# Patient Record
Sex: FEMALE | Race: WHITE | NOT HISPANIC OR LATINO | Employment: FULL TIME | URBAN - NONMETROPOLITAN AREA
[De-identification: names, ages, dates, MRNs, and addresses within clinical notes are randomized per-mention and may not be internally consistent; named-entity substitution may affect disease eponyms.]

---

## 2019-12-27 ENCOUNTER — HOSPITAL ENCOUNTER (OUTPATIENT)
Dept: MAMMOGRAPHY | Facility: HOSPITAL | Age: 59
Discharge: HOME | End: 2019-12-27
Admitting: OBSTETRICS & GYNECOLOGY

## 2019-12-27 DIAGNOSIS — Z12.31 VISIT FOR SCREENING MAMMOGRAM: ICD-10-CM

## 2019-12-27 PROCEDURE — 77063 BREAST TOMOSYNTHESIS BI: CPT

## 2019-12-27 PROCEDURE — 77063 BREAST TOMOSYNTHESIS BI: CPT | Performed by: RADIOLOGY

## 2019-12-27 PROCEDURE — 77067 SCR MAMMO BI INCL CAD: CPT | Performed by: RADIOLOGY

## 2019-12-27 PROCEDURE — 77067 SCR MAMMO BI INCL CAD: CPT

## 2020-01-02 ENCOUNTER — APPOINTMENT (OUTPATIENT)
Dept: MAMMOGRAPHY | Facility: HOSPITAL | Age: 60
End: 2020-01-02

## 2020-01-16 ENCOUNTER — HOSPITAL ENCOUNTER (OUTPATIENT)
Dept: MAMMOGRAPHY | Facility: HOSPITAL | Age: 60
Discharge: HOME | End: 2020-01-16
Admitting: RADIOLOGY

## 2020-01-16 ENCOUNTER — HOSPITAL ENCOUNTER (OUTPATIENT)
Dept: ULTRASOUND IMAGING | Facility: HOSPITAL | Age: 60
Discharge: HOME | End: 2020-01-16

## 2020-01-16 DIAGNOSIS — R92.8 ABNORMAL MAMMOGRAM OF RIGHT BREAST: ICD-10-CM

## 2020-01-16 PROCEDURE — 77065 DX MAMMO INCL CAD UNI: CPT

## 2020-01-16 PROCEDURE — 76642 ULTRASOUND BREAST LIMITED: CPT

## 2020-01-16 PROCEDURE — 77065 DX MAMMO INCL CAD UNI: CPT | Performed by: RADIOLOGY

## 2020-01-20 ENCOUNTER — OFFICE VISIT (OUTPATIENT)
Dept: SURGERY | Facility: CLINIC | Age: 60
End: 2020-01-20

## 2020-01-20 VITALS
TEMPERATURE: 97.8 F | HEIGHT: 64 IN | BODY MASS INDEX: 31.41 KG/M2 | OXYGEN SATURATION: 99 % | DIASTOLIC BLOOD PRESSURE: 78 MMHG | HEART RATE: 81 BPM | WEIGHT: 184 LBS | SYSTOLIC BLOOD PRESSURE: 132 MMHG

## 2020-01-20 DIAGNOSIS — N63.0 BREAST MASS: Primary | ICD-10-CM

## 2020-01-20 DIAGNOSIS — R92.8 ABNORMAL MAMMOGRAM: ICD-10-CM

## 2020-01-20 PROCEDURE — 99203 OFFICE O/P NEW LOW 30 MIN: CPT | Performed by: SURGERY

## 2020-01-20 NOTE — PROGRESS NOTES
Patient: Jackie Johnson    YOB: 1960    Date: 01/20/2020    Primary Care Provider: Denver Aguirre MD    Chief Complaint   Patient presents with   • Abnormal Breast Imaging       Subjective .     History of present illness:  I saw the patient in the office  today for evaluation and treatment of abnormal mammogram. Results show Bi-Rads 5.  Patient does states right breast pain and nipple drainage. She denies discoloration at this time. Patient does have a strong family history of breast cancer.  Patient scheduled for right breast stereotactic biopsy tomorrow in [place].  Patient has a second lesion that measures 5 mm seen on ultrasound may also need to be evaluated.  Patient has a very strong family history of breast cancer in her sister and mother.  Patient also complained of mild nipple discharge.    The following portions of the patient's history were reviewed and updated as appropriate: allergies, current medications, past family history, past medical history, past social history, past surgical history and problem list.    Review of Systems   Constitutional: Negative for chills, fever and unexpected weight change.   HENT: Negative for hearing loss, trouble swallowing and voice change.    Eyes: Negative for visual disturbance.   Respiratory: Negative for apnea, cough, chest tightness, shortness of breath and wheezing.    Cardiovascular: Negative for chest pain, palpitations and leg swelling.   Gastrointestinal: Negative for abdominal distention, abdominal pain, anal bleeding, blood in stool, constipation, diarrhea, nausea, rectal pain and vomiting.   Endocrine: Negative for cold intolerance and heat intolerance.   Genitourinary: Negative for difficulty urinating, dysuria and flank pain.   Musculoskeletal: Negative for back pain and gait problem.   Skin: Negative for color change, rash and wound.   Neurological: Negative for dizziness, syncope, speech difficulty, weakness, light-headedness,  "numbness and headaches.   Hematological: Negative for adenopathy. Does not bruise/bleed easily.   Psychiatric/Behavioral: Negative for confusion. The patient is not nervous/anxious.        History:  Past Medical History:   Diagnosis Date   • Asthma           Past Surgical History:   Procedure Laterality Date   •  SECTION     • LEG SURGERY     • TONSILLECTOMY         Family History   Problem Relation Age of Onset   • Breast cancer Mother 75   • Breast cancer Sister 55   • Cancer Father    • Heart disease Father    • Cancer Maternal Grandmother    • Cancer Maternal Grandfather    • Cancer Paternal Grandmother    • Cancer Paternal Grandfather        Social History     Tobacco Use   • Smoking status: Never Smoker   Substance Use Topics   • Alcohol use: Never     Frequency: Never   • Drug use: Never       Allergies:  Allergies   Allergen Reactions   • Penicillins Hives   • Sulfa Antibiotics Other (See Comments)     lesion       Medications:     Current Outpatient Medications:   •  Loratadine (CLARITIN PO), Take  by mouth., Disp: , Rfl:     Objective     Vital Signs:   Vitals:    20 1329   BP: 132/78   Pulse: 81   Temp: 97.8 °F (36.6 °C)   SpO2: 99%   Weight: 83.5 kg (184 lb)   Height: 162.6 cm (64\")       Physical Exam:     General Appearance:    Alert, cooperative, in no acute distress   Head:    Normocephalic, without obvious abnormality, atraumatic   Eyes:            Lids and lashes normal, conjunctivae and sclerae normal, no   icterus, no pallor, corneas clear,   Ears:    Ears appear intact with no abnormalities noted   Throat:   No oral lesions, no thrush, oral mucosa moist   Breast:    No palpable mass in either breast or axilla   Lungs:     Clear to auscultation,respirations regular, even and                  Unlabored    Heart:    Regular rhythm and normal rate, no murmur, no gallop.   Chest Wall:    No abnormalities observed   Abdomen:     Normal bowel sounds, no masses, no organomegaly, soft       "  non-tender, non-distended, no guarding.   Extremities:   Moves all extremities well, no edema, no cyanosis, no             redness   Pulses:   Pulses palpable and equal bilaterally   Skin:   No bleeding, bruising or rash   Lymph nodes:   No palpable adenopathy   Neurologic:   Cranial nerves 2 - 12 grossly intact.           Results Review:   I reviewed the patient's new clinical results.    Review of Systems was reviewed and confirmed as accurate as documented by the MA.    Assessment / Plan:    1. Breast mass    2. Abnormal mammogram        I did have a detailed and extensive discussion with the patient in the office today and reviewed her recent workup.  I have told the patient that to go ahead and have stereotactic biopsy done tomorrow.  Follow-up in 1 week for a right breast mammotome biopsy.  If stereotactic biopsy is positive and she wants to undergo a mastectomy, there is no need to do a second biopsy on the ultrasound visualized lesion.    Electronically signed by Anai Bansal MD  01/20/20  1:49 PM

## 2020-01-21 ENCOUNTER — HOSPITAL ENCOUNTER (OUTPATIENT)
Dept: MAMMOGRAPHY | Facility: HOSPITAL | Age: 60
Discharge: HOME | End: 2020-01-21

## 2020-01-21 ENCOUNTER — HOSPITAL ENCOUNTER (OUTPATIENT)
Dept: MAMMOGRAPHY | Facility: HOSPITAL | Age: 60
Discharge: HOME | End: 2020-01-21
Admitting: RADIOLOGY

## 2020-01-21 DIAGNOSIS — R92.8 ABNORMAL MAMMOGRAM: ICD-10-CM

## 2020-01-21 PROCEDURE — 19081 BX BREAST 1ST LESION STRTCTC: CPT | Performed by: RADIOLOGY

## 2020-01-21 PROCEDURE — A4648 IMPLANTABLE TISSUE MARKER: HCPCS

## 2020-01-21 PROCEDURE — 77065 DX MAMMO INCL CAD UNI: CPT | Performed by: RADIOLOGY

## 2020-01-21 PROCEDURE — 19082 BX BREAST ADD LESION STRTCTC: CPT | Performed by: RADIOLOGY

## 2020-01-22 ENCOUNTER — APPOINTMENT (OUTPATIENT)
Dept: MAMMOGRAPHY | Facility: HOSPITAL | Age: 60
End: 2020-01-22

## 2020-01-24 LAB
LAB AP CASE REPORT: NORMAL
LAB AP CLINICAL INFORMATION: NORMAL
PATH REPORT.FINAL DX SPEC: NORMAL

## 2020-02-12 LAB
LAB AP CASE REPORT: NORMAL
LAB AP CLINICAL INFORMATION: NORMAL
PATH REPORT.FINAL DX SPEC: NORMAL

## 2020-03-11 ENCOUNTER — CONSULT (OUTPATIENT)
Dept: ONCOLOGY | Facility: CLINIC | Age: 60
End: 2020-03-11

## 2020-03-11 VITALS
SYSTOLIC BLOOD PRESSURE: 131 MMHG | DIASTOLIC BLOOD PRESSURE: 85 MMHG | WEIGHT: 183 LBS | BODY MASS INDEX: 31.24 KG/M2 | TEMPERATURE: 98.2 F | HEIGHT: 64 IN | OXYGEN SATURATION: 98 % | HEART RATE: 68 BPM

## 2020-03-11 DIAGNOSIS — Z17.1 MALIGNANT NEOPLASM OF RIGHT BREAST IN FEMALE, ESTROGEN RECEPTOR NEGATIVE, UNSPECIFIED SITE OF BREAST (HCC): Primary | ICD-10-CM

## 2020-03-11 DIAGNOSIS — C50.911 MALIGNANT NEOPLASM OF RIGHT BREAST IN FEMALE, ESTROGEN RECEPTOR NEGATIVE, UNSPECIFIED SITE OF BREAST (HCC): Primary | ICD-10-CM

## 2020-03-11 LAB
ALBUMIN SERPL-MCNC: 4.54 G/DL (ref 3.5–5.2)
ALBUMIN/GLOB SERPL: 1.5 G/DL
ALP SERPL-CCNC: 73 U/L (ref 39–117)
ALT SERPL W P-5'-P-CCNC: 38 U/L (ref 1–33)
ANION GAP SERPL CALCULATED.3IONS-SCNC: 13.1 MMOL/L (ref 5–15)
AST SERPL-CCNC: 20 U/L (ref 1–32)
BASOPHILS # BLD AUTO: 0.08 10*3/MM3 (ref 0–0.2)
BASOPHILS NFR BLD AUTO: 1.2 % (ref 0–1.5)
BILIRUB SERPL-MCNC: 0.4 MG/DL (ref 0.2–1.2)
BUN BLD-MCNC: 15 MG/DL (ref 8–23)
BUN/CREAT SERPL: 19.7 (ref 7–25)
CALCIUM SPEC-SCNC: 9.9 MG/DL (ref 8.6–10.5)
CHLORIDE SERPL-SCNC: 102 MMOL/L (ref 98–107)
CO2 SERPL-SCNC: 24.9 MMOL/L (ref 22–29)
CREAT BLD-MCNC: 0.76 MG/DL (ref 0.57–1)
DEPRECATED RDW RBC AUTO: 44 FL (ref 37–54)
EOSINOPHIL # BLD AUTO: 0.09 10*3/MM3 (ref 0–0.4)
EOSINOPHIL NFR BLD AUTO: 1.3 % (ref 0.3–6.2)
ERYTHROCYTE [DISTWIDTH] IN BLOOD BY AUTOMATED COUNT: 12.9 % (ref 12.3–15.4)
GFR SERPL CREATININE-BSD FRML MDRD: 78 ML/MIN/1.73
GLOBULIN UR ELPH-MCNC: 3.1 GM/DL
GLUCOSE BLD-MCNC: 99 MG/DL (ref 65–99)
HCT VFR BLD AUTO: 44 % (ref 34–46.6)
HGB BLD-MCNC: 14.1 G/DL (ref 12–15.9)
IMM GRANULOCYTES # BLD AUTO: 0.01 10*3/MM3 (ref 0–0.05)
IMM GRANULOCYTES NFR BLD AUTO: 0.1 % (ref 0–0.5)
LYMPHOCYTES # BLD AUTO: 2.36 10*3/MM3 (ref 0.7–3.1)
LYMPHOCYTES NFR BLD AUTO: 34.5 % (ref 19.6–45.3)
MCH RBC QN AUTO: 29.7 PG (ref 26.6–33)
MCHC RBC AUTO-ENTMCNC: 32 G/DL (ref 31.5–35.7)
MCV RBC AUTO: 92.8 FL (ref 79–97)
MONOCYTES # BLD AUTO: 0.51 10*3/MM3 (ref 0.1–0.9)
MONOCYTES NFR BLD AUTO: 7.4 % (ref 5–12)
NEUTROPHILS # BLD AUTO: 3.8 10*3/MM3 (ref 1.7–7)
NEUTROPHILS NFR BLD AUTO: 55.5 % (ref 42.7–76)
NRBC BLD AUTO-RTO: 0 /100 WBC (ref 0–0.2)
PLATELET # BLD AUTO: 237 10*3/MM3 (ref 140–450)
PMV BLD AUTO: 10.1 FL (ref 6–12)
POTASSIUM BLD-SCNC: 4.3 MMOL/L (ref 3.5–5.2)
PROT SERPL-MCNC: 7.6 G/DL (ref 6–8.5)
RBC # BLD AUTO: 4.74 10*6/MM3 (ref 3.77–5.28)
SODIUM BLD-SCNC: 140 MMOL/L (ref 136–145)
WBC NRBC COR # BLD: 6.85 10*3/MM3 (ref 3.4–10.8)

## 2020-03-11 PROCEDURE — 80053 COMPREHEN METABOLIC PANEL: CPT | Performed by: INTERNAL MEDICINE

## 2020-03-11 PROCEDURE — 99205 OFFICE O/P NEW HI 60 MIN: CPT | Performed by: INTERNAL MEDICINE

## 2020-03-11 PROCEDURE — 86300 IMMUNOASSAY TUMOR CA 15-3: CPT | Performed by: INTERNAL MEDICINE

## 2020-03-11 PROCEDURE — 85025 COMPLETE CBC W/AUTO DIFF WBC: CPT | Performed by: INTERNAL MEDICINE

## 2020-03-11 PROCEDURE — 36415 COLL VENOUS BLD VENIPUNCTURE: CPT | Performed by: INTERNAL MEDICINE

## 2020-03-11 RX ORDER — ACETAMINOPHEN 325 MG/1
650 TABLET ORAL EVERY 6 HOURS PRN
COMMUNITY
End: 2020-11-10

## 2020-03-11 RX ORDER — FEXOFENADINE HYDROCHLORIDE 60 MG/1
60 TABLET, FILM COATED ORAL EVERY OTHER DAY
Status: ON HOLD | COMMUNITY
End: 2020-04-18

## 2020-03-11 NOTE — PROGRESS NOTES
NAME: Jackie Johnson    : 1960    DATE OF CONSULTATION:  3/11/2020    REASON FOR REFERRAL: Breast Cancer    REFERRING PHYSICIAN:  JUAN PABLO Richards MD    CHIEF COMPLAINT:  Breast Cancer      HISTORY OF PRESENT ILLNESS:   Jackie Johnson is a very pleasant 60 y.o. female who is being seen today in the presence of her  at the request of JUAN PABLO Richards MD for evaluation and treatment of breast cancer. She has a strong family h/o breast cancer and so is normally very good about getting her mammograms done.  She recently moved from [facility] however and so missed her mammogram in 2019 because of the move.  She got established with a new PCP (Dr. Denver Sheth) in 2019 and was referred to a a new gynecologist  (Dr. Brayan Richards) who she saw in .  Kevin Richards ordered a screening mammogram.  She had an acute febrile illness around the time of the Super Bowl and at that time thought she might have a lump in her R breast.  While waiting on mammogram scheduling, she started to have pain in the breast and in eventually she would occasionally get a little bit of discharge from the right nipple.  She presented for  Screening mammography 19 as below.  This was followed by diagnostic mammogram and U/S on 20.  She had an abnormal span of 15 cm spanning the R breast.  She underwent stereotactic biopsy on 20.    This revealed a poorly differentiated invasive ductal carcinoma of the R breast with associated high grade DCIS.  Tumor was ER/HI- and HER-2+ by IHC.  Her sister lives in [place] and was treated at [facility], so she went there for further evaluation. She saw Dr. Romero of medical oncology and Dr. Serrano of surgical oncology.  She had staging with CT CAP and bone scan as well as Brain MRI and had no evidence of distant metastatic spread.  She had reese breast MRI as well which showed an unexpected abnormality in the L breast described as linear non-mass  enhancement spanning an 8 cm area in the inferior breast at 6:00.  This was biopsied on 20 with biopsy result pending.  No abnormal axillary LNs were identified.  Neoadjuvant chemotherapy was recommended to her by her [facility] team of physicians but she wishes to seek this treatment closer to home so is here today for further evaluation and treatment.    She is in good health overall. She reports an isolated episode of fever/chills during an acute illness, abdominal fullness to her right side, fatigue and a rash circling her neck. She denies changes in weight, cp, sob, persistent abdominal pain, n/v/c/d or bony pain.      Risk Factors:  Age of Menarche: 11/11 yo  Age of Menopause: 55 yo  Prior Breast Disease: negative   Pregnancies:    Age of 1st pregnancy: 26  Family History of Breast Cancer: + see family history below  Family History of Ovarian Cancer: + see family history below  History of HRT use: None  Other history of hormone use:  Took progesterone for 1 month as part of a fertility treatment      PAST MEDICAL HISTORY:  Past Medical History:   Diagnosis Date   • Acid reflux    • Asthma    • Eczema    • Endometriosis    • Hiatal hernia    • High cholesterol    • Seasonal allergies        PAST SURGICAL HISTORY:  Past Surgical History:   Procedure Laterality Date   • BREAST BIOPSY     • CARDIAC CATHETERIZATION     •  SECTION     • D&C HYSTEROSCOPY ENDOMETRIAL ABLATION     • LEG SURGERY      Left leg following fracture   • TONSILLECTOMY         FAMILY HISTORY:  Family History   Problem Relation Age of Onset   • Breast cancer Mother 75   • Basal cell carcinoma Mother 90        2 small spots on face removed + fair complexion   • Breast cancer Sister 55        VUS detected on genetic testing CDKN2A gene (c.-33G>C).    • Heart disease Father    • Throat cancer Father 79   • Lung cancer Father 79   • Cancer Maternal Grandmother 65        unknown gynecologic cancer, cervical vs uterine   •  Lymphoma Maternal Grandfather 64   • Breast cancer Paternal Grandmother 70   • Prostate cancer Paternal Grandfather 70        metastastic to liver   • Breast cancer Paternal Aunt 77   • Breast cancer Maternal Aunt 80   • Breast cancer Other    • Ovarian cancer Sister 66        Elevated ; 1A tumor; Borderline cancer; both ovaries & lg cyst removed   • Ovarian cancer Maternal Cousin 54   • Breast cancer Other 50         SOCIAL HISTORY:  Social History     Socioeconomic History   • Marital status:      Spouse name: Not on file   • Number of children: Not on file   • Years of education: Not on file   • Highest education level: Not on file   Tobacco Use   • Smoking status: Never Smoker   • Smokeless tobacco: Never Used   Substance and Sexual Activity   • Alcohol use: Never     Frequency: Never   • Drug use: Never   • Sexual activity: Defer   Social History Narrative    lives alone with      with [facility] [place]    No unusual chemical exposures    Never smoker, never drinker       REVIEW OF SYSTEMS:   A comprehensive 14 point review of systems was performed.  Significant findings as mentioned above.  All other systems reviewed and are negative.      MEDICATIONS:  The current medication list was reviewed in the EMR    Current Outpatient Medications:   •  acetaminophen (TYLENOL) 325 MG tablet, Take 650 mg by mouth Every 6 (Six) Hours As Needed for Mild Pain ., Disp: , Rfl:   •  Famotidine (PEPCID PO), Take  by mouth., Disp: , Rfl:   •  fexofenadine (ALLEGRA) 60 MG tablet, Take 60 mg by mouth Daily., Disp: , Rfl:   •  Loratadine (CLARITIN PO), Take  by mouth., Disp: , Rfl:     ALLERGIES:    Allergies   Allergen Reactions   • Albuterol Other (See Comments)     One time severe headache, questionable for aneurysm, did spinal tap was positive, did Angiogram and MRI were Negative for aneurysm   • Cefpodoxime Rash   • Clindamycin Hcl Rash   • Crestor [Rosuvastatin Calcium] Other (See Comments)      Caused high liver enxymes   • Penicillins Hives   • Sulfa Antibiotics Other (See Comments)     lesion   • Pseudoephedrine Other (See Comments)     Soreness on the tongue       PHYSICAL EXAM:  Vitals:    03/11/20 1535   BP: 131/85   Pulse: 68   Temp: 98.2 °F (36.8 °C)   SpO2: 98%     Pain Score    03/11/20 1535   PainSc: 0-No pain     General:  Awake, alert and oriented, in no distress  HEENT:  Pupils are equal, round and reactive to light and accommodation, Extra-ocular movements full, Oropharyx clear, mucous membranes moist  Neck:  No JVD, thyromegaly or lymphadenopathy  CV:  Regular rate and rhythm, no murmurs, rubs or gallops  Resp:  Lungs are clear to auscultation bilaterally  Breast:  She is large breasted.  L breast has some fibrocystic changes but no palpable mass lesions. No enlarged L axillary LNs.  R breast is large.  Surprisingly there really isn't a clearly defined mass.  There is an area of firmer breast tissue posteriorly behind the nipple which is about 10 cm in dimension, but this isn't clearly a mass, more c/w fibrocystic changes.  No nipple d/c or inversion.  No palpable R axillary LNs.  Abd:  Soft, non-tender, sl distended, bowel sounds present, no organomegaly or masses  Ext:  No clubbing, cyanosis or edema  Lymph:  No cervical, supraclavicular, axillary, inguinal or femoral adenopathy  Neuro:  MS as above, CN II-XII intact, grossly non-focal exam      PATHOLOGY:  02-12-20            ENDOSCOPY:        IMAGING:  Bilateral Diagnostic mammogram 12-27-19  FINDINGS:  There are scattered areas of fibroglandular density.     The bilateral fibroglandular pattern is unremarkable in appearance. A  few scattered calcifications are present in the left breast. There are  calcifications in the central aspect of the right breast spanning  approximately 15 cm of tissue in the AP dimension and extending up to  the base of the nipple for which additional imaging is recommended.     IMPRESSION:  1. Benign left  mammographic findings.  2. Calcifications in the right breast. Recommend additional imaging.        BI-RADS CATEGORY:  0, INCOMPLETE: Need additional imaging evaluation.     RECOMMENDATION:  Right ML and CC magnification views.      R diagnostic mammogram 01-16-20  FINDINGS:  Magnification imaging of the right breast demonstrates  casting-type pleomorphic calcifications spanning over 15 cm of tissue in  the AP dimension in the right 6:00 to 7:00 region. Calcifications do  extend up to the base of the nipple. Findings are highly suspicious for  DCIS.     Right breast ultrasound: Focused sonographic evaluation of the right  6:00 to 7:00 region demonstrates a single 0.5 cm irregular hypoechoic  mass associated with a coarse calcification located at 7:00, 3 cm from  the nipple. Ultrasound of the right axilla demonstrates no abnormal  appearing axillary lymph nodes.        IMPRESSION:  Findings are highly suspicious for extensive DCIS in the  right 6:00 to 7:00 region extending into the base of the nipple. There  is also small irregular mass noted on ultrasound in the 7:00 region  suspicious for invasion.        BI-RADS CATEGORY:  5, HIGHLY SUGGESTIVE OF MALIGNANCY        RECOMMENDATION:  Recommend stereotactic guided core biopsy of the  anterior and posterior aspect of the calcifications in the 6:00 to 7:00  region to determine extent of disease. Ultrasound-guided core biopsy of  the right 7:00 mass may also be warranted pending pathology results of  the calcifications.    Diagnostic bilateral mammogram 02-25-20  FINDINGS:  There are scattered areas of fibroglandular density.    1:  There are fine-linear branching calcifications measuring 16 x 7 x 7  centimeters with segmental distribution and associated post biopsy clip in the  right breast lower hemisphere at 6 to 7 o'clock.  There are 2 post biopsy clips  noted.  The calcifications extend to the inferior skin and nipple.    2:  There are amorphous calcifications  measuring 0.3 centimeters in the left  breast lower hemisphere at 6 to 7 o'clock located 4 centimeters from the nipple.    IMPRESSION:  1:  Fine-linear branching calcifications in the right breast lower hemisphere at  6 to 7 o'clock are known biopsy-proven malignancy. Ultrasound is recommended for  staging. Recommend appropriate evaluation and treatment of this known malignancy.    2:  Amorphous calcifications in the left breast lower hemisphere at 6 to 7  o'clock located 4 centimeters from the nipple are suspicious. Stereotactic  biopsy is recommended.    BI-RADS Category 4:  Suspicious Abnormality      US Chest 02-25-20  Findings:       Right Breast: The extent of calcified disease is best visualized by mammography. There are dilated ducts with internal calcifications vascularity extending toward the nipple at the 7 o'clock position. This is consistent with the patient's known biopsy-proven malignancy.      Right Charles Basins: No suspicious axillary (level I, II & III) or internal mammary lymphadenopathy is noted.      IMPRESSION:    1. Known RIGHT breast malignancy is best visualized by mammography. A MRI may be obtained for evaluation of disease.    2. No suspicious RIGHT-sided lymphadenopathy      ACR BI-RADS Category: 6. Known malignancy.  Recommend appropriate evaluation and treatment of the known malignancy.         R breast US 02-25-20  Findings:       Right Breast: The extent of calcified disease is best visualized by mammography. There are dilated ducts with internal calcifications vascularity extending toward the nipple at the 7 o'clock position. This is consistent with the patient's known biopsy-proven malignancy.      Right Charles Basins: No suspicious axillary (level I, II & III) or internal mammary lymphadenopathy is noted.      IMPRESSION:    1. Known RIGHT breast malignancy is best visualized by mammography. A MRI may be obtained for evaluation of disease.    2. No suspicious RIGHT-sided  lymphadenopathy      ACR BI-RADS Category: 6. Known malignancy.  Recommend appropriate evaluation and treatment of the known malignancy.       MRI Brain w/wo contrast 02-27-20  FINDINGS:     No abnormal parenchymal or leptomeningeal enhancement is noted. The brain parenchyma is unremarkable except of small scattered T2 FLAIR hyperintensities consistent with chronic microangiopathic changes. No acute ischemia, intra or extra-axial hemorrhage, mass effect or midline shift. No hydrocephalus is noted.    The osseous structures and extra-cranial soft tissues are unremarkable.    The orbital structures unremarkable.    The paranasal sinuses and mastoid air cells are clear.    IMPRESSION:  No evidence of metastatic disease.      CTCAP 02-28-20    Findings:       CHEST:  The breasts are ptotic and there are biopsy clips projecting between the lower quadrants of the right breast.    On image 99 of series 3, there is a nonspecific 1 cm soft tissue nodule in the lower outer quadrant of the right breast.    No lung nodules or pleural effusions are present.    No axillary, internal mammary, mediastinal or hilar adenopathy is seen.    No suspicious bone lesions are present.      ABDOMEN and PELVIS:  The liver is borderline enlarged and diffusely fatty infiltrated without measurable mass or biliary ductal dilatation.    The gallbladder appears normal.    The spleen, pancreas and adrenal glands appear normal.    The kidneys demonstrate function without hydronephrosis and there is a small low dense nidus in the mid right kidney, likely a cyst.    No adenopathy or ascites are seen in the abdomen or pelvis.    A normal-appearing retrocecal appendix is seen.    Multilevel degenerative-like bone changes are present.      IMPRESSION:  No evidence for metastatic disease the chest, abdomen or pelvis.      MRI Breast bilateral 03-02-20    Findings:  The breast composition is heterogeneous fibroglandular tissue. There is no significant early  background parenchymal enhancement.    Right breast: There is linearly oriented nonmass enhancement involving the lateral and inferior aspects (anterior, middle, and posterior third) of the right breast 6 - 7 o'clock position extending 17 cm from the base of the nipple to the pectoralis muscle (series 5 image  of 256). There is no evidence for pectoralis muscle invasion. Extension to the inferior skin is better demonstrated mammographically. Susceptibility artifact from 2 postbiopsy clips is noted.    Left breast: There is linearly oriented nonmass enhancement in the inferior aspect (middle third) of the left breast 6 o'clock position extending approximately 8 cm (series 5 image  of 256). There is no evidence for nipple, skin, or pectoralis muscle involvement. Stereotactic guided biopsy of calcifications at the 6 o'clock position to be performed later today and corresponds to the anterior aspect of the nonmass enhancement. MRI guided biopsy of nonmass enhancement seen on image 94 of 256, series 5 and image 40 of 160, series 8 is recommended (The images have been annotated with a [place]).    Charles Basins: There is no lymphadenopathy in the visualized axillary or internal mammary regions.    IMPRESSION:  Linear nonmass enhancement representing known malignancy right breast as above. Linear nonmass enhancement left breast as above.   MRI guided biopsy of nonmass enhancement seen on image 94 of 256, series 5 and image 40 of 160, series 8 is recommended.    ACR BI-RADS Category: 6. Recommend MR-guided biopsy of nonmass enhancement left breast as above .Recommend appropriate evaluation and treatment of the known malignancy.       Bone scan 03-02-20  FINDINGS:     Radiotracer uptake secondary to degenerative changes are seen involving the shoulders, hips, knees, and feet. Increased uptake along the right tibial shaft likely is likely reactive. Faint focus of activity within the left tibial shaft is compatible  with prior injury/fracture. Increased uptake in the skull is compatible with a benign hyperostosis.    Physiologic uptake of the bilateral kidneys and bladder.    IMPRESSION:    No scintigraphic evidence of skeletal metastases.    RECENT LABS:  Lab Results   Component Value Date    WBC 6.0 02/26/2020    HGB 14.3 02/26/2020    HCT 45.3 02/26/2020    MCV 94 02/26/2020    RDW 13.2 02/26/2020     02/26/2020    NEUTRORELPCT 56.7 02/26/2020    EOSRELPCT 1.0 02/26/2020    NEUTROABS 3.40 02/26/2020    LYMPHSABS 2.06 02/26/2020       Lab Results   Component Value Date     02/26/2020    K 4.2 02/26/2020    CO2 25 02/26/2020     02/26/2020    BUN 16 02/26/2020    CREATININE 0.87 02/26/2020    EGFRIFNONA 73 02/26/2020    EGFRIFAFRI 84 02/26/2020           ASSESSMENT & PLAN:  Jackie Johnson is a very pleasant 60 y.o. female with what is clinically a Stage IIB ([id number]) poorly differentiated invasive ductal carcinoma of the right breast.  Tumor spans 15 cm.  There are no clinically or radiographically supspicious axillary LNs.  There is associated high grade DCIS.  Tumor is ER-,MO-, HER-2 +  There is also an abnormal finding on L breast MRI which is suspicious (BIRADS-5).  This has been biopsied.    1.  Right breast cancer:  -  I am in agreement with neoadjuvant chemotherapy to downsize tumor and potentially allow for a more successful surgery.  -  Specifically, I have recommended neoadjuvant Taxotere, Carboplatin, Herceptin and Perjeta with growth factor support.  -  Will get baseline bloodwork today.  -  Will refer for PAC          -  Echocardiogram done at [facility] - will obtain report.  -  Will need to monitor carefully on treatment.  Since she really doesn't have palpable disease, will plan for interim mammogram and U/S after 3 cycles of therapy.  -  Following neoadjuvant chemotherapy, will return her for surgery.  Following surgery, she will require adjuvant radiation given the size of her tumor  and completion of a year of anti-HER-2 therapy.             2.  Abnormality on R Breast visible on MRI only :    -  Biopsy done at [facility] on 2-28-20.  Await result.    3.  Very strong family history of malignancy including several members with breast cancer and a sister with ovarian cancer.  -  She underwent genetic testing at [facility] and testing was negative.  While she has no known genetic mutation, given her personal history combined with her family history, I would be in favor of bilateral mastectomy.  I think it is also likely that given the size of her tumor she would likely have significant asymmetry after unilateral surgery.    4.  Prophylaxis:  She has had 2019 flu vaccine.  Recommended she get Prevnar 13.  She is a lifelong non-smoker.    5.  ACO / MARNI/Other  Quality measures  -  Jackie Johnson received 2019 flu vaccine.  -  Jackie Johnson reports a pain score of 0.  Given her pain assessment as noted, treatment options were discussed and the following options were decided upon as a follow-up plan to address the patient's pain: No intervention needed..  -  Current outpatient and discharge medications have been reconciled for the patient.  Reviewed by: Fatoumata Obrien MD    6.  Follow-up:  Will get results of L breast biopsy and Echocardiogram done at [facility].  Will refer for PAC placement.  Will get baseline labwork today. Will work on approvals for TC and plan to begin as soon as possible.                                                                                                                                                                                                                 This note was scribed for Fatoumata Obrien MD by Alba Siu RN.    I, Fatoumata Obrien MD, personally performed the services described in this documentation as scribed by the above named individual in my presence, and it is both accurate and complete.  03/11/2020                                                                                                                                                                                                                                                                                                                                                                                                                               I spent 60 minutes with Jackie Johnson today.  In the office today, more than 50% of this time was spent face-to-face with her  in counseling / coordination of care, reviewing her medical history and counseling on the current treatment plan.  All questions were answered to her satisfaction      Electronically Signed by: Fatoumata Obrien MD      CC:   MD JUAN PABLO Macdonald MD Bora Lim, MD- MD [facility]  Kadeem Serrano MD- MD [facility]

## 2020-03-12 LAB — CANCER AG15-3 SERPL-ACNC: 15.1 U/ML

## 2020-03-13 LAB — CANCER AG27-29 SERPL-ACNC: 19 U/ML (ref 0–38.6)

## 2020-03-17 ENCOUNTER — PREP FOR SURGERY (OUTPATIENT)
Dept: OTHER | Facility: HOSPITAL | Age: 60
End: 2020-03-17

## 2020-03-17 DIAGNOSIS — C50.919 BREAST CANCER (HCC): Primary | ICD-10-CM

## 2020-03-17 RX ORDER — SODIUM CHLORIDE 0.9 % (FLUSH) 0.9 %
10 SYRINGE (ML) INJECTION AS NEEDED
Status: CANCELLED | OUTPATIENT
Start: 2020-03-18

## 2020-03-17 RX ORDER — SODIUM CHLORIDE 0.9 % (FLUSH) 0.9 %
3 SYRINGE (ML) INJECTION EVERY 12 HOURS SCHEDULED
Status: CANCELLED | OUTPATIENT
Start: 2020-03-18

## 2020-03-18 ENCOUNTER — ANESTHESIA EVENT (OUTPATIENT)
Dept: PERIOP | Facility: HOSPITAL | Age: 60
End: 2020-03-18

## 2020-03-18 ENCOUNTER — APPOINTMENT (OUTPATIENT)
Dept: GENERAL RADIOLOGY | Facility: HOSPITAL | Age: 60
End: 2020-03-18

## 2020-03-18 ENCOUNTER — TELEPHONE (OUTPATIENT)
Dept: ONCOLOGY | Facility: CLINIC | Age: 60
End: 2020-03-18

## 2020-03-18 ENCOUNTER — ANESTHESIA (OUTPATIENT)
Dept: PERIOP | Facility: HOSPITAL | Age: 60
End: 2020-03-18

## 2020-03-18 ENCOUNTER — HOSPITAL ENCOUNTER (OUTPATIENT)
Facility: HOSPITAL | Age: 60
Setting detail: HOSPITAL OUTPATIENT SURGERY
Discharge: HOME | End: 2020-03-18
Attending: SURGERY | Admitting: SURGERY

## 2020-03-18 VITALS
DIASTOLIC BLOOD PRESSURE: 75 MMHG | TEMPERATURE: 97.9 F | SYSTOLIC BLOOD PRESSURE: 110 MMHG | HEIGHT: 64 IN | RESPIRATION RATE: 18 BRPM | WEIGHT: 180.6 LBS | HEART RATE: 62 BPM | BODY MASS INDEX: 30.83 KG/M2 | OXYGEN SATURATION: 99 %

## 2020-03-18 DIAGNOSIS — C50.919 BREAST CANCER (HCC): ICD-10-CM

## 2020-03-18 PROCEDURE — 25010000002 PROPOFOL 10 MG/ML EMULSION: Performed by: NURSE ANESTHETIST, CERTIFIED REGISTERED

## 2020-03-18 PROCEDURE — C1788 PORT, INDWELLING, IMP: HCPCS | Performed by: SURGERY

## 2020-03-18 PROCEDURE — 71045 X-RAY EXAM CHEST 1 VIEW: CPT

## 2020-03-18 PROCEDURE — 77001 FLUOROGUIDE FOR VEIN DEVICE: CPT | Performed by: SURGERY

## 2020-03-18 PROCEDURE — 25010000002 FENTANYL CITRATE (PF) 100 MCG/2ML SOLUTION: Performed by: NURSE ANESTHETIST, CERTIFIED REGISTERED

## 2020-03-18 PROCEDURE — 76937 US GUIDE VASCULAR ACCESS: CPT | Performed by: SURGERY

## 2020-03-18 PROCEDURE — 76000 FLUOROSCOPY <1 HR PHYS/QHP: CPT

## 2020-03-18 PROCEDURE — 25010000002 MIDAZOLAM PER 1 MG: Performed by: NURSE ANESTHETIST, CERTIFIED REGISTERED

## 2020-03-18 PROCEDURE — 36561 INSERT TUNNELED CV CATH: CPT | Performed by: SURGERY

## 2020-03-18 PROCEDURE — 25010000003 HEPARIN LOCK FLUCH PER 10 UNITS: Performed by: SURGERY

## 2020-03-18 PROCEDURE — 25010000002 PHENYLEPHRINE PER 1 ML: Performed by: NURSE ANESTHETIST, CERTIFIED REGISTERED

## 2020-03-18 PROCEDURE — 25010000002 VANCOMYCIN 5 G RECONSTITUTED SOLUTION 5,000 MG VIAL: Performed by: SURGERY

## 2020-03-18 PROCEDURE — 71045 X-RAY EXAM CHEST 1 VIEW: CPT | Performed by: RADIOLOGY

## 2020-03-18 PROCEDURE — 76000 FLUOROSCOPY <1 HR PHYS/QHP: CPT | Performed by: RADIOLOGY

## 2020-03-18 DEVICE — VACCESS CT POWER-INJECTABLE IMPLANTABLE PORT (WITH SUTURE PLUGS) (8F)
Type: IMPLANTABLE DEVICE | Status: NON-FUNCTIONAL
Brand: VACCESS
Removed: 2020-04-19

## 2020-03-18 RX ORDER — MAGNESIUM HYDROXIDE 1200 MG/15ML
LIQUID ORAL AS NEEDED
Status: DISCONTINUED | OUTPATIENT
Start: 2020-03-18 | End: 2020-03-18 | Stop reason: HOSPADM

## 2020-03-18 RX ORDER — FENTANYL CITRATE 50 UG/ML
50 INJECTION, SOLUTION INTRAMUSCULAR; INTRAVENOUS
Status: DISCONTINUED | OUTPATIENT
Start: 2020-03-18 | End: 2020-03-18 | Stop reason: HOSPADM

## 2020-03-18 RX ORDER — LEVALBUTEROL TARTRATE 45 UG/1
1-2 AEROSOL, METERED ORAL EVERY 4 HOURS PRN
COMMUNITY
End: 2022-06-30 | Stop reason: SDUPTHER

## 2020-03-18 RX ORDER — FENTANYL CITRATE 50 UG/ML
INJECTION, SOLUTION INTRAMUSCULAR; INTRAVENOUS AS NEEDED
Status: DISCONTINUED | OUTPATIENT
Start: 2020-03-18 | End: 2020-03-18 | Stop reason: SURG

## 2020-03-18 RX ORDER — SODIUM CHLORIDE 0.9 % (FLUSH) 0.9 %
10 SYRINGE (ML) INJECTION AS NEEDED
Status: DISCONTINUED | OUTPATIENT
Start: 2020-03-18 | End: 2020-03-18 | Stop reason: HOSPADM

## 2020-03-18 RX ORDER — SODIUM CHLORIDE 0.9 % (FLUSH) 0.9 %
3 SYRINGE (ML) INJECTION EVERY 12 HOURS SCHEDULED
Status: DISCONTINUED | OUTPATIENT
Start: 2020-03-18 | End: 2020-03-18 | Stop reason: HOSPADM

## 2020-03-18 RX ORDER — PROPOFOL 10 MG/ML
VIAL (ML) INTRAVENOUS AS NEEDED
Status: DISCONTINUED | OUTPATIENT
Start: 2020-03-18 | End: 2020-03-18 | Stop reason: SURG

## 2020-03-18 RX ORDER — HEPARIN SODIUM (PORCINE) LOCK FLUSH IV SOLN 100 UNIT/ML 100 UNIT/ML
SOLUTION INTRAVENOUS AS NEEDED
Status: DISCONTINUED | OUTPATIENT
Start: 2020-03-18 | End: 2020-03-18 | Stop reason: HOSPADM

## 2020-03-18 RX ORDER — SODIUM CHLORIDE, SODIUM LACTATE, POTASSIUM CHLORIDE, CALCIUM CHLORIDE 600; 310; 30; 20 MG/100ML; MG/100ML; MG/100ML; MG/100ML
125 INJECTION, SOLUTION INTRAVENOUS CONTINUOUS
Status: DISCONTINUED | OUTPATIENT
Start: 2020-03-18 | End: 2020-03-18 | Stop reason: HOSPADM

## 2020-03-18 RX ORDER — BUPIVACAINE HYDROCHLORIDE AND EPINEPHRINE 5; 5 MG/ML; UG/ML
INJECTION, SOLUTION PERINEURAL AS NEEDED
Status: DISCONTINUED | OUTPATIENT
Start: 2020-03-18 | End: 2020-03-18 | Stop reason: HOSPADM

## 2020-03-18 RX ORDER — ONDANSETRON 2 MG/ML
4 INJECTION INTRAMUSCULAR; INTRAVENOUS AS NEEDED
Status: DISCONTINUED | OUTPATIENT
Start: 2020-03-18 | End: 2020-03-18 | Stop reason: HOSPADM

## 2020-03-18 RX ORDER — SODIUM CHLORIDE 0.9 % (FLUSH) 0.9 %
10 SYRINGE (ML) INJECTION EVERY 12 HOURS SCHEDULED
Status: DISCONTINUED | OUTPATIENT
Start: 2020-03-18 | End: 2020-03-18 | Stop reason: HOSPADM

## 2020-03-18 RX ORDER — LIDOCAINE HYDROCHLORIDE 20 MG/ML
INJECTION, SOLUTION INFILTRATION; PERINEURAL AS NEEDED
Status: DISCONTINUED | OUTPATIENT
Start: 2020-03-18 | End: 2020-03-18 | Stop reason: SURG

## 2020-03-18 RX ORDER — OXYCODONE HYDROCHLORIDE AND ACETAMINOPHEN 5; 325 MG/1; MG/1
1 TABLET ORAL ONCE AS NEEDED
Status: DISCONTINUED | OUTPATIENT
Start: 2020-03-18 | End: 2020-03-18 | Stop reason: HOSPADM

## 2020-03-18 RX ORDER — MIDAZOLAM HYDROCHLORIDE 1 MG/ML
INJECTION INTRAMUSCULAR; INTRAVENOUS AS NEEDED
Status: DISCONTINUED | OUTPATIENT
Start: 2020-03-18 | End: 2020-03-18 | Stop reason: SURG

## 2020-03-18 RX ORDER — MEPERIDINE HYDROCHLORIDE 25 MG/ML
12.5 INJECTION INTRAMUSCULAR; INTRAVENOUS; SUBCUTANEOUS
Status: DISCONTINUED | OUTPATIENT
Start: 2020-03-18 | End: 2020-03-18 | Stop reason: HOSPADM

## 2020-03-18 RX ADMIN — PHENYLEPHRINE HYDROCHLORIDE 100 MCG: 10 INJECTION, SOLUTION INTRAMUSCULAR; INTRAVENOUS; SUBCUTANEOUS at 08:20

## 2020-03-18 RX ADMIN — PROPOFOL 40 MG: 10 INJECTION, EMULSION INTRAVENOUS at 07:55

## 2020-03-18 RX ADMIN — PHENYLEPHRINE HYDROCHLORIDE 100 MCG: 10 INJECTION, SOLUTION INTRAMUSCULAR; INTRAVENOUS; SUBCUTANEOUS at 08:23

## 2020-03-18 RX ADMIN — SODIUM CHLORIDE, POTASSIUM CHLORIDE, SODIUM LACTATE AND CALCIUM CHLORIDE: 600; 310; 30; 20 INJECTION, SOLUTION INTRAVENOUS at 07:53

## 2020-03-18 RX ADMIN — PHENYLEPHRINE HYDROCHLORIDE 100 MCG: 10 INJECTION, SOLUTION INTRAMUSCULAR; INTRAVENOUS; SUBCUTANEOUS at 07:57

## 2020-03-18 RX ADMIN — PHENYLEPHRINE HYDROCHLORIDE 100 MCG: 10 INJECTION, SOLUTION INTRAMUSCULAR; INTRAVENOUS; SUBCUTANEOUS at 08:18

## 2020-03-18 RX ADMIN — FENTANYL CITRATE 100 MCG: 50 INJECTION INTRAMUSCULAR; INTRAVENOUS at 07:53

## 2020-03-18 RX ADMIN — VANCOMYCIN HYDROCHLORIDE 1250 MG: 5 INJECTION, POWDER, LYOPHILIZED, FOR SOLUTION INTRAVENOUS at 07:53

## 2020-03-18 RX ADMIN — PHENYLEPHRINE HYDROCHLORIDE 100 MCG: 10 INJECTION, SOLUTION INTRAMUSCULAR; INTRAVENOUS; SUBCUTANEOUS at 08:00

## 2020-03-18 RX ADMIN — MIDAZOLAM HYDROCHLORIDE 2 MG: 1 INJECTION, SOLUTION INTRAMUSCULAR; INTRAVENOUS at 07:53

## 2020-03-18 RX ADMIN — LIDOCAINE HYDROCHLORIDE 60 MG: 20 INJECTION, SOLUTION INFILTRATION; PERINEURAL at 07:55

## 2020-03-18 RX ADMIN — PHENYLEPHRINE HYDROCHLORIDE 100 MCG: 10 INJECTION, SOLUTION INTRAMUSCULAR; INTRAVENOUS; SUBCUTANEOUS at 08:10

## 2020-03-18 RX ADMIN — PHENYLEPHRINE HYDROCHLORIDE 100 MCG: 10 INJECTION, SOLUTION INTRAMUSCULAR; INTRAVENOUS; SUBCUTANEOUS at 08:05

## 2020-03-18 RX ADMIN — PHENYLEPHRINE HYDROCHLORIDE 100 MCG: 10 INJECTION, SOLUTION INTRAMUSCULAR; INTRAVENOUS; SUBCUTANEOUS at 08:16

## 2020-03-18 RX ADMIN — PROPOFOL 140 MCG/KG/MIN: 10 INJECTION, EMULSION INTRAVENOUS at 07:55

## 2020-03-18 NOTE — OP NOTE
INSERTION OF PORTACATH  Procedure Note    Jackie Johnson  3/18/2020    Pre-op Diagnosis:   Breast cancer (CMS/HCC) [C50.919]    Post-op Diagnosis:     Post-Op Diagnosis Codes:     * Breast cancer (CMS/HCC) [C50.919]    Procedure(s):  INSERTION OF PORTACATH    Surgeon(s):  Dudley Aldridge MD    Indication:  Breast cancer neoadjuvant therapy    Anesthesia: Choice    Staff:   Circulator: Nereida Ga RN  Radiology Technologist: Joel Romano  Scrub Person: Anastasia Dye LPN  Assistant: Walter Calero CSA    Operative Description: The patient was taken operating suite and placed supine on operating table.  Bilateral sequential compression devices were applied and anesthesia was administered.  The right neck and chest are prepped and draped in sterile fashion.  Timeout procedure was performed after antibiotics and been confirmed.   The right neck and chest were then infiltrated with local anesthetic.  Under ultrasound visualization an 18-gauge access needle was inserted into the right internal jugular vein under direct visualization.  Venous blood was aspirated.  Through the access needle guidewire was placed without resistance.  Ultrasound and fluoroscopy confirmed guidewire appropriate position and course.  A stab incision at the guidewire entry site was made.  2 fingerbreadths below the right clavicle a transverse incision was then made and with blunt and cautery dissection a subcutaneous pocket was created.  The catheter was then tunneled subcutaneously over the clavicle through the guidewire entry site in the right neck.  The catheter was cut to appropriate length based on patient height and the port was assembled and placed in the subcutaneous pocket.  Under fluoroscopic visualization the dilator introducer sheath was inserted via Seldinger technique over the guidewire into the right internal jugular vein.  The guidewire and sheath were removed.  The catheter was inserted into the  removable sheath and the sheath was removed.  Fluoroscopy showed the catheter tip in appropriate position with no evidence of kinking.  The port was accessed and withdrew venous blood easily and flushed with heparinized saline solution easily.  The port was secured to the pectoralis fascia with Prolene sutures.  The port site was closed with subcutaneous Vicryl and Monocryl subcuticular suture.  The right neck incision was closed with a subcuticular Monocryl suture.  The incisions are dressed with sure close and the patient was awakened from anesthesia after all counts were correct and taken to recovery where stat chest x-ray was ordered confirming placement.    Estimated Blood Loss: 10mL    Specimens:   none                Drains: none    Grafts/Implants: R IJ port    Findings: port withdrew and flushed easily    Complications: none      Dudley Aldridge MD     Date: 3/18/2020  Time: 08:33

## 2020-03-18 NOTE — ANESTHESIA PREPROCEDURE EVALUATION
Anesthesia Evaluation     Patient summary reviewed and Nursing notes reviewed   no history of anesthetic complications:  NPO Solid Status: > 8 hours  NPO Liquid Status: > 8 hours           Airway   Mallampati: II  TM distance: >3 FB  Neck ROM: full  no difficulty expected  Dental - normal exam     Pulmonary - normal exam   (+) asthma,sleep apnea,   (-) not a smoker  Cardiovascular - normal exam  Exercise tolerance: good (4-7 METS)    NYHA Classification: II    (+) dysrhythmias, hyperlipidemia,       Neuro/Psych- negative ROS  GI/Hepatic/Renal/Endo    (+) obesity,  hiatal hernia, GERD,      Musculoskeletal (-) negative ROS    Abdominal  - normal exam    Bowel sounds: normal.   Substance History - negative use     OB/GYN negative ob/gyn ROS         Other      history of cancer active                    Anesthesia Plan    ASA 3     general   (LMA)  intravenous induction     Anesthetic plan, all risks, benefits, and alternatives have been provided, discussed and informed consent has been obtained with: patient.  Use of blood products discussed with patient  Consented to blood products.

## 2020-03-18 NOTE — H&P (VIEW-ONLY)
Answers for HPI/ROS submitted by the patient on 3/17/2020   What is the primary reason for your visit?: Other  Please describe your symptoms.: I need a port put in for chemotherapy.  Have you had these symptoms before?: No  Please describe any probable cause for these symptoms. : Breast cancer  Subjective   Jackie Johnson is a 60 y.o. female is being seen for consultation today at the request of Denver Aguirre MD    Jackie Johnson is a 60 y.o. female Patient with large right breast cancer and need for neoadjuvant chemotherapy.  She is planning for surgery at [facility] after treatment.  No anticoagulation.  No previous neck surgeries.  Tumor not palpable but large and diffuse.      Past Medical History:   Diagnosis Date   • Acid reflux    • Asthma    • Cancer (CMS/HCC)    • Eczema    • Endometriosis    • Hiatal hernia    • High cholesterol    • Seasonal allergies        Family History   Problem Relation Age of Onset   • Breast cancer Mother 75   • Basal cell carcinoma Mother 90        2 small spots on face removed + fair complexion   • Breast cancer Sister 55        VUS detected on genetic testing CDKN2A gene (c.-33G>C).    • Heart disease Father    • Throat cancer Father 79   • Lung cancer Father 79   • Cancer Maternal Grandmother 65        unknown gynecologic cancer, cervical vs uterine   • Lymphoma Maternal Grandfather 64   • Breast cancer Paternal Grandmother 70   • Prostate cancer Paternal Grandfather 70        metastastic to liver   • Breast cancer Paternal Aunt 77   • Breast cancer Maternal Aunt 80   • Breast cancer Other    • Ovarian cancer Sister 66        Elevated ; 1A tumor; Borderline cancer; both ovaries & lg cyst removed   • Ovarian cancer Maternal Cousin 54   • Breast cancer Other 50       Social History     Socioeconomic History   • Marital status:      Spouse name: Not on file   • Number of children: Not on file   • Years of education: Not on file   • Highest education level:  Not on file   Tobacco Use   • Smoking status: Never Smoker   • Smokeless tobacco: Never Used   Substance and Sexual Activity   • Alcohol use: Never     Frequency: Never   • Drug use: Never   • Sexual activity: Defer     Partners: Male     Birth control/protection: None   Social History Narrative    lives alone with      with [facility]    No unusual chemical exposures    Never smoker, never drinker       Past Surgical History:   Procedure Laterality Date   • ABDOMINAL SURGERY     • BREAST BIOPSY Bilateral    • CARDIAC CATHETERIZATION     •  SECTION     • D&C HYSTEROSCOPY ENDOMETRIAL ABLATION     • DIAGNOSTIC LAPAROSCOPY     • FRACTURE SURGERY Left     tib/fib   • LEG SURGERY      Left leg following fracture   • SKIN BIOPSY     • TONSILLECTOMY         Review of Systems   Constitutional: Negative for activity change, appetite change, chills and fever.   HENT: Negative for sore throat and trouble swallowing.    Eyes: Negative for visual disturbance.   Respiratory: Negative for cough and shortness of breath.    Cardiovascular: Negative for chest pain and palpitations.   Gastrointestinal: Negative for abdominal distention, abdominal pain, blood in stool, constipation, diarrhea, nausea and vomiting.   Endocrine: Negative for cold intolerance and heat intolerance.   Genitourinary: Negative for dysuria.   Musculoskeletal: Negative for joint swelling.   Skin: Negative for color change, rash and wound.   Allergic/Immunologic: Negative for immunocompromised state.   Neurological: Negative for dizziness, seizures, weakness and headaches.   Hematological: Negative for adenopathy. Does not bruise/bleed easily.   Psychiatric/Behavioral: Negative for agitation and confusion.         There were no vitals taken for this visit.  Objective   Physical Exam   Constitutional: She is oriented to person, place, and time. She appears well-developed.   HENT:   Head: Normocephalic and atraumatic.    Mouth/Throat: Mucous membranes are normal.   Eyes: Pupils are equal, round, and reactive to light. Conjunctivae are normal.   Neck: Neck supple. No JVD present. No tracheal deviation present. No thyromegaly present.   Cardiovascular: Normal rate and regular rhythm. Exam reveals no gallop and no friction rub.   No murmur heard.  Pulmonary/Chest: Effort normal and breath sounds normal.   Abdominal: Soft. She exhibits no distension. There is no splenomegaly or hepatomegaly. There is no tenderness. No hernia.   Musculoskeletal: Normal range of motion. She exhibits no deformity.   Neurological: She is alert and oriented to person, place, and time.   Skin: Skin is warm and dry.   Psychiatric: She has a normal mood and affect.             Assessment   Diagnoses and all orders for this visit:    Malignant neoplasm of right breast in female, estrogen receptor negative, unspecified site of breast (CMS/HCC)    Other orders  -     Chlorhexidine Gluconate Cloth 2 % pads; Apply 2 application topically 1 (One) Time for 1 dose.      Jackie Johnson is a 60 y.o. female with need for neoadjuvant chemotherapy tomorrow 3/18/2020.  Patient will undergo port placement and follow up with Hem/Onc after placement.    Patient's There is no height or weight on file to calculate BMI. BMI is above normal parameters. Recommendations include: educational material.

## 2020-03-19 ENCOUNTER — INFUSION (OUTPATIENT)
Dept: ONCOLOGY | Facility: HOSPITAL | Age: 60
End: 2020-03-19

## 2020-03-19 ENCOUNTER — DOCUMENTATION (OUTPATIENT)
Dept: ONCOLOGY | Facility: HOSPITAL | Age: 60
End: 2020-03-19

## 2020-03-19 VITALS
OXYGEN SATURATION: 98 % | WEIGHT: 182.6 LBS | BODY MASS INDEX: 31.34 KG/M2 | TEMPERATURE: 98.3 F | SYSTOLIC BLOOD PRESSURE: 142 MMHG | HEART RATE: 73 BPM | DIASTOLIC BLOOD PRESSURE: 87 MMHG | RESPIRATION RATE: 18 BRPM

## 2020-03-19 DIAGNOSIS — C50.911 MALIGNANT NEOPLASM OF RIGHT BREAST IN FEMALE, ESTROGEN RECEPTOR NEGATIVE, UNSPECIFIED SITE OF BREAST (HCC): Primary | ICD-10-CM

## 2020-03-19 DIAGNOSIS — C50.911 MALIGNANT NEOPLASM OF RIGHT BREAST IN FEMALE, ESTROGEN RECEPTOR NEGATIVE, UNSPECIFIED SITE OF BREAST (HCC): ICD-10-CM

## 2020-03-19 DIAGNOSIS — Z17.1 MALIGNANT NEOPLASM OF RIGHT BREAST IN FEMALE, ESTROGEN RECEPTOR NEGATIVE, UNSPECIFIED SITE OF BREAST (HCC): ICD-10-CM

## 2020-03-19 DIAGNOSIS — Z17.1 MALIGNANT NEOPLASM OF RIGHT BREAST IN FEMALE, ESTROGEN RECEPTOR NEGATIVE, UNSPECIFIED SITE OF BREAST (HCC): Primary | ICD-10-CM

## 2020-03-19 DIAGNOSIS — Z95.828 PORT-A-CATH IN PLACE: ICD-10-CM

## 2020-03-19 PROCEDURE — 25010000002 PALONOSETRON PER 25 MCG: Performed by: INTERNAL MEDICINE

## 2020-03-19 PROCEDURE — 96417 CHEMO IV INFUS EACH ADDL SEQ: CPT

## 2020-03-19 PROCEDURE — 25010000003 HEPARIN LOCK FLUCH PER 10 UNITS: Performed by: INTERNAL MEDICINE

## 2020-03-19 PROCEDURE — 25010000002 FOSAPREPITANT PER 1 MG: Performed by: INTERNAL MEDICINE

## 2020-03-19 PROCEDURE — 25010000002 PERTUZUMAB 420 MG/14ML SOLUTION 420 MG VIAL: Performed by: INTERNAL MEDICINE

## 2020-03-19 PROCEDURE — 25010000002 DEXAMETHASONE SODIUM PHOSPHATE 20 MG/5ML SOLUTION 5 ML VIAL: Performed by: INTERNAL MEDICINE

## 2020-03-19 PROCEDURE — 96413 CHEMO IV INFUSION 1 HR: CPT

## 2020-03-19 PROCEDURE — 96367 TX/PROPH/DG ADDL SEQ IV INF: CPT

## 2020-03-19 PROCEDURE — 96375 TX/PRO/DX INJ NEW DRUG ADDON: CPT

## 2020-03-19 PROCEDURE — 25010000002 TRASTUZUMAB-ANNS 420 MG RECONSTITUTED SOLUTION 1 EACH VIAL: Performed by: INTERNAL MEDICINE

## 2020-03-19 PROCEDURE — 25010000002 DOCETAXEL 20 MG/ML SOLUTION 4 ML VIAL: Performed by: INTERNAL MEDICINE

## 2020-03-19 PROCEDURE — 25010000002 CARBOPLATIN PER 50 MG: Performed by: INTERNAL MEDICINE

## 2020-03-19 PROCEDURE — 96377 APPLICATON ON-BODY INJECTOR: CPT

## 2020-03-19 PROCEDURE — 85025 COMPLETE CBC W/AUTO DIFF WBC: CPT | Performed by: INTERNAL MEDICINE

## 2020-03-19 PROCEDURE — 25010000002 PEGFILGRASTIM 6 MG/0.6ML PREFILLED SYRINGE KIT: Performed by: INTERNAL MEDICINE

## 2020-03-19 PROCEDURE — 96415 CHEMO IV INFUSION ADDL HR: CPT

## 2020-03-19 PROCEDURE — 80053 COMPREHEN METABOLIC PANEL: CPT | Performed by: INTERNAL MEDICINE

## 2020-03-19 RX ORDER — ONDANSETRON HYDROCHLORIDE 8 MG/1
8 TABLET, FILM COATED ORAL 3 TIMES DAILY PRN
Qty: 30 TABLET | Refills: 5 | Status: SHIPPED | OUTPATIENT
Start: 2020-03-19 | End: 2020-11-10

## 2020-03-19 RX ORDER — SODIUM CHLORIDE 9 MG/ML
250 INJECTION, SOLUTION INTRAVENOUS ONCE
Status: CANCELLED | OUTPATIENT
Start: 2020-03-19

## 2020-03-19 RX ORDER — PALONOSETRON 0.05 MG/ML
0.25 INJECTION, SOLUTION INTRAVENOUS ONCE
Status: CANCELLED | OUTPATIENT
Start: 2020-03-19

## 2020-03-19 RX ORDER — SODIUM CHLORIDE 0.9 % (FLUSH) 0.9 %
10 SYRINGE (ML) INJECTION AS NEEDED
Status: DISCONTINUED | OUTPATIENT
Start: 2020-03-19 | End: 2020-03-19 | Stop reason: HOSPADM

## 2020-03-19 RX ORDER — DEXAMETHASONE 4 MG/1
TABLET ORAL
Qty: 6 TABLET | Refills: 3 | Status: ON HOLD | OUTPATIENT
Start: 2020-03-19 | End: 2020-04-18

## 2020-03-19 RX ORDER — FAMOTIDINE 10 MG/ML
20 INJECTION, SOLUTION INTRAVENOUS AS NEEDED
Status: CANCELLED | OUTPATIENT
Start: 2020-03-19

## 2020-03-19 RX ORDER — HEPARIN SODIUM (PORCINE) LOCK FLUSH IV SOLN 100 UNIT/ML 100 UNIT/ML
300 SOLUTION INTRAVENOUS ONCE
Status: CANCELLED | OUTPATIENT
Start: 2020-03-19

## 2020-03-19 RX ORDER — SODIUM CHLORIDE 0.9 % (FLUSH) 0.9 %
20 SYRINGE (ML) INJECTION AS NEEDED
Status: CANCELLED | OUTPATIENT
Start: 2020-03-19

## 2020-03-19 RX ORDER — HEPARIN SODIUM (PORCINE) LOCK FLUSH IV SOLN 100 UNIT/ML 100 UNIT/ML
500 SOLUTION INTRAVENOUS AS NEEDED
Status: CANCELLED | OUTPATIENT
Start: 2020-04-09

## 2020-03-19 RX ORDER — DIPHENHYDRAMINE HYDROCHLORIDE 50 MG/ML
50 INJECTION INTRAMUSCULAR; INTRAVENOUS AS NEEDED
Status: CANCELLED | OUTPATIENT
Start: 2020-03-19

## 2020-03-19 RX ORDER — SODIUM CHLORIDE 0.9 % (FLUSH) 0.9 %
10 SYRINGE (ML) INJECTION AS NEEDED
Status: CANCELLED | OUTPATIENT
Start: 2020-04-09

## 2020-03-19 RX ORDER — SODIUM CHLORIDE 9 MG/ML
250 INJECTION, SOLUTION INTRAVENOUS ONCE
Status: COMPLETED | OUTPATIENT
Start: 2020-03-19 | End: 2020-03-19

## 2020-03-19 RX ORDER — HEPARIN SODIUM (PORCINE) LOCK FLUSH IV SOLN 100 UNIT/ML 100 UNIT/ML
500 SOLUTION INTRAVENOUS AS NEEDED
Status: DISCONTINUED | OUTPATIENT
Start: 2020-03-19 | End: 2020-03-19 | Stop reason: HOSPADM

## 2020-03-19 RX ORDER — PALONOSETRON 0.05 MG/ML
0.25 INJECTION, SOLUTION INTRAVENOUS ONCE
Status: COMPLETED | OUTPATIENT
Start: 2020-03-19 | End: 2020-03-19

## 2020-03-19 RX ORDER — SODIUM CHLORIDE 9 MG/ML
250 INJECTION, SOLUTION INTRAVENOUS ONCE
Status: CANCELLED | OUTPATIENT
Start: 2020-04-02

## 2020-03-19 RX ORDER — SODIUM CHLORIDE 9 MG/ML
250 INJECTION, SOLUTION INTRAVENOUS ONCE
Status: CANCELLED | OUTPATIENT
Start: 2020-03-26

## 2020-03-19 RX ORDER — PROCHLORPERAZINE MALEATE 10 MG
10 TABLET ORAL EVERY 6 HOURS PRN
Qty: 60 TABLET | Refills: 3 | Status: SHIPPED | OUTPATIENT
Start: 2020-03-19 | End: 2021-03-04

## 2020-03-19 RX ADMIN — TRASTUZUMAB-ANNS 660 MG: 420 INJECTION, POWDER, LYOPHILIZED, FOR SOLUTION INTRAVENOUS at 13:30

## 2020-03-19 RX ADMIN — DEXAMETHASONE SODIUM PHOSPHATE 12 MG: 4 INJECTION, SOLUTION INTRAMUSCULAR; INTRAVENOUS at 10:26

## 2020-03-19 RX ADMIN — SODIUM CHLORIDE, PRESERVATIVE FREE 10 ML: 5 INJECTION INTRAVENOUS at 17:20

## 2020-03-19 RX ADMIN — SODIUM CHLORIDE 250 ML: 9 INJECTION, SOLUTION INTRAVENOUS at 10:12

## 2020-03-19 RX ADMIN — PEGFILGRASTIM 6 MG: KIT SUBCUTANEOUS at 15:17

## 2020-03-19 RX ADMIN — DOCETAXEL 140 MG: 20 INJECTION, SOLUTION, CONCENTRATE INTRAVENOUS at 15:22

## 2020-03-19 RX ADMIN — CARBOPLATIN 610 MG: 10 INJECTION, SOLUTION INTRAVENOUS at 16:26

## 2020-03-19 RX ADMIN — Medication 500 UNITS: at 17:20

## 2020-03-19 RX ADMIN — PALONOSETRON HYDROCHLORIDE 0.25 MG: 0.25 INJECTION INTRAVENOUS at 10:14

## 2020-03-19 RX ADMIN — SODIUM CHLORIDE 150 MG: 9 INJECTION, SOLUTION INTRAVENOUS at 10:42

## 2020-03-19 RX ADMIN — PERTUZUMAB 840 MG: 30 INJECTION, SOLUTION, CONCENTRATE INTRAVENOUS at 11:22

## 2020-03-19 NOTE — PROGRESS NOTES
"SS initiated social service assessment with patient this date.  Patient lives at home with spouse Alba and son Rikki.      Patient doesn't utilize any home health.    Patient doesn't utilize any durable medical equipment.    Patient has three children: Rosana, Lopez, and Rikki.    Patient independent with transportation.  Spouse to provide transportation as needed.    The patient and I discussed the results of her PHQ depression screening.    PHQ-9 Total Score:  1    Patient states that she doesn't have any anxiety or depression.    Patient completed NCCN Distress Thermometer and scored a 2 out of 10.    Advance Care Planning:  The patient and I discussed care planning \"Conversations that Matter.\" This service was offered, free of charge, for development of advance directives with a certified ACP facilitator. The patient does not have an up-to-date advance directive. The patient is not interested in an appointment with one of our facilitators to create or update their advanced directives.    SS completed application for [facility] and submitted to agency.      SS provided patient with catalog from [employer] to contact and request wig voucher.  Patient to order wig with wig voucher good up to $70.    SS will follow as needed.  "

## 2020-03-20 ENCOUNTER — TELEPHONE (OUTPATIENT)
Dept: ONCOLOGY | Facility: HOSPITAL | Age: 60
End: 2020-03-20

## 2020-03-20 NOTE — TELEPHONE ENCOUNTER
"Pt called to report a 6lb weight gain since yesterday. Pt reports some slight swelling in her ankles bilaterally, however states that \"its nothing really.\" Pt denies any other swelling or SOB. Pt denies N/V/D. Pt reports feeling a pulsation in the back of her head in the middle of the night. She reports getting out of bed and noticing her teeth were chattering. Pt reports after sitting up for about 5 minutes that the symptoms seemed to resolve. Pt denies any discomfort today. Reported to Dr. Julio MD order for pt to keep legs elevated. Orders given for pt to keep scheduled f/u with Dr. Obrien next week. Advised pt to return call for questions or concerns or if symptoms return. Pt verbalized understanding and agreed with plan of care.   "

## 2020-03-23 ENCOUNTER — TELEPHONE (OUTPATIENT)
Dept: ONCOLOGY | Facility: CLINIC | Age: 60
End: 2020-03-23

## 2020-03-23 NOTE — TELEPHONE ENCOUNTER
Telephoned pt back and spoke with patient and pt's , Alba. Patient c/o mild heart burn, pt aware to try something OTC for acid reflux as directed.  Pt also had questions regarding how to take claritin after neulasta on-pro.  Instructed pt to take Claritin 10 mg twice a day for 7 days, starting the night before treatment. Pt/ pt's  verified understanding.  Will call pt back regarding re-scheduling OV when Dr. Obrien is back in the office.

## 2020-03-23 NOTE — TELEPHONE ENCOUNTER
Patient has an appointment this Thursday and wants to know if this can be reschedule. Patient wants to go to [place] and see dr. sinan adrian for a biopsy that he ordered.       Patient needs to ask about indigestion and needs to know instructions on claritin      Call patient back at [phone]

## 2020-03-26 ENCOUNTER — TELEPHONE (OUTPATIENT)
Dept: ONCOLOGY | Facility: HOSPITAL | Age: 60
End: 2020-03-26

## 2020-03-26 NOTE — TELEPHONE ENCOUNTER
----- Message from Vin Guzman MA sent at 3/26/2020  9:31 AM EDT -----  Pt is wanting to know if one of you can call and talk to her?  Thank you

## 2020-03-26 NOTE — TELEPHONE ENCOUNTER
Patient reports she is in [place] for an appt at [facility], she thinks she may have a UTI and asked if we could call something in or if she should have a urinalysis at [facility] while she is there for appt on 03/27/2020.  I advised patient to have urinalysis at her appt tomorrow while she is there.  Patient expressed understanding and agrees with plan of care.

## 2020-04-01 ENCOUNTER — OFFICE VISIT (OUTPATIENT)
Dept: ONCOLOGY | Facility: CLINIC | Age: 60
End: 2020-04-01

## 2020-04-01 ENCOUNTER — LAB (OUTPATIENT)
Dept: ONCOLOGY | Facility: CLINIC | Age: 60
End: 2020-04-01

## 2020-04-01 VITALS
SYSTOLIC BLOOD PRESSURE: 123 MMHG | BODY MASS INDEX: 31.19 KG/M2 | TEMPERATURE: 97.1 F | HEART RATE: 81 BPM | OXYGEN SATURATION: 96 % | RESPIRATION RATE: 18 BRPM | WEIGHT: 181.7 LBS | DIASTOLIC BLOOD PRESSURE: 80 MMHG

## 2020-04-01 DIAGNOSIS — Z17.1 MALIGNANT NEOPLASM OF RIGHT BREAST IN FEMALE, ESTROGEN RECEPTOR NEGATIVE, UNSPECIFIED SITE OF BREAST (HCC): Primary | ICD-10-CM

## 2020-04-01 DIAGNOSIS — C50.911 MALIGNANT NEOPLASM OF RIGHT BREAST IN FEMALE, ESTROGEN RECEPTOR NEGATIVE, UNSPECIFIED SITE OF BREAST (HCC): Primary | ICD-10-CM

## 2020-04-01 LAB
ALBUMIN SERPL-MCNC: 4.19 G/DL (ref 3.5–5.2)
ALBUMIN/GLOB SERPL: 1.6 G/DL
ALP SERPL-CCNC: 105 U/L (ref 39–117)
ALT SERPL W P-5'-P-CCNC: 37 U/L (ref 1–33)
ANION GAP SERPL CALCULATED.3IONS-SCNC: 11.7 MMOL/L (ref 5–15)
AST SERPL-CCNC: 16 U/L (ref 1–32)
BASOPHILS # BLD AUTO: 0.09 10*3/MM3 (ref 0–0.2)
BASOPHILS NFR BLD AUTO: 0.9 % (ref 0–1.5)
BILIRUB SERPL-MCNC: 0.2 MG/DL (ref 0.2–1.2)
BUN BLD-MCNC: 15 MG/DL (ref 8–23)
BUN/CREAT SERPL: 19 (ref 7–25)
CALCIUM SPEC-SCNC: 9.4 MG/DL (ref 8.6–10.5)
CHLORIDE SERPL-SCNC: 103 MMOL/L (ref 98–107)
CO2 SERPL-SCNC: 26.3 MMOL/L (ref 22–29)
CREAT BLD-MCNC: 0.79 MG/DL (ref 0.57–1)
DEPRECATED RDW RBC AUTO: 44.6 FL (ref 37–54)
EOSINOPHIL # BLD AUTO: 0 10*3/MM3 (ref 0–0.4)
EOSINOPHIL NFR BLD AUTO: 0 % (ref 0.3–6.2)
ERYTHROCYTE [DISTWIDTH] IN BLOOD BY AUTOMATED COUNT: 13.2 % (ref 12.3–15.4)
GFR SERPL CREATININE-BSD FRML MDRD: 74 ML/MIN/1.73
GLOBULIN UR ELPH-MCNC: 2.6 GM/DL
GLUCOSE BLD-MCNC: 115 MG/DL (ref 65–99)
HCT VFR BLD AUTO: 41.8 % (ref 34–46.6)
HGB BLD-MCNC: 13.3 G/DL (ref 12–15.9)
IMM GRANULOCYTES # BLD AUTO: 0.21 10*3/MM3 (ref 0–0.05)
IMM GRANULOCYTES NFR BLD AUTO: 2.2 % (ref 0–0.5)
LYMPHOCYTES # BLD AUTO: 1.42 10*3/MM3 (ref 0.7–3.1)
LYMPHOCYTES NFR BLD AUTO: 14.8 % (ref 19.6–45.3)
MCH RBC QN AUTO: 29.4 PG (ref 26.6–33)
MCHC RBC AUTO-ENTMCNC: 31.8 G/DL (ref 31.5–35.7)
MCV RBC AUTO: 92.5 FL (ref 79–97)
MONOCYTES # BLD AUTO: 0.42 10*3/MM3 (ref 0.1–0.9)
MONOCYTES NFR BLD AUTO: 4.4 % (ref 5–12)
NEUTROPHILS # BLD AUTO: 7.45 10*3/MM3 (ref 1.7–7)
NEUTROPHILS NFR BLD AUTO: 77.7 % (ref 42.7–76)
NRBC BLD AUTO-RTO: 0 /100 WBC (ref 0–0.2)
PLATELET # BLD AUTO: 153 10*3/MM3 (ref 140–450)
PMV BLD AUTO: 10 FL (ref 6–12)
POTASSIUM BLD-SCNC: 3.7 MMOL/L (ref 3.5–5.2)
PROT SERPL-MCNC: 6.8 G/DL (ref 6–8.5)
RBC # BLD AUTO: 4.52 10*6/MM3 (ref 3.77–5.28)
SODIUM BLD-SCNC: 141 MMOL/L (ref 136–145)
WBC NRBC COR # BLD: 9.59 10*3/MM3 (ref 3.4–10.8)

## 2020-04-01 PROCEDURE — 85025 COMPLETE CBC W/AUTO DIFF WBC: CPT | Performed by: INTERNAL MEDICINE

## 2020-04-01 PROCEDURE — 99211 OFF/OP EST MAY X REQ PHY/QHP: CPT | Performed by: INTERNAL MEDICINE

## 2020-04-01 PROCEDURE — 80053 COMPREHEN METABOLIC PANEL: CPT | Performed by: INTERNAL MEDICINE

## 2020-04-01 NOTE — PROGRESS NOTES
"Date: 2020    Patient Name: Jackie Johnson   : 1960   ID: [id number]    Complaint:  Pt arrived ambulatory for acute nursing visit following chemotherapy treatment on 3/19. Pt afebrile, vital signs stable. Pt reports \"quivering\" inside her head x 3 episodes following treatment. Pt reports the \"quivering\" also occurred in her thighs bilaterally. Pt denies any of those symptoms at present. Pt denies any N/V/D. Pt denies pain or fevers. Pt reports decreased oral intake since treatment, stating the bowel movements are only happening once a day instead of 3-4 times. Labs obtained as ordered.       VS:   /80   Pulse 81   Temp 97.1 °F (36.2 °C) (Oral)   Resp 18   Wt 82.4 kg (181 lb 11.2 oz)   SpO2 96%   BMI 31.19 kg/m²        Labs:       Lab Results   Component Value Date    WBC 9.59 2020    HGB 13.3 2020    HCT 41.8 2020    MCV 92.5 2020    RDW 13.2 2020     2020    NEUTRORELPCT 77.7 (H) 2020    LYMPHORELPCT 14.8 (L) 2020    MONORELPCT 4.4 (L) 2020    EOSRELPCT 0.0 (L) 2020    BASORELPCT 0.9 2020    NEUTROABS 7.45 (H) 2020    LYMPHSABS 1.42 2020       Lab Results   Component Value Date     2020    K 3.7 2020    CO2 26.3 2020     2020    BUN 15 2020    CREATININE 0.79 2020    GLUCOSE 115 (H) 2020    CALCIUM 9.4 2020    ALKPHOS 105 2020    AST 16 2020    ALT 37 (H) 2020    BILITOT 0.2 2020    ALBUMIN 4.19 2020    PROTEINTOT 6.8 2020       No results found for: LDH, URICACID     Imaging Results (Last 24 Hours)     ** No results found for the last 24 hours. **          Action:  Reported labs and assessment. MD order for pt to increase oral hydration, drinking at minimum 4 bottles (16.9oz) each day if possible. MD order for pt to return call if symptoms reoccur or for any questions or concerns. Pt verbalized understanding " and agreed with plan of care.     Nurse: Alba Portillo, RN    Physician Notes:   Jackie Johnson is seen today for an acute visit for toxicity check after her first cycle of chemotherapy.  Overall doing well.  Reinforced need for active hydration.  Will see her in f/u with C2 as planned.       Electronically Signed By: Fatoumata Obrien MD    Date Signed: 04/01/20

## 2020-04-04 DIAGNOSIS — C50.911 MALIGNANT NEOPLASM OF RIGHT BREAST IN FEMALE, ESTROGEN RECEPTOR NEGATIVE, UNSPECIFIED SITE OF BREAST (HCC): ICD-10-CM

## 2020-04-04 DIAGNOSIS — Z17.1 MALIGNANT NEOPLASM OF RIGHT BREAST IN FEMALE, ESTROGEN RECEPTOR NEGATIVE, UNSPECIFIED SITE OF BREAST (HCC): ICD-10-CM

## 2020-04-04 RX ORDER — SODIUM CHLORIDE 9 MG/ML
250 INJECTION, SOLUTION INTRAVENOUS ONCE
Status: CANCELLED | OUTPATIENT
Start: 2020-04-09

## 2020-04-04 RX ORDER — FAMOTIDINE 10 MG/ML
20 INJECTION, SOLUTION INTRAVENOUS AS NEEDED
Status: CANCELLED | OUTPATIENT
Start: 2020-04-09

## 2020-04-04 RX ORDER — DIPHENHYDRAMINE HYDROCHLORIDE 50 MG/ML
50 INJECTION INTRAMUSCULAR; INTRAVENOUS AS NEEDED
Status: CANCELLED | OUTPATIENT
Start: 2020-04-09

## 2020-04-04 RX ORDER — PALONOSETRON 0.05 MG/ML
0.25 INJECTION, SOLUTION INTRAVENOUS ONCE
Status: CANCELLED | OUTPATIENT
Start: 2020-04-09

## 2020-04-08 ENCOUNTER — TELEPHONE (OUTPATIENT)
Dept: ONCOLOGY | Facility: HOSPITAL | Age: 60
End: 2020-04-08

## 2020-04-09 ENCOUNTER — OFFICE VISIT (OUTPATIENT)
Dept: ONCOLOGY | Facility: CLINIC | Age: 60
End: 2020-04-09

## 2020-04-09 ENCOUNTER — INFUSION (OUTPATIENT)
Dept: ONCOLOGY | Facility: HOSPITAL | Age: 60
End: 2020-04-09

## 2020-04-09 VITALS
TEMPERATURE: 97.9 F | OXYGEN SATURATION: 98 % | WEIGHT: 180.3 LBS | DIASTOLIC BLOOD PRESSURE: 79 MMHG | BODY MASS INDEX: 30.95 KG/M2 | RESPIRATION RATE: 18 BRPM | SYSTOLIC BLOOD PRESSURE: 125 MMHG | HEART RATE: 85 BPM

## 2020-04-09 VITALS
SYSTOLIC BLOOD PRESSURE: 125 MMHG | HEART RATE: 85 BPM | OXYGEN SATURATION: 98 % | TEMPERATURE: 97.9 F | BODY MASS INDEX: 30.95 KG/M2 | DIASTOLIC BLOOD PRESSURE: 79 MMHG | RESPIRATION RATE: 18 BRPM | WEIGHT: 180.3 LBS

## 2020-04-09 DIAGNOSIS — Z79.899 ENCOUNTER FOR MONITORING CARDIOTOXIC DRUG THERAPY: Primary | ICD-10-CM

## 2020-04-09 DIAGNOSIS — Z17.1 MALIGNANT NEOPLASM OF RIGHT BREAST IN FEMALE, ESTROGEN RECEPTOR NEGATIVE, UNSPECIFIED SITE OF BREAST (HCC): Primary | ICD-10-CM

## 2020-04-09 DIAGNOSIS — R92.8 ABNORMAL MAGNETIC RESONANCE IMAGING OF LEFT BREAST: ICD-10-CM

## 2020-04-09 DIAGNOSIS — Z51.81 ENCOUNTER FOR MONITORING CARDIOTOXIC DRUG THERAPY: Primary | ICD-10-CM

## 2020-04-09 DIAGNOSIS — Z17.1 MALIGNANT NEOPLASM OF RIGHT BREAST IN FEMALE, ESTROGEN RECEPTOR NEGATIVE, UNSPECIFIED SITE OF BREAST (HCC): ICD-10-CM

## 2020-04-09 DIAGNOSIS — Z51.81 ENCOUNTER FOR MONITORING CARDIOTOXIC DRUG THERAPY: ICD-10-CM

## 2020-04-09 DIAGNOSIS — C50.911 MALIGNANT NEOPLASM OF RIGHT BREAST IN FEMALE, ESTROGEN RECEPTOR NEGATIVE, UNSPECIFIED SITE OF BREAST (HCC): Primary | ICD-10-CM

## 2020-04-09 DIAGNOSIS — C50.911 MALIGNANT NEOPLASM OF RIGHT BREAST IN FEMALE, ESTROGEN RECEPTOR NEGATIVE, UNSPECIFIED SITE OF BREAST (HCC): ICD-10-CM

## 2020-04-09 DIAGNOSIS — Z79.899 ENCOUNTER FOR MONITORING CARDIOTOXIC DRUG THERAPY: ICD-10-CM

## 2020-04-09 DIAGNOSIS — Z95.828 PORT-A-CATH IN PLACE: ICD-10-CM

## 2020-04-09 LAB
ALBUMIN SERPL-MCNC: 3.94 G/DL (ref 3.5–5.2)
ALBUMIN/GLOB SERPL: 1.4 G/DL
ALP SERPL-CCNC: 74 U/L (ref 39–117)
ALT SERPL W P-5'-P-CCNC: 26 U/L (ref 1–33)
ANION GAP SERPL CALCULATED.3IONS-SCNC: 10.2 MMOL/L (ref 5–15)
AST SERPL-CCNC: 16 U/L (ref 1–32)
BASOPHILS # BLD AUTO: 0.1 10*3/MM3 (ref 0–0.2)
BASOPHILS NFR BLD AUTO: 2.6 % (ref 0–1.5)
BILIRUB SERPL-MCNC: 0.3 MG/DL (ref 0.2–1.2)
BUN BLD-MCNC: 17 MG/DL (ref 8–23)
BUN/CREAT SERPL: 24.3 (ref 7–25)
CALCIUM SPEC-SCNC: 9.3 MG/DL (ref 8.6–10.5)
CHLORIDE SERPL-SCNC: 105 MMOL/L (ref 98–107)
CO2 SERPL-SCNC: 25.8 MMOL/L (ref 22–29)
CREAT BLD-MCNC: 0.7 MG/DL (ref 0.57–1)
DEPRECATED RDW RBC AUTO: 46.7 FL (ref 37–54)
EOSINOPHIL # BLD AUTO: 0.02 10*3/MM3 (ref 0–0.4)
EOSINOPHIL NFR BLD AUTO: 0.5 % (ref 0.3–6.2)
ERYTHROCYTE [DISTWIDTH] IN BLOOD BY AUTOMATED COUNT: 14 % (ref 12.3–15.4)
GFR SERPL CREATININE-BSD FRML MDRD: 85 ML/MIN/1.73
GLOBULIN UR ELPH-MCNC: 2.8 GM/DL
GLUCOSE BLD-MCNC: 124 MG/DL (ref 65–99)
HCT VFR BLD AUTO: 38.3 % (ref 34–46.6)
HGB BLD-MCNC: 12.2 G/DL (ref 12–15.9)
IMM GRANULOCYTES # BLD AUTO: 0.01 10*3/MM3 (ref 0–0.05)
IMM GRANULOCYTES NFR BLD AUTO: 0.3 % (ref 0–0.5)
LYMPHOCYTES # BLD AUTO: 1.06 10*3/MM3 (ref 0.7–3.1)
LYMPHOCYTES NFR BLD AUTO: 27.3 % (ref 19.6–45.3)
MCH RBC QN AUTO: 29.8 PG (ref 26.6–33)
MCHC RBC AUTO-ENTMCNC: 31.9 G/DL (ref 31.5–35.7)
MCV RBC AUTO: 93.6 FL (ref 79–97)
MONOCYTES # BLD AUTO: 0.36 10*3/MM3 (ref 0.1–0.9)
MONOCYTES NFR BLD AUTO: 9.3 % (ref 5–12)
NEUTROPHILS # BLD AUTO: 2.33 10*3/MM3 (ref 1.7–7)
NEUTROPHILS NFR BLD AUTO: 60 % (ref 42.7–76)
NRBC BLD AUTO-RTO: 0 /100 WBC (ref 0–0.2)
PLATELET # BLD AUTO: 282 10*3/MM3 (ref 140–450)
PMV BLD AUTO: 9.7 FL (ref 6–12)
POTASSIUM BLD-SCNC: 4 MMOL/L (ref 3.5–5.2)
PROT SERPL-MCNC: 6.7 G/DL (ref 6–8.5)
RBC # BLD AUTO: 4.09 10*6/MM3 (ref 3.77–5.28)
SODIUM BLD-SCNC: 141 MMOL/L (ref 136–145)
WBC NRBC COR # BLD: 3.88 10*3/MM3 (ref 3.4–10.8)

## 2020-04-09 PROCEDURE — 25010000002 PALONOSETRON PER 25 MCG: Performed by: INTERNAL MEDICINE

## 2020-04-09 PROCEDURE — 25010000002 DOCETAXEL 20 MG/ML SOLUTION 4 ML VIAL: Performed by: INTERNAL MEDICINE

## 2020-04-09 PROCEDURE — 96413 CHEMO IV INFUSION 1 HR: CPT

## 2020-04-09 PROCEDURE — 25010000002 TRASTUZUMAB-ANNS 420 MG RECONSTITUTED SOLUTION 1 EACH VIAL: Performed by: INTERNAL MEDICINE

## 2020-04-09 PROCEDURE — 25010000002 DEXAMETHASONE SODIUM PHOSPHATE 20 MG/5ML SOLUTION: Performed by: INTERNAL MEDICINE

## 2020-04-09 PROCEDURE — 25010000002 PERTUZUMAB 420 MG/14ML SOLUTION 420 MG VIAL: Performed by: INTERNAL MEDICINE

## 2020-04-09 PROCEDURE — 25010000002 FOSAPREPITANT PER 1 MG: Performed by: INTERNAL MEDICINE

## 2020-04-09 PROCEDURE — 25010000002 CARBOPLATIN PER 50 MG: Performed by: INTERNAL MEDICINE

## 2020-04-09 PROCEDURE — 25010000003 HEPARIN LOCK FLUCH PER 10 UNITS: Performed by: INTERNAL MEDICINE

## 2020-04-09 PROCEDURE — 96367 TX/PROPH/DG ADDL SEQ IV INF: CPT

## 2020-04-09 PROCEDURE — 85025 COMPLETE CBC W/AUTO DIFF WBC: CPT

## 2020-04-09 PROCEDURE — 96417 CHEMO IV INFUS EACH ADDL SEQ: CPT

## 2020-04-09 PROCEDURE — 96375 TX/PRO/DX INJ NEW DRUG ADDON: CPT

## 2020-04-09 PROCEDURE — 80053 COMPREHEN METABOLIC PANEL: CPT

## 2020-04-09 PROCEDURE — 96372 THER/PROPH/DIAG INJ SC/IM: CPT

## 2020-04-09 PROCEDURE — 96377 APPLICATON ON-BODY INJECTOR: CPT

## 2020-04-09 PROCEDURE — 25010000002 PEGFILGRASTIM 6 MG/0.6ML PREFILLED SYRINGE KIT: Performed by: INTERNAL MEDICINE

## 2020-04-09 PROCEDURE — 99214 OFFICE O/P EST MOD 30 MIN: CPT | Performed by: INTERNAL MEDICINE

## 2020-04-09 RX ORDER — SODIUM CHLORIDE 0.9 % (FLUSH) 0.9 %
20 SYRINGE (ML) INJECTION AS NEEDED
Status: CANCELLED | OUTPATIENT
Start: 2020-04-09

## 2020-04-09 RX ORDER — AMOXICILLIN 250 MG
2 CAPSULE ORAL 2 TIMES DAILY
Qty: 120 TABLET | Refills: 5 | Status: SHIPPED | OUTPATIENT
Start: 2020-04-09 | End: 2021-03-04

## 2020-04-09 RX ORDER — SODIUM CHLORIDE 0.9 % (FLUSH) 0.9 %
10 SYRINGE (ML) INJECTION AS NEEDED
Status: CANCELLED | OUTPATIENT
Start: 2020-04-24

## 2020-04-09 RX ORDER — CALCIUM CARBONATE 200(500)MG
1 TABLET,CHEWABLE ORAL DAILY PRN
COMMUNITY
End: 2020-11-10

## 2020-04-09 RX ORDER — HEPARIN SODIUM (PORCINE) LOCK FLUSH IV SOLN 100 UNIT/ML 100 UNIT/ML
500 SOLUTION INTRAVENOUS AS NEEDED
Status: DISCONTINUED | OUTPATIENT
Start: 2020-04-09 | End: 2020-04-09 | Stop reason: HOSPADM

## 2020-04-09 RX ORDER — SODIUM CHLORIDE 9 MG/ML
250 INJECTION, SOLUTION INTRAVENOUS ONCE
Status: COMPLETED | OUTPATIENT
Start: 2020-04-09 | End: 2020-04-09

## 2020-04-09 RX ORDER — PALONOSETRON 0.05 MG/ML
0.25 INJECTION, SOLUTION INTRAVENOUS ONCE
Status: COMPLETED | OUTPATIENT
Start: 2020-04-09 | End: 2020-04-09

## 2020-04-09 RX ORDER — HEPARIN SODIUM (PORCINE) LOCK FLUSH IV SOLN 100 UNIT/ML 100 UNIT/ML
300 SOLUTION INTRAVENOUS ONCE
Status: CANCELLED | OUTPATIENT
Start: 2020-04-09

## 2020-04-09 RX ORDER — SODIUM CHLORIDE 0.9 % (FLUSH) 0.9 %
10 SYRINGE (ML) INJECTION AS NEEDED
Status: DISCONTINUED | OUTPATIENT
Start: 2020-04-09 | End: 2020-04-09 | Stop reason: HOSPADM

## 2020-04-09 RX ORDER — HEPARIN SODIUM (PORCINE) LOCK FLUSH IV SOLN 100 UNIT/ML 100 UNIT/ML
500 SOLUTION INTRAVENOUS AS NEEDED
Status: CANCELLED | OUTPATIENT
Start: 2020-04-24

## 2020-04-09 RX ADMIN — PEGFILGRASTIM 6 MG: KIT SUBCUTANEOUS at 14:45

## 2020-04-09 RX ADMIN — PALONOSETRON HYDROCHLORIDE 0.25 MG: 0.25 INJECTION INTRAVENOUS at 10:07

## 2020-04-09 RX ADMIN — SODIUM CHLORIDE, PRESERVATIVE FREE 10 ML: 5 INJECTION INTRAVENOUS at 14:43

## 2020-04-09 RX ADMIN — SODIUM CHLORIDE 150 MG: 9 INJECTION, SOLUTION INTRAVENOUS at 10:27

## 2020-04-09 RX ADMIN — TRASTUZUMAB-ANNS 490 MG: 420 INJECTION, POWDER, LYOPHILIZED, FOR SOLUTION INTRAVENOUS at 12:09

## 2020-04-09 RX ADMIN — DEXAMETHASONE SODIUM PHOSPHATE 12 MG: 4 INJECTION, SOLUTION INTRAMUSCULAR; INTRAVENOUS at 10:10

## 2020-04-09 RX ADMIN — DOCETAXEL 140 MG: 20 INJECTION, SOLUTION, CONCENTRATE INTRAVENOUS at 12:48

## 2020-04-09 RX ADMIN — PERTUZUMAB 420 MG: 30 INJECTION, SOLUTION, CONCENTRATE INTRAVENOUS at 11:05

## 2020-04-09 RX ADMIN — CARBOPLATIN 680 MG: 10 INJECTION, SOLUTION INTRAVENOUS at 13:54

## 2020-04-09 RX ADMIN — SODIUM CHLORIDE 250 ML: 9 INJECTION, SOLUTION INTRAVENOUS at 10:07

## 2020-04-09 RX ADMIN — Medication 500 UNITS: at 14:44

## 2020-04-09 NOTE — PROGRESS NOTES
NAME: Jackie Johnson    : 1960    DATE:  2020    DIAGNOSIS:   Stage IIB ([id number]) poorly differentiated invasive ductal carcinoma of the right breast.  Tumor spans 15 cm.  There are no clinically or radiographically supspicious axillary LNs.  There is associated high grade DCIS.  Tumor is ER-,ID-, HER-2 +  There is also an abnormal finding on L breast MRI which appears suspicious (BIRADS-5).  The abnormality was no longer present after C1 chemotherapy.    CHIEF COMPLAINT:  Follow up of Breast Cancer    TREATMENT HISTORY:  1.        HISTORY OF PRESENT ILLNESS:   Jackie Johnson is a very pleasant 60 y.o. female who is being seen today in the presence of her  at the request of No ref. provider found for evaluation and treatment of breast cancer. She has a strong family h/o breast cancer and so is normally very good about getting her mammograms done.  She recently moved from [facility] and so missed her mammogram in  because of the move.  She got established with a new PCP (Dr. Denver Sheth) in 2019 and was referred to a a new gynecologist  (Dr. Brayan Richards) who she saw in .  Kevin Richards ordered a screening mammogram.  She had an acute febrile illness around the time of the Super Bowl and at that time thought she might have a lump in her R breast.  While waiting on mammogram scheduling, she started to have pain in the breast and in eventually she would occasionally get a little bit of discharge from the right nipple.  She presented for  Screening mammography 19 as below.  This was followed by diagnostic mammogram and U/S on 20.  She had an abnormal span of 15 cm spanning the R breast.  She underwent stereotactic biopsy on 20.    This revealed a poorly differentiated invasive ductal carcinoma of the R breast with associated high grade DCIS.  Tumor was ER/ID- and HER-2+ by IHC.  Her sister lives in [place] and was treated at [facility]  [place], so she went there for further evaluation. She saw Dr. Romero of medical oncology and Dr. Serrano of surgical oncology.  She had staging with CT CAP and bone scan as well as Brain MRI and had no evidence of distant metastatic spread.  She had reese breast MRI as well which showed an unexpected abnormality in the L breast described as linear non-mass enhancement spanning an 8 cm area in the inferior breast at 6:00.  This was biopsied on 2-28-20 with biopsy result pending.  No abnormal axillary LNs were identified.  Neoadjuvant chemotherapy was recommended to her by her [facility] team of physicians but she wishes to seek this treatment closer to home so is here today for further evaluation and treatment.    She is in good health overall. She reports an isolated episode of fever/chills during an acute illness, abdominal fullness to her right side, fatigue and a rash circling her neck. She denies changes in weight, cp, sob, persistent abdominal pain, n/v/c/d or bony pain.      INTERVAL HISTORY:  Ms. Johnson is here today for f/u of breast cancer.  She returned to [facility] after her first cycle of chemotherapy for biopsy of L breast abnormality, but it was no longer present on MRI after first cycle of chemotherapy.  She says she had some fatigue with her treatment, had a couple of days of loose stools (up to 3x/day) and some bloating.  She also says she felt like she was vibrating.  Otherwise she had no significant side effects.  Dr. Romero has requested repeat U/S p 3 cycles treatment.  Ms. Johnson says within 4 days of her first treatment she felt better.  She felt like her breasts were lighter, she no longer had pain in the R breast and R nipple discharge stopped.        PAST MEDICAL HISTORY:  Past Medical History:   Diagnosis Date   • Acid reflux    • Asthma    • Cancer (CMS/HCC)    • Eczema    • Endometriosis    • Hiatal hernia    • High cholesterol    • Seasonal allergies        PAST SURGICAL HISTORY:  Past  Surgical History:   Procedure Laterality Date   • ABDOMINAL SURGERY     • BREAST BIOPSY Bilateral    •  SECTION     • D&C HYSTEROSCOPY ENDOMETRIAL ABLATION     • DIAGNOSTIC LAPAROSCOPY     • FRACTURE SURGERY Left     tib/fib   • LEG SURGERY      Left leg following fracture   • PORTACATH PLACEMENT N/A 3/18/2020    Procedure: INSERTION OF PORTACATH;  Surgeon: Dudley Aldridge MD;  Location: [facility];  Service: General;  Laterality: N/A;   • SKIN BIOPSY     • TONSILLECTOMY         FAMILY HISTORY:  Family History   Problem Relation Age of Onset   • Breast cancer Mother 75   • Basal cell carcinoma Mother 90        2 small spots on face removed + fair complexion   • Breast cancer Sister 55        VUS detected on genetic testing CDKN2A gene (c.-33G>C).    • Heart disease Father    • Throat cancer Father 79   • Lung cancer Father 79   • Cancer Maternal Grandmother 65        unknown gynecologic cancer, cervical vs uterine   • Lymphoma Maternal Grandfather 64   • Breast cancer Paternal Grandmother 70   • Prostate cancer Paternal Grandfather 70        metastastic to liver   • Breast cancer Paternal Aunt 77   • Breast cancer Maternal Aunt 80   • Breast cancer Other    • Ovarian cancer Sister 66        Elevated ; 1A tumor; Borderline cancer; both ovaries & lg cyst removed   • Ovarian cancer Maternal Cousin 54   • Breast cancer Other 50         SOCIAL HISTORY:  Social History     Socioeconomic History   • Marital status:      Spouse name: Not on file   • Number of children: Not on file   • Years of education: Not on file   • Highest education level: Not on file   Tobacco Use   • Smoking status: Never Smoker   • Smokeless tobacco: Never Used   Substance and Sexual Activity   • Alcohol use: Never     Frequency: Never   • Drug use: Never   • Sexual activity: Defer     Partners: Male     Birth control/protection: None   Social History Narrative    lives alone with      with [employer]  [employer]s    No unusual chemical exposures    Never smoker, never drinker       REVIEW OF SYSTEMS:   A comprehensive 14 point review of systems was performed.  Significant findings as mentioned above.  All other systems reviewed and are negative.      MEDICATIONS:  The current medication list was reviewed in the EMR    Current Outpatient Medications:   •  acetaminophen (TYLENOL) 325 MG tablet, Take 650 mg by mouth Every 6 (Six) Hours As Needed for Mild Pain ., Disp: , Rfl:   •  calcium carbonate (TUMS) 500 MG chewable tablet, Chew 1 tablet Daily As Needed for Indigestion or Heartburn., Disp: , Rfl:   •  dexamethasone (DECADRON) 4 MG tablet, Take 2 tablets in the morning daily on Days 2, 3 & 4.  Take with food., Disp: 6 tablet, Rfl: 3  •  Famotidine (PEPCID PO), Take  by mouth As Needed., Disp: , Rfl:   •  fexofenadine (ALLEGRA) 60 MG tablet, Take 60 mg by mouth Every Other Day., Disp: , Rfl:   •  levalbuterol (XOPENEX HFA) 45 MCG/ACT inhaler, Inhale 1-2 puffs Every 4 (Four) Hours As Needed for Wheezing., Disp: , Rfl:   •  Loratadine (CLARITIN PO), Take  by mouth Every Other Day., Disp: , Rfl:   •  ondansetron (ZOFRAN) 8 MG tablet, Take 1 tablet by mouth 3 (Three) Times a Day As Needed for Nausea or Vomiting., Disp: 30 tablet, Rfl: 5  •  prochlorperazine (COMPAZINE) 10 MG tablet, Take 1 tablet by mouth Every 6 (Six) Hours As Needed for Nausea or Vomiting., Disp: 60 tablet, Rfl: 3    ALLERGIES:    Allergies   Allergen Reactions   • Albuterol Other (See Comments)     One time severe headache, questionable for aneurysm, did spinal tap was positive, did Angiogram and MRI were Negative for aneurysm   • Penicillins Hives   • Cefpodoxime Rash     vantin     • Clindamycin Hcl Rash   • Crestor [Rosuvastatin Calcium] Other (See Comments)     Caused high liver enxymes   • Sulfa Antibiotics Other (See Comments)     Mucosal lesions   • Pseudoephedrine Other (See Comments)     Soreness on the tongue       PHYSICAL EXAM:  Vitals:     04/09/20 0833   BP: 125/79   Pulse: 85   Resp: 18   Temp: 97.9 °F (36.6 °C)   SpO2: 98%     Pain Score    04/09/20 0833   PainSc: 0-No pain     General:  Awake, alert and oriented, appears well.  HEENT:  Pupils are equal, round and reactive to light and accommodation, Extra-ocular movements full, Oropharyx clear, mucous membranes moist  Neck:  No JVD, thyromegaly or lymphadenopathy  CV:  Regular rate and rhythm, no murmurs, rubs or gallops  Resp:  Lungs are clear to auscultation bilaterally  Breast:  She is large breasted.  L breast has some fibrocystic changes but no palpable mass lesions. No enlarged L axillary LNs.  R breast is large.  Surprisingly there has never really been a clearly defined mass on exam.  There is an area of firmer breast tissue posteriorly behind the nipple which is about 8-10 cm in dimension, but this isn't clearly a mass, more c/w fibrocystic changes.  No nipple d/c or inversion.  No palpable R axillary LNs.  Abd:  Soft, non-tender, sl distended, bowel sounds present, no organomegaly or masses  Ext:  No clubbing, cyanosis or edema  Lymph:  No cervical, supraclavicular, axillary, inguinal or femoral adenopathy  Neuro:  MS as above, CN II-XII intact, grossly non-focal exam      PATHOLOGY:  02-12-20            ENDOSCOPY:        IMAGING:  Bilateral Diagnostic mammogram 12-27-19  FINDINGS:  There are scattered areas of fibroglandular density.     The bilateral fibroglandular pattern is unremarkable in appearance. A  few scattered calcifications are present in the left breast. There are  calcifications in the central aspect of the right breast spanning  approximately 15 cm of tissue in the AP dimension and extending up to  the base of the nipple for which additional imaging is recommended.     IMPRESSION:  1. Benign left mammographic findings.  2. Calcifications in the right breast. Recommend additional imaging.        BI-RADS CATEGORY:  0, INCOMPLETE: Need additional imaging evaluation.      RECOMMENDATION:  Right ML and CC magnification views.      R diagnostic mammogram 01-16-20  FINDINGS:  Magnification imaging of the right breast demonstrates  casting-type pleomorphic calcifications spanning over 15 cm of tissue in  the AP dimension in the right 6:00 to 7:00 region. Calcifications do  extend up to the base of the nipple. Findings are highly suspicious for  DCIS.     Right breast ultrasound: Focused sonographic evaluation of the right  6:00 to 7:00 region demonstrates a single 0.5 cm irregular hypoechoic  mass associated with a coarse calcification located at 7:00, 3 cm from  the nipple. Ultrasound of the right axilla demonstrates no abnormal  appearing axillary lymph nodes.        IMPRESSION:  Findings are highly suspicious for extensive DCIS in the  right 6:00 to 7:00 region extending into the base of the nipple. There  is also small irregular mass noted on ultrasound in the 7:00 region  suspicious for invasion.        BI-RADS CATEGORY:  5, HIGHLY SUGGESTIVE OF MALIGNANCY        RECOMMENDATION:  Recommend stereotactic guided core biopsy of the  anterior and posterior aspect of the calcifications in the 6:00 to 7:00  region to determine extent of disease. Ultrasound-guided core biopsy of  the right 7:00 mass may also be warranted pending pathology results of  the calcifications.    Diagnostic bilateral mammogram 02-25-20  FINDINGS:  There are scattered areas of fibroglandular density.    1:  There are fine-linear branching calcifications measuring 16 x 7 x 7  centimeters with segmental distribution and associated post biopsy clip in the  right breast lower hemisphere at 6 to 7 o'clock.  There are 2 post biopsy clips  noted.  The calcifications extend to the inferior skin and nipple.    2:  There are amorphous calcifications measuring 0.3 centimeters in the left  breast lower hemisphere at 6 to 7 o'clock located 4 centimeters from the nipple.    IMPRESSION:  1:  Fine-linear branching calcifications  in the right breast lower hemisphere at  6 to 7 o'clock are known biopsy-proven malignancy. Ultrasound is recommended for  staging. Recommend appropriate evaluation and treatment of this known malignancy.    2:  Amorphous calcifications in the left breast lower hemisphere at 6 to 7  o'clock located 4 centimeters from the nipple are suspicious. Stereotactic  biopsy is recommended.    BI-RADS Category 4:  Suspicious Abnormality      US Chest 02-25-20  Findings:       Right Breast: The extent of calcified disease is best visualized by mammography. There are dilated ducts with internal calcifications vascularity extending toward the nipple at the 7 o'clock position. This is consistent with the patient's known biopsy-proven malignancy.      Right Charles Basins: No suspicious axillary (level I, II & III) or internal mammary lymphadenopathy is noted.      IMPRESSION:    1. Known RIGHT breast malignancy is best visualized by mammography. A MRI may be obtained for evaluation of disease.    2. No suspicious RIGHT-sided lymphadenopathy      ACR BI-RADS Category: 6. Known malignancy.  Recommend appropriate evaluation and treatment of the known malignancy.         R breast US 02-25-20  Findings:       Right Breast: The extent of calcified disease is best visualized by mammography. There are dilated ducts with internal calcifications vascularity extending toward the nipple at the 7 o'clock position. This is consistent with the patient's known biopsy-proven malignancy.      Right Charles Basins: No suspicious axillary (level I, II & III) or internal mammary lymphadenopathy is noted.      IMPRESSION:    1. Known RIGHT breast malignancy is best visualized by mammography. A MRI may be obtained for evaluation of disease.    2. No suspicious RIGHT-sided lymphadenopathy      ACR BI-RADS Category: 6. Known malignancy.  Recommend appropriate evaluation and treatment of the known malignancy.       MRI Brain w/wo contrast 02-27-20  FINDINGS:      No abnormal parenchymal or leptomeningeal enhancement is noted. The brain parenchyma is unremarkable except of small scattered T2 FLAIR hyperintensities consistent with chronic microangiopathic changes. No acute ischemia, intra or extra-axial hemorrhage, mass effect or midline shift. No hydrocephalus is noted.    The osseous structures and extra-cranial soft tissues are unremarkable.    The orbital structures unremarkable.    The paranasal sinuses and mastoid air cells are clear.    IMPRESSION:  No evidence of metastatic disease.      CTCAP 02-28-20    Findings:       CHEST:  The breasts are ptotic and there are biopsy clips projecting between the lower quadrants of the right breast.    On image 99 of series 3, there is a nonspecific 1 cm soft tissue nodule in the lower outer quadrant of the right breast.    No lung nodules or pleural effusions are present.    No axillary, internal mammary, mediastinal or hilar adenopathy is seen.    No suspicious bone lesions are present.      ABDOMEN and PELVIS:  The liver is borderline enlarged and diffusely fatty infiltrated without measurable mass or biliary ductal dilatation.    The gallbladder appears normal.    The spleen, pancreas and adrenal glands appear normal.    The kidneys demonstrate function without hydronephrosis and there is a small low dense nidus in the mid right kidney, likely a cyst.    No adenopathy or ascites are seen in the abdomen or pelvis.    A normal-appearing retrocecal appendix is seen.    Multilevel degenerative-like bone changes are present.      IMPRESSION:  No evidence for metastatic disease the chest, abdomen or pelvis.      MRI Breast bilateral 03-02-20    Findings:  The breast composition is heterogeneous fibroglandular tissue. There is no significant early background parenchymal enhancement.    Right breast: There is linearly oriented nonmass enhancement involving the lateral and inferior aspects (anterior, middle, and posterior third) of  the right breast 6 - 7 o'clock position extending 17 cm from the base of the nipple to the pectoralis muscle (series 5 image  of 256). There is no evidence for pectoralis muscle invasion. Extension to the inferior skin is better demonstrated mammographically. Susceptibility artifact from 2 postbiopsy clips is noted.    Left breast: There is linearly oriented nonmass enhancement in the inferior aspect (middle third) of the left breast 6 o'clock position extending approximately 8 cm (series 5 image  of 256). There is no evidence for nipple, skin, or pectoralis muscle involvement. Stereotactic guided biopsy of calcifications at the 6 o'clock position to be performed later today and corresponds to the anterior aspect of the nonmass enhancement. MRI guided biopsy of nonmass enhancement seen on image 94 of 256, series 5 and image 40 of 160, series 8 is recommended (The images have been annotated with a [place]).    Charles Basins: There is no lymphadenopathy in the visualized axillary or internal mammary regions.    IMPRESSION:  Linear nonmass enhancement representing known malignancy right breast as above. Linear nonmass enhancement left breast as above.   MRI guided biopsy of nonmass enhancement seen on image 94 of 256, series 5 and image 40 of 160, series 8 is recommended.    ACR BI-RADS Category: 6. Recommend MR-guided biopsy of nonmass enhancement left breast as above .Recommend appropriate evaluation and treatment of the known malignancy.       Bone scan 03-02-20  FINDINGS:     Radiotracer uptake secondary to degenerative changes are seen involving the shoulders, hips, knees, and feet. Increased uptake along the right tibial shaft likely is likely reactive. Faint focus of activity within the left tibial shaft is compatible with prior injury/fracture. Increased uptake in the skull is compatible with a benign hyperostosis.    Physiologic uptake of the bilateral kidneys and bladder.    IMPRESSION:    No  scintigraphic evidence of skeletal metastases.      Echo 03-02-20        RECENT LABS:  Lab Results   Component Value Date    WBC 3.88 04/09/2020    HGB 12.2 04/09/2020    HCT 38.3 04/09/2020    MCV 93.6 04/09/2020    RDW 14.0 04/09/2020     04/09/2020    NEUTRORELPCT 60.0 04/09/2020    LYMPHORELPCT 27.3 04/09/2020    MONORELPCT 9.3 04/09/2020    EOSRELPCT 0.5 04/09/2020    BASORELPCT 2.6 (H) 04/09/2020    NEUTROABS 2.33 04/09/2020    LYMPHSABS 1.06 04/09/2020       Lab Results   Component Value Date     04/09/2020    K 4.0 04/09/2020    CO2 25.8 04/09/2020     04/09/2020    BUN 17 04/09/2020    CREATININE 0.70 04/09/2020    EGFRIFNONA 85 04/09/2020    EGFRIFAFRI 84 02/26/2020    GLUCOSE 124 (H) 04/09/2020    CALCIUM 9.3 04/09/2020    ALKPHOS 74 04/09/2020    AST 16 04/09/2020    ALT 26 04/09/2020    BILITOT 0.3 04/09/2020    ALBUMIN 3.94 04/09/2020    PROTEINTOT 6.7 04/09/2020     Lab Results   Component Value Date     15.1 03/11/2020     Lab Results   Component Value Date    LABCA2 19.0 03/11/2020             ASSESSMENT & PLAN:  Jackie Johnson is a very pleasant 60 y.o. female with what is clinically a Stage IIB ([id number]) poorly differentiated invasive ductal carcinoma of the right breast.  Tumor spans 15 cm.  There are no clinically or radiographically supspicious axillary LNs.  There is associated high grade DCIS.  Tumor is ER-,LA-, HER-2 +  There is also an abnormal finding on L breast MRI which is suspicious (BIRADS-5).  This has been biopsied.    1.  Right breast cancer:  -  I am in agreement with neoadjuvant chemotherapy to downsize tumor and potentially allow for a more successful surgery.  -  Specifically, I have recommended neoadjuvant Taxotere, Carboplatin, Herceptin and Perjeta with growth factor support.  -  Will get baseline bloodwork today.  -  Will refer for PAC          -  Echocardiogram done at [facility] - will obtain report.  -  Will need to monitor carefully on  treatment.  Since she really doesn't have palpable disease, will plan for interim mammogram and U/S after 3 cycles of therapy.  -  Following neoadjuvant chemotherapy, will return her for surgery.  Following surgery, she will require adjuvant radiation given the size of her tumor and completion of a year of anti-HER-2 therapy.             2.  Abnormality on L Breast visible on MRI only :    -  Biopsy done at [facility] on 2-28-20.  This isn't visible on care everywhere, but she says the biopsy was benign.  Plan was made for repeat MRI  Guided bx after 1st cycle of treatment, but biopsy could not be done b/c the lesion was no longer present.  Given improvement on treatment,  I would consider this lesion to be malignant and would be in favor of bilateral mastectomies.    3.  Very strong family history of malignancy including several members with breast cancer and a sister with ovarian cancer.  -  She underwent genetic testing at [facility] and testing was negative.  While she has no known genetic mutation, given her personal history combined with her family history, I would be in favor of bilateral mastectomy.  I think it is also likely that given the size of her tumor she would likely have significant asymmetry after unilateral surgery.    4.  Prophylaxis:  She has had 2019 flu vaccine.  Recommended she get Prevnar 13 which she also had.  She is a lifelong non-smoker.    5.  ACO / MARNI/Other  Quality measures  -  Jackie Johnson received 2019 flu vaccine.  -  Jackie Johnson reports a pain score of 0.  Given her pain assessment as noted, treatment options were discussed and the following options were decided upon as a follow-up plan to address the patient's pain: No intervention needed..  -  Current outpatient and discharge medications have been reconciled for the patient.  Reviewed by: Fatoumata Obrien MD       6.  Follow-up:  She will see APRN w/ C3.  I will see her with C4 with reese diagnostic mammogram and  U/S prior.    This note was scribed for Fatoumata Obrien MD by Alba Siu RN.    I, Fatoumata Obrien MD, personally performed the services described in this documentation as scribed by the above named individual in my presence, and it is both accurate and complete.  04/09/2020                                                                                                                                                 I spent25 minutes with Jackie Johnson today.  In the office today, more than 50% of this time was spent face-to-face with her  in counseling / coordination of care, reviewing her medical history and counseling on the current treatment plan.  All questions were answered to her satisfaction      Electronically Signed by: Fatoumata Obrien MD      CC:   [facility] JUAN PABLO Macdonald MD Bora Lim, MD- [facility] Medical Oncology  Kadeem Serrano MD- [facility]

## 2020-04-17 ENCOUNTER — OFFICE VISIT (OUTPATIENT)
Dept: SURGERY | Facility: CLINIC | Age: 60
End: 2020-04-17

## 2020-04-17 ENCOUNTER — TELEPHONE (OUTPATIENT)
Dept: SURGERY | Facility: CLINIC | Age: 60
End: 2020-04-17

## 2020-04-17 ENCOUNTER — TELEPHONE (OUTPATIENT)
Dept: ONCOLOGY | Facility: HOSPITAL | Age: 60
End: 2020-04-17

## 2020-04-17 ENCOUNTER — HOSPITAL ENCOUNTER (OUTPATIENT)
Dept: CARDIOLOGY | Facility: HOSPITAL | Age: 60
Discharge: HOME | End: 2020-04-17
Admitting: SURGERY

## 2020-04-17 VITALS
HEART RATE: 92 BPM | HEIGHT: 65 IN | DIASTOLIC BLOOD PRESSURE: 93 MMHG | BODY MASS INDEX: 29.85 KG/M2 | WEIGHT: 179.2 LBS | SYSTOLIC BLOOD PRESSURE: 133 MMHG

## 2020-04-17 DIAGNOSIS — I82.890 BLOOD CLOT OF NECK VEIN: ICD-10-CM

## 2020-04-17 DIAGNOSIS — I82.890 BLOOD CLOT OF NECK VEIN: Primary | ICD-10-CM

## 2020-04-17 DIAGNOSIS — L03.221 CELLULITIS OF NECK: Primary | ICD-10-CM

## 2020-04-17 PROCEDURE — 93971 EXTREMITY STUDY: CPT | Performed by: RADIOLOGY

## 2020-04-17 PROCEDURE — 87070 CULTURE OTHR SPECIMN AEROBIC: CPT | Performed by: SURGERY

## 2020-04-17 PROCEDURE — 99212 OFFICE O/P EST SF 10 MIN: CPT | Performed by: SURGERY

## 2020-04-17 PROCEDURE — 93971 EXTREMITY STUDY: CPT

## 2020-04-17 PROCEDURE — 87186 SC STD MICRODIL/AGAR DIL: CPT | Performed by: SURGERY

## 2020-04-17 PROCEDURE — 87205 SMEAR GRAM STAIN: CPT | Performed by: SURGERY

## 2020-04-17 PROCEDURE — 87147 CULTURE TYPE IMMUNOLOGIC: CPT | Performed by: SURGERY

## 2020-04-17 NOTE — TELEPHONE ENCOUNTER
Jackie called and stated that her port has gotten red and painful. She ask if she could send a picture I let her and then sent it on to Dr. Wang because she is on call today. She wanted her to have a STAT VNS US and did not want the patient to leave the hospital until she had the results. It will be done at 4:00 and I called the US teck (Kayleigh) and made sure she knew not to let the patient leave. I will put Dr. Wang's Ext and cell # on order.

## 2020-04-17 NOTE — PROGRESS NOTES
Subjective   Jackie Johnson is a 60 y.o. female is here today for follow-up for possible port infection.    History of Present Illness  Patient called the office today with complaints of redness and pain in the right neck where her port is.  She is receiving neoadjuvant chemo for breast cancer. Had a stat venous us of the right neck just immediately prior to visit which did not show DVT. Port placed over 4 weeks ago  Allergies   Allergen Reactions   • Albuterol Other (See Comments)     One time severe headache, questionable for aneurysm, did spinal tap was positive, did Angiogram and MRI were Negative for aneurysm   • Penicillins Hives   • Cefpodoxime Rash     vantin     • Clindamycin Hcl Rash   • Crestor [Rosuvastatin Calcium] Other (See Comments)     Caused high liver enxymes   • Sulfa Antibiotics Other (See Comments)     Mucosal lesions   • Pseudoephedrine Other (See Comments)     Soreness on the tongue       Current Outpatient Medications   Medication Sig Dispense Refill   • acetaminophen (TYLENOL) 325 MG tablet Take 650 mg by mouth Every 6 (Six) Hours As Needed for Mild Pain .     • calcium carbonate (TUMS) 500 MG chewable tablet Chew 1 tablet Daily As Needed for Indigestion or Heartburn.     • dexamethasone (DECADRON) 4 MG tablet Take 2 tablets in the morning daily on Days 2, 3 & 4.  Take with food. 6 tablet 3   • Famotidine (PEPCID PO) Take  by mouth As Needed.     • fexofenadine (ALLEGRA) 60 MG tablet Take 60 mg by mouth Every Other Day.     • levalbuterol (XOPENEX HFA) 45 MCG/ACT inhaler Inhale 1-2 puffs Every 4 (Four) Hours As Needed for Wheezing.     • Loratadine (CLARITIN PO) Take  by mouth Every Other Day.     • ondansetron (ZOFRAN) 8 MG tablet Take 1 tablet by mouth 3 (Three) Times a Day As Needed for Nausea or Vomiting. 30 tablet 5   • prochlorperazine (COMPAZINE) 10 MG tablet Take 1 tablet by mouth Every 6 (Six) Hours As Needed for Nausea or Vomiting. 60 tablet 3   • sennosides-docusate (Senna-S)  "8.6-50 MG per tablet Take 2 tablets by mouth 2 (Two) Times a Day. 120 tablet 5     No current facility-administered medications for this visit.      Past Medical History:   Diagnosis Date   • Acid reflux    • Asthma    • Cancer (CMS/HCC)    • Eczema    • Endometriosis    • Hiatal hernia    • High cholesterol    • Seasonal allergies      Past Surgical History:   Procedure Laterality Date   • ABDOMINAL SURGERY     • BREAST BIOPSY Bilateral    •  SECTION     • D&C HYSTEROSCOPY ENDOMETRIAL ABLATION     • DIAGNOSTIC LAPAROSCOPY     • FRACTURE SURGERY Left     tib/fib   • LEG SURGERY      Left leg following fracture   • PORTACATH PLACEMENT N/A 3/18/2020    Procedure: INSERTION OF PORTACATH;  Surgeon: Dudley Aldridge MD;  Location: [facility];  Service: General;  Laterality: N/A;   • SKIN BIOPSY     • TONSILLECTOMY       The following portions of the patient's history were reviewed and updated as appropriate: allergies, current medications, past family history, past medical history, past social history, past surgical history and problem list.    Review of systems:  Unchanged from prior except HPI    Objective   /93 (BP Location: Left arm, Patient Position: Sitting)   Pulse 92   Ht 165.1 cm (65\")   Wt 81.3 kg (179 lb 3.2 oz)   BMI 29.82 kg/m²    Physical Exam  On examination this is a well-developed well-nourished female in no acute distress  HEENT examination: Within normal limits.  Conjunctiva pink.  Nose and ears appear normal.  Neck: Supple, full range of motion.  No JVD.  Musculoskeletal: Full range of motion all extremities without focal weakness. Normal gait. No digital cyanosis.  Psych: Patient is alert, oriented x3. Mood and affect are appropriate.  Skin: Right neck -there is a 2 to 3 cm area of erythema around the stab incision of port entry into the neck.  The port itself is not grossly infected.  Area in the neck cleansed with alcohol and culture done of very minimal area of drainage.  No " definite pus seen.    Results/Data      Procedures     Assessment/Plan   Right neck cellulitis and skin a port site without evidence of DVT    Plan:  Start doxycycline  Track culture         Discussion/Summary    Patient's Body mass index is 29.82 kg/m². BMI is above normal parameters. Recommendations include: educational material.         Future Appointments   Date Time Provider Department Center   4/30/2020  8:30 AM  [facility] OP INFUS CHAIR 2  [facility] INF [facility]   4/30/2020  8:30 AM [facility] NURSE [facility]   4/30/2020  9:00 AM Izzy Arce APRN [facility]   5/6/2020 12:30 PM COR BR CARE MAMM 1  COR MA BC COR   5/6/2020  1:00 PM COR BR CARE US 1  COR US BC COR   5/21/2020  8:30 AM [facility] NURSE LAB E ONC [facility] COR   5/21/2020  9:00 AM Fatoumata Obrien MD [facility]         Please note that portions of this note were completed with a voice recognition program.

## 2020-04-17 NOTE — TELEPHONE ENCOUNTER
----- Message from Rosana Braun MA sent at 4/17/2020 11:48 AM EDT -----  Contact: [phone]  Jackie is asking for a nurse to call her back, She thinks her port is infected.    Thanks

## 2020-04-17 NOTE — TELEPHONE ENCOUNTER
"Patient reports area on neck above port is tender, red, and \"puffy\".  Patient concerned port may be infected.  Patient instructed to call Dr. Aldridge who inserted port and given telephone number [phone] to call Dr. Aldridge's office for further instruction and follow up.  Patient expresses understanding and agrees with plan of care.   "

## 2020-04-18 ENCOUNTER — HOSPITAL ENCOUNTER (INPATIENT)
Facility: HOSPITAL | Age: 60
LOS: 5 days | Discharge: HOME | End: 2020-04-23
Attending: EMERGENCY MEDICINE | Admitting: SURGERY

## 2020-04-18 ENCOUNTER — APPOINTMENT (OUTPATIENT)
Dept: GENERAL RADIOLOGY | Facility: HOSPITAL | Age: 60
End: 2020-04-18

## 2020-04-18 DIAGNOSIS — L03.221 CELLULITIS OF NECK: Primary | ICD-10-CM

## 2020-04-18 DIAGNOSIS — Z95.828 PORT-A-CATH IN PLACE: ICD-10-CM

## 2020-04-18 LAB
ALBUMIN SERPL-MCNC: 4.14 G/DL (ref 3.5–5.2)
ALBUMIN/GLOB SERPL: 1.7 G/DL
ALP SERPL-CCNC: 166 U/L (ref 39–117)
ALT SERPL W P-5'-P-CCNC: 33 U/L (ref 1–33)
ANION GAP SERPL CALCULATED.3IONS-SCNC: 13 MMOL/L (ref 5–15)
AST SERPL-CCNC: 17 U/L (ref 1–32)
BACTERIA BLD CULT: ABNORMAL
BACTERIA UR QL AUTO: ABNORMAL /HPF
BASOPHILS # BLD MANUAL: 0.32 10*3/MM3 (ref 0–0.2)
BASOPHILS # BLD MANUAL: 0.54 10*3/MM3 (ref 0–0.2)
BASOPHILS NFR BLD AUTO: 1 % (ref 0–1.5)
BASOPHILS NFR BLD AUTO: 2 % (ref 0–1.5)
BILIRUB SERPL-MCNC: 0.3 MG/DL (ref 0.2–1.2)
BILIRUB UR QL STRIP: NEGATIVE
BOTTLE TYPE: ABNORMAL
BUN BLD-MCNC: 15 MG/DL (ref 8–23)
BUN/CREAT SERPL: 16.9 (ref 7–25)
CALCIUM SPEC-SCNC: 9.1 MG/DL (ref 8.6–10.5)
CHLORIDE SERPL-SCNC: 96 MMOL/L (ref 98–107)
CLARITY UR: CLEAR
CO2 SERPL-SCNC: 23 MMOL/L (ref 22–29)
COLOR UR: YELLOW
CREAT BLD-MCNC: 0.89 MG/DL (ref 0.57–1)
CRP SERPL-MCNC: 1.93 MG/DL (ref 0–0.5)
D-LACTATE SERPL-SCNC: 1.3 MMOL/L (ref 0.5–2)
DEPRECATED RDW RBC AUTO: 49.1 FL (ref 37–54)
DEPRECATED RDW RBC AUTO: 50.4 FL (ref 37–54)
ERYTHROCYTE [DISTWIDTH] IN BLOOD BY AUTOMATED COUNT: 14.5 % (ref 12.3–15.4)
ERYTHROCYTE [DISTWIDTH] IN BLOOD BY AUTOMATED COUNT: 14.6 % (ref 12.3–15.4)
GFR SERPL CREATININE-BSD FRML MDRD: 65 ML/MIN/1.73
GLOBULIN UR ELPH-MCNC: 2.5 GM/DL
GLUCOSE BLD-MCNC: 136 MG/DL (ref 65–99)
GLUCOSE UR STRIP-MCNC: NEGATIVE MG/DL
HCT VFR BLD AUTO: 33.9 % (ref 34–46.6)
HCT VFR BLD AUTO: 38.4 % (ref 34–46.6)
HGB BLD-MCNC: 10.7 G/DL (ref 12–15.9)
HGB BLD-MCNC: 12.2 G/DL (ref 12–15.9)
HGB UR QL STRIP.AUTO: NEGATIVE
HYALINE CASTS UR QL AUTO: ABNORMAL /LPF
HYPOCHROMIA BLD QL: ABNORMAL
HYPOCHROMIA BLD QL: ABNORMAL
KETONES UR QL STRIP: NEGATIVE
LEUKOCYTE ESTERASE UR QL STRIP.AUTO: ABNORMAL
LIPASE SERPL-CCNC: 21 U/L (ref 13–60)
LYMPHOCYTES # BLD MANUAL: 1.08 10*3/MM3 (ref 0.7–3.1)
LYMPHOCYTES # BLD MANUAL: 2.58 10*3/MM3 (ref 0.7–3.1)
LYMPHOCYTES NFR BLD MANUAL: 3 % (ref 5–12)
LYMPHOCYTES NFR BLD MANUAL: 4 % (ref 19.6–45.3)
LYMPHOCYTES NFR BLD MANUAL: 4 % (ref 5–12)
LYMPHOCYTES NFR BLD MANUAL: 8 % (ref 19.6–45.3)
MCH RBC QN AUTO: 30 PG (ref 26.6–33)
MCH RBC QN AUTO: 30.1 PG (ref 26.6–33)
MCHC RBC AUTO-ENTMCNC: 31.6 G/DL (ref 31.5–35.7)
MCHC RBC AUTO-ENTMCNC: 31.8 G/DL (ref 31.5–35.7)
MCV RBC AUTO: 94.3 FL (ref 79–97)
MCV RBC AUTO: 95.5 FL (ref 79–97)
METAMYELOCYTES NFR BLD MANUAL: 2 % (ref 0–0)
METAMYELOCYTES NFR BLD MANUAL: 3 % (ref 0–0)
MONOCYTES # BLD AUTO: 0.81 10*3/MM3 (ref 0.1–0.9)
MONOCYTES # BLD AUTO: 1.29 10*3/MM3 (ref 0.1–0.9)
NEUTROPHILS # BLD AUTO: 24.01 10*3/MM3 (ref 1.7–7)
NEUTROPHILS # BLD AUTO: 27.07 10*3/MM3 (ref 1.7–7)
NEUTROPHILS NFR BLD MANUAL: 77 % (ref 42.7–76)
NEUTROPHILS NFR BLD MANUAL: 84 % (ref 42.7–76)
NEUTS BAND NFR BLD MANUAL: 12 % (ref 0–5)
NITRITE UR QL STRIP: NEGATIVE
PH UR STRIP.AUTO: <=5 [PH] (ref 5–8)
PLAT MORPH BLD: NORMAL
PLATELET # BLD AUTO: 137 10*3/MM3 (ref 140–450)
PLATELET # BLD AUTO: 155 10*3/MM3 (ref 140–450)
PMV BLD AUTO: 10 FL (ref 6–12)
PMV BLD AUTO: 9.5 FL (ref 6–12)
POTASSIUM BLD-SCNC: 4 MMOL/L (ref 3.5–5.2)
PROT SERPL-MCNC: 6.6 G/DL (ref 6–8.5)
PROT UR QL STRIP: NEGATIVE
RBC # BLD AUTO: 3.55 10*6/MM3 (ref 3.77–5.28)
RBC # BLD AUTO: 4.07 10*6/MM3 (ref 3.77–5.28)
RBC # UR: ABNORMAL /HPF
REF LAB TEST METHOD: ABNORMAL
SCAN SLIDE: NORMAL
SCAN SLIDE: NORMAL
SMALL PLATELETS BLD QL SMEAR: ABNORMAL
SODIUM BLD-SCNC: 132 MMOL/L (ref 136–145)
SP GR UR STRIP: 1.01 (ref 1–1.03)
SQUAMOUS #/AREA URNS HPF: ABNORMAL /HPF
UROBILINOGEN UR QL STRIP: ABNORMAL
WBC NRBC COR # BLD: 26.98 10*3/MM3 (ref 3.4–10.8)
WBC NRBC COR # BLD: 32.23 10*3/MM3 (ref 3.4–10.8)
WBC UR QL AUTO: ABNORMAL /HPF

## 2020-04-18 PROCEDURE — 99284 EMERGENCY DEPT VISIT MOD MDM: CPT

## 2020-04-18 PROCEDURE — 86140 C-REACTIVE PROTEIN: CPT | Performed by: EMERGENCY MEDICINE

## 2020-04-18 PROCEDURE — 87150 DNA/RNA AMPLIFIED PROBE: CPT | Performed by: EMERGENCY MEDICINE

## 2020-04-18 PROCEDURE — 25010000002 LEVOFLOXACIN PER 250 MG: Performed by: SURGERY

## 2020-04-18 PROCEDURE — 87186 SC STD MICRODIL/AGAR DIL: CPT | Performed by: EMERGENCY MEDICINE

## 2020-04-18 PROCEDURE — 83605 ASSAY OF LACTIC ACID: CPT | Performed by: EMERGENCY MEDICINE

## 2020-04-18 PROCEDURE — 94799 UNLISTED PULMONARY SVC/PX: CPT

## 2020-04-18 PROCEDURE — 81001 URINALYSIS AUTO W/SCOPE: CPT | Performed by: EMERGENCY MEDICINE

## 2020-04-18 PROCEDURE — 25010000002 VANCOMYCIN: Performed by: EMERGENCY MEDICINE

## 2020-04-18 PROCEDURE — 36415 COLL VENOUS BLD VENIPUNCTURE: CPT

## 2020-04-18 PROCEDURE — 99222 1ST HOSP IP/OBS MODERATE 55: CPT | Performed by: SURGERY

## 2020-04-18 PROCEDURE — 85007 BL SMEAR W/DIFF WBC COUNT: CPT | Performed by: EMERGENCY MEDICINE

## 2020-04-18 PROCEDURE — 85007 BL SMEAR W/DIFF WBC COUNT: CPT | Performed by: SURGERY

## 2020-04-18 PROCEDURE — 83690 ASSAY OF LIPASE: CPT | Performed by: EMERGENCY MEDICINE

## 2020-04-18 PROCEDURE — 71045 X-RAY EXAM CHEST 1 VIEW: CPT

## 2020-04-18 PROCEDURE — 85025 COMPLETE CBC W/AUTO DIFF WBC: CPT | Performed by: SURGERY

## 2020-04-18 PROCEDURE — 87147 CULTURE TYPE IMMUNOLOGIC: CPT | Performed by: EMERGENCY MEDICINE

## 2020-04-18 PROCEDURE — 80053 COMPREHEN METABOLIC PANEL: CPT | Performed by: EMERGENCY MEDICINE

## 2020-04-18 PROCEDURE — 25010000002 VANCOMYCIN 1 G RECONSTITUTED SOLUTION: Performed by: EMERGENCY MEDICINE

## 2020-04-18 PROCEDURE — 85025 COMPLETE CBC W/AUTO DIFF WBC: CPT | Performed by: EMERGENCY MEDICINE

## 2020-04-18 PROCEDURE — 87040 BLOOD CULTURE FOR BACTERIA: CPT | Performed by: EMERGENCY MEDICINE

## 2020-04-18 RX ORDER — FAMOTIDINE 20 MG/1
10 TABLET, FILM COATED ORAL AS NEEDED
Status: DISCONTINUED | OUTPATIENT
Start: 2020-04-18 | End: 2020-04-23 | Stop reason: HOSPADM

## 2020-04-18 RX ORDER — ONDANSETRON 4 MG/1
8 TABLET, FILM COATED ORAL 3 TIMES DAILY PRN
Status: DISCONTINUED | OUTPATIENT
Start: 2020-04-18 | End: 2020-04-23 | Stop reason: HOSPADM

## 2020-04-18 RX ORDER — PROCHLORPERAZINE MALEATE 10 MG
10 TABLET ORAL EVERY 6 HOURS PRN
Status: DISCONTINUED | OUTPATIENT
Start: 2020-04-18 | End: 2020-04-23 | Stop reason: HOSPADM

## 2020-04-18 RX ORDER — ACETAMINOPHEN 325 MG/1
650 TABLET ORAL EVERY 6 HOURS PRN
Status: DISCONTINUED | OUTPATIENT
Start: 2020-04-18 | End: 2020-04-23 | Stop reason: HOSPADM

## 2020-04-18 RX ORDER — HEPARIN SODIUM 5000 [USP'U]/ML
5000 INJECTION, SOLUTION INTRAVENOUS; SUBCUTANEOUS EVERY 12 HOURS SCHEDULED
Status: DISCONTINUED | OUTPATIENT
Start: 2020-04-18 | End: 2020-04-23 | Stop reason: HOSPADM

## 2020-04-18 RX ORDER — FAMOTIDINE 20 MG/1
10 TABLET, FILM COATED ORAL AS NEEDED
Status: CANCELLED | OUTPATIENT
Start: 2020-04-18

## 2020-04-18 RX ORDER — CETIRIZINE HYDROCHLORIDE 10 MG/1
10 TABLET ORAL DAILY
Status: DISCONTINUED | OUTPATIENT
Start: 2020-04-18 | End: 2020-04-23 | Stop reason: HOSPADM

## 2020-04-18 RX ORDER — LEVOFLOXACIN 5 MG/ML
750 INJECTION, SOLUTION INTRAVENOUS EVERY 24 HOURS
Status: DISCONTINUED | OUTPATIENT
Start: 2020-04-18 | End: 2020-04-19

## 2020-04-18 RX ORDER — ALBUTEROL SULFATE 2.5 MG/3ML
2.5 SOLUTION RESPIRATORY (INHALATION) EVERY 6 HOURS PRN
Status: CANCELLED | OUTPATIENT
Start: 2020-04-18

## 2020-04-18 RX ORDER — CALCIUM CARBONATE 200(500)MG
1 TABLET,CHEWABLE ORAL DAILY PRN
Status: DISCONTINUED | OUTPATIENT
Start: 2020-04-18 | End: 2020-04-23 | Stop reason: HOSPADM

## 2020-04-18 RX ORDER — CETIRIZINE HYDROCHLORIDE 10 MG/1
10 TABLET ORAL DAILY
Status: CANCELLED | OUTPATIENT
Start: 2020-04-18

## 2020-04-18 RX ORDER — SODIUM CHLORIDE 9 MG/ML
125 INJECTION, SOLUTION INTRAVENOUS CONTINUOUS
Status: DISCONTINUED | OUTPATIENT
Start: 2020-04-18 | End: 2020-04-23 | Stop reason: HOSPADM

## 2020-04-18 RX ORDER — SODIUM CHLORIDE 0.9 % (FLUSH) 0.9 %
10 SYRINGE (ML) INJECTION AS NEEDED
Status: DISCONTINUED | OUTPATIENT
Start: 2020-04-18 | End: 2020-04-23 | Stop reason: HOSPADM

## 2020-04-18 RX ORDER — LEVALBUTEROL TARTRATE 45 UG/1
1 AEROSOL, METERED ORAL EVERY 4 HOURS PRN
Status: DISCONTINUED | OUTPATIENT
Start: 2020-04-18 | End: 2020-04-23 | Stop reason: HOSPADM

## 2020-04-18 RX ORDER — SODIUM CHLORIDE 0.9 % (FLUSH) 0.9 %
10 SYRINGE (ML) INJECTION EVERY 12 HOURS SCHEDULED
Status: DISCONTINUED | OUTPATIENT
Start: 2020-04-18 | End: 2020-04-23 | Stop reason: HOSPADM

## 2020-04-18 RX ORDER — FAMOTIDINE 10 MG
10 TABLET ORAL AS NEEDED
COMMUNITY
End: 2021-07-28

## 2020-04-18 RX ORDER — LORATADINE 10 MG/1
10 TABLET ORAL 2 TIMES DAILY
COMMUNITY
End: 2021-07-26

## 2020-04-18 RX ORDER — AMOXICILLIN 250 MG
2 CAPSULE ORAL 2 TIMES DAILY
Status: DISCONTINUED | OUTPATIENT
Start: 2020-04-18 | End: 2020-04-23 | Stop reason: HOSPADM

## 2020-04-18 RX ADMIN — SODIUM CHLORIDE, PRESERVATIVE FREE 10 ML: 5 INJECTION INTRAVENOUS at 08:09

## 2020-04-18 RX ADMIN — VANCOMYCIN HYDROCHLORIDE 1000 MG: 1 INJECTION, POWDER, LYOPHILIZED, FOR SOLUTION INTRAVENOUS at 14:14

## 2020-04-18 RX ADMIN — SODIUM CHLORIDE 125 ML/HR: 9 INJECTION, SOLUTION INTRAVENOUS at 03:16

## 2020-04-18 RX ADMIN — ACETAMINOPHEN 650 MG: 325 TABLET ORAL at 18:42

## 2020-04-18 RX ADMIN — SODIUM CHLORIDE 500 ML: 9 INJECTION, SOLUTION INTRAVENOUS at 01:48

## 2020-04-18 RX ADMIN — AZTREONAM 2 G: 2 INJECTION, POWDER, FOR SOLUTION INTRAMUSCULAR; INTRAVENOUS at 01:48

## 2020-04-18 RX ADMIN — DOCUSATE SODIUM 50 MG AND SENNOSIDES 8.6 MG 2 TABLET: 8.6; 5 TABLET, FILM COATED ORAL at 14:14

## 2020-04-18 RX ADMIN — SODIUM CHLORIDE 2 G: 900 INJECTION INTRAVENOUS at 10:24

## 2020-04-18 RX ADMIN — SODIUM CHLORIDE 500 ML: 9 INJECTION, SOLUTION INTRAVENOUS at 03:17

## 2020-04-18 RX ADMIN — CETIRIZINE HYDROCHLORIDE 10 MG: 10 TABLET, FILM COATED ORAL at 14:14

## 2020-04-18 RX ADMIN — VANCOMYCIN HYDROCHLORIDE 1500 MG: 10 INJECTION, POWDER, LYOPHILIZED, FOR SOLUTION INTRAVENOUS at 02:47

## 2020-04-18 RX ADMIN — LEVOFLOXACIN 750 MG: 750 INJECTION, SOLUTION INTRAVENOUS at 08:08

## 2020-04-18 RX ADMIN — SODIUM CHLORIDE 125 ML/HR: 9 INJECTION, SOLUTION INTRAVENOUS at 16:32

## 2020-04-18 RX ADMIN — DOCUSATE SODIUM 50 MG AND SENNOSIDES 8.6 MG 2 TABLET: 8.6; 5 TABLET, FILM COATED ORAL at 19:28

## 2020-04-18 NOTE — ED PROVIDER NOTES
Subjective   60-year-old female presents with suspected postop infection.  She was referred in by Dr. Wang.  She recently had a port placed by Dr. Aldridge and was seen in the office today for redness at the incision site.  There was some drainage that was swabbed.  She developed a fever this evening and was referred into the ER for evaluation and admission.  Fever has resolved without treatment.  Patient denies any other complaints.  She is a breast cancer patient on active chemotherapy, and recently had Neulasta as well.  She follows with Dr. Obrien.      History provided by:  Patient   used: No        Review of Systems   Constitutional: Positive for fever.   HENT: Negative.    Eyes: Negative.    Respiratory: Negative.  Negative for cough and shortness of breath.    Cardiovascular: Negative.  Negative for chest pain.   Gastrointestinal: Negative.  Negative for abdominal pain.   Genitourinary: Negative.  Negative for dysuria.   Musculoskeletal: Negative.    Skin: Positive for rash and wound.   Neurological: Negative.    Psychiatric/Behavioral: Negative.    All other systems reviewed and are negative.      Past Medical History:   Diagnosis Date   • Acid reflux    • Asthma    • Cancer (CMS/HCC)    • Eczema    • Endometriosis    • Hiatal hernia    • High cholesterol    • Seasonal allergies        Allergies   Allergen Reactions   • Albuterol Other (See Comments)     One time severe headache, questionable for aneurysm, did spinal tap was positive, did Angiogram and MRI were Negative for aneurysm   • Penicillins Hives   • Cefpodoxime Rash     vantin     • Clindamycin Hcl Rash   • Crestor [Rosuvastatin Calcium] Other (See Comments)     Caused high liver enxymes   • Sulfa Antibiotics Other (See Comments)     Mucosal lesions   • Pseudoephedrine Other (See Comments)     Soreness on the tongue       Past Surgical History:   Procedure Laterality Date   • ABDOMINAL SURGERY     • BREAST BIOPSY Bilateral    •   SECTION     • D&C HYSTEROSCOPY ENDOMETRIAL ABLATION     • DIAGNOSTIC LAPAROSCOPY     • FRACTURE SURGERY Left     tib/fib   • LEG SURGERY      Left leg following fracture   • PORTACATH PLACEMENT N/A 3/18/2020    Procedure: INSERTION OF PORTACATH;  Surgeon: Dudley Aldridge MD;  Location: [facility];  Service: General;  Laterality: N/A;   • SKIN BIOPSY     • TONSILLECTOMY         Family History   Problem Relation Age of Onset   • Breast cancer Mother 75   • Basal cell carcinoma Mother 90        2 small spots on face removed + fair complexion   • Breast cancer Sister 55        VUS detected on genetic testing CDKN2A gene (c.-33G>C).    • Heart disease Father    • Throat cancer Father 79   • Lung cancer Father 79   • Cancer Maternal Grandmother 65        unknown gynecologic cancer, cervical vs uterine   • Lymphoma Maternal Grandfather 64   • Breast cancer Paternal Grandmother 70   • Prostate cancer Paternal Grandfather 70        metastastic to liver   • Breast cancer Paternal Aunt 77   • Breast cancer Maternal Aunt 80   • Breast cancer Other    • Ovarian cancer Sister 66        Elevated ; 1A tumor; Borderline cancer; both ovaries & lg cyst removed   • Ovarian cancer Maternal Cousin 54   • Breast cancer Other 50       Social History     Socioeconomic History   • Marital status:      Spouse name: Not on file   • Number of children: Not on file   • Years of education: Not on file   • Highest education level: Not on file   Tobacco Use   • Smoking status: Never Smoker   • Smokeless tobacco: Never Used   Substance and Sexual Activity   • Alcohol use: Never     Frequency: Never   • Drug use: Never   • Sexual activity: Defer     Partners: Male     Birth control/protection: None   Social History Narrative    lives alone with      with [facility]    No unusual chemical exposures    Never smoker, never drinker           Objective   Physical Exam   Constitutional: She is  oriented to person, place, and time. She appears well-developed and well-nourished. No distress.   HENT:   Head: Normocephalic and atraumatic.   Right Ear: External ear normal.   Left Ear: External ear normal.   Nose: Nose normal.   Eyes: Pupils are equal, round, and reactive to light. Conjunctivae and EOM are normal.   Neck: Normal range of motion. Neck supple. No JVD present. No tracheal deviation present.   Cardiovascular: Normal rate, regular rhythm and normal heart sounds.   No murmur heard.  Pulmonary/Chest: Effort normal and breath sounds normal. No respiratory distress. She has no wheezes.   Abdominal: Soft. Bowel sounds are normal. There is no tenderness.   Musculoskeletal: Normal range of motion. She exhibits no edema or deformity.   Neurological: She is alert and oriented to person, place, and time. No cranial nerve deficit.   Skin: Skin is warm and dry. Rash noted. She is not diaphoretic. No erythema. No pallor.   Erythematous cellulitis supraclavicular on R, no active drainage, no fluctuance, does not directly overly access site   Psychiatric: She has a normal mood and affect. Her behavior is normal. Thought content normal.   Nursing note and vitals reviewed.      Procedures           ED Course                                           MDM  Number of Diagnoses or Management Options  Cellulitis of neck:   Diagnosis management comments: Patient presents with cellulitis, postop skin infection.  Fever has resolved, heart rate is in the 90s, she otherwise is nontoxic in appearance.  Basic bloodwork was notable only for marked leukocytosis due to her Neulasta.  We will start her on broad-spectrum antibiotic coverage.  Discussed with Dr. Wang who will admit patient to her service.       Amount and/or Complexity of Data Reviewed  Clinical lab tests: reviewed and ordered  Tests in the radiology section of CPT®: ordered and reviewed  Discuss the patient with other providers: yes  Independent visualization of  images, tracings, or specimens: yes        Final diagnoses:   Cellulitis of neck            Artie Cruz MD  04/18/20 0721

## 2020-04-18 NOTE — H&P
Chief complaint cellulitis    Subjective     Patient is a 60 y.o. female who presented with right neck cellulitis.  The patient was actually seen in the office yesterday for this problem.  She underwent a port placement for neoadjuvant chemotherapy for right breast cancer approximately 4-1/2 weeks ago.  The patient called the office yesterday complaining of pain and redness in the neck.  She sent a picture into the office and based upon that she had a stat ultrasound which did not show any evidence of DVT.  The patient was seen in the office about 4:00 yesterday afternoon where a culture was done and she was started on doxycycline.  The patient called the answering service late yesterday evening with complaints of a low-grade temperature.  She was advised to come to the emergency room to be admitted.  Blood cultures were done.  The patient did have a leukocytosis but she has received Neulasta following her last chemo treatment.      Review of Systems  Review of Systems - General ROS: negative for - weight loss  Psychological ROS: negative for - behavioral disorder  Ophthalmic ROS: negative for - dry eyes  ENT ROS: negative for - vertigo or vocal changes  Hematological and Lymphatic ROS: negative for - jaundice or swollen lymph nodes  Respiratory ROS: negative for - sputum changes or stridor  Cardiovascular ROS: negative for - irregular heartbeat or murmur  Gastrointestinal ROS: negative for - blood in stools or change in stools  Genito-Urinary ROS: negative for - hematuria or incontinence  Musculoskeletal ROS: negative for - gait disturbance    History  Past Medical History:   Diagnosis Date   • Acid reflux    • Asthma    • Cancer (CMS/HCC)    • Eczema    • Endometriosis    • Hiatal hernia    • High cholesterol    • Seasonal allergies      Past Surgical History:   Procedure Laterality Date   • ABDOMINAL SURGERY     • BREAST BIOPSY Bilateral    •  SECTION     • D&C HYSTEROSCOPY ENDOMETRIAL ABLATION      • DIAGNOSTIC LAPAROSCOPY     • FRACTURE SURGERY Left     tib/fib   • LEG SURGERY      Left leg following fracture   • PORTACATH PLACEMENT N/A 3/18/2020    Procedure: INSERTION OF PORTACATH;  Surgeon: Dudley Aldridge MD;  Location: [facility];  Service: General;  Laterality: N/A;   • SKIN BIOPSY     • TONSILLECTOMY       Family History   Problem Relation Age of Onset   • Breast cancer Mother 75   • Basal cell carcinoma Mother 90        2 small spots on face removed + fair complexion   • Breast cancer Sister 55        VUS detected on genetic testing CDKN2A gene (c.-33G>C).    • Heart disease Father    • Throat cancer Father 79   • Lung cancer Father 79   • Cancer Maternal Grandmother 65        unknown gynecologic cancer, cervical vs uterine   • Lymphoma Maternal Grandfather 64   • Breast cancer Paternal Grandmother 70   • Prostate cancer Paternal Grandfather 70        metastastic to liver   • Breast cancer Paternal Aunt 77   • Breast cancer Maternal Aunt 80   • Breast cancer Other    • Ovarian cancer Sister 66        Elevated ; 1A tumor; Borderline cancer; both ovaries & lg cyst removed   • Ovarian cancer Maternal Cousin 54   • Breast cancer Other 50     Social History     Tobacco Use   • Smoking status: Never Smoker   • Smokeless tobacco: Never Used   Substance Use Topics   • Alcohol use: Never     Frequency: Never   • Drug use: Never     Medications Prior to Admission   Medication Sig Dispense Refill Last Dose   • acetaminophen (TYLENOL) 325 MG tablet Take 650 mg by mouth Every 6 (Six) Hours As Needed for Mild Pain .   Past Week at Unknown time   • calcium carbonate (TUMS) 500 MG chewable tablet Chew 1 tablet Daily As Needed for Indigestion or Heartburn.   Past Week at Unknown time   • famotidine (PEPCID) 10 MG tablet Take 10 mg by mouth As Needed for Indigestion or Heartburn.   Past Week at Unknown time   • levalbuterol (XOPENEX HFA) 45 MCG/ACT inhaler Inhale 1-2 puffs Every 4 (Four) Hours As Needed for  Wheezing.   Past Month at Unknown time   • loratadine (CLARITIN) 10 MG tablet Take 10 mg by mouth 2 (Two) Times a Day.   4/17/2020 at pm   • ondansetron (ZOFRAN) 8 MG tablet Take 1 tablet by mouth 3 (Three) Times a Day As Needed for Nausea or Vomiting. 30 tablet 5 Past Week at Unknown time   • prochlorperazine (COMPAZINE) 10 MG tablet Take 1 tablet by mouth Every 6 (Six) Hours As Needed for Nausea or Vomiting. 60 tablet 3 4/17/2020 at pm   • sennosides-docusate (Senna-S) 8.6-50 MG per tablet Take 2 tablets by mouth 2 (Two) Times a Day. 120 tablet 5 Past Week at Unknown time     Allergies:  Albuterol; Penicillins; Cefpodoxime; Clindamycin hcl; Crestor [rosuvastatin calcium]; Sulfa antibiotics; and Pseudoephedrine    Objective     Vital Signs  Temp:  [98.6 °F (37 °C)-100.4 °F (38 °C)] 100.4 °F (38 °C)  Heart Rate:  [] 88  Resp:  [16-18] 18  BP: (112-133)/(70-93) 122/70    Physical Exam  General:  This is a WD WN female in no acute distress  Vital signs stable, afebrile  HEENT exam:  WNL. Sclerae are anicteric.  EOMI  Neck:  supple, FROM.  No JVD.  Trachea midline  Lungs:  Respiratory effort normal. Auscultation: Clear, without wheezes, rhonchi, rales  Heart:  Regular rate and rhythm, without murmur, gallop, rub.  No pedal edema  Abdomen: Nontender, nondistended  Musculoskeletal:  muscle strength/tone is normal.    Psyc:  alert, oriented x 3.  Mood and affect are appropriate  skin:  Warm with good turgor.  There is cellulitis and erythema associated with the incision in the neck from where the port was placed on the right side.  The cellulitis has actually progressed significantly since it was seen in the office yesterday at 4 PM  extremities:  Examination of the extremities revealed no cyanosis, clubbing or edema.    Results Review:       Results from last 7 days   Lab Units 04/18/20  0733 04/18/20  0115   CRP mg/dL  --  1.93*   LACTATE mmol/L  --  1.3   WBC 10*3/mm3 26.98* 32.23*   HEMOGLOBIN g/dL 10.7* 12.2    HEMATOCRIT % 33.9* 38.4   PLATELETS 10*3/mm3 137* 155         Results from last 7 days   Lab Units 04/18/20  0115   SODIUM mmol/L 132*   POTASSIUM mmol/L 4.0   CHLORIDE mmol/L 96*   CO2 mmol/L 23.0   BUN mg/dL 15   CREATININE mg/dL 0.89   EGFR IF NONAFRICN AM mL/min/1.73 65   CALCIUM mg/dL 9.1   GLUCOSE mg/dL 136*   ALBUMIN g/dL 4.14   BILIRUBIN mg/dL 0.3   ALK PHOS U/L 166*   AST (SGOT) U/L 17   ALT (SGPT) U/L 33   Estimated Creatinine Clearance: 69.9 mL/min (by C-G formula based on SCr of 0.89 mg/dL).  No results found for: AMMONIA      No results found for: BLOODCX  No results found for: URINECX  Wound Culture   Date Value Ref Range Status   04/17/2020 Moderate growth (3+) Staphylococcus aureus (A)  Preliminary     No results found for: STOOLCX    Imaging:  Imaging Results (Last 24 Hours)     Procedure Component Value Units Date/Time    XR Chest 1 View [[id number]] Collected:  04/18/20 0308     Updated:  04/18/20 0310    Narrative:       CR Chest 1 Vw    INDICATION:   Postop port. Cellulitis of the neck.     COMPARISON:    3/18/2020    FINDINGS:  Single portable AP view(s) of the chest.    Right-sided Port-A-Cath tip is at the lower SVC level.    The lungs are clear. Cardiac and mediastinal contours are normal. There is no pneumothorax.       Impression:       Port tip in the lower SVC. No acute findings.    Signer Name: Charly Stahl MD   Signed: 4/18/2020 3:08 AM   Workstation Name: [facility]    [facility]              Impression:  Patient Active Problem List   Diagnosis Code   • Malignant neoplasm of right breast in female, estrogen receptor negative (CMS/HCC) C50.911, Z17.1   • Breast cancer (CMS/HCC) C50.919   • Port-A-Cath in place Z95.828   • Cellulitis of neck L03.221       Plan:  Continue broad-spectrum antibiotics and monitor blood culture results.  If blood culture positive patient will require removal of [id number]      Discussion:    Nerissa Wang,  MD  04/18/20  13:38      Please note that portions of this note were completed with a voice recognition program.

## 2020-04-18 NOTE — NURSING NOTE
Contacted Dr. Wang at [phone] and left voicemail on home phone. Called back at 0506 to inform that patient was admitted to the floor and to ask for admitting orders, said she would put them in.

## 2020-04-18 NOTE — PROGRESS NOTES
Pharmacy was consulted to dose vancomycin for a skin and soft tissue infection. Based on an estimated CrCl of 69mL/min and other patient factors, a vancomycin dose of 1g q12hr has been ordered following the initial 1500mg loading dose. A vancomycin trough will be obtained at steady state to determine if we are within the target therapeutic trough range of 15-20mg/L. Thank you for the consult. Pharmacy will continue to follow.     Thank you,   Imani Mcknight [facility]  03:58

## 2020-04-18 NOTE — PLAN OF CARE
Problem: Patient Care Overview  Goal: Plan of Care Review  Outcome: Ongoing (interventions implemented as appropriate)  Flowsheets (Taken 4/18/2020 1516)  Outcome Summary: Patient has ambulated in room, denies pain, abx per MARS, no distress noted, will continue to monitor.

## 2020-04-18 NOTE — PLAN OF CARE
Pt admitted to floor around 0445 this AM. Vitals WNL, patient took shower for preparation. No acute changes, will continue to monitor. New IV 20 LAC, patient accidentally removed IV upon arrival to floor.

## 2020-04-19 ENCOUNTER — ANESTHESIA EVENT (OUTPATIENT)
Dept: PERIOP | Facility: HOSPITAL | Age: 60
End: 2020-04-19

## 2020-04-19 ENCOUNTER — ANESTHESIA (OUTPATIENT)
Dept: PERIOP | Facility: HOSPITAL | Age: 60
End: 2020-04-19

## 2020-04-19 LAB
ANION GAP SERPL CALCULATED.3IONS-SCNC: 10.7 MMOL/L (ref 5–15)
BACTERIA SPEC AEROBE CULT: ABNORMAL
BUN BLD-MCNC: 7 MG/DL (ref 8–23)
BUN/CREAT SERPL: 9.7 (ref 7–25)
CALCIUM SPEC-SCNC: 7.9 MG/DL (ref 8.6–10.5)
CHLORIDE SERPL-SCNC: 104 MMOL/L (ref 98–107)
CO2 SERPL-SCNC: 17.3 MMOL/L (ref 22–29)
CREAT BLD-MCNC: 0.72 MG/DL (ref 0.57–1)
DEPRECATED RDW RBC AUTO: 53.3 FL (ref 37–54)
ERYTHROCYTE [DISTWIDTH] IN BLOOD BY AUTOMATED COUNT: 14.6 % (ref 12.3–15.4)
GFR SERPL CREATININE-BSD FRML MDRD: 83 ML/MIN/1.73
GLUCOSE BLD-MCNC: 130 MG/DL (ref 65–99)
GRAM STN SPEC: ABNORMAL
GRAM STN SPEC: ABNORMAL
HCT VFR BLD AUTO: 34.3 % (ref 34–46.6)
HGB BLD-MCNC: 10.3 G/DL (ref 12–15.9)
MCH RBC QN AUTO: 29.9 PG (ref 26.6–33)
MCHC RBC AUTO-ENTMCNC: 30 G/DL (ref 31.5–35.7)
MCV RBC AUTO: 99.7 FL (ref 79–97)
PLATELET # BLD AUTO: 110 10*3/MM3 (ref 140–450)
PMV BLD AUTO: 9.7 FL (ref 6–12)
POTASSIUM BLD-SCNC: 3.6 MMOL/L (ref 3.5–5.2)
RBC # BLD AUTO: 3.44 10*6/MM3 (ref 3.77–5.28)
SODIUM BLD-SCNC: 132 MMOL/L (ref 136–145)
WBC NRBC COR # BLD: 16.38 10*3/MM3 (ref 3.4–10.8)

## 2020-04-19 PROCEDURE — 0JPT3WZ REMOVAL OF TOTALLY IMPLANTABLE VASCULAR ACCESS DEVICE FROM TRUNK SUBCUTANEOUS TISSUE AND FASCIA, PERCUTANEOUS APPROACH: ICD-10-PCS | Performed by: SURGERY

## 2020-04-19 PROCEDURE — 99251 PR INITL INPATIENT CONSULT NEW/ESTAB PT 20 MIN: CPT | Performed by: SPECIALIST

## 2020-04-19 PROCEDURE — 05PY33Z REMOVAL OF INFUSION DEVICE FROM UPPER VEIN, PERCUTANEOUS APPROACH: ICD-10-PCS | Performed by: SURGERY

## 2020-04-19 PROCEDURE — 25010000002 HEPARIN (PORCINE) PER 1000 UNITS: Performed by: SURGERY

## 2020-04-19 PROCEDURE — 99232 SBSQ HOSP IP/OBS MODERATE 35: CPT | Performed by: SURGERY

## 2020-04-19 PROCEDURE — 25010000002 GENTAMICIN PER 80 MG: Performed by: INTERNAL MEDICINE

## 2020-04-19 PROCEDURE — 25010000002 ONDANSETRON PER 1 MG: Performed by: NURSE ANESTHETIST, CERTIFIED REGISTERED

## 2020-04-19 PROCEDURE — 94799 UNLISTED PULMONARY SVC/PX: CPT

## 2020-04-19 PROCEDURE — 36590 REMOVAL TUNNELED CV CATH: CPT | Performed by: SURGERY

## 2020-04-19 PROCEDURE — 80048 BASIC METABOLIC PNL TOTAL CA: CPT | Performed by: SURGERY

## 2020-04-19 PROCEDURE — 63710000001 ONDANSETRON PER 8 MG: Performed by: SURGERY

## 2020-04-19 PROCEDURE — 85027 COMPLETE CBC AUTOMATED: CPT | Performed by: SURGERY

## 2020-04-19 PROCEDURE — 25010000002 DAPTOMYCIN PER 1 MG: Performed by: INTERNAL MEDICINE

## 2020-04-19 PROCEDURE — 25010000003 LIDOCAINE 1 % SOLUTION: Performed by: SURGERY

## 2020-04-19 PROCEDURE — 25010000002 LEVOFLOXACIN PER 250 MG: Performed by: SURGERY

## 2020-04-19 PROCEDURE — 25010000002 MIDAZOLAM PER 1 MG: Performed by: NURSE ANESTHETIST, CERTIFIED REGISTERED

## 2020-04-19 PROCEDURE — 25010000002 PROPOFOL 10 MG/ML EMULSION: Performed by: NURSE ANESTHETIST, CERTIFIED REGISTERED

## 2020-04-19 RX ORDER — PROPOFOL 10 MG/ML
VIAL (ML) INTRAVENOUS AS NEEDED
Status: DISCONTINUED | OUTPATIENT
Start: 2020-04-19 | End: 2020-04-19 | Stop reason: SURG

## 2020-04-19 RX ORDER — MAGNESIUM HYDROXIDE 1200 MG/15ML
LIQUID ORAL AS NEEDED
Status: DISCONTINUED | OUTPATIENT
Start: 2020-04-19 | End: 2020-04-19 | Stop reason: HOSPADM

## 2020-04-19 RX ORDER — SODIUM CHLORIDE, SODIUM LACTATE, POTASSIUM CHLORIDE, CALCIUM CHLORIDE 600; 310; 30; 20 MG/100ML; MG/100ML; MG/100ML; MG/100ML
INJECTION, SOLUTION INTRAVENOUS CONTINUOUS PRN
Status: DISCONTINUED | OUTPATIENT
Start: 2020-04-19 | End: 2020-04-19

## 2020-04-19 RX ORDER — LIDOCAINE HYDROCHLORIDE 10 MG/ML
INJECTION, SOLUTION INFILTRATION; PERINEURAL AS NEEDED
Status: DISCONTINUED | OUTPATIENT
Start: 2020-04-19 | End: 2020-04-19 | Stop reason: HOSPADM

## 2020-04-19 RX ORDER — MEPERIDINE HYDROCHLORIDE 25 MG/ML
12.5 INJECTION INTRAMUSCULAR; INTRAVENOUS; SUBCUTANEOUS
Status: DISCONTINUED | OUTPATIENT
Start: 2020-04-19 | End: 2020-04-19 | Stop reason: HOSPADM

## 2020-04-19 RX ORDER — LIDOCAINE HYDROCHLORIDE 20 MG/ML
INJECTION, SOLUTION EPIDURAL; INFILTRATION; INTRACAUDAL; PERINEURAL AS NEEDED
Status: DISCONTINUED | OUTPATIENT
Start: 2020-04-19 | End: 2020-04-19 | Stop reason: SURG

## 2020-04-19 RX ORDER — MIDAZOLAM HYDROCHLORIDE 1 MG/ML
INJECTION INTRAMUSCULAR; INTRAVENOUS AS NEEDED
Status: DISCONTINUED | OUTPATIENT
Start: 2020-04-19 | End: 2020-04-19 | Stop reason: SURG

## 2020-04-19 RX ORDER — ONDANSETRON 2 MG/ML
INJECTION INTRAMUSCULAR; INTRAVENOUS AS NEEDED
Status: DISCONTINUED | OUTPATIENT
Start: 2020-04-19 | End: 2020-04-19 | Stop reason: SURG

## 2020-04-19 RX ORDER — ONDANSETRON 2 MG/ML
4 INJECTION INTRAMUSCULAR; INTRAVENOUS AS NEEDED
Status: DISCONTINUED | OUTPATIENT
Start: 2020-04-19 | End: 2020-04-19 | Stop reason: HOSPADM

## 2020-04-19 RX ORDER — FAMOTIDINE 10 MG/ML
INJECTION, SOLUTION INTRAVENOUS AS NEEDED
Status: DISCONTINUED | OUTPATIENT
Start: 2020-04-19 | End: 2020-04-19 | Stop reason: SURG

## 2020-04-19 RX ORDER — OXYCODONE HYDROCHLORIDE AND ACETAMINOPHEN 5; 325 MG/1; MG/1
1 TABLET ORAL ONCE AS NEEDED
Status: DISCONTINUED | OUTPATIENT
Start: 2020-04-19 | End: 2020-04-19 | Stop reason: HOSPADM

## 2020-04-19 RX ORDER — FENTANYL CITRATE 50 UG/ML
50 INJECTION, SOLUTION INTRAMUSCULAR; INTRAVENOUS
Status: DISCONTINUED | OUTPATIENT
Start: 2020-04-19 | End: 2020-04-19 | Stop reason: HOSPADM

## 2020-04-19 RX ADMIN — DAPTOMYCIN 500 MG: 500 INJECTION, POWDER, LYOPHILIZED, FOR SOLUTION INTRAVENOUS at 09:25

## 2020-04-19 RX ADMIN — ONDANSETRON HYDROCHLORIDE 8 MG: 4 TABLET, FILM COATED ORAL at 02:30

## 2020-04-19 RX ADMIN — LIDOCAINE HYDROCHLORIDE 50 MG: 20 INJECTION, SOLUTION EPIDURAL; INFILTRATION; INTRACAUDAL; PERINEURAL at 12:20

## 2020-04-19 RX ADMIN — PROPOFOL 60 MG: 10 INJECTION, EMULSION INTRAVENOUS at 12:20

## 2020-04-19 RX ADMIN — ONDANSETRON 4 MG: 2 INJECTION INTRAMUSCULAR; INTRAVENOUS at 12:33

## 2020-04-19 RX ADMIN — PROPOFOL 50 MCG/KG/MIN: 10 INJECTION, EMULSION INTRAVENOUS at 12:33

## 2020-04-19 RX ADMIN — ACETAMINOPHEN 650 MG: 325 TABLET ORAL at 02:30

## 2020-04-19 RX ADMIN — HEPARIN SODIUM 5000 UNITS: 5000 INJECTION INTRAVENOUS; SUBCUTANEOUS at 20:22

## 2020-04-19 RX ADMIN — SODIUM CHLORIDE 125 ML/HR: 9 INJECTION, SOLUTION INTRAVENOUS at 02:35

## 2020-04-19 RX ADMIN — GENTAMICIN SULFATE 200 MG: 40 INJECTION, SOLUTION INTRAMUSCULAR; INTRAVENOUS at 09:25

## 2020-04-19 RX ADMIN — ACETAMINOPHEN 650 MG: 325 TABLET ORAL at 09:27

## 2020-04-19 RX ADMIN — SODIUM CHLORIDE, PRESERVATIVE FREE 10 ML: 5 INJECTION INTRAVENOUS at 09:26

## 2020-04-19 RX ADMIN — MIDAZOLAM HYDROCHLORIDE 2 MG: 1 INJECTION, SOLUTION INTRAMUSCULAR; INTRAVENOUS at 12:13

## 2020-04-19 RX ADMIN — ONDANSETRON HYDROCHLORIDE 8 MG: 4 TABLET, FILM COATED ORAL at 16:59

## 2020-04-19 RX ADMIN — VANCOMYCIN HYDROCHLORIDE 1000 MG: 1 INJECTION, POWDER, LYOPHILIZED, FOR SOLUTION INTRAVENOUS at 02:26

## 2020-04-19 RX ADMIN — LEVOFLOXACIN 750 MG: 750 INJECTION, SOLUTION INTRAVENOUS at 09:25

## 2020-04-19 RX ADMIN — FAMOTIDINE 20 MG: 10 INJECTION INTRAVENOUS at 12:33

## 2020-04-19 RX ADMIN — ACETAMINOPHEN 650 MG: 325 TABLET ORAL at 16:59

## 2020-04-19 NOTE — ANESTHESIA POSTPROCEDURE EVALUATION
Patient: Jackie Johnson    Procedure Summary     Date:  04/19/20 Room / Location:  [facility] OR 01 /  [facility] OR    Anesthesia Start:  1213 Anesthesia Stop:  1245    Procedure:  REMOVAL VENOUS ACCESS DEVICE (Right ) Diagnosis:       Cellulitis of neck      Port-A-Cath in place      (Cellulitis of neck [L03.221])      (Port-A-Cath in place [Z95.828])    Surgeon:  Nerissa Wang MD Provider:  Brayan Sharp DO    Anesthesia Type:  general ASA Status:  3          Anesthesia Type: general    Vitals  Vitals Value Taken Time   /71 4/19/2020  1:05 PM   Temp 98.3 °F (36.8 °C) 4/19/2020  1:05 PM   Pulse 88 4/19/2020  1:05 PM   Resp 16 4/19/2020  1:05 PM   SpO2 97 % 4/19/2020  1:05 PM           Post Anesthesia Care and Evaluation    Patient location during evaluation: PHASE II  Patient participation: complete - patient participated  Level of consciousness: awake and alert  Pain score: 1  Pain management: adequate  Airway patency: patent  Anesthetic complications: No anesthetic complications  PONV Status: controlled  Cardiovascular status: acceptable  Respiratory status: acceptable  Hydration status: acceptable

## 2020-04-19 NOTE — PLAN OF CARE
Problem: Patient Care Overview  Goal: Plan of Care Review  Outcome: Ongoing (interventions implemented as appropriate)  Flowsheets (Taken 4/19/2020 6035)  Outcome Summary: Patient has rested, had port removed denies pain, no distress noted, will continue to monitor.

## 2020-04-19 NOTE — CONSULTS
INFECTIOUS DISEASE CONSULTATION REPORT        Patient Identification:  Name:  Jackie Johnson  Age:  60 y.o.  Sex:  female  :  1960  MRN:  [id number]   Visit Number:  [id number]  Primary Care Physician:  Denver Aguirre MD       LOS: 1 day        Subjective       Subjective     History of present illness:      Thank you Dr. Wang for allowing us to participate in the care of your patient.  As you well know, Ms. Jackie Johnson is a 60 y.o. female with past medical history significant for breast cancer currently undergoing chemotherapy, right Port-A-Cath placement 1 to 2 months ago, asthma, eczema, who presented to [facility] Emergency Department on 2020 for right neck pain and fever.  Patient had Port-A-Cath placed 1 to 2 months ago status post 2 chemo treatments 3 weeks apart and began to have right neck pain and erythema.  Now presents with high-grade fever of 103.  On room air with no distress.  WBC 16.38.  Right upper extremity venous duplex ruled out DVT.  X-ray of the chest reports no acute findings.      Infectious Disease consultation was requested for antimicrobial management.      ---------------------------------------------------------------------------------------------------------------------     Review Of Systems:    Constitutional: Positive fever, no chills and night sweats. No appetite change or unexpected weight change. No fatigue.  Hair loss.  Eyes: no eye drainage, itching or redness.  HEENT: no mouth sores, dysphagia or nose bleed.  Respiratory: no for shortness of breath, cough or production of sputum.  Cardiovascular: no chest pain, no palpitations, no orthopnea.  Gastrointestinal: no nausea, vomiting or diarrhea. No abdominal pain, hematemesis or rectal bleeding.  Genitourinary: no dysuria or polyuria.  Hematologic/lymphatic: no lymph node abnormalities, no easy bruising or easy bleeding.  Musculoskeletal: Right neck tenderness.  Skin: No rash and no  itching.  Neurological: no loss of consciousness, no seizure, no headache.  Behavioral/Psych: no depression or suicidal ideation.  Endocrine: no hot flashes.  Immunologic: negative.    ---------------------------------------------------------------------------------------------------------------------     Past Medical History    Past Medical History:   Diagnosis Date   • Acid reflux    • Asthma    • Cancer (CMS/HCC)    • Eczema    • Endometriosis    • Hiatal hernia    • High cholesterol    • Seasonal allergies        Past Surgical History    Past Surgical History:   Procedure Laterality Date   • ABDOMINAL SURGERY     • BREAST BIOPSY Bilateral    •  SECTION     • D&C HYSTEROSCOPY ENDOMETRIAL ABLATION     • DIAGNOSTIC LAPAROSCOPY     • FRACTURE SURGERY Left     tib/fib   • LEG SURGERY      Left leg following fracture   • PORTACATH PLACEMENT N/A 3/18/2020    Procedure: INSERTION OF PORTACATH;  Surgeon: Dudley Aldridge MD;  Location: [facility];  Service: General;  Laterality: N/A;   • SKIN BIOPSY     • TONSILLECTOMY         Family History    Family History   Problem Relation Age of Onset   • Breast cancer Mother 75   • Basal cell carcinoma Mother 90        2 small spots on face removed + fair complexion   • Breast cancer Sister 55        VUS detected on genetic testing CDKN2A gene (c.-33G>C).    • Heart disease Father    • Throat cancer Father 79   • Lung cancer Father 79   • Cancer Maternal Grandmother 65        unknown gynecologic cancer, cervical vs uterine   • Lymphoma Maternal Grandfather 64   • Breast cancer Paternal Grandmother 70   • Prostate cancer Paternal Grandfather 70        metastastic to liver   • Breast cancer Paternal Aunt 77   • Breast cancer Maternal Aunt 80   • Breast cancer Other    • Ovarian cancer Sister 66        Elevated ; 1A tumor; Borderline cancer; both ovaries & lg cyst removed   • Ovarian cancer Maternal Cousin 54   • Breast cancer Other 50       Social History    Social  History     Tobacco Use   • Smoking status: Never Smoker   • Smokeless tobacco: Never Used   Substance Use Topics   • Alcohol use: Never     Frequency: Never   • Drug use: Never       Allergies    Albuterol; Penicillins; Cefpodoxime; Clindamycin hcl; Crestor [rosuvastatin calcium]; Sulfa antibiotics; and Pseudoephedrine  ---------------------------------------------------------------------------------------------------------------------     Home Medications:    Prior to Admission Medications     Prescriptions Last Dose Informant Patient Reported? Taking?    acetaminophen (TYLENOL) 325 MG tablet Past Week Self Yes Yes    Take 650 mg by mouth Every 6 (Six) Hours As Needed for Mild Pain .    calcium carbonate (TUMS) 500 MG chewable tablet Past Week Self Yes Yes    Chew 1 tablet Daily As Needed for Indigestion or Heartburn.    famotidine (PEPCID) 10 MG tablet Past Week Self Yes Yes    Take 10 mg by mouth As Needed for Indigestion or Heartburn.    levalbuterol (XOPENEX HFA) 45 MCG/ACT inhaler Past Month Self Yes Yes    Inhale 1-2 puffs Every 4 (Four) Hours As Needed for Wheezing.    loratadine (CLARITIN) 10 MG tablet 4/17/2020 Self Yes Yes    Take 10 mg by mouth 2 (Two) Times a Day.    ondansetron (ZOFRAN) 8 MG tablet Past Week Self No Yes    Take 1 tablet by mouth 3 (Three) Times a Day As Needed for Nausea or Vomiting.    prochlorperazine (COMPAZINE) 10 MG tablet 4/17/2020 Self No Yes    Take 1 tablet by mouth Every 6 (Six) Hours As Needed for Nausea or Vomiting.    sennosides-docusate (Senna-S) 8.6-50 MG per tablet Past Week Self No Yes    Take 2 tablets by mouth 2 (Two) Times a Day.        ---------------------------------------------------------------------------------------------------------------------    Objective       Objective     Hospital Scheduled Meds:    cetirizine 10 mg Oral Daily   DAPTOmycin 8 mg/kg (Adjusted) Intravenous Q24H   heparin (porcine) 5,000 Units Subcutaneous Q12H   sennosides-docusate 2  tablet Oral BID   sodium chloride 10 mL Intravenous Q12H       sodium chloride 125 mL/hr Last Rate: 125 mL/hr (04/19/20 0235)     ---------------------------------------------------------------------------------------------------------------------   Vital Signs:  Temp:  [99.6 °F (37.6 °C)-103 °F (39.4 °C)] 99.6 °F (37.6 °C)  Heart Rate:  [] 92  Resp:  [18-20] 20  BP: (113-142)/(67-77) 113/67  No data found.  SpO2 Percentage    04/18/20 2300 04/19/20 0238 04/19/20 1041   SpO2: 97% 93% 94%     SpO2:  [93 %-98 %] 94 %  on   ;   Device (Oxygen Therapy): room air    Body mass index is 31.24 kg/m².  Wt Readings from Last 3 Encounters:   04/18/20 82.6 kg (182 lb)   04/17/20 81.3 kg (179 lb 3.2 oz)   04/09/20 81.8 kg (180 lb 4.8 oz)     ---------------------------------------------------------------------------------------------------------------------     Physical Exam:    Constitutional:  Well-developed and well-nourished.  No respiratory distress.  Hair loss.  HENT:  Head: Normocephalic and atraumatic.  Mouth:  Moist mucous membranes.    Eyes:  Conjunctivae and EOM are normal.  No scleral icterus.  Neck:  Neck supple.  No JVD present.    Cardiovascular:  Normal rate, regular rhythm and normal heart sounds with no murmur. No edema.  Pulmonary/Chest:  No respiratory distress, no wheezes, no crackles, with normal breath sounds and good air movement.  Abdominal:  Soft.  Bowel sounds are normal.  No distension and no tenderness.   Musculoskeletal:  No edema, no tenderness, and no deformity.  No swelling or redness of joints.  Neurological:  Alert and oriented to person, place, and time.  No facial droop.  No slurred speech.   Skin:  Skin is warm and dry.  No rash noted.  No pallor.  Right neck erythema.  Psychiatric:  Normal mood and affect.  Behavior is normal.    ---------------------------------------------------------------------------------------------------------------------              Results from last 7 days    Lab Units 04/19/20  0444 04/18/20  0733 04/18/20  0115   CRP mg/dL  --   --  1.93*   LACTATE mmol/L  --   --  1.3   WBC 10*3/mm3 16.38* 26.98* 32.23*   HEMOGLOBIN g/dL 10.3* 10.7* 12.2   HEMATOCRIT % 34.3 33.9* 38.4   MCV fL 99.7* 95.5 94.3   MCHC g/dL 30.0* 31.6 31.8   PLATELETS 10*3/mm3 110* 137* 155     Results from last 7 days   Lab Units 04/19/20 0444 04/18/20  0115   SODIUM mmol/L 132* 132*   POTASSIUM mmol/L 3.6 4.0   CHLORIDE mmol/L 104 96*   CO2 mmol/L 17.3* 23.0   BUN mg/dL 7* 15   CREATININE mg/dL 0.72 0.89   EGFR IF NONAFRICN AM mL/min/1.73 83 65   CALCIUM mg/dL 7.9* 9.1   GLUCOSE mg/dL 130* 136*   ALBUMIN g/dL  --  4.14   BILIRUBIN mg/dL  --  0.3   ALK PHOS U/L  --  166*   AST (SGOT) U/L  --  17   ALT (SGPT) U/L  --  33   Estimated Creatinine Clearance: 86.4 mL/min (by C-G formula based on SCr of 0.72 mg/dL).  No results found for: AMMONIA    No results found for: HGBA1C, POCGLU  Lab Results   Component Value Date    HGBA1C 6.1 (H) 02/26/2020     No results found for: TSH, FREET4    No results found for: BLOODCX  No results found for: URINECX  Wound Culture   Date Value Ref Range Status   04/17/2020 Moderate growth (3+) Staphylococcus aureus (A)  Final     No results found for: STOOLCX  No results found for: RESPCX  Pain Management Panel     There is no flowsheet data to display.        I have personally reviewed the above laboratory results.   ---------------------------------------------------------------------------------------------------------------------  Imaging Results (Last 7 Days)     Procedure Component Value Units Date/Time    XR Chest 1 View [[id number]] Collected:  04/18/20 0308     Updated:  04/18/20 0310    Narrative:       CR Chest 1 Vw    INDICATION:   Postop port. Cellulitis of the neck.     COMPARISON:    3/18/2020    FINDINGS:  Single portable AP view(s) of the chest.    Right-sided Port-A-Cath tip is at the lower SVC level.    The lungs are clear. Cardiac and mediastinal contours  are normal. There is no pneumothorax.       Impression:       Port tip in the lower SVC. No acute findings.    Signer Name: Charly Stahl MD   Signed: 4/18/2020 3:08 AM   Workstation Name: [email or web address]    [facility]        I have personally reviewed the above radiology results.   ---------------------------------------------------------------------------------------------------------------------      Assessment & Plan        Assessment/Plan       ASSESSMENT:    1.  Sepsis  2.  MSSA bacteremia  3.  Right chest wall port infection with cellulitis    PLAN:    Patient presents with right neck pain and fever.  Patient had Port-A-Cath placed 1 to 2 months ago status post 2 chemo treatments 3 weeks apart and began to have right neck pain and erythema.  Now presents with high-grade fever of 103.  On room air with no distress.  WBC 16.38.  Right upper extremity venous duplex ruled out DVT.  X-ray of the chest reports no acute findings.  Wound culture from neck finalized as MSSA.  Blood culture from 4/18/2020 1 out of 2 sets positive for MSSA.    MSSA bacteremia does require port removal.    In the setting of MSSA bacteremia with penicillin and Vantin allergy unable to initiate nafcillin or high-dose cefazolin.  Patient was initiated on daptomycin 8 mg/kg IV every 24 hours to continue through at least 5-20 20.  One-time dose of gentamicin 3 mg/kg grams was given.  Repeat blood cultures ordered after port removal.  Recommend CINTIA to rule out port lead vegetation or valvular vegetation.  We will continue to follow closely and adjust antibiotic therapy as needed.  Levaquin was discontinued for now.    Again, thank you Dr. Wang for allowing us to participate in the care of your patient and please feel free to call for any questions you may have.        Code Status:   Code Status and Medical Interventions:   Ordered at: 04/18/20 05     Level Of Support Discussed With:    Patient     Code Status:    CPR      Medical Interventions (Level of Support Prior to Arrest):    Full       Scribed for Dr. Matt Burks, APRN  04/19/20  10:46     Physician Attestation:    The documentation recorded by the scribe accurately reflects the service I personally performed and the decisions made by me.    Matt Gunn MD  Infectious Diseases  04/19/20  13:11

## 2020-04-19 NOTE — CONSULTS
Consult Note        Date of Admit: 2020  Date of Consult: 20  Artie Cruz*          Reason for consultation:   Request for CINTIA    Subjective       Subjective     Jackie Johnson is a 60 y.o. female  presented with bacteremia secondary to port a cath infection    History of Presenting Illness:   Patient with a history of breast cancer she admitted with fever and blood culture was positive for MSSA she had the port a cath for chemotherapy for breast cancer apparently she presented with neck pain and fever and found to have bacteremia as well port catheter was removed by Dr. Wang and since she is feeling better she is not hurting much from the surgical site and she is comfortable she denies any cardiac history in the past she has been active with no issues    Cardiac risk factors:hypercholesterolemia and Sedentary life style    Last Echo:     Last Stress:     Last Cath:        Past Medical History:   Diagnosis Date   • Acid reflux    • Asthma    • Cancer (CMS/HCC)    • Eczema    • Endometriosis    • Hiatal hernia    • High cholesterol    • Seasonal allergies      Past Surgical History:   Procedure Laterality Date   • ABDOMINAL SURGERY     • BREAST BIOPSY Bilateral    •  SECTION     • D&C HYSTEROSCOPY ENDOMETRIAL ABLATION     • DIAGNOSTIC LAPAROSCOPY     • FRACTURE SURGERY Left     tib/fib   • LEG SURGERY      Left leg following fracture   • PORTACATH PLACEMENT N/A 3/18/2020    Procedure: INSERTION OF PORTACATH;  Surgeon: Dudley Aldridge MD;  Location: [facility];  Service: General;  Laterality: N/A;   • SKIN BIOPSY     • TONSILLECTOMY       Family History   Problem Relation Age of Onset   • Breast cancer Mother 75   • Basal cell carcinoma Mother 90        2 small spots on face removed + fair complexion   • Breast cancer Sister 55        VUS detected on genetic testing CDKN2A gene (c.-33G>C).    • Heart disease Father    • Throat cancer Father 79   • Lung cancer Father 79   •  Cancer Maternal Grandmother 65        unknown gynecologic cancer, cervical vs uterine   • Lymphoma Maternal Grandfather 64   • Breast cancer Paternal Grandmother 70   • Prostate cancer Paternal Grandfather 70        metastastic to liver   • Breast cancer Paternal Aunt 77   • Breast cancer Maternal Aunt 80   • Breast cancer Other    • Ovarian cancer Sister 66        Elevated ; 1A tumor; Borderline cancer; both ovaries & lg cyst removed   • Ovarian cancer Maternal Cousin 54   • Breast cancer Other 50     Social History     Tobacco Use   • Smoking status: Never Smoker   • Smokeless tobacco: Never Used   Substance Use Topics   • Alcohol use: Never     Frequency: Never   • Drug use: Never     Medications Prior to Admission   Medication Sig Dispense Refill Last Dose   • acetaminophen (TYLENOL) 325 MG tablet Take 650 mg by mouth Every 6 (Six) Hours As Needed for Mild Pain .   Past Week at Unknown time   • calcium carbonate (TUMS) 500 MG chewable tablet Chew 1 tablet Daily As Needed for Indigestion or Heartburn.   Past Week at Unknown time   • famotidine (PEPCID) 10 MG tablet Take 10 mg by mouth As Needed for Indigestion or Heartburn.   Past Week at Unknown time   • levalbuterol (XOPENEX HFA) 45 MCG/ACT inhaler Inhale 1-2 puffs Every 4 (Four) Hours As Needed for Wheezing.   Past Month at Unknown time   • loratadine (CLARITIN) 10 MG tablet Take 10 mg by mouth 2 (Two) Times a Day.   4/17/2020 at pm   • ondansetron (ZOFRAN) 8 MG tablet Take 1 tablet by mouth 3 (Three) Times a Day As Needed for Nausea or Vomiting. 30 tablet 5 Past Week at Unknown time   • prochlorperazine (COMPAZINE) 10 MG tablet Take 1 tablet by mouth Every 6 (Six) Hours As Needed for Nausea or Vomiting. 60 tablet 3 4/17/2020 at pm   • sennosides-docusate (Senna-S) 8.6-50 MG per tablet Take 2 tablets by mouth 2 (Two) Times a Day. 120 tablet 5 Past Week at Unknown time     Allergies:  Albuterol; Penicillins; Cefpodoxime; Clindamycin hcl; Crestor  [rosuvastatin calcium]; Sulfa antibiotics; and Pseudoephedrine    Review of Systems    Objective       Objective      Vital Signs  Temp:  [97.6 °F (36.4 °C)-103 °F (39.4 °C)] 98.3 °F (36.8 °C)  Heart Rate:  [] 88  Resp:  [16-21] 16  BP: (102-142)/(67-77) 111/71  Vital Signs (last 72 hrs)       04/16 0700  -  04/17 0659 04/17 0700  -  04/18 0659 04/18 0700  -  04/19 0659 04/19 0700  -  04/19 1356   Most Recent    Temp (°F)   98.6 -  98.7    100 -  103    97.6 -  99.6     98.3 (36.8)    Heart Rate   86 -  101    88 -  113    82 -  92     88    Resp   16 -  18    18 -  20    16 -  21     16    BP   112/78 -  128/80    122/70 -  142/73    102/70 -  114/75     111/71    SpO2 (%)   94 -  96    93 -  98    94 -  97     97        Vital Signs (last 72 hrs)       04/16 0700  -  04/17 0659 04/17 0700  -  04/18 0659 04/18 0700 - 04/19 0659 04/19 0700  -  04/19 1356   Most Recent    Temp (°F)   98.6 -  98.7    100 -  103    97.6 -  99.6     98.3 (36.8)    Heart Rate   86 -  101    88 -  113    82 -  92     88    Resp   16 -  18    18 -  20    16 -  21     16    BP   112/78 -  128/80    122/70 -  142/73    102/70 -  114/75     111/71    SpO2 (%)   94 -  96    93 -  98    94 -  97     97        Body mass index is 31.24 kg/m².    Intake/Output Summary (Last 24 hours) at 4/19/2020 1356  Last data filed at 4/19/2020 1245  Gross per 24 hour   Intake 4110 ml   Output --   Net 4110 ml       Physical Exam:  Physical exam:  Constitutional:    HENT:  Head:  Normocephalic and atraumatic.    Eyes:  Conjunctivae and EOM are normal.  Pupils are equal, round, and reactive to light.  No scleral icterus.    Neck:  Neck supple.  No JVD present.    Cardiovascular: Normal rate, regular rhythm, S1 S2+, NO S3 / S4  Pulmonary/Chest:  Vesicular breath sounds B/L  Abdominal:  Soft.  Bowel sounds are normal.  No distension and no tenderness.    Neurological:  Alert and oriented to person, place, and time. No focal deficits, normal coordination and  gate.  Skin:  Skin is warm and dry. No rash noted. No pallor.   Musculoskeletal:  No tenderness, and no deformity.  No red or swollen joints anywhere.   Lower extremities: No edema, peripheral vascular: equal and palpable,  2+ Pulses B/L       Results review     Results Review:    I reviewed the patient's new clinical results.      Results from last 7 days   Lab Units 04/19/20  0444 04/18/20  0733 04/18/20  0115   WBC 10*3/mm3 16.38* 26.98* 32.23*   HEMOGLOBIN g/dL 10.3* 10.7* 12.2   PLATELETS 10*3/mm3 110* 137* 155     Results from last 7 days   Lab Units 04/19/20  0444 04/18/20  0115   SODIUM mmol/L 132* 132*   POTASSIUM mmol/L 3.6 4.0   CHLORIDE mmol/L 104 96*   CO2 mmol/L 17.3* 23.0   BUN mg/dL 7* 15   CREATININE mg/dL 0.72 0.89   CALCIUM mg/dL 7.9* 9.1   GLUCOSE mg/dL 130* 136*   ALT (SGPT) U/L  --  33   AST (SGOT) U/L  --  17     Lab Results   Component Value Date    INR 0.94 02/26/2020     No results found for: MG  No results found for: TSH, PSA, CHLPL, TRIG, HDL, LDL   No results found for: PROBNP    ECG  ECG/EMG Results (last 24 hours)     ** No results found for the last 24 hours. **          Imaging Results (Last 72 Hours)     Procedure Component Value Units Date/Time    XR Chest 1 View [[id number]] Collected:  04/18/20 0308     Updated:  04/18/20 0310    Narrative:       CR Chest 1 Vw    INDICATION:   Postop port. Cellulitis of the neck.     COMPARISON:    3/18/2020    FINDINGS:  Single portable AP view(s) of the chest.    Right-sided Port-A-Cath tip is at the lower SVC level.    The lungs are clear. Cardiac and mediastinal contours are normal. There is no pneumothorax.       Impression:       Port tip in the lower SVC. No acute findings.    Signer Name: Charly Stahl MD   Signed: 4/18/2020 3:08 AM   Workstation Name: [facility]    [facility]          Assessment      1. Patient with positive blood culture with MRSA from infected port a cath  2. History of breast  cancer  3. Hyperlipidemia      Recommendations     1. Agree with a CINTIA I discussed with the patient in detail she agreed to go for the procedure risks and benefits discussed we will plan to go for CINTIA in the  2. Continue antibiotic as per ID and medicine service      I have discussed my impression and recommendations with the patient and family.    Thank you very much for asking us to be involved in this patient's care.  We will follow along with you.      Yumi Syed MD,FACC  04/19/20  13:56

## 2020-04-19 NOTE — PROGRESS NOTES
Surgery follow-up for right neck cellulitis    Blood culture came back yesterday evening positive for staph, not MRSA.  Overnight patient spiked a temp to 102.2.  She reported to Dr. Gunn that she had some palpitations when she had the fever.  As a result he has been reportedly recommended the patient have a CINTIA although he has not documented his recommendations in the chart as of yet.  Patient states her palpitations stopped when the fever came down.    On examination this is a well-developed well-nourished female in no acute distress  HEENT examination: Within normal limits.  Conjunctiva pink.  Nose and ears appear normal.  Neck: Supple, full range of motion.  No JVD.  Heart: Regular rate and rhythm, no murmur  Musculoskeletal: Full range of motion all extremities without focal weakness. Normal gait. No digital cyanosis.  Psych: Patient is alert, oriented x3. Mood and affect are appropriate.  Skin: Cellulitis is improved    Impression: Staph aureus bacteremia    Plan: Port removal today  Levaquin discontinued  We will consult cardiology regarding their recommendations about CINTIA

## 2020-04-19 NOTE — OP NOTE
Yusipm-j-Vful removal    Indications: This patient presents for removal of an Nybthu-t-Zegt    Surgeon:  Nerissa Wang M.D., DARRICKADanielCDanielSDaniel    Assistant;  None    Pre-operative Diagnosis: staph bacteremia    Post-operative Diagnosis:  Same    Anesthesia:  MAC    Procedure Details   After obtaining informed consent and receiving preoperative antibiotic patient was taken to the operating room and placed in the supine position.  After administration of anesthesia the chest and neck were prepped and draped in sterile fashion.   An incision was made over the previous incision and carried down into the subcutaneous tissue. The catheter was freed, and as it was removed from the vein a 3-0 Vicryl purse string was placed to avoid back bleeding.  The port was then freed from the surrounding tissue.  After ensuring hemostasis the incision was closed in a 2 layer closure of interrupted 3-0 Vicryls sutures to Neisha's fascia, followed by a 4-0 Vicryls subcuticular stitch.  Patient tolerated the procedure well, was taken to the recovery room in stable condition.     Instrument, sponge, and needle counts were correct at closure and at the conclusion of the case.     Findings:  Port was not well incorporated into the surrounding tissue    Estimated Blood Loss: 10 cc or less    Blood administered:  none           Drains: None           Total IV Fluids: per anesthesia           Specimens: None             Complications: none           Condition: Stable

## 2020-04-20 ENCOUNTER — APPOINTMENT (OUTPATIENT)
Dept: CARDIOLOGY | Facility: HOSPITAL | Age: 60
End: 2020-04-20

## 2020-04-20 ENCOUNTER — ANESTHESIA EVENT (OUTPATIENT)
Dept: CARDIOLOGY | Facility: HOSPITAL | Age: 60
End: 2020-04-20

## 2020-04-20 ENCOUNTER — ANESTHESIA (OUTPATIENT)
Dept: CARDIOLOGY | Facility: HOSPITAL | Age: 60
End: 2020-04-20

## 2020-04-20 LAB
BACTERIA SPEC AEROBE CULT: ABNORMAL
BACTERIA SPEC AEROBE CULT: ABNORMAL
BH CV ECHO MEAS - BSA(HAYCOCK): 2 M^2
BH CV ECHO MEAS - BSA: 1.9 M^2
BH CV ECHO MEAS - BZI_BMI: 31.2 KILOGRAMS/M^2
BH CV ECHO MEAS - BZI_METRIC_HEIGHT: 162.6 CM
BH CV ECHO MEAS - BZI_METRIC_WEIGHT: 82.6 KG
CRP SERPL-MCNC: 7.19 MG/DL (ref 0–0.5)
GRAM STN SPEC: ABNORMAL
ISOLATED FROM: ABNORMAL
ISOLATED FROM: ABNORMAL

## 2020-04-20 PROCEDURE — 93321 DOPPLER ECHO F-UP/LMTD STD: CPT | Performed by: SPECIALIST

## 2020-04-20 PROCEDURE — 86140 C-REACTIVE PROTEIN: CPT | Performed by: SURGERY

## 2020-04-20 PROCEDURE — 25010000002 GENTAMICIN PER 80 MG: Performed by: INTERNAL MEDICINE

## 2020-04-20 PROCEDURE — 25010000002 PROPOFOL 10 MG/ML EMULSION: Performed by: NURSE ANESTHETIST, CERTIFIED REGISTERED

## 2020-04-20 PROCEDURE — 25010000002 HEPARIN (PORCINE) PER 1000 UNITS: Performed by: SURGERY

## 2020-04-20 PROCEDURE — 93325 DOPPLER ECHO COLOR FLOW MAPG: CPT | Performed by: SPECIALIST

## 2020-04-20 PROCEDURE — 63710000001 PROCHLORPERAZINE MALEATE PER 10 MG: Performed by: SURGERY

## 2020-04-20 PROCEDURE — 93312 ECHO TRANSESOPHAGEAL: CPT | Performed by: SPECIALIST

## 2020-04-20 PROCEDURE — 25010000002 DAPTOMYCIN PER 1 MG: Performed by: SURGERY

## 2020-04-20 PROCEDURE — 93325 DOPPLER ECHO COLOR FLOW MAPG: CPT

## 2020-04-20 PROCEDURE — 87040 BLOOD CULTURE FOR BACTERIA: CPT | Performed by: INTERNAL MEDICINE

## 2020-04-20 PROCEDURE — 99024 POSTOP FOLLOW-UP VISIT: CPT | Performed by: SURGERY

## 2020-04-20 PROCEDURE — 93312 ECHO TRANSESOPHAGEAL: CPT

## 2020-04-20 PROCEDURE — 93321 DOPPLER ECHO F-UP/LMTD STD: CPT

## 2020-04-20 RX ORDER — PROPOFOL 10 MG/ML
VIAL (ML) INTRAVENOUS AS NEEDED
Status: DISCONTINUED | OUTPATIENT
Start: 2020-04-20 | End: 2020-04-20 | Stop reason: SURG

## 2020-04-20 RX ADMIN — PROPOFOL 200 MCG/KG/MIN: 10 INJECTION, EMULSION INTRAVENOUS at 10:44

## 2020-04-20 RX ADMIN — GENTAMICIN SULFATE 200 MG: 40 INJECTION, SOLUTION INTRAMUSCULAR; INTRAVENOUS at 11:55

## 2020-04-20 RX ADMIN — ACETAMINOPHEN 650 MG: 325 TABLET ORAL at 00:44

## 2020-04-20 RX ADMIN — SODIUM CHLORIDE 125 ML/HR: 9 INJECTION, SOLUTION INTRAVENOUS at 00:44

## 2020-04-20 RX ADMIN — HEPARIN SODIUM 5000 UNITS: 5000 INJECTION INTRAVENOUS; SUBCUTANEOUS at 20:09

## 2020-04-20 RX ADMIN — PROPOFOL 50 MG: 10 INJECTION, EMULSION INTRAVENOUS at 10:44

## 2020-04-20 RX ADMIN — SODIUM CHLORIDE 125 ML/HR: 9 INJECTION, SOLUTION INTRAVENOUS at 18:53

## 2020-04-20 RX ADMIN — DAPTOMYCIN 500 MG: 500 INJECTION, POWDER, LYOPHILIZED, FOR SOLUTION INTRAVENOUS at 09:31

## 2020-04-20 RX ADMIN — PROCHLORPERAZINE MALEATE 10 MG: 10 TABLET ORAL at 14:02

## 2020-04-20 RX ADMIN — PROPOFOL 30 MG: 10 INJECTION, EMULSION INTRAVENOUS at 10:45

## 2020-04-20 RX ADMIN — PROPOFOL 30 MG: 10 INJECTION, EMULSION INTRAVENOUS at 10:48

## 2020-04-20 RX ADMIN — SODIUM CHLORIDE 125 ML/HR: 9 INJECTION, SOLUTION INTRAVENOUS at 08:10

## 2020-04-20 RX ADMIN — ACETAMINOPHEN 650 MG: 325 TABLET ORAL at 22:00

## 2020-04-20 NOTE — ANESTHESIA POSTPROCEDURE EVALUATION
Patient: Jackie Johnson    Procedure Summary     Date:  04/20/20 Room / Location:  [facility] NONINVASIVE LAB    Anesthesia Start:  1041 Anesthesia Stop:  1057    Procedure:  ADULT TRANSESOPHAGEAL ECHO (CINTIA) W/ CONT IF NECESSARY PER PROTOCOL Diagnosis:  (Endocarditis)    Scheduled Providers:  Yumi Syed MD Provider:  Kadeem Chilel MD    Anesthesia Type:  general ASA Status:  3          Anesthesia Type: general    Vitals  Vitals Value Taken Time   /72 4/20/2020 11:20 AM   Temp     Pulse 82 4/20/2020 11:20 AM   Resp 18 4/20/2020 11:20 AM   SpO2 99 % 4/20/2020 11:20 AM           Post Anesthesia Care and Evaluation    Patient location during evaluation: bedside  Patient participation: complete - patient participated  Level of consciousness: awake and alert  Pain score: 1  Pain management: adequate  Airway patency: patent  Anesthetic complications: No anesthetic complications  PONV Status: controlled  Cardiovascular status: acceptable  Respiratory status: acceptable  Hydration status: acceptable

## 2020-04-20 NOTE — PLAN OF CARE
Problem: Patient Care Overview  Goal: Plan of Care Review  Outcome: Ongoing (interventions implemented as appropriate)  Flowsheets (Taken 4/20/2020 4533)  Progress: no change  Plan of Care Reviewed With: patient  Note:   Patient complains of neck pain this shift, temp elevated, prn tylenol given. Pt NPO at midnight for CINTIA. Will continue to monitor

## 2020-04-20 NOTE — PROGRESS NOTES
Discharge Planning Assessment  [facility]     Patient Name: Jackie Johnson  MRN: [id number]  Today's Date: 4/20/2020    Admit Date: 4/18/2020    Discharge Needs Assessment     Row Name 04/20/20 1222       Living Environment    Lives With  spouse    Current Living Arrangements  home/apartment/condo       Discharge Needs Assessment    Equipment Currently Used at Home  none        Discharge Plan     Row Name 04/20/20 1223       Plan    Plan  Pt was admitted on 04/18/20. Pt lives at home with her spouse, Alba. Pt does not utilize home health or DME currently. Pt's PCP is Denver Aguirre. Pt's pharmacy is [facility] at [place]. Pt's transporation will be her spouse, Alba at d/c. He can be reached at [phone]. SS recieved consult re: possible IV anbx when afebrile for 48 hours, per Dr. Wang. Pt is agreeable to do IV anbx, stated her  and herself prefers an infusion company that will do teaching rather than have home health services in her home. Pt stated she is doing chemotherapy and she was concerned about recent COv-ID 19 risks. SS will follow for d/c planning.         Rosana Dickens

## 2020-04-20 NOTE — PLAN OF CARE
Problem: Patient Care Overview  Goal: Plan of Care Review  Outcome: Ongoing (interventions implemented as appropriate)  Flowsheets (Taken 4/20/2020 1514)  Progress: no change  Plan of Care Reviewed With: patient  Outcome Summary: Pt has rested comfortably in bed today, had CINTIA this AM, back on regular diet at this time. Will continue to monitor.

## 2020-04-20 NOTE — ANESTHESIA PREPROCEDURE EVALUATION
Anesthesia Evaluation     Patient summary reviewed and Nursing notes reviewed   no history of anesthetic complications:  NPO Solid Status: > 8 hours  NPO Liquid Status: > 8 hours           Airway   Mallampati: II  TM distance: >3 FB  Neck ROM: full  no difficulty expected  Dental - normal exam     Pulmonary - normal exam   (+) asthma,sleep apnea,   (-) not a smoker  Cardiovascular - normal exam  Exercise tolerance: good (4-7 METS)    NYHA Classification: II    (+) dysrhythmias, hyperlipidemia,       Neuro/Psych- negative ROS  GI/Hepatic/Renal/Endo    (+) obesity,  hiatal hernia, GERD,      Musculoskeletal (-) negative ROS    Abdominal  - normal exam    Bowel sounds: normal.   Substance History - negative use     OB/GYN negative ob/gyn ROS         Other      history of cancer active                      Anesthesia Plan    ASA 3     general     intravenous induction     Anesthetic plan, all risks, benefits, and alternatives have been provided, discussed and informed consent has been obtained with: patient.  Use of blood products discussed with patient  Consented to blood products.   Plan discussed with CRNA.

## 2020-04-20 NOTE — PROGRESS NOTES
PROGRESS NOTE         Patient Identification:  Name:  Jackie Johnson  Age:  60 y.o.  Sex:  female  :  1960  MRN:  [id number]  Visit Number:  [id number]  Primary Care Provider:  Denver Aguirre MD         LOS: 2 days       ----------------------------------------------------------------------------------------------------------------------  Subjective       Chief Complaints:    Post-op Problem        Interval History:    T-max overnight of 102.8. WBC improved at 16.38. CRP worsening at 7.19. Port-a-cath removed yesterday per General Surgery.       Review of Systems:    Constitutional: Fever present. No appetite change or unexpected weight change. No fatigue.  Eyes: no eye drainage, itching or redness.  HEENT: no mouth sores, dysphagia or nose bleed.  Respiratory: no for shortness of breath, cough or production of sputum.  Cardiovascular: no chest pain, no palpitations, no orthopnea.  Gastrointestinal: no nausea, vomiting or diarrhea. No abdominal pain, hematemesis or rectal bleeding.  Genitourinary: no dysuria or polyuria.  Hematologic/lymphatic: no lymph node abnormalities, no easy bruising or easy bleeding.  Musculoskeletal: no muscle or joint pain.  Skin: No rash and no itching.  Neurological: no loss of consciousness, no seizure, no headache.  Behavioral/Psych: no depression or suicidal ideation.  Endocrine: no hot flashes.  Immunologic: negative.  ----------------------------------------------------------------------------------------------------------------------      Objective       Current Hospital Meds:    cetirizine 10 mg Oral Daily   DAPTOmycin 8 mg/kg (Adjusted) Intravenous Q24H   heparin (porcine) 5,000 Units Subcutaneous Q12H   sennosides-docusate 2 tablet Oral BID   sodium chloride 10 mL Intravenous Q12H       sodium chloride 125 mL/hr Last Rate: 125 mL/hr (20 1044)      ----------------------------------------------------------------------------------------------------------------------    Vital Signs:  Temp:  [98 °F (36.7 °C)-102.8 °F (39.3 °C)] 99.7 °F (37.6 °C)  Heart Rate:  [] 87  Resp:  [16-20] 20  BP: ()/(60-87) 114/73  No data found.  SpO2 Percentage    04/20/20 1200 04/20/20 1215 04/20/20 1315   SpO2: 96% 96% 96%     SpO2:  [96 %-100 %] 96 %  on   ;   Device (Oxygen Therapy): room air    Body mass index is 31.24 kg/m².  Wt Readings from Last 3 Encounters:   04/18/20 82.6 kg (182 lb)   04/17/20 81.3 kg (179 lb 3.2 oz)   04/09/20 81.8 kg (180 lb 4.8 oz)        Intake/Output Summary (Last 24 hours) at 4/20/2020 1500  Last data filed at 4/20/2020 0852  Gross per 24 hour   Intake 3045 ml   Output --   Net 3045 ml     Diet Regular  ----------------------------------------------------------------------------------------------------------------------      Physical Exam:  Constitutional:  Well-developed and well-nourished.  No respiratory distress.      HENT:  Head: Normocephalic and atraumatic.  Mouth: Dried fever blisters  Eyes:  Conjunctivae and EOM are normal.  No scleral icterus.  Neck:  Neck supple.  No JVD present.    Cardiovascular:  Normal rate, regular rhythm and normal heart sounds with no murmur. No edema.  Pulmonary/Chest:  No respiratory distress, no wheezes, no crackles, with normal breath sounds and good air movement.  Abdominal:  Soft.  Bowel sounds are normal.  No distension and no tenderness.   Musculoskeletal:  No edema, no tenderness, and no deformity.  No swelling or redness of joints.  Neurological:  Alert and oriented to person, place, and time.  No facial droop.  No slurred speech.   Skin:  Skin is warm and dry.  No rash noted.  No pallor.   Psychiatric:  Normal mood and affect.  Behavior is normal.            ----------------------------------------------------------------------------------------------------------------------             Results from last 7 days   Lab Units 04/20/20  0546 04/19/20  0444 04/18/20  0733 04/18/20  0115   CRP mg/dL 7.19*  --   --  1.93*   LACTATE mmol/L  --   --   --  1.3   WBC 10*3/mm3  --  16.38* 26.98* 32.23*   HEMOGLOBIN g/dL  --  10.3* 10.7* 12.2   HEMATOCRIT %  --  34.3 33.9* 38.4   MCV fL  --  99.7* 95.5 94.3   MCHC g/dL  --  30.0* 31.6 31.8   PLATELETS 10*3/mm3  --  110* 137* 155     Results from last 7 days   Lab Units 04/19/20  0444 04/18/20  0115   SODIUM mmol/L 132* 132*   POTASSIUM mmol/L 3.6 4.0   CHLORIDE mmol/L 104 96*   CO2 mmol/L 17.3* 23.0   BUN mg/dL 7* 15   CREATININE mg/dL 0.72 0.89   EGFR IF NONAFRICN AM mL/min/1.73 83 65   CALCIUM mg/dL 7.9* 9.1   GLUCOSE mg/dL 130* 136*   ALBUMIN g/dL  --  4.14   BILIRUBIN mg/dL  --  0.3   ALK PHOS U/L  --  166*   AST (SGOT) U/L  --  17   ALT (SGPT) U/L  --  33   Estimated Creatinine Clearance: 86.4 mL/min (by C-G formula based on SCr of 0.72 mg/dL).  No results found for: AMMONIA    No results found for: HGBA1C, POCGLU  Lab Results   Component Value Date    HGBA1C 6.1 (H) 02/26/2020     No results found for: TSH, FREET4    Blood Culture   Date Value Ref Range Status   04/18/2020 Staphylococcus aureus (C)  Final     Comment:       Infectious disease consultation is highly recommended to rule out distant foci of infection.   04/18/2020 Staphylococcus aureus (C)  Final     Comment:       Infectious disease consultation is highly recommended to rule out distant foci of infection.     No results found for: URINECX  Wound Culture   Date Value Ref Range Status   04/17/2020 Moderate growth (3+) Staphylococcus aureus (A)  Final     No results found for: STOOLCX  No results found for: RESPCX  Pain Management Panel     There is no flowsheet data to display.            ----------------------------------------------------------------------------------------------------------------------  Imaging Results (Last 24 Hours)     ** No results found for the last 24 hours. **           ----------------------------------------------------------------------------------------------------------------------    Assessment/Plan       Assessment/Plan     ASSESSMENT:    1.  Sepsis  2.  MSSA bacteremia  3.  Right chest wall port infection with cellulitis    PLAN:    T-max overnight of 102.8. WBC improved at 16.38. CRP worsening at 7.19. Port-a-cath removed yesterday per General Surgery.     Right upper extremity venous duplex ruled out DVT.  X-ray of the chest reports no acute findings.  Wound culture from neck finalized as MSSA.  Blood culture from 4/18/2020 1 out of 2 sets positive for MSSA.     MSSA bacteremia does require port removal.     In the setting of MSSA bacteremia with penicillin and Vantin allergy unable to initiate nafcillin or high-dose cefazolin.      Recommend to continue daptomycin 8 mg/kg IV every 24 hours to continue through at least 5-20-20.      Patient showing high load bacteremia in both aerobic and anaerobic. Creatinine normal- another one-time dose of gentamicin 3 mg/kg grams was ordered again this morning in the setting of fever and worsening CRP level.      Repeat blood cultures ordered and pending. We do not recommend new port placement at this time.    Recommend CINTIA to rule out port lead vegetation or valvular vegetation.  We will continue to follow closely and adjust antibiotic therapy as needed.        Code Status:   Code Status and Medical Interventions:   Ordered at: 04/18/20 0532     Level Of Support Discussed With:    Patient     Code Status:    CPR     Medical Interventions (Level of Support Prior to Arrest):    Full     Scribed for Matt Dye, SHER  04/20/20  15:00     Physician Attestation:    The documentation recorded by the scribe accurately reflects the service I personally performed and the decisions made by me.    Matt Gunn MD  Infectious Diseases  04/20/20  16:52

## 2020-04-20 NOTE — PROGRESS NOTES
Status post port removal for staph sepsis.    Port was removed yesterday.  T-max last night of 102.8, now at 99 2.  Patient is scheduled for CINTIA today.  She states she feels better.    On examination this is a well-developed well-nourished female in no acute distress  HEENT examination: Within normal limits.  Conjunctiva pink.  Nose and ears appear normal.  Neck: Supple, full range of motion.  No JVD.  Musculoskeletal: Full range of motion all extremities without focal weakness. Normal gait. No digital cyanosis.  Psych: Patient is alert, oriented x3. Mood and affect are appropriate.  Skin: Port site clean.  Marked improvement in cellulitis.    Plan: Continue IV antibiotics  For CINTIA today  Consult  to arrange for outpatient antibiotic therapy  Plan will be to insert PICC line once patient has been afebrile for 48 hours with the hopes that she can then be discharged home on IV antibiotics per ID recommendations.

## 2020-04-20 NOTE — PAYOR COMM NOTE
"Contact: Velma Waite RN @ [facility]  Phone: [phone]  Fax: [phone]    Ref# [id number]  Inpatient status  Clinical       Jackie Johnson (60 y.o. Female)     Date of Birth Social Security Number Address Home Phone MRN    [id number]  [id number] [facility] [phone] [id number]    Mosque Marital Status          [employer]vent        Admission Date Admission Type Admitting Provider Attending Provider Department, Room/Bed    4/18/20 Emergency Nerissa Wang MD Michna, Barbara A, MD [address], [address]/    Discharge Date Discharge Disposition Discharge Destination                       Attending Provider:  Nerissa Wang MD    Allergies:  Albuterol, Penicillins, Cefpodoxime, Clindamycin Hcl, Crestor [Rosuvastatin Calcium], Sulfa Antibiotics, Pseudoephedrine    Isolation:  None   Infection:  None   Code Status:  CPR    Ht:  162.6 cm (64\")   Wt:  82.6 kg (182 lb)    Admission Cmt:  None   Principal Problem:  None                Active Insurance as of 4/18/2020     Primary Coverage     Payor Plan Insurance Group Employer/Plan Group    [facility], later [facility] [employer] [id number]     Payor Plan Address Payor Plan Phone Number Payor Plan Fax Number Effective Dates    [address] [phone]  8/1/2017 - None Entered    [place] [address]       Subscriber Name Subscriber Birth Date Member ID       JACKIE JOHNSON 1960 [id number]                 Emergency Contacts      (Rel.) Home Phone Work Phone Mobile Phone    IVET JOHNSON (Spouse) [phone] -- [phone]               History & Physical      Nerissa Wang MD at 04/18/20 1338                Chief complaint cellulitis    Subjective     Patient is a 60 y.o. female who presented with right neck cellulitis.  The patient was actually seen in the office yesterday for this problem.  She underwent a port placement for neoadjuvant chemotherapy for right " breast cancer approximately 4-1/2 weeks ago.  The patient called the office yesterday complaining of pain and redness in the neck.  She sent a picture into the office and based upon that she had a stat ultrasound which did not show any evidence of DVT.  The patient was seen in the office about 4:00 yesterday afternoon where a culture was done and she was started on doxycycline.  The patient called the answering service late yesterday evening with complaints of a low-grade temperature.  She was advised to come to the emergency room to be admitted.  Blood cultures were done.  The patient did have a leukocytosis but she has received Neulasta following her last chemo treatment.      Review of Systems  Review of Systems - General ROS: negative for - weight loss  Psychological ROS: negative for - behavioral disorder  Ophthalmic ROS: negative for - dry eyes  ENT ROS: negative for - vertigo or vocal changes  Hematological and Lymphatic ROS: negative for - jaundice or swollen lymph nodes  Respiratory ROS: negative for - sputum changes or stridor  Cardiovascular ROS: negative for - irregular heartbeat or murmur  Gastrointestinal ROS: negative for - blood in stools or change in stools  Genito-Urinary ROS: negative for - hematuria or incontinence  Musculoskeletal ROS: negative for - gait disturbance    History  Past Medical History:   Diagnosis Date   • Acid reflux    • Asthma    • Cancer (CMS/HCC)    • Eczema    • Endometriosis    • Hiatal hernia    • High cholesterol    • Seasonal allergies      Past Surgical History:   Procedure Laterality Date   • ABDOMINAL SURGERY     • BREAST BIOPSY Bilateral    •  SECTION     • D&C HYSTEROSCOPY ENDOMETRIAL ABLATION     • DIAGNOSTIC LAPAROSCOPY     • FRACTURE SURGERY Left     tib/fib   • LEG SURGERY      Left leg following fracture   • PORTACATH PLACEMENT N/A 3/18/2020    Procedure: INSERTION OF PORTACATH;  Surgeon: Dudley Aldridge MD;  Location: [facility];  Service:  General;  Laterality: N/A;   • SKIN BIOPSY     • TONSILLECTOMY       Family History   Problem Relation Age of Onset   • Breast cancer Mother 75   • Basal cell carcinoma Mother 90        2 small spots on face removed + fair complexion   • Breast cancer Sister 55        VUS detected on genetic testing CDKN2A gene (c.-33G>C).    • Heart disease Father    • Throat cancer Father 79   • Lung cancer Father 79   • Cancer Maternal Grandmother 65        unknown gynecologic cancer, cervical vs uterine   • Lymphoma Maternal Grandfather 64   • Breast cancer Paternal Grandmother 70   • Prostate cancer Paternal Grandfather 70        metastastic to liver   • Breast cancer Paternal Aunt 77   • Breast cancer Maternal Aunt 80   • Breast cancer Other    • Ovarian cancer Sister 66        Elevated ; 1A tumor; Borderline cancer; both ovaries & lg cyst removed   • Ovarian cancer Maternal Cousin 54   • Breast cancer Other 50     Social History     Tobacco Use   • Smoking status: Never Smoker   • Smokeless tobacco: Never Used   Substance Use Topics   • Alcohol use: Never     Frequency: Never   • Drug use: Never     Medications Prior to Admission   Medication Sig Dispense Refill Last Dose   • acetaminophen (TYLENOL) 325 MG tablet Take 650 mg by mouth Every 6 (Six) Hours As Needed for Mild Pain .   Past Week at Unknown time   • calcium carbonate (TUMS) 500 MG chewable tablet Chew 1 tablet Daily As Needed for Indigestion or Heartburn.   Past Week at Unknown time   • famotidine (PEPCID) 10 MG tablet Take 10 mg by mouth As Needed for Indigestion or Heartburn.   Past Week at Unknown time   • levalbuterol (XOPENEX HFA) 45 MCG/ACT inhaler Inhale 1-2 puffs Every 4 (Four) Hours As Needed for Wheezing.   Past Month at Unknown time   • loratadine (CLARITIN) 10 MG tablet Take 10 mg by mouth 2 (Two) Times a Day.   4/17/2020 at pm   • ondansetron (ZOFRAN) 8 MG tablet Take 1 tablet by mouth 3 (Three) Times a Day As Needed for Nausea or Vomiting. 30  tablet 5 Past Week at Unknown time   • prochlorperazine (COMPAZINE) 10 MG tablet Take 1 tablet by mouth Every 6 (Six) Hours As Needed for Nausea or Vomiting. 60 tablet 3 4/17/2020 at pm   • sennosides-docusate (Senna-S) 8.6-50 MG per tablet Take 2 tablets by mouth 2 (Two) Times a Day. 120 tablet 5 Past Week at Unknown time     Allergies:  Albuterol; Penicillins; Cefpodoxime; Clindamycin hcl; Crestor [rosuvastatin calcium]; Sulfa antibiotics; and Pseudoephedrine    Objective     Vital Signs  Temp:  [98.6 °F (37 °C)-100.4 °F (38 °C)] 100.4 °F (38 °C)  Heart Rate:  [] 88  Resp:  [16-18] 18  BP: (112-133)/(70-93) 122/70    Physical Exam  General:  This is a WD WN female in no acute distress  Vital signs stable, afebrile  HEENT exam:  WNL. Sclerae are anicteric.  EOMI  Neck:  supple, FROM.  No JVD.  Trachea midline  Lungs:  Respiratory effort normal. Auscultation: Clear, without wheezes, rhonchi, rales  Heart:  Regular rate and rhythm, without murmur, gallop, rub.  No pedal edema  Abdomen: Nontender, nondistended  Musculoskeletal:  muscle strength/tone is normal.    Psyc:  alert, oriented x 3.  Mood and affect are appropriate  skin:  Warm with good turgor.  There is cellulitis and erythema associated with the incision in the neck from where the port was placed on the right side.  The cellulitis has actually progressed significantly since it was seen in the office yesterday at 4 PM  extremities:  Examination of the extremities revealed no cyanosis, clubbing or edema.    Results Review:       Results from last 7 days   Lab Units 04/18/20  0733 04/18/20  0115   CRP mg/dL  --  1.93*   LACTATE mmol/L  --  1.3   WBC 10*3/mm3 26.98* 32.23*   HEMOGLOBIN g/dL 10.7* 12.2   HEMATOCRIT % 33.9* 38.4   PLATELETS 10*3/mm3 137* 155         Results from last 7 days   Lab Units 04/18/20  0115   SODIUM mmol/L 132*   POTASSIUM mmol/L 4.0   CHLORIDE mmol/L 96*   CO2 mmol/L 23.0   BUN mg/dL 15   CREATININE mg/dL 0.89   EGFR IF NONAFRICN  AM mL/min/1.73 65   CALCIUM mg/dL 9.1   GLUCOSE mg/dL 136*   ALBUMIN g/dL 4.14   BILIRUBIN mg/dL 0.3   ALK PHOS U/L 166*   AST (SGOT) U/L 17   ALT (SGPT) U/L 33   Estimated Creatinine Clearance: 69.9 mL/min (by C-G formula based on SCr of 0.89 mg/dL).  No results found for: AMMONIA      No results found for: BLOODCX  No results found for: URINECX  Wound Culture   Date Value Ref Range Status   04/17/2020 Moderate growth (3+) Staphylococcus aureus (A)  Preliminary     No results found for: STOOLCX    Imaging:  Imaging Results (Last 24 Hours)     Procedure Component Value Units Date/Time    XR Chest 1 View [[id number]] Collected:  04/18/20 0308     Updated:  04/18/20 0310    Narrative:       CR Chest 1 Vw    INDICATION:   Postop port. Cellulitis of the neck.     COMPARISON:    3/18/2020    FINDINGS:  Single portable AP view(s) of the chest.    Right-sided Port-A-Cath tip is at the lower SVC level.    The lungs are clear. Cardiac and mediastinal contours are normal. There is no pneumothorax.       Impression:       Port tip in the lower SVC. No acute findings.    Signer Name: Charly Stahl MD   Signed: 4/18/2020 3:08 AM   Workstation Name: [facility]    [facility]              Impression:  Patient Active Problem List   Diagnosis Code   • Malignant neoplasm of right breast in female, estrogen receptor negative (CMS/HCC) C50.911, Z17.1   • Breast cancer (CMS/HCC) C50.919   • Port-A-Cath in place Z95.828   • Cellulitis of neck L03.221       Plan:  Continue broad-spectrum antibiotics and monitor blood culture results.  If blood culture positive patient will require removal of [id number]      Discussion:    Nerissa Wang MD  04/18/20  13:38      Please note that portions of this note were completed with a voice recognition program.        Electronically signed by Nerissa Wang MD at 04/18/20 1357          Emergency Department Notes      Artie Cruz MD at 04/18/20 0108           Subjective   60-year-old female presents with suspected postop infection.  She was referred in by Dr. Wang.  She recently had a port placed by Dr. Aldridge and was seen in the office today for redness at the incision site.  There was some drainage that was swabbed.  She developed a fever this evening and was referred into the ER for evaluation and admission.  Fever has resolved without treatment.  Patient denies any other complaints.  She is a breast cancer patient on active chemotherapy, and recently had Neulasta as well.  She follows with Dr. Obrien.      History provided by:  Patient   used: No        Review of Systems   Constitutional: Positive for fever.   HENT: Negative.    Eyes: Negative.    Respiratory: Negative.  Negative for cough and shortness of breath.    Cardiovascular: Negative.  Negative for chest pain.   Gastrointestinal: Negative.  Negative for abdominal pain.   Genitourinary: Negative.  Negative for dysuria.   Musculoskeletal: Negative.    Skin: Positive for rash and wound.   Neurological: Negative.    Psychiatric/Behavioral: Negative.    All other systems reviewed and are negative.      Past Medical History:   Diagnosis Date   • Acid reflux    • Asthma    • Cancer (CMS/HCC)    • Eczema    • Endometriosis    • Hiatal hernia    • High cholesterol    • Seasonal allergies        Allergies   Allergen Reactions   • Albuterol Other (See Comments)     One time severe headache, questionable for aneurysm, did spinal tap was positive, did Angiogram and MRI were Negative for aneurysm   • Penicillins Hives   • Cefpodoxime Rash     vantin     • Clindamycin Hcl Rash   • Crestor [Rosuvastatin Calcium] Other (See Comments)     Caused high liver enxymes   • Sulfa Antibiotics Other (See Comments)     Mucosal lesions   • Pseudoephedrine Other (See Comments)     Soreness on the tongue       Past Surgical History:   Procedure Laterality Date   • ABDOMINAL SURGERY     • BREAST BIOPSY Bilateral      •  SECTION     • D&C HYSTEROSCOPY ENDOMETRIAL ABLATION     • DIAGNOSTIC LAPAROSCOPY     • FRACTURE SURGERY Left     tib/fib   • LEG SURGERY      Left leg following fracture   • PORTACATH PLACEMENT N/A 3/18/2020    Procedure: INSERTION OF PORTACATH;  Surgeon: Dudley Aldridge MD;  [facility];  Service: General;  Laterality: N/A;   • SKIN BIOPSY     • TONSILLECTOMY         Family History   Problem Relation Age of Onset   • Breast cancer Mother 75   • Basal cell carcinoma Mother 90        2 small spots on face removed + fair complexion   • Breast cancer Sister 55        VUS detected on genetic testing CDKN2A gene (c.-33G>C).    • Heart disease Father    • Throat cancer Father 79   • Lung cancer Father 79   • Cancer Maternal Grandmother 65        unknown gynecologic cancer, cervical vs uterine   • Lymphoma Maternal Grandfather 64   • Breast cancer Paternal Grandmother 70   • Prostate cancer Paternal Grandfather 70        metastastic to liver   • Breast cancer Paternal Aunt 77   • Breast cancer Maternal Aunt 80   • Breast cancer Other    • Ovarian cancer Sister 66        Elevated ; 1A tumor; Borderline cancer; both ovaries & lg cyst removed   • Ovarian cancer Maternal Cousin 54   • Breast cancer Other 50       Social History     Socioeconomic History   • Marital status:      Spouse name: Not on file   • Number of children: Not on file   • Years of education: Not on file   • Highest education level: Not on file   Tobacco Use   • Smoking status: Never Smoker   • Smokeless tobacco: Never Used   Substance and Sexual Activity   • Alcohol use: Never     Frequency: Never   • Drug use: Never   • Sexual activity: Defer     Partners: Male     Birth control/protection: None   Social History Narrative    lives alone with      with [facility]    No unusual chemical exposures    Never smoker, never drinker           Objective   Physical Exam   Constitutional: She is  oriented to person, place, and time. She appears well-developed and well-nourished. No distress.   HENT:   Head: Normocephalic and atraumatic.   Right Ear: External ear normal.   Left Ear: External ear normal.   Nose: Nose normal.   Eyes: Pupils are equal, round, and reactive to light. Conjunctivae and EOM are normal.   Neck: Normal range of motion. Neck supple. No JVD present. No tracheal deviation present.   Cardiovascular: Normal rate, regular rhythm and normal heart sounds.   No murmur heard.  Pulmonary/Chest: Effort normal and breath sounds normal. No respiratory distress. She has no wheezes.   Abdominal: Soft. Bowel sounds are normal. There is no tenderness.   Musculoskeletal: Normal range of motion. She exhibits no edema or deformity.   Neurological: She is alert and oriented to person, place, and time. No cranial nerve deficit.   Skin: Skin is warm and dry. Rash noted. She is not diaphoretic. No erythema. No pallor.   Erythematous cellulitis supraclavicular on R, no active drainage, no fluctuance, does not directly overly access site   Psychiatric: She has a normal mood and affect. Her behavior is normal. Thought content normal.   Nursing note and vitals reviewed.      Procedures          ED Course                                           MDM  Number of Diagnoses or Management Options  Cellulitis of neck:   Diagnosis management comments: Patient presents with cellulitis, postop skin infection.  Fever has resolved, heart rate is in the 90s, she otherwise is nontoxic in appearance.  Basic bloodwork was notable only for marked leukocytosis due to her Neulasta.  We will start her on broad-spectrum antibiotic coverage.  Discussed with Dr. Wang who will admit patient to her service.       Amount and/or Complexity of Data Reviewed  Clinical lab tests: reviewed and ordered  Tests in the radiology section of CPT®:  ordered and reviewed  Discuss the patient with other providers: yes  Independent visualization of  images, tracings, or specimens: yes        Final diagnoses:   Cellulitis of neck            Artie Cruz MD  04/18/20 0712      Electronically signed by Artie Cruz MD at 04/18/20 0712          Physician Progress Notes (last 7 days) (Notes from 04/13/20 0943 through 04/20/20 0943)      Nerissa Wang MD at 04/20/20 0913        Status post port removal for staph sepsis.    Port was removed yesterday.  T-max last night of 102.8, now at 99 2.  Patient is scheduled for CINTIA today.  She states she feels better.    On examination this is a well-developed well-nourished female in no acute distress  HEENT examination: Within normal limits.  Conjunctiva pink.  Nose and ears appear normal.  Neck: Supple, full range of motion.  No JVD.  Musculoskeletal: Full range of motion all extremities without focal weakness. Normal gait. No digital cyanosis.  Psych: Patient is alert, oriented x3. Mood and affect are appropriate.  Skin: Port site clean.  Marked improvement in cellulitis.    Plan: Continue IV antibiotics  For CINTIA today  Consult  to arrange for outpatient antibiotic therapy  Plan will be to insert PICC line once patient has been afebrile for 48 hours with the hopes that she can then be discharged home on IV antibiotics per ID recommendations.    Electronically signed by Nerissa Wang MD at 04/20/20 0916     Nerissa Wang MD at 04/19/20 1001        Surgery follow-up for right neck cellulitis    Blood culture came back yesterday evening positive for staph, not MRSA.  Overnight patient spiked a temp to 102.2.  She reported to Dr. Gunn that she had some palpitations when she had the fever.  As a result he has been reportedly recommended the patient have a CINTIA although he has not documented his recommendations in the chart as of yet.  Patient states her palpitations stopped when the fever came down.    On examination this is a well-developed well-nourished female in no  acute distress  HEENT examination: Within normal limits.  Conjunctiva pink.  Nose and ears appear normal.  Neck: Supple, full range of motion.  No JVD.  Heart: Regular rate and rhythm, no murmur  Musculoskeletal: Full range of motion all extremities without focal weakness. Normal gait. No digital cyanosis.  Psych: Patient is alert, oriented x3. Mood and affect are appropriate.  Skin: Cellulitis is improved    Impression: Staph aureus bacteremia    Plan: Port removal today  Levaquin discontinued  We will consult cardiology regarding their recommendations about CINTIA    Electronically signed by Nerissa Wang MD at 04/19/20 1003       Medical Student Notes (last 7 days) (Notes from 04/13/20 0943 through 04/20/20 0943)    No notes of this type exist for this encounter.       All medication doses during the admission are shown, including meds that are no longer on order.   Scheduled Meds Sorted by Name   for Jackie Johnson as of 04/20/20 0943     1 Day 3 Days 7 Days 10 Days < Today >    Legend:                           Discontinued    Completed    Future    MAR Hold     Other Encounter    Linked           Medications 04/11 04/12 04/13 04/14 04/15 04/16 04/17 04/18 04/19 04/20   aztreonam (AZACTAM) 2 g/100 mL 0.9% NS (mbp)   Dose: 2 g  Freq: Every 8 Hours Route: IV  Indications of Use: SKIN AND SOFT TISSUE INFECTION  Start: 04/18/20 1000 End: 04/18/20 1230    Admin Instructions:   Break seal and mix to activate vial before use           1024     1230-D/C'd        aztreonam (AZACTAM) 2 g/100 mL 0.9% NS (mbp)   Dose: 2 g  Freq: Once Route: IV  Last Dose: Stopped (04/18/20 0247)  Start: 04/18/20 0130 End: 04/18/20 0247    Admin Instructions:   Break seal and mix to activate vial before use           0148     0247          cetirizine (zyrTEC) tablet 10 mg   Dose: 10 mg  Freq: Daily Route: PO  Start: 04/18/20 1400           1414      (0927)     1117     1353      (0809)        DAPTOmycin (CUBICIN) 500 mg in sodium  chloride 0.9 % 50 mL IVPB   Dose: 8 mg/kg  Weight Dosing Info: 65.9 kg (Adjusted)  Freq: Every 24 Hours Route: IV  Indications of Use: BACTEREMIA  Last Dose: 500 mg (04/20/20 0931)  Start: 04/19/20 0900 End: 05/03/20 0859    Admin Instructions:   Caution: Look alike/sound alike drug alert. Refrigerate. Do not shake. Do not infuse with other fluids or drugs.    Order specific questions:   Reason for Therapy MRSA & documented vancomycin failure            0925 1117     1353      0931        gentamicin (GARAMYCIN) 200 mg in sodium chloride 0.9 % 100 mL IVPB   Dose: 3 mg/kg  Weight Dosing Info: 65.9 kg (Adjusted)  Freq: Once Route: IV  Indications of Use: BACTEREMIA  Start: 04/20/20 1100             1100        gentamicin (GARAMYCIN) 200 mg in sodium chloride 0.9 % 100 mL IVPB   Dose: 3 mg/kg  Weight Dosing Info: 65.9 kg (Adjusted)  Freq: Once Route: IV  Indications of Use: BACTEREMIA  Start: 04/19/20 1000 End: 04/19/20 0955 0925         heparin (porcine) 5000 UNIT/ML injection 5,000 Units   Dose: 5,000 Units  Freq: Every 12 Hours Scheduled Route: SC  Indications Comment: Prophylaxis of Venous Thromboembolism  Start: 04/18/20 0900           0809     2100 [C]      (0926)     1117     1353     2022      (0809) [C]     2100        levoFLOXacin (LEVAQUIN) 750 mg/150 mL D5W (premix) (LEVAQUIN) 750 mg   Dose: 750 mg  Freq: Every 24 Hours Route: IV  Indications of Use: INTRA-ABDOMINAL INFECTION  Start: 04/18/20 0900 End: 04/19/20 0959    Admin Instructions:   Caution: Look alike/sound alike drug alert. Protect from light. Do NOT refrigerate.           0808 0925     0959-D/C'd       sennosides-docusate (PERICOLACE) 8.6-50 MG per tablet 2 tablet   Dose: 2 tablet  Freq: 2 Times Daily Route: PO  Start: 04/18/20 1400           1414     1928     2100      (0926)     1117     1353     (2023)      (0809)     2100        sodium chloride 0.9 % bolus 500 mL   Dose: 500 mL  Freq: Once Route: IV  Last Dose: 500 mL  (04/18/20 0317)  Start: 04/18/20 0256 End: 04/18/20 0347           0317          sodium chloride 0.9 % bolus 500 mL   Dose: 500 mL  Freq: Once Route: IV  Last Dose: Stopped (04/18/20 0247)  Start: 04/18/20 0109 End: 04/18/20 0247           0148     0247          sodium chloride 0.9 % flush 10 mL   Dose: 10 mL  Freq: Every 12 Hours Scheduled Route: IV  Start: 04/18/20 0900           0809     2100      0926     1117     1353     (2023)      (0810)     2100        vancomycin 1 g/250 mL 0.9% NS (vial-mate)   Dose: 1,000 mg  Freq: Every 12 Hours Route: IV  Indications of Use: SKIN AND SOFT TISSUE INFECTION  Start: 04/18/20 1500 End: 04/19/20 0738           1414      0226     0738-D/C'd       vancomycin 1500 mg/500 mL 0.9% NS IVPB ([facility])   Dose: 20 mg/kg  Weight Dosing Info: 81.2 kg  Freq: Once Route: IV  Indications of Use: SKIN AND SOFT TISSUE INFECTION  Start: 04/18/20 0115 End: 04/18/20 0417           0247          Medications 04/11 04/12 04/13 04/14 04/15 04/16 04/17 04/18 04/19 04/20       Continuous Meds Sorted by Name   for Jackie Johnson as of 04/20/20 0943    Legend:                           Discontinued    Completed    Future    MAR Hold     Other Encounter    Linked           Medications 04/11 04/12 04/13 04/14 04/15 04/16 04/17 04/18 04/19 04/20   lactated ringers infusion   Freq: Continuous PRN Route: IV  Start: 04/19/20 1213 End: 04/19/20 1235            1213     1235-D/C'd       Pharmacy to dose vancomycin   Freq: Continuous PRN Route: XX  PRN Reason: Consult  Indications of Use: SKIN AND SOFT TISSUE INFECTION  Start: 04/18/20 0531 End: 04/18/20 0536           0536-D/C'd        Pharmacy to dose vancomycin   Freq: Continuous Route: XX  Indications of Use: SKIN AND SOFT TISSUE INFECTION  Start: 04/18/20 0115 End: 04/18/20 0356           0356-D/C'd  (0810)          propofol (DIPRIVAN) infusion 10 mg/mL 100 mL   Freq: Continuous PRN Route: IV  Last Dose: 50 mcg/kg/min (04/19/20 1233)  Start: 04/19/20 1233  End: 04/19/20 1245            1233     1245-D/C'd       sodium chloride 0.9 % infusion   Rate: 125 mL/hr Dose: 125 mL/hr  Freq: Continuous Route: IV  Last Dose: 125 mL/hr (04/20/20 0810)  Start: 04/18/20 0221           0316     0655     1632      0235     1213     1245      0044     0810        Medications 04/11 04/12 04/13 04/14 04/15 04/16 04/17 04/18 04/19 04/20       PRN Meds Sorted by Name   for Jackie Johnson as of 04/20/20 0943    Legend:                           Discontinued    Completed    Future    MAR Hold     Other Encounter    Linked           Medications 04/11 04/12 04/13 04/14 04/15 04/16 04/17 04/18 04/19 04/20   acetaminophen (TYLENOL) tablet 650 mg   Dose: 650 mg  Freq: Every 6 Hours PRN Route: PO  PRN Reason: Mild Pain   Start: 04/18/20 1234    Admin Instructions:   Do not exceed 4 grams of acetaminophen in a 24 hr period.    If given for pain, use the following pain scale:   Mild Pain = Pain Score of 1-3, CPOT 1-2  Moderate Pain = Pain Score of 4-6, CPOT 3-4  Severe Pain = Pain Score of 7-10, CPOT 5-8           1842      0230     0927     1117     1353     1659      0044        calcium carbonate (TUMS) chewable tablet 500 mg (200 mg elemental)   Dose: 1 tablet  Freq: Daily PRN Route: PO  PRN Reasons: Indigestion,Heartburn  Start: 04/18/20 1234    Admin Instructions:   One tablet contains 200 mg elemental calcium.  Take with food.            1117     1353         famotidine (PEPCID) injection   Freq: As Needed  Start: 04/19/20 1233 End: 04/19/20 1245            1233     1245-D/C'd       famotidine (PEPCID) tablet 10 mg   Dose: 10 mg  Freq: As Needed Route: PO  PRN Reasons: Indigestion,Heartburn  Start: 04/18/20 1235                fentaNYL citrate (PF) (SUBLIMAZE) injection 50 mcg   Dose: 50 mcg  Freq: Every 5 Minutes PRN Route: IV  PRN Reasons: Moderate Pain ,Severe Pain   Start: 04/19/20 1123 End: 04/19/20 1308    Admin Instructions:   Maximum total dose of fentanyl is 100 mcg.  If given  for pain, use the following pain scale:  Mild Pain = Pain Score of 1-3, CPOT 1-2  Moderate Pain = Pain Score of 4-6, CPOT 3-4  Severe Pain = Pain Score of 7-10, CPOT 5-8            1308-D/C'd       lactated ringers infusion   Freq: Continuous PRN Route: IV  Start: 04/19/20 1213 End: 04/19/20 1235            1213     1235-D/C'd       levalbuterol (XOPENEX HFA) inhaler 1 puff   Dose: 1 puff  Freq: Every 4 Hours PRN Route: IN  PRN Reason: Wheezing  Start: 04/18/20 1238    Admin Instructions:               1117     1353         lidocaine (XYLOCAINE) 1 % injection   Freq: As Needed  Start: 04/19/20 1229 End: 04/19/20 1248            1229 [C]     1248-D/C'd       lidocaine PF 2% (XYLOCAINE) injection   Freq: As Needed  Start: 04/19/20 1220 End: 04/19/20 1245            1220     1245-D/C'd       meperidine (DEMEROL) injection 12.5 mg   Dose: 12.5 mg  Freq: Every 5 Minutes PRN Route: IV  PRN Reason: Shivering  PRN Comment: May repeat x 1  Start: 04/19/20 1123 End: 04/19/20 1308    Admin Instructions:               1308-D/C'd       midazolam (VERSED) injection   Freq: As Needed  Start: 04/19/20 1213 End: 04/19/20 1245            1213     1245-D/C'd       ondansetron (ZOFRAN) injection   Freq: As Needed  Start: 04/19/20 1233 End: 04/19/20 1245            1233     1245-D/C'd       ondansetron (ZOFRAN) injection 4 mg   Dose: 4 mg  Freq: As Needed Route: IV  PRN Reasons: Nausea,Vomiting  Start: 04/19/20 1123 End: 04/19/20 1308    Admin Instructions:   If BOTH ondansetron (ZOFRAN) and promethazine (PHENERGAN) are ordered use ondansetron first and THEN promethazine IF ondansetron is ineffective.            1308-D/C'd       ondansetron (ZOFRAN) tablet 8 mg   Dose: 8 mg  Freq: 3 Times Daily PRN Route: PO  PRN Reasons: Nausea,Vomiting  Start: 04/18/20 1234            0230     1117     1353     1659         oxyCODONE-acetaminophen (PERCOCET) 5-325 MG per tablet 1 tablet   Dose: 1 tablet  Freq: Once As Needed Route: PO  PRN Reason:  Moderate Pain   Start: 04/19/20 1123 End: 04/19/20 1308            1308-D/C'd       Pharmacy to dose vancomycin   Freq: Continuous PRN Route: XX  PRN Reason: Consult  Indications of Use: SKIN AND SOFT TISSUE INFECTION  Start: 04/18/20 0531 End: 04/18/20 0536           0536-D/C'd        prochlorperazine (COMPAZINE) tablet 10 mg   Dose: 10 mg  Freq: Every 6 Hours PRN Route: PO  PRN Reasons: Nausea,Vomiting  Start: 04/18/20 1234            1117     1353         propofol (DIPRIVAN) infusion 10 mg/mL 100 mL   Freq: Continuous PRN Route: IV  Start: 04/19/20 1233 End: 04/19/20 1245            1233     1245-D/C'd       Propofol (DIPRIVAN) injection   Freq: As Needed Route: IV  Start: 04/19/20 1220 End: 04/19/20 1245            1220     1245-D/C'd       sodium chloride (NS) irrigation solution   Freq: As Needed  Start: 04/19/20 1229 End: 04/19/20 1248            1229     1248-D/C'd       sodium chloride 0.9 % flush 10 mL   Dose: 10 mL  Freq: As Needed Route: IV  PRN Reason: Line Care  Start: 04/18/20 0530            1117     1353         sodium chloride 0.9 % flush 10 mL   Dose: 10 mL  Freq: As Needed Route: IV  PRN Reason: Line Care  Start: 04/18/20 0105            1117     1353         Medications 04/11 04/12 04/13 04/14 04/15 04/16 04/17 04/18 04/19 04/20

## 2020-04-21 LAB
BASOPHILS # BLD AUTO: 0.04 10*3/MM3 (ref 0–0.2)
BASOPHILS NFR BLD AUTO: 0.8 % (ref 0–1.5)
CRP SERPL-MCNC: 5.33 MG/DL (ref 0–0.5)
DEPRECATED RDW RBC AUTO: 51.2 FL (ref 37–54)
EOSINOPHIL # BLD AUTO: 0 10*3/MM3 (ref 0–0.4)
EOSINOPHIL NFR BLD AUTO: 0 % (ref 0.3–6.2)
ERYTHROCYTE [DISTWIDTH] IN BLOOD BY AUTOMATED COUNT: 14.8 % (ref 12.3–15.4)
HCT VFR BLD AUTO: 32.7 % (ref 34–46.6)
HGB BLD-MCNC: 10.4 G/DL (ref 12–15.9)
IMM GRANULOCYTES # BLD AUTO: 0.07 10*3/MM3 (ref 0–0.05)
IMM GRANULOCYTES NFR BLD AUTO: 1.5 % (ref 0–0.5)
LYMPHOCYTES # BLD AUTO: 0.76 10*3/MM3 (ref 0.7–3.1)
LYMPHOCYTES NFR BLD AUTO: 15.8 % (ref 19.6–45.3)
MCH RBC QN AUTO: 30.1 PG (ref 26.6–33)
MCHC RBC AUTO-ENTMCNC: 31.8 G/DL (ref 31.5–35.7)
MCV RBC AUTO: 94.5 FL (ref 79–97)
MONOCYTES # BLD AUTO: 0.33 10*3/MM3 (ref 0.1–0.9)
MONOCYTES NFR BLD AUTO: 6.9 % (ref 5–12)
NEUTROPHILS # BLD AUTO: 3.61 10*3/MM3 (ref 1.7–7)
NEUTROPHILS NFR BLD AUTO: 75 % (ref 42.7–76)
NRBC BLD AUTO-RTO: 0 /100 WBC (ref 0–0.2)
PLATELET # BLD AUTO: 91 10*3/MM3 (ref 140–450)
PMV BLD AUTO: 10.1 FL (ref 6–12)
RBC # BLD AUTO: 3.46 10*6/MM3 (ref 3.77–5.28)
WBC NRBC COR # BLD: 4.81 10*3/MM3 (ref 3.4–10.8)

## 2020-04-21 PROCEDURE — 85025 COMPLETE CBC W/AUTO DIFF WBC: CPT | Performed by: NURSE PRACTITIONER

## 2020-04-21 PROCEDURE — 25010000002 HEPARIN (PORCINE) PER 1000 UNITS: Performed by: SURGERY

## 2020-04-21 PROCEDURE — 25010000002 DAPTOMYCIN PER 1 MG: Performed by: SURGERY

## 2020-04-21 PROCEDURE — 99024 POSTOP FOLLOW-UP VISIT: CPT | Performed by: SURGERY

## 2020-04-21 PROCEDURE — 86140 C-REACTIVE PROTEIN: CPT | Performed by: SURGERY

## 2020-04-21 PROCEDURE — 99255 IP/OBS CONSLTJ NEW/EST HI 80: CPT | Performed by: INTERNAL MEDICINE

## 2020-04-21 RX ADMIN — HEPARIN SODIUM 5000 UNITS: 5000 INJECTION INTRAVENOUS; SUBCUTANEOUS at 20:41

## 2020-04-21 RX ADMIN — HEPARIN SODIUM 5000 UNITS: 5000 INJECTION INTRAVENOUS; SUBCUTANEOUS at 08:12

## 2020-04-21 RX ADMIN — DAPTOMYCIN 500 MG: 500 INJECTION, POWDER, LYOPHILIZED, FOR SOLUTION INTRAVENOUS at 10:11

## 2020-04-21 RX ADMIN — SODIUM CHLORIDE 125 ML/HR: 9 INJECTION, SOLUTION INTRAVENOUS at 20:41

## 2020-04-21 RX ADMIN — DOCUSATE SODIUM 50 MG AND SENNOSIDES 8.6 MG 2 TABLET: 8.6; 5 TABLET, FILM COATED ORAL at 20:41

## 2020-04-21 RX ADMIN — SODIUM CHLORIDE 125 ML/HR: 9 INJECTION, SOLUTION INTRAVENOUS at 03:06

## 2020-04-21 RX ADMIN — CETIRIZINE HYDROCHLORIDE 10 MG: 10 TABLET, FILM COATED ORAL at 08:11

## 2020-04-21 RX ADMIN — SODIUM CHLORIDE 125 ML/HR: 9 INJECTION, SOLUTION INTRAVENOUS at 11:15

## 2020-04-21 NOTE — CONSULTS
Date:  04/21/20  Referring Provider: Dr. Wang  Reason for Consultation: Port infection, breast cancer    Patient Care Team:  Denver Aguirre MD as PCP - General (Family Medicine)  JUAN PABLO Richards MD as Consulting Physician (Obstetrics and Gynecology)  Fatoumata Obrien MD as Consulting Physician (Oncology)  Simón Romero MD (Oncology)    Chief complaint: Port infection    History of present illness:  Jackie Johnson is a 60 y.o. year-old female seen today at the request of Dr. Wang for evaluation and treatment of PAC infection and breast cancer.  Ms. Johnson is well known to me from the office where I see her for treatment of breast cancer.  Please see my last note dated 4-09-20 for full details.  Briefly, she was referred to me by Dr. Richards in March after diagnosis of R breast cancer.  She had a very large mass spanning a 15 cm area in the R breast.  She was seen at [facility] [place] and underwent biopsy showing a hormone negative, HER/-2/José Miguel positive cancer in a background of DCIS. There was also an abnormality noted in the left breast.  She was referred to me for neoadjuvant treatment.  The area in the left breast was biopsied once and was negative.  Repeat biopsy was attempted but the area had disappeared on neoadjuvant treatment.  She has a very strong family h/o malignancy including several family members with breast cancer and a sister with ovarian cancer, but genetic testing done at [facility] was negative.  She started on neoadjuvant treatment as planned and has had 2 cycles of TCHP (last on 4-09-20)with improvement in the soreness in her R breast and cessation of R nipple discharge.  Unfortunately, she developed  R neck pain and redness c/w cellulitis over PAC insertion site.  She then developed high grade fevers and rigors and was found to have MSSA bacteremia.  PAC was removed.  Fevers initially persisted but finally broke.  She is on IV Daptomycin per Dr. Gunn's recommendations.  CINTIA was done  which did not show evidence of endocarditis.        History  Past Medical History:   Diagnosis Date   • Acid reflux    • Asthma    • Cancer (CMS/HCC)    • Eczema    • Endometriosis    • Hiatal hernia    • High cholesterol    • Seasonal allergies        Past Surgical History:   Procedure Laterality Date   • ABDOMINAL SURGERY     • BREAST BIOPSY Bilateral    •  SECTION     • D&C HYSTEROSCOPY ENDOMETRIAL ABLATION     • DIAGNOSTIC LAPAROSCOPY     • FRACTURE SURGERY Left     tib/fib   • LEG SURGERY      Left leg following fracture   • PORTACATH PLACEMENT N/A 3/18/2020    Procedure: INSERTION OF PORTACATH;  Surgeon: Dudley Aldridge MD;  Location: [facility];  Service: General;  Laterality: N/A;   • SKIN BIOPSY     • TONSILLECTOMY     • VENOUS ACCESS DEVICE (PORT) REMOVAL Right 2020    Procedure: REMOVAL VENOUS ACCESS DEVICE;  Surgeon: Nerissa Wang MD;  Location: [facility];  Service: General;  Laterality: Right;       Family History   Problem Relation Age of Onset   • Breast cancer Mother 75   • Basal cell carcinoma Mother 90        2 small spots on face removed + fair complexion   • Breast cancer Sister 55        VUS detected on genetic testing CDKN2A gene (c.-33G>C).    • Heart disease Father    • Throat cancer Father 79   • Lung cancer Father 79   • Cancer Maternal Grandmother 65        unknown gynecologic cancer, cervical vs uterine   • Lymphoma Maternal Grandfather 64   • Breast cancer Paternal Grandmother 70   • Prostate cancer Paternal Grandfather 70        metastastic to liver   • Breast cancer Paternal Aunt 77   • Breast cancer Maternal Aunt 80   • Breast cancer Other    • Ovarian cancer Sister 66        Elevated ; 1A tumor; Borderline cancer; both ovaries & lg cyst removed   • Ovarian cancer Maternal Cousin 54   • Breast cancer Other 50       Social History     Tobacco Use   • Smoking status: Never Smoker   • Smokeless tobacco: Never Used   Substance Use Topics   • Alcohol use: Never      Frequency: Never   • Drug use: Never       Allergies:  Albuterol; Penicillins; Cefpodoxime; Clindamycin hcl; Crestor [rosuvastatin calcium]; Sulfa antibiotics; and Pseudoephedrine    Outpatient Medications:  Medications Prior to Admission   Medication Sig Dispense Refill Last Dose   • acetaminophen (TYLENOL) 325 MG tablet Take 650 mg by mouth Every 6 (Six) Hours As Needed for Mild Pain .   Past Week at Unknown time   • calcium carbonate (TUMS) 500 MG chewable tablet Chew 1 tablet Daily As Needed for Indigestion or Heartburn.   Past Week at Unknown time   • famotidine (PEPCID) 10 MG tablet Take 10 mg by mouth As Needed for Indigestion or Heartburn.   Past Week at Unknown time   • levalbuterol (XOPENEX HFA) 45 MCG/ACT inhaler Inhale 1-2 puffs Every 4 (Four) Hours As Needed for Wheezing.   Past Month at Unknown time   • loratadine (CLARITIN) 10 MG tablet Take 10 mg by mouth 2 (Two) Times a Day.   4/17/2020 at pm   • ondansetron (ZOFRAN) 8 MG tablet Take 1 tablet by mouth 3 (Three) Times a Day As Needed for Nausea or Vomiting. 30 tablet 5 Past Week at Unknown time   • prochlorperazine (COMPAZINE) 10 MG tablet Take 1 tablet by mouth Every 6 (Six) Hours As Needed for Nausea or Vomiting. 60 tablet 3 4/17/2020 at pm   • sennosides-docusate (Senna-S) 8.6-50 MG per tablet Take 2 tablets by mouth 2 (Two) Times a Day. 120 tablet 5 Past Week at Unknown time       Inpatient Medications:  Scheduled Meds:    cetirizine 10 mg Oral Daily   DAPTOmycin 8 mg/kg (Adjusted) Intravenous Q24H   heparin (porcine) 5,000 Units Subcutaneous Q12H   sennosides-docusate 2 tablet Oral BID   sodium chloride 10 mL Intravenous Q12H       Continuous Infusions:    sodium chloride 125 mL/hr Last Rate: 125 mL/hr (04/21/20 1115)       PRN Meds:  •  acetaminophen  •  calcium carbonate  •  famotidine  •  levalbuterol  •  ondansetron  •  prochlorperazine  •  [COMPLETED] Insert peripheral IV **AND** sodium chloride  •  sodium chloride    Review of  Systems  A comprehensive 14 point review of systems was performed.  Significant findings as mentioned above.  All other systems reviewed and are negative.       Physical Exam:  Vital Signs   Patient Vitals for the past 24 hrs:   BP Temp Temp src Pulse Resp SpO2   04/21/20 1047 107/73 96.7 °F (35.9 °C) Axillary 79 20 --   04/21/20 0600 108/74 97.4 °F (36.3 °C) Axillary 83 20 --   04/21/20 0300 104/66 98.5 °F (36.9 °C) Oral 83 20 --   04/20/20 2300 105/67 (!) 101.1 °F (38.4 °C) Oral 90 20 --   04/20/20 2202 -- 99.7 °F (37.6 °C) Oral -- -- --   04/20/20 1900 106/64 99.7 °F (37.6 °C) Oral 88 20 --   04/20/20 1615 105/61 99.6 °F (37.6 °C) Oral 93 20 --   04/20/20 1515 110/63 99.2 °F (37.3 °C) Oral 92 18 --   04/20/20 1415 111/73 99.5 °F (37.5 °C) Oral 88 18 --   04/20/20 1315 114/73 99.7 °F (37.6 °C) Oral 87 20 96 %   04/20/20 1245 113/70 99.1 °F (37.3 °C) Oral 87 20 --   04/20/20 1215 115/71 99.1 °F (37.3 °C) Axillary 88 18 96 %       General:  Awake, alert and oriented, appears well  HEENT:  Pupils are equal, round and reactive to light and accommodation  Neck:  No JVD, thyromegaly or lymphadenopathy.  R PAC insertion site with minimal erythema.  R PAC has been removed.  Dressing in place.  No fluctuance or warmth.  CV:  Regular rate and rhythm, no murmurs, rubs or gallops  Resp:  Lungs are clear to auscultation bilaterally  Abd:  Soft, non-tender, non-distended, bowel sounds present, no organomegaly  Ext:  No clubbing, cyanosis or edema  Lymph:  No cervical, supraclavicular, axillary, inguinal or femoral adenopathy  Neuro:  MS as above, CN II-XII intact, grossly non-focal exam      Labs / Studies:  Lab Results   Component Value Date    WBC 4.81 04/21/2020    HGB 10.4 (L) 04/21/2020    HCT 32.7 (L) 04/21/2020    MCV 94.5 04/21/2020    RDW 14.8 04/21/2020    PLT 91 (L) 04/21/2020    NEUTRORELPCT 75.0 04/21/2020    LYMPHORELPCT 15.8 (L) 04/21/2020    MONORELPCT 6.9 04/21/2020    EOSRELPCT 0.0 (L) 04/21/2020    BASORELPCT  0.8 04/21/2020    NEUTROABS 3.61 04/21/2020    LYMPHSABS 0.76 04/21/2020       Lab Results   Component Value Date     (L) 04/19/2020    K 3.6 04/19/2020    CO2 17.3 (L) 04/19/2020     04/19/2020    BUN 7 (L) 04/19/2020    CREATININE 0.72 04/19/2020    EGFRIFNONA 83 04/19/2020    EGFRIFAFRI 84 02/26/2020    GLUCOSE 130 (H) 04/19/2020    CALCIUM 7.9 (L) 04/19/2020    ALKPHOS 166 (H) 04/18/2020    AST 17 04/18/2020    ALT 33 04/18/2020    BILITOT 0.3 04/18/2020    ALBUMIN 4.14 04/18/2020    PROTEINTOT 6.6 04/18/2020     Lab Results   Component Value Date    BLOODCX No growth at 24 hours 04/20/2020    BLOODCX No growth at 24 hours 04/20/2020    BLOODCX Staphylococcus aureus (C) 04/18/2020    BLOODCX Staphylococcus aureus (C) 04/18/2020    WOUNDCX Moderate growth (3+) Staphylococcus aureus (A) 04/17/2020           Assessment & Plan:  Jackie Johnson is a 60 y.o. year-old female presenting with MSSA PAC infection with bacteremia in the setting of neoadjuvant chemotherapy for a hormone negative, HER-2 positive breast cancer of the R breast with possible abnormality in the L breast.    1.  PAC infection:  -  PAC was removed on 4-19-20  -  Initial cultures from 4-17 and again on 4-18 showed MSSA.  Repeat Cx from 4-20-20 are NGTD.  -  She is currently on IV Daptomycin 500 mg IV daily along with Gentamicin  -  CINTIA without evidence of endocarditis.  -  Dr. Gunn has recommended to continue Daptomycin 8 mg/kg q 24 hrs through at least 5-20-20, but Ms. Johnson thinks he may have altered his recommendations and shortened the course of rx.  Will discuss with Dr. Gunn.  -  Plan is for PICC line placement to allow for delivery of IV Abx.    2.  Breast Cancer:  -   I have spoken with Dr. Wang and also with Ms. Johnson.  -  There is some dilemma about how to best proceed with her treatment given bloodstream infection.  -  Plan ultimately is for bilateral mastectomies given her family history, concern about  contralateral disease, etc.  -  Given this, it may be prudent to go ahead and proceed with surgery and delay immunosuppressive therapy.  I will get into contact with Dr. Gunn to clarify length of therapy for antibiotics as above.  I have spoken with Ms. Johnson and with Dr. Wang regarding plan for her breast cancer.  She wants to think things over a little.  I will return to speak with her further tomorrow.           Fatoumata Obrien MD  04/21/20  11:22

## 2020-04-21 NOTE — PROGRESS NOTES
PROGRESS NOTE         Patient Identification:  Name:  Jackie Johnson  Age:  60 y.o.  Sex:  female  :  1960  MRN:  [id number]  Visit Number:  [id number]  Primary Care Provider:  Denver Aguirre MD         LOS: 3 days       ----------------------------------------------------------------------------------------------------------------------  Subjective       Chief Complaints:    Post-op Problem        Interval History:    Patient comfortable this am. T-max of 101.1 overnight. CRP improved at 5.3. WBC normal. CINTIA negative on 20. No diarrhea reported.      Review of Systems:    Constitutional: Fever present. No appetite change or unexpected weight change. No fatigue.  Eyes: no eye drainage, itching or redness.  HEENT: no mouth sores, dysphagia or nose bleed.  Respiratory: no for shortness of breath, cough or production of sputum.  Cardiovascular: no chest pain, no palpitations, no orthopnea.  Gastrointestinal: no nausea, vomiting or diarrhea. No abdominal pain, hematemesis or rectal bleeding.  Genitourinary: no dysuria or polyuria.  Hematologic/lymphatic: no lymph node abnormalities, no easy bruising or easy bleeding.  Musculoskeletal: no muscle or joint pain.  Skin: No rash and no itching.  Neurological: no loss of consciousness, no seizure, no headache.  Behavioral/Psych: no depression or suicidal ideation.  Endocrine: no hot flashes.  Immunologic: negative.  ----------------------------------------------------------------------------------------------------------------------      Objective       Current [facility] Meds:    cetirizine 10 mg Oral Daily   DAPTOmycin 8 mg/kg (Adjusted) Intravenous Q24H   heparin (porcine) 5,000 Units Subcutaneous Q12H   sennosides-docusate 2 tablet Oral BID   sodium chloride 10 mL Intravenous Q12H       sodium chloride 125 mL/hr Last Rate: 125 mL/hr (20 1115)      ----------------------------------------------------------------------------------------------------------------------    Vital Signs:  Temp:  [96.7 °F (35.9 °C)-101.1 °F (38.4 °C)] 98.1 °F (36.7 °C)  Heart Rate:  [79-93] 83  Resp:  [20] 20  BP: (104-109)/(61-74) 109/71  No data found.  SpO2 Percentage    04/20/20 1215 04/20/20 1315 04/21/20 1436   SpO2: 96% 96% 94%     SpO2:  [94 %] 94 %  on   ;   Device (Oxygen Therapy): room air    Body mass index is 31.24 kg/m².  Wt Readings from Last 3 Encounters:   04/18/20 82.6 kg (182 lb)   04/17/20 81.3 kg (179 lb 3.2 oz)   04/09/20 81.8 kg (180 lb 4.8 oz)        Intake/Output Summary (Last 24 hours) at 4/21/2020 1525  Last data filed at 4/21/2020 0800  Gross per 24 hour   Intake 3771.6 ml   Output --   Net 3771.6 ml     Diet Regular  ----------------------------------------------------------------------------------------------------------------------      Physical Exam:  Constitutional:  Well-developed and well-nourished.  No respiratory distress.      HENT:  Head: Normocephalic and atraumatic.  Mouth: Dried fever blisters  Eyes:  Conjunctivae and EOM are normal.  No scleral icterus.  Neck:  Neck supple.  No JVD present.    Cardiovascular:  Normal rate, regular rhythm and normal heart sounds with no murmur. No edema.  Pulmonary/Chest:  No respiratory distress, no wheezes, no crackles, with normal breath sounds and good air movement.  Abdominal:  Soft.  Bowel sounds are normal.  No distension and no tenderness.   Musculoskeletal:  No edema, no tenderness, and no deformity.  No swelling or redness of joints.  Neurological:  Alert and oriented to person, place, and time.  No facial droop.  No slurred speech.   Skin:  Skin is warm and dry.  No rash noted.  No pallor.   Psychiatric:  Normal mood and affect.  Behavior is normal.            ----------------------------------------------------------------------------------------------------------------------            Results  from last 7 days   Lab Units 04/21/20  0800 04/21/20  0449 04/20/20  0546 04/19/20  0444 04/18/20  0733 04/18/20  0115   CRP mg/dL  --  5.33* 7.19*  --   --  1.93*   LACTATE mmol/L  --   --   --   --   --  1.3   WBC 10*3/mm3 4.81  --   --  16.38* 26.98* 32.23*   HEMOGLOBIN g/dL 10.4*  --   --  10.3* 10.7* 12.2   HEMATOCRIT % 32.7*  --   --  34.3 33.9* 38.4   MCV fL 94.5  --   --  99.7* 95.5 94.3   MCHC g/dL 31.8  --   --  30.0* 31.6 31.8   PLATELETS 10*3/mm3 91*  --   --  110* 137* 155     Results from last 7 days   Lab Units 04/19/20 0444 04/18/20  0115   SODIUM mmol/L 132* 132*   POTASSIUM mmol/L 3.6 4.0   CHLORIDE mmol/L 104 96*   CO2 mmol/L 17.3* 23.0   BUN mg/dL 7* 15   CREATININE mg/dL 0.72 0.89   EGFR IF NONAFRICN AM mL/min/1.73 83 65   CALCIUM mg/dL 7.9* 9.1   GLUCOSE mg/dL 130* 136*   ALBUMIN g/dL  --  4.14   BILIRUBIN mg/dL  --  0.3   ALK PHOS U/L  --  166*   AST (SGOT) U/L  --  17   ALT (SGPT) U/L  --  33   Estimated Creatinine Clearance: 86.4 mL/min (by C-G formula based on SCr of 0.72 mg/dL).  No results found for: AMMONIA    No results found for: HGBA1C, POCGLU  Lab Results   Component Value Date    HGBA1C 6.1 (H) 02/26/2020     No results found for: TSH, FREET4    Blood Culture   Date Value Ref Range Status   04/18/2020 Staphylococcus aureus (C)  Final     Comment:       Infectious disease consultation is highly recommended to rule out distant foci of infection.   04/18/2020 Staphylococcus aureus (C)  Final     Comment:       Infectious disease consultation is highly recommended to rule out distant foci of infection.     No results found for: URINECX  Wound Culture   Date Value Ref Range Status   04/17/2020 Moderate growth (3+) Staphylococcus aureus (A)  Final     No results found for: STOOLCX  No results found for: RESPCX  Pain Management Panel     There is no flowsheet data to display.             ----------------------------------------------------------------------------------------------------------------------  Imaging Results (Last 24 Hours)     ** No results found for the last 24 hours. **          ----------------------------------------------------------------------------------------------------------------------    Assessment/Plan       Assessment/Plan     ASSESSMENT:    1.  Sepsis  2.  MSSA bacteremia  3.  Right chest wall port infection with cellulitis    PLAN:    Patient comfortable this am. T-max of 101.1 overnight. CRP improved at 5.3. WBC normal. CINTIA negative on 4/20/20. No diarrhea reported.    Right upper extremity venous duplex ruled out DVT.  X-ray of the chest reports no acute findings.  Wound culture from neck finalized as MSSA.  Blood culture from 4/18/2020 1 out of 2 sets positive for MSSA.     MSSA bacteremia does require port removal.     In the setting of MSSA bacteremia with penicillin and Vantin allergy unable to initiate nafcillin or high-dose cefazolin.      Recommend to continue daptomycin 8 mg/kg IV every 24 hours to continue through at least 5-3-20.      Patient showing high load bacteremia in both aerobic and anaerobic. One-time dose of gentamicin 3 mg/kg grams was given on 4/20/20.    Blood cultures on 4/20/20 reveal no growth to date. We do not recommend new port placement at this time. If blood cultures from 4/20/20 remain no growth for the next 48-72 hours would recommend 14 day course of Daptomycin with a completion date of 5/3/20.    CINTIA on 4/20/20 negative.     We will continue to follow CBC, CRP trend, culture results and adjust accordingly.    Code Status:   Code Status and Medical Interventions:   Ordered at: 04/18/20 0532     Level Of Support Discussed With:    Patient     Code Status:    CPR     Medical Interventions (Level of Support Prior to Arrest):    Full     Scribed for Matt Dye, APRN  04/21/20  15:25     Physician  Attestation:    The documentation recorded by the scribe accurately reflects the service I personally performed and the decisions made by me.    Matt Gunn MD  Infectious Diseases  04/21/20  15:25

## 2020-04-21 NOTE — PLAN OF CARE
Problem: Patient Care Overview  Goal: Plan of Care Review  Outcome: Ongoing (interventions implemented as appropriate)  Flowsheets (Taken 4/21/2020 3733)  Progress: no change  Plan of Care Reviewed With: patient  Note:   Temp 101.1, prn tylenol given and effective. No other complaints this shift. Will monitor

## 2020-04-21 NOTE — PLAN OF CARE
Problem: Patient Care Overview  Goal: Plan of Care Review  Outcome: Ongoing (interventions implemented as appropriate)  Flowsheets (Taken 4/21/2020 1502)  Progress: no change  Plan of Care Reviewed With: patient  Outcome Summary: Pt has been resting comfortably in bed today, no complaints noted. Had one bout of fever early this AM (night shift), no reports today. Will continue to monitor.

## 2020-04-21 NOTE — PROGRESS NOTES
Status post port removal for staph sepsis.    Port was removed Sunday.  T-max last night of 101.1 at 11 PM last night, now at 96.7.  Patient was negative.  She states she feels better.    On examination this is a well-developed well-nourished female in no acute distress  HEENT examination: Within normal limits.  Conjunctiva pink.  Nose and ears appear normal.  Neck: Supple, full range of motion.  No JVD.  Musculoskeletal: Full range of motion all extremities without focal weakness. Normal gait. No digital cyanosis.  Psych: Patient is alert, oriented x3. Mood and affect are appropriate.  Skin: Port site clean.  Marked improvement in cellulitis.    Plan: Continue IV antibiotics   to arrange for outpatient antibiotic therapy  Plan will be to insert PICC line once patient has been afebrile for 48 hours with the hopes that she can then be discharged home on IV antibiotics per ID recommendations.    There is a discrepancy in the duration of antibiotics recommended.  The notes from infectious disease in the chart state IV antibiotics through 5/20/2020 while on the white board in the patient's room it is written for 14 days of IV therapy from 4/20/2020.  This would put the completion date at May 4.  This needs to be clarified as it may impact management.

## 2020-04-21 NOTE — PROGRESS NOTES
Discharge Planning Assessment   Adriel     Patient Name: Jackie Johnson  MRN: [id number]  Today's Date: 4/21/2020    Admit Date: 4/18/2020    Discharge Plan     Row Name 04/21/20 1610       Plan    Plan SS discussed pt with CM nurseAngelica and IV antibiotic duration is being discussed. SS to assist with arranging home IV antibiotics after duration is determined. SS to follow.         LINSEY Parra

## 2020-04-22 LAB
BASOPHILS # BLD AUTO: 0.02 10*3/MM3 (ref 0–0.2)
BASOPHILS NFR BLD AUTO: 0.4 % (ref 0–1.5)
CRP SERPL-MCNC: 3.53 MG/DL (ref 0–0.5)
DEPRECATED RDW RBC AUTO: 51.9 FL (ref 37–54)
EOSINOPHIL # BLD AUTO: 0.01 10*3/MM3 (ref 0–0.4)
EOSINOPHIL NFR BLD AUTO: 0.2 % (ref 0.3–6.2)
ERYTHROCYTE [DISTWIDTH] IN BLOOD BY AUTOMATED COUNT: 14.6 % (ref 12.3–15.4)
HCT VFR BLD AUTO: 29.9 % (ref 34–46.6)
HGB BLD-MCNC: 9.2 G/DL (ref 12–15.9)
IMM GRANULOCYTES # BLD AUTO: 0.08 10*3/MM3 (ref 0–0.05)
IMM GRANULOCYTES NFR BLD AUTO: 1.6 % (ref 0–0.5)
LYMPHOCYTES # BLD AUTO: 1.06 10*3/MM3 (ref 0.7–3.1)
LYMPHOCYTES NFR BLD AUTO: 21.4 % (ref 19.6–45.3)
MCH RBC QN AUTO: 29.8 PG (ref 26.6–33)
MCHC RBC AUTO-ENTMCNC: 30.8 G/DL (ref 31.5–35.7)
MCV RBC AUTO: 96.8 FL (ref 79–97)
MONOCYTES # BLD AUTO: 0.3 10*3/MM3 (ref 0.1–0.9)
MONOCYTES NFR BLD AUTO: 6.1 % (ref 5–12)
NEUTROPHILS # BLD AUTO: 3.48 10*3/MM3 (ref 1.7–7)
NEUTROPHILS NFR BLD AUTO: 70.3 % (ref 42.7–76)
NRBC BLD AUTO-RTO: 0 /100 WBC (ref 0–0.2)
PLATELET # BLD AUTO: 101 10*3/MM3 (ref 140–450)
PMV BLD AUTO: 9.6 FL (ref 6–12)
RBC # BLD AUTO: 3.09 10*6/MM3 (ref 3.77–5.28)
WBC NRBC COR # BLD: 4.95 10*3/MM3 (ref 3.4–10.8)

## 2020-04-22 PROCEDURE — 99232 SBSQ HOSP IP/OBS MODERATE 35: CPT | Performed by: INTERNAL MEDICINE

## 2020-04-22 PROCEDURE — 25010000002 DAPTOMYCIN PER 1 MG: Performed by: SURGERY

## 2020-04-22 PROCEDURE — 99024 POSTOP FOLLOW-UP VISIT: CPT | Performed by: SURGERY

## 2020-04-22 PROCEDURE — 86140 C-REACTIVE PROTEIN: CPT | Performed by: SURGERY

## 2020-04-22 PROCEDURE — 25010000002 HEPARIN (PORCINE) PER 1000 UNITS: Performed by: SURGERY

## 2020-04-22 PROCEDURE — 85025 COMPLETE CBC W/AUTO DIFF WBC: CPT | Performed by: NURSE PRACTITIONER

## 2020-04-22 RX ADMIN — HEPARIN SODIUM 5000 UNITS: 5000 INJECTION INTRAVENOUS; SUBCUTANEOUS at 20:56

## 2020-04-22 RX ADMIN — DOCUSATE SODIUM 50 MG AND SENNOSIDES 8.6 MG 2 TABLET: 8.6; 5 TABLET, FILM COATED ORAL at 20:56

## 2020-04-22 RX ADMIN — CETIRIZINE HYDROCHLORIDE 10 MG: 10 TABLET, FILM COATED ORAL at 08:41

## 2020-04-22 RX ADMIN — DOCUSATE SODIUM 50 MG AND SENNOSIDES 8.6 MG 2 TABLET: 8.6; 5 TABLET, FILM COATED ORAL at 08:41

## 2020-04-22 RX ADMIN — SODIUM CHLORIDE 125 ML/HR: 9 INJECTION, SOLUTION INTRAVENOUS at 18:20

## 2020-04-22 RX ADMIN — DAPTOMYCIN 500 MG: 500 INJECTION, POWDER, LYOPHILIZED, FOR SOLUTION INTRAVENOUS at 08:41

## 2020-04-22 RX ADMIN — HEPARIN SODIUM 5000 UNITS: 5000 INJECTION INTRAVENOUS; SUBCUTANEOUS at 08:41

## 2020-04-22 NOTE — PLAN OF CARE
Problem: Patient Care Overview  Goal: Plan of Care Review  Outcome: Ongoing (interventions implemented as appropriate)  Flowsheets (Taken 4/22/2020 1405)  Progress: no change  Plan of Care Reviewed With: patient  Outcome Summary: No changes today, been resting comfortably in bed. Dressing changed on removed port site (R chest), still on IV fluids. Will continue to monitor.

## 2020-04-22 NOTE — PROGRESS NOTES
Date:  04/22/20    CC: Breast Cancer         PAC infection    Subjective:  Jackie Johnson is feeling well.  She is happy to no longer be having fevers.  She has considered and wants to go ahead and complete neoadjuvant therapy as planned.  As such, she will get PICC tomorrow and will go home with IV Daptomycin through 5/3 per Dr. Gunn's recommendations.  I spoke with Dr. Wang and we decided to go ahead and finish IV antibiotics and then give her next cycle of chemotherapy through the PICC line and then place new PAC just prior to her subsequent cycle of chemotherapy.      Objective:  Medications:  Scheduled Meds:  cetirizine 10 mg Oral Daily   DAPTOmycin 8 mg/kg (Adjusted) Intravenous Q24H   heparin (porcine) 5,000 Units Subcutaneous Q12H   sennosides-docusate 2 tablet Oral BID   sodium chloride 10 mL Intravenous Q12H     Continuous Infusions:  sodium chloride 125 mL/hr Last Rate: 125 mL/hr (04/21/20 2041)     PRN Meds:.•  acetaminophen  •  calcium carbonate  •  famotidine  •  levalbuterol  •  ondansetron  •  prochlorperazine  •  [COMPLETED] Insert peripheral IV **AND** sodium chloride  •  sodium chloride    Physical Exam:  Vital Signs     Patient Vitals for the past 24 hrs:   BP Temp Temp src Pulse Resp SpO2   04/22/20 1500 122/74 98.2 °F (36.8 °C) Oral 75 18 --   04/22/20 1100 108/71 98.2 °F (36.8 °C) Oral 77 18 --   04/22/20 0622 118/87 98.1 °F (36.7 °C) Oral 80 18 --   04/22/20 0300 118/79 98.3 °F (36.8 °C) Axillary 79 18 95 %   04/21/20 2300 120/84 98.5 °F (36.9 °C) Axillary 84 20 97 %   04/21/20 1900 119/71 98.2 °F (36.8 °C) Axillary 84 18 96 %         General:  Awake, alert and oriented, in no distress  HEENT:  Pupils are equal, round and reactive to light and accommodation, Extra-ocular movements full, oropharynx clear, mucous membranes moist  Neck:  No JVD, thyromegaly or lymphadenopathy.  R PAC insertion site erythema improved.  R PAC site with dressing in place.  No warmth or fluctuence.  CV:   Regular rate and rhythm, no murmurs, rubs or gallops  Resp:  Lungs are clear to auscultation bilaterally  Abd:  Soft, non-tender, non-distended, bowel sounds present, no organomegaly  Ext:  No clubbing, cyanosis or edema  Lymph:  No cervical, supraclavicular, axillary, inguinal or femoral adenopathy  Neuro:  MS as above, CN II-XII intact, grossly non-focal exam    Labs / Studies:    Lab Results   Component Value Date    WBC 4.95 04/22/2020    HGB 9.2 (L) 04/22/2020    HCT 29.9 (L) 04/22/2020    MCV 96.8 04/22/2020    RDW 14.6 04/22/2020     (L) 04/22/2020    NEUTRORELPCT 70.3 04/22/2020    LYMPHORELPCT 21.4 04/22/2020    MONORELPCT 6.1 04/22/2020    EOSRELPCT 0.2 (L) 04/22/2020    BASORELPCT 0.4 04/22/2020    NEUTROABS 3.48 04/22/2020    LYMPHSABS 1.06 04/22/2020       Lab Results   Component Value Date     (L) 04/19/2020    K 3.6 04/19/2020    CO2 17.3 (L) 04/19/2020     04/19/2020    BUN 7 (L) 04/19/2020    CREATININE 0.72 04/19/2020    EGFRIFNONA 83 04/19/2020    EGFRIFAFRI 84 02/26/2020    GLUCOSE 130 (H) 04/19/2020    CALCIUM 7.9 (L) 04/19/2020    ALKPHOS 166 (H) 04/18/2020    AST 17 04/18/2020    ALT 33 04/18/2020    BILITOT 0.3 04/18/2020    ALBUMIN 4.14 04/18/2020    PROTEINTOT 6.6 04/18/2020     Lab Results   Component Value Date    BLOODCX No growth at 2 days 04/20/2020    BLOODCX No growth at 2 days 04/20/2020    BLOODCX Staphylococcus aureus (C) 04/18/2020    BLOODCX Staphylococcus aureus (C) 04/18/2020    WOUNDCX Moderate growth (3+) Staphylococcus aureus (A) 04/17/2020       Assessment & Plan:  Jackie Johnson is a 60 y.o. year-old female with MSSA PAC infection with bacteremia in the setting of neoadjuvant chemotherapy for a hormone negative, HER-2 positive breast cancer of the R breast with possible abnormality in the L breast.     1.  PAC infection:  -  PAC was removed on 4-19-20  -  Initial cultures from 4-17 and again on 4-18 showed MSSA.  Repeat Cx from 4-20-20 continue to  be NGTD.  -  She is currently on IV Daptomycin 500 mg IV daily and also temporarily received Gentamicin which has now been discontinued.  -  CINTIA without evidence of endocarditis.  -  Dr. Gunn has recommended to continue Daptomycin 8 mg/kg q 24 hrs through 5-03-20.  -  Plan is for PICC line placement tomorrow to allow for delivery of IV Abx.     2.  Breast Cancer:  -   I have spoken with Dr. Wang and also with Ms. Johnson.  -  There is some dilemma about how to best proceed with her treatment given bloodstream infection.  -  Plan ultimately is for bilateral mastectomies given her family history, concern about contralateral disease, etc.  -  Given this, I did discuss option with her to proceed with surgery and delay immunosuppressive therapy.  She has thought things over and would prefer to stick with the original plan.  As such, will plan to finish antibiotics on 5-3-20 as planned.    -  Will delay her next cycle of chemotherapy by 1 week and plan to give this on 5-07-20      Fatoumata Obrien MD  04/22/20  17:37

## 2020-04-22 NOTE — DISCHARGE PLACEMENT REQUEST
"Ana María Johnson (60 y.o. Female)     Date of Birth Social Security Number Address Home Phone MRN    1960  [id number] [facility] [phone] [id number]    Orthodoxy Marital Status          Gnosticist        Admission Date Admission Type Admitting Provider Attending Provider Department, Room/Bed    4/18/20 Emergency Nerissa Wang MD Michna, Barbara A, MD [address], 3335/1P    Discharge Date Discharge Disposition Discharge Destination                       Attending Provider:  Nerissa Wang MD    Allergies:  Albuterol, Penicillins, Cefpodoxime, Clindamycin Hcl, Crestor [Rosuvastatin Calcium], Sulfa Antibiotics, Pseudoephedrine    Isolation:  None   Infection:  None   Code Status:  CPR    Ht:  162.6 cm (64\")   Wt:  82.6 kg (182 lb)    Admission Cmt:  None   Principal Problem:  None                Active Insurance as of 4/18/2020     Primary Coverage     Payor Plan Insurance Group Employer/Plan Group    [facility] BLUE CROSS [facility] [employer] EMPL[employer]EE [id number]     Payor Plan Address Payor Plan Phone Number Payor Plan Fax Number Effective Dates    [address] [phone]  8/1/2017 - None Entered    [place] [address]       Subscriber Name Subscriber Birth Date Member ID       ANA MARÍA JOHNSON 1960 [id number]                 Emergency Contacts      (Rel.) Home Phone Work Phone Mobile Phone    IVET JOHNSON (Spouse) [phone] -- [phone]               History & Physical      Nerissa Wang MD at 04/18/20 1338                Chief complaint cellulitis    Subjective     Patient is a 60 y.o. female who presented with right neck cellulitis.  The patient was actually seen in the office yesterday for this problem.  She underwent a port placement for neoadjuvant chemotherapy for right breast cancer approximately 4-1/2 weeks ago.  The patient called the office yesterday complaining of pain and redness in the neck.  She sent a " picture into the office and based upon that she had a stat ultrasound which did not show any evidence of DVT.  The patient was seen in the office about 4:00 yesterday afternoon where a culture was done and she was started on doxycycline.  The patient called the answering service late yesterday evening with complaints of a low-grade temperature.  She was advised to come to the emergency room to be admitted.  Blood cultures were done.  The patient did have a leukocytosis but she has received Neulasta following her last chemo treatment.      Review of Systems  Review of Systems - General ROS: negative for - weight loss  Psychological ROS: negative for - behavioral disorder  Ophthalmic ROS: negative for - dry eyes  ENT ROS: negative for - vertigo or vocal changes  Hematological and Lymphatic ROS: negative for - jaundice or swollen lymph nodes  Respiratory ROS: negative for - sputum changes or stridor  Cardiovascular ROS: negative for - irregular heartbeat or murmur  Gastrointestinal ROS: negative for - blood in stools or change in stools  Genito-Urinary ROS: negative for - hematuria or incontinence  Musculoskeletal ROS: negative for - gait disturbance    History  Past Medical History:   Diagnosis Date   • Acid reflux    • Asthma    • Cancer (CMS/HCC)    • Eczema    • Endometriosis    • Hiatal hernia    • High cholesterol    • Seasonal allergies      Past Surgical History:   Procedure Laterality Date   • ABDOMINAL SURGERY     • BREAST BIOPSY Bilateral    •  SECTION     • D&C HYSTEROSCOPY ENDOMETRIAL ABLATION     • DIAGNOSTIC LAPAROSCOPY     • FRACTURE SURGERY Left     tib/fib   • LEG SURGERY      Left leg following fracture   • PORTACATH PLACEMENT N/A 3/18/2020    Procedure: INSERTION OF PORTACATH;  Surgeon: Dudley Aldridge MD;  Location: [facility];  Service: General;  Laterality: N/A;   • SKIN BIOPSY     • TONSILLECTOMY       Family History   Problem Relation Age of Onset   • Breast cancer Mother 75   •  Basal cell carcinoma Mother 90        2 small spots on face removed + fair complexion   • Breast cancer Sister 55        VUS detected on genetic testing CDKN2A gene (c.-33G>C).    • Heart disease Father    • Throat cancer Father 79   • Lung cancer Father 79   • Cancer Maternal Grandmother 65        unknown gynecologic cancer, cervical vs uterine   • Lymphoma Maternal Grandfather 64   • Breast cancer Paternal Grandmother 70   • Prostate cancer Paternal Grandfather 70        metastastic to liver   • Breast cancer Paternal Aunt 77   • Breast cancer Maternal Aunt 80   • Breast cancer Other    • Ovarian cancer Sister 66        Elevated ; 1A tumor; Borderline cancer; both ovaries & lg cyst removed   • Ovarian cancer Maternal Cousin 54   • Breast cancer Other 50     Social History     Tobacco Use   • Smoking status: Never Smoker   • Smokeless tobacco: Never Used   Substance Use Topics   • Alcohol use: Never     Frequency: Never   • Drug use: Never     Medications Prior to Admission   Medication Sig Dispense Refill Last Dose   • acetaminophen (TYLENOL) 325 MG tablet Take 650 mg by mouth Every 6 (Six) Hours As Needed for Mild Pain .   Past Week at Unknown time   • calcium carbonate (TUMS) 500 MG chewable tablet Chew 1 tablet Daily As Needed for Indigestion or Heartburn.   Past Week at Unknown time   • famotidine (PEPCID) 10 MG tablet Take 10 mg by mouth As Needed for Indigestion or Heartburn.   Past Week at Unknown time   • levalbuterol (XOPENEX HFA) 45 MCG/ACT inhaler Inhale 1-2 puffs Every 4 (Four) Hours As Needed for Wheezing.   Past Month at Unknown time   • loratadine (CLARITIN) 10 MG tablet Take 10 mg by mouth 2 (Two) Times a Day.   4/17/2020 at pm   • ondansetron (ZOFRAN) 8 MG tablet Take 1 tablet by mouth 3 (Three) Times a Day As Needed for Nausea or Vomiting. 30 tablet 5 Past Week at Unknown time   • prochlorperazine (COMPAZINE) 10 MG tablet Take 1 tablet by mouth Every 6 (Six) Hours As Needed for Nausea or  Vomiting. 60 tablet 3 4/17/2020 at pm   • sennosides-docusate (Senna-S) 8.6-50 MG per tablet Take 2 tablets by mouth 2 (Two) Times a Day. 120 tablet 5 Past Week at Unknown time     Allergies:  Albuterol; Penicillins; Cefpodoxime; Clindamycin hcl; Crestor [rosuvastatin calcium]; Sulfa antibiotics; and Pseudoephedrine    Objective     Vital Signs  Temp:  [98.6 °F (37 °C)-100.4 °F (38 °C)] 100.4 °F (38 °C)  Heart Rate:  [] 88  Resp:  [16-18] 18  BP: (112-133)/(70-93) 122/70    Physical Exam  General:  This is a WD WN female in no acute distress  Vital signs stable, afebrile  HEENT exam:  WNL. Sclerae are anicteric.  EOMI  Neck:  supple, FROM.  No JVD.  Trachea midline  Lungs:  Respiratory effort normal. Auscultation: Clear, without wheezes, rhonchi, rales  Heart:  Regular rate and rhythm, without murmur, gallop, rub.  No pedal edema  Abdomen: Nontender, nondistended  Musculoskeletal:  muscle strength/tone is normal.    Psyc:  alert, oriented x 3.  Mood and affect are appropriate  skin:  Warm with good turgor.  There is cellulitis and erythema associated with the incision in the neck from where the port was placed on the right side.  The cellulitis has actually progressed significantly since it was seen in the office yesterday at 4 PM  extremities:  Examination of the extremities revealed no cyanosis, clubbing or edema.    Results Review:       Results from last 7 days   Lab Units 04/18/20  0733 04/18/20  0115   CRP mg/dL  --  1.93*   LACTATE mmol/L  --  1.3   WBC 10*3/mm3 26.98* 32.23*   HEMOGLOBIN g/dL 10.7* 12.2   HEMATOCRIT % 33.9* 38.4   PLATELETS 10*3/mm3 137* 155         Results from last 7 days   Lab Units 04/18/20  0115   SODIUM mmol/L 132*   POTASSIUM mmol/L 4.0   CHLORIDE mmol/L 96*   CO2 mmol/L 23.0   BUN mg/dL 15   CREATININE mg/dL 0.89   EGFR IF NONAFRICN AM mL/min/1.73 65   CALCIUM mg/dL 9.1   GLUCOSE mg/dL 136*   ALBUMIN g/dL 4.14   BILIRUBIN mg/dL 0.3   ALK PHOS U/L 166*   AST (SGOT) U/L 17   ALT  (SGPT) U/L 33   Estimated Creatinine Clearance: 69.9 mL/min (by C-G formula based on SCr of 0.89 mg/dL).  No results found for: AMMONIA      No results found for: BLOODCX  No results found for: URINECX  Wound Culture   Date Value Ref Range Status   04/17/2020 Moderate growth (3+) Staphylococcus aureus (A)  Preliminary     No results found for: STOOLCX    Imaging:  Imaging Results (Last 24 Hours)     Procedure Component Value Units Date/Time    XR Chest 1 View [[id number]] Collected:  04/18/20 0308     Updated:  04/18/20 0310    Narrative:       CR Chest 1 Vw    INDICATION:   Postop port. Cellulitis of the neck.     COMPARISON:    3/18/2020    FINDINGS:  Single portable AP view(s) of the chest.    Right-sided Port-A-Cath tip is at the lower SVC level.    The lungs are clear. Cardiac and mediastinal contours are normal. There is no pneumothorax.       Impression:       Port tip in the lower SVC. No acute findings.    Signer Name: Charly Stahl MD   Signed: 4/18/2020 3:08 AM   Workstation Name: [email or web address]    [facility]              Impression:  Patient Active Problem List   Diagnosis Code   • Malignant neoplasm of right breast in female, estrogen receptor negative (CMS/HCC) C50.911, Z17.1   • Breast cancer (CMS/HCC) C50.919   • Port-A-Cath in place Z95.828   • Cellulitis of neck L03.221       Plan:  Continue broad-spectrum antibiotics and monitor blood culture results.  If blood culture positive patient will require removal of [id number]      Discussion:    Nerissa Wang MD  04/18/20  13:38      Please note that portions of this note were completed with a voice recognition program.        Electronically signed by Nerissa Wang MD at 04/18/20 1351         Current Facility-Administered Medications   Medication Dose Route Frequency Provider Last Rate Last Dose   • acetaminophen (TYLENOL) tablet 650 mg  650 mg Oral Q6H PRN Nerissa Wang MD   650 mg at 04/20/20 2200   • calcium  carbonate (TUMS) chewable tablet 500 mg (200 mg elemental)  1 tablet Oral Daily PRN Nerissa Wang MD       • cetirizine (zyrTEC) tablet 10 mg  10 mg Oral Daily Nerissa Wang MD   10 mg at 20 0841   • DAPTOmycin (CUBICIN) 500 mg in sodium chloride 0.9 % 50 mL IVPB  8 mg/kg (Adjusted) Intravenous Q24H Nerissa Wang  mL/hr at 20 0841 500 mg at 20 0841   • famotidine (PEPCID) tablet 10 mg  10 mg Oral PRN Nerissa Wang MD       • heparin (porcine) 5000 UNIT/ML injection 5,000 Units  5,000 Units Subcutaneous Q12H Nerissa Wang MD   5,000 Units at 20 0841   • levalbuterol (XOPENEX HFA) inhaler 1 puff  1 puff Inhalation Q4H PRN Nerissa Wang MD       • ondansetron (ZOFRAN) tablet 8 mg  8 mg Oral TID PRN Nerissa Wang MD   8 mg at 20 1659   • prochlorperazine (COMPAZINE) tablet 10 mg  10 mg Oral Q6H PRN Nerissa Wang MD   10 mg at 20 1402   • sennosides-docusate (PERICOLACE) 8.6-50 MG per tablet 2 tablet  2 tablet Oral BID Nerissa Wang MD   2 tablet at 20 0841   • sodium chloride 0.9 % flush 10 mL  10 mL Intravenous PRN Nerissa Wang MD       • sodium chloride 0.9 % flush 10 mL  10 mL Intravenous Q12H Nerissa Wang MD   10 mL at 20 0926   • sodium chloride 0.9 % flush 10 mL  10 mL Intravenous PRN Nerissa Wang MD       • sodium chloride 0.9 % infusion  125 mL/hr Intravenous Continuous Nerissa Wang  mL/hr at 20 125 mL/hr at 20 204        Physician Progress Notes (last 48 hours) (Notes from 20 1247 through 20 1247)      Matt Gunn MD at 20 1216                     PROGRESS NOTE         Patient Identification:  Name:  Jackie Johnson  Age:  60 y.o.  Sex:  female  :  1960  MRN:  [id number]  Visit Number:  [id number]  Primary Care Provider:  Denver Aguirre MD         LOS: 4 days              ----------------------------------------------------------------------------------------------------------------------  Subjective       Chief Complaints:    Post-op Problem        Interval History:    Patient comfortable this morning, states she slept well. WBC normal. CRP improved at 3.53. Patient has been afebrile for 24 hours. No diarrhea. Blood cultures from 4/20/20 reveal no growth to date thus far.     Review of Systems:    Constitutional: Afebrile this AM. No appetite change or unexpected weight change. No fatigue.  Eyes: no eye drainage, itching or redness.  HEENT: no mouth sores, dysphagia or nose bleed.  Respiratory: no for shortness of breath, cough or production of sputum.  Cardiovascular: no chest pain, no palpitations, no orthopnea.  Gastrointestinal: no nausea, vomiting or diarrhea. No abdominal pain, hematemesis or rectal bleeding.  Genitourinary: no dysuria or polyuria.  Hematologic/lymphatic: no lymph node abnormalities, no easy bruising or easy bleeding.  Musculoskeletal: no muscle or joint pain.  Skin: No rash and no itching.  Neurological: no loss of consciousness, no seizure, no headache.  Behavioral/Psych: no depression or suicidal ideation.  Endocrine: no hot flashes.  Immunologic: negative.  ----------------------------------------------------------------------------------------------------------------------      Objective       [facility] Meds:    cetirizine 10 mg Oral Daily   DAPTOmycin 8 mg/kg (Adjusted) Intravenous Q24H   heparin (porcine) 5,000 Units Subcutaneous Q12H   sennosides-docusate 2 tablet Oral BID   sodium chloride 10 mL Intravenous Q12H       sodium chloride 125 mL/hr Last Rate: 125 mL/hr (04/21/20 2041)     ----------------------------------------------------------------------------------------------------------------------    Vital Signs:  Temp:  [98.1 °F (36.7 °C)-98.5 °F (36.9 °C)] 98.2 °F (36.8 °C)  Heart Rate:  [77-84] 77  Resp:  [18-20] 18  BP:  (108-120)/(71-87) 108/71  Mean Arterial Pressure (Non-Invasive) for the past 24 hrs (Last 3 readings):   Noninvasive MAP (mmHg)   04/22/20 0300 90   04/21/20 2300 94   04/21/20 1900 86     SpO2 Percentage    04/21/20 1900 04/21/20 2300 04/22/20 0300   SpO2: 96% 97% 95%     SpO2:  [94 %-97 %] 95 %  on   ;   Device (Oxygen Therapy): room air    Body mass index is 31.24 kg/m².  Wt Readings from Last 3 Encounters:   04/18/20 82.6 kg (182 lb)   04/17/20 81.3 kg (179 lb 3.2 oz)   04/09/20 81.8 kg (180 lb 4.8 oz)        Intake/Output Summary (Last 24 hours) at 4/22/2020 1216  Last data filed at 4/22/2020 0300  Gross per 24 hour   Intake 2120.85 ml   Output --   Net 2120.85 ml     Diet Regular  ----------------------------------------------------------------------------------------------------------------------      Physical Exam:  Constitutional:  Well-developed and well-nourished.  No respiratory distress.      HENT:  Head: Normocephalic and atraumatic.  Mouth: Dried fever blisters  Eyes:  Conjunctivae and EOM are normal.  No scleral icterus.  Neck:  Neck supple.  No JVD present.    Cardiovascular:  Normal rate, regular rhythm and normal heart sounds with no murmur. No edema.  Pulmonary/Chest:  No respiratory distress, no wheezes, no crackles, with normal breath sounds and good air movement.  Abdominal:  Soft.  Bowel sounds are normal.  No distension and no tenderness.   Musculoskeletal:  No edema, no tenderness, and no deformity.  No swelling or redness of joints.  Neurological:  Alert and oriented to person, place, and time.  No facial droop.  No slurred speech.   Skin:  Skin is warm and dry.  No rash noted.  No pallor.   Psychiatric:  Normal mood and affect.  Behavior is normal.            ----------------------------------------------------------------------------------------------------------------------            Results from last 7 days   Lab Units 04/22/20  0057 04/21/20  0800 04/21/20  0449 04/20/20  0546  04/19/20 0444 04/18/20  0115   CRP mg/dL 3.53*  --  5.33* 7.19*  --   --  1.93*   LACTATE mmol/L  --   --   --   --   --   --  1.3   WBC 10*3/mm3 4.95 4.81  --   --  16.38*   < > 32.23*   HEMOGLOBIN g/dL 9.2* 10.4*  --   --  10.3*   < > 12.2   HEMATOCRIT % 29.9* 32.7*  --   --  34.3   < > 38.4   MCV fL 96.8 94.5  --   --  99.7*   < > 94.3   MCHC g/dL 30.8* 31.8  --   --  30.0*   < > 31.8   PLATELETS 10*3/mm3 101* 91*  --   --  110*   < > 155    < > = values in this interval not displayed.     Results from last 7 days   Lab Units 04/19/20 0444 04/18/20 0115   SODIUM mmol/L 132* 132*   POTASSIUM mmol/L 3.6 4.0   CHLORIDE mmol/L 104 96*   CO2 mmol/L 17.3* 23.0   BUN mg/dL 7* 15   CREATININE mg/dL 0.72 0.89   EGFR IF NONAFRICN AM mL/min/1.73 83 65   CALCIUM mg/dL 7.9* 9.1   GLUCOSE mg/dL 130* 136*   ALBUMIN g/dL  --  4.14   BILIRUBIN mg/dL  --  0.3   ALK PHOS U/L  --  166*   AST (SGOT) U/L  --  17   ALT (SGPT) U/L  --  33   Estimated Creatinine Clearance: 86.4 mL/min (by C-G formula based on SCr of 0.72 mg/dL).  No results found for: AMMONIA    No results found for: HGBA1C, POCGLU  Lab Results   Component Value Date    HGBA1C 6.1 (H) 02/26/2020     No results found for: TSH, FREET4    Blood Culture   Date Value Ref Range Status   04/18/2020 Staphylococcus aureus (C)  Final     Comment:       Infectious disease consultation is highly recommended to rule out distant foci of infection.   04/18/2020 Staphylococcus aureus (C)  Final     Comment:       Infectious disease consultation is highly recommended to rule out distant foci of infection.     No results found for: URINECX  Wound Culture   Date Value Ref Range Status   04/17/2020 Moderate growth (3+) Staphylococcus aureus (A)  Final     No results found for: STOOLCX  No results found for: RESPCX  Pain Management Panel     There is no flowsheet data to display.             ----------------------------------------------------------------------------------------------------------------------  Imaging Results (Last 24 Hours)     ** No results found for the last 24 hours. **          ----------------------------------------------------------------------------------------------------------------------    Assessment/Plan       Assessment/Plan     ASSESSMENT:    1.  Sepsis  2.  MSSA bacteremia  3.  Right chest wall port infection with cellulitis    PLAN:    Patient comfortable this morning, states she slept well. WBC normal. CRP improved at 3.53. Patient has been afebrile for 24 hours. No diarrhea. Blood cultures from 4/20/20 reveal no growth to date thus far.     Right upper extremity venous duplex ruled out DVT.  X-ray of the chest reports no acute findings.  Wound culture from neck finalized as MSSA.  Blood culture from 4/18/2020 1 out of 2 sets positive for MSSA.     CINTIA on 4/20/20 negative.     MSSA bacteremia does require port removal.     In the setting of MSSA bacteremia with penicillin and Vantin allergy unable to initiate nafcillin or high-dose cefazolin.      Patient showing high load bacteremia in both aerobic and anaerobic. One-time dose of gentamicin 3 mg/kg was given on 4/20/20.    Blood cultures on 4/20/20 reveal no growth to date thus far. If blood cultures remain negative on 4/23/20 patient can be cleared to have a new Port-a-cath placed to the left side.    Recommend to continue daptomycin 8 mg/kg IV every 24 hours to continue through  5/3/20.      Code Status:   Code Status and Medical Interventions:   Ordered at: 04/18/20 0532     Level Of Support Discussed With:    Patient     Code Status:    CPR     Medical Interventions (Level of Support Prior to Arrest):    Full     Scribed for Matt Dye, APRN  04/22/20  12:16     Physician Attestation:    The documentation recorded by the scribe accurately reflects the service I personally performed and  the decisions made by me.    Matt Gunn MD  Infectious Diseases  20  12:16      Electronically signed by Matt Gunn MD at 20 1246     Jennifer Dye APRN at 20 1525                     PROGRESS NOTE         Patient Identification:  Name:  Jackie Johnson  Age:  60 y.o.  Sex:  female  :  1960  MRN:  [id number]  Visit Number:  [id number]  Primary Care Provider:  Denver Aguirre MD         LOS: 3 days       ----------------------------------------------------------------------------------------------------------------------  Subjective       Chief Complaints:    Post-op Problem        Interval History:    Patient comfortable this am. T-max of 101.1 overnight. CRP improved at 5.3. WBC normal. CINTIA negative on 20. No diarrhea reported.      Review of Systems:    Constitutional: Fever present. No appetite change or unexpected weight change. No fatigue.  Eyes: no eye drainage, itching or redness.  HEENT: no mouth sores, dysphagia or nose bleed.  Respiratory: no for shortness of breath, cough or production of sputum.  Cardiovascular: no chest pain, no palpitations, no orthopnea.  Gastrointestinal: no nausea, vomiting or diarrhea. No abdominal pain, hematemesis or rectal bleeding.  Genitourinary: no dysuria or polyuria.  Hematologic/lymphatic: no lymph node abnormalities, no easy bruising or easy bleeding.  Musculoskeletal: no muscle or joint pain.  Skin: No rash and no itching.  Neurological: no loss of consciousness, no seizure, no headache.  Behavioral/Psych: no depression or suicidal ideation.  Endocrine: no hot flashes.  Immunologic: negative.  ----------------------------------------------------------------------------------------------------------------------      Objective       Current [facility] Meds:    cetirizine 10 mg Oral Daily   DAPTOmycin 8 mg/kg (Adjusted) Intravenous Q24H   heparin (porcine) 5,000 Units Subcutaneous Q12H   sennosides-docusate 2 tablet Oral BID    sodium chloride 10 mL Intravenous Q12H       sodium chloride 125 mL/hr Last Rate: 125 mL/hr (04/21/20 1115)     ----------------------------------------------------------------------------------------------------------------------    Vital Signs:  Temp:  [96.7 °F (35.9 °C)-101.1 °F (38.4 °C)] 98.1 °F (36.7 °C)  Heart Rate:  [79-93] 83  Resp:  [20] 20  BP: (104-109)/(61-74) 109/71  No data found.  SpO2 Percentage    04/20/20 1215 04/20/20 1315 04/21/20 1436   SpO2: 96% 96% 94%     SpO2:  [94 %] 94 %  on   ;   Device (Oxygen Therapy): room air    Body mass index is 31.24 kg/m².  Wt Readings from Last 3 Encounters:   04/18/20 82.6 kg (182 lb)   04/17/20 81.3 kg (179 lb 3.2 oz)   04/09/20 81.8 kg (180 lb 4.8 oz)        Intake/Output Summary (Last 24 hours) at 4/21/2020 1525  Last data filed at 4/21/2020 0800  Gross per 24 hour   Intake 3771.6 ml   Output --   Net 3771.6 ml     Diet Regular  ----------------------------------------------------------------------------------------------------------------------      Physical Exam:  Constitutional:  Well-developed and well-nourished.  No respiratory distress.      HENT:  Head: Normocephalic and atraumatic.  Mouth: Dried fever blisters  Eyes:  Conjunctivae and EOM are normal.  No scleral icterus.  Neck:  Neck supple.  No JVD present.    Cardiovascular:  Normal rate, regular rhythm and normal heart sounds with no murmur. No edema.  Pulmonary/Chest:  No respiratory distress, no wheezes, no crackles, with normal breath sounds and good air movement.  Abdominal:  Soft.  Bowel sounds are normal.  No distension and no tenderness.   Musculoskeletal:  No edema, no tenderness, and no deformity.  No swelling or redness of joints.  Neurological:  Alert and oriented to person, place, and time.  No facial droop.  No slurred speech.   Skin:  Skin is warm and dry.  No rash noted.  No pallor.   Psychiatric:  Normal mood and affect.  Behavior is  normal.            ----------------------------------------------------------------------------------------------------------------------            Results from last 7 days   Lab Units 04/21/20  0800 04/21/20  0449 04/20/20  0546 04/19/20 0444 04/18/20  0733 04/18/20  0115   CRP mg/dL  --  5.33* 7.19*  --   --  1.93*   LACTATE mmol/L  --   --   --   --   --  1.3   WBC 10*3/mm3 4.81  --   --  16.38* 26.98* 32.23*   HEMOGLOBIN g/dL 10.4*  --   --  10.3* 10.7* 12.2   HEMATOCRIT % 32.7*  --   --  34.3 33.9* 38.4   MCV fL 94.5  --   --  99.7* 95.5 94.3   MCHC g/dL 31.8  --   --  30.0* 31.6 31.8   PLATELETS 10*3/mm3 91*  --   --  110* 137* 155     Results from last 7 days   Lab Units 04/19/20 0444 04/18/20  0115   SODIUM mmol/L 132* 132*   POTASSIUM mmol/L 3.6 4.0   CHLORIDE mmol/L 104 96*   CO2 mmol/L 17.3* 23.0   BUN mg/dL 7* 15   CREATININE mg/dL 0.72 0.89   EGFR IF NONAFRICN AM mL/min/1.73 83 65   CALCIUM mg/dL 7.9* 9.1   GLUCOSE mg/dL 130* 136*   ALBUMIN g/dL  --  4.14   BILIRUBIN mg/dL  --  0.3   ALK PHOS U/L  --  166*   AST (SGOT) U/L  --  17   ALT (SGPT) U/L  --  33   Estimated Creatinine Clearance: 86.4 mL/min (by C-G formula based on SCr of 0.72 mg/dL).  No results found for: AMMONIA    No results found for: HGBA1C, POCGLU  Lab Results   Component Value Date    HGBA1C 6.1 (H) 02/26/2020     No results found for: TSH, FREET4    Blood Culture   Date Value Ref Range Status   04/18/2020 Staphylococcus aureus (C)  Final     Comment:       Infectious disease consultation is highly recommended to rule out distant foci of infection.   04/18/2020 Staphylococcus aureus (C)  Final     Comment:       Infectious disease consultation is highly recommended to rule out distant foci of infection.     No results found for: URINECX  Wound Culture   Date Value Ref Range Status   04/17/2020 Moderate growth (3+) Staphylococcus aureus (A)  Final     No results found for: STOOLCX  No results found for: RESPCX  Pain Management Panel      There is no flowsheet data to display.            ----------------------------------------------------------------------------------------------------------------------  Imaging Results (Last 24 Hours)     ** No results found for the last 24 hours. **          ----------------------------------------------------------------------------------------------------------------------    Assessment/Plan       Assessment/Plan     ASSESSMENT:    1.  Sepsis  2.  MSSA bacteremia  3.  Right chest wall port infection with cellulitis    PLAN:    Patient comfortable this am. T-max of 101.1 overnight. CRP improved at 5.3. WBC normal. CINTIA negative on 4/20/20. No diarrhea reported.    Right upper extremity venous duplex ruled out DVT.  X-ray of the chest reports no acute findings.  Wound culture from neck finalized as MSSA.  Blood culture from 4/18/2020 1 out of 2 sets positive for MSSA.     MSSA bacteremia does require port removal.     In the setting of MSSA bacteremia with penicillin and Vantin allergy unable to initiate nafcillin or high-dose cefazolin.      Recommend to continue daptomycin 8 mg/kg IV every 24 hours to continue through at least 5-3-20.      Patient showing high load bacteremia in both aerobic and anaerobic. One-time dose of gentamicin 3 mg/kg grams was given on 4/20/20.    Blood cultures on 4/20/20 reveal no growth to date. We do not recommend new port placement at this time. If blood cultures from 4/20/20 remain no growth for the next 48-72 hours would recommend 14 day course of Daptomycin with a completion date of 5/3/20.    CINTIA on 4/20/20 negative.     We will continue to follow CBC, CRP trend, culture results and adjust accordingly.    Code Status:   Code Status and Medical Interventions:   Ordered at: 04/18/20 0532     Level Of Support Discussed With:    Patient     Code Status:    CPR     Medical Interventions (Level of Support Prior to Arrest):    Full     Scribed for Matt Dye,  SHER  20  15:25     Physician Attestation:    The documentation recorded by the scribe accurately reflects the service I personally performed and the decisions made by me.    Matt Gunn MD  Infectious Diseases  20  15:25      Electronically signed by Jennifer Dye APRN at 20 0920     Nerissa Wang MD at 20 1228        Status post port removal for staph sepsis.    Port was removed .  T-max last night of 101.1 at 11 PM last night, now at 96.7.  Patient was negative.  She states she feels better.    On examination this is a well-developed well-nourished female in no acute distress  HEENT examination: Within normal limits.  Conjunctiva pink.  Nose and ears appear normal.  Neck: Supple, full range of motion.  No JVD.  Musculoskeletal: Full range of motion all extremities without focal weakness. Normal gait. No digital cyanosis.  Psych: Patient is alert, oriented x3. Mood and affect are appropriate.  Skin: Port site clean.  Marked improvement in cellulitis.    Plan: Continue IV antibiotics   to arrange for outpatient antibiotic therapy  Plan will be to insert PICC line once patient has been afebrile for 48 hours with the hopes that she can then be discharged home on IV antibiotics per ID recommendations.    There is a discrepancy in the duration of antibiotics recommended.  The notes from infectious disease in the chart state IV antibiotics through 2020 while on the white board in the patient's room it is written for 14 days of IV therapy from 2020.  This would put the completion date at May 4.  This needs to be clarified as it may impact management.    Electronically signed by Nerissa Wang MD at 20 1232     Matt Gunn MD at 20 2999                     PROGRESS NOTE         Patient Identification:  Name:  Jackie Johnson  Age:  60 y.o.  Sex:  female  :  1960  MRN:  [id number]  Visit Number:  [id number]  Primary Care  Provider:  Denver Aguirre MD         LOS: 2 days       ----------------------------------------------------------------------------------------------------------------------  Subjective       Chief Complaints:    Post-op Problem        Interval History:    T-max overnight of 102.8. WBC improved at 16.38. CRP worsening at 7.19. Port-a-cath removed yesterday per General Surgery.       Review of Systems:    Constitutional: Fever present. No appetite change or unexpected weight change. No fatigue.  Eyes: no eye drainage, itching or redness.  HEENT: no mouth sores, dysphagia or nose bleed.  Respiratory: no for shortness of breath, cough or production of sputum.  Cardiovascular: no chest pain, no palpitations, no orthopnea.  Gastrointestinal: no nausea, vomiting or diarrhea. No abdominal pain, hematemesis or rectal bleeding.  Genitourinary: no dysuria or polyuria.  Hematologic/lymphatic: no lymph node abnormalities, no easy bruising or easy bleeding.  Musculoskeletal: no muscle or joint pain.  Skin: No rash and no itching.  Neurological: no loss of consciousness, no seizure, no headache.  Behavioral/Psych: no depression or suicidal ideation.  Endocrine: no hot flashes.  Immunologic: negative.  ----------------------------------------------------------------------------------------------------------------------      Objective       Current Hospital Meds:    cetirizine 10 mg Oral Daily   DAPTOmycin 8 mg/kg (Adjusted) Intravenous Q24H   heparin (porcine) 5,000 Units Subcutaneous Q12H   sennosides-docusate 2 tablet Oral BID   sodium chloride 10 mL Intravenous Q12H       sodium chloride 125 mL/hr Last Rate: 125 mL/hr (04/20/20 1044)     ----------------------------------------------------------------------------------------------------------------------    Vital Signs:  Temp:  [98 °F (36.7 °C)-102.8 °F (39.3 °C)] 99.7 °F (37.6 °C)  Heart Rate:  [] 87  Resp:  [16-20] 20  BP: ()/(60-87) 114/73  No data found.  SpO2  Percentage    04/20/20 1200 04/20/20 1215 04/20/20 1315   SpO2: 96% 96% 96%     SpO2:  [96 %-100 %] 96 %  on   ;   Device (Oxygen Therapy): room air    Body mass index is 31.24 kg/m².  Wt Readings from Last 3 Encounters:   04/18/20 82.6 kg (182 lb)   04/17/20 81.3 kg (179 lb 3.2 oz)   04/09/20 81.8 kg (180 lb 4.8 oz)        Intake/Output Summary (Last 24 hours) at 4/20/2020 1500  Last data filed at 4/20/2020 0852  Gross per 24 hour   Intake 3045 ml   Output --   Net 3045 ml     Diet Regular  ----------------------------------------------------------------------------------------------------------------------      Physical Exam:  Constitutional:  Well-developed and well-nourished.  No respiratory distress.      HENT:  Head: Normocephalic and atraumatic.  Mouth: Dried fever blisters  Eyes:  Conjunctivae and EOM are normal.  No scleral icterus.  Neck:  Neck supple.  No JVD present.    Cardiovascular:  Normal rate, regular rhythm and normal heart sounds with no murmur. No edema.  Pulmonary/Chest:  No respiratory distress, no wheezes, no crackles, with normal breath sounds and good air movement.  Abdominal:  Soft.  Bowel sounds are normal.  No distension and no tenderness.   Musculoskeletal:  No edema, no tenderness, and no deformity.  No swelling or redness of joints.  Neurological:  Alert and oriented to person, place, and time.  No facial droop.  No slurred speech.   Skin:  Skin is warm and dry.  No rash noted.  No pallor.   Psychiatric:  Normal mood and affect.  Behavior is normal.            ----------------------------------------------------------------------------------------------------------------------            Results from last 7 days   Lab Units 04/20/20  0546 04/19/20  0444 04/18/20  0733 04/18/20  0115   CRP mg/dL 7.19*  --   --  1.93*   LACTATE mmol/L  --   --   --  1.3   WBC 10*3/mm3  --  16.38* 26.98* 32.23*   HEMOGLOBIN g/dL  --  10.3* 10.7* 12.2   HEMATOCRIT %  --  34.3 33.9* 38.4   MCV fL  --   99.7* 95.5 94.3   MCHC g/dL  --  30.0* 31.6 31.8   PLATELETS 10*3/mm3  --  110* 137* 155     Results from last 7 days   Lab Units 04/19/20  0444 04/18/20  0115   SODIUM mmol/L 132* 132*   POTASSIUM mmol/L 3.6 4.0   CHLORIDE mmol/L 104 96*   CO2 mmol/L 17.3* 23.0   BUN mg/dL 7* 15   CREATININE mg/dL 0.72 0.89   EGFR IF NONAFRICN AM mL/min/1.73 83 65   CALCIUM mg/dL 7.9* 9.1   GLUCOSE mg/dL 130* 136*   ALBUMIN g/dL  --  4.14   BILIRUBIN mg/dL  --  0.3   ALK PHOS U/L  --  166*   AST (SGOT) U/L  --  17   ALT (SGPT) U/L  --  33   Estimated Creatinine Clearance: 86.4 mL/min (by C-G formula based on SCr of 0.72 mg/dL).  No results found for: AMMONIA    No results found for: HGBA1C, POCGLU  Lab Results   Component Value Date    HGBA1C 6.1 (H) 02/26/2020     No results found for: TSH, FREET4    Blood Culture   Date Value Ref Range Status   04/18/2020 Staphylococcus aureus (C)  Final     Comment:       Infectious disease consultation is highly recommended to rule out distant foci of infection.   04/18/2020 Staphylococcus aureus (C)  Final     Comment:       Infectious disease consultation is highly recommended to rule out distant foci of infection.     No results found for: URINECX  Wound Culture   Date Value Ref Range Status   04/17/2020 Moderate growth (3+) Staphylococcus aureus (A)  Final     No results found for: STOOLCX  No results found for: RESPCX  Pain Management Panel     There is no flowsheet data to display.            ----------------------------------------------------------------------------------------------------------------------  Imaging Results (Last 24 Hours)     ** No results found for the last 24 hours. **          ----------------------------------------------------------------------------------------------------------------------    Assessment/Plan       Assessment/Plan     ASSESSMENT:    1.  Sepsis  2.  MSSA bacteremia  3.  Right chest wall port infection with cellulitis    PLAN:    T-max overnight of  102.8. WBC improved at 16.38. CRP worsening at 7.19. Port-a-cath removed yesterday per General Surgery.     Right upper extremity venous duplex ruled out DVT.  X-ray of the chest reports no acute findings.  Wound culture from neck finalized as MSSA.  Blood culture from 4/18/2020 1 out of 2 sets positive for MSSA.     MSSA bacteremia does require port removal.     In the setting of MSSA bacteremia with penicillin and Vantin allergy unable to initiate nafcillin or high-dose cefazolin.      Recommend to continue daptomycin 8 mg/kg IV every 24 hours to continue through at least 5-20-20.      Patient showing high load bacteremia in both aerobic and anaerobic. Creatinine normal- another one-time dose of gentamicin 3 mg/kg grams was ordered again this morning in the setting of fever and worsening CRP level.      Repeat blood cultures ordered and pending. We do not recommend new port placement at this time.    Recommend CINTIA to rule out port lead vegetation or valvular vegetation.  We will continue to follow closely and adjust antibiotic therapy as needed.        Code Status:   Code Status and Medical Interventions:   Ordered at: 04/18/20 0532     Level Of Support Discussed With:    Patient     Code Status:    CPR     Medical Interventions (Level of Support Prior to Arrest):    Full     Scribed for SHER Buck MD  04/20/20  15:00     Physician Attestation:    The documentation recorded by the scribe accurately reflects the service I personally performed and the decisions made by me.    Matt Gunn MD  Infectious Diseases  04/20/20  16:52      Electronically signed by Matt Gunn MD at 04/20/20 8427

## 2020-04-22 NOTE — PROGRESS NOTES
Status post port removal for staph sepsis.    Port was removed Sunday.  Afebrile for 38 hours. Lost IV access    On examination this is a well-developed well-nourished female in no acute distress  HEENT examination: Within normal limits.  Conjunctiva pink.  Nose and ears appear normal.  Neck: Supple, full range of motion.  No JVD.  Musculoskeletal: Full range of motion all extremities without focal weakness. Normal gait. No digital cyanosis.  Psych: Patient is alert, oriented x3. Mood and affect are appropriate.  Skin: Port site clean.  Marked improvement in cellulitis.    Plan: Continue IV antibiotics   to arrange for outpatient antibiotic therapy  Plan will be to insert PICC line once patient has been afebrile for 48 hours with the hopes that she can then be discharged home on IV antibiotics per ID recommendations.    Plan is to send patient home on IV ABX and replace port closer to time of restarting chemo.

## 2020-04-22 NOTE — PROGRESS NOTES
Discharge Planning Assessment   [place]     Patient Name: Jackie Johnson  MRN: [id number]  Today's Date: 4/22/2020    Admit Date: 4/18/2020      Discharge Plan     Row Name 04/22/20 1248       Plan    Plan Pt needs IV Daptomycin through 5/3/20. Pt is requesting to have infusion pharmacy complete IV antibiotic teaching instead of utilizing home health services. SS contacted [facility] [phone] per Walter WEBB with referral. SS faxed referral to [facility] [phone]. SS to follow.     SS received call from Walter WEBB at [facility] and pt does not have a copay for IV Daptomycin. Walter WEBB at [facility] to contact pt to discuss teaching. SS notified pt. SS to follow.      LINSEY Parra

## 2020-04-22 NOTE — PLAN OF CARE
Problem: Patient Care Overview  Goal: Plan of Care Review  Outcome: Ongoing (interventions implemented as appropriate)  Flowsheets  Taken 4/22/2020 0543  Progress: no change  Outcome Summary: No acute changes overnight. Pt rested will. No complaints. Will continue to monitor.  Taken 4/21/2020 2046  Plan of Care Reviewed With: patient

## 2020-04-22 NOTE — PROGRESS NOTES
PROGRESS NOTE         Patient Identification:  Name:  Jackie Johnson  Age:  60 y.o.  Sex:  female  :  1960  MRN:  [id number]  Visit Number:  [id number]  Primary Care Provider:  Denver Aguirre MD         LOS: 4 days       ----------------------------------------------------------------------------------------------------------------------  Subjective       Chief Complaints:    Post-op Problem        Interval History:    Patient comfortable this morning, states she slept well. WBC normal. CRP improved at 3.53. Patient has been afebrile for 24 hours. No diarrhea. Blood cultures from 20 reveal no growth to date thus far.     Review of Systems:    Constitutional: Afebrile this AM. No appetite change or unexpected weight change. No fatigue.  Eyes: no eye drainage, itching or redness.  HEENT: no mouth sores, dysphagia or nose bleed.  Respiratory: no for shortness of breath, cough or production of sputum.  Cardiovascular: no chest pain, no palpitations, no orthopnea.  Gastrointestinal: no nausea, vomiting or diarrhea. No abdominal pain, hematemesis or rectal bleeding.  Genitourinary: no dysuria or polyuria.  Hematologic/lymphatic: no lymph node abnormalities, no easy bruising or easy bleeding.  Musculoskeletal: no muscle or joint pain.  Skin: No rash and no itching.  Neurological: no loss of consciousness, no seizure, no headache.  Behavioral/Psych: no depression or suicidal ideation.  Endocrine: no hot flashes.  Immunologic: negative.  ----------------------------------------------------------------------------------------------------------------------      Objective       Current [facility] Meds:    cetirizine 10 mg Oral Daily   DAPTOmycin 8 mg/kg (Adjusted) Intravenous Q24H   heparin (porcine) 5,000 Units Subcutaneous Q12H   sennosides-docusate 2 tablet Oral BID   sodium chloride 10 mL Intravenous Q12H       sodium chloride 125 mL/hr Last Rate: 125 mL/hr (20)      ----------------------------------------------------------------------------------------------------------------------    Vital Signs:  Temp:  [98.1 °F (36.7 °C)-98.5 °F (36.9 °C)] 98.2 °F (36.8 °C)  Heart Rate:  [77-84] 77  Resp:  [18-20] 18  BP: (108-120)/(71-87) 108/71  Mean Arterial Pressure (Non-Invasive) for the past 24 hrs (Last 3 readings):   Noninvasive MAP (mmHg)   04/22/20 0300 90   04/21/20 2300 94   04/21/20 1900 86     SpO2 Percentage    04/21/20 1900 04/21/20 2300 04/22/20 0300   SpO2: 96% 97% 95%     SpO2:  [94 %-97 %] 95 %  on   ;   Device (Oxygen Therapy): room air    Body mass index is 31.24 kg/m².  Wt Readings from Last 3 Encounters:   04/18/20 82.6 kg (182 lb)   04/17/20 81.3 kg (179 lb 3.2 oz)   04/09/20 81.8 kg (180 lb 4.8 oz)        Intake/Output Summary (Last 24 hours) at 4/22/2020 1216  Last data filed at 4/22/2020 0300  Gross per 24 hour   Intake 2120.85 ml   Output --   Net 2120.85 ml     Diet Regular  ----------------------------------------------------------------------------------------------------------------------      Physical Exam:  Constitutional:  Well-developed and well-nourished.  No respiratory distress.      HENT:  Head: Normocephalic and atraumatic.  Mouth: Dried fever blisters  Eyes:  Conjunctivae and EOM are normal.  No scleral icterus.  Neck:  Neck supple.  No JVD present.    Cardiovascular:  Normal rate, regular rhythm and normal heart sounds with no murmur. No edema.  Pulmonary/Chest:  No respiratory distress, no wheezes, no crackles, with normal breath sounds and good air movement.  Abdominal:  Soft.  Bowel sounds are normal.  No distension and no tenderness.   Musculoskeletal:  No edema, no tenderness, and no deformity.  No swelling or redness of joints.  Neurological:  Alert and oriented to person, place, and time.  No facial droop.  No slurred speech.   Skin:  Skin is warm and dry.  No rash noted.  No pallor.   Psychiatric:  Normal mood and affect.  Behavior is  normal.            ----------------------------------------------------------------------------------------------------------------------            Results from last 7 days   Lab Units 04/22/20  0057 04/21/20  0800 04/21/20  0449 04/20/20  0546 04/19/20 0444 04/18/20 0115   CRP mg/dL 3.53*  --  5.33* 7.19*  --   --  1.93*   LACTATE mmol/L  --   --   --   --   --   --  1.3   WBC 10*3/mm3 4.95 4.81  --   --  16.38*   < > 32.23*   HEMOGLOBIN g/dL 9.2* 10.4*  --   --  10.3*   < > 12.2   HEMATOCRIT % 29.9* 32.7*  --   --  34.3   < > 38.4   MCV fL 96.8 94.5  --   --  99.7*   < > 94.3   MCHC g/dL 30.8* 31.8  --   --  30.0*   < > 31.8   PLATELETS 10*3/mm3 101* 91*  --   --  110*   < > 155    < > = values in this interval not displayed.     Results from last 7 days   Lab Units 04/19/20 0444 04/18/20 0115   SODIUM mmol/L 132* 132*   POTASSIUM mmol/L 3.6 4.0   CHLORIDE mmol/L 104 96*   CO2 mmol/L 17.3* 23.0   BUN mg/dL 7* 15   CREATININE mg/dL 0.72 0.89   EGFR IF NONAFRICN AM mL/min/1.73 83 65   CALCIUM mg/dL 7.9* 9.1   GLUCOSE mg/dL 130* 136*   ALBUMIN g/dL  --  4.14   BILIRUBIN mg/dL  --  0.3   ALK PHOS U/L  --  166*   AST (SGOT) U/L  --  17   ALT (SGPT) U/L  --  33   Estimated Creatinine Clearance: 86.4 mL/min (by C-G formula based on SCr of 0.72 mg/dL).  No results found for: AMMONIA    No results found for: HGBA1C, POCGLU  Lab Results   Component Value Date    HGBA1C 6.1 (H) 02/26/2020     No results found for: TSH, FREET4    Blood Culture   Date Value Ref Range Status   04/18/2020 Staphylococcus aureus (C)  Final     Comment:       Infectious disease consultation is highly recommended to rule out distant foci of infection.   04/18/2020 Staphylococcus aureus (C)  Final     Comment:       Infectious disease consultation is highly recommended to rule out distant foci of infection.     No results found for: URINECX  Wound Culture   Date Value Ref Range Status   04/17/2020 Moderate growth (3+) Staphylococcus aureus (A)   Final     No results found for: STOOLCX  No results found for: RESPCX  Pain Management Panel     There is no flowsheet data to display.            ----------------------------------------------------------------------------------------------------------------------  Imaging Results (Last 24 Hours)     ** No results found for the last 24 hours. **          ----------------------------------------------------------------------------------------------------------------------    Assessment/Plan       Assessment/Plan     ASSESSMENT:    1.  Sepsis  2.  MSSA bacteremia  3.  Right chest wall port infection with cellulitis    PLAN:    Patient comfortable this morning, states she slept well. WBC normal. CRP improved at 3.53. Patient has been afebrile for 24 hours. No diarrhea. Blood cultures from 4/20/20 reveal no growth to date thus far.     Right upper extremity venous duplex ruled out DVT.  X-ray of the chest reports no acute findings.  Wound culture from neck finalized as MSSA.  Blood culture from 4/18/2020 1 out of 2 sets positive for MSSA.     CINTIA on 4/20/20 negative.     MSSA bacteremia does require port removal.     In the setting of MSSA bacteremia with penicillin and Vantin allergy unable to initiate nafcillin or high-dose cefazolin.      Patient showing high load bacteremia in both aerobic and anaerobic. One-time dose of gentamicin 3 mg/kg was given on 4/20/20.    Blood cultures on 4/20/20 reveal no growth to date thus far. If blood cultures remain negative on 4/23/20 patient can be cleared to have a new Port-a-cath placed to the left side.    Recommend to continue daptomycin 8 mg/kg IV every 24 hours to continue through  5/3/20.      Code Status:   Code Status and Medical Interventions:   Ordered at: 04/18/20 0532     Level Of Support Discussed With:    Patient     Code Status:    CPR     Medical Interventions (Level of Support Prior to Arrest):    Full     Scribed for Matt Dye,  APRN  04/22/20  12:16     Physician Attestation:    The documentation recorded by the scribe accurately reflects the service I personally performed and the decisions made by me.    Matt Gunn MD  Infectious Diseases  04/22/20  12:16

## 2020-04-23 ENCOUNTER — TRANSCRIBE ORDERS (OUTPATIENT)
Dept: INFUSION THERAPY | Facility: HOSPITAL | Age: 60
End: 2020-04-23

## 2020-04-23 VITALS
OXYGEN SATURATION: 97 % | BODY MASS INDEX: 31.07 KG/M2 | HEIGHT: 64 IN | HEART RATE: 81 BPM | DIASTOLIC BLOOD PRESSURE: 78 MMHG | WEIGHT: 182 LBS | SYSTOLIC BLOOD PRESSURE: 123 MMHG | TEMPERATURE: 98.4 F | RESPIRATION RATE: 22 BRPM

## 2020-04-23 DIAGNOSIS — L03.90 CELLULITIS, UNSPECIFIED CELLULITIS SITE: Primary | ICD-10-CM

## 2020-04-23 LAB
BASOPHILS # BLD AUTO: 0.06 10*3/MM3 (ref 0–0.2)
BASOPHILS NFR BLD AUTO: 1.2 % (ref 0–1.5)
CRP SERPL-MCNC: 2.42 MG/DL (ref 0–0.5)
DEPRECATED RDW RBC AUTO: 50.4 FL (ref 37–54)
EOSINOPHIL # BLD AUTO: 0 10*3/MM3 (ref 0–0.4)
EOSINOPHIL NFR BLD AUTO: 0 % (ref 0.3–6.2)
ERYTHROCYTE [DISTWIDTH] IN BLOOD BY AUTOMATED COUNT: 14.7 % (ref 12.3–15.4)
HCT VFR BLD AUTO: 29.4 % (ref 34–46.6)
HGB BLD-MCNC: 9.3 G/DL (ref 12–15.9)
IMM GRANULOCYTES # BLD AUTO: 0.03 10*3/MM3 (ref 0–0.05)
IMM GRANULOCYTES NFR BLD AUTO: 0.6 % (ref 0–0.5)
LYMPHOCYTES # BLD AUTO: 1.19 10*3/MM3 (ref 0.7–3.1)
LYMPHOCYTES NFR BLD AUTO: 23.6 % (ref 19.6–45.3)
MCH RBC QN AUTO: 29.9 PG (ref 26.6–33)
MCHC RBC AUTO-ENTMCNC: 31.6 G/DL (ref 31.5–35.7)
MCV RBC AUTO: 94.5 FL (ref 79–97)
MONOCYTES # BLD AUTO: 0.26 10*3/MM3 (ref 0.1–0.9)
MONOCYTES NFR BLD AUTO: 5.1 % (ref 5–12)
NEUTROPHILS # BLD AUTO: 3.51 10*3/MM3 (ref 1.7–7)
NEUTROPHILS NFR BLD AUTO: 69.5 % (ref 42.7–76)
NRBC BLD AUTO-RTO: 0 /100 WBC (ref 0–0.2)
PLATELET # BLD AUTO: 118 10*3/MM3 (ref 140–450)
PMV BLD AUTO: 9.8 FL (ref 6–12)
RBC # BLD AUTO: 3.11 10*6/MM3 (ref 3.77–5.28)
WBC NRBC COR # BLD: 5.05 10*3/MM3 (ref 3.4–10.8)

## 2020-04-23 PROCEDURE — 86140 C-REACTIVE PROTEIN: CPT | Performed by: SURGERY

## 2020-04-23 PROCEDURE — 25010000002 DAPTOMYCIN PER 1 MG: Performed by: SURGERY

## 2020-04-23 PROCEDURE — 25010000002 HEPARIN (PORCINE) PER 1000 UNITS: Performed by: SURGERY

## 2020-04-23 PROCEDURE — 85025 COMPLETE CBC W/AUTO DIFF WBC: CPT | Performed by: NURSE PRACTITIONER

## 2020-04-23 PROCEDURE — C1751 CATH, INF, PER/CENT/MIDLINE: HCPCS

## 2020-04-23 PROCEDURE — 05HY33Z INSERTION OF INFUSION DEVICE INTO UPPER VEIN, PERCUTANEOUS APPROACH: ICD-10-PCS | Performed by: SURGERY

## 2020-04-23 RX ADMIN — SODIUM CHLORIDE, PRESERVATIVE FREE 10 ML: 5 INJECTION INTRAVENOUS at 08:49

## 2020-04-23 RX ADMIN — ACETAMINOPHEN 650 MG: 325 TABLET ORAL at 16:16

## 2020-04-23 RX ADMIN — CETIRIZINE HYDROCHLORIDE 10 MG: 10 TABLET, FILM COATED ORAL at 08:48

## 2020-04-23 RX ADMIN — HEPARIN SODIUM 5000 UNITS: 5000 INJECTION INTRAVENOUS; SUBCUTANEOUS at 08:48

## 2020-04-23 RX ADMIN — SODIUM CHLORIDE 125 ML/HR: 9 INJECTION, SOLUTION INTRAVENOUS at 02:25

## 2020-04-23 RX ADMIN — DAPTOMYCIN 500 MG: 500 INJECTION, POWDER, LYOPHILIZED, FOR SOLUTION INTRAVENOUS at 08:48

## 2020-04-23 NOTE — PLAN OF CARE
Problem: Patient Care Overview  Goal: Plan of Care Review  Outcome: Ongoing (interventions implemented as appropriate)  Flowsheets  Taken 4/23/2020 1425  Progress: no change  Outcome Summary: Patient getting PICC line placed today. Patient being discharged home today no acute changes or complaints  Taken 4/23/2020 0845  Plan of Care Reviewed With: patient

## 2020-04-23 NOTE — NURSING NOTE
Called Sandra PICC Line RN about PICC placement. She states it will be at least another 30 minutes before she could get up here.

## 2020-04-23 NOTE — PLAN OF CARE
Patient has rested for most of the night, no acute changes. Patient states to be in no pain. Vital signs WNL, will continue to monitor.

## 2020-04-23 NOTE — NURSING NOTE
Spoke with outpatient services at infusion clinic about patient needing a PICC line placed this morning so that she could be discharged home today. They state that Sandra is on call and is on her way in that they will let her know once she gets here.

## 2020-04-23 NOTE — PROGRESS NOTES
Discharge Planning Assessment  Psychiatric     Patient Name: Jackie Johnson  MRN: [id number]  Today's Date: 4/23/2020    Admit Date: 4/18/2020      Discharge Plan     Row Name 04/23/20 1340       Plan    Plan SS received prescription for IV Daptomycin. SS faxed prescription to [facility] [phone]. Outpatient order for PICC line dressing change was received. Lead RN arranged outpatient PICC line dressing change with [facility] infusion clinic. SS to follow.     Pt is being discharged home today. SS spoke to [facility] per Walter and he will contact pt about IV antibiotic teaching. [facility] to deliver IV antibiotics to pt's home. No other needs identified.         LINSEY Parra

## 2020-04-23 NOTE — NURSING NOTE
Spoke with Sandra, PICC RN who states that she just arrived to the hospital and that she had a line to place first then she would come and place the PICC on this patient.

## 2020-04-23 NOTE — DISCHARGE SUMMARY
[facility] GENERAL SURGERY DISCHARGE SUMMARY    Patient Identification:  Name:  Jackie Johnson  Age:  60 y.o.  Sex:  female  :  1960  MRN:  [id number]    Date of Admission: 2020  Date of Discharge:  2020     ADMISSION DIAGNOSIS:  Port infection    DISCHARGE DIAGNOSIS: Port infection with MSSA bacteremia    CONSULTS:   Infectious disease, cardiology    PROCEDURES PERFORMED:  Procedure(s):  REMOVAL VENOUS ACCESS DEVICE  CINTIA       HOSPITAL COURSE  Patient is a 60 y.o. female presented to [facility] complaining of fever and right neck cellulitis.  She was seen in the office on  and started on oral antibiotics but later that night spiked a temperature and went to the emergency room where she was admitted and started on IV antibiotics..  Please see the admitting history and physical for further details.  Blood cultures turn positive within 24 hours.  Patient had her port removed on 2020.  She continued to spike temperatures post port removal.  She did have a CINTIA which did not demonstrate any vegetation.  Patient was maintained on vancomycin and then switched to daptomycin.  She is currently been afebrile for over 48 hours      PHYSICAL EXAM:  On examination this is a well-developed well-nourished female in no acute distress  HEENT examination: Within normal limits.  Conjunctiva pink.  Nose and ears appear normal.  Neck: Supple, full range of motion.  No JVD.  Lungs: Clear  Heart without murmur  Musculoskeletal: Full range of motion all extremities without focal weakness. Normal gait. No digital cyanosis.  Psych: Patient is alert, oriented x3. Mood and affect are appropriate.  Skin: Port removal site is clean without cellulitis or induration.  Right neck port site demonstrates mild induration with no cellulitis  Lab Results (last 48 hours)     Procedure Component Value Units Date/Time    Blood Culture - Blood, Arm, Right [[id number]] Collected:  20 1008    Specimen:   Blood from Arm, Right Updated:  04/23/20 1030     Blood Culture No growth at 3 days    Blood Culture - Blood, Arm, Right [[id number]] Collected:  04/20/20 1008    Specimen:  Blood from Arm, Right Updated:  04/23/20 1030     Blood Culture No growth at 3 days    C-reactive Protein [[id number]]  (Abnormal) Collected:  04/23/20 0139    Specimen:  Blood Updated:  04/23/20 0235     C-Reactive Protein 2.42 mg/dL     CBC & Differential [[id number]] Collected:  04/23/20 0139    Specimen:  Blood Updated:  04/23/20 0217    Narrative:       The following orders were created for panel order CBC & Differential.  Procedure                               Abnormality         Status                     ---------                               -----------         ------                     CBC Auto Differential[[id number]]        Abnormal            Final result                 Please view results for these tests on the individual orders.    CBC Auto Differential [[id number]]  (Abnormal) Collected:  04/23/20 0139    Specimen:  Blood Updated:  04/23/20 0217     WBC 5.05 10*3/mm3      RBC 3.11 10*6/mm3      Hemoglobin 9.3 g/dL      Hematocrit 29.4 %      MCV 94.5 fL      MCH 29.9 pg      MCHC 31.6 g/dL      RDW 14.7 %      RDW-SD 50.4 fl      MPV 9.8 fL      Platelets 118 10*3/mm3      Neutrophil % 69.5 %      Lymphocyte % 23.6 %      Monocyte % 5.1 %      Eosinophil % 0.0 %      Basophil % 1.2 %      Immature Grans % 0.6 %      Neutrophils, Absolute 3.51 10*3/mm3      Lymphocytes, Absolute 1.19 10*3/mm3      Monocytes, Absolute 0.26 10*3/mm3      Eosinophils, Absolute 0.00 10*3/mm3      Basophils, Absolute 0.06 10*3/mm3      Immature Grans, Absolute 0.03 10*3/mm3      nRBC 0.0 /100 WBC     C-reactive Protein [[id number]]  (Abnormal) Collected:  04/22/20 0057    Specimen:  Blood Updated:  04/22/20 0127     C-Reactive Protein 3.53 mg/dL     CBC & Differential [[id number]] Collected:  04/22/20 0057    Specimen:  Blood Updated:  04/22/20 0105     Narrative:       The following orders were created for panel order CBC & Differential.  Procedure                               Abnormality         Status                     ---------                               -----------         ------                     CBC Auto Differential[[id number]]        Abnormal            Final result                 Please view results for these tests on the individual orders.    CBC Auto Differential [[id number]]  (Abnormal) Collected:  04/22/20 0057    Specimen:  Blood Updated:  04/22/20 0105     WBC 4.95 10*3/mm3      RBC 3.09 10*6/mm3      Hemoglobin 9.2 g/dL      Hematocrit 29.9 %      MCV 96.8 fL      MCH 29.8 pg      MCHC 30.8 g/dL      RDW 14.6 %      RDW-SD 51.9 fl      MPV 9.6 fL      Platelets 101 10*3/mm3      Neutrophil % 70.3 %      Lymphocyte % 21.4 %      Monocyte % 6.1 %      Eosinophil % 0.2 %      Basophil % 0.4 %      Immature Grans % 1.6 %      Neutrophils, Absolute 3.48 10*3/mm3      Lymphocytes, Absolute 1.06 10*3/mm3      Monocytes, Absolute 0.30 10*3/mm3      Eosinophils, Absolute 0.01 10*3/mm3      Basophils, Absolute 0.02 10*3/mm3      Immature Grans, Absolute 0.08 10*3/mm3      nRBC 0.0 /100 WBC         Xr Chest 1 View    Result Date: 4/18/2020  Port tip in the lower SVC. No acute findings. Signer Name: Charly Stahl MD  Signed: 4/18/2020 3:08 AM  Workstation Name: [facility]  [facility] Venous Doppler Upper Extremity Right (duplex)    Result Date: 4/17/2020  No DVT.  This report was finalized on 4/17/2020 4:30 PM by Dr. Maury Phipps MD.      Blood Culture   Date Value Ref Range Status   04/20/2020 No growth at 3 days  Preliminary   04/20/2020 No growth at 3 days  Preliminary     BCID, PCR   Date Value Ref Range Status   04/18/2020 (C) No organism detected by BCID PCR. Final    Staphylococcus aureus, not MRSA. Identification by BCID PCR.     No results found for: CULTURES, HSVCX, URCX  No results found for: EYECULTURE,  GCCX, LABHSV  No results found for: LEGIONELLA, MRSACX, MUMPSCX, MYCOPLASCX  No results found for: NOCARDIACX, STOOLCX  No results found for: THROATCX, UNSTIMCULT, URINECX, CULTURE, VZVCULTUR  Wound Culture   Date Value Ref Range Status   04/17/2020 Moderate growth (3+) Staphylococcus aureus (A)  Final     CONDITION AT DISCHARGE:  Improved    DISCHARGE DISPOSITION   Home    DISCHARGE MEDICATIONS:     Discharge Medications      ASK your doctor about these medications      Instructions Start Date   acetaminophen 325 MG tablet  Commonly known as:  TYLENOL   650 mg, Oral, Every 6 Hours PRN      calcium carbonate 500 MG chewable tablet  Commonly known as:  TUMS   1 tablet, Oral, Daily PRN      famotidine 10 MG tablet  Commonly known as:  PEPCID  Ask about: Which instructions should I use?   10 mg, Oral, As Needed      levalbuterol 45 MCG/ACT inhaler  Commonly known as:  XOPENEX HFA   1-2 puffs, Inhalation, Every 4 Hours PRN      loratadine 10 MG tablet  Commonly known as:  CLARITIN  Ask about: Which instructions should I use?   10 mg, Oral, 2 Times Daily      ondansetron 8 MG tablet  Commonly known as:  ZOFRAN   8 mg, Oral, 3 Times Daily PRN      prochlorperazine 10 MG tablet  Commonly known as:  COMPAZINE   10 mg, Oral, Every 6 Hours PRN      sennosides-docusate 8.6-50 MG per tablet  Commonly known as:  Senna-S   2 tablets, Oral, 2 Times Daily             FOLLOW-UP:  Dr. Wang to be determined  Patient has scheduled follow-up with medical oncology to resume her chemotherapy once her antibiotic course has been completed.  Patient will then contact me when she is ready to have her new port placed and this will be coordinated based upon the timing of her treatment.    Activity and diet instructions given to the patient    Nerissa Wang MD  04/23/20  14:25    Please note that this discharge summary required more than 30 minutes to complete.

## 2020-04-23 NOTE — PROGRESS NOTES
PROGRESS NOTE         Patient Identification:  Name:  Jackie Johnson  Age:  60 y.o.  Sex:  female  :  1960  MRN:  [id number]  Visit Number:  [id number]  Primary Care Provider:  Denver Aguirre MD         LOS: 5 days       ----------------------------------------------------------------------------------------------------------------------  Subjective       Chief Complaints:    Post-op Problem        Interval History:    Patient comfortable this morning, no apparent distress. Afebrile, no diarrhea. WBC normal. CRP improving at 2.42. Blood cultures from 20 reveal NGTD. Patient to have PICC line placed today as she has been afebrile for 48 hours.    Review of Systems:    Constitutional: Afebrile this AM. No appetite change or unexpected weight change. No fatigue.  Eyes: no eye drainage, itching or redness.  HEENT: no mouth sores, dysphagia or nose bleed.  Respiratory: no for shortness of breath, cough or production of sputum.  Cardiovascular: no chest pain, no palpitations, no orthopnea.  Gastrointestinal: no nausea, vomiting or diarrhea. No abdominal pain, hematemesis or rectal bleeding.  Genitourinary: no dysuria or polyuria.  Hematologic/lymphatic: no lymph node abnormalities, no easy bruising or easy bleeding.  Musculoskeletal: no muscle or joint pain.  Skin: No rash and no itching.  Neurological: no loss of consciousness, no seizure, no headache.  Behavioral/Psych: no depression or suicidal ideation.  Endocrine: no hot flashes.  Immunologic: negative.  ----------------------------------------------------------------------------------------------------------------------      Objective       [facility] Meds:    cetirizine 10 mg Oral Daily   DAPTOmycin 8 mg/kg (Adjusted) Intravenous Q24H   heparin (porcine) 5,000 Units Subcutaneous Q12H   sennosides-docusate 2 tablet Oral BID   sodium chloride 10 mL Intravenous Q12H       sodium chloride 125 mL/hr Last Rate: 125 mL/hr (20  0225)     ----------------------------------------------------------------------------------------------------------------------    Vital Signs:  Temp:  [98.1 °F (36.7 °C)-98.4 °F (36.9 °C)] 98.4 °F (36.9 °C)  Heart Rate:  [75-88] 81  Resp:  [18-22] 22  BP: (117-124)/(74-85) 123/78  Mean Arterial Pressure (Non-Invasive) for the past 24 hrs (Last 3 readings):   Noninvasive MAP (mmHg)   04/23/20 0300 91   04/22/20 2300 93   04/22/20 1900 95     SpO2 Percentage    04/22/20 2300 04/23/20 0300 04/23/20 1020   SpO2: 94% 94% 97%     SpO2:  [94 %-97 %] 97 %  on   ;   Device (Oxygen Therapy): room air    Body mass index is 31.24 kg/m².  Wt Readings from Last 3 Encounters:   04/18/20 82.6 kg (182 lb)   04/17/20 81.3 kg (179 lb 3.2 oz)   04/09/20 81.8 kg (180 lb 4.8 oz)        Intake/Output Summary (Last 24 hours) at 4/23/2020 1322  Last data filed at 4/23/2020 0800  Gross per 24 hour   Intake 3429.51 ml   Output --   Net 3429.51 ml     Diet Regular  ----------------------------------------------------------------------------------------------------------------------      Physical Exam:  Constitutional:  Well-developed and well-nourished.  No respiratory distress.      HENT:  Head: Normocephalic and atraumatic.  Mouth: Dried fever blisters  Eyes:  Conjunctivae and EOM are normal.  No scleral icterus.  Neck:  Neck supple.  No JVD present.    Cardiovascular:  Normal rate, regular rhythm and normal heart sounds with no murmur. No edema.  Pulmonary/Chest:  No respiratory distress, no wheezes, no crackles, with normal breath sounds and good air movement.  Abdominal:  Soft.  Bowel sounds are normal.  No distension and no tenderness.   Musculoskeletal:  No edema, no tenderness, and no deformity.  No swelling or redness of joints.  Neurological:  Alert and oriented to person, place, and time.  No facial droop.  No slurred speech.   Skin:  Skin is warm and dry.  No rash noted.  No pallor.   Psychiatric:  Normal mood and affect.   Behavior is normal.            ----------------------------------------------------------------------------------------------------------------------            Results from last 7 days   Lab Units 04/23/20  0139 04/22/20  0057 04/21/20  0800 04/21/20 0449 04/18/20  0115   CRP mg/dL 2.42* 3.53*  --  5.33*   < > 1.93*   LACTATE mmol/L  --   --   --   --   --  1.3   WBC 10*3/mm3 5.05 4.95 4.81  --    < > 32.23*   HEMOGLOBIN g/dL 9.3* 9.2* 10.4*  --    < > 12.2   HEMATOCRIT % 29.4* 29.9* 32.7*  --    < > 38.4   MCV fL 94.5 96.8 94.5  --    < > 94.3   MCHC g/dL 31.6 30.8* 31.8  --    < > 31.8   PLATELETS 10*3/mm3 118* 101* 91*  --    < > 155    < > = values in this interval not displayed.     Results from last 7 days   Lab Units 04/19/20 0444 04/18/20  0115   SODIUM mmol/L 132* 132*   POTASSIUM mmol/L 3.6 4.0   CHLORIDE mmol/L 104 96*   CO2 mmol/L 17.3* 23.0   BUN mg/dL 7* 15   CREATININE mg/dL 0.72 0.89   EGFR IF NONAFRICN AM mL/min/1.73 83 65   CALCIUM mg/dL 7.9* 9.1   GLUCOSE mg/dL 130* 136*   ALBUMIN g/dL  --  4.14   BILIRUBIN mg/dL  --  0.3   ALK PHOS U/L  --  166*   AST (SGOT) U/L  --  17   ALT (SGPT) U/L  --  33   Estimated Creatinine Clearance: 86.4 mL/min (by C-G formula based on SCr of 0.72 mg/dL).  No results found for: AMMONIA    No results found for: HGBA1C, POCGLU  Lab Results   Component Value Date    HGBA1C 6.1 (H) 02/26/2020     No results found for: TSH, FREET4    Blood Culture   Date Value Ref Range Status   04/18/2020 Staphylococcus aureus (C)  Final     Comment:       Infectious disease consultation is highly recommended to rule out distant foci of infection.   04/18/2020 Staphylococcus aureus (C)  Final     Comment:       Infectious disease consultation is highly recommended to rule out distant foci of infection.     No results found for: URINECX  Wound Culture   Date Value Ref Range Status   04/17/2020 Moderate growth (3+) Staphylococcus aureus (A)  Final     No results found for: STOOLCX  No  results found for: RESPX  Pain Management Panel     There is no flowsheet data to display.            ----------------------------------------------------------------------------------------------------------------------  Imaging Results (Last 24 Hours)     ** No results found for the last 24 hours. **          ----------------------------------------------------------------------------------------------------------------------    Assessment/Plan       Assessment/Plan     ASSESSMENT:    1.  Sepsis  2.  MSSA bacteremia  3.  Right chest wall port infection with cellulitis    PLAN:    Patient comfortable this morning, no apparent distress. Afebrile, no diarrhea. WBC normal. CRP improving at 2.42. Blood cultures from 4/20/20 reveal NGTD. Patient to have PICC line placed today as she has been afebrile for 48 hours.    Right upper extremity venous duplex ruled out DVT.  X-ray of the chest reports no acute findings.  Wound culture from neck finalized as MSSA.  Blood culture from 4/18/2020 1 out of 2 sets positive for MSSA.     CINTIA on 4/20/20 negative.     Right Port-a-cath removed per Dr. Wang related to MSSA bacteremia.     In the setting of MSSA bacteremia with penicillin and Vantin allergy unable to initiate nafcillin or high-dose cefazolin.      Patient showing high load bacteremia in both aerobic and anaerobic. One-time dose of gentamicin 3 mg/kg was given on 4/20/20.    Blood cultures on 4/20/20 reveal no growth to date thus far. If blood cultures remain negative on 4/23/20 patient can be cleared to have a new Port-a-cath placed to the left side.    Patient has been afebrile for 48 hours, will have PICC line placed today with plans to discharge home on antibiotics.    Recommend to continue daptomycin 8 mg/kg IV every 24 hours to continue through  5/3/20.      Stable from ID standpoint. Plan as outlined. Will sign off.    Code Status:   Code Status and Medical Interventions:   Ordered at: 04/18/20 0532     Level Of  Support Discussed With:    Patient     Code Status:    CPR     Medical Interventions (Level of Support Prior to Arrest):    Full     Scribed for Matt Dye, SHER  04/23/20  13:22     Physician Attestation:    The documentation recorded by the scribe accurately reflects the service I personally performed and the decisions made by me.    Matt Gunn MD  Infectious Diseases  04/23/20  13:22

## 2020-04-23 NOTE — NURSING NOTE
Awaiting PICC line placement at this time. I have tried to call Sandra RN with PICC line and there is no answer at this time. Will attempt to reach her again, as we are only awaiting this PICC line to be placed so that the patient can be discharged home. Dr. Wang requests we call her once the PICC has been placed.

## 2020-04-24 ENCOUNTER — READMISSION MANAGEMENT (OUTPATIENT)
Dept: CALL CENTER | Facility: HOSPITAL | Age: 60
End: 2020-04-24

## 2020-04-24 ENCOUNTER — HOSPITAL ENCOUNTER (OUTPATIENT)
Dept: INFUSION THERAPY | Facility: HOSPITAL | Age: 60
Setting detail: INFUSION SERIES
Discharge: HOME | End: 2020-04-24

## 2020-04-24 VITALS
TEMPERATURE: 98.3 F | HEART RATE: 79 BPM | SYSTOLIC BLOOD PRESSURE: 143 MMHG | RESPIRATION RATE: 18 BRPM | DIASTOLIC BLOOD PRESSURE: 80 MMHG

## 2020-04-24 DIAGNOSIS — L03.90 CELLULITIS, UNSPECIFIED CELLULITIS SITE: ICD-10-CM

## 2020-04-24 PROCEDURE — G0463 HOSPITAL OUTPT CLINIC VISIT: HCPCS

## 2020-04-24 NOTE — OUTREACH NOTE
Prep Survey      Responses   [facility] facility patient discharged from?  [facility]   Is LACE score < 7 ?  No   Eligibility  Readm Mgmt   Discharge diagnosis  Port infection with MSSA bacteremia   COVID-19 Test Status  Not tested   Does the patient have one of the following disease processes/diagnoses(primary or secondary)?  Sepsis   Does the patient have Home health ordered?  No [[facility] to contact patient about IV antibiotic teaching.  PICC line dressing change to be done outpatient with [facility] infusion clinic]   Is there a DME ordered?  No   Medication alerts for this patient  New Medication: Daptomycin IV every 24 hours   Prep survey completed?  Yes          Barb Ta RN

## 2020-04-24 NOTE — PAYOR COMM NOTE
"  Contact: Velma Waite RN @ [facility]  Phone: [phone]  Fax: [phone]    Auth# [id number]  Patient discharged home on 4/23/20       Jackie Johnson (60 y.o. Female)     Date of Birth Social Security Number Address Home Phone MRN    1960  [address] [address] [phone] [id number]    [place] Marital Status          Latter-day        Admission Date Admission Type Admitting Provider Attending Provider Department, Room/Bed    4/18/20 Emergency Nerissa Wang MD  [address], 3335/1P    Discharge Date Discharge Disposition Discharge Destination        4/23/2020 Home or Self Care              Attending Provider:  (none)   Allergies:  Albuterol, Penicillins, Cefpodoxime, Clindamycin Hcl, Crestor [Rosuvastatin Calcium], Sulfa Antibiotics, Pseudoephedrine    Isolation:  None   Infection:  None   Code Status:  Prior    Ht:  162.6 cm (64\")   Wt:  82.6 kg (182 lb)    Admission Cmt:  None   Principal Problem:  None                Active Insurance as of 4/18/2020     Primary Coverage     Payor Plan Insurance Group Employer/Plan Group    [employer] [employer] BL[employer] [id number]     Payor Plan Address Payor Plan Phone Number Payor Plan Fax Number Effective Dates    [address] [phone]  8/1/2017 - None Entered    [place] [address]       Subscriber Name Subscriber Birth Date Member ID       JACKIE JOHNSON 1960 [id number]                 Emergency Contacts      (Rel.) Home Phone Work Phone Mobile Phone    IVET JOHNSON (Spouse) [phone] -- [phone]                "

## 2020-04-24 NOTE — PATIENT INSTRUCTIONS
PICC Home Care Guide    A peripherally inserted central catheter (PICC) is a form of IV access that allows medicines and IV fluids to be quickly distributed throughout the body. The PICC is a long, thin, flexible tube (catheter) that is inserted into a vein in the upper arm. The catheter ends in a large vein in the chest (superior vena cava, or SVC). After the PICC is inserted, a chest X-ray may be done to make sure that it is in the correct place.  A PICC may be placed for different reasons, such as:  · To give medicines and liquid nutrition.  · To give IV fluids and blood products.  · If there is trouble placing a peripheral intravenous (PIV) catheter.  If taken care of properly, a PICC can remain in place for several months. Having a PICC can also allow a person to go home from the hospital sooner. Medicine and PICC care can be managed at home by a family member, caregiver, or home health care team.  What are the risks?  Generally, having a PICC is safe. However, problems may occur, including:  · A blood clot (thrombus) forming in or at the tip of the PICC.  · A blood clot forming in a vein (deep vein thrombosis) or traveling to the lung (pulmonary embolism).  · Inflammation of the vein (phlebitis) in which the PICC is placed.  · Infection. Central line associated blood stream infection (CLABSI) is a serious infection that often requires hospitalization.  · PICC movement (malposition). The PICC tip may move from its original position due to excessive physical activity, forceful coughing, sneezing, or vomiting.  · A break or cut in the PICC. It is important not to use scissors near the PICC.  · Nerve or tendon irritation or injury during PICC insertion.  How to take care of your PICC  Preventing problems  · You and any caregivers should wash your hands often with soap. Wash hands:  ? Before touching the PICC line or the infusion device.  ? Before changing a bandage (dressing).  · Flush the PICC as told by your  health care provider. Let your health care provider know right away if the PICC is hard to flush or does not flush. Do not use force to flush the PICC.  · Do not use a syringe that is less than 10 mL to flush the PICC.  · Avoid blood pressure checks on the arm in which the PICC is placed.  · Never pull or tug on the PICC.  · Do not take the PICC out yourself. Only a trained clinical professional should remove the PICC.  · Use clean and sterile supplies only. Keep the supplies in a dry place. Do not reuse needles, syringes, or any other supplies. Doing that can lead to infection.  · Keep pets and children away from your PICC line.  · Check the PICC insertion site every day for signs of infection. Check for:  ? Leakage.  ? Redness, swelling, or pain.  ? Fluid or blood.  ? Warmth.  ? Pus or a bad smell.  PICC dressing care  · Keep your PICC bandage (dressing) clean and dry to prevent infection.  · Do not take baths, swim, or use a hot tub until your health care provider approves. Ask your health care provider if you can take showers. You may only be allowed to take sponge baths for bathing. When you are allowed to shower:  ? Ask your health care provider to teach you how to wrap the PICC line.  ? Cover the PICC line with clear plastic wrap and tape to keep it dry while showering.  · Follow instructions from your health care provider about how to take care of your insertion site and dressing. Make sure you:  ? Wash your hands with soap and water before you change your bandage (dressing). If soap and water are not available, use hand .  ? Change your dressing as told by your health care provider.  ? Leave stitches (sutures), skin glue, or adhesive strips in place. These skin closures may need to stay in place for 2 weeks or longer. If adhesive strip edges start to loosen and curl up, you may trim the loose edges. Do not remove adhesive strips completely unless your health care provider tells you to do  "that.  · Change your PICC dressing if it becomes loose or wet.  General instructions    · Carry your PICC identification card or wear a medical alert bracelet at all times.  · Keep the tube clamped at all times, unless it is being used.  · Carry a smooth-edge clamp with you at all times to place on the tube if it breaks.  · Do not use scissors or sharp objects near the tube.  · You may bend your arm and move it freely. If your PICC is near or at the bend of your elbow, avoid activity with repeated motion at the elbow.  · Avoid lifting heavy objects as told by your health care provider.  · Keep all follow-up visits as told by your health care provider. This is important.  Disposal of supplies  · Throw away any syringes in a disposal container that is meant for sharp items (sharps container). You can buy a sharps container from a pharmacy, or you can make one by using an empty hard plastic bottle with a cover.  · Place any used dressings or infusion bags into a plastic bag. Throw that bag in the trash.  Contact a health care provider if:  · You have pain in your arm, ear, face, or teeth.  · You have a fever or chills.  · You have redness, swelling, or pain around the insertion site.  · You have fluid or blood coming from the insertion site.  · Your insertion site feels warm to the touch.  · You have pus or a bad smell coming from the insertion site.  · Your skin feels hard and raised around the insertion site.  Get help right away if:  · Your PICC is accidentally pulled all the way out. If this happens, cover the insertion site with a bandage or gauze dressing. Do not throw the PICC away. Your health care provider will need to check it.  · Your PICC was tugged or pulled and has partially come out. Do not  push the PICC back in.  · You cannot flush the PICC, it is hard to flush, or the PICC leaks around the insertion site when it is flushed.  · You hear a \"flushing\" sound when the PICC is flushed.  · You feel your " heart racing or skipping beats.  · There is a hole or tear in the PICC.  · You have swelling in the arm in which the PICC was inserted.  · You have a red streak going up your arm from where the PICC was inserted.  Summary  · A peripherally inserted central catheter (PICC) is a long, thin, flexible tube (catheter) that is inserted into a vein in the upper arm.  · The PICC is inserted using a sterile technique by a specially trained nurse or physician. Only a trained clinical professional should remove it.  · Keep your PICC identification card with you at all times.  · Avoid blood pressure checks on the arm in which the PICC is placed.  · If cared for properly, a PICC can remain in place for several months. Having a PICC can also allow a person to go home from the hospital sooner.  This information is not intended to replace advice given to you by your health care provider. Make sure you discuss any questions you have with your health care provider.  Document Released: 06/23/2004 Document Revised: 01/20/2018 Document Reviewed: 01/20/2018  [employer]vier Interactive Patient Education © 2020 [employer]

## 2020-04-25 LAB
BACTERIA SPEC AEROBE CULT: NORMAL
BACTERIA SPEC AEROBE CULT: NORMAL

## 2020-04-27 ENCOUNTER — READMISSION MANAGEMENT (OUTPATIENT)
Dept: CALL CENTER | Facility: HOSPITAL | Age: 60
End: 2020-04-27

## 2020-04-27 ENCOUNTER — TELEPHONE (OUTPATIENT)
Dept: ONCOLOGY | Facility: CLINIC | Age: 60
End: 2020-04-27

## 2020-04-27 NOTE — OUTREACH NOTE
Sepsis Week 1 Survey      Responses   [facility] patient discharged from?  [facility]   COVID-19 Test Status  Not tested   Does the patient have one of the following disease processes/diagnoses(primary or secondary)?  Sepsis   Is there a successful TCM telephone encounter documented?  No   Week 1 attempt successful?  Yes   Call start time  1225   Call end time  1228   Discharge diagnosis  Port infection with MSSA bacteremia   Meds reviewed with patient/caregiver?  Yes   Is the patient having any side effects they believe may be caused by any medication additions or changes?  No   Does the patient have all medications related to this admission filled (includes all antibiotics, inhalers, nebulizers,steroids,etc.)  Yes   Is the patient taking all medications as directed (includes completed medication regime)?  Yes   Does the patient have a primary care provider?   Yes   Does the patient have an appointment with their PCP within 7 days of discharge?  Yes   Has the patient kept scheduled appointments due by today?  Yes   Has home health visited the patient within 72 hours of discharge?  N/A   Psychosocial issues?  No   Did the patient receive a copy of their discharge instructions?  Yes   Nursing interventions  Reviewed instructions with patient   What is the patient's perception of their health status since discharge?  Improving   Nursing interventions  Nurse provided patient education   Is the patient/caregiver able to teach back Sepsis?  S - Shivering,fever or very cold, E - Extreme pain or generalized discomfort (worst ever,especially abdomen), P - Pale or discolored skin, S - Sleepy, difficult to arouse,confused   Nursing interventions  Nurse provided reassurance to patient, Nurse provided patient education   Is patient/caregiver able to teach back steps to recovery at home?  Set small, achievable goals for return to baseline health, Rest and regain strength   Is the patient/caregiver able to teach back signs and  symptoms of worsening condition:  Fever   Is the patient/caregiver able to teach back the hierarchy of who to call/visit for symptoms/problems? PCP, Specialist, Home health nurse, Urgent Care, ED, 911  Yes   Week 1 call completed?  Yes          Artie Dietrich RN

## 2020-04-30 ENCOUNTER — APPOINTMENT (OUTPATIENT)
Dept: ONCOLOGY | Facility: HOSPITAL | Age: 60
End: 2020-04-30

## 2020-04-30 DIAGNOSIS — C50.911 MALIGNANT NEOPLASM OF RIGHT BREAST IN FEMALE, ESTROGEN RECEPTOR NEGATIVE, UNSPECIFIED SITE OF BREAST (HCC): ICD-10-CM

## 2020-04-30 DIAGNOSIS — Z17.1 MALIGNANT NEOPLASM OF RIGHT BREAST IN FEMALE, ESTROGEN RECEPTOR NEGATIVE, UNSPECIFIED SITE OF BREAST (HCC): ICD-10-CM

## 2020-04-30 RX ORDER — FAMOTIDINE 10 MG/ML
20 INJECTION, SOLUTION INTRAVENOUS AS NEEDED
Status: CANCELLED | OUTPATIENT
Start: 2020-05-07

## 2020-04-30 RX ORDER — DIPHENHYDRAMINE HYDROCHLORIDE 50 MG/ML
50 INJECTION INTRAMUSCULAR; INTRAVENOUS AS NEEDED
Status: CANCELLED | OUTPATIENT
Start: 2020-05-07

## 2020-04-30 RX ORDER — SODIUM CHLORIDE 9 MG/ML
250 INJECTION, SOLUTION INTRAVENOUS ONCE
Status: CANCELLED | OUTPATIENT
Start: 2020-05-07

## 2020-04-30 RX ORDER — PALONOSETRON 0.05 MG/ML
0.25 INJECTION, SOLUTION INTRAVENOUS ONCE
Status: CANCELLED | OUTPATIENT
Start: 2020-05-07

## 2020-05-01 ENCOUNTER — HOSPITAL ENCOUNTER (OUTPATIENT)
Dept: INFUSION THERAPY | Facility: HOSPITAL | Age: 60
Setting detail: INFUSION SERIES
Discharge: HOME | End: 2020-05-01

## 2020-05-01 DIAGNOSIS — C50.911 MALIGNANT NEOPLASM OF RIGHT BREAST IN FEMALE, ESTROGEN RECEPTOR NEGATIVE, UNSPECIFIED SITE OF BREAST (HCC): ICD-10-CM

## 2020-05-01 DIAGNOSIS — Z17.1 MALIGNANT NEOPLASM OF RIGHT BREAST IN FEMALE, ESTROGEN RECEPTOR NEGATIVE, UNSPECIFIED SITE OF BREAST (HCC): ICD-10-CM

## 2020-05-01 PROCEDURE — G0463 HOSPITAL OUTPT CLINIC VISIT: HCPCS

## 2020-05-04 ENCOUNTER — READMISSION MANAGEMENT (OUTPATIENT)
Dept: CALL CENTER | Facility: HOSPITAL | Age: 60
End: 2020-05-04

## 2020-05-04 NOTE — OUTREACH NOTE
Sepsis Week 2 Survey      Responses   [facility] patient discharged from?  [facility]   COVID-19 Test Status  Not tested   Does the patient have one of the following disease processes/diagnoses(primary or secondary)?  Sepsis   Week 2 attempt successful?  Yes   Call start time  1007   Call end time  1018   Discharge diagnosis  Port infection with MSSA bacteremia   Meds reviewed with patient/caregiver?  Yes   Is the patient taking all medications as directed (includes completed medication regime)?  Yes   Medication comments  Last dose of Dapto yesterday but I have urged her to call Oncology to confirm and have them discuss with [facility]--she has not seen anyone from ID since d/c   Has the patient kept scheduled appointments due by today?  Yes   Comments  Infusion center for PICC line dressing changes   Pulse Ox monitoring  None   What is the patient's perception of their health status since discharge?  Improving   Nursing interventions  Nurse provided patient education, Advised patient to call provider   Is patient/caregiver able to teach back steps to recovery at home?  Set small, achievable goals for return to baseline health   Is the patient/caregiver able to teach back signs and symptoms of worsening condition:  Fever, Rapid heart rate (>90), Altered mental status(confusion/coma)   Is the patient/caregiver able to teach back the hierarchy of who to call/visit for symptoms/problems? PCP, Specialist, Home health nurse, Urgent Care, ED, 911  Yes   Additional teach back comments  Enc pt to f/u with oncology. Neck looks much better ans she will start chemo via her PICC planned for Thursday   Week 2 call completed?  Yes          Jen Lane RN

## 2020-05-06 ENCOUNTER — APPOINTMENT (OUTPATIENT)
Dept: MAMMOGRAPHY | Facility: HOSPITAL | Age: 60
End: 2020-05-06

## 2020-05-06 ENCOUNTER — TELEPHONE (OUTPATIENT)
Dept: ONCOLOGY | Facility: HOSPITAL | Age: 60
End: 2020-05-06

## 2020-05-06 ENCOUNTER — APPOINTMENT (OUTPATIENT)
Dept: ULTRASOUND IMAGING | Facility: HOSPITAL | Age: 60
End: 2020-05-06

## 2020-05-07 ENCOUNTER — TELEPHONE (OUTPATIENT)
Dept: SURGERY | Facility: CLINIC | Age: 60
End: 2020-05-07

## 2020-05-07 ENCOUNTER — INFUSION (OUTPATIENT)
Dept: ONCOLOGY | Facility: HOSPITAL | Age: 60
End: 2020-05-07

## 2020-05-07 ENCOUNTER — OFFICE VISIT (OUTPATIENT)
Dept: ONCOLOGY | Facility: CLINIC | Age: 60
End: 2020-05-07

## 2020-05-07 VITALS
SYSTOLIC BLOOD PRESSURE: 120 MMHG | WEIGHT: 176.9 LBS | TEMPERATURE: 96.6 F | TEMPERATURE: 96.6 F | RESPIRATION RATE: 18 BRPM | DIASTOLIC BLOOD PRESSURE: 80 MMHG | WEIGHT: 176.9 LBS | OXYGEN SATURATION: 98 % | OXYGEN SATURATION: 98 % | HEART RATE: 86 BPM | HEART RATE: 86 BPM | BODY MASS INDEX: 30.36 KG/M2 | RESPIRATION RATE: 18 BRPM | BODY MASS INDEX: 30.36 KG/M2 | SYSTOLIC BLOOD PRESSURE: 120 MMHG | DIASTOLIC BLOOD PRESSURE: 80 MMHG

## 2020-05-07 DIAGNOSIS — C50.911 MALIGNANT NEOPLASM OF RIGHT BREAST IN FEMALE, ESTROGEN RECEPTOR NEGATIVE, UNSPECIFIED SITE OF BREAST (HCC): Primary | ICD-10-CM

## 2020-05-07 DIAGNOSIS — Z17.1 MALIGNANT NEOPLASM OF RIGHT BREAST IN FEMALE, ESTROGEN RECEPTOR NEGATIVE, UNSPECIFIED SITE OF BREAST (HCC): Primary | ICD-10-CM

## 2020-05-07 DIAGNOSIS — R92.8 ABNORMAL MAGNETIC RESONANCE IMAGING OF LEFT BREAST: ICD-10-CM

## 2020-05-07 DIAGNOSIS — T80.211D BLOODSTREAM INFECTION DUE TO PORT-A-CATH, SUBSEQUENT ENCOUNTER: ICD-10-CM

## 2020-05-07 LAB
ALBUMIN SERPL-MCNC: 4.21 G/DL (ref 3.5–5.2)
ALBUMIN/GLOB SERPL: 1.6 G/DL
ALP SERPL-CCNC: 78 U/L (ref 39–117)
ALT SERPL W P-5'-P-CCNC: 26 U/L (ref 1–33)
ANION GAP SERPL CALCULATED.3IONS-SCNC: 10.5 MMOL/L (ref 5–15)
AST SERPL-CCNC: 16 U/L (ref 1–32)
BASOPHILS # BLD AUTO: 0.13 10*3/MM3 (ref 0–0.2)
BASOPHILS NFR BLD AUTO: 2.7 % (ref 0–1.5)
BILIRUB SERPL-MCNC: 0.3 MG/DL (ref 0.2–1.2)
BUN BLD-MCNC: 16 MG/DL (ref 8–23)
BUN/CREAT SERPL: 21.3 (ref 7–25)
CALCIUM SPEC-SCNC: 9.7 MG/DL (ref 8.6–10.5)
CHLORIDE SERPL-SCNC: 102 MMOL/L (ref 98–107)
CO2 SERPL-SCNC: 27.5 MMOL/L (ref 22–29)
CREAT BLD-MCNC: 0.75 MG/DL (ref 0.57–1)
DEPRECATED RDW RBC AUTO: 50.4 FL (ref 37–54)
EOSINOPHIL # BLD AUTO: 0.18 10*3/MM3 (ref 0–0.4)
EOSINOPHIL NFR BLD AUTO: 3.7 % (ref 0.3–6.2)
ERYTHROCYTE [DISTWIDTH] IN BLOOD BY AUTOMATED COUNT: 14.6 % (ref 12.3–15.4)
GFR SERPL CREATININE-BSD FRML MDRD: 79 ML/MIN/1.73
GLOBULIN UR ELPH-MCNC: 2.6 GM/DL
GLUCOSE BLD-MCNC: 101 MG/DL (ref 65–99)
HCT VFR BLD AUTO: 36.8 % (ref 34–46.6)
HGB BLD-MCNC: 11.7 G/DL (ref 12–15.9)
IMM GRANULOCYTES # BLD AUTO: 0.01 10*3/MM3 (ref 0–0.05)
IMM GRANULOCYTES NFR BLD AUTO: 0.2 % (ref 0–0.5)
LYMPHOCYTES # BLD AUTO: 1.43 10*3/MM3 (ref 0.7–3.1)
LYMPHOCYTES NFR BLD AUTO: 29.2 % (ref 19.6–45.3)
MCH RBC QN AUTO: 30.2 PG (ref 26.6–33)
MCHC RBC AUTO-ENTMCNC: 31.8 G/DL (ref 31.5–35.7)
MCV RBC AUTO: 94.8 FL (ref 79–97)
MONOCYTES # BLD AUTO: 0.29 10*3/MM3 (ref 0.1–0.9)
MONOCYTES NFR BLD AUTO: 5.9 % (ref 5–12)
NEUTROPHILS # BLD AUTO: 2.85 10*3/MM3 (ref 1.7–7)
NEUTROPHILS NFR BLD AUTO: 58.3 % (ref 42.7–76)
NRBC BLD AUTO-RTO: 0 /100 WBC (ref 0–0.2)
PLATELET # BLD AUTO: 278 10*3/MM3 (ref 140–450)
PMV BLD AUTO: 9.1 FL (ref 6–12)
POTASSIUM BLD-SCNC: 4.3 MMOL/L (ref 3.5–5.2)
PROT SERPL-MCNC: 6.8 G/DL (ref 6–8.5)
RBC # BLD AUTO: 3.88 10*6/MM3 (ref 3.77–5.28)
SODIUM BLD-SCNC: 140 MMOL/L (ref 136–145)
WBC NRBC COR # BLD: 4.89 10*3/MM3 (ref 3.4–10.8)

## 2020-05-07 PROCEDURE — 25010000002 PERTUZUMAB 420 MG/14ML SOLUTION 420 MG VIAL: Performed by: INTERNAL MEDICINE

## 2020-05-07 PROCEDURE — 96417 CHEMO IV INFUS EACH ADDL SEQ: CPT

## 2020-05-07 PROCEDURE — 96367 TX/PROPH/DG ADDL SEQ IV INF: CPT

## 2020-05-07 PROCEDURE — 96377 APPLICATON ON-BODY INJECTOR: CPT

## 2020-05-07 PROCEDURE — 25010000002 PEGFILGRASTIM 6 MG/0.6ML PREFILLED SYRINGE KIT: Performed by: INTERNAL MEDICINE

## 2020-05-07 PROCEDURE — 25010000002 FOSAPREPITANT PER 1 MG: Performed by: INTERNAL MEDICINE

## 2020-05-07 PROCEDURE — 80053 COMPREHEN METABOLIC PANEL: CPT

## 2020-05-07 PROCEDURE — 25010000002 CARBOPLATIN PER 50 MG: Performed by: INTERNAL MEDICINE

## 2020-05-07 PROCEDURE — 25010000002 PALONOSETRON PER 25 MCG: Performed by: INTERNAL MEDICINE

## 2020-05-07 PROCEDURE — 25010000002 DOCETAXEL 20 MG/ML SOLUTION 4 ML VIAL: Performed by: INTERNAL MEDICINE

## 2020-05-07 PROCEDURE — 96413 CHEMO IV INFUSION 1 HR: CPT

## 2020-05-07 PROCEDURE — 96375 TX/PRO/DX INJ NEW DRUG ADDON: CPT

## 2020-05-07 PROCEDURE — 25010000002 DEXAMETHASONE SODIUM PHOSPHATE 20 MG/5ML SOLUTION: Performed by: INTERNAL MEDICINE

## 2020-05-07 PROCEDURE — 99214 OFFICE O/P EST MOD 30 MIN: CPT | Performed by: INTERNAL MEDICINE

## 2020-05-07 PROCEDURE — 85025 COMPLETE CBC W/AUTO DIFF WBC: CPT

## 2020-05-07 PROCEDURE — 25010000002 TRASTUZUMAB-ANNS 420 MG RECONSTITUTED SOLUTION 1 EACH VIAL: Performed by: INTERNAL MEDICINE

## 2020-05-07 RX ORDER — SODIUM CHLORIDE 9 MG/ML
250 INJECTION, SOLUTION INTRAVENOUS ONCE
Status: COMPLETED | OUTPATIENT
Start: 2020-05-07 | End: 2020-05-07

## 2020-05-07 RX ORDER — PALONOSETRON 0.05 MG/ML
0.25 INJECTION, SOLUTION INTRAVENOUS ONCE
Status: COMPLETED | OUTPATIENT
Start: 2020-05-07 | End: 2020-05-07

## 2020-05-07 RX ADMIN — PERTUZUMAB 420 MG: 30 INJECTION, SOLUTION, CONCENTRATE INTRAVENOUS at 11:38

## 2020-05-07 RX ADMIN — SODIUM CHLORIDE 250 ML: 9 INJECTION, SOLUTION INTRAVENOUS at 10:30

## 2020-05-07 RX ADMIN — DOCETAXEL 140 MG: 20 INJECTION, SOLUTION, CONCENTRATE INTRAVENOUS at 13:30

## 2020-05-07 RX ADMIN — PEGFILGRASTIM 6 MG: KIT SUBCUTANEOUS at 15:42

## 2020-05-07 RX ADMIN — TRASTUZUMAB-ANNS 480 MG: 420 INJECTION, POWDER, LYOPHILIZED, FOR SOLUTION INTRAVENOUS at 12:50

## 2020-05-07 RX ADMIN — PALONOSETRON HYDROCHLORIDE 0.25 MG: 0.25 INJECTION INTRAVENOUS at 10:40

## 2020-05-07 RX ADMIN — CARBOPLATIN 640 MG: 10 INJECTION, SOLUTION INTRAVENOUS at 14:37

## 2020-05-07 RX ADMIN — SODIUM CHLORIDE 150 MG: 9 INJECTION, SOLUTION INTRAVENOUS at 10:52

## 2020-05-07 RX ADMIN — DEXAMETHASONE SODIUM PHOSPHATE 12 MG: 4 INJECTION, SOLUTION INTRAMUSCULAR; INTRAVENOUS at 10:37

## 2020-05-07 NOTE — TELEPHONE ENCOUNTER
Called Miss Mejia to choose a surgery date for port placement. Dr. Wang had seen her in the hospital and she will not need a office visit. She choose May 27th and I told Dr. Wang.

## 2020-05-07 NOTE — PROGRESS NOTES
NAME: Jackie Johnson    : 1960    DATE:  2020    DIAGNOSIS:   Clinical Stage IIB ([id number]) poorly differentiated invasive ductal carcinoma of the right breast.  Tumor spans 15 cm.  There are no clinically or radiographically supspicious axillary LNs.  There is associated high grade DCIS.  Tumor is ER-,NY-, HER-2 +  There is also an abnormal finding on L breast MRI which appears suspicious (BIRADS-5).  The abnormality was no longer present after C1 chemotherapy.    CHIEF COMPLAINT:  Follow up of Breast Cancer    TREATMENT HISTORY:  1.          HISTORY OF PRESENT ILLNESS:   Jakcie Johnson is a very pleasant 60 y.o. female who was referred by Dr. STACIE Richards for evaluation and treatment of breast cancer. She has a strong family h/o breast cancer and so is normally very good about getting her mammograms done.  She recently moved from [facility] however and so missed her mammogram in  because of the move.  She got established with a new PCP (Dr. Denver Sheth) in 2019 and was referred to a a new gynecologist  (Dr. Brayan Richards) who she saw in .  Kevin Richards ordered a screening mammogram.  She had an acute febrile illness around the time of the Super Bowl and at that time thought she might have a lump in her R breast.  While waiting on mammogram scheduling, she started to have pain in the breast and in eventually she would occasionally get a little bit of discharge from the right nipple.  She presented for  Screening mammography 19 as below.  This was followed by diagnostic mammogram and U/S on 20.  She had an abnormal span of 15 cm spanning the R breast.  She underwent stereotactic biopsy on 20.    This revealed a poorly differentiated invasive ductal carcinoma of the R breast with associated high grade DCIS.  Tumor was ER/NY- and HER-2+ by IHC.  Her sister lives in [place] and was treated at [facility], so she went there for further  evaluation. She saw Dr. Romero of medical oncology and Dr. Serrano of surgical oncology.  She had staging with CT CAP and bone scan as well as Brain MRI and had no evidence of distant metastatic spread.  She had reese breast MRI as well which showed an unexpected abnormality in the L breast described as linear non-mass enhancement spanning an 8 cm area in the inferior breast at 6:00.  This was biopsied on 20 with biopsy result pending.  No abnormal axillary LNs were identified.  Neoadjuvant chemotherapy was recommended to her by her [facility] team of physicians but she wishes to seek this treatment closer to home so is here today for further evaluation and treatment.    She is in good health overall. She reports an isolated episode of fever/chills during an acute illness, abdominal fullness to her right side, fatigue and a rash circling her neck. She denies changes in weight, cp, sob, persistent abdominal pain, n/v/c/d or bony pain.      INTERVAL HISTORY:  Ms. Johnson is here today for follow up of breast cancer and C3 of [facility] after being held for port infection. Previous port site is healed well. PICC is in place in [facility]. She reports having sores in her nose the week following treatment. Otherwise she has healed well from the infection and is ready to resume treatment.   Given COVID pandemic, she has decided to do interim restaging here rather than at [facility].    PAST MEDICAL HISTORY:  Past Medical History:   Diagnosis Date   • Acid reflux    • Asthma    • Cancer (CMS/HCC)    • Eczema    • Endometriosis    • Hiatal hernia    • High cholesterol    • Seasonal allergies        PAST SURGICAL HISTORY:  Past Surgical History:   Procedure Laterality Date   • ABDOMINAL SURGERY     • BREAST BIOPSY Bilateral    •  SECTION     • D&C HYSTEROSCOPY ENDOMETRIAL ABLATION     • DIAGNOSTIC LAPAROSCOPY     • FRACTURE SURGERY Left     tib/fib   • LEG SURGERY      Left leg following fracture   • PORTACATH PLACEMENT N/A  3/18/2020    Procedure: INSERTION OF PORTACATH;  Surgeon: Dudley Aldridge MD;  Location:  [facility] OR;  Service: General;  Laterality: N/A;   • SKIN BIOPSY     • TONSILLECTOMY     • VENOUS ACCESS DEVICE (PORT) REMOVAL Right 4/19/2020    Procedure: REMOVAL VENOUS ACCESS DEVICE;  Surgeon: Nerissa Wang MD;  Location:  [facility] OR;  Service: General;  Laterality: Right;       FAMILY HISTORY:  Family History   Problem Relation Age of Onset   • Breast cancer Mother 75   • Basal cell carcinoma Mother 90        2 small spots on face removed + fair complexion   • Breast cancer Sister 55        VUS detected on genetic testing CDKN2A gene (c.-33G>C).    • Heart disease Father    • Throat cancer Father 79   • Lung cancer Father 79   • Cancer Maternal Grandmother 65        unknown gynecologic cancer, cervical vs uterine   • Lymphoma Maternal Grandfather 64   • Breast cancer Paternal Grandmother 70   • Prostate cancer Paternal Grandfather 70        metastastic to liver   • Breast cancer Paternal Aunt 77   • Breast cancer Maternal Aunt 80   • Breast cancer Other    • Ovarian cancer Sister 66        Elevated ; 1A tumor; Borderline cancer; both ovaries & lg cyst removed   • Ovarian cancer Maternal Cousin 54   • Breast cancer Other 50         SOCIAL HISTORY:  Social History     Socioeconomic History   • Marital status:      Spouse name: Not on file   • Number of children: Not on file   • Years of education: Not on file   • Highest education level: Not on file   Tobacco Use   • Smoking status: Never Smoker   • Smokeless tobacco: Never Used   Substance and Sexual Activity   • Alcohol use: Never     Frequency: Never   • Drug use: Never   • Sexual activity: Defer     Partners: Male     Birth control/protection: None   Social History Narrative    lives alone with      with [facility]    No unusual chemical exposures    Never smoker, never drinker       REVIEW OF SYSTEMS:   A comprehensive 14  point review of systems was performed.  Significant findings as mentioned above.  All other systems reviewed and are negative.      MEDICATIONS:  The current medication list was reviewed in the EMR    Current Outpatient Medications:   •  acetaminophen (TYLENOL) 325 MG tablet, Take 650 mg by mouth Every 6 (Six) Hours As Needed for Mild Pain ., Disp: , Rfl:   •  calcium carbonate (TUMS) 500 MG chewable tablet, Chew 1 tablet Daily As Needed for Indigestion or Heartburn., Disp: , Rfl:   •  famotidine (PEPCID) 10 MG tablet, Take 10 mg by mouth As Needed for Indigestion or Heartburn., Disp: , Rfl:   •  levalbuterol (XOPENEX HFA) 45 MCG/ACT inhaler, Inhale 1-2 puffs Every 4 (Four) Hours As Needed for Wheezing., Disp: , Rfl:   •  loratadine (CLARITIN) 10 MG tablet, Take 10 mg by mouth 2 (Two) Times a Day., Disp: , Rfl:   •  ondansetron (ZOFRAN) 8 MG tablet, Take 1 tablet by mouth 3 (Three) Times a Day As Needed for Nausea or Vomiting., Disp: 30 tablet, Rfl: 5  •  prochlorperazine (COMPAZINE) 10 MG tablet, Take 1 tablet by mouth Every 6 (Six) Hours As Needed for Nausea or Vomiting., Disp: 60 tablet, Rfl: 3  •  sennosides-docusate (Senna-S) 8.6-50 MG per tablet, Take 2 tablets by mouth 2 (Two) Times a Day., Disp: 120 tablet, Rfl: 5  No current facility-administered medications for this visit.     Facility-Administered Medications Ordered in Other Visits:   •  CARBOplatin (PARAPLATIN) 640 mg in sodium chloride 0.9 % 314 mL chemo IVPB, 640 mg, Intravenous, Once, Fatoumata Obrien MD  •  dexamethasone (DECADRON) IVPB 12 mg, 12 mg, Intravenous, Once, Fatoumata Obrien MD  •  DOCEtaxel 140 mg in sodium chloride 0.9 % 257 mL chemo IVPB, 75 mg/m2 (Treatment Plan Recorded), Intravenous, Once, Fatoumata Obrien MD  •  fosaprepitant (EMEND) 150 mg/100mL NS, 150 mg, Intravenous, Once, Fatoumata Obrien MD  •  palonosetron (ALOXI) injection 0.25 mg, 0.25 mg, Intravenous, Once, Fatoumata Obrien MD  •  pegfilgrastim (NEULASTA ONPRO)  on-body injector 6 mg, 6 mg, Subcutaneous, Once, Fatoumata Obrien MD  •  pertuzumab (PERJETA) 420 mg in sodium chloride 0.9 % 264 mL chemo IVPB, 420 mg, Intravenous, Once, Fatoumata Obrien MD  •  sodium chloride 0.9 % infusion 250 mL, 250 mL, Intravenous, Once, Fatoumata Obrien MD  •  trastuzumab-anns (KANJINTI) 500 mg in sodium chloride 0.9 % 250 mL chemo IVPB, 6 mg/kg (Treatment Plan Recorded), Intravenous, Once, Fatoumata Obrien MD    ALLERGIES:    Allergies   Allergen Reactions   • Albuterol Other (See Comments)     One time severe headache, questionable for aneurysm, did spinal tap was positive, did Angiogram and MRI were Negative for aneurysm   • Penicillins Hives   • Cefpodoxime Rash     vantin     • Clindamycin Hcl Rash   • Crestor [Rosuvastatin Calcium] Other (See Comments)     Caused high liver enxymes   • Sulfa Antibiotics Other (See Comments)     Mucosal lesions   • Pseudoephedrine Other (See Comments)     Soreness on the tongue       PHYSICAL EXAM:  Vitals:    05/07/20 0844   BP: 120/80   Pulse: 86   Resp: 18   Temp: 96.6 °F (35.9 °C)   SpO2: 98%     Pain Score    05/07/20 0844   PainSc: 0-No pain     General:  Awake, alert and oriented, appears well.  HEENT:  Pupils are equal, round and reactive to light and accommodation, Extra-ocular movements full, Oropharyx clear, mucous membranes moist  Neck:  No JVD, thyromegaly or lymphadenopathy, prior PAC associated cellulitis completely resolved.  CV:  Regular rate and rhythm, no murmurs, rubs or gallops  Resp:  Lungs are clear to auscultation bilaterally.  PAC site c/d/i.  Breast:  She is large breasted.  L breast has some fibrocystic changes but no palpable mass lesions. No enlarged L axillary LNs.  R breast is large.  Surprisingly there has never really been a clearly defined mass on exam and this continues to be there case.  No nipple d/c or inversion.  No palpable R axillary LNs.  Abd:  Soft, non-tender, sl distended, bowel sounds present, no  organomegaly or masses  Ext:  No clubbing, cyanosis or edema.  PICC in place in LUE, c/d/i  Lymph:  No cervical, supraclavicular, axillary, inguinal or femoral adenopathy  Neuro:  MS as above, CN II-XII intact, grossly non-focal exam      PATHOLOGY:  02-12-20            ENDOSCOPY:        IMAGING:  Bilateral Diagnostic mammogram 12-27-19  FINDINGS:  There are scattered areas of fibroglandular density.     The bilateral fibroglandular pattern is unremarkable in appearance. A  few scattered calcifications are present in the left breast. There are  calcifications in the central aspect of the right breast spanning  approximately 15 cm of tissue in the AP dimension and extending up to  the base of the nipple for which additional imaging is recommended.     IMPRESSION:  1. Benign left mammographic findings.  2. Calcifications in the right breast. Recommend additional imaging.        BI-RADS CATEGORY:  0, INCOMPLETE: Need additional imaging evaluation.     RECOMMENDATION:  Right ML and CC magnification views.      R diagnostic mammogram 01-16-20  FINDINGS:  Magnification imaging of the right breast demonstrates  casting-type pleomorphic calcifications spanning over 15 cm of tissue in  the AP dimension in the right 6:00 to 7:00 region. Calcifications do  extend up to the base of the nipple. Findings are highly suspicious for  DCIS.     Right breast ultrasound: Focused sonographic evaluation of the right  6:00 to 7:00 region demonstrates a single 0.5 cm irregular hypoechoic  mass associated with a coarse calcification located at 7:00, 3 cm from  the nipple. Ultrasound of the right axilla demonstrates no abnormal  appearing axillary lymph nodes.        IMPRESSION:  Findings are highly suspicious for extensive DCIS in the  right 6:00 to 7:00 region extending into the base of the nipple. There  is also small irregular mass noted on ultrasound in the 7:00 region  suspicious for invasion.        BI-RADS CATEGORY:  5, HIGHLY  SUGGESTIVE OF MALIGNANCY        RECOMMENDATION:  Recommend stereotactic guided core biopsy of the  anterior and posterior aspect of the calcifications in the 6:00 to 7:00  region to determine extent of disease. Ultrasound-guided core biopsy of  the right 7:00 mass may also be warranted pending pathology results of  the calcifications.    Diagnostic bilateral mammogram 02-25-20  FINDINGS:  There are scattered areas of fibroglandular density.    1:  There are fine-linear branching calcifications measuring 16 x 7 x 7  centimeters with segmental distribution and associated post biopsy clip in the  right breast lower hemisphere at 6 to 7 o'clock.  There are 2 post biopsy clips  noted.  The calcifications extend to the inferior skin and nipple.    2:  There are amorphous calcifications measuring 0.3 centimeters in the left  breast lower hemisphere at 6 to 7 o'clock located 4 centimeters from the nipple.    IMPRESSION:  1:  Fine-linear branching calcifications in the right breast lower hemisphere at  6 to 7 o'clock are known biopsy-proven malignancy. Ultrasound is recommended for  staging. Recommend appropriate evaluation and treatment of this known malignancy.    2:  Amorphous calcifications in the left breast lower hemisphere at 6 to 7  o'clock located 4 centimeters from the nipple are suspicious. Stereotactic  biopsy is recommended.    BI-RADS Category 4:  Suspicious Abnormality      US Chest 02-25-20  Findings:       Right Breast: The extent of calcified disease is best visualized by mammography. There are dilated ducts with internal calcifications vascularity extending toward the nipple at the 7 o'clock position. This is consistent with the patient's known biopsy-proven malignancy.      Right Charles Basins: No suspicious axillary (level I, II & III) or internal mammary lymphadenopathy is noted.      IMPRESSION:    1. Known RIGHT breast malignancy is best visualized by mammography. A MRI may be obtained for evaluation  of disease.    2. No suspicious RIGHT-sided lymphadenopathy      ACR BI-RADS Category: 6. Known malignancy.  Recommend appropriate evaluation and treatment of the known malignancy.         R breast US 02-25-20  Findings:       Right Breast: The extent of calcified disease is best visualized by mammography. There are dilated ducts with internal calcifications vascularity extending toward the nipple at the 7 o'clock position. This is consistent with the patient's known biopsy-proven malignancy.      Right Charles Basins: No suspicious axillary (level I, II & III) or internal mammary lymphadenopathy is noted.      IMPRESSION:    1. Known RIGHT breast malignancy is best visualized by mammography. A MRI may be obtained for evaluation of disease.    2. No suspicious RIGHT-sided lymphadenopathy      ACR BI-RADS Category: 6. Known malignancy.  Recommend appropriate evaluation and treatment of the known malignancy.       MRI Brain w/wo contrast 02-27-20  FINDINGS:     No abnormal parenchymal or leptomeningeal enhancement is noted. The brain parenchyma is unremarkable except of small scattered T2 FLAIR hyperintensities consistent with chronic microangiopathic changes. No acute ischemia, intra or extra-axial hemorrhage, mass effect or midline shift. No hydrocephalus is noted.    The osseous structures and extra-cranial soft tissues are unremarkable.    The orbital structures unremarkable.    The paranasal sinuses and mastoid air cells are clear.    IMPRESSION:  No evidence of metastatic disease.      CTCAP 02-28-20    Findings:       CHEST:  The breasts are ptotic and there are biopsy clips projecting between the lower quadrants of the right breast.    On image 99 of series 3, there is a nonspecific 1 cm soft tissue nodule in the lower outer quadrant of the right breast.    No lung nodules or pleural effusions are present.    No axillary, internal mammary, mediastinal or hilar adenopathy is seen.    No suspicious bone lesions  are present.      ABDOMEN and PELVIS:  The liver is borderline enlarged and diffusely fatty infiltrated without measurable mass or biliary ductal dilatation.    The gallbladder appears normal.    The spleen, pancreas and adrenal glands appear normal.    The kidneys demonstrate function without hydronephrosis and there is a small low dense nidus in the mid right kidney, likely a cyst.    No adenopathy or ascites are seen in the abdomen or pelvis.    A normal-appearing retrocecal appendix is seen.    Multilevel degenerative-like bone changes are present.      IMPRESSION:  No evidence for metastatic disease the chest, abdomen or pelvis.      MRI Breast bilateral 03-02-20    Findings:  The breast composition is heterogeneous fibroglandular tissue. There is no significant early background parenchymal enhancement.    Right breast: There is linearly oriented nonmass enhancement involving the lateral and inferior aspects (anterior, middle, and posterior third) of the right breast 6 - 7 o'clock position extending 17 cm from the base of the nipple to the pectoralis muscle (series 5 image  of 256). There is no evidence for pectoralis muscle invasion. Extension to the inferior skin is better demonstrated mammographically. Susceptibility artifact from 2 postbiopsy clips is noted.    Left breast: There is linearly oriented nonmass enhancement in the inferior aspect (middle third) of the left breast 6 o'clock position extending approximately 8 cm (series 5 image  of 256). There is no evidence for nipple, skin, or pectoralis muscle involvement. Stereotactic guided biopsy of calcifications at the 6 o'clock position to be performed later today and corresponds to the anterior aspect of the nonmass enhancement. MRI guided biopsy of nonmass enhancement seen on image 94 of 256, series 5 and image 40 of 160, series 8 is recommended (The images have been annotated with a [place]).    Charles Basins: There is no lymphadenopathy in  the visualized axillary or internal mammary regions.    IMPRESSION:  Linear nonmass enhancement representing known malignancy right breast as above. Linear nonmass enhancement left breast as above.   MRI guided biopsy of nonmass enhancement seen on image 94 of 256, series 5 and image 40 of 160, series 8 is recommended.    ACR BI-RADS Category: 6. Recommend MR-guided biopsy of nonmass enhancement left breast as above .Recommend appropriate evaluation and treatment of the known malignancy.       Bone scan 03-02-20  FINDINGS:     Radiotracer uptake secondary to degenerative changes are seen involving the shoulders, hips, knees, and feet. Increased uptake along the right tibial shaft likely is likely reactive. Faint focus of activity within the left tibial shaft is compatible with prior injury/fracture. Increased uptake in the skull is compatible with a benign hyperostosis.    Physiologic uptake of the bilateral kidneys and bladder.    IMPRESSION:    No scintigraphic evidence of skeletal metastases.      Echo 03-02-20        RECENT LABS:  Lab Results   Component Value Date    WBC 4.89 05/07/2020    HGB 11.7 (L) 05/07/2020    HCT 36.8 05/07/2020    MCV 94.8 05/07/2020    RDW 14.6 05/07/2020     05/07/2020    NEUTRORELPCT 58.3 05/07/2020    LYMPHORELPCT 29.2 05/07/2020    MONORELPCT 5.9 05/07/2020    EOSRELPCT 3.7 05/07/2020    BASORELPCT 2.7 (H) 05/07/2020    NEUTROABS 2.85 05/07/2020    LYMPHSABS 1.43 05/07/2020       Lab Results   Component Value Date     05/07/2020    K 4.3 05/07/2020    CO2 27.5 05/07/2020     05/07/2020    BUN 16 05/07/2020    CREATININE 0.75 05/07/2020    EGFRIFNONA 79 05/07/2020    EGFRIFAFRI 84 02/26/2020    GLUCOSE 101 (H) 05/07/2020    CALCIUM 9.7 05/07/2020    ALKPHOS 78 05/07/2020    AST 16 05/07/2020    ALT 26 05/07/2020    BILITOT 0.3 05/07/2020    ALBUMIN 4.21 05/07/2020    PROTEINTOT 6.8 05/07/2020     Lab Results   Component Value Date     15.1 03/11/2020     Lab  Results   Component Value Date    LABCA2 19.0 03/11/2020             ASSESSMENT & PLAN:  Jackie Johnson is a very pleasant 60 y.o. female with what is clinically a Stage IIB ([id number]) poorly differentiated invasive ductal carcinoma of the right breast.  Tumor spans 15 cm.  There are no clinically or radiographically supspicious axillary LNs.  There is associated high grade DCIS.  Tumor is ER-,OR-, HER-2 +  There is also an abnormal finding on L breast MRI which is suspicious (BIRADS-5).  This has been biopsied.    1.  Right breast cancer:  -  Currently taking  neoadjuvant chemotherapy to downsize tumor and potentially allow for a more successful surgery.  -  Specifically, I have recommended neoadjuvant Taxotere, Carboplatin, Herceptin and Perjeta with growth factor support.  -  Given that she really doesn't have palpable disease, will plan for interim mammogram and U/S prior to C4 therapy.  -  Following neoadjuvant chemotherapy, will return her for surgery.  Following surgery, she will require adjuvant radiation given the size of her tumor and completion of a year of anti-HER-2 therapy.             2.  Abnormality on L Breast visible on MRI only :    -  Biopsy done at [facility] on 2-28-20.  This isn't visible on care everywhere, but she says the biopsy was benign.  Plan was made for repeat MRI  Guided bx after 1st cycle of treatment, but biopsy could not be done b/c the lesion was no longer present.  Given improvement on treatment,  I would consider this lesion to be malignant and would be in favor of bilateral mastectomies.    3.  PAC infection:  -  PAC was removed.  Echocardiogram including CINTIA was negative.  She has completed IV antibiotics and is symptom free.  Will proceed with rx today as planned and will remove PICC line today after chemotherapy given.    4.  Very strong family history of malignancy including several members with breast cancer and a sister with ovarian cancer.  -  She underwent genetic  testing at [facility] and testing was negative.  While she has no known genetic mutation, given her personal history combined with her family history, I would be in favor of bilateral mastectomy.  I think it is also likely that given the size of her tumor she would likely have significant asymmetry after unilateral surgery.    5.  Prophylaxis:  She has had 2019 flu vaccine.  Recommended she get Prevnar 13 which she also had.  She is a lifelong non-smoker.    6.  ACO / MARNI/Other  Quality measures  -  Jackie Johnson received 2019 flu vaccine.  -  Jackie Johnson reports a pain score of 0.  Given her pain assessment as noted, treatment options were discussed and the following options were decided upon as a follow-up plan to address the patient's pain: No intervention needed..  -  Current outpatient and discharge medications have been reconciled for the patient.  Reviewed by: Fatoumata Obrien MD       7.  Follow-up:  I will see her with C4 with resee diagnostic mammogram and U/S prior.        This note was scribed for Fatoumata Obrien MD by Alba Siu RN.    I, Fatoumata Obrien MD, personally performed the services described in this documentation as scribed by the above named individual in my presence, and it is both accurate and complete.  05/07/2020                                                                                                                                             I spent 25 minutes with Jackie Johnson today.  In the office today, more than 50% of this time was spent face-to-face with her  in counseling / coordination of care, reviewing her medical history and counseling on the current treatment plan.  All questions were answered to her satisfaction      Electronically Signed by: Fatoumata Obrien MD      CC:   MD JUAN PABLO Macdonald MD Bora Lim, MD- [facility] Medical Oncology  Kadeem Serrano MD- [facility] Surgical Oncology

## 2020-05-08 ENCOUNTER — TELEPHONE (OUTPATIENT)
Dept: ONCOLOGY | Facility: HOSPITAL | Age: 60
End: 2020-05-08

## 2020-05-08 ENCOUNTER — DOCUMENTATION (OUTPATIENT)
Dept: ONCOLOGY | Facility: HOSPITAL | Age: 60
End: 2020-05-08

## 2020-05-08 ENCOUNTER — INFUSION (OUTPATIENT)
Dept: ONCOLOGY | Facility: HOSPITAL | Age: 60
End: 2020-05-08

## 2020-05-08 VITALS
RESPIRATION RATE: 18 BRPM | TEMPERATURE: 96.5 F | DIASTOLIC BLOOD PRESSURE: 78 MMHG | SYSTOLIC BLOOD PRESSURE: 121 MMHG | OXYGEN SATURATION: 97 % | BODY MASS INDEX: 30.97 KG/M2 | HEART RATE: 90 BPM | WEIGHT: 180.4 LBS

## 2020-05-08 DIAGNOSIS — Z17.1 MALIGNANT NEOPLASM OF RIGHT BREAST IN FEMALE, ESTROGEN RECEPTOR NEGATIVE, UNSPECIFIED SITE OF BREAST (HCC): ICD-10-CM

## 2020-05-08 DIAGNOSIS — C50.911 MALIGNANT NEOPLASM OF RIGHT BREAST IN FEMALE, ESTROGEN RECEPTOR NEGATIVE, UNSPECIFIED SITE OF BREAST (HCC): ICD-10-CM

## 2020-05-08 PROCEDURE — 96372 THER/PROPH/DIAG INJ SC/IM: CPT

## 2020-05-08 PROCEDURE — 25010000002 PEGFILGRASTIM 6 MG/0.6ML SOLUTION PREFILLED SYRINGE: Performed by: INTERNAL MEDICINE

## 2020-05-08 RX ORDER — DEXAMETHASONE 4 MG/1
TABLET ORAL
Qty: 6 TABLET | Refills: 3 | Status: SHIPPED | OUTPATIENT
Start: 2020-05-08 | End: 2020-11-10

## 2020-05-08 RX ADMIN — PEGFILGRASTIM 6 MG: 6 INJECTION SUBCUTANEOUS at 15:35

## 2020-05-08 NOTE — TELEPHONE ENCOUNTER
----- Message from Arabella Quinteros sent at 5/8/2020 11:47 AM EDT -----  Regarding: NEULASTA ON PRO  Contact: [phone]  She hit a door facing and her on pro came loose. It told her more then likely she will need to come in but I did forget that she is coming in to get her boost and prescriptions at some point today.Told patient we would call her back.

## 2020-05-08 NOTE — TELEPHONE ENCOUNTER
Pt called to report that the bottom of Neulasta OnPro had became unattached after hitting the door facing. Spoke with pharmacy. Advised pt that to bring On Pro device to clinic today @ 3:30p and we would give her Neulasta SQ today. Pt verbalized understanding and agreed with plan of care.

## 2020-05-08 NOTE — TELEPHONE ENCOUNTER
Refill for Dexamethasone sent to  Apothecary, patient has refills on zofran available.  Apothecary will deliver to Oncology clinic.

## 2020-05-08 NOTE — PROGRESS NOTES
SS received call from patient requesting a case of boost.  SS left patient case of strawberry boost at front window.  Patient to  today.  SS will follow.

## 2020-05-12 ENCOUNTER — READMISSION MANAGEMENT (OUTPATIENT)
Dept: CALL CENTER | Facility: HOSPITAL | Age: 60
End: 2020-05-12

## 2020-05-12 ENCOUNTER — PREP FOR SURGERY (OUTPATIENT)
Dept: OTHER | Facility: HOSPITAL | Age: 60
End: 2020-05-12

## 2020-05-12 DIAGNOSIS — Z17.1 MALIGNANT NEOPLASM OF RIGHT BREAST IN FEMALE, ESTROGEN RECEPTOR NEGATIVE, UNSPECIFIED SITE OF BREAST (HCC): Primary | ICD-10-CM

## 2020-05-12 DIAGNOSIS — C50.911 MALIGNANT NEOPLASM OF RIGHT BREAST IN FEMALE, ESTROGEN RECEPTOR NEGATIVE, UNSPECIFIED SITE OF BREAST (HCC): Primary | ICD-10-CM

## 2020-05-12 NOTE — OUTREACH NOTE
Sepsis Week 3 Survey      Responses   [facility] patient discharged from?  [facility]   COVID-19 Test Status  Not tested   Does the patient have one of the following disease processes/diagnoses(primary or secondary)?  Sepsis   Week 3 attempt successful?  Yes   Call start time  1512   Call end time  1516   Discharge diagnosis  Port infection with MSSA bacteremia   Is patient permission given to speak with other caregiver?  No   Meds reviewed with patient/caregiver?  Yes   Is the patient taking all medications as directed (includes completed medication regime)?  Yes   Has the patient kept scheduled appointments due by today?  Yes   Comments  PICC line removed    Pulse Ox monitoring  None   What is the patient's perception of their health status since discharge?  Improving   Week 3 call completed?  Yes   Wrap up additional comments  PICC line is out. Has had 3rd dose of chemo and having mild fatigue and rarely, N/V. Has antiemetic as needed.            Alba Jane, RN

## 2020-05-14 ENCOUNTER — TELEPHONE (OUTPATIENT)
Dept: SURGERY | Facility: CLINIC | Age: 60
End: 2020-05-14

## 2020-05-15 ENCOUNTER — APPOINTMENT (OUTPATIENT)
Dept: CARDIOLOGY | Facility: HOSPITAL | Age: 60
End: 2020-05-15

## 2020-05-18 ENCOUNTER — TELEPHONE (OUTPATIENT)
Dept: ONCOLOGY | Facility: HOSPITAL | Age: 60
End: 2020-05-18

## 2020-05-18 NOTE — TELEPHONE ENCOUNTER
Pt called and stated she had a cyst under her breast.  Reports having a history of getting these cysts but was worried about it healing d/t receiving chemotherapy.  Denies fever.  States area has gotten larger over weekend.  States she has doxycyline at home from past cellulitis. She only had taken 1 pill and was admitted to hospital several weeks ago.  Pt was wanting to know if she should take them now.  Dr Gautam made aware.  OK to restart doxycycline.  Continue warm compresses to area.  Pt to call office if no improvement in a few days.  Pt voiced understanding.

## 2020-05-18 NOTE — TELEPHONE ENCOUNTER
----- Message from Rosana Braun MA sent at 5/15/2020  2:43 PM EDT -----  Contact: [phone]  Jackie has a cyst under her breast she thinks is getting infected, Can someone call her back please.    Thanks

## 2020-05-19 ENCOUNTER — READMISSION MANAGEMENT (OUTPATIENT)
Dept: CALL CENTER | Facility: HOSPITAL | Age: 60
End: 2020-05-19

## 2020-05-19 NOTE — OUTREACH NOTE
Sepsis Week 4 Survey      Responses   [facility] patient discharged from?  [facility]   COVID-19 Test Status  Not tested   Does the patient have one of the following disease processes/diagnoses(primary or secondary)?  Sepsis   Week 4 attempt successful?  Yes   Call start time  1732   Call end time  1734   Discharge diagnosis  Port infection with MSSA bacteremia   Medication alerts for this patient  Started on Doxycycline for infected cyst under breast   Meds reviewed with patient/caregiver?  Yes   Is the patient having any side effects they believe may be caused by any medication additions or changes?  No   Is the patient taking all medications as directed (includes completed medication regime)?  Yes   Has the patient kept scheduled appointments due by today?  Yes   Pulse Ox monitoring  None   Psychosocial issues?  No   What is the patient's perception of their health status since discharge?  Improving   Nursing interventions  Nurse provided patient education   Is the patient/caregiver able to teach back Sepsis?  S - Shivering,fever or very cold, E - Extreme pain or generalized discomfort (worst ever,especially abdomen), P - Pale or discolored skin, S - Sleepy, difficult to arouse,confused   Nursing interventions  Nurse provided patient education, Advised patient to call provider   Is patient/caregiver able to teach back steps to recovery at home?  Set small, achievable goals for return to baseline health   Is the patient/caregiver able to teach back signs and symptoms of worsening condition:  Fever, Rapid heart rate (>90), Altered mental status(confusion/coma)   Is the patient/caregiver able to teach back the hierarchy of who to call/visit for symptoms/problems? PCP, Specialist, Home health nurse, Urgent Care, ED, 911  Yes   Week 4 call completed?  Yes   Would the patient like one additional call?  No   Graduated  Yes   Did the patient feel the follow up calls were helpful during their recovery period?  Yes   Was  the number of calls appropriate?  Yes          Jung Jha RN

## 2020-05-21 ENCOUNTER — OFFICE VISIT (OUTPATIENT)
Dept: SURGERY | Facility: CLINIC | Age: 60
End: 2020-05-21

## 2020-05-21 ENCOUNTER — APPOINTMENT (OUTPATIENT)
Dept: PREADMISSION TESTING | Facility: HOSPITAL | Age: 60
End: 2020-05-21

## 2020-05-21 VITALS
SYSTOLIC BLOOD PRESSURE: 126 MMHG | DIASTOLIC BLOOD PRESSURE: 88 MMHG | HEIGHT: 65 IN | WEIGHT: 177 LBS | BODY MASS INDEX: 29.49 KG/M2 | HEART RATE: 75 BPM

## 2020-05-21 DIAGNOSIS — L02.213 CUTANEOUS ABSCESS OF CHEST WALL: Primary | ICD-10-CM

## 2020-05-21 DIAGNOSIS — Z17.1 MALIGNANT NEOPLASM OF RIGHT BREAST IN FEMALE, ESTROGEN RECEPTOR NEGATIVE, UNSPECIFIED SITE OF BREAST (HCC): ICD-10-CM

## 2020-05-21 DIAGNOSIS — L02.211 CUTANEOUS ABSCESS OF ABDOMINAL WALL: ICD-10-CM

## 2020-05-21 DIAGNOSIS — L03.221 CELLULITIS OF NECK: Primary | ICD-10-CM

## 2020-05-21 DIAGNOSIS — C50.911 MALIGNANT NEOPLASM OF RIGHT BREAST IN FEMALE, ESTROGEN RECEPTOR NEGATIVE, UNSPECIFIED SITE OF BREAST (HCC): ICD-10-CM

## 2020-05-21 DIAGNOSIS — L02.213 CUTANEOUS ABSCESS OF CHEST WALL: ICD-10-CM

## 2020-05-21 PROCEDURE — 87205 SMEAR GRAM STAIN: CPT | Performed by: SURGERY

## 2020-05-21 PROCEDURE — 87077 CULTURE AEROBIC IDENTIFY: CPT | Performed by: SURGERY

## 2020-05-21 PROCEDURE — 10060 I&D ABSCESS SIMPLE/SINGLE: CPT | Performed by: SURGERY

## 2020-05-21 PROCEDURE — 99212 OFFICE O/P EST SF 10 MIN: CPT | Performed by: SURGERY

## 2020-05-21 PROCEDURE — 87070 CULTURE OTHR SPECIMN AEROBIC: CPT | Performed by: SURGERY

## 2020-05-21 RX ORDER — DOXYCYCLINE HYCLATE 100 MG/1
100 CAPSULE ORAL 2 TIMES DAILY
COMMUNITY
End: 2020-07-27

## 2020-05-21 NOTE — H&P
Subjective   Jackie Johnson is a 60 y.o. female is here today for follow-up for possible port placement and abscess on trunk.    History of Present Illness  Ms. Johnson was seen in the office today to discuss replacement of infusaport.  She was admitted last month with sepsis secondary to infected port and have it removed.  She went home on IV antibiotics and the PICC line and now has resumed her chemotherapy.  She presents today to have her port replaced.  In addition the patient states that she has developed an abscess of her trunk at the xiphoid.  She has been on antibiotics for several days.    Allergies   Allergen Reactions   • Albuterol Other (See Comments)     One time severe headache, questionable for aneurysm, did spinal tap was positive, did Angiogram and MRI were Negative for aneurysm   • Penicillins Hives   • Cefpodoxime Rash     vantin     • Clindamycin Hcl Rash   • Crestor [Rosuvastatin Calcium] Other (See Comments)     Caused high liver enxymes   • Sulfa Antibiotics Other (See Comments)     Mucosal lesions   • Pseudoephedrine Other (See Comments)     Soreness on the tongue         Current Outpatient Medications   Medication Sig Dispense Refill   • acetaminophen (TYLENOL) 325 MG tablet Take 650 mg by mouth Every 6 (Six) Hours As Needed for Mild Pain .     • calcium carbonate (TUMS) 500 MG chewable tablet Chew 1 tablet Daily As Needed for Indigestion or Heartburn.     • dexamethasone (DECADRON) 4 MG tablet Take 2 tablets by mouth daily for 3 days 6 tablet 3   • famotidine (PEPCID) 10 MG tablet Take 10 mg by mouth As Needed for Indigestion or Heartburn.     • levalbuterol (XOPENEX HFA) 45 MCG/ACT inhaler Inhale 1-2 puffs Every 4 (Four) Hours As Needed for Wheezing.     • loratadine (CLARITIN) 10 MG tablet Take 10 mg by mouth 2 (Two) Times a Day.     • ondansetron (ZOFRAN) 8 MG tablet Take 1 tablet by mouth 3 (Three) Times a Day As Needed for Nausea or Vomiting. 30 tablet 5   • prochlorperazine  "(COMPAZINE) 10 MG tablet Take 1 tablet by mouth Every 6 (Six) Hours As Needed for Nausea or Vomiting. 60 tablet 3   • sennosides-docusate (Senna-S) 8.6-50 MG per tablet Take 2 tablets by mouth 2 (Two) Times a Day. 120 tablet 5     No current facility-administered medications for this visit.      Past Medical History:   Diagnosis Date   • Acid reflux    • Asthma    • Cancer (CMS/HCC)    • Eczema    • Endometriosis    • Hiatal hernia    • High cholesterol    • Seasonal allergies      Past Surgical History:   Procedure Laterality Date   • ABDOMINAL SURGERY     • BREAST BIOPSY Bilateral    •  SECTION     • D&C HYSTEROSCOPY ENDOMETRIAL ABLATION     • DIAGNOSTIC LAPAROSCOPY     • FRACTURE SURGERY Left     tib/fib   • LEG SURGERY      Left leg following fracture   • PORTACATH PLACEMENT N/A 3/18/2020    Procedure: INSERTION OF PORTACATH;  Surgeon: Dudley Aldridge MD;  Location: [facility];  Service: General;  Laterality: N/A;   • SKIN BIOPSY     • TONSILLECTOMY     • VENOUS ACCESS DEVICE (PORT) REMOVAL Right 2020    Procedure: REMOVAL VENOUS ACCESS DEVICE;  Surgeon: Nerissa Wang MD;  Location: [facility];  Service: General;  Laterality: Right;       The following portions of the patient's history were reviewed and updated as appropriate: allergies, current medications, past family history, past medical history, past social history, past surgical history and problem list.    Review of Systems  General: fever  Integumentary: abscess   Eyes: glasses  ENT: negative  Respiratory: negative  Gastrointestinal: nausea/vomiting  Cardiovascular: negative  Neurological: negative  Psychiatric: negative  Hematologic/Lymphatic: negative  Genitourinary: negative  Musculoskeletal: negative  Endocrine: negative  Breasts: negative    Objective   /88 (BP Location: Left arm)   Pulse 75   Ht 165.1 cm (65\")   Wt 80.3 kg (177 lb)   BMI 29.45 kg/m²     Physical Exam  General:  This is a WD WN female in no acute " distress  HEENT exam:  WNL. Sclera are anicteric.  EOMI  Neck:  supple, FROM, without thyromegaly, cervical or supraclavicular adenopathy  Lungs:  Respiratory effort normal. Auscultation: Clear, without wheezes, rhonchi, rales  Heart:  Regular rate and rhythm, without murmur, gallop, rub.  No pedal edema  Abdomen: Nontender, nondistended  Musculoskeletal:  muscle strength/tone is normal.  Gait and station: normal. No digital cyanosis  Psyc:  alert, oriented x 3.  Mood and affect are appropriate  skin:  Warm with good turgor.  Healing port incision in the right neck and right upper chest.  There is a cutaneous abscess in the anterior trunk just at the level of the xiphoid measuring at least 3 cm.  extremities:  Examination of the extremities revealed no cyanosis, clubbing or edema.  Results/Data      Procedures   Procedure Note    Procedure: Incision and drainage abscess    Location: Anterior trunk    Anesthesia: 1% lidocaine with epinephrine    Description:  The risks and benefits of the procedure were discussed.  After prep with Betadine and infiltration with lidocaine a 1.5 cm incision was made over the abscess.  Several cc of pus were evacuated.  Culture was done.  The wound was irrigated with peroxide and packed with quarter inch Nu Gauze.  Dressing was placed    Complications:  none    Follow-up: As needed    Assessment/Plan   Right breast carcinoma  Status post removal of infected [id number]  Cutaneous abscess of trunk    Plan:  Proceed with port placement next week  Wound care to cutaneous abscess  Track culture result       Discussion/Summary    Patient's Body mass index is 29.45 kg/m². BMI is above normal parameters. Recommendations include: educational material.         Future Appointments   Date Time Provider Department Center   5/21/2020 10:15 AM PAT 3 COR BH COR PAT COR   5/26/2020 10:50 AM COR BR CARE MAMM 2 BH COR MA BC COR   5/28/2020  8:30 AM BH COR OP INFUS CHAIR 2  COR INF COR   5/28/2020   8:30 AM [facility] NURSE LAB [facility] ON[facility] COR COR   5/28/2020  9:00 AM Fatoumata Obrien MD [facility] ON[facility] COR COR         Please note that portions of this note were completed with a voice recognition program.  This document has been electronically signed by Nerissa JESSICA MD on May 21, 2020 14:49

## 2020-05-21 NOTE — PROGRESS NOTES
Subjective   Jackie Johnson is a 60 y.o. female is here today for follow-up for possible port placement and abscess on trunk.    History of Present Illness  Ms. Johnson was seen in the office today to discuss replacement of infusaport.  She was admitted last month with sepsis secondary to infected port and have it removed.  She went home on IV antibiotics and the PICC line and now has resumed her chemotherapy.  She presents today to have her port replaced.  In addition the patient states that she has developed an abscess of her trunk at the xiphoid.  She has been on antibiotics for several days.    Allergies   Allergen Reactions   • Albuterol Other (See Comments)     One time severe headache, questionable for aneurysm, did spinal tap was positive, did Angiogram and MRI were Negative for aneurysm   • Penicillins Hives   • Cefpodoxime Rash     vantin     • Clindamycin Hcl Rash   • Crestor [Rosuvastatin Calcium] Other (See Comments)     Caused high liver enxymes   • Sulfa Antibiotics Other (See Comments)     Mucosal lesions   • Pseudoephedrine Other (See Comments)     Soreness on the tongue         Current Outpatient Medications   Medication Sig Dispense Refill   • acetaminophen (TYLENOL) 325 MG tablet Take 650 mg by mouth Every 6 (Six) Hours As Needed for Mild Pain .     • calcium carbonate (TUMS) 500 MG chewable tablet Chew 1 tablet Daily As Needed for Indigestion or Heartburn.     • dexamethasone (DECADRON) 4 MG tablet Take 2 tablets by mouth daily for 3 days 6 tablet 3   • famotidine (PEPCID) 10 MG tablet Take 10 mg by mouth As Needed for Indigestion or Heartburn.     • levalbuterol (XOPENEX HFA) 45 MCG/ACT inhaler Inhale 1-2 puffs Every 4 (Four) Hours As Needed for Wheezing.     • loratadine (CLARITIN) 10 MG tablet Take 10 mg by mouth 2 (Two) Times a Day.     • ondansetron (ZOFRAN) 8 MG tablet Take 1 tablet by mouth 3 (Three) Times a Day As Needed for Nausea or Vomiting. 30 tablet 5   • prochlorperazine  "(COMPAZINE) 10 MG tablet Take 1 tablet by mouth Every 6 (Six) Hours As Needed for Nausea or Vomiting. 60 tablet 3   • sennosides-docusate (Senna-S) 8.6-50 MG per tablet Take 2 tablets by mouth 2 (Two) Times a Day. 120 tablet 5     No current facility-administered medications for this visit.      Past Medical History:   Diagnosis Date   • Acid reflux    • Asthma    • Cancer (CMS/HCC)    • Eczema    • Endometriosis    • Hiatal hernia    • High cholesterol    • Seasonal allergies      Past Surgical History:   Procedure Laterality Date   • ABDOMINAL SURGERY     • BREAST BIOPSY Bilateral    •  SECTION     • D&C HYSTEROSCOPY ENDOMETRIAL ABLATION     • DIAGNOSTIC LAPAROSCOPY     • FRACTURE SURGERY Left     tib/fib   • LEG SURGERY      Left leg following fracture   • PORTACATH PLACEMENT N/A 3/18/2020    Procedure: INSERTION OF PORTACATH;  Surgeon: Dudley Aldridge MD;  Location: [facility];  Service: General;  Laterality: N/A;   • SKIN BIOPSY     • TONSILLECTOMY     • VENOUS ACCESS DEVICE (PORT) REMOVAL Right 2020    Procedure: REMOVAL VENOUS ACCESS DEVICE;  Surgeon: Nerissa Wang MD;  Location: [facility];  Service: General;  Laterality: Right;       The following portions of the patient's history were reviewed and updated as appropriate: allergies, current medications, past family history, past medical history, past social history, past surgical history and problem list.    Review of Systems  General: fever  Integumentary: abscess   Eyes: glasses  ENT: negative  Respiratory: negative  Gastrointestinal: nausea/vomiting  Cardiovascular: negative  Neurological: negative  Psychiatric: negative  Hematologic/Lymphatic: negative  Genitourinary: negative  Musculoskeletal: negative  Endocrine: negative  Breasts: negative    Objective   /88 (BP Location: Left arm)   Pulse 75   Ht 165.1 cm (65\")   Wt 80.3 kg (177 lb)   BMI 29.45 kg/m²    Physical Exam  General:  This is a WD WN female in no acute " distress  HEENT exam:  WNL. Sclera are anicteric.  EOMI  Neck:  supple, FROM, without thyromegaly, cervical or supraclavicular adenopathy  Lungs:  Respiratory effort normal. Auscultation: Clear, without wheezes, rhonchi, rales  Heart:  Regular rate and rhythm, without murmur, gallop, rub.  No pedal edema  Abdomen: Nontender, nondistended  Musculoskeletal:  muscle strength/tone is normal.  Gait and station: normal. No digital cyanosis  Psyc:  alert, oriented x 3.  Mood and affect are appropriate  skin:  Warm with good turgor.  Healing port incision in the right neck and right upper chest.  There is a cutaneous abscess in the anterior trunk just at the level of the xiphoid measuring at least 3 cm.  extremities:  Examination of the extremities revealed no cyanosis, clubbing or edema.  Results/Data      Procedures   Procedure Note    Procedure: Incision and drainage abscess    Location: Anterior trunk    Anesthesia: 1% lidocaine with epinephrine    Description:  The risks and benefits of the procedure were discussed.  After prep with Betadine and infiltration with lidocaine a 1.5 cm incision was made over the abscess.  Several cc of pus were evacuated.  Culture was done.  The wound was irrigated with peroxide and packed with quarter inch Nu Gauze.  Dressing was placed    Complications:  none    Follow-up: As needed    Assessment/Plan   Right breast carcinoma  Status post removal of infected [id number]  Cutaneous abscess of trunk    Plan:  Proceed with port placement next week  Wound care to cutaneous abscess  Track culture result       Discussion/Summary    Patient's Body mass index is 29.45 kg/m². BMI is above normal parameters. Recommendations include: educational material.         Future Appointments   Date Time Provider Department Center   5/21/2020 10:15 AM PAT 3 COR [facility] COR PAT COR   5/26/2020 10:50 AM COR BR CARE MAMM 2 BH COR MA BC COR   5/28/2020  8:30 AM BH COR OP INFUS CHAIR 2  COR INF COR   5/28/2020   8:30 AM [facility] NURSE LAB [facility] COR   5/28/2020  9:00 AM Fatoumata Obrien MD [facility] ON[facility] COR COR         Please note that portions of this note were completed with a voice recognition program.

## 2020-05-21 NOTE — DISCHARGE INSTRUCTIONS
5/27/2020   DR OFFICE TO CALL WITH TIME    TAKE the following medications the morning of surgery:  All heart or blood pressure medications    HOLD all diabetic medications the morning of surgery as ordered by physician.    Please discontinue all blood thinners and anticoagulants (except aspirin) prior to surgery as per your surgeon and cardiologist instructions.  Aspirin may be continued up to the day prior to surgery.     CHLORHEXIDINE CLOTHS GIVEN WITH INSTRUCTIONS AND FORM TO RETURN TO HOSPITAL, IF APPLICABLE.    General Instructions:  · Do not eat or drink after midnight: includes water, mints, or gum. You may brush your teeth.  Dental appliances that are removable must be taken out day of surgery.  · Do not smoke, chew tobacco, or drink alcohol.  · Bring medications in original bottles, any inhalers and if applicable your C-PAP/BI-PAP machine.  · Bring any papers given to you in the doctor's office.  · Wear clean comfortable clothes and socks.  · Do not wear contact lenses or make-up. Bring a case for your glasses if applicable.  · Bring crutches or walker if applicable.  · Leave all other valuables and jewelry at home.    If you were given a blood bank ID arm band remember to bring it with you the day of surgery.    Preventing a Surgical Site Infection:  Shower the night before surgery (unless instructed other wise) using a fresh bar of anti-bacterial soap (such as Dial) and clean washcloth. Dry with a clean towel and dress in clean clothing.  For 2 to 3 days before surgery, avoid shaving with a razor near where you will have surgery because the razor can irritate skin and make it easier to develop an infection. Ask your surgeon if you will be receiving antibiotics prior to surgery.  Make sure you, your family, and all healthcare providers clear their hands with soap and water or an alcohol-based hand  before caring for you or your wound.  If at all possible, quit smoking as many days before surgery  as you can.    Day of surgery:  Upon arrival, a Pre-op nurse and Anesthesiologist will review your health history, obtain vital signs, and answer questions you may have. The only belongings needed at this time will be your home medications and if applicable your C-PAP/BI-PAP machine. If you are staying overnight your family can leave the rest of your belongings in the car and bring them to your room later. A Pre-op nurse will start an IV and you may receive medication in preparation for surgery, including something to help you relax. Your family will be able to see you in the Pre-op area. While you are in surgery your family should notify the waiting room  if they leave the waiting room area and provide a contact phone number.    Please be aware that surgery does come with discomfort. We want to make every effort to control your discomfort so please discuss any uncontrolled symptoms with your nurse. Your doctor will most likely have prescribed pain medications.    If you are going home after surgery you will receive individualized written care instructions before being discharged. A responsible adult must drive you to and from the hospital on the day of surgery and stay with you for 24 hours.    If you are staying overnight following surgery, you will be transported to your hospital room following the recovery period.  [facility] has all private rooms.    If you have any questions please call Pre-Admission Testing at [phone].  Deductibles and co-payments are collected on the day of service. Please be prepared to pay the required co-pay, deductible or deposit on the day of service as defined by your plan.    A RESPONSIBLE PERSON MUST REMAIN IN THE WAITING ROOM DURING YOUR PROCEDURE AND A RESPONSIBLE  MUST BE AVAILABLE UPON YOUR DISCHARGE.

## 2020-05-22 ENCOUNTER — TRANSCRIBE ORDERS (OUTPATIENT)
Dept: ADMINISTRATIVE | Facility: HOSPITAL | Age: 60
End: 2020-05-22

## 2020-05-22 DIAGNOSIS — Z01.818 PRE-OPERATIVE CLEARANCE: Primary | ICD-10-CM

## 2020-05-25 ENCOUNTER — LAB (OUTPATIENT)
Dept: LAB | Facility: HOSPITAL | Age: 60
End: 2020-05-25

## 2020-05-25 DIAGNOSIS — Z01.818 PRE-OPERATIVE CLEARANCE: ICD-10-CM

## 2020-05-25 LAB
BACTERIA SPEC AEROBE CULT: NORMAL
GRAM STN SPEC: NORMAL

## 2020-05-26 ENCOUNTER — TELEPHONE (OUTPATIENT)
Dept: SURGERY | Facility: CLINIC | Age: 60
End: 2020-05-26

## 2020-05-26 ENCOUNTER — HOSPITAL ENCOUNTER (OUTPATIENT)
Dept: ULTRASOUND IMAGING | Facility: HOSPITAL | Age: 60
Discharge: HOME | End: 2020-05-26

## 2020-05-26 ENCOUNTER — HOSPITAL ENCOUNTER (OUTPATIENT)
Dept: MAMMOGRAPHY | Facility: HOSPITAL | Age: 60
Discharge: HOME | End: 2020-05-26
Admitting: INTERNAL MEDICINE

## 2020-05-26 DIAGNOSIS — Z17.1 MALIGNANT NEOPLASM OF RIGHT BREAST IN FEMALE, ESTROGEN RECEPTOR NEGATIVE, UNSPECIFIED SITE OF BREAST (HCC): ICD-10-CM

## 2020-05-26 DIAGNOSIS — C50.911 MALIGNANT NEOPLASM OF RIGHT BREAST IN FEMALE, ESTROGEN RECEPTOR NEGATIVE, UNSPECIFIED SITE OF BREAST (HCC): ICD-10-CM

## 2020-05-26 LAB
REF LAB TEST METHOD: NORMAL
SARS-COV-2 RNA RESP QL NAA+PROBE: NOT DETECTED

## 2020-05-26 PROCEDURE — 77062 BREAST TOMOSYNTHESIS BI: CPT | Performed by: RADIOLOGY

## 2020-05-26 PROCEDURE — 77066 DX MAMMO INCL CAD BI: CPT | Performed by: RADIOLOGY

## 2020-05-26 PROCEDURE — G0279 TOMOSYNTHESIS, MAMMO: HCPCS

## 2020-05-26 PROCEDURE — 77066 DX MAMMO INCL CAD BI: CPT

## 2020-05-26 RX ORDER — SODIUM CHLORIDE 9 MG/ML
250 INJECTION, SOLUTION INTRAVENOUS ONCE
Status: CANCELLED | OUTPATIENT
Start: 2020-05-28

## 2020-05-26 RX ORDER — PALONOSETRON 0.05 MG/ML
0.25 INJECTION, SOLUTION INTRAVENOUS ONCE
Status: CANCELLED | OUTPATIENT
Start: 2020-05-28

## 2020-05-26 RX ORDER — DIPHENHYDRAMINE HYDROCHLORIDE 50 MG/ML
50 INJECTION INTRAMUSCULAR; INTRAVENOUS AS NEEDED
Status: CANCELLED | OUTPATIENT
Start: 2020-05-28

## 2020-05-26 RX ORDER — FAMOTIDINE 10 MG/ML
20 INJECTION, SOLUTION INTRAVENOUS AS NEEDED
Status: CANCELLED | OUTPATIENT
Start: 2020-05-28

## 2020-05-27 ENCOUNTER — HOSPITAL ENCOUNTER (OUTPATIENT)
Facility: HOSPITAL | Age: 60
Setting detail: HOSPITAL OUTPATIENT SURGERY
Discharge: HOME | End: 2020-05-27
Attending: SURGERY | Admitting: SURGERY

## 2020-05-27 ENCOUNTER — ANESTHESIA (OUTPATIENT)
Dept: PERIOP | Facility: HOSPITAL | Age: 60
End: 2020-05-27

## 2020-05-27 ENCOUNTER — APPOINTMENT (OUTPATIENT)
Dept: GENERAL RADIOLOGY | Facility: HOSPITAL | Age: 60
End: 2020-05-27

## 2020-05-27 ENCOUNTER — ANESTHESIA EVENT (OUTPATIENT)
Dept: PERIOP | Facility: HOSPITAL | Age: 60
End: 2020-05-27

## 2020-05-27 VITALS
OXYGEN SATURATION: 95 % | SYSTOLIC BLOOD PRESSURE: 115 MMHG | HEIGHT: 64 IN | TEMPERATURE: 97.2 F | BODY MASS INDEX: 30.56 KG/M2 | RESPIRATION RATE: 16 BRPM | WEIGHT: 179 LBS | DIASTOLIC BLOOD PRESSURE: 82 MMHG | HEART RATE: 70 BPM

## 2020-05-27 DIAGNOSIS — Z17.1 MALIGNANT NEOPLASM OF RIGHT BREAST IN FEMALE, ESTROGEN RECEPTOR NEGATIVE, UNSPECIFIED SITE OF BREAST (HCC): ICD-10-CM

## 2020-05-27 DIAGNOSIS — C50.911 MALIGNANT NEOPLASM OF RIGHT BREAST IN FEMALE, ESTROGEN RECEPTOR NEGATIVE, UNSPECIFIED SITE OF BREAST (HCC): ICD-10-CM

## 2020-05-27 PROCEDURE — 25010000002 MIDAZOLAM PER 1 MG: Performed by: NURSE ANESTHETIST, CERTIFIED REGISTERED

## 2020-05-27 PROCEDURE — C1788 PORT, INDWELLING, IMP: HCPCS | Performed by: SURGERY

## 2020-05-27 PROCEDURE — 25010000002 FENTANYL CITRATE (PF) 100 MCG/2ML SOLUTION: Performed by: NURSE ANESTHETIST, CERTIFIED REGISTERED

## 2020-05-27 PROCEDURE — 77001 FLUOROGUIDE FOR VEIN DEVICE: CPT | Performed by: SURGERY

## 2020-05-27 PROCEDURE — 36561 INSERT TUNNELED CV CATH: CPT | Performed by: SURGERY

## 2020-05-27 PROCEDURE — 25010000002 HEPARIN (PORCINE) PER 1000 UNITS: Performed by: SURGERY

## 2020-05-27 PROCEDURE — 76000 FLUOROSCOPY <1 HR PHYS/QHP: CPT | Performed by: RADIOLOGY

## 2020-05-27 PROCEDURE — 25010000002 DEXAMETHASONE PER 1 MG: Performed by: NURSE ANESTHETIST, CERTIFIED REGISTERED

## 2020-05-27 PROCEDURE — 25010000002 PROPOFOL 10 MG/ML EMULSION: Performed by: NURSE ANESTHETIST, CERTIFIED REGISTERED

## 2020-05-27 PROCEDURE — 71045 X-RAY EXAM CHEST 1 VIEW: CPT

## 2020-05-27 PROCEDURE — 25010000002 VANCOMYCIN: Performed by: SURGERY

## 2020-05-27 PROCEDURE — 25010000002 ONDANSETRON PER 1 MG: Performed by: NURSE ANESTHETIST, CERTIFIED REGISTERED

## 2020-05-27 PROCEDURE — 76000 FLUOROSCOPY <1 HR PHYS/QHP: CPT

## 2020-05-27 PROCEDURE — 71045 X-RAY EXAM CHEST 1 VIEW: CPT | Performed by: RADIOLOGY

## 2020-05-27 DEVICE — VACCESS CT POWER-INJECTABLE IMPLANTABLE PORT (WITH SUTURE PLUGS) (8F)
Type: IMPLANTABLE DEVICE | Status: FUNCTIONAL
Brand: VACCESS

## 2020-05-27 RX ORDER — OXYCODONE HYDROCHLORIDE AND ACETAMINOPHEN 5; 325 MG/1; MG/1
1 TABLET ORAL ONCE AS NEEDED
Status: DISCONTINUED | OUTPATIENT
Start: 2020-05-27 | End: 2020-05-27 | Stop reason: HOSPADM

## 2020-05-27 RX ORDER — SODIUM CHLORIDE, SODIUM LACTATE, POTASSIUM CHLORIDE, CALCIUM CHLORIDE 600; 310; 30; 20 MG/100ML; MG/100ML; MG/100ML; MG/100ML
125 INJECTION, SOLUTION INTRAVENOUS CONTINUOUS
Status: DISCONTINUED | OUTPATIENT
Start: 2020-05-27 | End: 2020-05-27 | Stop reason: HOSPADM

## 2020-05-27 RX ORDER — FENTANYL CITRATE 50 UG/ML
INJECTION, SOLUTION INTRAMUSCULAR; INTRAVENOUS AS NEEDED
Status: DISCONTINUED | OUTPATIENT
Start: 2020-05-27 | End: 2020-05-27 | Stop reason: SURG

## 2020-05-27 RX ORDER — MIDAZOLAM HYDROCHLORIDE 1 MG/ML
2 INJECTION INTRAMUSCULAR; INTRAVENOUS
Status: DISCONTINUED | OUTPATIENT
Start: 2020-05-27 | End: 2020-05-27 | Stop reason: HOSPADM

## 2020-05-27 RX ORDER — MIDAZOLAM HYDROCHLORIDE 1 MG/ML
1 INJECTION INTRAMUSCULAR; INTRAVENOUS
Status: DISCONTINUED | OUTPATIENT
Start: 2020-05-27 | End: 2020-05-27 | Stop reason: HOSPADM

## 2020-05-27 RX ORDER — HEPARIN SODIUM 5000 [USP'U]/ML
INJECTION, SOLUTION INTRAVENOUS; SUBCUTANEOUS AS NEEDED
Status: DISCONTINUED | OUTPATIENT
Start: 2020-05-27 | End: 2020-05-27 | Stop reason: HOSPADM

## 2020-05-27 RX ORDER — FAMOTIDINE 10 MG/ML
INJECTION, SOLUTION INTRAVENOUS AS NEEDED
Status: DISCONTINUED | OUTPATIENT
Start: 2020-05-27 | End: 2020-05-27 | Stop reason: SURG

## 2020-05-27 RX ORDER — HYDROCODONE BITARTRATE AND ACETAMINOPHEN 7.5; 325 MG/1; MG/1
1 TABLET ORAL 4 TIMES DAILY PRN
Qty: 10 TABLET | Refills: 0 | Status: SHIPPED | OUTPATIENT
Start: 2020-05-27 | End: 2020-08-17

## 2020-05-27 RX ORDER — MEPERIDINE HYDROCHLORIDE 25 MG/ML
12.5 INJECTION INTRAMUSCULAR; INTRAVENOUS; SUBCUTANEOUS
Status: DISCONTINUED | OUTPATIENT
Start: 2020-05-27 | End: 2020-05-27 | Stop reason: HOSPADM

## 2020-05-27 RX ORDER — DEXAMETHASONE SODIUM PHOSPHATE 4 MG/ML
INJECTION, SOLUTION INTRA-ARTICULAR; INTRALESIONAL; INTRAMUSCULAR; INTRAVENOUS; SOFT TISSUE AS NEEDED
Status: DISCONTINUED | OUTPATIENT
Start: 2020-05-27 | End: 2020-05-27 | Stop reason: SURG

## 2020-05-27 RX ORDER — MIDAZOLAM HYDROCHLORIDE 1 MG/ML
INJECTION INTRAMUSCULAR; INTRAVENOUS AS NEEDED
Status: DISCONTINUED | OUTPATIENT
Start: 2020-05-27 | End: 2020-05-27 | Stop reason: SURG

## 2020-05-27 RX ORDER — PROPOFOL 10 MG/ML
VIAL (ML) INTRAVENOUS AS NEEDED
Status: DISCONTINUED | OUTPATIENT
Start: 2020-05-27 | End: 2020-05-27 | Stop reason: SURG

## 2020-05-27 RX ORDER — SODIUM CHLORIDE 0.9 % (FLUSH) 0.9 %
10 SYRINGE (ML) INJECTION AS NEEDED
Status: DISCONTINUED | OUTPATIENT
Start: 2020-05-27 | End: 2020-05-27 | Stop reason: HOSPADM

## 2020-05-27 RX ORDER — MAGNESIUM HYDROXIDE 1200 MG/15ML
LIQUID ORAL AS NEEDED
Status: DISCONTINUED | OUTPATIENT
Start: 2020-05-27 | End: 2020-05-27 | Stop reason: HOSPADM

## 2020-05-27 RX ORDER — ONDANSETRON 2 MG/ML
INJECTION INTRAMUSCULAR; INTRAVENOUS AS NEEDED
Status: DISCONTINUED | OUTPATIENT
Start: 2020-05-27 | End: 2020-05-27 | Stop reason: SURG

## 2020-05-27 RX ORDER — ONDANSETRON 2 MG/ML
4 INJECTION INTRAMUSCULAR; INTRAVENOUS AS NEEDED
Status: DISCONTINUED | OUTPATIENT
Start: 2020-05-27 | End: 2020-05-27 | Stop reason: HOSPADM

## 2020-05-27 RX ORDER — SODIUM CHLORIDE 9 MG/ML
INJECTION, SOLUTION INTRAVENOUS AS NEEDED
Status: DISCONTINUED | OUTPATIENT
Start: 2020-05-27 | End: 2020-05-27 | Stop reason: HOSPADM

## 2020-05-27 RX ORDER — SODIUM CHLORIDE 0.9 % (FLUSH) 0.9 %
10 SYRINGE (ML) INJECTION EVERY 12 HOURS SCHEDULED
Status: DISCONTINUED | OUTPATIENT
Start: 2020-05-27 | End: 2020-05-27 | Stop reason: HOSPADM

## 2020-05-27 RX ORDER — LIDOCAINE HYDROCHLORIDE 20 MG/ML
INJECTION, SOLUTION INFILTRATION; PERINEURAL AS NEEDED
Status: DISCONTINUED | OUTPATIENT
Start: 2020-05-27 | End: 2020-05-27 | Stop reason: SURG

## 2020-05-27 RX ORDER — FENTANYL CITRATE 50 UG/ML
50 INJECTION, SOLUTION INTRAMUSCULAR; INTRAVENOUS
Status: DISCONTINUED | OUTPATIENT
Start: 2020-05-27 | End: 2020-05-27 | Stop reason: HOSPADM

## 2020-05-27 RX ORDER — SODIUM CHLORIDE, SODIUM LACTATE, POTASSIUM CHLORIDE, CALCIUM CHLORIDE 600; 310; 30; 20 MG/100ML; MG/100ML; MG/100ML; MG/100ML
INJECTION, SOLUTION INTRAVENOUS CONTINUOUS PRN
Status: DISCONTINUED | OUTPATIENT
Start: 2020-05-27 | End: 2020-05-27 | Stop reason: SURG

## 2020-05-27 RX ADMIN — VANCOMYCIN HYDROCHLORIDE 1.25 G: 10 INJECTION, POWDER, LYOPHILIZED, FOR SOLUTION INTRAVENOUS at 07:30

## 2020-05-27 RX ADMIN — SODIUM CHLORIDE, POTASSIUM CHLORIDE, SODIUM LACTATE AND CALCIUM CHLORIDE: 600; 310; 30; 20 INJECTION, SOLUTION INTRAVENOUS at 07:30

## 2020-05-27 RX ADMIN — FENTANYL CITRATE 25 MCG: 50 INJECTION INTRAMUSCULAR; INTRAVENOUS at 07:51

## 2020-05-27 RX ADMIN — PROPOFOL 150 MG: 10 INJECTION, EMULSION INTRAVENOUS at 07:35

## 2020-05-27 RX ADMIN — FENTANYL CITRATE 25 MCG: 50 INJECTION INTRAMUSCULAR; INTRAVENOUS at 07:46

## 2020-05-27 RX ADMIN — SODIUM CHLORIDE, POTASSIUM CHLORIDE, SODIUM LACTATE AND CALCIUM CHLORIDE 125 ML/HR: 600; 310; 30; 20 INJECTION, SOLUTION INTRAVENOUS at 07:14

## 2020-05-27 RX ADMIN — MIDAZOLAM HYDROCHLORIDE 2 MG: 1 INJECTION, SOLUTION INTRAMUSCULAR; INTRAVENOUS at 07:32

## 2020-05-27 RX ADMIN — LIDOCAINE HYDROCHLORIDE 60 MG: 20 INJECTION, SOLUTION INFILTRATION; PERINEURAL at 07:35

## 2020-05-27 RX ADMIN — DEXAMETHASONE SODIUM PHOSPHATE 4 MG: 4 INJECTION, SOLUTION INTRAMUSCULAR; INTRAVENOUS at 07:56

## 2020-05-27 RX ADMIN — ONDANSETRON 4 MG: 2 INJECTION INTRAMUSCULAR; INTRAVENOUS at 07:56

## 2020-05-27 RX ADMIN — FENTANYL CITRATE 50 MCG: 50 INJECTION INTRAMUSCULAR; INTRAVENOUS at 08:06

## 2020-05-27 RX ADMIN — FAMOTIDINE 20 MG: 10 INJECTION INTRAVENOUS at 07:56

## 2020-05-27 NOTE — ANESTHESIA POSTPROCEDURE EVALUATION
Patient: Jackie Johnson    Procedure Summary     Date:  05/27/20 Room / Location:  [facility] OR  /  COR OR    Anesthesia Start:  0730 Anesthesia Stop:  0812    Procedure:  INSERTION VENOUS ACCESS DEVICE (N/A ) Diagnosis:       Malignant neoplasm of right breast in female, estrogen receptor negative, unspecified site of breast (CMS/HCC)      (Malignant neoplasm of right breast in female, estrogen receptor negative, unspecified site of breast (CMS/HCC) [C50.911, Z17.1])    Surgeon:  Nerissa Wang MD Provider:  Case Duval MD    Anesthesia Type:  general ASA Status:  3          Anesthesia Type: general    Vitals  Vitals Value Taken Time   /76 5/27/2020  8:41 AM   Temp 97 °F (36.1 °C) 5/27/2020  8:16 AM   Pulse 69 5/27/2020  8:46 AM   Resp 15 5/27/2020  8:46 AM   SpO2 96 % 5/27/2020  8:46 AM           Post Anesthesia Care and Evaluation    Patient location during evaluation: bedside  Patient participation: complete - patient participated  Level of consciousness: awake and alert  Pain score: 1  Pain management: adequate  Airway patency: patent  Anesthetic complications: No anesthetic complications  PONV Status: none  Cardiovascular status: acceptable  Respiratory status: acceptable  Hydration status: acceptable

## 2020-05-27 NOTE — ANESTHESIA PROCEDURE NOTES
Airway  Urgency: elective    Date/Time: 5/27/2020 7:35 AM  Airway not difficult    General Information and Staff    Patient location during procedure: OR  Anesthesiologist: Case Duval MD  CRNA: Raven Braun CRNA    Indications and Patient Condition  Indications for airway management: airway protection    Preoxygenated: yes  MILS maintained throughout  Mask difficulty assessment: 1 - vent by mask    Final Airway Details  Final airway type: supraglottic airway      Successful airway: classic  Size 4    Number of attempts at approach: 1  Assessment: lips, teeth, and gum same as pre-op and atraumatic intubation    Additional Comments  AOI X 1 PASS +BBS, +ETCO2, +R//F/C MOUTH TEETH GUMS SAME AS PREOP

## 2020-05-28 ENCOUNTER — INFUSION (OUTPATIENT)
Dept: ONCOLOGY | Facility: HOSPITAL | Age: 60
End: 2020-05-28

## 2020-05-28 ENCOUNTER — LAB (OUTPATIENT)
Dept: ONCOLOGY | Facility: CLINIC | Age: 60
End: 2020-05-28

## 2020-05-28 VITALS
RESPIRATION RATE: 20 BRPM | TEMPERATURE: 97.1 F | BODY MASS INDEX: 30.93 KG/M2 | SYSTOLIC BLOOD PRESSURE: 120 MMHG | DIASTOLIC BLOOD PRESSURE: 71 MMHG | HEART RATE: 83 BPM | OXYGEN SATURATION: 97 % | WEIGHT: 180.2 LBS

## 2020-05-28 DIAGNOSIS — C50.911 MALIGNANT NEOPLASM OF RIGHT BREAST IN FEMALE, ESTROGEN RECEPTOR NEGATIVE, UNSPECIFIED SITE OF BREAST (HCC): ICD-10-CM

## 2020-05-28 DIAGNOSIS — Z17.1 MALIGNANT NEOPLASM OF RIGHT BREAST IN FEMALE, ESTROGEN RECEPTOR NEGATIVE, UNSPECIFIED SITE OF BREAST (HCC): Primary | ICD-10-CM

## 2020-05-28 DIAGNOSIS — Z17.1 MALIGNANT NEOPLASM OF RIGHT BREAST IN FEMALE, ESTROGEN RECEPTOR NEGATIVE, UNSPECIFIED SITE OF BREAST (HCC): ICD-10-CM

## 2020-05-28 DIAGNOSIS — Z95.828 PORT-A-CATH IN PLACE: ICD-10-CM

## 2020-05-28 DIAGNOSIS — C50.911 MALIGNANT NEOPLASM OF RIGHT BREAST IN FEMALE, ESTROGEN RECEPTOR NEGATIVE, UNSPECIFIED SITE OF BREAST (HCC): Primary | ICD-10-CM

## 2020-05-28 LAB
ALBUMIN SERPL-MCNC: 3.87 G/DL (ref 3.5–5.2)
ALBUMIN/GLOB SERPL: 1.7 G/DL
ALP SERPL-CCNC: 67 U/L (ref 39–117)
ALT SERPL W P-5'-P-CCNC: 16 U/L (ref 1–33)
ANION GAP SERPL CALCULATED.3IONS-SCNC: 9.2 MMOL/L (ref 5–15)
AST SERPL-CCNC: 11 U/L (ref 1–32)
BASOPHILS # BLD AUTO: 0.06 10*3/MM3 (ref 0–0.2)
BASOPHILS NFR BLD AUTO: 0.8 % (ref 0–1.5)
BILIRUB SERPL-MCNC: 0.2 MG/DL (ref 0.2–1.2)
BUN BLD-MCNC: 16 MG/DL (ref 8–23)
BUN/CREAT SERPL: 20.3 (ref 7–25)
CALCIUM SPEC-SCNC: 9.4 MG/DL (ref 8.6–10.5)
CHLORIDE SERPL-SCNC: 105 MMOL/L (ref 98–107)
CO2 SERPL-SCNC: 25.8 MMOL/L (ref 22–29)
CREAT BLD-MCNC: 0.79 MG/DL (ref 0.57–1)
DEPRECATED RDW RBC AUTO: 58.4 FL (ref 37–54)
EOSINOPHIL # BLD AUTO: 0.01 10*3/MM3 (ref 0–0.4)
EOSINOPHIL NFR BLD AUTO: 0.1 % (ref 0.3–6.2)
ERYTHROCYTE [DISTWIDTH] IN BLOOD BY AUTOMATED COUNT: 16 % (ref 12.3–15.4)
GFR SERPL CREATININE-BSD FRML MDRD: 74 ML/MIN/1.73
GLOBULIN UR ELPH-MCNC: 2.3 GM/DL
GLUCOSE BLD-MCNC: 108 MG/DL (ref 65–99)
HCT VFR BLD AUTO: 33 % (ref 34–46.6)
HGB BLD-MCNC: 10.3 G/DL (ref 12–15.9)
IMM GRANULOCYTES # BLD AUTO: 0.02 10*3/MM3 (ref 0–0.05)
IMM GRANULOCYTES NFR BLD AUTO: 0.3 % (ref 0–0.5)
LYMPHOCYTES # BLD AUTO: 2.01 10*3/MM3 (ref 0.7–3.1)
LYMPHOCYTES NFR BLD AUTO: 27.7 % (ref 19.6–45.3)
MCH RBC QN AUTO: 31 PG (ref 26.6–33)
MCHC RBC AUTO-ENTMCNC: 31.2 G/DL (ref 31.5–35.7)
MCV RBC AUTO: 99.4 FL (ref 79–97)
MONOCYTES # BLD AUTO: 0.69 10*3/MM3 (ref 0.1–0.9)
MONOCYTES NFR BLD AUTO: 9.5 % (ref 5–12)
NEUTROPHILS # BLD AUTO: 4.47 10*3/MM3 (ref 1.7–7)
NEUTROPHILS NFR BLD AUTO: 61.6 % (ref 42.7–76)
NRBC BLD AUTO-RTO: 0 /100 WBC (ref 0–0.2)
PLATELET # BLD AUTO: 228 10*3/MM3 (ref 140–450)
PMV BLD AUTO: 9.8 FL (ref 6–12)
POTASSIUM BLD-SCNC: 3.9 MMOL/L (ref 3.5–5.2)
PROT SERPL-MCNC: 6.2 G/DL (ref 6–8.5)
RBC # BLD AUTO: 3.32 10*6/MM3 (ref 3.77–5.28)
SODIUM BLD-SCNC: 140 MMOL/L (ref 136–145)
WBC NRBC COR # BLD: 7.26 10*3/MM3 (ref 3.4–10.8)

## 2020-05-28 PROCEDURE — 96413 CHEMO IV INFUSION 1 HR: CPT

## 2020-05-28 PROCEDURE — 25010000002 PEGFILGRASTIM 6 MG/0.6ML PREFILLED SYRINGE KIT: Performed by: INTERNAL MEDICINE

## 2020-05-28 PROCEDURE — 80053 COMPREHEN METABOLIC PANEL: CPT | Performed by: INTERNAL MEDICINE

## 2020-05-28 PROCEDURE — 25010000002 PERTUZUMAB 420 MG/14ML SOLUTION 420 MG VIAL: Performed by: INTERNAL MEDICINE

## 2020-05-28 PROCEDURE — 25010000002 DOCETAXEL 20 MG/ML SOLUTION 4 ML VIAL: Performed by: INTERNAL MEDICINE

## 2020-05-28 PROCEDURE — 25010000002 FOSAPREPITANT PER 1 MG: Performed by: INTERNAL MEDICINE

## 2020-05-28 PROCEDURE — 85025 COMPLETE CBC W/AUTO DIFF WBC: CPT | Performed by: INTERNAL MEDICINE

## 2020-05-28 PROCEDURE — 25010000003 HEPARIN LOCK FLUSH PER 10 UNITS: Performed by: INTERNAL MEDICINE

## 2020-05-28 PROCEDURE — 25010000002 PALONOSETRON PER 25 MCG: Performed by: INTERNAL MEDICINE

## 2020-05-28 PROCEDURE — 96375 TX/PRO/DX INJ NEW DRUG ADDON: CPT

## 2020-05-28 PROCEDURE — 96367 TX/PROPH/DG ADDL SEQ IV INF: CPT

## 2020-05-28 PROCEDURE — 25010000002 DEXAMETHASONE SODIUM PHOSPHATE 20 MG/5ML SOLUTION: Performed by: INTERNAL MEDICINE

## 2020-05-28 PROCEDURE — 96377 APPLICATON ON-BODY INJECTOR: CPT

## 2020-05-28 PROCEDURE — 25010000002 CARBOPLATIN PER 50 MG: Performed by: INTERNAL MEDICINE

## 2020-05-28 PROCEDURE — 96417 CHEMO IV INFUS EACH ADDL SEQ: CPT

## 2020-05-28 PROCEDURE — 25010000002 TRASTUZUMAB-ANNS 420 MG RECONSTITUTED SOLUTION 1 EACH VIAL: Performed by: INTERNAL MEDICINE

## 2020-05-28 RX ORDER — HEPARIN SODIUM (PORCINE) LOCK FLUSH IV SOLN 100 UNIT/ML 100 UNIT/ML
500 SOLUTION INTRAVENOUS AS NEEDED
Status: CANCELLED | OUTPATIENT
Start: 2020-06-18

## 2020-05-28 RX ORDER — PALONOSETRON 0.05 MG/ML
0.25 INJECTION, SOLUTION INTRAVENOUS ONCE
Status: COMPLETED | OUTPATIENT
Start: 2020-05-28 | End: 2020-05-28

## 2020-05-28 RX ORDER — SODIUM CHLORIDE 9 MG/ML
250 INJECTION, SOLUTION INTRAVENOUS ONCE
Status: COMPLETED | OUTPATIENT
Start: 2020-05-28 | End: 2020-05-28

## 2020-05-28 RX ORDER — SODIUM CHLORIDE 0.9 % (FLUSH) 0.9 %
20 SYRINGE (ML) INJECTION AS NEEDED
Status: CANCELLED | OUTPATIENT
Start: 2020-05-28

## 2020-05-28 RX ORDER — HEPARIN SODIUM (PORCINE) LOCK FLUSH IV SOLN 100 UNIT/ML 100 UNIT/ML
300 SOLUTION INTRAVENOUS ONCE
Status: CANCELLED | OUTPATIENT
Start: 2020-05-28

## 2020-05-28 RX ORDER — SODIUM CHLORIDE 0.9 % (FLUSH) 0.9 %
10 SYRINGE (ML) INJECTION AS NEEDED
Status: CANCELLED | OUTPATIENT
Start: 2020-06-18

## 2020-05-28 RX ORDER — HEPARIN SODIUM (PORCINE) LOCK FLUSH IV SOLN 100 UNIT/ML 100 UNIT/ML
500 SOLUTION INTRAVENOUS AS NEEDED
Status: DISCONTINUED | OUTPATIENT
Start: 2020-05-28 | End: 2020-05-28 | Stop reason: HOSPADM

## 2020-05-28 RX ORDER — SODIUM CHLORIDE 0.9 % (FLUSH) 0.9 %
10 SYRINGE (ML) INJECTION AS NEEDED
Status: DISCONTINUED | OUTPATIENT
Start: 2020-05-28 | End: 2020-05-28 | Stop reason: HOSPADM

## 2020-05-28 RX ADMIN — DEXAMETHASONE SODIUM PHOSPHATE 12 MG: 4 INJECTION, SOLUTION INTRAMUSCULAR; INTRAVENOUS at 09:47

## 2020-05-28 RX ADMIN — PERTUZUMAB 420 MG: 30 INJECTION, SOLUTION, CONCENTRATE INTRAVENOUS at 10:40

## 2020-05-28 RX ADMIN — DOCETAXEL 140 MG: 20 INJECTION, SOLUTION, CONCENTRATE INTRAVENOUS at 12:15

## 2020-05-28 RX ADMIN — TRASTUZUMAB-ANNS 490 MG: 420 INJECTION, POWDER, LYOPHILIZED, FOR SOLUTION INTRAVENOUS at 11:44

## 2020-05-28 RX ADMIN — PEGFILGRASTIM 6 MG: KIT SUBCUTANEOUS at 14:00

## 2020-05-28 RX ADMIN — SODIUM CHLORIDE 250 ML: 9 INJECTION, SOLUTION INTRAVENOUS at 09:47

## 2020-05-28 RX ADMIN — SODIUM CHLORIDE, PRESERVATIVE FREE 500 UNITS: 5 INJECTION INTRAVENOUS at 13:55

## 2020-05-28 RX ADMIN — SODIUM CHLORIDE 150 MG: 9 INJECTION, SOLUTION INTRAVENOUS at 10:05

## 2020-05-28 RX ADMIN — CARBOPLATIN 620 MG: 10 INJECTION, SOLUTION INTRAVENOUS at 13:15

## 2020-05-28 RX ADMIN — PALONOSETRON HYDROCHLORIDE 0.25 MG: 0.25 INJECTION INTRAVENOUS at 09:47

## 2020-06-10 ENCOUNTER — OFFICE VISIT (OUTPATIENT)
Dept: ONCOLOGY | Facility: CLINIC | Age: 60
End: 2020-06-10

## 2020-06-10 ENCOUNTER — LAB (OUTPATIENT)
Dept: ONCOLOGY | Facility: CLINIC | Age: 60
End: 2020-06-10

## 2020-06-10 VITALS
SYSTOLIC BLOOD PRESSURE: 112 MMHG | RESPIRATION RATE: 18 BRPM | HEART RATE: 82 BPM | DIASTOLIC BLOOD PRESSURE: 77 MMHG | BODY MASS INDEX: 30.59 KG/M2 | WEIGHT: 178.2 LBS | TEMPERATURE: 97.1 F | OXYGEN SATURATION: 98 %

## 2020-06-10 DIAGNOSIS — C50.911 MALIGNANT NEOPLASM OF RIGHT BREAST IN FEMALE, ESTROGEN RECEPTOR NEGATIVE, UNSPECIFIED SITE OF BREAST (HCC): ICD-10-CM

## 2020-06-10 DIAGNOSIS — Z79.899 ENCOUNTER FOR MONITORING CARDIOTOXIC DRUG THERAPY: Primary | ICD-10-CM

## 2020-06-10 DIAGNOSIS — Z51.81 ENCOUNTER FOR MONITORING CARDIOTOXIC DRUG THERAPY: Primary | ICD-10-CM

## 2020-06-10 DIAGNOSIS — Z17.1 MALIGNANT NEOPLASM OF RIGHT BREAST IN FEMALE, ESTROGEN RECEPTOR NEGATIVE, UNSPECIFIED SITE OF BREAST (HCC): ICD-10-CM

## 2020-06-10 LAB
ALBUMIN SERPL-MCNC: 4.08 G/DL (ref 3.5–5.2)
ALBUMIN/GLOB SERPL: 1.7 G/DL
ALP SERPL-CCNC: 91 U/L (ref 39–117)
ALT SERPL W P-5'-P-CCNC: 23 U/L (ref 1–33)
ANION GAP SERPL CALCULATED.3IONS-SCNC: 9.4 MMOL/L (ref 5–15)
AST SERPL-CCNC: 13 U/L (ref 1–32)
BASOPHILS # BLD AUTO: 0.04 10*3/MM3 (ref 0–0.2)
BASOPHILS NFR BLD AUTO: 0.6 % (ref 0–1.5)
BILIRUB SERPL-MCNC: 0.2 MG/DL (ref 0.2–1.2)
BUN BLD-MCNC: 15 MG/DL (ref 8–23)
BUN/CREAT SERPL: 18.3 (ref 7–25)
CALCIUM SPEC-SCNC: 9.4 MG/DL (ref 8.6–10.5)
CHLORIDE SERPL-SCNC: 108 MMOL/L (ref 98–107)
CO2 SERPL-SCNC: 24.6 MMOL/L (ref 22–29)
CREAT BLD-MCNC: 0.82 MG/DL (ref 0.57–1)
DEPRECATED RDW RBC AUTO: 55.2 FL (ref 37–54)
EOSINOPHIL # BLD AUTO: 0.01 10*3/MM3 (ref 0–0.4)
EOSINOPHIL NFR BLD AUTO: 0.1 % (ref 0.3–6.2)
ERYTHROCYTE [DISTWIDTH] IN BLOOD BY AUTOMATED COUNT: 15.5 % (ref 12.3–15.4)
GFR SERPL CREATININE-BSD FRML MDRD: 71 ML/MIN/1.73
GLOBULIN UR ELPH-MCNC: 2.4 GM/DL
GLUCOSE BLD-MCNC: 100 MG/DL (ref 65–99)
HCT VFR BLD AUTO: 35.6 % (ref 34–46.6)
HGB BLD-MCNC: 11.3 G/DL (ref 12–15.9)
IMM GRANULOCYTES # BLD AUTO: 0.04 10*3/MM3 (ref 0–0.05)
IMM GRANULOCYTES NFR BLD AUTO: 0.6 % (ref 0–0.5)
LYMPHOCYTES # BLD AUTO: 1.31 10*3/MM3 (ref 0.7–3.1)
LYMPHOCYTES NFR BLD AUTO: 18.3 % (ref 19.6–45.3)
MCH RBC QN AUTO: 31.2 PG (ref 26.6–33)
MCHC RBC AUTO-ENTMCNC: 31.7 G/DL (ref 31.5–35.7)
MCV RBC AUTO: 98.3 FL (ref 79–97)
MONOCYTES # BLD AUTO: 0.29 10*3/MM3 (ref 0.1–0.9)
MONOCYTES NFR BLD AUTO: 4 % (ref 5–12)
NEUTROPHILS # BLD AUTO: 5.48 10*3/MM3 (ref 1.7–7)
NEUTROPHILS NFR BLD AUTO: 76.4 % (ref 42.7–76)
NRBC BLD AUTO-RTO: 0 /100 WBC (ref 0–0.2)
PLATELET # BLD AUTO: 189 10*3/MM3 (ref 140–450)
PMV BLD AUTO: 9.3 FL (ref 6–12)
POTASSIUM BLD-SCNC: 4.2 MMOL/L (ref 3.5–5.2)
PROT SERPL-MCNC: 6.5 G/DL (ref 6–8.5)
RBC # BLD AUTO: 3.62 10*6/MM3 (ref 3.77–5.28)
SODIUM BLD-SCNC: 142 MMOL/L (ref 136–145)
WBC NRBC COR # BLD: 7.17 10*3/MM3 (ref 3.4–10.8)

## 2020-06-10 PROCEDURE — 36415 COLL VENOUS BLD VENIPUNCTURE: CPT | Performed by: INTERNAL MEDICINE

## 2020-06-10 PROCEDURE — 80053 COMPREHEN METABOLIC PANEL: CPT | Performed by: INTERNAL MEDICINE

## 2020-06-10 PROCEDURE — 85025 COMPLETE CBC W/AUTO DIFF WBC: CPT | Performed by: INTERNAL MEDICINE

## 2020-06-10 PROCEDURE — 99214 OFFICE O/P EST MOD 30 MIN: CPT | Performed by: INTERNAL MEDICINE

## 2020-06-10 NOTE — PROGRESS NOTES
NAME: Jackie Johnson    : 1960    DATE:  6/10/2020    DIAGNOSIS:   Clinical Stage IIB ([id number]) poorly differentiated invasive ductal carcinoma of the right breast.  Tumor spans 15 cm.  There are no clinically or radiographically supspicious axillary LNs.  There is associated high grade DCIS.  Tumor is ER-,KY-, HER-2 +  There is also an abnormal finding on L breast MRI which appears suspicious (BIRADS-5).  The abnormality was no longer present after C1 chemotherapy.    CHIEF COMPLAINT:  Follow up of Breast Cancer    TREATMENT HISTORY:  1.        HISTORY OF PRESENT ILLNESS:   Jackie Johnson is a very pleasant 60 y.o. female who was referred by Dr. STACIE Richards for evaluation and treatment of breast cancer. She has a strong family h/o breast cancer and so is normally very good about getting her mammograms done.  She recently moved from [facility] however and so missed her mammogram in  because of the move.  She got established with a new PCP (Dr. Denver Sheth) in 2019 and was referred to a a new gynecologist  (Dr. Brayan Richards) who she saw in .  Kevin Richards ordered a screening mammogram.  She had an acute febrile illness around the time of the Super Bowl and at that time thought she might have a lump in her R breast.  While waiting on mammogram scheduling, she started to have pain in the breast and in eventually she would occasionally get a little bit of discharge from the right nipple.  She presented for  Screening mammography 19 as below.  This was followed by diagnostic mammogram and U/S on 20.  She had an abnormal span of 15 cm spanning the R breast.  She underwent stereotactic biopsy on 20.    This revealed a poorly differentiated invasive ductal carcinoma of the R breast with associated high grade DCIS.  Tumor was ER/KY- and HER-2+ by IHC.  Her sister lives in [place] and was treated at [facility], so she went there for further  evaluation. She saw Dr. Romero of medical oncology and Dr. Serrano of surgical oncology.  She had staging with CT CAP and bone scan as well as Brain MRI and had no evidence of distant metastatic spread.  She had reese breast MRI as well which showed an unexpected abnormality in the L breast described as linear non-mass enhancement spanning an 8 cm area in the inferior breast at 6:00.  This was biopsied on 20 with biopsy result pending.  No abnormal axillary LNs were identified.  Neoadjuvant chemotherapy was recommended to her by her [facility] team of physicians but she wishes to seek this treatment closer to home so is here today for further evaluation and treatment.    She is in good health overall. She reports an isolated episode of fever/chills during an acute illness, abdominal fullness to her right side, fatigue and a rash circling her neck. She denies changes in weight, cp, sob, persistent abdominal pain, n/v/c/d or bony pain.      INTERVAL HISTORY:  Ms. Johnson is here today for follow up of breast cancer. She has received 4 cycles of TCHP and has tolerated her treatment well to date. She underwent repeat mammography as below.  She says that she hasn't had any discharge from the R nipple since a few days after her first treatment.  She says she also has less pain in the breast tissue bilaterally.  She is scheduled to return to [facility] the week of 7/15/20 for surgical planning.  She complains of fatigue but is otherwise doing well.     PAST MEDICAL HISTORY:  Past Medical History:   Diagnosis Date   • Acid reflux    • Asthma    • Breast cancer (CMS/HCC) 2020    rt br ca   • Cancer (CMS/HCC)    • Eczema    • Endometriosis    • Hiatal hernia    • High cholesterol    • Seasonal allergies    • Staph infection 2020    port       PAST SURGICAL HISTORY:  Past Surgical History:   Procedure Laterality Date   • ABDOMINAL SURGERY     • BREAST BIOPSY Bilateral    •  SECTION     • D&C HYSTEROSCOPY  ENDOMETRIAL ABLATION     • DIAGNOSTIC LAPAROSCOPY     • FRACTURE SURGERY Left     tib/fib   • LEG SURGERY      Left leg following fracture   • PORTACATH PLACEMENT N/A 3/18/2020    Procedure: INSERTION OF PORTACATH;  Surgeon: Dudley Aldridge MD;  Location: [facility] OR;  Service: General;  Laterality: N/A;   • SKIN BIOPSY     • TONSILLECTOMY     • VENOUS ACCESS DEVICE (PORT) INSERTION N/A 5/27/2020    Procedure: INSERTION VENOUS ACCESS DEVICE;  Surgeon: Nerissa Wang MD;  Location: [facility] OR;  Service: General;  Laterality: N/A;   • VENOUS ACCESS DEVICE (PORT) REMOVAL Right 4/19/2020    Procedure: REMOVAL VENOUS ACCESS DEVICE;  Surgeon: Nerissa Wang MD;  Location:  [facility] OR;  Service: General;  Laterality: Right;       FAMILY HISTORY:  Family History   Problem Relation Age of Onset   • Breast cancer Mother 75   • Basal cell carcinoma Mother 90        2 small spots on face removed + fair complexion   • Breast cancer Sister 55        VUS detected on genetic testing CDKN2A gene (c.-33G>C).    • Heart disease Father    • Throat cancer Father 79   • Lung cancer Father 79   • Cancer Maternal Grandmother 65        unknown gynecologic cancer, cervical vs uterine   • Lymphoma Maternal Grandfather 64   • Breast cancer Paternal Grandmother 70   • Prostate cancer Paternal Grandfather 70        metastastic to liver   • Breast cancer Paternal Aunt 77   • Breast cancer Maternal Aunt 80   • Breast cancer Other    • Ovarian cancer Sister 66        Elevated ; 1A tumor; Borderline cancer; both ovaries & lg cyst removed   • Ovarian cancer Maternal Cousin 54   • Breast cancer Other 50         SOCIAL HISTORY:  Social History     Socioeconomic History   • Marital status:      Spouse name: Not on file   • Number of children: Not on file   • Years of education: Not on file   • Highest education level: Not on file   Tobacco Use   • Smoking status: Never Smoker   • Smokeless tobacco: Never Used   Substance and Sexual  Activity   • Alcohol use: Never     Frequency: Never   • Drug use: Never   • Sexual activity: Defer     Partners: Male     Birth control/protection: None   Social History Narrative    lives alone with      with [facility]    No unusual chemical exposures    Never smoker, never drinker       REVIEW OF SYSTEMS:   A comprehensive 14 point review of systems was performed.  Significant findings as mentioned above.  All other systems reviewed and are negative.      MEDICATIONS:  The current medication list was reviewed in the EMR    Current Outpatient Medications:   •  acetaminophen (TYLENOL) 325 MG tablet, Take 650 mg by mouth Every 6 (Six) Hours As Needed for Mild Pain ., Disp: , Rfl:   •  calcium carbonate (TUMS) 500 MG chewable tablet, Chew 1 tablet Daily As Needed for Indigestion or Heartburn., Disp: , Rfl:   •  dexamethasone (DECADRON) 4 MG tablet, Take 2 tablets by mouth daily for 3 days, Disp: 6 tablet, Rfl: 3  •  doxycycline (VIBRAMYCIN) 100 MG capsule, Take 100 mg by mouth 2 (Two) Times a Day., Disp: , Rfl:   •  famotidine (PEPCID) 10 MG tablet, Take 10 mg by mouth As Needed for Indigestion or Heartburn., Disp: , Rfl:   •  HYDROcodone-acetaminophen (Norco) 7.5-325 MG per tablet, Take 1 tablet by mouth 4 (Four) Times a Day As Needed for Moderate Pain ., Disp: 10 tablet, Rfl: 0  •  levalbuterol (XOPENEX HFA) 45 MCG/ACT inhaler, Inhale 1-2 puffs Every 4 (Four) Hours As Needed for Wheezing., Disp: , Rfl:   •  loratadine (CLARITIN) 10 MG tablet, Take 10 mg by mouth 2 (Two) Times a Day., Disp: , Rfl:   •  ondansetron (ZOFRAN) 8 MG tablet, Take 1 tablet by mouth 3 (Three) Times a Day As Needed for Nausea or Vomiting., Disp: 30 tablet, Rfl: 5  •  prochlorperazine (COMPAZINE) 10 MG tablet, Take 1 tablet by mouth Every 6 (Six) Hours As Needed for Nausea or Vomiting., Disp: 60 tablet, Rfl: 3  •  sennosides-docusate (Senna-S) 8.6-50 MG per tablet, Take 2 tablets by mouth 2 (Two) Times a Day.,  Disp: 120 tablet, Rfl: 5    ALLERGIES:    Allergies   Allergen Reactions   • Albuterol Other (See Comments)     One time severe headache, questionable for aneurysm, did spinal tap was positive, did Angiogram and MRI were Negative for aneurysm   • Penicillins Hives   • Cefpodoxime Rash     vantin     • Clindamycin Hcl Rash   • Crestor [Rosuvastatin Calcium] Other (See Comments)     Caused high liver enxymes   • Sulfa Antibiotics Other (See Comments)     Mucosal lesions   • Pseudoephedrine Other (See Comments)     Soreness on the tongue       PHYSICAL EXAM:  Vitals:    06/10/20 1336   BP: 112/77   Pulse: 82   Resp: 18   Temp: 97.1 °F (36.2 °C)   SpO2: 98%     Pain Score    06/10/20 1336   PainSc: 0-No pain     General:  Awake, alert and oriented, appears well. In good spirits  HEENT:  Pupils are equal, round and reactive to light and accommodation, Extra-ocular movements full, Oropharyx clear, mucous membranes moist  Neck:  No JVD, thyromegaly or lymphadenopathy, prior PAC associated cellulitis completely resolved.  CV:  Regular rate and rhythm, no murmurs, rubs or gallops  Resp:  Lungs are clear to auscultation bilaterally.  PAC site c/d/i.  Breast:  She is large breasted.  L breast has some fibrocystic changes but no palpable mass lesions. No enlarged L axillary LNs.  R breast is large.  There has never really been a clearly defined mass on exam and this continues to be the case.  There are some fibrocystic changes.   No nipple d/c or inversion.  No palpable R axillary LNs.  Abd:  Soft, non-tender, sl distended, bowel sounds present, no organomegaly or masses  Ext:  No clubbing, cyanosis or edema.  PAC in place L chest wall c/d/i and well-healed.  Lymph:  No cervical, supraclavicular, axillary, inguinal or femoral adenopathy  Neuro:  MS as above, CN II-XII intact, grossly non-focal exam      PATHOLOGY:  02-12-20            ENDOSCOPY:        IMAGING:  Bilateral Diagnostic mammogram 12-27-19  FINDINGS:  There are  scattered areas of fibroglandular density.     The bilateral fibroglandular pattern is unremarkable in appearance. A  few scattered calcifications are present in the left breast. There are  calcifications in the central aspect of the right breast spanning  approximately 15 cm of tissue in the AP dimension and extending up to  the base of the nipple for which additional imaging is recommended.     IMPRESSION:  1. Benign left mammographic findings.  2. Calcifications in the right breast. Recommend additional imaging.        BI-RADS CATEGORY:  0, INCOMPLETE: Need additional imaging evaluation.     RECOMMENDATION:  Right ML and CC magnification views.      R diagnostic mammogram 01-16-20  FINDINGS:  Magnification imaging of the right breast demonstrates  casting-type pleomorphic calcifications spanning over 15 cm of tissue in  the AP dimension in the right 6:00 to 7:00 region. Calcifications do  extend up to the base of the nipple. Findings are highly suspicious for  DCIS.     Right breast ultrasound: Focused sonographic evaluation of the right  6:00 to 7:00 region demonstrates a single 0.5 cm irregular hypoechoic  mass associated with a coarse calcification located at 7:00, 3 cm from  the nipple. Ultrasound of the right axilla demonstrates no abnormal  appearing axillary lymph nodes.        IMPRESSION:  Findings are highly suspicious for extensive DCIS in the  right 6:00 to 7:00 region extending into the base of the nipple. There  is also small irregular mass noted on ultrasound in the 7:00 region  suspicious for invasion.        BI-RADS CATEGORY:  5, HIGHLY SUGGESTIVE OF MALIGNANCY        RECOMMENDATION:  Recommend stereotactic guided core biopsy of the  anterior and posterior aspect of the calcifications in the 6:00 to 7:00  region to determine extent of disease. Ultrasound-guided core biopsy of  the right 7:00 mass may also be warranted pending pathology results of  the calcifications.    Diagnostic bilateral  mammogram 02-25-20  FINDINGS:  There are scattered areas of fibroglandular density.    1:  There are fine-linear branching calcifications measuring 16 x 7 x 7  centimeters with segmental distribution and associated post biopsy clip in the  right breast lower hemisphere at 6 to 7 o'clock.  There are 2 post biopsy clips  noted.  The calcifications extend to the inferior skin and nipple.    2:  There are amorphous calcifications measuring 0.3 centimeters in the left  breast lower hemisphere at 6 to 7 o'clock located 4 centimeters from the nipple.    IMPRESSION:  1:  Fine-linear branching calcifications in the right breast lower hemisphere at  6 to 7 o'clock are known biopsy-proven malignancy. Ultrasound is recommended for  staging. Recommend appropriate evaluation and treatment of this known malignancy.    2:  Amorphous calcifications in the left breast lower hemisphere at 6 to 7  o'clock located 4 centimeters from the nipple are suspicious. Stereotactic  biopsy is recommended.    BI-RADS Category 4:  Suspicious Abnormality      US Chest 02-25-20  Findings:       Right Breast: The extent of calcified disease is best visualized by mammography. There are dilated ducts with internal calcifications vascularity extending toward the nipple at the 7 o'clock position. This is consistent with the patient's known biopsy-proven malignancy.      Right Charles Basins: No suspicious axillary (level I, II & III) or internal mammary lymphadenopathy is noted.      IMPRESSION:    1. Known RIGHT breast malignancy is best visualized by mammography. A MRI may be obtained for evaluation of disease.    2. No suspicious RIGHT-sided lymphadenopathy      ACR BI-RADS Category: 6. Known malignancy.  Recommend appropriate evaluation and treatment of the known malignancy.         R breast US 02-25-20  Findings:       Right Breast: The extent of calcified disease is best visualized by mammography. There are dilated ducts with internal calcifications  vascularity extending toward the nipple at the 7 o'clock position. This is consistent with the patient's known biopsy-proven malignancy.      Right Charles Basins: No suspicious axillary (level I, II & III) or internal mammary lymphadenopathy is noted.      IMPRESSION:    1. Known RIGHT breast malignancy is best visualized by mammography. A MRI may be obtained for evaluation of disease.    2. No suspicious RIGHT-sided lymphadenopathy      ACR BI-RADS Category: 6. Known malignancy.  Recommend appropriate evaluation and treatment of the known malignancy.       MRI Brain w/wo contrast 02-27-20  FINDINGS:     No abnormal parenchymal or leptomeningeal enhancement is noted. The brain parenchyma is unremarkable except of small scattered T2 FLAIR hyperintensities consistent with chronic microangiopathic changes. No acute ischemia, intra or extra-axial hemorrhage, mass effect or midline shift. No hydrocephalus is noted.    The osseous structures and extra-cranial soft tissues are unremarkable.    The orbital structures unremarkable.    The paranasal sinuses and mastoid air cells are clear.    IMPRESSION:  No evidence of metastatic disease.      CTCAP 02-28-20    Findings:       CHEST:  The breasts are ptotic and there are biopsy clips projecting between the lower quadrants of the right breast.    On image 99 of series 3, there is a nonspecific 1 cm soft tissue nodule in the lower outer quadrant of the right breast.    No lung nodules or pleural effusions are present.    No axillary, internal mammary, mediastinal or hilar adenopathy is seen.    No suspicious bone lesions are present.      ABDOMEN and PELVIS:  The liver is borderline enlarged and diffusely fatty infiltrated without measurable mass or biliary ductal dilatation.    The gallbladder appears normal.    The spleen, pancreas and adrenal glands appear normal.    The kidneys demonstrate function without hydronephrosis and there is a small low dense nidus in the mid right  kidney, likely a cyst.    No adenopathy or ascites are seen in the abdomen or pelvis.    A normal-appearing retrocecal appendix is seen.    Multilevel degenerative-like bone changes are present.      IMPRESSION:  No evidence for metastatic disease the chest, abdomen or pelvis.      MRI Breast bilateral 03-02-20    Findings:  The breast composition is heterogeneous fibroglandular tissue. There is no significant early background parenchymal enhancement.    Right breast: There is linearly oriented nonmass enhancement involving the lateral and inferior aspects (anterior, middle, and posterior third) of the right breast 6 - 7 o'clock position extending 17 cm from the base of the nipple to the pectoralis muscle (series 5 image  of 256). There is no evidence for pectoralis muscle invasion. Extension to the inferior skin is better demonstrated mammographically. Susceptibility artifact from 2 postbiopsy clips is noted.    Left breast: There is linearly oriented nonmass enhancement in the inferior aspect (middle third) of the left breast 6 o'clock position extending approximately 8 cm (series 5 image  of 256). There is no evidence for nipple, skin, or pectoralis muscle involvement. Stereotactic guided biopsy of calcifications at the 6 o'clock position to be performed later today and corresponds to the anterior aspect of the nonmass enhancement. MRI guided biopsy of nonmass enhancement seen on image 94 of 256, series 5 and image 40 of 160, series 8 is recommended (The images have been annotated with a [employer]).    Charles Basins: There is no lymphadenopathy in the visualized axillary or internal mammary regions.    IMPRESSION:  Linear nonmass enhancement representing known malignancy right breast as above. Linear nonmass enhancement left breast as above.   MRI guided biopsy of nonmass enhancement seen on image 94 of 256, series 5 and image 40 of 160, series 8 is recommended.    ACR BI-RADS Category: 6. Recommend  MR-guided biopsy of nonmass enhancement left breast as above .Recommend appropriate evaluation and treatment of the known malignancy.       Bone scan 03-02-20  FINDINGS:     Radiotracer uptake secondary to degenerative changes are seen involving the shoulders, hips, knees, and feet. Increased uptake along the right tibial shaft likely is likely reactive. Faint focus of activity within the left tibial shaft is compatible with prior injury/fracture. Increased uptake in the skull is compatible with a benign hyperostosis.    Physiologic uptake of the bilateral kidneys and bladder.    IMPRESSION:    No scintigraphic evidence of skeletal metastases.      Echo 03-02-20    Bilateral diagnostic mammogram 05-26-20  FINDINGS: There are scattered areas of fibroglandular density.     A postbiopsy marking clip is now noted in the left breast with expected  surrounding postbiopsy changes best visualized on MLO imaging. The  remaining bilateral fibroglandular pattern is stable in appearance.  Redemonstrated are fine linear branching calcifications in the right  6:00 to 7:00 region spanning approximately 17 cm of tissue in the AP  dimension. There are 2 associated postbiopsy marking clips. No new areas  of calcification are seen in either breast.     IMPRESSION:  1. Benign left mammographic findings.        2. No significant change in the appearance of calcifications in the  right breast.        BI-RADS CATEGORY:  6, KNOWN BIOPSY PROVEN MALIGNANCY        RECOMMENDATION:  Recommend clinical follow-up.       RECENT LABS:  Lab Results   Component Value Date    WBC 7.17 06/10/2020    HGB 11.3 (L) 06/10/2020    HCT 35.6 06/10/2020    MCV 98.3 (H) 06/10/2020    RDW 15.5 (H) 06/10/2020     06/10/2020    NEUTRORELPCT 76.4 (H) 06/10/2020    LYMPHORELPCT 18.3 (L) 06/10/2020    MONORELPCT 4.0 (L) 06/10/2020    EOSRELPCT 0.1 (L) 06/10/2020    BASORELPCT 0.6 06/10/2020    NEUTROABS 5.48 06/10/2020    LYMPHSABS 1.31 06/10/2020       Lab  Results   Component Value Date     06/10/2020    K 4.2 06/10/2020    CO2 24.6 06/10/2020     (H) 06/10/2020    BUN 15 06/10/2020    CREATININE 0.82 06/10/2020    EGFRIFNONA 71 06/10/2020    EGFRIFAFRI 84 02/26/2020    GLUCOSE 100 (H) 06/10/2020    CALCIUM 9.4 06/10/2020    ALKPHOS 91 06/10/2020    AST 13 06/10/2020    ALT 23 06/10/2020    BILITOT 0.2 06/10/2020    ALBUMIN 4.08 06/10/2020    PROTEINTOT 6.5 06/10/2020     Lab Results   Component Value Date     15.1 03/11/2020     Lab Results   Component Value Date    LABCA2 19.0 03/11/2020             ASSESSMENT & PLAN:  Jackie Johnson is a very pleasant 60 y.o. female with what is clinically a Stage IIB ([id number]) poorly differentiated invasive ductal carcinoma of the right breast.  Tumor spans 15 cm.  There are no clinically or radiographically supspicious axillary LNs.  There is associated high grade DCIS.  Tumor is ER-,NV-, HER-2 +  There is also an abnormal finding on L breast MRI which is suspicious (BIRADS-5).  This has been biopsied.    1.  Right breast cancer:  -  Currently taking  neoadjuvant chemotherapy to downsize tumor and potentially allow for a more successful surgery.  -  Specifically, I recommended neoadjuvant Taxotere, Carboplatin, Herceptin and Perjeta with growth factor support.  -  Given that she really doesn't have palpable disease, will ordered interim mammogram and U/S prior to C4 therapy.  This showed a stable appearance of abnormal calcifications.  No defined mass.  No evidence of progression.    -  Will complete last 2 cycles of chemotherapy and she will return to [facility] the week of 7/15 for surgical planning.  As discussed below, would favor bilateral mastectomies.  -   Following surgery, she will require adjuvant radiation given the size of her tumor and completion of a year of anti-HER-2 therapy.           -  Repeat echocardiogram due mid July.  Will order this to be done after she returns from [facility].    2.   Abnormality on L Breast visible on MRI only :    -  Biopsy done at [facility] on 2-28-20.  This isn't visible on care everywhere, but she says the biopsy was benign.  Plan was made for repeat MRI  Guided bx after 1st cycle of treatment, but biopsy could not be done b/c the lesion was no longer present.  Given improvement on treatment,  I would consider this lesion to be malignant and would be in favor of bilateral mastectomies.  Ms. Johnson says she also has a strong preference for bilateral mastectomies because she says she had pain in the left breast similar to the cancer-related pain she was having in the R breast prior to treatment and pain on both sides has subsided on treatment.    3.  Very strong family history of malignancy including several members with breast cancer and a sister with ovarian cancer.  -  She underwent genetic testing at [facility] and testing was negative.  While she has no known genetic mutation, given her personal history combined with her family history, I would be in favor of bilateral mastectomy.  I think it is also likely that given the size of her tumor she would likely have significant asymmetry after unilateral surgery.    4.  Prophylaxis:  She has had 2019 flu vaccine.  Recommended she get Prevnar 13 which she also had.  She is a lifelong non-smoker.    5.  ACO / MARNI/Other  Quality measures  -  Jackie Johnson received 2019 flu vaccine.  -  Jackie Johnson reports a pain score of 0.  Given her pain assessment as noted, treatment options were discussed and the following options were decided upon as a follow-up plan to address the patient's pain: No intervention needed..  -  Current outpatient and discharge medications have been reconciled for the patient.  Reviewed by: Fatoumata Obrien MD       6.  Follow-up:  I will see her back on 7-9 when she is here for rx.    This note was scribed for Fatoumata Obrien MD by Alba Siu RN.    I, Fatoumata Obrien MD,  personally performed the services described in this documentation as scribed by the above named individual in my presence, and it is both accurate and complete.  06/10/2020                                                                                                                                                 I spent 25 minutes with Jackie Johnson today.  In the office today, more than 50% of this time was spent face-to-face with her  in counseling / coordination of care, reviewing her medical history and counseling on the current treatment plan.  All questions were answered to her satisfaction      Electronically Signed by: Fatoumata Obrien MD      CC:   MD JUAN PABLO Macdonald MD Bora Lim, MD- [facility]  Kadeem Serrano MD- MD [place] [facility]

## 2020-06-14 DIAGNOSIS — C50.911 MALIGNANT NEOPLASM OF RIGHT BREAST IN FEMALE, ESTROGEN RECEPTOR NEGATIVE, UNSPECIFIED SITE OF BREAST (HCC): ICD-10-CM

## 2020-06-14 DIAGNOSIS — Z17.1 MALIGNANT NEOPLASM OF RIGHT BREAST IN FEMALE, ESTROGEN RECEPTOR NEGATIVE, UNSPECIFIED SITE OF BREAST (HCC): ICD-10-CM

## 2020-06-14 RX ORDER — PALONOSETRON 0.05 MG/ML
0.25 INJECTION, SOLUTION INTRAVENOUS ONCE
Status: CANCELLED | OUTPATIENT
Start: 2020-06-18

## 2020-06-14 RX ORDER — SODIUM CHLORIDE 9 MG/ML
250 INJECTION, SOLUTION INTRAVENOUS ONCE
Status: CANCELLED | OUTPATIENT
Start: 2020-06-18

## 2020-06-14 RX ORDER — FAMOTIDINE 10 MG/ML
20 INJECTION, SOLUTION INTRAVENOUS AS NEEDED
Status: CANCELLED | OUTPATIENT
Start: 2020-06-18

## 2020-06-14 RX ORDER — DIPHENHYDRAMINE HYDROCHLORIDE 50 MG/ML
50 INJECTION INTRAMUSCULAR; INTRAVENOUS AS NEEDED
Status: CANCELLED | OUTPATIENT
Start: 2020-06-18

## 2020-06-17 ENCOUNTER — TELEPHONE (OUTPATIENT)
Dept: ONCOLOGY | Facility: HOSPITAL | Age: 60
End: 2020-06-17

## 2020-06-18 ENCOUNTER — INFUSION (OUTPATIENT)
Dept: ONCOLOGY | Facility: HOSPITAL | Age: 60
End: 2020-06-18

## 2020-06-18 VITALS
SYSTOLIC BLOOD PRESSURE: 115 MMHG | WEIGHT: 178.4 LBS | BODY MASS INDEX: 30.62 KG/M2 | HEART RATE: 82 BPM | TEMPERATURE: 96.2 F | OXYGEN SATURATION: 98 % | DIASTOLIC BLOOD PRESSURE: 81 MMHG | RESPIRATION RATE: 18 BRPM

## 2020-06-18 DIAGNOSIS — C50.911 MALIGNANT NEOPLASM OF RIGHT BREAST IN FEMALE, ESTROGEN RECEPTOR NEGATIVE, UNSPECIFIED SITE OF BREAST (HCC): Primary | ICD-10-CM

## 2020-06-18 DIAGNOSIS — Z95.828 PORT-A-CATH IN PLACE: ICD-10-CM

## 2020-06-18 DIAGNOSIS — Z17.1 MALIGNANT NEOPLASM OF RIGHT BREAST IN FEMALE, ESTROGEN RECEPTOR NEGATIVE, UNSPECIFIED SITE OF BREAST (HCC): Primary | ICD-10-CM

## 2020-06-18 LAB
ALBUMIN SERPL-MCNC: 4.13 G/DL (ref 3.5–5.2)
ALBUMIN/GLOB SERPL: 1.8 G/DL
ALP SERPL-CCNC: 69 U/L (ref 39–117)
ALT SERPL W P-5'-P-CCNC: 20 U/L (ref 1–33)
ANION GAP SERPL CALCULATED.3IONS-SCNC: 9.3 MMOL/L (ref 5–15)
AST SERPL-CCNC: 14 U/L (ref 1–32)
BASOPHILS # BLD AUTO: 0.06 10*3/MM3 (ref 0–0.2)
BASOPHILS NFR BLD AUTO: 1.3 % (ref 0–1.5)
BILIRUB SERPL-MCNC: 0.3 MG/DL (ref 0.2–1.2)
BUN BLD-MCNC: 20 MG/DL (ref 8–23)
BUN/CREAT SERPL: 25.3 (ref 7–25)
CALCIUM SPEC-SCNC: 9.1 MG/DL (ref 8.6–10.5)
CHLORIDE SERPL-SCNC: 105 MMOL/L (ref 98–107)
CO2 SERPL-SCNC: 23.7 MMOL/L (ref 22–29)
CREAT BLD-MCNC: 0.79 MG/DL (ref 0.57–1)
DEPRECATED RDW RBC AUTO: 57.2 FL (ref 37–54)
EOSINOPHIL # BLD AUTO: 0.01 10*3/MM3 (ref 0–0.4)
EOSINOPHIL NFR BLD AUTO: 0.2 % (ref 0.3–6.2)
ERYTHROCYTE [DISTWIDTH] IN BLOOD BY AUTOMATED COUNT: 15.6 % (ref 12.3–15.4)
GFR SERPL CREATININE-BSD FRML MDRD: 74 ML/MIN/1.73
GLOBULIN UR ELPH-MCNC: 2.3 GM/DL
GLUCOSE BLD-MCNC: 155 MG/DL (ref 65–99)
HCT VFR BLD AUTO: 35.9 % (ref 34–46.6)
HGB BLD-MCNC: 11.1 G/DL (ref 12–15.9)
IMM GRANULOCYTES # BLD AUTO: 0.01 10*3/MM3 (ref 0–0.05)
IMM GRANULOCYTES NFR BLD AUTO: 0.2 % (ref 0–0.5)
LYMPHOCYTES # BLD AUTO: 1.27 10*3/MM3 (ref 0.7–3.1)
LYMPHOCYTES NFR BLD AUTO: 26.6 % (ref 19.6–45.3)
MCH RBC QN AUTO: 30.8 PG (ref 26.6–33)
MCHC RBC AUTO-ENTMCNC: 30.9 G/DL (ref 31.5–35.7)
MCV RBC AUTO: 99.7 FL (ref 79–97)
MONOCYTES # BLD AUTO: 0.34 10*3/MM3 (ref 0.1–0.9)
MONOCYTES NFR BLD AUTO: 7.1 % (ref 5–12)
NEUTROPHILS # BLD AUTO: 3.08 10*3/MM3 (ref 1.7–7)
NEUTROPHILS NFR BLD AUTO: 64.6 % (ref 42.7–76)
NRBC BLD AUTO-RTO: 0 /100 WBC (ref 0–0.2)
PLATELET # BLD AUTO: 241 10*3/MM3 (ref 140–450)
PMV BLD AUTO: 9.4 FL (ref 6–12)
POTASSIUM BLD-SCNC: 3.9 MMOL/L (ref 3.5–5.2)
PROT SERPL-MCNC: 6.4 G/DL (ref 6–8.5)
RBC # BLD AUTO: 3.6 10*6/MM3 (ref 3.77–5.28)
SODIUM BLD-SCNC: 138 MMOL/L (ref 136–145)
WBC NRBC COR # BLD: 4.77 10*3/MM3 (ref 3.4–10.8)

## 2020-06-18 PROCEDURE — 96377 APPLICATON ON-BODY INJECTOR: CPT

## 2020-06-18 PROCEDURE — 25010000003 HEPARIN LOCK FLUSH PER 10 UNITS: Performed by: INTERNAL MEDICINE

## 2020-06-18 PROCEDURE — 25010000002 TRASTUZUMAB-ANNS 420 MG RECONSTITUTED SOLUTION 1 EACH VIAL: Performed by: INTERNAL MEDICINE

## 2020-06-18 PROCEDURE — 96367 TX/PROPH/DG ADDL SEQ IV INF: CPT

## 2020-06-18 PROCEDURE — 96417 CHEMO IV INFUS EACH ADDL SEQ: CPT

## 2020-06-18 PROCEDURE — 25010000002 CARBOPLATIN PER 50 MG: Performed by: INTERNAL MEDICINE

## 2020-06-18 PROCEDURE — 25010000002 PALONOSETRON PER 25 MCG: Performed by: INTERNAL MEDICINE

## 2020-06-18 PROCEDURE — 25010000002 FOSAPREPITANT PER 1 MG: Performed by: INTERNAL MEDICINE

## 2020-06-18 PROCEDURE — 85025 COMPLETE CBC W/AUTO DIFF WBC: CPT

## 2020-06-18 PROCEDURE — 25010000002 PERTUZUMAB 420 MG/14ML SOLUTION 420 MG VIAL: Performed by: INTERNAL MEDICINE

## 2020-06-18 PROCEDURE — 25010000002 DEXAMETHASONE SODIUM PHOSPHATE 20 MG/5ML SOLUTION: Performed by: INTERNAL MEDICINE

## 2020-06-18 PROCEDURE — 96375 TX/PRO/DX INJ NEW DRUG ADDON: CPT

## 2020-06-18 PROCEDURE — 80053 COMPREHEN METABOLIC PANEL: CPT

## 2020-06-18 PROCEDURE — 25010000002 PEGFILGRASTIM 6 MG/0.6ML PREFILLED SYRINGE KIT: Performed by: INTERNAL MEDICINE

## 2020-06-18 PROCEDURE — 96413 CHEMO IV INFUSION 1 HR: CPT

## 2020-06-18 PROCEDURE — 25010000002 DOCETAXEL 20 MG/ML SOLUTION 4 ML VIAL: Performed by: INTERNAL MEDICINE

## 2020-06-18 RX ORDER — SODIUM CHLORIDE 0.9 % (FLUSH) 0.9 %
20 SYRINGE (ML) INJECTION AS NEEDED
Status: CANCELLED | OUTPATIENT
Start: 2020-06-18

## 2020-06-18 RX ORDER — SODIUM CHLORIDE 9 MG/ML
250 INJECTION, SOLUTION INTRAVENOUS ONCE
Status: COMPLETED | OUTPATIENT
Start: 2020-06-18 | End: 2020-06-18

## 2020-06-18 RX ORDER — HEPARIN SODIUM (PORCINE) LOCK FLUSH IV SOLN 100 UNIT/ML 100 UNIT/ML
300 SOLUTION INTRAVENOUS ONCE
Status: CANCELLED | OUTPATIENT
Start: 2020-06-18

## 2020-06-18 RX ORDER — HEPARIN SODIUM (PORCINE) LOCK FLUSH IV SOLN 100 UNIT/ML 100 UNIT/ML
500 SOLUTION INTRAVENOUS AS NEEDED
Status: DISCONTINUED | OUTPATIENT
Start: 2020-06-18 | End: 2020-06-18 | Stop reason: HOSPADM

## 2020-06-18 RX ORDER — HEPARIN SODIUM (PORCINE) LOCK FLUSH IV SOLN 100 UNIT/ML 100 UNIT/ML
500 SOLUTION INTRAVENOUS AS NEEDED
Status: CANCELLED | OUTPATIENT
Start: 2020-07-08

## 2020-06-18 RX ORDER — SODIUM CHLORIDE 0.9 % (FLUSH) 0.9 %
10 SYRINGE (ML) INJECTION AS NEEDED
Status: CANCELLED | OUTPATIENT
Start: 2020-07-08

## 2020-06-18 RX ORDER — PALONOSETRON 0.05 MG/ML
0.25 INJECTION, SOLUTION INTRAVENOUS ONCE
Status: COMPLETED | OUTPATIENT
Start: 2020-06-18 | End: 2020-06-18

## 2020-06-18 RX ORDER — SODIUM CHLORIDE 0.9 % (FLUSH) 0.9 %
10 SYRINGE (ML) INJECTION AS NEEDED
Status: DISCONTINUED | OUTPATIENT
Start: 2020-06-18 | End: 2020-06-18 | Stop reason: HOSPADM

## 2020-06-18 RX ADMIN — SODIUM CHLORIDE 150 MG: 9 INJECTION, SOLUTION INTRAVENOUS at 12:15

## 2020-06-18 RX ADMIN — DEXAMETHASONE SODIUM PHOSPHATE 12 MG: 4 INJECTION, SOLUTION INTRA-ARTICULAR; INTRALESIONAL; INTRAMUSCULAR; INTRAVENOUS; SOFT TISSUE at 11:58

## 2020-06-18 RX ADMIN — Medication 500 UNITS: at 15:00

## 2020-06-18 RX ADMIN — PEGFILGRASTIM 6 MG: KIT SUBCUTANEOUS at 14:50

## 2020-06-18 RX ADMIN — PALONOSETRON HYDROCHLORIDE 0.25 MG: 0.25 INJECTION INTRAVENOUS at 11:56

## 2020-06-18 RX ADMIN — DOCETAXEL 140 MG: 20 INJECTION, SOLUTION, CONCENTRATE INTRAVENOUS at 12:58

## 2020-06-18 RX ADMIN — SODIUM CHLORIDE, PRESERVATIVE FREE 10 ML: 5 INJECTION INTRAVENOUS at 15:00

## 2020-06-18 RX ADMIN — PERTUZUMAB 420 MG: 30 INJECTION, SOLUTION, CONCENTRATE INTRAVENOUS at 10:02

## 2020-06-18 RX ADMIN — TRASTUZUMAB-ANNS 490 MG: 420 INJECTION, POWDER, LYOPHILIZED, FOR SOLUTION INTRAVENOUS at 11:06

## 2020-06-18 RX ADMIN — SODIUM CHLORIDE 250 ML: 9 INJECTION, SOLUTION INTRAVENOUS at 10:02

## 2020-06-18 RX ADMIN — CARBOPLATIN 620 MG: 10 INJECTION, SOLUTION INTRAVENOUS at 14:11

## 2020-07-07 ENCOUNTER — TELEPHONE (OUTPATIENT)
Dept: ONCOLOGY | Facility: HOSPITAL | Age: 60
End: 2020-07-07

## 2020-07-07 RX ORDER — FAMOTIDINE 10 MG/ML
20 INJECTION, SOLUTION INTRAVENOUS AS NEEDED
Status: CANCELLED | OUTPATIENT
Start: 2020-07-08

## 2020-07-07 RX ORDER — PALONOSETRON 0.05 MG/ML
0.25 INJECTION, SOLUTION INTRAVENOUS ONCE
Status: CANCELLED | OUTPATIENT
Start: 2020-07-08

## 2020-07-07 RX ORDER — DIPHENHYDRAMINE HYDROCHLORIDE 50 MG/ML
50 INJECTION INTRAMUSCULAR; INTRAVENOUS AS NEEDED
Status: CANCELLED | OUTPATIENT
Start: 2020-07-08

## 2020-07-07 RX ORDER — SODIUM CHLORIDE 9 MG/ML
250 INJECTION, SOLUTION INTRAVENOUS ONCE
Status: CANCELLED | OUTPATIENT
Start: 2020-07-08

## 2020-07-08 ENCOUNTER — INFUSION (OUTPATIENT)
Dept: ONCOLOGY | Facility: HOSPITAL | Age: 60
End: 2020-07-08

## 2020-07-08 ENCOUNTER — OFFICE VISIT (OUTPATIENT)
Dept: ONCOLOGY | Facility: CLINIC | Age: 60
End: 2020-07-08

## 2020-07-08 VITALS
HEART RATE: 73 BPM | DIASTOLIC BLOOD PRESSURE: 80 MMHG | SYSTOLIC BLOOD PRESSURE: 120 MMHG | RESPIRATION RATE: 18 BRPM | OXYGEN SATURATION: 98 % | DIASTOLIC BLOOD PRESSURE: 80 MMHG | WEIGHT: 181.5 LBS | TEMPERATURE: 97.1 F | TEMPERATURE: 97.1 F | RESPIRATION RATE: 18 BRPM | WEIGHT: 181.5 LBS | BODY MASS INDEX: 31.15 KG/M2 | BODY MASS INDEX: 31.15 KG/M2 | OXYGEN SATURATION: 98 % | HEART RATE: 73 BPM | SYSTOLIC BLOOD PRESSURE: 120 MMHG

## 2020-07-08 DIAGNOSIS — Z17.1 MALIGNANT NEOPLASM OF RIGHT BREAST IN FEMALE, ESTROGEN RECEPTOR NEGATIVE, UNSPECIFIED SITE OF BREAST (HCC): Primary | ICD-10-CM

## 2020-07-08 DIAGNOSIS — C50.911 MALIGNANT NEOPLASM OF RIGHT BREAST IN FEMALE, ESTROGEN RECEPTOR NEGATIVE, UNSPECIFIED SITE OF BREAST (HCC): Primary | ICD-10-CM

## 2020-07-08 DIAGNOSIS — Z95.828 PORT-A-CATH IN PLACE: ICD-10-CM

## 2020-07-08 DIAGNOSIS — R92.8 ABNORMAL MAGNETIC RESONANCE IMAGING OF LEFT BREAST: ICD-10-CM

## 2020-07-08 PROCEDURE — 25010000002 PEGFILGRASTIM 6 MG/0.6ML PREFILLED SYRINGE KIT: Performed by: INTERNAL MEDICINE

## 2020-07-08 PROCEDURE — 96413 CHEMO IV INFUSION 1 HR: CPT

## 2020-07-08 PROCEDURE — 85025 COMPLETE CBC W/AUTO DIFF WBC: CPT | Performed by: INTERNAL MEDICINE

## 2020-07-08 PROCEDURE — 25010000003 HEPARIN LOCK FLUSH PER 10 UNITS: Performed by: INTERNAL MEDICINE

## 2020-07-08 PROCEDURE — 96375 TX/PRO/DX INJ NEW DRUG ADDON: CPT

## 2020-07-08 PROCEDURE — 96367 TX/PROPH/DG ADDL SEQ IV INF: CPT

## 2020-07-08 PROCEDURE — 96417 CHEMO IV INFUS EACH ADDL SEQ: CPT

## 2020-07-08 PROCEDURE — 80053 COMPREHEN METABOLIC PANEL: CPT | Performed by: INTERNAL MEDICINE

## 2020-07-08 PROCEDURE — 25010000002 PALONOSETRON PER 25 MCG: Performed by: INTERNAL MEDICINE

## 2020-07-08 PROCEDURE — 99214 OFFICE O/P EST MOD 30 MIN: CPT | Performed by: INTERNAL MEDICINE

## 2020-07-08 PROCEDURE — 25010000002 TRASTUZUMAB-ANNS 420 MG RECONSTITUTED SOLUTION 1 EACH VIAL: Performed by: INTERNAL MEDICINE

## 2020-07-08 PROCEDURE — 25010000002 DEXAMETHASONE SODIUM PHOSPHATE 20 MG/5ML SOLUTION: Performed by: INTERNAL MEDICINE

## 2020-07-08 PROCEDURE — 96377 APPLICATON ON-BODY INJECTOR: CPT

## 2020-07-08 PROCEDURE — 25010000002 DOCETAXEL 20 MG/ML SOLUTION 4 ML VIAL: Performed by: INTERNAL MEDICINE

## 2020-07-08 PROCEDURE — 25010000002 PERTUZUMAB 420 MG/14ML SOLUTION 420 MG VIAL: Performed by: INTERNAL MEDICINE

## 2020-07-08 PROCEDURE — 25010000002 FOSAPREPITANT PER 1 MG: Performed by: INTERNAL MEDICINE

## 2020-07-08 PROCEDURE — 25010000002 CARBOPLATIN PER 50 MG: Performed by: INTERNAL MEDICINE

## 2020-07-08 RX ORDER — SODIUM CHLORIDE 0.9 % (FLUSH) 0.9 %
10 SYRINGE (ML) INJECTION AS NEEDED
Status: CANCELLED | OUTPATIENT
Start: 2020-08-10

## 2020-07-08 RX ORDER — HEPARIN SODIUM (PORCINE) LOCK FLUSH IV SOLN 100 UNIT/ML 100 UNIT/ML
500 SOLUTION INTRAVENOUS AS NEEDED
Status: CANCELLED | OUTPATIENT
Start: 2020-08-10

## 2020-07-08 RX ORDER — HEPARIN SODIUM (PORCINE) LOCK FLUSH IV SOLN 100 UNIT/ML 100 UNIT/ML
300 SOLUTION INTRAVENOUS ONCE
Status: CANCELLED | OUTPATIENT
Start: 2020-07-08

## 2020-07-08 RX ORDER — SODIUM CHLORIDE 0.9 % (FLUSH) 0.9 %
10 SYRINGE (ML) INJECTION AS NEEDED
Status: DISCONTINUED | OUTPATIENT
Start: 2020-07-08 | End: 2020-07-08 | Stop reason: HOSPADM

## 2020-07-08 RX ORDER — SODIUM CHLORIDE 9 MG/ML
250 INJECTION, SOLUTION INTRAVENOUS ONCE
Status: COMPLETED | OUTPATIENT
Start: 2020-07-08 | End: 2020-07-08

## 2020-07-08 RX ORDER — HEPARIN SODIUM (PORCINE) LOCK FLUSH IV SOLN 100 UNIT/ML 100 UNIT/ML
500 SOLUTION INTRAVENOUS AS NEEDED
Status: DISCONTINUED | OUTPATIENT
Start: 2020-07-08 | End: 2020-07-08 | Stop reason: HOSPADM

## 2020-07-08 RX ORDER — PALONOSETRON 0.05 MG/ML
0.25 INJECTION, SOLUTION INTRAVENOUS ONCE
Status: COMPLETED | OUTPATIENT
Start: 2020-07-08 | End: 2020-07-08

## 2020-07-08 RX ORDER — SODIUM CHLORIDE 0.9 % (FLUSH) 0.9 %
20 SYRINGE (ML) INJECTION AS NEEDED
Status: CANCELLED | OUTPATIENT
Start: 2020-07-08

## 2020-07-08 RX ADMIN — TRASTUZUMAB-ANNS 490 MG: 420 INJECTION, POWDER, LYOPHILIZED, FOR SOLUTION INTRAVENOUS at 11:43

## 2020-07-08 RX ADMIN — SODIUM CHLORIDE 150 MG: 9 INJECTION, SOLUTION INTRAVENOUS at 12:45

## 2020-07-08 RX ADMIN — SODIUM CHLORIDE 250 ML: 9 INJECTION, SOLUTION INTRAVENOUS at 10:27

## 2020-07-08 RX ADMIN — DEXAMETHASONE SODIUM PHOSPHATE 12 MG: 4 INJECTION INTRA-ARTICULAR; INTRALESIONAL; INTRAMUSCULAR; INTRAVENOUS; SOFT TISSUE at 12:25

## 2020-07-08 RX ADMIN — PALONOSETRON HYDROCHLORIDE 0.25 MG: 0.25 INJECTION INTRAVENOUS at 12:21

## 2020-07-08 RX ADMIN — Medication 500 UNITS: at 15:18

## 2020-07-08 RX ADMIN — DOCETAXEL 140 MG: 20 INJECTION, SOLUTION, CONCENTRATE INTRAVENOUS at 13:26

## 2020-07-08 RX ADMIN — CARBOPLATIN 660 MG: 10 INJECTION, SOLUTION INTRAVENOUS at 14:30

## 2020-07-08 RX ADMIN — SODIUM CHLORIDE, PRESERVATIVE FREE 10 ML: 5 INJECTION INTRAVENOUS at 15:18

## 2020-07-08 RX ADMIN — PEGFILGRASTIM 6 MG: KIT SUBCUTANEOUS at 15:24

## 2020-07-08 RX ADMIN — PERTUZUMAB 420 MG: 30 INJECTION, SOLUTION, CONCENTRATE INTRAVENOUS at 10:32

## 2020-07-08 NOTE — PROGRESS NOTES
NAME: Jackie Johnson    : 1960    DATE:  2020    DIAGNOSIS:   Clinical Stage IIB ([id number]) poorly differentiated invasive ductal carcinoma of the right breast.  Tumor spans 15 cm.  There are no clinically or radiographically supspicious axillary LNs.  There is associated high grade DCIS.  Tumor is ER-,TX-, HER-2 +  There is also an abnormal finding on L breast MRI which appears suspicious (BIRADS-5).  The abnormality was no longer present after C1 chemotherapy.    CHIEF COMPLAINT:  Follow up of Breast Cancer      TREATMENT HISTORY:  1.          HISTORY OF PRESENT ILLNESS:   Jackie Johnson is a very pleasant 60 y.o. female who was referred by Dr. STACIE Richards for evaluation and treatment of breast cancer. She has a strong family h/o breast cancer and so is normally very good about getting her mammograms done.  She recently moved from [facility] however and so missed her mammogram in  because of the move.  She got established with a new PCP (Dr. Denver Sheth) in 2019 and was referred to a a new gynecologist  (Dr. Brayan Richards) who she saw in .  Kevin Richards ordered a screening mammogram.  She had an acute febrile illness around the time of the Super Bowl and at that time thought she might have a lump in her R breast.  While waiting on mammogram scheduling, she started to have pain in the breast and in eventually she would occasionally get a little bit of discharge from the right nipple.  She presented for  Screening mammography 19 as below.  This was followed by diagnostic mammogram and U/S on 20.  She had an abnormal span of 15 cm spanning the R breast.  She underwent stereotactic biopsy on 20.    This revealed a poorly differentiated invasive ductal carcinoma of the R breast with associated high grade DCIS.  Tumor was ER/TX- and HER-2+ by IHC.  Her sister lives in [place] and was treated at [facility], so she went there for further  "evaluation. She saw Dr. Romero of medical oncology and Dr. Serrano of surgical oncology.  She had staging with CT CAP and bone scan as well as Brain MRI and had no evidence of distant metastatic spread.  She had reese breast MRI as well which showed an unexpected abnormality in the L breast described as linear non-mass enhancement spanning an 8 cm area in the inferior breast at 6:00.  This was biopsied on 2-28-20 with biopsy result pending.  No abnormal axillary LNs were identified.  Neoadjuvant chemotherapy was recommended to her by her [facility] team of physicians but she wishes to seek this treatment closer to home so is here today for further evaluation and treatment.    She is in good health overall. She reports an isolated episode of fever/chills during an acute illness, abdominal fullness to her right side, fatigue and a rash circling her neck. She denies changes in weight, cp, sob, persistent abdominal pain, n/v/c/d or bony pain.      INTERVAL HISTORY:  Ms. Johnson is here today for follow up of breast cancer and C6 of [facility]. She reports she has been feeling fatigue and has \"typical\" symptoms following her treatment. She also reports occasional numbness in her feet.  She says her R foot has always done this because of an old ankle injury but the L foot sensation is new.  She occasionally gets twinges of discomfort in both breasts.  She leaves Monday to go to [facility].  They will travel in an RV to minimize risk of exposure to the coronavirus.  She isn't sure if unilateral or bilateral surgery is planned.  She asks what she should take with her.      PAST MEDICAL HISTORY:  Past Medical History:   Diagnosis Date   • Acid reflux    • Asthma    • Breast cancer (CMS/HCC) 01/2020    rt br ca   • Cancer (CMS/HCC)    • Eczema    • Endometriosis    • Hiatal hernia    • High cholesterol    • Seasonal allergies    • Staph infection 04/2020    port       PAST SURGICAL HISTORY:  Past Surgical History:   Procedure " Laterality Date   • ABDOMINAL SURGERY     • BREAST BIOPSY Bilateral    •  SECTION     • D&C HYSTEROSCOPY ENDOMETRIAL ABLATION     • DIAGNOSTIC LAPAROSCOPY     • FRACTURE SURGERY Left     tib/fib   • LEG SURGERY      Left leg following fracture   • PORTACATH PLACEMENT N/A 3/18/2020    Procedure: INSERTION OF PORTACATH;  Surgeon: Dudley Aldridge MD;  Location: [facility];  Service: General;  Laterality: N/A;   • SKIN BIOPSY     • TONSILLECTOMY     • VENOUS ACCESS DEVICE (PORT) INSERTION N/A 2020    Procedure: INSERTION VENOUS ACCESS DEVICE;  Surgeon: Nerissa Wang MD;  Location: [facility] OR;  Service: General;  Laterality: N/A;   • VENOUS ACCESS DEVICE (PORT) REMOVAL Right 2020    Procedure: REMOVAL VENOUS ACCESS DEVICE;  Surgeon: Nerissa Wang MD;  Location: [facility] OR;  Service: General;  Laterality: Right;       FAMILY HISTORY:  Family History   Problem Relation Age of Onset   • Breast cancer Mother 75   • Basal cell carcinoma Mother 90        2 small spots on face removed + fair complexion   • Breast cancer Sister 55        VUS detected on genetic testing CDKN2A gene (c.-33G>C).    • Heart disease Father    • Throat cancer Father 79   • Lung cancer Father 79   • Cancer Maternal Grandmother 65        unknown gynecologic cancer, cervical vs uterine   • Lymphoma Maternal Grandfather 64   • Breast cancer Paternal Grandmother 70   • Prostate cancer Paternal Grandfather 70        metastastic to liver   • Breast cancer Paternal Aunt 77   • Breast cancer Maternal Aunt 80   • Breast cancer Other    • Ovarian cancer Sister 66        Elevated ; 1A tumor; Borderline cancer; both ovaries & lg cyst removed   • Ovarian cancer Maternal Cousin 54   • Breast cancer Other 50         SOCIAL HISTORY:  Social History     Socioeconomic History   • Marital status:      Spouse name: Not on file   • Number of children: Not on file   • Years of education: Not on file   • Highest education level: Not  on file   Tobacco Use   • Smoking status: Never Smoker   • Smokeless tobacco: Never Used   Substance and Sexual Activity   • Alcohol use: Never     Frequency: Never   • Drug use: Never   • Sexual activity: Defer     Partners: Male     Birth control/protection: None   Social History Narrative    lives alone with      with [employer]    No unusual chemical exposures    Never smoker, never drinker       REVIEW OF SYSTEMS:   A comprehensive 14 point review of systems was performed.  Significant findings as mentioned above.  All other systems reviewed and are negative.      MEDICATIONS:  The current medication list was reviewed in the EMR    Current Outpatient Medications:   •  acetaminophen (TYLENOL) 325 MG tablet, Take 650 mg by mouth Every 6 (Six) Hours As Needed for Mild Pain ., Disp: , Rfl:   •  calcium carbonate (TUMS) 500 MG chewable tablet, Chew 1 tablet Daily As Needed for Indigestion or Heartburn., Disp: , Rfl:   •  dexamethasone (DECADRON) 4 MG tablet, Take 2 tablets by mouth daily for 3 days, Disp: 6 tablet, Rfl: 3  •  doxycycline (VIBRAMYCIN) 100 MG capsule, Take 100 mg by mouth 2 (Two) Times a Day., Disp: , Rfl:   •  famotidine (PEPCID) 10 MG tablet, Take 10 mg by mouth As Needed for Indigestion or Heartburn., Disp: , Rfl:   •  HYDROcodone-acetaminophen (Norco) 7.5-325 MG per tablet, Take 1 tablet by mouth 4 (Four) Times a Day As Needed for Moderate Pain ., Disp: 10 tablet, Rfl: 0  •  levalbuterol (XOPENEX HFA) 45 MCG/ACT inhaler, Inhale 1-2 puffs Every 4 (Four) Hours As Needed for Wheezing., Disp: , Rfl:   •  loratadine (CLARITIN) 10 MG tablet, Take 10 mg by mouth 2 (Two) Times a Day., Disp: , Rfl:   •  ondansetron (ZOFRAN) 8 MG tablet, Take 1 tablet by mouth 3 (Three) Times a Day As Needed for Nausea or Vomiting., Disp: 30 tablet, Rfl: 5  •  prochlorperazine (COMPAZINE) 10 MG tablet, Take 1 tablet by mouth Every 6 (Six) Hours As Needed for Nausea or Vomiting., Disp: 60  tablet, Rfl: 3  •  sennosides-docusate (Senna-S) 8.6-50 MG per tablet, Take 2 tablets by mouth 2 (Two) Times a Day., Disp: 120 tablet, Rfl: 5    ALLERGIES:    Allergies   Allergen Reactions   • Albuterol Other (See Comments)     One time severe headache, questionable for aneurysm, did spinal tap was positive, did Angiogram and MRI were Negative for aneurysm   • Penicillins Hives   • Cefpodoxime Rash     vantin     • Clindamycin Hcl Rash   • Crestor [Rosuvastatin Calcium] Other (See Comments)     Caused high liver enxymes   • Sulfa Antibiotics Other (See Comments)     Mucosal lesions   • Pseudoephedrine Other (See Comments)     Soreness on the tongue       PHYSICAL EXAM:  Vitals:    07/08/20 0850   BP: 120/80   Pulse: 73   Resp: 18   Temp: 97.1 °F (36.2 °C)   SpO2: 98%     Pain Score    07/08/20 0850   PainSc: 0-No pain     General:  Awake, alert and oriented, appears well.   HEENT:  Pupils are equal, round and reactive to light and accommodation, Extra-ocular movements full, Oropharyx clear, mucous membranes moist  Neck:  No JVD, thyromegaly or lymphadenopathy   CV:  Regular rate and rhythm, no murmurs, rubs or gallops  Resp:  Lungs are clear to auscultation bilaterally.  No crackles.  PAC site c/d/i.  Breast:  She is large breasted.  L breast has some fibrocystic changes but no palpable mass lesions. No enlarged L axillary LNs.  R breast is large.  There has never really been a clearly defined mass on exam and this continues to be the case.  There are some fibrocystic changes.   No nipple d/c or inversion.  No palpable R axillary LNs.  Abd:  Soft, non-tender, sl distended, bowel sounds present, no organomegaly or masses  Ext:  No clubbing, cyanosis or edema.   Lymph:  No cervical, supraclavicular, axillary, inguinal or femoral adenopathy  Neuro:  MS as above, CN II-XII intact, grossly non-focal exam      PATHOLOGY:  02-12-20            ENDOSCOPY:        IMAGING:  Bilateral Diagnostic mammogram 12-27-19  FINDINGS:   There are scattered areas of fibroglandular density.     The bilateral fibroglandular pattern is unremarkable in appearance. A  few scattered calcifications are present in the left breast. There are  calcifications in the central aspect of the right breast spanning  approximately 15 cm of tissue in the AP dimension and extending up to  the base of the nipple for which additional imaging is recommended.     IMPRESSION:  1. Benign left mammographic findings.  2. Calcifications in the right breast. Recommend additional imaging.        BI-RADS CATEGORY:  0, INCOMPLETE: Need additional imaging evaluation.     RECOMMENDATION:  Right ML and CC magnification views.      R diagnostic mammogram 01-16-20  FINDINGS:  Magnification imaging of the right breast demonstrates  casting-type pleomorphic calcifications spanning over 15 cm of tissue in  the AP dimension in the right 6:00 to 7:00 region. Calcifications do  extend up to the base of the nipple. Findings are highly suspicious for  DCIS.     Right breast ultrasound: Focused sonographic evaluation of the right  6:00 to 7:00 region demonstrates a single 0.5 cm irregular hypoechoic  mass associated with a coarse calcification located at 7:00, 3 cm from  the nipple. Ultrasound of the right axilla demonstrates no abnormal  appearing axillary lymph nodes.        IMPRESSION:  Findings are highly suspicious for extensive DCIS in the  right 6:00 to 7:00 region extending into the base of the nipple. There  is also small irregular mass noted on ultrasound in the 7:00 region  suspicious for invasion.        BI-RADS CATEGORY:  5, HIGHLY SUGGESTIVE OF MALIGNANCY        RECOMMENDATION:  Recommend stereotactic guided core biopsy of the  anterior and posterior aspect of the calcifications in the 6:00 to 7:00  region to determine extent of disease. Ultrasound-guided core biopsy of  the right 7:00 mass may also be warranted pending pathology results of  the calcifications.    Diagnostic  bilateral mammogram 02-25-20  FINDINGS:  There are scattered areas of fibroglandular density.    1:  There are fine-linear branching calcifications measuring 16 x 7 x 7  centimeters with segmental distribution and associated post biopsy clip in the  right breast lower hemisphere at 6 to 7 o'clock.  There are 2 post biopsy clips  noted.  The calcifications extend to the inferior skin and nipple.    2:  There are amorphous calcifications measuring 0.3 centimeters in the left  breast lower hemisphere at 6 to 7 o'clock located 4 centimeters from the nipple.    IMPRESSION:  1:  Fine-linear branching calcifications in the right breast lower hemisphere at  6 to 7 o'clock are known biopsy-proven malignancy. Ultrasound is recommended for  staging. Recommend appropriate evaluation and treatment of this known malignancy.    2:  Amorphous calcifications in the left breast lower hemisphere at 6 to 7  o'clock located 4 centimeters from the nipple are suspicious. Stereotactic  biopsy is recommended.    BI-RADS Category 4:  Suspicious Abnormality      US Chest 02-25-20  Findings:       Right Breast: The extent of calcified disease is best visualized by mammography. There are dilated ducts with internal calcifications vascularity extending toward the nipple at the 7 o'clock position. This is consistent with the patient's known biopsy-proven malignancy.      Right Charles Basins: No suspicious axillary (level I, II & III) or internal mammary lymphadenopathy is noted.      IMPRESSION:    1. Known RIGHT breast malignancy is best visualized by mammography. A MRI may be obtained for evaluation of disease.    2. No suspicious RIGHT-sided lymphadenopathy      ACR BI-RADS Category: 6. Known malignancy.  Recommend appropriate evaluation and treatment of the known malignancy.         R breast US 02-25-20  Findings:       Right Breast: The extent of calcified disease is best visualized by mammography. There are dilated ducts with internal  calcifications vascularity extending toward the nipple at the 7 o'clock position. This is consistent with the patient's known biopsy-proven malignancy.      Right Charles Basins: No suspicious axillary (level I, II & III) or internal mammary lymphadenopathy is noted.      IMPRESSION:    1. Known RIGHT breast malignancy is best visualized by mammography. A MRI may be obtained for evaluation of disease.    2. No suspicious RIGHT-sided lymphadenopathy      ACR BI-RADS Category: 6. Known malignancy.  Recommend appropriate evaluation and treatment of the known malignancy.       MRI Brain w/wo contrast 02-27-20  FINDINGS:     No abnormal parenchymal or leptomeningeal enhancement is noted. The brain parenchyma is unremarkable except of small scattered T2 FLAIR hyperintensities consistent with chronic microangiopathic changes. No acute ischemia, intra or extra-axial hemorrhage, mass effect or midline shift. No hydrocephalus is noted.    The osseous structures and extra-cranial soft tissues are unremarkable.    The orbital structures unremarkable.    The paranasal sinuses and mastoid air cells are clear.    IMPRESSION:  No evidence of metastatic disease.      CTCAP 02-28-20    Findings:       CHEST:  The breasts are ptotic and there are biopsy clips projecting between the lower quadrants of the right breast.    On image 99 of series 3, there is a nonspecific 1 cm soft tissue nodule in the lower outer quadrant of the right breast.    No lung nodules or pleural effusions are present.    No axillary, internal mammary, mediastinal or hilar adenopathy is seen.    No suspicious bone lesions are present.      ABDOMEN and PELVIS:  The liver is borderline enlarged and diffusely fatty infiltrated without measurable mass or biliary ductal dilatation.    The gallbladder appears normal.    The spleen, pancreas and adrenal glands appear normal.    The kidneys demonstrate function without hydronephrosis and there is a small low dense nidus  in the mid right kidney, likely a cyst.    No adenopathy or ascites are seen in the abdomen or pelvis.    A normal-appearing retrocecal appendix is seen.    Multilevel degenerative-like bone changes are present.      IMPRESSION:  No evidence for metastatic disease the chest, abdomen or pelvis.      MRI Breast bilateral 03-02-20    Findings:  The breast composition is heterogeneous fibroglandular tissue. There is no significant early background parenchymal enhancement.    Right breast: There is linearly oriented nonmass enhancement involving the lateral and inferior aspects (anterior, middle, and posterior third) of the right breast 6 - 7 o'clock position extending 17 cm from the base of the nipple to the pectoralis muscle (series 5 image  of 256). There is no evidence for pectoralis muscle invasion. Extension to the inferior skin is better demonstrated mammographically. Susceptibility artifact from 2 postbiopsy clips is noted.    Left breast: There is linearly oriented nonmass enhancement in the inferior aspect (middle third) of the left breast 6 o'clock position extending approximately 8 cm (series 5 image  of 256). There is no evidence for nipple, skin, or pectoralis muscle involvement. Stereotactic guided biopsy of calcifications at the 6 o'clock position to be performed later today and corresponds to the anterior aspect of the nonmass enhancement. MRI guided biopsy of nonmass enhancement seen on image 94 of 256, series 5 and image 40 of 160, series 8 is recommended (The images have been annotated with a Rosebud).    Charles Basins: There is no lymphadenopathy in the visualized axillary or internal mammary regions.    IMPRESSION:  Linear nonmass enhancement representing known malignancy right breast as above. Linear nonmass enhancement left breast as above.   MRI guided biopsy of nonmass enhancement seen on image 94 of 256, series 5 and image 40 of 160, series 8 is recommended.    ACR BI-RADS Category:  6. Recommend MR-guided biopsy of nonmass enhancement left breast as above .Recommend appropriate evaluation and treatment of the known malignancy.       Bone scan 03-02-20  FINDINGS:     Radiotracer uptake secondary to degenerative changes are seen involving the shoulders, hips, knees, and feet. Increased uptake along the right tibial shaft likely is likely reactive. Faint focus of activity within the left tibial shaft is compatible with prior injury/fracture. Increased uptake in the skull is compatible with a benign hyperostosis.    Physiologic uptake of the bilateral kidneys and bladder.    IMPRESSION:    No scintigraphic evidence of skeletal metastases.      Echo 03-02-20    Bilateral diagnostic mammogram 05-26-20  FINDINGS: There are scattered areas of fibroglandular density.     A postbiopsy marking clip is now noted in the left breast with expected  surrounding postbiopsy changes best visualized on MLO imaging. The  remaining bilateral fibroglandular pattern is stable in appearance.  Redemonstrated are fine linear branching calcifications in the right  6:00 to 7:00 region spanning approximately 17 cm of tissue in the AP  dimension. There are 2 associated postbiopsy marking clips. No new areas  of calcification are seen in either breast.     IMPRESSION:  1. Benign left mammographic findings.        2. No significant change in the appearance of calcifications in the  right breast.        BI-RADS CATEGORY:  6, KNOWN BIOPSY PROVEN MALIGNANCY        RECOMMENDATION:  Recommend clinical follow-up.       RECENT LABS:  Lab Results   Component Value Date    WBC 4.25 07/08/2020    HGB 10.5 (L) 07/08/2020    HCT 33.6 (L) 07/08/2020    .2 (H) 07/08/2020    RDW 15.5 (H) 07/08/2020     07/08/2020    NEUTRORELPCT 59.8 07/08/2020    LYMPHORELPCT 28.0 07/08/2020    MONORELPCT 9.9 07/08/2020    EOSRELPCT 0.0 (L) 07/08/2020    BASORELPCT 2.1 (H) 07/08/2020    NEUTROABS 2.54 07/08/2020    LYMPHSABS 1.19 07/08/2020        Lab Results   Component Value Date     07/08/2020    K 4.4 07/08/2020    CO2 23.5 07/08/2020     07/08/2020    BUN 17 07/08/2020    CREATININE 0.73 07/08/2020    EGFRIFNONA 81 07/08/2020    EGFRIFAFRI 84 02/26/2020    GLUCOSE 100 (H) 07/08/2020    CALCIUM 9.1 07/08/2020    ALKPHOS 64 07/08/2020    AST 15 07/08/2020    ALT 21 07/08/2020    BILITOT 0.2 07/08/2020    ALBUMIN 3.91 07/08/2020    PROTEINTOT 6.1 07/08/2020     Lab Results   Component Value Date     15.1 03/11/2020     Lab Results   Component Value Date    LABCA2 19.0 03/11/2020             ASSESSMENT & PLAN:  Jackie Johnson is a very pleasant 60 y.o. female with what is clinically a Stage IIB ([id number]) poorly differentiated invasive ductal carcinoma of the right breast.  Tumor spans 15 cm.  There are no clinically or radiographically supspicious axillary LNs.  There is associated high grade DCIS.  Tumor is ER-,NE-, HER-2 +  There is also an abnormal finding on L breast MRI which is suspicious (BIRADS-5).  This has been biopsied.    1.  Right breast cancer:  -  Currently taking  neoadjuvant chemotherapy to downsize tumor and potentially allow for a more successful surgery.  -  Specifically, I recommended neoadjuvant Taxotere, Carboplatin, Herceptin and Perjeta with growth factor support.  -  Given that she really doesn't have palpable disease, I ordered interim mammogram and U/S prior to C4 therapy.  This showed a stable appearance of abnormal calcifications.  No defined mass.  No evidence of progression.    -  Will complete last cycles of chemotherapy today and she will return to [facility] next week for surgical planning.  As discussed below, would favor bilateral mastectomies.  -   Following surgery, she will require adjuvant radiation given the size of her tumor and completion of a year of anti-HER-2 therapy.          -  Will order repeat echocardiogram/    2.  Abnormality on L Breast visible on MRI only :    -  Biopsy done  at [facility] on 2-28-20.  This isn't visible on care everywhere, but she says the biopsy was benign.  Plan was made for repeat MRI  Guided bx after 1st cycle of treatment, but biopsy could not be done b/c the lesion was no longer present.  Given improvement on treatment,  I would consider this lesion to be malignant and would be in favor of bilateral mastectomies.  Ms. Johnson says she also has a strong preference for bilateral mastectomies because she says she had pain in the left breast similar to the cancer-related pain she was having in the R breast prior to treatment and pain on both sides has subsided on treatment.    3.  Very strong family history of malignancy including several members with breast cancer and a sister with ovarian cancer.  -  She underwent genetic testing at [facility] and testing was negative.  While she has no known genetic mutation, given her personal history combined with her family history, I would be in favor of bilateral mastectomy.  I think it is also likely that given the size of her tumor she would likely have significant asymmetry after unilateral surgery.    4.  Prophylaxis:  She has had 2019 flu vaccine.  Recommended she get Prevnar 13 which she also had.  She is a lifelong non-smoker.    5.  ACO / MARNI/Other  Quality measures  -  Jackie Johnson received 2019 flu vaccine.  -  Jackie Johnson reports a pain score of 0.  Given her pain assessment as noted, treatment options were discussed and the following options were decided upon as a follow-up plan to address the patient's pain: No intervention needed..  -  Current outpatient and discharge medications have been reconciled for the patient.  Reviewed by: Fatoumata Obrien MD       6.  Follow-up:   - MD follow up after surgery     This note was scribed for Fatoumata Obrien MD by Alba Siu RN.    I, Fatoumata Obrien MD, personally performed the services described in this documentation as scribed by the above  named individual in my presence, and it is both accurate and complete.  07/08/2020          I spent 25 minutes with Jackie Johnson today.  In the office today, more than 50% of this time was spent face-to-face with her  in counseling / coordination of care, reviewing her medical history and counseling on the current treatment plan.  All questions were answered to her satisfaction      Electronically Signed by: Fatoumata Obrien MD      CC:   MD JUAN PABLO Macdonald MD Bora Lim, MD- [facility]  Kadeem Serrano MD- [facility]

## 2020-07-22 ENCOUNTER — TELEPHONE (OUTPATIENT)
Dept: ONCOLOGY | Facility: HOSPITAL | Age: 60
End: 2020-07-22

## 2020-07-22 ENCOUNTER — HOSPITAL ENCOUNTER (OUTPATIENT)
Dept: CARDIOLOGY | Facility: HOSPITAL | Age: 60
Discharge: HOME | End: 2020-07-22
Admitting: INTERNAL MEDICINE

## 2020-07-22 DIAGNOSIS — Z79.899 ENCOUNTER FOR MONITORING CARDIOTOXIC DRUG THERAPY: ICD-10-CM

## 2020-07-22 DIAGNOSIS — Z51.81 ENCOUNTER FOR MONITORING CARDIOTOXIC DRUG THERAPY: ICD-10-CM

## 2020-07-22 LAB
BH CV ECHO MEAS - % IVS THICK: 35.2 %
BH CV ECHO MEAS - % LVPW THICK: 72.9 %
BH CV ECHO MEAS - BSA(HAYCOCK): 2 M^2
BH CV ECHO MEAS - BSA: 1.9 M^2
BH CV ECHO MEAS - BZI_BMI: 31.1 KILOGRAMS/M^2
BH CV ECHO MEAS - BZI_METRIC_HEIGHT: 162.6 CM
BH CV ECHO MEAS - BZI_METRIC_WEIGHT: 82.1 KG
BH CV ECHO MEAS - EDV(CUBED): 76.8 ML
BH CV ECHO MEAS - EDV(MOD-SP4): 40.6 ML
BH CV ECHO MEAS - EDV(TEICH): 80.8 ML
BH CV ECHO MEAS - EF(CUBED): 38.7 %
BH CV ECHO MEAS - EF(MOD-SP4): 63.5 %
BH CV ECHO MEAS - EF(TEICH): 32.2 %
BH CV ECHO MEAS - ESV(CUBED): 47 ML
BH CV ECHO MEAS - ESV(MOD-SP4): 14.8 ML
BH CV ECHO MEAS - ESV(TEICH): 54.8 ML
BH CV ECHO MEAS - FS: 15.1 %
BH CV ECHO MEAS - IVS/LVPW: 1.2
BH CV ECHO MEAS - IVSD: 1.1 CM
BH CV ECHO MEAS - IVSS: 1.5 CM
BH CV ECHO MEAS - LV DIASTOLIC VOL/BSA (35-75): 21.7 ML/M^2
BH CV ECHO MEAS - LV MASS(C)D: 142.4 GRAMS
BH CV ECHO MEAS - LV MASS(C)DI: 76 GRAMS/M^2
BH CV ECHO MEAS - LV MASS(C)S: 211.7 GRAMS
BH CV ECHO MEAS - LV MASS(C)SI: 112.9 GRAMS/M^2
BH CV ECHO MEAS - LV SYSTOLIC VOL/BSA (12-30): 7.9 ML/M^2
BH CV ECHO MEAS - LVIDD: 4.3 CM
BH CV ECHO MEAS - LVIDS: 3.6 CM
BH CV ECHO MEAS - LVLD AP4: 7.4 CM
BH CV ECHO MEAS - LVLS AP4: 6 CM
BH CV ECHO MEAS - LVPWD: 0.93 CM
BH CV ECHO MEAS - LVPWS: 1.6 CM
BH CV ECHO MEAS - MV A MAX VEL: 69.8 CM/SEC
BH CV ECHO MEAS - MV E MAX VEL: 88.7 CM/SEC
BH CV ECHO MEAS - MV E/A: 1.3
BH CV ECHO MEAS - SI(CUBED): 15.9 ML/M^2
BH CV ECHO MEAS - SI(MOD-SP4): 13.8 ML/M^2
BH CV ECHO MEAS - SI(TEICH): 13.9 ML/M^2
BH CV ECHO MEAS - SV(CUBED): 29.7 ML
BH CV ECHO MEAS - SV(MOD-SP4): 25.8 ML
BH CV ECHO MEAS - SV(TEICH): 26 ML
MAXIMAL PREDICTED HEART RATE: 160 BPM
STRESS TARGET HR: 136 BPM

## 2020-07-22 PROCEDURE — 93308 TTE F-UP OR LMTD: CPT | Performed by: INTERNAL MEDICINE

## 2020-07-22 PROCEDURE — 93308 TTE F-UP OR LMTD: CPT

## 2020-07-22 NOTE — TELEPHONE ENCOUNTER
----- Message from Arabella Quinteros sent at 7/21/2020  4:04 PM EDT -----  Regarding: PHONE CALL  Contact: [phone]  Jackie called wanting to speak with a nurse to relay a message to Dr. Obrien.

## 2020-07-22 NOTE — TELEPHONE ENCOUNTER
Spoke with patient who reports upcoming surgery on 08/18/2020.  Patient reports she would like a port flush prior to leaving to [place].  Patient will not return until around September 11th.  Dr. Obrien made aware, patient placed on schedule with Dr. Obrien for 09/22/20.

## 2020-07-27 ENCOUNTER — OFFICE VISIT (OUTPATIENT)
Dept: FAMILY MEDICINE CLINIC | Facility: CLINIC | Age: 60
End: 2020-07-27

## 2020-07-27 VITALS
SYSTOLIC BLOOD PRESSURE: 110 MMHG | TEMPERATURE: 97.1 F | WEIGHT: 181 LBS | HEART RATE: 92 BPM | OXYGEN SATURATION: 98 % | HEIGHT: 64 IN | DIASTOLIC BLOOD PRESSURE: 66 MMHG | BODY MASS INDEX: 30.9 KG/M2

## 2020-07-27 DIAGNOSIS — G62.9 NEUROPATHY: ICD-10-CM

## 2020-07-27 DIAGNOSIS — R73.03 PREDIABETES: ICD-10-CM

## 2020-07-27 DIAGNOSIS — D22.9 NUMEROUS SKIN MOLES: Primary | ICD-10-CM

## 2020-07-27 DIAGNOSIS — J30.89 SEASONAL ALLERGIC RHINITIS DUE TO OTHER ALLERGIC TRIGGER: ICD-10-CM

## 2020-07-27 DIAGNOSIS — E78.2 MIXED HYPERLIPIDEMIA: ICD-10-CM

## 2020-07-27 PROCEDURE — 99204 OFFICE O/P NEW MOD 45 MIN: CPT | Performed by: FAMILY MEDICINE

## 2020-07-27 RX ORDER — FEXOFENADINE HCL 180 MG/1
180 TABLET ORAL
COMMUNITY
End: 2021-03-04

## 2020-07-27 RX ORDER — LOPERAMIDE HYDROCHLORIDE 2 MG/1
TABLET ORAL
COMMUNITY
Start: 2020-03-23 | End: 2021-03-04

## 2020-07-28 ENCOUNTER — LAB (OUTPATIENT)
Dept: FAMILY MEDICINE CLINIC | Facility: CLINIC | Age: 60
End: 2020-07-28

## 2020-07-28 DIAGNOSIS — G62.9 NEUROPATHY: ICD-10-CM

## 2020-07-28 DIAGNOSIS — E78.2 MIXED HYPERLIPIDEMIA: ICD-10-CM

## 2020-07-28 DIAGNOSIS — R73.03 PREDIABETES: ICD-10-CM

## 2020-07-28 PROCEDURE — 84443 ASSAY THYROID STIM HORMONE: CPT | Performed by: FAMILY MEDICINE

## 2020-07-28 PROCEDURE — 82306 VITAMIN D 25 HYDROXY: CPT | Performed by: FAMILY MEDICINE

## 2020-07-28 PROCEDURE — 83036 HEMOGLOBIN GLYCOSYLATED A1C: CPT | Performed by: FAMILY MEDICINE

## 2020-07-28 PROCEDURE — 83735 ASSAY OF MAGNESIUM: CPT | Performed by: FAMILY MEDICINE

## 2020-07-28 PROCEDURE — 80061 LIPID PANEL: CPT | Performed by: FAMILY MEDICINE

## 2020-07-28 PROCEDURE — 82607 VITAMIN B-12: CPT | Performed by: FAMILY MEDICINE

## 2020-07-29 LAB
25(OH)D3 SERPL-MCNC: 23.2 NG/ML (ref 30–100)
CHOLEST SERPL-MCNC: 195 MG/DL (ref 0–200)
HBA1C MFR BLD: 5.65 % (ref 4.8–5.6)
HDLC SERPL-MCNC: 30 MG/DL (ref 40–60)
LDLC SERPL CALC-MCNC: 117 MG/DL (ref 0–100)
LDLC/HDLC SERPL: 3.89 {RATIO}
MAGNESIUM SERPL-MCNC: 2.1 MG/DL (ref 1.6–2.4)
TRIGL SERPL-MCNC: 242 MG/DL (ref 0–150)
TSH SERPL DL<=0.05 MIU/L-ACNC: 1.69 UIU/ML (ref 0.27–4.2)
VIT B12 BLD-MCNC: >2000 PG/ML (ref 211–946)
VLDLC SERPL-MCNC: 48.4 MG/DL (ref 5–40)

## 2020-08-07 ENCOUNTER — TELEPHONE (OUTPATIENT)
Dept: ONCOLOGY | Facility: HOSPITAL | Age: 60
End: 2020-08-07

## 2020-08-10 ENCOUNTER — INFUSION (OUTPATIENT)
Dept: ONCOLOGY | Facility: HOSPITAL | Age: 60
End: 2020-08-10

## 2020-08-10 ENCOUNTER — TELEPHONE (OUTPATIENT)
Dept: FAMILY MEDICINE CLINIC | Facility: CLINIC | Age: 60
End: 2020-08-10

## 2020-08-10 VITALS
TEMPERATURE: 97.3 F | RESPIRATION RATE: 20 BRPM | WEIGHT: 180.4 LBS | BODY MASS INDEX: 30.97 KG/M2 | SYSTOLIC BLOOD PRESSURE: 115 MMHG | HEART RATE: 80 BPM | OXYGEN SATURATION: 97 % | DIASTOLIC BLOOD PRESSURE: 77 MMHG

## 2020-08-10 DIAGNOSIS — Z95.828 PORT-A-CATH IN PLACE: Primary | ICD-10-CM

## 2020-08-10 DIAGNOSIS — Z17.1 MALIGNANT NEOPLASM OF RIGHT BREAST IN FEMALE, ESTROGEN RECEPTOR NEGATIVE, UNSPECIFIED SITE OF BREAST (HCC): ICD-10-CM

## 2020-08-10 DIAGNOSIS — C50.911 MALIGNANT NEOPLASM OF RIGHT BREAST IN FEMALE, ESTROGEN RECEPTOR NEGATIVE, UNSPECIFIED SITE OF BREAST (HCC): ICD-10-CM

## 2020-08-10 PROCEDURE — 25010000003 HEPARIN LOCK FLUSH PER 10 UNITS: Performed by: INTERNAL MEDICINE

## 2020-08-10 PROCEDURE — 96523 IRRIG DRUG DELIVERY DEVICE: CPT

## 2020-08-10 RX ORDER — HEPARIN SODIUM (PORCINE) LOCK FLUSH IV SOLN 100 UNIT/ML 100 UNIT/ML
500 SOLUTION INTRAVENOUS AS NEEDED
Status: DISCONTINUED | OUTPATIENT
Start: 2020-08-10 | End: 2020-08-10 | Stop reason: HOSPADM

## 2020-08-10 RX ORDER — HEPARIN SODIUM (PORCINE) LOCK FLUSH IV SOLN 100 UNIT/ML 100 UNIT/ML
300 SOLUTION INTRAVENOUS ONCE
Status: CANCELLED | OUTPATIENT
Start: 2020-08-10

## 2020-08-10 RX ORDER — SODIUM CHLORIDE 0.9 % (FLUSH) 0.9 %
10 SYRINGE (ML) INJECTION AS NEEDED
Status: DISCONTINUED | OUTPATIENT
Start: 2020-08-10 | End: 2020-08-10 | Stop reason: HOSPADM

## 2020-08-10 RX ORDER — SODIUM CHLORIDE 0.9 % (FLUSH) 0.9 %
20 SYRINGE (ML) INJECTION AS NEEDED
Status: CANCELLED | OUTPATIENT
Start: 2020-08-10

## 2020-08-10 RX ORDER — SODIUM CHLORIDE 0.9 % (FLUSH) 0.9 %
10 SYRINGE (ML) INJECTION AS NEEDED
Status: CANCELLED | OUTPATIENT
Start: 2020-09-22

## 2020-08-10 RX ORDER — HEPARIN SODIUM (PORCINE) LOCK FLUSH IV SOLN 100 UNIT/ML 100 UNIT/ML
500 SOLUTION INTRAVENOUS AS NEEDED
Status: CANCELLED | OUTPATIENT
Start: 2020-09-22

## 2020-08-10 RX ADMIN — Medication 500 UNITS: at 10:08

## 2020-08-10 RX ADMIN — SODIUM CHLORIDE, PRESERVATIVE FREE 10 ML: 5 INJECTION INTRAVENOUS at 10:07

## 2020-08-10 NOTE — TELEPHONE ENCOUNTER
----- Message from Scarlet Loera MD sent at 8/9/2020 10:17 PM EDT -----  Please call Jackie. Labs are okay except she is borderline elevated cholesterol - work on diet and exercise and we will monitor. Also, her Vitamin D is low - repleting may help her neuropathy. If she is okay with it, please send Vitamin D 18520 IU weekly, #8, no refills to the pharmacy. Thanks.      CALLED PATIENT NO ANSWER, LEFT MESSAGE TO RETURN CALL.     Attempted to reach patient again no answer, left message to return call.       Patient returned call and I made her aware, she voiced understanding and is agreeable to weekly Vitamin D. Sent to [facility] in [place] per patients request as she is there with her sister.

## 2020-08-10 NOTE — TELEPHONE ENCOUNTER
----- Message from Scarlet Loera MD sent at 8/9/2020 10:12 PM EDT -----  Please get me a STONE.    On your desk

## 2020-08-12 RX ORDER — ERGOCALCIFEROL 1.25 MG/1
CAPSULE ORAL
Qty: 13 CAPSULE | OUTPATIENT
Start: 2020-08-12

## 2020-08-12 RX ORDER — ERGOCALCIFEROL 1.25 MG/1
50000 CAPSULE ORAL WEEKLY
Qty: 8 CAPSULE | Refills: 0 | Status: SHIPPED | OUTPATIENT
Start: 2020-08-12 | End: 2021-07-26

## 2020-08-17 NOTE — PROGRESS NOTES
"Jackie Johnson     VITALS: Blood pressure 110/66, pulse 92, temperature 97.1 °F (36.2 °C), height 162.6 cm (64\"), weight 82.1 kg (181 lb), SpO2 98 %, not currently breastfeeding.    Subjective  Chief Complaint:   Chief Complaint   Patient presents with   • Establish Care   • Breast Cancer        History of Present Illness:  Patient is a 60 y.o.  female with a complicated medical history significant for breast cancer with a future left lumpectomy planned along with a right mastectomy planned August 18 who presents to clinic secondary to establishment of care.  Patient has no new or acute concerns.  She is doing okay.  She does complain of numbness and tingling in her bilateral upper and lower extremities without any other symptoms.    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  Tumor is ER negative, AL negative, HER-2 positive.  There is also a finding on left breast MRI which appears suspicious.  She is pending a future left lumpectomy along with a right mastectomy.  There is no evidence of distant metastatic spread.  She has been seen at [facility].  Currently on neoadjuvant chemotherapy and is planning to return to [facility] for the mastectomies.  Genetic testing at [facility] was negative.    Patient has history of elevated liver enzymes and enlarged liver.  She also has a history of hyperlipidemia.  However, she was discontinued from the Crestor secondary to the elevated liver enzymes.  They had tried her on the WelChol afterwards, but she experienced muscle spasms and weakened muscles from that medication and thus discontinued that also.    Patient has allergic rhinitis and is currently on Claritin or Allegra as needed.  She denies any exacerbations.  She denies any congestion, rhinorrhea, or coughing.    Patient has GERD and is currently on Pepcid 10 mg orally daily without any side effects.  She denies any nighttime " exacerbations or she denies any nighttime coughing.  She denies any abdominal pain, or worsening symptoms.    No complaints about any of the medications.    The following portions of the patient's history were reviewed and updated as appropriate: allergies, current medications, past family history, past medical history, past social history, past surgical history and problem list.    Past Medical History  Past Medical History:   Diagnosis Date   • Acid reflux    • Asthma    • Breast cancer (CMS/HCC) 2020    rt br ca   • Cancer (CMS/HCC)    • Eczema    • Endometriosis    • Hiatal hernia    • High cholesterol    • Seasonal allergies    • Sinusitis    • Staph infection 2020    port       Review of Systems   Constitutional: Negative for chills and fever.   HENT: Negative for congestion and rhinorrhea.    Eyes: Negative for discharge and itching.   Respiratory: Negative for shortness of breath and wheezing.    Cardiovascular: Negative for chest pain and palpitations.   Gastrointestinal: Negative for abdominal pain and constipation.   Endocrine: Negative for cold intolerance and heat intolerance.   Genitourinary: Negative for dysuria and hematuria.   Musculoskeletal: Positive for arthralgias and myalgias.   Skin: Negative for rash and wound.   Neurological: Positive for weakness. Negative for headaches.   Psychiatric/Behavioral: Negative for suicidal ideas. The patient is not nervous/anxious.        Surgical History  Past Surgical History:   Procedure Laterality Date   • ABDOMINAL SURGERY     • BREAST BIOPSY Bilateral    •  SECTION     • D&C HYSTEROSCOPY ENDOMETRIAL ABLATION     • DIAGNOSTIC LAPAROSCOPY     • FRACTURE SURGERY Left     tib/fib   • FULGURATION ENDOMETRIOSIS     • LEG SURGERY      Left leg following fracture   • PORTACATH PLACEMENT N/A 3/18/2020    Procedure: INSERTION OF PORTACATH;  Surgeon: Dudley Aldridge MD;  Location: [facility];  Service: General;  Laterality: N/A;   • SKIN BIOPSY      • TONSILLECTOMY     • VENOUS ACCESS DEVICE (PORT) INSERTION N/A 5/27/2020    Procedure: INSERTION VENOUS ACCESS DEVICE;  Surgeon: Nerissa Wang MD;  Location:  [facility];  Service: General;  Laterality: N/A;   • VENOUS ACCESS DEVICE (PORT) REMOVAL Right 4/19/2020    Procedure: REMOVAL VENOUS ACCESS DEVICE;  Surgeon: Nerissa Wang MD;  Location:  [facility];  Service: General;  Laterality: Right;       Family History  Family History   Problem Relation Age of Onset   • Breast cancer Mother 75   • Basal cell carcinoma Mother 90        2 small spots on face removed + fair complexion   • Breast cancer Sister 55        VUS detected on genetic testing CDKN2A gene (c.-33G>C).    • Heart disease Father    • Throat cancer Father 79   • Lung cancer Father 79   • Dementia Father    • Cancer Maternal Grandmother 65        unknown gynecologic cancer, cervical vs uterine   • Lymphoma Maternal Grandfather 64   • Breast cancer Paternal Grandmother 70   • Prostate cancer Paternal Grandfather 70        metastastic to liver   • Breast cancer Paternal Aunt 77   • Breast cancer Maternal Aunt 80   • Breast cancer Other    • Ovarian cancer Sister 66        Elevated ; 1A tumor; Borderline cancer; both ovaries & lg cyst removed   • Ovarian cancer Maternal Cousin 54   • Breast cancer Other 50       Social History  Social History     Socioeconomic History   • Marital status:      Spouse name: Not on file   • Number of children: Not on file   • Years of education: Not on file   • Highest education level: Not on file   Tobacco Use   • Smoking status: Never Smoker   • Smokeless tobacco: Never Used   Substance and Sexual Activity   • Alcohol use: Never     Frequency: Never   • Drug use: Never   • Sexual activity: Defer     Partners: Male     Birth control/protection: None   Social History Narrative    lives alone with      with [facility] [employer]    No unusual chemical exposures    Never smoker, never  drinker       Objective  Physical Exam    Gen: Patient in NAD. Pleasant and answers appropriately. A&Ox3.    Skin: Warm and dry with normal turgor. No purpura, rashes, or unusual pigmentation noted. Hair is normal in appearance and distribution.  Numerous skin moles.    HEENT: NC/AT. No lesions noted. Conjunctiva clear, sclera nonicteric. PERRL. EOMI without nystagmus or strabismus. Fundi appear benign. No hemorrhages or exudates of eyes. Auditory canals are patent bilaterally without lesions. TMs intact,  nonerythematous, nonbulging without lesions. Nasal mucosa pink, nonerythematous, and nonedematous. Frontal and maxillary sinuses are nontender. O/P nonerythematous and moist without exudate.    Neck: Supple without lymph nodes palpated. FROM.     Lungs: CTA B/L without rales, rhonchi, crackles, or wheezes.    Heart: RRR. S1 and S2 normal. No S3 or S4. No MRGT.    Abd: Soft, nontender,nondistended. (+)BSx4 quadrants.     Extrem: No CCE. Radial pulses 2+/4 and equal B/L. FROMx4.   Neuro: No focal motor/sensory deficits.    Procedures    Assessment/Plan  Jackie Johnson is a 60 y.o. here for establishment of care.  Diagnoses and all orders for this visit:    Numerous skin moles  -     Ambulatory Referral to Dermatology  We will send to dermatology for a skin check.    Neuropathy  -     Vitamin B12; Future  -     Vitamin D 25 Hydroxy; Future  -     TSH; Future  -     Magnesium; Future  May be secondary to cancer treatment, but will check labs.    Prediabetes  -     Hemoglobin A1c; Future  Has history of elevated glucose, will check labs.    Mixed hyperlipidemia  -     Lipid Panel; Future  Has history of being on Crestor.  Will check labs.    Seasonal allergic rhinitis due to other allergic trigger  Continue on antihistamine.    Patient's Body mass index is 31.07 kg/m². BMI is above normal parameters. Recommendations include: exercise counseling and nutrition counseling.     Findings and plans discussed with patient  who verbalizes understanding and agreement. Will followup with patient once results are in. Patient to followup at clinic PRN or in two months for further medical followup.    MD JUDY Mccormick Dragon/Transcription Disclaimer:  Much of this encounter note is an electronic transcription/translation of spoken language to printed text.  The electronic translation of spoken language may permit erroneous, or at times, nonsensical words or phrases to be inadvertently transcribed.  Although I have reviewed the note for such errors, some may still exist.

## 2020-08-19 ENCOUNTER — APPOINTMENT (OUTPATIENT)
Dept: ONCOLOGY | Facility: HOSPITAL | Age: 60
End: 2020-08-19

## 2020-09-22 ENCOUNTER — OFFICE VISIT (OUTPATIENT)
Dept: ONCOLOGY | Facility: CLINIC | Age: 60
End: 2020-09-22

## 2020-09-22 ENCOUNTER — INFUSION (OUTPATIENT)
Dept: ONCOLOGY | Facility: HOSPITAL | Age: 60
End: 2020-09-22

## 2020-09-22 VITALS
BODY MASS INDEX: 28.24 KG/M2 | DIASTOLIC BLOOD PRESSURE: 72 MMHG | OXYGEN SATURATION: 98 % | RESPIRATION RATE: 20 BRPM | SYSTOLIC BLOOD PRESSURE: 112 MMHG | WEIGHT: 164.5 LBS | TEMPERATURE: 97.3 F | HEART RATE: 76 BPM

## 2020-09-22 VITALS
TEMPERATURE: 97.3 F | RESPIRATION RATE: 20 BRPM | OXYGEN SATURATION: 98 % | SYSTOLIC BLOOD PRESSURE: 112 MMHG | BODY MASS INDEX: 28.24 KG/M2 | HEART RATE: 76 BPM | WEIGHT: 164.5 LBS | DIASTOLIC BLOOD PRESSURE: 72 MMHG

## 2020-09-22 DIAGNOSIS — C50.911 MALIGNANT NEOPLASM OF RIGHT BREAST IN FEMALE, ESTROGEN RECEPTOR NEGATIVE, UNSPECIFIED SITE OF BREAST (HCC): ICD-10-CM

## 2020-09-22 DIAGNOSIS — Z51.81 ENCOUNTER FOR MONITORING CARDIOTOXIC DRUG THERAPY: ICD-10-CM

## 2020-09-22 DIAGNOSIS — Z79.899 ENCOUNTER FOR MONITORING CARDIOTOXIC DRUG THERAPY: ICD-10-CM

## 2020-09-22 DIAGNOSIS — Z17.1 MALIGNANT NEOPLASM OF RIGHT BREAST IN FEMALE, ESTROGEN RECEPTOR NEGATIVE, UNSPECIFIED SITE OF BREAST (HCC): ICD-10-CM

## 2020-09-22 DIAGNOSIS — R53.83 FATIGUE, UNSPECIFIED TYPE: Primary | ICD-10-CM

## 2020-09-22 DIAGNOSIS — Z95.828 PORT-A-CATH IN PLACE: Primary | ICD-10-CM

## 2020-09-22 DIAGNOSIS — M81.0 POSTMENOPAUSAL OSTEOPOROSIS: ICD-10-CM

## 2020-09-22 LAB — TSH SERPL DL<=0.05 MIU/L-ACNC: 1.82 UIU/ML (ref 0.27–4.2)

## 2020-09-22 PROCEDURE — 80053 COMPREHEN METABOLIC PANEL: CPT | Performed by: INTERNAL MEDICINE

## 2020-09-22 PROCEDURE — 84443 ASSAY THYROID STIM HORMONE: CPT | Performed by: INTERNAL MEDICINE

## 2020-09-22 PROCEDURE — 36591 DRAW BLOOD OFF VENOUS DEVICE: CPT

## 2020-09-22 PROCEDURE — 85025 COMPLETE CBC W/AUTO DIFF WBC: CPT | Performed by: INTERNAL MEDICINE

## 2020-09-22 PROCEDURE — 25010000003 HEPARIN LOCK FLUSH PER 10 UNITS: Performed by: INTERNAL MEDICINE

## 2020-09-22 PROCEDURE — 99215 OFFICE O/P EST HI 40 MIN: CPT | Performed by: INTERNAL MEDICINE

## 2020-09-22 RX ORDER — HEPARIN SODIUM (PORCINE) LOCK FLUSH IV SOLN 100 UNIT/ML 100 UNIT/ML
500 SOLUTION INTRAVENOUS AS NEEDED
Status: CANCELLED | OUTPATIENT
Start: 2020-10-06

## 2020-09-22 RX ORDER — SODIUM CHLORIDE 0.9 % (FLUSH) 0.9 %
20 SYRINGE (ML) INJECTION AS NEEDED
Status: CANCELLED | OUTPATIENT
Start: 2020-09-22

## 2020-09-22 RX ORDER — SODIUM CHLORIDE 0.9 % (FLUSH) 0.9 %
10 SYRINGE (ML) INJECTION AS NEEDED
Status: CANCELLED | OUTPATIENT
Start: 2020-10-06

## 2020-09-22 RX ORDER — SODIUM CHLORIDE 0.9 % (FLUSH) 0.9 %
10 SYRINGE (ML) INJECTION AS NEEDED
Status: DISCONTINUED | OUTPATIENT
Start: 2020-09-22 | End: 2020-09-22 | Stop reason: HOSPADM

## 2020-09-22 RX ORDER — SODIUM CHLORIDE 9 MG/ML
250 INJECTION, SOLUTION INTRAVENOUS ONCE
Status: CANCELLED | OUTPATIENT
Start: 2020-10-06

## 2020-09-22 RX ORDER — HEPARIN SODIUM (PORCINE) LOCK FLUSH IV SOLN 100 UNIT/ML 100 UNIT/ML
300 SOLUTION INTRAVENOUS ONCE
Status: CANCELLED | OUTPATIENT
Start: 2020-09-22

## 2020-09-22 RX ORDER — HEPARIN SODIUM (PORCINE) LOCK FLUSH IV SOLN 100 UNIT/ML 100 UNIT/ML
500 SOLUTION INTRAVENOUS AS NEEDED
Status: DISCONTINUED | OUTPATIENT
Start: 2020-09-22 | End: 2020-09-22 | Stop reason: HOSPADM

## 2020-09-22 RX ADMIN — SODIUM CHLORIDE, PRESERVATIVE FREE 10 ML: 5 INJECTION INTRAVENOUS at 14:47

## 2020-09-22 RX ADMIN — Medication 500 UNITS: at 14:47

## 2020-09-22 NOTE — PROGRESS NOTES
NAME: Jackie Johnson    : 1960    DATE:  2020    DIAGNOSIS:   1.  Clinical Stage IIB (cQ2T0S9) poorly differentiated invasive ductal carcinoma of the right breast.  Tumor spanned 15-16 cm, though it was unclear how much of this was invasive malignancy v. DCIS.  There were no clinically or radiographically supspicious axillary LNs.  There was associated high grade DCIS.  Tumor was ER-,RI-, HER-2 +  .  Following neoadjuvant TCHP, she had complete pathologic response - ypT0N0(sn).  No invasive malignancy detected.  0/6 LN involved.     2.  Atypical Lobular Hyperplasia and LCIS involving the L breast    CHIEF COMPLAINT:  Follow up of Breast Cancer      TREATMENT HISTORY:  1.      2.  R Mastectectomy and SLNBx with L Lumpectomy and Mastopexy  - Dr. Kadeem Serrano and Dr. Roc Huston ([facility]) 20    HISTORY OF PRESENT ILLNESS:   Jackie Johnson is a very pleasant 60 y.o. female who was referred by Dr. STACIE Richards for evaluation and treatment of breast cancer. She has a strong family h/o breast cancer and so is normally very good about getting her mammograms done.  She recently moved from [place] to [facility] and so missed her mammogram in 2019 because of the move.  She got established with a new PCP (Dr. Denver Sheth) in 2019 and was referred to a a new gynecologist  (Dr. Brayan Richards) who she saw in .  Kevin Richards ordered a screening mammogram.  She had an acute febrile illness around the time of the Super Bowl and at that time thought she might have a lump in her R breast.  While waiting on mammogram scheduling, she started to have pain in the breast and in eventually she would occasionally get a little bit of discharge from the right nipple.  She presented for  Screening mammography 19 as below.  This was followed by diagnostic mammogram and U/S on 20.  She had an abnormal span of 15 cm spanning the R breast.  She underwent stereotactic  biopsy on 1-21-20.    This revealed a poorly differentiated invasive ductal carcinoma of the R breast with associated high grade DCIS.  Tumor was ER/WI- and HER-2+ by IHC.  Her sister lives in [place] and was treated at [facility], so she went there for further evaluation. She saw Dr. Romero of medical oncology and Dr. Serrano of surgical oncology.  She had staging with CT CAP and bone scan as well as Brain MRI and had no evidence of distant metastatic spread.  She had reese breast MRI as well which showed an unexpected abnormality in the L breast described as linear non-mass enhancement spanning an 8 cm area in the inferior breast at 6:00.  This was biopsied on 2-28-20 with biopsy result pending.  No abnormal axillary LNs were identified.  Neoadjuvant chemotherapy was recommended to her by her [facility] team of physicians but she wishes to seek this treatment closer to home so is here today for further evaluation and treatment.    She is in good health overall. She reports an isolated episode of fever/chills during an acute illness, abdominal fullness to her right side, fatigue and a rash circling her neck. She denies changes in weight, cp, sob, persistent abdominal pain, n/v/c/d or bony pain.      INTERVAL HISTORY:  Ms. Johnson is here today for follow up of breast cancer. Since she was here last, she underwent R mastectomy, SLNBx with L breast lumpectomy and mastopexy at [facility]. She is recovering well. She did have an unusual sense of vibrating throughout her body and brain following her surgery, which she apparently had after her last dose of anesthesia also.  She had neurologic evaluation which showed no pathology and she says this is improving.  She has questions about restarting HP and consideration of hormonal therapy after discussing this with Dr. Simón Romero at [facility].       PAST MEDICAL HISTORY:  Past Medical History:   Diagnosis Date   • Acid reflux    • Asthma    • Breast cancer (CMS/[facility]) 01/2020     rt br ca   • Cancer (CMS/HCC)    • Eczema    • Endometriosis    • GERD (gastroesophageal reflux disease)    • Hiatal hernia    • High cholesterol    • Seasonal allergies    • Sinusitis    • Staph infection 2020    port       PAST SURGICAL HISTORY:  Past Surgical History:   Procedure Laterality Date   • ABDOMINAL SURGERY     • BREAST BIOPSY Bilateral    •  SECTION     • D&C HYSTEROSCOPY ENDOMETRIAL ABLATION     • DIAGNOSTIC LAPAROSCOPY     • FRACTURE SURGERY Left     tib/fib   • FULGURATION ENDOMETRIOSIS     • LEG SURGERY      Left leg following fracture   • PORTACATH PLACEMENT N/A 3/18/2020    Procedure: INSERTION OF PORTACATH;  Surgeon: Dudley Aldridge MD;  Location: [facility];  Service: General;  Laterality: N/A;   • SKIN BIOPSY     • TONSILLECTOMY     • VENOUS ACCESS DEVICE (PORT) INSERTION N/A 2020    Procedure: INSERTION VENOUS ACCESS DEVICE;  Surgeon: Nerissa Wang MD;  Location: [facility];  Service: General;  Laterality: N/A;   • VENOUS ACCESS DEVICE (PORT) REMOVAL Right 2020    Procedure: REMOVAL VENOUS ACCESS DEVICE;  Surgeon: Nerissa Wang MD;  Location: [facility];  Service: General;  Laterality: Right;       FAMILY HISTORY:  Family History   Problem Relation Age of Onset   • Breast cancer Mother 75   • Basal cell carcinoma Mother 90        2 small spots on face removed + fair complexion   • Heart disease Mother    • Hypertension Mother    • Diabetes type II Mother    • Breast cancer Sister 55        VUS detected on genetic testing CDKN2A gene (c.-33G>C).    • Lung disease Sister    • Heart disease Father    • Throat cancer Father 79   • Lung cancer Father 79   • Dementia Father    • Parkinsonism Father    • Cancer Maternal Grandmother 65        unknown gynecologic cancer, cervical vs uterine   • Heart disease Maternal Grandmother    • Diabetes type II Maternal Grandmother    • Lymphoma Maternal Grandfather 64   • Breast cancer Paternal Grandmother 70   • Prostate  cancer Paternal Grandfather 70        metastastic to liver   • Breast cancer Paternal Aunt 77   • Breast cancer Maternal Aunt 80   • Breast cancer Other    • Ovarian cancer Sister 66        Elevated ; 1A tumor; Borderline cancer; both ovaries & lg cyst removed   • Ovarian cancer Maternal Cousin 54   • Breast cancer Other 50   • Diabetes type I Nephew          SOCIAL HISTORY:  Social History     Socioeconomic History   • Marital status:      Spouse name: Not on file   • Number of children: Not on file   • Years of education: Not on file   • Highest education level: Not on file   Tobacco Use   • Smoking status: Never Smoker   • Smokeless tobacco: Never Used   Substance and Sexual Activity   • Alcohol use: Never     Frequency: Never   • Drug use: Never   • Sexual activity: Defer     Partners: Male     Birth control/protection: None   Social History Narrative    lives alone with      with [facility]    No unusual chemical exposures    Never smoker, never drinker       REVIEW OF SYSTEMS:   A comprehensive 14 point review of systems was performed.  Significant findings as mentioned above.  All other systems reviewed and are negative.      MEDICATIONS:  The current medication list was reviewed in the EMR    Current Outpatient Medications:   •  acetaminophen (TYLENOL) 325 MG tablet, Take 650 mg by mouth Every 6 (Six) Hours As Needed for Mild Pain ., Disp: , Rfl:   •  calcium carbonate (TUMS) 500 MG chewable tablet, Chew 1 tablet Daily As Needed for Indigestion or Heartburn., Disp: , Rfl:   •  dexamethasone (DECADRON) 4 MG tablet, Take 2 tablets by mouth daily for 3 days, Disp: 6 tablet, Rfl: 3  •  famotidine (PEPCID) 10 MG tablet, Take 10 mg by mouth As Needed for Indigestion or Heartburn., Disp: , Rfl:   •  fexofenadine (ALLEGRA) 180 MG tablet, Take 180 mg by mouth., Disp: , Rfl:   •  levalbuterol (XOPENEX HFA) 45 MCG/ACT inhaler, Inhale 1-2 puffs Every 4 (Four) Hours As Needed for  Wheezing., Disp: , Rfl:   •  loperamide (IMODIUM A-D) 2 MG tablet, , Disp: , Rfl:   •  loratadine (CLARITIN) 10 MG tablet, Take 10 mg by mouth 2 (Two) Times a Day., Disp: , Rfl:   •  ondansetron (ZOFRAN) 8 MG tablet, Take 1 tablet by mouth 3 (Three) Times a Day As Needed for Nausea or Vomiting., Disp: 30 tablet, Rfl: 5  •  prochlorperazine (COMPAZINE) 10 MG tablet, Take 1 tablet by mouth Every 6 (Six) Hours As Needed for Nausea or Vomiting., Disp: 60 tablet, Rfl: 3  •  sennosides-docusate (Senna-S) 8.6-50 MG per tablet, Take 2 tablets by mouth 2 (Two) Times a Day., Disp: 120 tablet, Rfl: 5  •  vitamin D (ERGOCALCIFEROL) 1.25 MG (45823 UT) capsule capsule, Take 1 capsule by mouth 1 (One) Time Per Week., Disp: 8 capsule, Rfl: 0    ALLERGIES:    Allergies   Allergen Reactions   • Albuterol Other (See Comments)     One time severe headache, questionable for aneurysm, did spinal tap was positive, did Angiogram and MRI were Negative for aneurysm   • Penicillins Hives   • Cefpodoxime Rash     vantin     • Clindamycin Hcl Rash   • Crestor [Rosuvastatin Calcium] Other (See Comments)     Caused high liver enxymes   • Sulfa Antibiotics Other (See Comments)     Mucosal lesions   • Pseudoephedrine Other (See Comments)     Soreness on the tongue       PHYSICAL EXAM:  Vitals:    09/22/20 1414   BP: 112/72   Pulse: 76   Resp: 20   Temp: 97.3 °F (36.3 °C)   SpO2: 98%     Pain Score    09/22/20 1414   PainSc: 0-No pain     General:  Awake, alert and oriented, appears well.   HEENT:  Pupils are equal, round and reactive to light and accommodation, Extra-ocular movements full, Oropharyx clear, mucous membranes moist  Neck:  No JVD, thyromegaly or lymphadenopathy   CV:  Regular rate and rhythm, no murmurs, rubs or gallops  Resp:  Lungs are clear to auscultation bilaterally.  No wheezing.  PAC site c/d/i.  Breast:  S/p R mastectomy.  Surgical incisions c/d/i and healing nicely.  S/p L lumpectomy/mastopexy.  Surgical incisions are healing  well.  There is an area of granulation tissue inferiorly which does not appear to be infected.  No palpable masses or axillary adenopathy on either side.   Abd:  Soft, non-tender, sl distended, bowel sounds present, no organomegaly or masses  Ext:  No clubbing, cyanosis or edema.   Lymph:  No cervical, supraclavicular, axillary, inguinal or femoral adenopathy  Neuro:  MS as above, CN II-XII intact, grossly non-focal exam      PATHOLOGY:  02-12-20 08-18-20  A. SENTINEL LYMPH NODE #1 RIGHT AXILLA, BIOPSY:  - 1 lymph node, no tumor present (0/1)  - Cytokeratin stain shows no evidence of metastatic carcinoma    B. SENTINEL LYMPH NODE #2, RIGHT AXILLA, BIOPSY:  - one lymph node, no tumor present (0/1)  - Cytokeratin stain shows no evidence of metastatic carcinoma    C. RIGHT BREAST, TOTAL MASTECTOMY:  - No residual carcinoma identified  - area of fibrosis with associated biopsy clip, consistent with treated tumor bed.   - Proliferative fibrocystic change.   - Skin and nipple, no tumor present.   - four low axillary lymph nodes, no tumor present (0/4).     D. LEFT BREAST, SEGMENTAL MASTECTOMY:  - FOCI OF LOBULAR NEOPLASIA (ATYPICAL LOBULAR HYPERPLASIA AND LOBULAR CARCINOMA IN SITU). LOW GRADE, CLASSIC TYPE, WITH FOCAL PAGETOID SPREAD INTO DUCTS.   - Two prior biopsy site changes present.   - Proliferative fibrocystic change.     E. RIGHT BREAST SKIN, EXCESS, EXCISION:  - Skin and breast tissue, no tumor present.     F. LEFT BREAST, MASTOPEXY:  - Skin and breast tissue, no tumor present    Tumor Template  Specimen Identification   Procedure: Total mastectomy   Specimen Laterality: Right  Invasive Carcinoma   Tumor Focality: NA   Tumor Size: 0   Histologic Type of Invasive Carcinoma: NA   Histologic Grade Based on Tommy Histologic Score    Glandular (Acinar)/Tubular Differentiation: Not applicable    Nuclear Pleomorphism: not applicable    Mitotic Rate: not applicable   Overall Grade: not  applicable   Lymphovascular invasion: not present  In Situ Carcinoma   Ductal Carcinoma in Situ (DCIS): Not present   Lobular Carcinoma in situ (LCIS): Not present  Tumor Extension   Skin: uninvolved    Nipple: uninvolved   Skeletal muscle: not applicable  Margins   Invasive Carcinoma Margins: NA    DCIS Margins: NA  Regional Lymph nodes   Uninvolved by tumor cells    Total number of lymph nodes examined: 6    Number of sentinel lymph nodes examined: 2  Treatment effect   Treatment effect in the breast: present   Treatment effect in the lymph nodes: not present  RCB input variables   Dimensions of Residual Cancer that is in-situ: 0%   Total Number of Lymph nodes with Residual Metastatic Carcinoma: 0   Size of Largest Metastasis: NA   RCB Index Computed Value (optional): 0   RCB Class (optional): 0  Pathologic Stage Classification 9ypTNM, AJCC 8th Edition)    Primary Tumor (invasive carcinoma)(ypT): ypT0   Regional lymph nodes (ypN): yPN0(sn)   Distant Metastasis (ypM): NA  Ancillary Studies: Please see previous case [id number]      ENDOSCOPY:        IMAGING:  Bilateral Diagnostic mammogram 12-27-19  FINDINGS:  There are scattered areas of fibroglandular density.     The bilateral fibroglandular pattern is unremarkable in appearance. A  few scattered calcifications are present in the left breast. There are  calcifications in the central aspect of the right breast spanning  approximately 15 cm of tissue in the AP dimension and extending up to  the base of the nipple for which additional imaging is recommended.     IMPRESSION:  1. Benign left mammographic findings.  2. Calcifications in the right breast. Recommend additional imaging.        BI-RADS CATEGORY:  0, INCOMPLETE: Need additional imaging evaluation.     RECOMMENDATION:  Right ML and CC magnification views.      R diagnostic mammogram 01-16-20  FINDINGS:  Magnification imaging of the right breast demonstrates  casting-type pleomorphic calcifications spanning  over 15 cm of tissue in  the AP dimension in the right 6:00 to 7:00 region. Calcifications do  extend up to the base of the nipple. Findings are highly suspicious for  DCIS.     Right breast ultrasound: Focused sonographic evaluation of the right  6:00 to 7:00 region demonstrates a single 0.5 cm irregular hypoechoic  mass associated with a coarse calcification located at 7:00, 3 cm from  the nipple. Ultrasound of the right axilla demonstrates no abnormal  appearing axillary lymph nodes.        IMPRESSION:  Findings are highly suspicious for extensive DCIS in the  right 6:00 to 7:00 region extending into the base of the nipple. There  is also small irregular mass noted on ultrasound in the 7:00 region  suspicious for invasion.        BI-RADS CATEGORY:  5, HIGHLY SUGGESTIVE OF MALIGNANCY        RECOMMENDATION:  Recommend stereotactic guided core biopsy of the  anterior and posterior aspect of the calcifications in the 6:00 to 7:00  region to determine extent of disease. Ultrasound-guided core biopsy of  the right 7:00 mass may also be warranted pending pathology results of  the calcifications.    Diagnostic bilateral mammogram 02-25-20  FINDINGS:  There are scattered areas of fibroglandular density.    1:  There are fine-linear branching calcifications measuring 16 x 7 x 7  centimeters with segmental distribution and associated post biopsy clip in the  right breast lower hemisphere at 6 to 7 o'clock.  There are 2 post biopsy clips  noted.  The calcifications extend to the inferior skin and nipple.    2:  There are amorphous calcifications measuring 0.3 centimeters in the left  breast lower hemisphere at 6 to 7 o'clock located 4 centimeters from the nipple.    IMPRESSION:  1:  Fine-linear branching calcifications in the right breast lower hemisphere at  6 to 7 o'clock are known biopsy-proven malignancy. Ultrasound is recommended for  staging. Recommend appropriate evaluation and treatment of this known  malignancy.    2:  Amorphous calcifications in the left breast lower hemisphere at 6 to 7  o'clock located 4 centimeters from the nipple are suspicious. Stereotactic  biopsy is recommended.    BI-RADS Category 4:  Suspicious Abnormality      US Chest 02-25-20  Findings:       Right Breast: The extent of calcified disease is best visualized by mammography. There are dilated ducts with internal calcifications vascularity extending toward the nipple at the 7 o'clock position. This is consistent with the patient's known biopsy-proven malignancy.      Right Charles Basins: No suspicious axillary (level I, II & III) or internal mammary lymphadenopathy is noted.      IMPRESSION:    1. Known RIGHT breast malignancy is best visualized by mammography. A MRI may be obtained for evaluation of disease.    2. No suspicious RIGHT-sided lymphadenopathy      ACR BI-RADS Category: 6. Known malignancy.  Recommend appropriate evaluation and treatment of the known malignancy.         R breast US 02-25-20  Findings:       Right Breast: The extent of calcified disease is best visualized by mammography. There are dilated ducts with internal calcifications vascularity extending toward the nipple at the 7 o'clock position. This is consistent with the patient's known biopsy-proven malignancy.      Right Charles Basins: No suspicious axillary (level I, II & III) or internal mammary lymphadenopathy is noted.      IMPRESSION:    1. Known RIGHT breast malignancy is best visualized by mammography. A MRI may be obtained for evaluation of disease.    2. No suspicious RIGHT-sided lymphadenopathy      ACR BI-RADS Category: 6. Known malignancy.  Recommend appropriate evaluation and treatment of the known malignancy.       MRI Brain w/wo contrast 02-27-20  FINDINGS:     No abnormal parenchymal or leptomeningeal enhancement is noted. The brain parenchyma is unremarkable except of small scattered T2 FLAIR hyperintensities consistent with chronic  microangiopathic changes. No acute ischemia, intra or extra-axial hemorrhage, mass effect or midline shift. No hydrocephalus is noted.    The osseous structures and extra-cranial soft tissues are unremarkable.    The orbital structures unremarkable.    The paranasal sinuses and mastoid air cells are clear.    IMPRESSION:  No evidence of metastatic disease.      CTCAP 02-28-20    Findings:       CHEST:  The breasts are ptotic and there are biopsy clips projecting between the lower quadrants of the right breast.    On image 99 of series 3, there is a nonspecific 1 cm soft tissue nodule in the lower outer quadrant of the right breast.    No lung nodules or pleural effusions are present.    No axillary, internal mammary, mediastinal or hilar adenopathy is seen.    No suspicious bone lesions are present.      ABDOMEN and PELVIS:  The liver is borderline enlarged and diffusely fatty infiltrated without measurable mass or biliary ductal dilatation.    The gallbladder appears normal.    The spleen, pancreas and adrenal glands appear normal.    The kidneys demonstrate function without hydronephrosis and there is a small low dense nidus in the mid right kidney, likely a cyst.    No adenopathy or ascites are seen in the abdomen or pelvis.    A normal-appearing retrocecal appendix is seen.    Multilevel degenerative-like bone changes are present.      IMPRESSION:  No evidence for metastatic disease the chest, abdomen or pelvis.      MRI Breast bilateral 03-02-20    Findings:  The breast composition is heterogeneous fibroglandular tissue. There is no significant early background parenchymal enhancement.    Right breast: There is linearly oriented nonmass enhancement involving the lateral and inferior aspects (anterior, middle, and posterior third) of the right breast 6 - 7 o'clock position extending 17 cm from the base of the nipple to the pectoralis muscle (series 5 image  of 256). There is no evidence for pectoralis  muscle invasion. Extension to the inferior skin is better demonstrated mammographically. Susceptibility artifact from 2 postbiopsy clips is noted.    Left breast: There is linearly oriented nonmass enhancement in the inferior aspect (middle third) of the left breast 6 o'clock position extending approximately 8 cm (series 5 image  of 256). There is no evidence for nipple, skin, or pectoralis muscle involvement. Stereotactic guided biopsy of calcifications at the 6 o'clock position to be performed later today and corresponds to the anterior aspect of the nonmass enhancement. MRI guided biopsy of nonmass enhancement seen on image 94 of 256, series 5 and image 40 of 160, series 8 is recommended (The images have been annotated with a [place]).    Charles Basins: There is no lymphadenopathy in the visualized axillary or internal mammary regions.    IMPRESSION:  Linear nonmass enhancement representing known malignancy right breast as above. Linear nonmass enhancement left breast as above.   MRI guided biopsy of nonmass enhancement seen on image 94 of 256, series 5 and image 40 of 160, series 8 is recommended.    ACR BI-RADS Category: 6. Recommend MR-guided biopsy of nonmass enhancement left breast as above .Recommend appropriate evaluation and treatment of the known malignancy.       Bone scan 03-02-20  FINDINGS:     Radiotracer uptake secondary to degenerative changes are seen involving the shoulders, hips, knees, and feet. Increased uptake along the right tibial shaft likely is likely reactive. Faint focus of activity within the left tibial shaft is compatible with prior injury/fracture. Increased uptake in the skull is compatible with a benign hyperostosis.    Physiologic uptake of the bilateral kidneys and bladder.    IMPRESSION:    No scintigraphic evidence of skeletal metastases.      Echo 03-02-20      Bilateral diagnostic mammogram 05-26-20  FINDINGS: There are scattered areas of fibroglandular density.     A  postbiopsy marking clip is now noted in the left breast with expected  surrounding postbiopsy changes best visualized on MLO imaging. The  remaining bilateral fibroglandular pattern is stable in appearance.  Redemonstrated are fine linear branching calcifications in the right  6:00 to 7:00 region spanning approximately 17 cm of tissue in the AP  dimension. There are 2 associated postbiopsy marking clips. No new areas  of calcification are seen in either breast.     IMPRESSION:  1. Benign left mammographic findings.        2. No significant change in the appearance of calcifications in the  right breast.        BI-RADS CATEGORY:  6, KNOWN BIOPSY PROVEN MALIGNANCY        RECOMMENDATION:  Recommend clinical follow-up.       Echo 07-22-20  · Normal left ventricular cavity size and wall thickness noted. All left ventricular wall segments contract normally.  · Estimated EF appears to be in the range of 61 - 65%  · The pericardium is normal. There is no evidence of pericardial effusion.    RECENT LABS:  Lab Results   Component Value Date    WBC 4.59 09/22/2020    HGB 12.0 09/22/2020    HCT 38.1 09/22/2020    MCV 95.7 09/22/2020    RDW 12.0 (L) 09/22/2020     09/22/2020    NEUTRORELPCT 53.1 09/22/2020    LYMPHORELPCT 37.7 09/22/2020    MONORELPCT 6.8 09/22/2020    EOSRELPCT 1.3 09/22/2020    BASORELPCT 1.1 09/22/2020    NEUTROABS 2.44 09/22/2020    LYMPHSABS 1.73 09/22/2020       Lab Results   Component Value Date     09/22/2020    K 4.3 09/22/2020    CO2 25.1 09/22/2020     09/22/2020    BUN 20 09/22/2020    CREATININE 1.03 (H) 09/22/2020    EGFRIFNONA 55 (L) 09/22/2020    EGFRIFAFRI 85 08/17/2020    GLUCOSE 90 09/22/2020    CALCIUM 9.8 09/22/2020    ALKPHOS 68 09/22/2020    AST 16 09/22/2020    ALT 20 09/22/2020    BILITOT 0.2 09/22/2020    ALBUMIN 4.42 09/22/2020    PROTEINTOT 6.6 09/22/2020    MG 2.1 07/28/2020     Lab Results   Component Value Date    TSH 1.820 09/22/2020       Lab Results    Component Value Date     15.1 03/11/2020     Lab Results   Component Value Date    LABCA2 19.0 03/11/2020             ASSESSMENT & PLAN:  Jackie Johnson is a very pleasant 60 y.o. female with what is clinically a Stage IIB ([id number]) poorly differentiated invasive ductal carcinoma of the right breast.  Tumor spanned 15-16 cm although it was difficult to tell how much of this was invasive malignancy v. DCIS.  There were no clinically or radiographically supspicious axillary LNs.  There was associated high grade DCIS.  Tumor was ER-,WI-, HER-2 +.  She had pathologic complete response with neoadjuvant therapy.  She also has LCIS/ALH involving the left breast.    1.  Right breast cancer:  -  I administered TCHP neoadjuvantly downsize tumor and potentially allow for a more successful surgery. After 6 cycles of TCHP, she had a complete pathologic response.  -  We discussed consideration of radiation given the initial size of her tumor.  It really isn't known how much of her initial disease was invasive malignancy v. DCIS.  She says she was evaluated by a Radiation Oncologist at [facility] who didn't recommend radiation.  -  Given HER-2 positive disease, I have recommended we complete a year of HP.  -  Will repeat echocardiogram and work to get this started.    2.  L Breast LCIS / ALH:  -  She is s/p lumpectomy.  -  Discussed role for 5 years of hormonal therapy in prevention of future breast cancer development in this breast.  She is post-menopausal, so could use Tamoxifen or an aromatase inhibitor for this purpose.  She has an entact uterus so Tamoxifen would come with increased risk (albeit low risk) of development of endometrial hypertrophy/ endometrial cancer.  Aromatase inhibitors would not come with this risk, but would come with risk for loss of bone mineral density and potentially increased risk of side effects.  Will order DEXA scan and she will think things over and we can discuss this further after  DEXA is done.    3.  Very strong family history of malignancy including several members with breast cancer and a sister with ovarian cancer.  -  She underwent genetic testing at [facility] and testing was negative.      4.  Unusual sense of having vibration all over her body:  -  Uncertain origin.  Evaluated extensively by Neurology at [facility] who felt the symptoms were not neurologic in origin.  Ms. Johnson wonders if they might be related to anesthesia.        5.  Prophylaxis:  She has had 2019 flu vaccine.  Recommended she get Prevnar 13 which she also had.  She is a lifelong non-smoker.    6.  ACO / MARNI/Other  Quality measures  -  Jackie Johnson did not receive 2020 flu vaccine.  Recommended [facility] get this done.  -  Jackie Jhonson reports a pain score of 0.  Given her pain assessment as noted, treatment options were discussed and the following options were decided upon as a follow-up plan to address the patient's pain: No intervention needed..  -  Current outpatient and discharge medications have been reconciled for the patient.  Reviewed by: Fatoumata Obrien MD       7.  Follow-up:   - MD follow up after DEXA    This note was scribed for Fatoumata Obrien MD by Alba Siu RN.    I, Fatoumata Obrien MD, personally performed the services described in this documentation as scribed by the above named individual in my presence, and it is both accurate and complete.  09/22/2020             I spent 45 minutes with Jackie Johnson today.  In the office today, more than 50% of this time was spent face-to-face with her  in counseling / coordination of care, reviewing her medical history and counseling on the current treatment plan.  All questions were answered to her satisfaction      Electronically Signed by: Fatoumata Obrien MD      CC:   MD JUAN PABLO Macdonald MD Bora Lim, MD- [facility] Medical Oncology  Kadeem Serrano MD- [facility] Surgical Oncology  Dr. Roc Joaquin MD  [facility]

## 2020-09-25 ENCOUNTER — HOSPITAL ENCOUNTER (OUTPATIENT)
Dept: BONE DENSITY | Facility: HOSPITAL | Age: 60
Discharge: HOME | End: 2020-09-25
Admitting: INTERNAL MEDICINE

## 2020-09-25 DIAGNOSIS — M81.0 POSTMENOPAUSAL OSTEOPOROSIS: ICD-10-CM

## 2020-09-25 PROCEDURE — 77080 DXA BONE DENSITY AXIAL: CPT

## 2020-09-25 PROCEDURE — 77080 DXA BONE DENSITY AXIAL: CPT | Performed by: RADIOLOGY

## 2020-09-28 ENCOUNTER — TELEPHONE (OUTPATIENT)
Dept: ONCOLOGY | Facility: HOSPITAL | Age: 60
End: 2020-09-28

## 2020-09-28 ENCOUNTER — HOSPITAL ENCOUNTER (EMERGENCY)
Facility: HOSPITAL | Age: 60
Discharge: HOME | End: 2020-09-28
Attending: EMERGENCY MEDICINE | Admitting: EMERGENCY MEDICINE

## 2020-09-28 ENCOUNTER — APPOINTMENT (OUTPATIENT)
Dept: MRI IMAGING | Facility: HOSPITAL | Age: 60
End: 2020-09-28

## 2020-09-28 VITALS
TEMPERATURE: 98.2 F | HEIGHT: 64 IN | HEART RATE: 70 BPM | OXYGEN SATURATION: 96 % | RESPIRATION RATE: 18 BRPM | SYSTOLIC BLOOD PRESSURE: 119 MMHG | BODY MASS INDEX: 28.34 KG/M2 | DIASTOLIC BLOOD PRESSURE: 67 MMHG | WEIGHT: 166 LBS

## 2020-09-28 DIAGNOSIS — S39.012A STRAIN OF LUMBAR REGION, INITIAL ENCOUNTER: Primary | ICD-10-CM

## 2020-09-28 LAB
ALBUMIN SERPL-MCNC: 4.5 G/DL (ref 3.5–5.2)
ALBUMIN/GLOB SERPL: 1.8 G/DL
ALP SERPL-CCNC: 76 U/L (ref 39–117)
ALT SERPL W P-5'-P-CCNC: 23 U/L (ref 1–33)
ANION GAP SERPL CALCULATED.3IONS-SCNC: 9.5 MMOL/L (ref 5–15)
AST SERPL-CCNC: 17 U/L (ref 1–32)
BACTERIA UR QL AUTO: ABNORMAL /HPF
BASOPHILS # BLD AUTO: 0.05 10*3/MM3 (ref 0–0.2)
BASOPHILS NFR BLD AUTO: 1 % (ref 0–1.5)
BILIRUB SERPL-MCNC: 0.2 MG/DL (ref 0–1.2)
BILIRUB UR QL STRIP: NEGATIVE
BUN SERPL-MCNC: 17 MG/DL (ref 8–23)
BUN/CREAT SERPL: 20.5 (ref 7–25)
CALCIUM SPEC-SCNC: 10 MG/DL (ref 8.6–10.5)
CHLORIDE SERPL-SCNC: 104 MMOL/L (ref 98–107)
CLARITY UR: CLEAR
CO2 SERPL-SCNC: 25.5 MMOL/L (ref 22–29)
COLOR UR: YELLOW
CREAT SERPL-MCNC: 0.83 MG/DL (ref 0.57–1)
CRP SERPL-MCNC: 0.3 MG/DL (ref 0–0.5)
DEPRECATED RDW RBC AUTO: 42.8 FL (ref 37–54)
EOSINOPHIL # BLD AUTO: 0.06 10*3/MM3 (ref 0–0.4)
EOSINOPHIL NFR BLD AUTO: 1.2 % (ref 0.3–6.2)
ERYTHROCYTE [DISTWIDTH] IN BLOOD BY AUTOMATED COUNT: 12.1 % (ref 12.3–15.4)
ERYTHROCYTE [SEDIMENTATION RATE] IN BLOOD: 12 MM/HR (ref 0–30)
GFR SERPL CREATININE-BSD FRML MDRD: 70 ML/MIN/1.73
GLOBULIN UR ELPH-MCNC: 2.5 GM/DL
GLUCOSE SERPL-MCNC: 91 MG/DL (ref 65–99)
GLUCOSE UR STRIP-MCNC: NEGATIVE MG/DL
HCT VFR BLD AUTO: 40.5 % (ref 34–46.6)
HGB BLD-MCNC: 12.5 G/DL (ref 12–15.9)
HGB UR QL STRIP.AUTO: NEGATIVE
HYALINE CASTS UR QL AUTO: ABNORMAL /LPF
IMM GRANULOCYTES # BLD AUTO: 0 10*3/MM3 (ref 0–0.05)
IMM GRANULOCYTES NFR BLD AUTO: 0 % (ref 0–0.5)
KETONES UR QL STRIP: NEGATIVE
LEUKOCYTE ESTERASE UR QL STRIP.AUTO: ABNORMAL
LYMPHOCYTES # BLD AUTO: 1.89 10*3/MM3 (ref 0.7–3.1)
LYMPHOCYTES NFR BLD AUTO: 36.3 % (ref 19.6–45.3)
MCH RBC QN AUTO: 29.7 PG (ref 26.6–33)
MCHC RBC AUTO-ENTMCNC: 30.9 G/DL (ref 31.5–35.7)
MCV RBC AUTO: 96.2 FL (ref 79–97)
MONOCYTES # BLD AUTO: 0.34 10*3/MM3 (ref 0.1–0.9)
MONOCYTES NFR BLD AUTO: 6.5 % (ref 5–12)
NEUTROPHILS NFR BLD AUTO: 2.87 10*3/MM3 (ref 1.7–7)
NEUTROPHILS NFR BLD AUTO: 55 % (ref 42.7–76)
NITRITE UR QL STRIP: NEGATIVE
NRBC BLD AUTO-RTO: 0 /100 WBC (ref 0–0.2)
PH UR STRIP.AUTO: <=5 [PH] (ref 5–8)
PLATELET # BLD AUTO: 214 10*3/MM3 (ref 140–450)
PMV BLD AUTO: 9.7 FL (ref 6–12)
POTASSIUM SERPL-SCNC: 4.3 MMOL/L (ref 3.5–5.2)
PROT SERPL-MCNC: 7 G/DL (ref 6–8.5)
PROT UR QL STRIP: NEGATIVE
RBC # BLD AUTO: 4.21 10*6/MM3 (ref 3.77–5.28)
RBC # UR: ABNORMAL /HPF
REF LAB TEST METHOD: ABNORMAL
SODIUM SERPL-SCNC: 139 MMOL/L (ref 136–145)
SP GR UR STRIP: 1.01 (ref 1–1.03)
SQUAMOUS #/AREA URNS HPF: ABNORMAL /HPF
UROBILINOGEN UR QL STRIP: ABNORMAL
WBC # BLD AUTO: 5.21 10*3/MM3 (ref 3.4–10.8)
WBC UR QL AUTO: ABNORMAL /HPF

## 2020-09-28 PROCEDURE — 87086 URINE CULTURE/COLONY COUNT: CPT | Performed by: PHYSICIAN ASSISTANT

## 2020-09-28 PROCEDURE — 99283 EMERGENCY DEPT VISIT LOW MDM: CPT

## 2020-09-28 PROCEDURE — 81001 URINALYSIS AUTO W/SCOPE: CPT | Performed by: PHYSICIAN ASSISTANT

## 2020-09-28 PROCEDURE — 86140 C-REACTIVE PROTEIN: CPT | Performed by: PHYSICIAN ASSISTANT

## 2020-09-28 PROCEDURE — 85652 RBC SED RATE AUTOMATED: CPT | Performed by: PHYSICIAN ASSISTANT

## 2020-09-28 PROCEDURE — 80053 COMPREHEN METABOLIC PANEL: CPT | Performed by: PHYSICIAN ASSISTANT

## 2020-09-28 PROCEDURE — 85025 COMPLETE CBC W/AUTO DIFF WBC: CPT | Performed by: PHYSICIAN ASSISTANT

## 2020-09-28 PROCEDURE — 72148 MRI LUMBAR SPINE W/O DYE: CPT

## 2020-09-28 NOTE — TELEPHONE ENCOUNTER
"Patient reports experiencing \"sharp\" pain (8/10) to her right lower back beginning Saturday. Patient stated that her pain has been so severe in the AM that her spouse has had to help her with dressing. Patient reports that pain seems to improve with mobility, she denies any falls or injury. Patient reports a history of kidney stones, denies any fever, dysuria, or nausea presently, but did state she had a previous UTI that she has already finished an antibiotic course for. Discussed patient's complaints with Dr. Camille MD stated to have patient report to ER for further evaluation. Called & made patient aware of MD's recommendations. Pt verbalized understanding at this time without any further questions.    "

## 2020-09-29 ENCOUNTER — HOSPITAL ENCOUNTER (OUTPATIENT)
Dept: CARDIOLOGY | Facility: HOSPITAL | Age: 60
Discharge: HOME | End: 2020-09-29
Admitting: INTERNAL MEDICINE

## 2020-09-29 DIAGNOSIS — Z51.81 ENCOUNTER FOR MONITORING CARDIOTOXIC DRUG THERAPY: ICD-10-CM

## 2020-09-29 DIAGNOSIS — Z79.899 ENCOUNTER FOR MONITORING CARDIOTOXIC DRUG THERAPY: ICD-10-CM

## 2020-09-29 LAB
BACTERIA SPEC AEROBE CULT: NO GROWTH
BH CV ECHO MEAS - % IVS THICK: 4.6 %
BH CV ECHO MEAS - % LVPW THICK: 15.3 %
BH CV ECHO MEAS - BSA(HAYCOCK): 1.9 M^2
BH CV ECHO MEAS - BSA: 1.8 M^2
BH CV ECHO MEAS - BZI_BMI: 28.5 KILOGRAMS/M^2
BH CV ECHO MEAS - BZI_METRIC_HEIGHT: 162.6 CM
BH CV ECHO MEAS - BZI_METRIC_WEIGHT: 75.3 KG
BH CV ECHO MEAS - EDV(CUBED): 69.4 ML
BH CV ECHO MEAS - EDV(MOD-SP4): 64.8 ML
BH CV ECHO MEAS - EDV(TEICH): 74.7 ML
BH CV ECHO MEAS - EF(CUBED): 71.2 %
BH CV ECHO MEAS - EF(MOD-SP4): 66.5 %
BH CV ECHO MEAS - EF(TEICH): 63.3 %
BH CV ECHO MEAS - ESV(CUBED): 20 ML
BH CV ECHO MEAS - ESV(MOD-SP4): 21.7 ML
BH CV ECHO MEAS - ESV(TEICH): 27.4 ML
BH CV ECHO MEAS - FS: 33.9 %
BH CV ECHO MEAS - IVS/LVPW: 1
BH CV ECHO MEAS - IVSD: 0.99 CM
BH CV ECHO MEAS - IVSS: 1 CM
BH CV ECHO MEAS - LV DIASTOLIC VOL/BSA (35-75): 35.9 ML/M^2
BH CV ECHO MEAS - LV MASS(C)D: 131.8 GRAMS
BH CV ECHO MEAS - LV MASS(C)DI: 72.9 GRAMS/M^2
BH CV ECHO MEAS - LV MASS(C)S: 82.1 GRAMS
BH CV ECHO MEAS - LV MASS(C)SI: 45.5 GRAMS/M^2
BH CV ECHO MEAS - LV SYSTOLIC VOL/BSA (12-30): 12 ML/M^2
BH CV ECHO MEAS - LVIDD: 4.1 CM
BH CV ECHO MEAS - LVIDS: 2.7 CM
BH CV ECHO MEAS - LVLD AP4: 6.9 CM
BH CV ECHO MEAS - LVLS AP4: 5.9 CM
BH CV ECHO MEAS - LVPWD: 1 CM
BH CV ECHO MEAS - LVPWS: 1.2 CM
BH CV ECHO MEAS - SI(CUBED): 27.3 ML/M^2
BH CV ECHO MEAS - SI(MOD-SP4): 23.9 ML/M^2
BH CV ECHO MEAS - SI(TEICH): 26.2 ML/M^2
BH CV ECHO MEAS - SV(CUBED): 49.4 ML
BH CV ECHO MEAS - SV(MOD-SP4): 43.1 ML
BH CV ECHO MEAS - SV(TEICH): 47.3 ML
MAXIMAL PREDICTED HEART RATE: 160 BPM
STRESS TARGET HR: 136 BPM

## 2020-09-29 PROCEDURE — 93308 TTE F-UP OR LMTD: CPT

## 2020-09-29 PROCEDURE — 93308 TTE F-UP OR LMTD: CPT | Performed by: INTERNAL MEDICINE

## 2020-09-29 PROCEDURE — 93321 DOPPLER ECHO F-UP/LMTD STD: CPT

## 2020-09-29 PROCEDURE — 93321 DOPPLER ECHO F-UP/LMTD STD: CPT | Performed by: INTERNAL MEDICINE

## 2020-09-30 RX ORDER — ERGOCALCIFEROL 1.25 MG/1
CAPSULE ORAL
Qty: 8 CAPSULE | Refills: 0 | OUTPATIENT
Start: 2020-09-30

## 2020-10-02 ENCOUNTER — OFFICE VISIT (OUTPATIENT)
Dept: FAMILY MEDICINE CLINIC | Facility: CLINIC | Age: 60
End: 2020-10-02

## 2020-10-02 VITALS
HEIGHT: 64 IN | HEART RATE: 77 BPM | OXYGEN SATURATION: 95 % | SYSTOLIC BLOOD PRESSURE: 102 MMHG | WEIGHT: 163.4 LBS | DIASTOLIC BLOOD PRESSURE: 60 MMHG | TEMPERATURE: 97.6 F | BODY MASS INDEX: 27.9 KG/M2

## 2020-10-02 DIAGNOSIS — M99.04 SEGMENTAL AND SOMATIC DYSFUNCTION OF SACRAL REGION: ICD-10-CM

## 2020-10-02 DIAGNOSIS — M54.50 ACUTE LEFT-SIDED LOW BACK PAIN WITHOUT SCIATICA: Primary | ICD-10-CM

## 2020-10-02 DIAGNOSIS — M99.03 SEGMENTAL AND SOMATIC DYSFUNCTION OF LUMBAR REGION: ICD-10-CM

## 2020-10-02 DIAGNOSIS — M99.05 SEGMENTAL AND SOMATIC DYSFUNCTION OF PELVIC REGION: ICD-10-CM

## 2020-10-02 PROCEDURE — 98926 OSTEOPATH MANJ 3-4 REGIONS: CPT | Performed by: FAMILY MEDICINE

## 2020-10-02 PROCEDURE — 99214 OFFICE O/P EST MOD 30 MIN: CPT | Performed by: FAMILY MEDICINE

## 2020-10-02 NOTE — PROGRESS NOTES
Subjective   Jackie Johnson is a 60 y.o. female.   Pt presents today with CC of Back Pain      History of Present Illness   Patient is a 60-year-old female here to follow-up from the emergency room.  She was diagnosed with a lumbar strain.  She has breast cancer status post radical mastectomy and osteoporosis, she follows with oncology she reports that her last chemotherapy was in the summer.  In the emergency room she was evaluated with MRI and no acute findings were seen.  Her urine culture returned negative, any suggestive of stone disease not seen.  She is here today to follow-up on back pain.  She reports low back pain, worse on the left side, no radiation of pain, pain severity 3 out of 10.  She denies weakness, numbness, or loss of bowel/bladder.  She is not interested in narcotic medications.  She reports that because of her past surgeries, she has a couple untouched bottles of narcotic pain medicines.  She would like to try other options.       The following portions of the patient's history were reviewed and updated as appropriate: allergies, current medications, past family history, past medical history, past social history, past surgical history and problem list.    Review of Systems   Constitutional: Negative for chills, fatigue, fever and unexpected weight loss.   HENT: Negative for congestion and sore throat.    Eyes: Negative for blurred vision and visual disturbance.   Respiratory: Negative for cough and wheezing.    Cardiovascular: Negative for chest pain and palpitations.   Gastrointestinal: Negative for abdominal pain and diarrhea.   Endocrine: Negative for cold intolerance and heat intolerance.   Genitourinary: Negative for dysuria.   Musculoskeletal: Positive for back pain. Negative for arthralgias, gait problem and neck stiffness.   Neurological: Negative for dizziness, seizures and syncope.   Psychiatric/Behavioral: Negative for self-injury, suicidal ideas and depressed mood.        Objective   Physical Exam  Vitals signs and nursing note reviewed.   Constitutional:       Appearance: She is well-developed.   HENT:      Head: Normocephalic and atraumatic.      Right Ear: External ear normal.      Left Ear: External ear normal.      Nose: Nose normal.   Eyes:      Conjunctiva/sclera: Conjunctivae normal.      Pupils: Pupils are equal, round, and reactive to light.   Neck:      Musculoskeletal: Normal range of motion and neck supple.   Cardiovascular:      Rate and Rhythm: Normal rate and regular rhythm.      Heart sounds: Normal heart sounds.   Pulmonary:      Effort: Pulmonary effort is normal.      Breath sounds: Normal breath sounds.   Abdominal:      General: Bowel sounds are normal.      Palpations: Abdomen is soft.   Musculoskeletal:      Comments: Hips, knees, and ankles with full range of motion and 5/5 strength bilaterally. DTRs symmetrical. Straight leg raise test negative bilaterally.  Low back is tender to palpation at L5 on the left side.  No midline tenderness,  Osteopathic: L5 flexed right on right  Sacrum was left on right  Right anterior pelvis   Skin:     General: Skin is warm and dry.   Neurological:      Mental Status: She is alert and oriented to person, place, and time.   Psychiatric:         Behavior: Behavior normal.       OMT procedure, expected risks and benefits discussed with patient, who elects to proceed.  Written consent obtained. 3 techniques were used. Pt tolerated OMT well. No complications noted. Patient to do home stretches as shown in clinic today or instructed in after visit summary.      Assessment/Plan   Jackie was seen today for back pain.    Diagnoses and all orders for this visit:    Acute left-sided low back pain without sciatica  We discussed treatment options.  She prefers non-medicine options.  Her pain is relatively mild.  On examination no red flag symptoms were seen.  Together we sat and reviewed her MRI findings.  There were some  abnormalities, but nothing that needs urgent attention.  We discussed osteopathic manipulation as a treatment option.  She was agreeable.  She tolerated treatment well and the pain at L5 on the left was improved.  I expect that she may have some soreness tomorrow and Sunday, this is expected.  I recommend drinking a little extra water tonight and tomorrow.  She can take Tylenol as needed for pain.  For breakthrough pain I do not have a problem with her taking hydrocodone.  She has some leftover from her surgery.  She does not want a refill.  She is to follow-up if no significant improvement.  Segmental and somatic dysfunction of lumbar region  OMT  Segmental and somatic dysfunction of sacral region    Segmental and somatic dysfunction of pelvic region

## 2020-10-06 ENCOUNTER — INFUSION (OUTPATIENT)
Dept: ONCOLOGY | Facility: HOSPITAL | Age: 60
End: 2020-10-06

## 2020-10-06 VITALS
HEART RATE: 78 BPM | DIASTOLIC BLOOD PRESSURE: 79 MMHG | WEIGHT: 166.5 LBS | SYSTOLIC BLOOD PRESSURE: 113 MMHG | RESPIRATION RATE: 18 BRPM | BODY MASS INDEX: 28.57 KG/M2 | OXYGEN SATURATION: 98 % | TEMPERATURE: 97.1 F

## 2020-10-06 DIAGNOSIS — Z95.828 PORT-A-CATH IN PLACE: ICD-10-CM

## 2020-10-06 DIAGNOSIS — C50.911 MALIGNANT NEOPLASM OF RIGHT BREAST IN FEMALE, ESTROGEN RECEPTOR NEGATIVE, UNSPECIFIED SITE OF BREAST (HCC): Primary | ICD-10-CM

## 2020-10-06 DIAGNOSIS — Z17.1 MALIGNANT NEOPLASM OF RIGHT BREAST IN FEMALE, ESTROGEN RECEPTOR NEGATIVE, UNSPECIFIED SITE OF BREAST (HCC): Primary | ICD-10-CM

## 2020-10-06 LAB
ALBUMIN SERPL-MCNC: 4.29 G/DL (ref 3.5–5.2)
ALBUMIN/GLOB SERPL: 1.7 G/DL
ALP SERPL-CCNC: 77 U/L (ref 39–117)
ALT SERPL W P-5'-P-CCNC: 27 U/L (ref 1–33)
ANION GAP SERPL CALCULATED.3IONS-SCNC: 10.8 MMOL/L (ref 5–15)
AST SERPL-CCNC: 18 U/L (ref 1–32)
BASOPHILS # BLD AUTO: 0.04 10*3/MM3 (ref 0–0.2)
BASOPHILS NFR BLD AUTO: 0.9 % (ref 0–1.5)
BILIRUB SERPL-MCNC: 0.2 MG/DL (ref 0–1.2)
BUN SERPL-MCNC: 18 MG/DL (ref 8–23)
BUN/CREAT SERPL: 24 (ref 7–25)
CALCIUM SPEC-SCNC: 10 MG/DL (ref 8.6–10.5)
CHLORIDE SERPL-SCNC: 104 MMOL/L (ref 98–107)
CO2 SERPL-SCNC: 25.2 MMOL/L (ref 22–29)
CREAT SERPL-MCNC: 0.75 MG/DL (ref 0.57–1)
DEPRECATED RDW RBC AUTO: 41.4 FL (ref 37–54)
EOSINOPHIL # BLD AUTO: 0.05 10*3/MM3 (ref 0–0.4)
EOSINOPHIL NFR BLD AUTO: 1.1 % (ref 0.3–6.2)
ERYTHROCYTE [DISTWIDTH] IN BLOOD BY AUTOMATED COUNT: 11.9 % (ref 12.3–15.4)
GFR SERPL CREATININE-BSD FRML MDRD: 79 ML/MIN/1.73
GLOBULIN UR ELPH-MCNC: 2.5 GM/DL
GLUCOSE SERPL-MCNC: 92 MG/DL (ref 65–99)
HCT VFR BLD AUTO: 38.1 % (ref 34–46.6)
HGB BLD-MCNC: 12 G/DL (ref 12–15.9)
IMM GRANULOCYTES # BLD AUTO: 0.01 10*3/MM3 (ref 0–0.05)
IMM GRANULOCYTES NFR BLD AUTO: 0.2 % (ref 0–0.5)
LYMPHOCYTES # BLD AUTO: 1.64 10*3/MM3 (ref 0.7–3.1)
LYMPHOCYTES NFR BLD AUTO: 36.6 % (ref 19.6–45.3)
MCH RBC QN AUTO: 29.6 PG (ref 26.6–33)
MCHC RBC AUTO-ENTMCNC: 31.5 G/DL (ref 31.5–35.7)
MCV RBC AUTO: 94.1 FL (ref 79–97)
MONOCYTES # BLD AUTO: 0.3 10*3/MM3 (ref 0.1–0.9)
MONOCYTES NFR BLD AUTO: 6.7 % (ref 5–12)
NEUTROPHILS NFR BLD AUTO: 2.44 10*3/MM3 (ref 1.7–7)
NEUTROPHILS NFR BLD AUTO: 54.5 % (ref 42.7–76)
NRBC BLD AUTO-RTO: 0 /100 WBC (ref 0–0.2)
PLATELET # BLD AUTO: 207 10*3/MM3 (ref 140–450)
PMV BLD AUTO: 9.6 FL (ref 6–12)
POTASSIUM SERPL-SCNC: 4.1 MMOL/L (ref 3.5–5.2)
PROT SERPL-MCNC: 6.8 G/DL (ref 6–8.5)
RBC # BLD AUTO: 4.05 10*6/MM3 (ref 3.77–5.28)
SODIUM SERPL-SCNC: 140 MMOL/L (ref 136–145)
WBC # BLD AUTO: 4.48 10*3/MM3 (ref 3.4–10.8)

## 2020-10-06 PROCEDURE — 25010000002 PERTUZUMAB 420 MG/14ML SOLUTION 420 MG VIAL: Performed by: INTERNAL MEDICINE

## 2020-10-06 PROCEDURE — 25010000002 TRASTUZUMAB-ANNS 420 MG RECONSTITUTED SOLUTION 1 EACH VIAL: Performed by: INTERNAL MEDICINE

## 2020-10-06 PROCEDURE — 85025 COMPLETE CBC W/AUTO DIFF WBC: CPT

## 2020-10-06 PROCEDURE — 96417 CHEMO IV INFUS EACH ADDL SEQ: CPT

## 2020-10-06 PROCEDURE — 96413 CHEMO IV INFUSION 1 HR: CPT

## 2020-10-06 PROCEDURE — 96415 CHEMO IV INFUSION ADDL HR: CPT

## 2020-10-06 PROCEDURE — 25010000003 HEPARIN LOCK FLUSH PER 10 UNITS: Performed by: INTERNAL MEDICINE

## 2020-10-06 PROCEDURE — 80053 COMPREHEN METABOLIC PANEL: CPT

## 2020-10-06 RX ORDER — SODIUM CHLORIDE 0.9 % (FLUSH) 0.9 %
20 SYRINGE (ML) INJECTION AS NEEDED
Status: CANCELLED | OUTPATIENT
Start: 2020-10-06

## 2020-10-06 RX ORDER — SODIUM CHLORIDE 9 MG/ML
250 INJECTION, SOLUTION INTRAVENOUS ONCE
Status: COMPLETED | OUTPATIENT
Start: 2020-10-06 | End: 2020-10-06

## 2020-10-06 RX ORDER — HEPARIN SODIUM (PORCINE) LOCK FLUSH IV SOLN 100 UNIT/ML 100 UNIT/ML
500 SOLUTION INTRAVENOUS AS NEEDED
Status: DISCONTINUED | OUTPATIENT
Start: 2020-10-06 | End: 2020-10-06 | Stop reason: HOSPADM

## 2020-10-06 RX ORDER — HEPARIN SODIUM (PORCINE) LOCK FLUSH IV SOLN 100 UNIT/ML 100 UNIT/ML
300 SOLUTION INTRAVENOUS ONCE
Status: CANCELLED | OUTPATIENT
Start: 2020-10-06

## 2020-10-06 RX ORDER — SODIUM CHLORIDE 0.9 % (FLUSH) 0.9 %
10 SYRINGE (ML) INJECTION AS NEEDED
Status: DISCONTINUED | OUTPATIENT
Start: 2020-10-06 | End: 2020-10-06 | Stop reason: HOSPADM

## 2020-10-06 RX ORDER — HEPARIN SODIUM (PORCINE) LOCK FLUSH IV SOLN 100 UNIT/ML 100 UNIT/ML
500 SOLUTION INTRAVENOUS AS NEEDED
Status: CANCELLED | OUTPATIENT
Start: 2020-10-27

## 2020-10-06 RX ORDER — SODIUM CHLORIDE 0.9 % (FLUSH) 0.9 %
10 SYRINGE (ML) INJECTION AS NEEDED
Status: CANCELLED | OUTPATIENT
Start: 2020-10-27

## 2020-10-06 RX ADMIN — SODIUM CHLORIDE, PRESERVATIVE FREE 10 ML: 5 INJECTION INTRAVENOUS at 16:15

## 2020-10-06 RX ADMIN — PERTUZUMAB 840 MG: 30 INJECTION, SOLUTION, CONCENTRATE INTRAVENOUS at 12:15

## 2020-10-06 RX ADMIN — SODIUM CHLORIDE 250 ML: 9 INJECTION, SOLUTION INTRAVENOUS at 12:14

## 2020-10-06 RX ADMIN — SODIUM CHLORIDE, PRESERVATIVE FREE 500 UNITS: 5 INJECTION INTRAVENOUS at 16:15

## 2020-10-06 RX ADMIN — TRASTUZUMAB-ANNS 600 MG: 420 INJECTION, POWDER, LYOPHILIZED, FOR SOLUTION INTRAVENOUS at 14:07

## 2020-10-14 ENCOUNTER — LAB (OUTPATIENT)
Dept: ONCOLOGY | Facility: CLINIC | Age: 60
End: 2020-10-14

## 2020-10-14 ENCOUNTER — OFFICE VISIT (OUTPATIENT)
Dept: ONCOLOGY | Facility: CLINIC | Age: 60
End: 2020-10-14

## 2020-10-14 VITALS
DIASTOLIC BLOOD PRESSURE: 87 MMHG | SYSTOLIC BLOOD PRESSURE: 141 MMHG | OXYGEN SATURATION: 98 % | TEMPERATURE: 97.5 F | HEART RATE: 120 BPM

## 2020-10-14 VITALS
HEART RATE: 68 BPM | OXYGEN SATURATION: 98 % | RESPIRATION RATE: 18 BRPM | DIASTOLIC BLOOD PRESSURE: 79 MMHG | WEIGHT: 164.4 LBS | TEMPERATURE: 96.8 F | BODY MASS INDEX: 28.21 KG/M2 | SYSTOLIC BLOOD PRESSURE: 118 MMHG

## 2020-10-14 DIAGNOSIS — C50.911 MALIGNANT NEOPLASM OF RIGHT BREAST IN FEMALE, ESTROGEN RECEPTOR NEGATIVE, UNSPECIFIED SITE OF BREAST (HCC): ICD-10-CM

## 2020-10-14 DIAGNOSIS — Z17.1 MALIGNANT NEOPLASM OF RIGHT BREAST IN FEMALE, ESTROGEN RECEPTOR NEGATIVE, UNSPECIFIED SITE OF BREAST (HCC): ICD-10-CM

## 2020-10-14 DIAGNOSIS — N60.92 ATYPICAL LOBULAR HYPERPLASIA (ALH) OF LEFT BREAST: ICD-10-CM

## 2020-10-14 DIAGNOSIS — Z79.899 ENCOUNTER FOR MONITORING CARDIOTOXIC DRUG THERAPY: Primary | ICD-10-CM

## 2020-10-14 DIAGNOSIS — D05.02 NEOPLASM OF LEFT BREAST, PRIMARY TUMOR STAGING CATEGORY TIS: LOBULAR CARCINOMA IN SITU (LCIS): ICD-10-CM

## 2020-10-14 DIAGNOSIS — Z51.81 ENCOUNTER FOR MONITORING CARDIOTOXIC DRUG THERAPY: Primary | ICD-10-CM

## 2020-10-14 DIAGNOSIS — M85.80 OSTEOPENIA, UNSPECIFIED LOCATION: ICD-10-CM

## 2020-10-14 DIAGNOSIS — E55.9 VITAMIN D DEFICIENCY: ICD-10-CM

## 2020-10-14 LAB
25(OH)D3 SERPL-MCNC: 42.4 NG/ML (ref 30–100)
ALBUMIN SERPL-MCNC: 4.68 G/DL (ref 3.5–5.2)
ALBUMIN/GLOB SERPL: 1.8 G/DL
ALP SERPL-CCNC: 82 U/L (ref 39–117)
ALT SERPL W P-5'-P-CCNC: 21 U/L (ref 1–33)
ANION GAP SERPL CALCULATED.3IONS-SCNC: 9 MMOL/L (ref 5–15)
AST SERPL-CCNC: 15 U/L (ref 1–32)
BASOPHILS # BLD AUTO: 0.05 10*3/MM3 (ref 0–0.2)
BASOPHILS NFR BLD AUTO: 1.2 % (ref 0–1.5)
BILIRUB SERPL-MCNC: 0.2 MG/DL (ref 0–1.2)
BUN SERPL-MCNC: 23 MG/DL (ref 8–23)
BUN/CREAT SERPL: 27.4 (ref 7–25)
CALCIUM SPEC-SCNC: 9.9 MG/DL (ref 8.6–10.5)
CHLORIDE SERPL-SCNC: 103 MMOL/L (ref 98–107)
CO2 SERPL-SCNC: 25 MMOL/L (ref 22–29)
CREAT SERPL-MCNC: 0.84 MG/DL (ref 0.57–1)
DEPRECATED RDW RBC AUTO: 41.6 FL (ref 37–54)
EOSINOPHIL # BLD AUTO: 0.05 10*3/MM3 (ref 0–0.4)
EOSINOPHIL NFR BLD AUTO: 1.2 % (ref 0.3–6.2)
ERYTHROCYTE [DISTWIDTH] IN BLOOD BY AUTOMATED COUNT: 11.9 % (ref 12.3–15.4)
GFR SERPL CREATININE-BSD FRML MDRD: 69 ML/MIN/1.73
GLOBULIN UR ELPH-MCNC: 2.6 GM/DL
GLUCOSE SERPL-MCNC: 107 MG/DL (ref 65–99)
HCT VFR BLD AUTO: 40.9 % (ref 34–46.6)
HGB BLD-MCNC: 12.9 G/DL (ref 12–15.9)
IMM GRANULOCYTES # BLD AUTO: 0.01 10*3/MM3 (ref 0–0.05)
IMM GRANULOCYTES NFR BLD AUTO: 0.2 % (ref 0–0.5)
LYMPHOCYTES # BLD AUTO: 1.51 10*3/MM3 (ref 0.7–3.1)
LYMPHOCYTES NFR BLD AUTO: 35.1 % (ref 19.6–45.3)
MCH RBC QN AUTO: 29.8 PG (ref 26.6–33)
MCHC RBC AUTO-ENTMCNC: 31.5 G/DL (ref 31.5–35.7)
MCV RBC AUTO: 94.5 FL (ref 79–97)
MONOCYTES # BLD AUTO: 0.24 10*3/MM3 (ref 0.1–0.9)
MONOCYTES NFR BLD AUTO: 5.6 % (ref 5–12)
NEUTROPHILS NFR BLD AUTO: 2.44 10*3/MM3 (ref 1.7–7)
NEUTROPHILS NFR BLD AUTO: 56.7 % (ref 42.7–76)
NRBC BLD AUTO-RTO: 0 /100 WBC (ref 0–0.2)
PLATELET # BLD AUTO: 220 10*3/MM3 (ref 140–450)
PMV BLD AUTO: 9.5 FL (ref 6–12)
POTASSIUM SERPL-SCNC: 4.1 MMOL/L (ref 3.5–5.2)
PROT SERPL-MCNC: 7.3 G/DL (ref 6–8.5)
RBC # BLD AUTO: 4.33 10*6/MM3 (ref 3.77–5.28)
SODIUM SERPL-SCNC: 137 MMOL/L (ref 136–145)
WBC # BLD AUTO: 4.3 10*3/MM3 (ref 3.4–10.8)

## 2020-10-14 PROCEDURE — 85025 COMPLETE CBC W/AUTO DIFF WBC: CPT | Performed by: INTERNAL MEDICINE

## 2020-10-14 PROCEDURE — 80053 COMPREHEN METABOLIC PANEL: CPT | Performed by: INTERNAL MEDICINE

## 2020-10-14 PROCEDURE — 82306 VITAMIN D 25 HYDROXY: CPT | Performed by: INTERNAL MEDICINE

## 2020-10-14 PROCEDURE — 99214 OFFICE O/P EST MOD 30 MIN: CPT | Performed by: INTERNAL MEDICINE

## 2020-10-14 RX ORDER — LETROZOLE 2.5 MG/1
2.5 TABLET, FILM COATED ORAL DAILY
Qty: 30 TABLET | Refills: 11 | Status: SHIPPED | OUTPATIENT
Start: 2020-10-14 | End: 2021-11-15 | Stop reason: SDUPTHER

## 2020-10-14 NOTE — PROGRESS NOTES
NAME: Jackie Johnson    : 1960    DATE:  10/14/2020    DIAGNOSIS:   1.  Clinical Stage IIB ([id number]) poorly differentiated invasive ductal carcinoma of the right breast.  Tumor spanned 15-16 cm, though it was unclear how much of this was invasive malignancy v. DCIS.  There were no clinically or radiographically supspicious axillary LNs.  There was associated high grade DCIS.  Tumor was ER-,NE-, HER-2 +  .  Following neoadjuvant TCHP, she had complete pathologic response - ypT0N0(sn).  No invasive malignancy detected.  0/6 LN involved.     2.  Atypical Lobular Hyperplasia and LCIS involving the L breast    CHIEF COMPLAINT:  Follow up of Breast Cancer      TREATMENT HISTORY:  1.      2.  R Mastectectomy and SLNBx with L Lumpectomy and Mastopexy  - Dr. Kadeem Serrano and Dr. Roc Huston ([facility]) 20    3.         HISTORY OF PRESENT ILLNESS:   Jackie Johnson is a very pleasant 60 y.o. female who was referred by Dr. STACIE Richards for evaluation and treatment of breast cancer. She has a strong family h/o breast cancer and so is normally very good about getting her mammograms done.  She recently moved from [place] to [place] however and so missed her mammogram in 2019 because of the move.  She got established with a new PCP (Dr. Denver Sheth) in 2019 and was referred to a a new gynecologist  (Dr. Brayan Richards) who she saw in November/December.  Kevin Richards ordered a screening mammogram.  She had an acute febrile illness around the time of the Super Bowl and at that time thought she might have a lump in her R breast.  While waiting on mammogram scheduling, she started to have pain in the breast and in eventually she would occasionally get a little bit of discharge from the right nipple.  She presented for  Screening mammography 19 as below.  This was followed by diagnostic mammogram and U/S on 20.  She had an abnormal span of 15 cm spanning the R breast.  She underwent  stereotactic biopsy on 1-21-20.    This revealed a poorly differentiated invasive ductal carcinoma of the R breast with associated high grade DCIS.  Tumor was ER/ND- and HER-2+ by IHC.  Her sister lives in [place] and was treated at [facility], so she went there for further evaluation. She saw Dr. Romero of medical oncology and Dr. Serrano of surgical oncology.  She had staging with CT CAP and bone scan as well as Brain MRI and had no evidence of distant metastatic spread.  She had reese breast MRI as well which showed an unexpected abnormality in the L breast described as linear non-mass enhancement spanning an 8 cm area in the inferior breast at 6:00.  This was biopsied on 2-28-20 with biopsy result pending.  No abnormal axillary LNs were identified.  Neoadjuvant chemotherapy was recommended to her by her [facility] team of physicians but she wishes to seek this treatment closer to home so is here today for further evaluation and treatment.    She is in good health overall. She reports an isolated episode of fever/chills during an acute illness, abdominal fullness to her right side, fatigue and a rash circling her neck. She denies changes in weight, cp, sob, persistent abdominal pain, n/v/c/d or bony pain.      INTERVAL HISTORY:  Ms. Johnson is here today for follow up of breast cancer and DEXA scan results. She reports she is doing well.  She started back on HP and tolerated it well. F/u echo showed no change.  She has questions about her DEXA       PAST MEDICAL HISTORY:  Past Medical History:   Diagnosis Date   • Acid reflux    • Asthma    • Breast cancer (CMS/HCC) 01/2020    rt br ca   • Cancer (CMS/HCC)    • Eczema    • Endometriosis    • GERD (gastroesophageal reflux disease)    • Hiatal hernia    • High cholesterol    • Seasonal allergies    • Sinusitis    • Staph infection 04/2020    port       PAST SURGICAL HISTORY:  Past Surgical History:   Procedure Laterality Date   • ABDOMINAL SURGERY     • BREAST BIOPSY  Bilateral    •  SECTION     • D&C HYSTEROSCOPY ENDOMETRIAL ABLATION     • DIAGNOSTIC LAPAROSCOPY     • FRACTURE SURGERY Left     tib/fib   • FULGURATION ENDOMETRIOSIS     • LEG SURGERY      Left leg following fracture   • LYMPH NODE BIOPSY     • PORTACATH PLACEMENT N/A 3/18/2020    Procedure: INSERTION OF PORTACATH;  Surgeon: Dudley Aldridge MD;  Location: [facility] OR;  Service: General;  Laterality: N/A;   • SKIN BIOPSY     • TONSILLECTOMY     • VENOUS ACCESS DEVICE (PORT) INSERTION N/A 2020    Procedure: INSERTION VENOUS ACCESS DEVICE;  Surgeon: Nerissa Wang MD;  Location: [facility] OR;  Service: General;  Laterality: N/A;   • VENOUS ACCESS DEVICE (PORT) REMOVAL Right 2020    Procedure: REMOVAL VENOUS ACCESS DEVICE;  Surgeon: Nerissa Wang MD;  Location: [facility] OR;  Service: General;  Laterality: Right;       FAMILY HISTORY:  Family History   Problem Relation Age of Onset   • Breast cancer Mother 75   • Basal cell carcinoma Mother 90        2 small spots on face removed + fair complexion   • Heart disease Mother    • Hypertension Mother    • Diabetes type II Mother    • Breast cancer Sister 55        VUS detected on genetic testing CDKN2A gene (c.-33G>C).    • Lung disease Sister    • Heart disease Father    • Throat cancer Father 79   • Lung cancer Father 79   • Dementia Father    • Parkinsonism Father    • Cancer Maternal Grandmother 65        unknown gynecologic cancer, cervical vs uterine   • Heart disease Maternal Grandmother    • Diabetes type II Maternal Grandmother    • Lymphoma Maternal Grandfather 64   • Breast cancer Paternal Grandmother 70   • Prostate cancer Paternal Grandfather 70        metastastic to liver   • Breast cancer Paternal Aunt 77   • Breast cancer Maternal Aunt 80   • Breast cancer Other    • Ovarian cancer Sister 66        Elevated ; 1A tumor; Borderline cancer; both ovaries & lg cyst removed   • Ovarian cancer Maternal Cousin 54   • Breast cancer  Other 50   • Diabetes type I Nephew          SOCIAL HISTORY:  Social History     Socioeconomic History   • Marital status:      Spouse name: Not on file   • Number of children: Not on file   • Years of education: Not on file   • Highest education level: Not on file   Tobacco Use   • Smoking status: Never Smoker   • Smokeless tobacco: Never Used   Substance and Sexual Activity   • Alcohol use: Never     Frequency: Never   • Drug use: Never   • Sexual activity: Defer     Partners: Male     Birth control/protection: None   Social History Narrative    lives alone with      with [facility]    No unusual chemical exposures    Never smoker, never drinker       REVIEW OF SYSTEMS:   A comprehensive 14 point review of systems was performed.  Significant findings as mentioned above.  All other systems reviewed and are negative.      MEDICATIONS:  The current medication list was reviewed in the EMR    Current Outpatient Medications:   •  acetaminophen (TYLENOL) 325 MG tablet, Take 650 mg by mouth Every 6 (Six) Hours As Needed for Mild Pain ., Disp: , Rfl:   •  calcium carbonate (TUMS) 500 MG chewable tablet, Chew 1 tablet Daily As Needed for Indigestion or Heartburn., Disp: , Rfl:   •  dexamethasone (DECADRON) 4 MG tablet, Take 2 tablets by mouth daily for 3 days, Disp: 6 tablet, Rfl: 3  •  famotidine (PEPCID) 10 MG tablet, Take 10 mg by mouth As Needed for Indigestion or Heartburn., Disp: , Rfl:   •  fexofenadine (ALLEGRA) 180 MG tablet, Take 180 mg by mouth., Disp: , Rfl:   •  levalbuterol (XOPENEX HFA) 45 MCG/ACT inhaler, Inhale 1-2 puffs Every 4 (Four) Hours As Needed for Wheezing., Disp: , Rfl:   •  loperamide (IMODIUM A-D) 2 MG tablet, , Disp: , Rfl:   •  loratadine (CLARITIN) 10 MG tablet, Take 10 mg by mouth 2 (Two) Times a Day., Disp: , Rfl:   •  ondansetron (ZOFRAN) 8 MG tablet, Take 1 tablet by mouth 3 (Three) Times a Day As Needed for Nausea or Vomiting., Disp: 30 tablet, Rfl:  5  •  prochlorperazine (COMPAZINE) 10 MG tablet, Take 1 tablet by mouth Every 6 (Six) Hours As Needed for Nausea or Vomiting., Disp: 60 tablet, Rfl: 3  •  sennosides-docusate (Senna-S) 8.6-50 MG per tablet, Take 2 tablets by mouth 2 (Two) Times a Day., Disp: 120 tablet, Rfl: 5  •  vitamin D (ERGOCALCIFEROL) 1.25 MG (18806 UT) capsule capsule, Take 1 capsule by mouth 1 (One) Time Per Week., Disp: 8 capsule, Rfl: 0    ALLERGIES:    Allergies   Allergen Reactions   • Albuterol Other (See Comments)     One time severe headache, questionable for aneurysm, did spinal tap was positive, did Angiogram and MRI were Negative for aneurysm   • Penicillins Hives   • Cefpodoxime Rash     vantin     • Clindamycin Hcl Rash   • Crestor [Rosuvastatin Calcium] Other (See Comments)     Caused high liver enxymes   • Sulfa Antibiotics Other (See Comments)     Mucosal lesions   • Latex Rash   • Pseudoephedrine Other (See Comments)     Soreness on the tongue       PHYSICAL EXAM:  Vitals:    10/14/20 0904   BP: 118/79   Pulse: 68   Resp: 18   Temp: 96.8 °F (36 °C)   SpO2: 98%     Pain Score    10/14/20 0904   PainSc: 0-No pain     General:  Awake, alert and oriented, appears well. Hair is coming  Back in.  HEENT:  Pupils are equal, round and reactive to light and accommodation, Extra-ocular movements full, Oropharyx clear, mucous membranes moist  Neck:  No JVD, thyromegaly or lymphadenopathy   CV:  Regular rate and rhythm, no murmurs, rubs or gallops  Resp:  Lungs are clear to auscultation bilaterally without crackles  Breast:  S/p R mastectomy.  Surgical incisions well healed.  S/p L lumpectomy/mastopexy.  Surgical incisions are healing well.  There is an area of granulation tissue inferiorly which does not appear to be infected.  No palpable masses or axillary adenopathy on either side.   Abd:  Soft, non-tender, sl distended, bowel sounds present, no organomegaly or masses  Ext:  No clubbing, cyanosis or edema.   Lymph:  No cervical,  supraclavicular, axillary, inguinal or femoral adenopathy  Neuro:  MS as above, CN II-XII intact, grossly non-focal exam      PATHOLOGY:  02-12-20 08-18-20  A. SENTINEL LYMPH NODE #1 RIGHT AXILLA, BIOPSY:  - 1 lymph node, no tumor present (0/1)  - Cytokeratin stain shows no evidence of metastatic carcinoma    B. SENTINEL LYMPH NODE #2, RIGHT AXILLA, BIOPSY:  - one lymph node, no tumor present (0/1)  - Cytokeratin stain shows no evidence of metastatic carcinoma    C. RIGHT BREAST, TOTAL MASTECTOMY:  - No residual carcinoma identified  - area of fibrosis with associated biopsy clip, consistent with treated tumor bed.   - Proliferative fibrocystic change.   - Skin and nipple, no tumor present.   - four low axillary lymph nodes, no tumor present (0/4).     D. LEFT BREAST, SEGMENTAL MASTECTOMY:  - FOCI OF LOBULAR NEOPLASIA (ATYPICAL LOBULAR HYPERPLASIA AND LOBULAR CARCINOMA IN SITU). LOW GRADE, CLASSIC TYPE, WITH FOCAL PAGETOID SPREAD INTO DUCTS.   - Two prior biopsy site changes present.   - Proliferative fibrocystic change.     E. RIGHT BREAST SKIN, EXCESS, EXCISION:  - Skin and breast tissue, no tumor present.     F. LEFT BREAST, MASTOPEXY:  - Skin and breast tissue, no tumor present    Tumor Template  Specimen Identification   Procedure: Total mastectomy   Specimen Laterality: Right  Invasive Carcinoma   Tumor Focality: NA   Tumor Size: 0   Histologic Type of Invasive Carcinoma: NA   Histologic Grade Based on Tommy Histologic Score    Glandular (Acinar)/Tubular Differentiation: Not applicable    Nuclear Pleomorphism: not applicable    Mitotic Rate: not applicable   Overall Grade: not applicable   Lymphovascular invasion: not present  In Situ Carcinoma   Ductal Carcinoma in Situ (DCIS): Not present   Lobular Carcinoma in situ (LCIS): Not present  Tumor Extension   Skin: uninvolved    Nipple: uninvolved   Skeletal muscle: not applicable  Margins   Invasive Carcinoma Margins: NA    DCIS Margins:  NA  Regional Lymph nodes   Uninvolved by tumor cells    Total number of lymph nodes examined: 6    Number of sentinel lymph nodes examined: 2  Treatment effect   Treatment effect in the breast: present   Treatment effect in the lymph nodes: not present  RCB input variables   Dimensions of Residual Cancer that is in-situ: 0%   Total Number of Lymph nodes with Residual Metastatic Carcinoma: 0   Size of Largest Metastasis: NA   RCB Index Computed Value (optional): 0   RCB Class (optional): 0  Pathologic Stage Classification 9ypTNM, AJCC 8th Edition)    Primary Tumor (invasive carcinoma)(ypT): ypT0   Regional lymph nodes (ypN): yPN0(sn)   Distant Metastasis (ypM): NA  Ancillary Studies: Please see previous case [id number]      ENDOSCOPY:        IMAGING:  Bilateral Diagnostic mammogram 12-27-19  FINDINGS:  There are scattered areas of fibroglandular density.     The bilateral fibroglandular pattern is unremarkable in appearance. A  few scattered calcifications are present in the left breast. There are  calcifications in the central aspect of the right breast spanning  approximately 15 cm of tissue in the AP dimension and extending up to  the base of the nipple for which additional imaging is recommended.     IMPRESSION:  1. Benign left mammographic findings.  2. Calcifications in the right breast. Recommend additional imaging.        BI-RADS CATEGORY:  0, INCOMPLETE: Need additional imaging evaluation.     RECOMMENDATION:  Right ML and CC magnification views.      R diagnostic mammogram 01-16-20  FINDINGS:  Magnification imaging of the right breast demonstrates  casting-type pleomorphic calcifications spanning over 15 cm of tissue in  the AP dimension in the right 6:00 to 7:00 region. Calcifications do  extend up to the base of the nipple. Findings are highly suspicious for  DCIS.     Right breast ultrasound: Focused sonographic evaluation of the right  6:00 to 7:00 region demonstrates a single 0.5 cm irregular  hypoechoic  mass associated with a coarse calcification located at 7:00, 3 cm from  the nipple. Ultrasound of the right axilla demonstrates no abnormal  appearing axillary lymph nodes.        IMPRESSION:  Findings are highly suspicious for extensive DCIS in the  right 6:00 to 7:00 region extending into the base of the nipple. There  is also small irregular mass noted on ultrasound in the 7:00 region  suspicious for invasion.        BI-RADS CATEGORY:  5, HIGHLY SUGGESTIVE OF MALIGNANCY        RECOMMENDATION:  Recommend stereotactic guided core biopsy of the  anterior and posterior aspect of the calcifications in the 6:00 to 7:00  region to determine extent of disease. Ultrasound-guided core biopsy of  the right 7:00 mass may also be warranted pending pathology results of  the calcifications.    Diagnostic bilateral mammogram 02-25-20  FINDINGS:  There are scattered areas of fibroglandular density.    1:  There are fine-linear branching calcifications measuring 16 x 7 x 7  centimeters with segmental distribution and associated post biopsy clip in the  right breast lower hemisphere at 6 to 7 o'clock.  There are 2 post biopsy clips  noted.  The calcifications extend to the inferior skin and nipple.    2:  There are amorphous calcifications measuring 0.3 centimeters in the left  breast lower hemisphere at 6 to 7 o'clock located 4 centimeters from the nipple.    IMPRESSION:  1:  Fine-linear branching calcifications in the right breast lower hemisphere at  6 to 7 o'clock are known biopsy-proven malignancy. Ultrasound is recommended for  staging. Recommend appropriate evaluation and treatment of this known malignancy.    2:  Amorphous calcifications in the left breast lower hemisphere at 6 to 7  o'clock located 4 centimeters from the nipple are suspicious. Stereotactic  biopsy is recommended.    BI-RADS Category 4:  Suspicious Abnormality      US Chest 02-25-20  Findings:       Right Breast: The extent of calcified disease  is best visualized by mammography. There are dilated ducts with internal calcifications vascularity extending toward the nipple at the 7 o'clock position. This is consistent with the patient's known biopsy-proven malignancy.      Right Charles Basins: No suspicious axillary (level I, II & III) or internal mammary lymphadenopathy is noted.      IMPRESSION:    1. Known RIGHT breast malignancy is best visualized by mammography. A MRI may be obtained for evaluation of disease.    2. No suspicious RIGHT-sided lymphadenopathy      ACR BI-RADS Category: 6. Known malignancy.  Recommend appropriate evaluation and treatment of the known malignancy.         R breast US 02-25-20  Findings:       Right Breast: The extent of calcified disease is best visualized by mammography. There are dilated ducts with internal calcifications vascularity extending toward the nipple at the 7 o'clock position. This is consistent with the patient's known biopsy-proven malignancy.      Right Charles Basins: No suspicious axillary (level I, II & III) or internal mammary lymphadenopathy is noted.      IMPRESSION:    1. Known RIGHT breast malignancy is best visualized by mammography. A MRI may be obtained for evaluation of disease.    2. No suspicious RIGHT-sided lymphadenopathy      ACR BI-RADS Category: 6. Known malignancy.  Recommend appropriate evaluation and treatment of the known malignancy.       MRI Brain w/wo contrast 02-27-20  FINDINGS:     No abnormal parenchymal or leptomeningeal enhancement is noted. The brain parenchyma is unremarkable except of small scattered T2 FLAIR hyperintensities consistent with chronic microangiopathic changes. No acute ischemia, intra or extra-axial hemorrhage, mass effect or midline shift. No hydrocephalus is noted.    The osseous structures and extra-cranial soft tissues are unremarkable.    The orbital structures unremarkable.    The paranasal sinuses and mastoid air cells are clear.    IMPRESSION:  No evidence  of metastatic disease.      CTCAP 02-28-20    Findings:       CHEST:  The breasts are ptotic and there are biopsy clips projecting between the lower quadrants of the right breast.    On image 99 of series 3, there is a nonspecific 1 cm soft tissue nodule in the lower outer quadrant of the right breast.    No lung nodules or pleural effusions are present.    No axillary, internal mammary, mediastinal or hilar adenopathy is seen.    No suspicious bone lesions are present.      ABDOMEN and PELVIS:  The liver is borderline enlarged and diffusely fatty infiltrated without measurable mass or biliary ductal dilatation.    The gallbladder appears normal.    The spleen, pancreas and adrenal glands appear normal.    The kidneys demonstrate function without hydronephrosis and there is a small low dense nidus in the mid right kidney, likely a cyst.    No adenopathy or ascites are seen in the abdomen or pelvis.    A normal-appearing retrocecal appendix is seen.    Multilevel degenerative-like bone changes are present.      IMPRESSION:  No evidence for metastatic disease the chest, abdomen or pelvis.      MRI Breast bilateral 03-02-20    Findings:  The breast composition is heterogeneous fibroglandular tissue. There is no significant early background parenchymal enhancement.    Right breast: There is linearly oriented nonmass enhancement involving the lateral and inferior aspects (anterior, middle, and posterior third) of the right breast 6 - 7 o'clock position extending 17 cm from the base of the nipple to the pectoralis muscle (series 5 image  of 256). There is no evidence for pectoralis muscle invasion. Extension to the inferior skin is better demonstrated mammographically. Susceptibility artifact from 2 postbiopsy clips is noted.    Left breast: There is linearly oriented nonmass enhancement in the inferior aspect (middle third) of the left breast 6 o'clock position extending approximately 8 cm (series 5 image  of  256). There is no evidence for nipple, skin, or pectoralis muscle involvement. Stereotactic guided biopsy of calcifications at the 6 o'clock position to be performed later today and corresponds to the anterior aspect of the nonmass enhancement. MRI guided biopsy of nonmass enhancement seen on image 94 of 256, series 5 and image 40 of 160, series 8 is recommended (The images have been annotated with a [place]).    Charles Basins: There is no lymphadenopathy in the visualized axillary or internal mammary regions.    IMPRESSION:  Linear nonmass enhancement representing known malignancy right breast as above. Linear nonmass enhancement left breast as above.   MRI guided biopsy of nonmass enhancement seen on image 94 of 256, series 5 and image 40 of 160, series 8 is recommended.    ACR BI-RADS Category: 6. Recommend MR-guided biopsy of nonmass enhancement left breast as above .Recommend appropriate evaluation and treatment of the known malignancy.       Bone scan 03-02-20  FINDINGS:     Radiotracer uptake secondary to degenerative changes are seen involving the shoulders, hips, knees, and feet. Increased uptake along the right tibial shaft likely is likely reactive. Faint focus of activity within the left tibial shaft is compatible with prior injury/fracture. Increased uptake in the skull is compatible with a benign hyperostosis.    Physiologic uptake of the bilateral kidneys and bladder.    IMPRESSION:    No scintigraphic evidence of skeletal metastases.      Echo 03-02-20      Bilateral diagnostic mammogram 05-26-20  FINDINGS: There are scattered areas of fibroglandular density.     A postbiopsy marking clip is now noted in the left breast with expected  surrounding postbiopsy changes best visualized on MLO imaging. The  remaining bilateral fibroglandular pattern is stable in appearance.  Redemonstrated are fine linear branching calcifications in the right  6:00 to 7:00 region spanning approximately 17 cm of tissue in the  AP  dimension. There are 2 associated postbiopsy marking clips. No new areas  of calcification are seen in either breast.     IMPRESSION:  1. Benign left mammographic findings.        2. No significant change in the appearance of calcifications in the  right breast.        BI-RADS CATEGORY:  6, KNOWN BIOPSY PROVEN MALIGNANCY        RECOMMENDATION:  Recommend clinical follow-up.       Echo 07-22-20  · Normal left ventricular cavity size and wall thickness noted. All left ventricular wall segments contract normally.  · Estimated EF appears to be in the range of 61 - 65%  · The pericardium is normal. There is no evidence of pericardial effusion.      DEXA 09-25-20  FINDINGS: The BMD measured at the AP spine L1-L4 is 1.003 gm/cm2 with a  T-score of -1.5.      IMPRESSION:  The patient is considered to be osteopenic according to the  World Health Organization criteria. Fracture risk is moderate      MRI L spine wo contrast 09-28-20  FINDINGS:  Sagittal alignment is normal. Bone marrow signal intensity is normal. There is disc desiccation L1-2 through L3-4 with mild multiple level loss of intervertebral disc height. Endplate spondylosis and Schmorl's node formation most apparent at L1-2 and  L2-3. Conus medullaris terminates at L1-2 and is normal.     At L1-2, there is a mild concentric disc bulge and endplate spondylosis. There is no canal stenosis or foraminal impingement.     At L2-3, there is concentric disc bulging and mild facet hypertrophy but no canal or foraminal impingement.     At L3-4, is mild facet degenerative change left greater the right with ligamentum flavum thickening and a broad posterior disc bulge. There is no canal stenosis. There is mild inferior foraminal narrowing bilaterally.     L4-5, there is moderate facet degenerative change bilaterally with ligamentum flavum thickening. Is mild bilateral foraminal narrowing. There is no canal stenosis.     At L5-S1, there is a broad posterior disc bulge.  There is a tiny posterior annular fissure. There is no canal stenosis. There is a small focal left posterolateral protrusion into the left inferior foramen with mild to moderate left inferior foraminal  narrowing. There is mild bilateral facet degenerative change.     IMPRESSION:     1. There is no evidence for osseous metastatic disease.  2. There are lumbar degenerative changes without evidence for lumbar canal stenosis. Details above.      Echo 09-29-20  · The study is technically difficult for diagnosis.  · Normal left ventricular cavity size and wall thickness noted. All left ventricular wall segments contract normally.  · Left ventricular ejection fraction appears to be 61 - 65%.  · The aortic valve is structurally normal with no regurgitation or stenosis present.  · The mitral valve is structurally normal with no regurgitation or significant stenosis present.  · There is no evidence of pericardial effusion. .    RECENT LABS:  Lab Results   Component Value Date    WBC 4.30 10/14/2020    HGB 12.9 10/14/2020    HCT 40.9 10/14/2020    MCV 94.5 10/14/2020    RDW 11.9 (L) 10/14/2020     10/14/2020    NEUTRORELPCT 56.7 10/14/2020    LYMPHORELPCT 35.1 10/14/2020    MONORELPCT 5.6 10/14/2020    EOSRELPCT 1.2 10/14/2020    BASORELPCT 1.2 10/14/2020    NEUTROABS 2.44 10/14/2020    LYMPHSABS 1.51 10/14/2020       Lab Results   Component Value Date     10/06/2020    K 4.1 10/06/2020    CO2 25.2 10/06/2020     10/06/2020    BUN 18 10/06/2020    CREATININE 0.75 10/06/2020    EGFRIFNONA 79 10/06/2020    EGFRIFAFRI 85 08/17/2020    GLUCOSE 92 10/06/2020    CALCIUM 10.0 10/06/2020    ALKPHOS 77 10/06/2020    AST 18 10/06/2020    ALT 27 10/06/2020    BILITOT 0.2 10/06/2020    ALBUMIN 4.29 10/06/2020    PROTEINTOT 6.8 10/06/2020    MG 2.1 07/28/2020     Lab Results   Component Value Date    TSH 1.820 09/22/2020       Lab Results   Component Value Date     15.1 03/11/2020     Lab Results   Component Value  Date    LABCA2 19.0 03/11/2020       ASSESSMENT & PLAN:  Jackie Johnson is a very pleasant 60 y.o. female with what is clinically a Stage IIB ([id number]) poorly differentiated invasive ductal carcinoma of the right breast.  Tumor spanned 15-16 cm although it was difficult to tell how much of this was invasive malignancy v. DCIS.  There were no clinically or radiographically supspicious axillary LNs.  There was associated high grade DCIS.  Tumor was ER-,WV-, HER-2 +.  She had pathologic complete response with neoadjuvant therapy.  She also has LCIS/ALH involving the left breast.    1.  Right breast cancer:  -  I administered TCHP neoadjuvantly downsize tumor and potentially allow for a more successful surgery. After 6 cycles of TCHP, she had a complete pathologic response.  -  We discussed consideration of radiation given the initial size of her tumor.  It really isn't known how much of her initial disease was invasive malignancy v. DCIS.  She says she was evaluated by a Radiation Oncologist at [facility] who didn't recommend radiation.  -  Given HER-2 positive disease, I have recommended we complete a year of HP.  Echo showed no change and she has restarted without difficulty.  .    2.  L Breast LCIS / ALH:  -  She is s/p lumpectomy.  -  Discussed role for 5 years of hormonal therapy in prevention of future breast cancer development in this breast.  She is post-menopausal, so could use Tamoxifen or an aromatase inhibitor for this purpose.  She has an entact uterus so Tamoxifen would come with increased risk (albeit low risk) of development of endometrial hypertrophy/ endometrial cancer.  Aromatase inhibitors would not come with this risk, but would come with risk for loss of bone mineral density and potentially increased risk of side effects.    -  DEXA showed osteopenia with T score -1.5, so I recommended Tamoxifen for prevention.  Ms. Johnson was not comfortable with risk of endometrial hyperplasia / cancer so  she said she would prefer aromatase inhibitor.  In particular she would like to take Femara so will prescribe this with plan for 5 years of treatment.    3.  Bone health:  -  Ms. Johnson has osteopenia with T score -1.5.  She also has a h/o Vit D Deficiency for which she took a course of weekly high dose Vit D (now completed).   Will repeat Vit D Level. Will start Ca/Vit D BID.  Recommended also weight bearing exercise.  -  Will plan for repeat DEXA after 2 years.    4.  Very strong family history of malignancy including several members with breast cancer and a sister with ovarian cancer.  -  She underwent genetic testing at [facility] and testing was negative.      5.  Prophylaxis:  She has had 2019 flu vaccine.  Recommended she get Prevnar 13 which she also had.  She is a lifelong non-smoker.    6.  ACO / MARNI/Other  Quality measures  -  Jackie Johnson did not receive 2020 flu vaccine.  Recommended she get this soon.  -  Jackie Johnson reports a pain score of 0.  Given her pain assessment as noted, treatment options were discussed and the following options were decided upon as a follow-up plan to address the patient's pain: No intervention needed..  -  Current outpatient and discharge medications have been reconciled for the patient.  Reviewed by: Fatoumata Obrien MD       7.  Follow-up:   - MD follow up ion 3 months with echo prior with CBCD, CMP, Vit D, TSH    This note was scribed for Fatoumata Obrien MD by Alba Siu RN.    I, Fatoumata Obrien MD, personally performed the services described in this documentation as scribed by the above named individual in my presence, and it is both accurate and complete.  10/14/2020       I spent 25 minutes with Jackie Johnson today.  In the office today, more than 50% of this time was spent face-to-face with her  in counseling / coordination of care, reviewing her medical history and counseling on the current treatment plan.  All questions were  answered to her satisfaction      Electronically Signed by: Fatoumata Obrien MD      CC:   MD JUAN PABLO Macdonald MD Bora Lim, MD- [facility] Medical Oncology  Kadeem Serrano MD- MD [facility]  Dr. Roc Huston - [facility] Plastic Surgery

## 2020-10-15 ENCOUNTER — OFFICE VISIT (OUTPATIENT)
Dept: FAMILY MEDICINE CLINIC | Facility: CLINIC | Age: 60
End: 2020-10-15

## 2020-10-15 VITALS
OXYGEN SATURATION: 98 % | TEMPERATURE: 98.4 F | HEART RATE: 79 BPM | DIASTOLIC BLOOD PRESSURE: 74 MMHG | SYSTOLIC BLOOD PRESSURE: 116 MMHG | WEIGHT: 164.2 LBS | BODY MASS INDEX: 28.03 KG/M2 | HEIGHT: 64 IN

## 2020-10-15 DIAGNOSIS — K21.9 GASTROESOPHAGEAL REFLUX DISEASE WITHOUT ESOPHAGITIS: ICD-10-CM

## 2020-10-15 DIAGNOSIS — G89.29 CHRONIC BILATERAL LOW BACK PAIN WITHOUT SCIATICA: Primary | ICD-10-CM

## 2020-10-15 DIAGNOSIS — Z23 NEEDS FLU SHOT: ICD-10-CM

## 2020-10-15 DIAGNOSIS — J30.89 SEASONAL ALLERGIC RHINITIS DUE TO OTHER ALLERGIC TRIGGER: ICD-10-CM

## 2020-10-15 DIAGNOSIS — M54.50 CHRONIC BILATERAL LOW BACK PAIN WITHOUT SCIATICA: Primary | ICD-10-CM

## 2020-10-15 PROCEDURE — 90686 IIV4 VACC NO PRSV 0.5 ML IM: CPT | Performed by: FAMILY MEDICINE

## 2020-10-15 PROCEDURE — 99214 OFFICE O/P EST MOD 30 MIN: CPT | Performed by: FAMILY MEDICINE

## 2020-10-15 PROCEDURE — 90471 IMMUNIZATION ADMIN: CPT | Performed by: FAMILY MEDICINE

## 2020-10-25 DIAGNOSIS — Z17.1 MALIGNANT NEOPLASM OF RIGHT BREAST IN FEMALE, ESTROGEN RECEPTOR NEGATIVE, UNSPECIFIED SITE OF BREAST (HCC): Primary | ICD-10-CM

## 2020-10-25 DIAGNOSIS — C50.911 MALIGNANT NEOPLASM OF RIGHT BREAST IN FEMALE, ESTROGEN RECEPTOR NEGATIVE, UNSPECIFIED SITE OF BREAST (HCC): Primary | ICD-10-CM

## 2020-10-25 RX ORDER — SODIUM CHLORIDE 9 MG/ML
250 INJECTION, SOLUTION INTRAVENOUS ONCE
Status: CANCELLED | OUTPATIENT
Start: 2020-10-27

## 2020-10-27 ENCOUNTER — INFUSION (OUTPATIENT)
Dept: ONCOLOGY | Facility: HOSPITAL | Age: 60
End: 2020-10-27

## 2020-10-27 VITALS
WEIGHT: 163.2 LBS | TEMPERATURE: 97.1 F | DIASTOLIC BLOOD PRESSURE: 77 MMHG | BODY MASS INDEX: 28 KG/M2 | HEART RATE: 80 BPM | SYSTOLIC BLOOD PRESSURE: 111 MMHG | OXYGEN SATURATION: 97 % | RESPIRATION RATE: 18 BRPM

## 2020-10-27 DIAGNOSIS — C50.911 MALIGNANT NEOPLASM OF RIGHT BREAST IN FEMALE, ESTROGEN RECEPTOR NEGATIVE, UNSPECIFIED SITE OF BREAST (HCC): Primary | ICD-10-CM

## 2020-10-27 DIAGNOSIS — Z95.828 PORT-A-CATH IN PLACE: ICD-10-CM

## 2020-10-27 DIAGNOSIS — Z17.1 MALIGNANT NEOPLASM OF RIGHT BREAST IN FEMALE, ESTROGEN RECEPTOR NEGATIVE, UNSPECIFIED SITE OF BREAST (HCC): Primary | ICD-10-CM

## 2020-10-27 LAB
ALBUMIN SERPL-MCNC: 4.52 G/DL (ref 3.5–5.2)
ALBUMIN/GLOB SERPL: 2 G/DL
ALP SERPL-CCNC: 86 U/L (ref 39–117)
ALT SERPL W P-5'-P-CCNC: 20 U/L (ref 1–33)
ANION GAP SERPL CALCULATED.3IONS-SCNC: 8.4 MMOL/L (ref 5–15)
AST SERPL-CCNC: 16 U/L (ref 1–32)
BASOPHILS # BLD AUTO: 0.04 10*3/MM3 (ref 0–0.2)
BASOPHILS NFR BLD AUTO: 0.9 % (ref 0–1.5)
BILIRUB SERPL-MCNC: 0.3 MG/DL (ref 0–1.2)
BUN SERPL-MCNC: 18 MG/DL (ref 8–23)
BUN/CREAT SERPL: 20.7 (ref 7–25)
CALCIUM SPEC-SCNC: 10 MG/DL (ref 8.6–10.5)
CHLORIDE SERPL-SCNC: 103 MMOL/L (ref 98–107)
CO2 SERPL-SCNC: 28.6 MMOL/L (ref 22–29)
CREAT SERPL-MCNC: 0.87 MG/DL (ref 0.57–1)
DEPRECATED RDW RBC AUTO: 40.8 FL (ref 37–54)
EOSINOPHIL # BLD AUTO: 0.07 10*3/MM3 (ref 0–0.4)
EOSINOPHIL NFR BLD AUTO: 1.6 % (ref 0.3–6.2)
ERYTHROCYTE [DISTWIDTH] IN BLOOD BY AUTOMATED COUNT: 12 % (ref 12.3–15.4)
GFR SERPL CREATININE-BSD FRML MDRD: 66 ML/MIN/1.73
GLOBULIN UR ELPH-MCNC: 2.3 GM/DL
GLUCOSE SERPL-MCNC: 93 MG/DL (ref 65–99)
HCT VFR BLD AUTO: 39 % (ref 34–46.6)
HGB BLD-MCNC: 12.3 G/DL (ref 12–15.9)
IMM GRANULOCYTES # BLD AUTO: 0.01 10*3/MM3 (ref 0–0.05)
IMM GRANULOCYTES NFR BLD AUTO: 0.2 % (ref 0–0.5)
LYMPHOCYTES # BLD AUTO: 1.68 10*3/MM3 (ref 0.7–3.1)
LYMPHOCYTES NFR BLD AUTO: 38.5 % (ref 19.6–45.3)
MCH RBC QN AUTO: 29 PG (ref 26.6–33)
MCHC RBC AUTO-ENTMCNC: 31.5 G/DL (ref 31.5–35.7)
MCV RBC AUTO: 92 FL (ref 79–97)
MONOCYTES # BLD AUTO: 0.27 10*3/MM3 (ref 0.1–0.9)
MONOCYTES NFR BLD AUTO: 6.2 % (ref 5–12)
NEUTROPHILS NFR BLD AUTO: 2.29 10*3/MM3 (ref 1.7–7)
NEUTROPHILS NFR BLD AUTO: 52.6 % (ref 42.7–76)
NRBC BLD AUTO-RTO: 0 /100 WBC (ref 0–0.2)
PLATELET # BLD AUTO: 201 10*3/MM3 (ref 140–450)
PMV BLD AUTO: 9.8 FL (ref 6–12)
POTASSIUM SERPL-SCNC: 4.2 MMOL/L (ref 3.5–5.2)
PROT SERPL-MCNC: 6.8 G/DL (ref 6–8.5)
RBC # BLD AUTO: 4.24 10*6/MM3 (ref 3.77–5.28)
SODIUM SERPL-SCNC: 140 MMOL/L (ref 136–145)
WBC # BLD AUTO: 4.36 10*3/MM3 (ref 3.4–10.8)

## 2020-10-27 PROCEDURE — 96417 CHEMO IV INFUS EACH ADDL SEQ: CPT

## 2020-10-27 PROCEDURE — 25010000002 PERTUZUMAB 420 MG/14ML SOLUTION 420 MG VIAL: Performed by: INTERNAL MEDICINE

## 2020-10-27 PROCEDURE — 25010000002 TRASTUZUMAB-ANNS 420 MG RECONSTITUTED SOLUTION 1 EACH VIAL: Performed by: INTERNAL MEDICINE

## 2020-10-27 PROCEDURE — 25010000003 HEPARIN LOCK FLUSH PER 10 UNITS: Performed by: INTERNAL MEDICINE

## 2020-10-27 PROCEDURE — 85025 COMPLETE CBC W/AUTO DIFF WBC: CPT

## 2020-10-27 PROCEDURE — 96413 CHEMO IV INFUSION 1 HR: CPT

## 2020-10-27 PROCEDURE — 80053 COMPREHEN METABOLIC PANEL: CPT

## 2020-10-27 RX ORDER — SODIUM CHLORIDE 0.9 % (FLUSH) 0.9 %
10 SYRINGE (ML) INJECTION AS NEEDED
Status: DISCONTINUED | OUTPATIENT
Start: 2020-10-27 | End: 2020-10-27 | Stop reason: HOSPADM

## 2020-10-27 RX ORDER — SODIUM CHLORIDE 0.9 % (FLUSH) 0.9 %
20 SYRINGE (ML) INJECTION AS NEEDED
Status: CANCELLED | OUTPATIENT
Start: 2020-11-17

## 2020-10-27 RX ORDER — HEPARIN SODIUM (PORCINE) LOCK FLUSH IV SOLN 100 UNIT/ML 100 UNIT/ML
500 SOLUTION INTRAVENOUS AS NEEDED
Status: DISCONTINUED | OUTPATIENT
Start: 2020-10-27 | End: 2020-10-27 | Stop reason: HOSPADM

## 2020-10-27 RX ORDER — HEPARIN SODIUM (PORCINE) LOCK FLUSH IV SOLN 100 UNIT/ML 100 UNIT/ML
300 SOLUTION INTRAVENOUS ONCE
Status: CANCELLED | OUTPATIENT
Start: 2020-11-17

## 2020-10-27 RX ORDER — SODIUM CHLORIDE 0.9 % (FLUSH) 0.9 %
10 SYRINGE (ML) INJECTION AS NEEDED
Status: CANCELLED | OUTPATIENT
Start: 2020-11-17

## 2020-10-27 RX ORDER — HEPARIN SODIUM (PORCINE) LOCK FLUSH IV SOLN 100 UNIT/ML 100 UNIT/ML
500 SOLUTION INTRAVENOUS AS NEEDED
Status: CANCELLED | OUTPATIENT
Start: 2020-11-17

## 2020-10-27 RX ORDER — SODIUM CHLORIDE 9 MG/ML
250 INJECTION, SOLUTION INTRAVENOUS ONCE
Status: COMPLETED | OUTPATIENT
Start: 2020-10-27 | End: 2020-10-27

## 2020-10-27 RX ADMIN — PERTUZUMAB 420 MG: 30 INJECTION, SOLUTION, CONCENTRATE INTRAVENOUS at 11:01

## 2020-10-27 RX ADMIN — SODIUM CHLORIDE 250 ML: 9 INJECTION, SOLUTION INTRAVENOUS at 11:00

## 2020-10-27 RX ADMIN — SODIUM CHLORIDE, PRESERVATIVE FREE 500 UNITS: 5 INJECTION INTRAVENOUS at 13:03

## 2020-10-27 RX ADMIN — TRASTUZUMAB-ANNS 450 MG: 420 INJECTION, POWDER, LYOPHILIZED, FOR SOLUTION INTRAVENOUS at 12:12

## 2020-10-27 RX ADMIN — SODIUM CHLORIDE, PRESERVATIVE FREE 10 ML: 5 INJECTION INTRAVENOUS at 13:03

## 2020-11-02 NOTE — PROGRESS NOTES
"Jackie Johnson     VITALS: Blood pressure 116/74, pulse 79, temperature 98.4 °F (36.9 °C), height 162.6 cm (64.02\"), weight 74.5 kg (164 lb 3.2 oz), SpO2 98 %, not currently breastfeeding.    Subjective  Chief Complaint:   Chief Complaint   Patient presents with   • Hyperlipidemia        History of Present Illness:  Patient is a 60 y.o.  female with complicated medical conditions including breast cancer status post lumpectomy, elevated liver enzymes, and GERD who presents to clinic secondary to medical followup.  She is doing well from a breast cancer standpoint.  Since her last visit, she has had a lumpectomy with biopsy.  She has undergone neoadjuvant chemotherapy treatments.  There is no evidence of distant metastatic spread.  Patient is concerned today because she is having lower back pain for the last month.  She states that she has been told in the past that her left leg was shorter than the right.  She thinks that is what is causing her back pain as she has done a lot of ambulating recently.  She states that her gait is wobbly and favors the shorter leg.  Back pain is a dull, throbbing ache that does not radiate.  She denies any numbness or tingling in her legs.  She denies any saddle anesthesia.  She denies any changes in urination or bowel movements.  She is wondering if a shoe lift would help her left leg and therefore her back.    Patient has chronic conditions including GERD and allergic rhinitis.  These chronic conditions are stable and unchanged.  Medications associated with these chronic conditions are not giving her any side effects.    No complaints about any of the medications.    The following portions of the patient's history were reviewed and updated as appropriate: allergies, current medications, past family history, past medical history, past social history, past surgical history and problem list.    Past Medical History  Past Medical History:   Diagnosis Date   • Acid reflux    • Asthma  "   • Breast cancer (CMS/HCC) 2020    rt br ca   • Cancer (CMS/HCC)    • Eczema    • Endometriosis    • GERD (gastroesophageal reflux disease)    • Hiatal hernia    • High cholesterol    • Seasonal allergies    • Sinusitis    • Staph infection 2020    port       Review of Systems   Respiratory: Negative for shortness of breath and wheezing.    Cardiovascular: Negative for chest pain and palpitations.       Surgical History  Past Surgical History:   Procedure Laterality Date   • ABDOMINAL SURGERY     • BREAST BIOPSY Bilateral    •  SECTION     • D&C HYSTEROSCOPY ENDOMETRIAL ABLATION     • DIAGNOSTIC LAPAROSCOPY     • FRACTURE SURGERY Left     tib/fib   • FULGURATION ENDOMETRIOSIS     • LEG SURGERY      Left leg following fracture   • LYMPH NODE BIOPSY     • PORTACATH PLACEMENT N/A 3/18/2020    Procedure: INSERTION OF PORTACATH;  Surgeon: Dudley Aldridge MD;  Location: [facility];  Service: General;  Laterality: N/A;   • SKIN BIOPSY     • TONSILLECTOMY     • VENOUS ACCESS DEVICE (PORT) INSERTION N/A 2020    Procedure: INSERTION VENOUS ACCESS DEVICE;  Surgeon: Nerissa Wang MD;  Location: [facility] OR;  Service: General;  Laterality: N/A;   • VENOUS ACCESS DEVICE (PORT) REMOVAL Right 2020    Procedure: REMOVAL VENOUS ACCESS DEVICE;  Surgeon: Nerissa Wang MD;  Location: [facility] OR;  Service: General;  Laterality: Right;       Family History  Family History   Problem Relation Age of Onset   • Breast cancer Mother 75   • Basal cell carcinoma Mother 90        2 small spots on face removed + fair complexion   • Heart disease Mother    • Hypertension Mother    • Diabetes type II Mother    • Breast cancer Sister 55        VUS detected on genetic testing CDKN2A gene (c.-33G>C).    • Lung disease Sister    • Heart disease Father    • Throat cancer Father 79   • Lung cancer Father 79   • Dementia Father    • Parkinsonism Father    • Cancer Maternal Grandmother 65        unknown gynecologic  cancer, cervical vs uterine   • Heart disease Maternal Grandmother    • Diabetes type II Maternal Grandmother    • Lymphoma Maternal Grandfather 64   • Breast cancer Paternal Grandmother 70   • Prostate cancer Paternal Grandfather 70        metastastic to liver   • Breast cancer Paternal Aunt 77   • Breast cancer Maternal Aunt 80   • Breast cancer Other    • Ovarian cancer Sister 66        Elevated ; 1A tumor; Borderline cancer; both ovaries & lg cyst removed   • Ovarian cancer Maternal Cousin 54   • Breast cancer Other 50   • Diabetes type I Nephew        Social History  Social History     Socioeconomic History   • Marital status:      Spouse name: Not on file   • Number of children: Not on file   • Years of education: Not on file   • Highest education level: Not on file   Tobacco Use   • Smoking status: Never Smoker   • Smokeless tobacco: Never Used   Substance and Sexual Activity   • Alcohol use: Never     Frequency: Never   • Drug use: Never   • Sexual activity: Defer     Partners: Male     Birth control/protection: None   Social History Narrative    lives alone with      with [facility] [employer]    No unusual chemical exposures    Never smoker, never drinker       Objective  Physical Exam    Gen: Patient in NAD. Pleasant and answers appropriately. A&Ox3.    Skin: Warm and dry with normal turgor. No purpura, rashes, or unusual pigmentation noted. Hair is normal in appearance and distribution.    HEENT: NC/AT. No lesions noted. Conjunctiva clear, sclera nonicteric. PERRL. EOMI without nystagmus or strabismus. Fundi appear benign. No hemorrhages or exudates of eyes. Auditory canals are patent bilaterally without lesions. TMs intact,  nonerythematous, nonbulging without lesions. Nasal mucosa pink, nonerythematous, and nonedematous. Frontal and maxillary sinuses are nontender. O/P nonerythematous and moist without exudate.    Neck: Supple without lymph nodes palpated. FROM.      Lungs: CTA B/L without rales, rhonchi, crackles, or wheezes.    Heart: RRR. S1 and S2 normal. No S3 or S4. No MRGT.    Abd: Soft, nontender,nondistended. (+)BSx4 quadrants.     Extrem: No CCE. Radial pulses 2+/4 and equal B/L. FROMx4.  Left leg is shorter than the right.    Neuro: No focal motor/sensory deficits.    Procedures    Assessment/Plan  Jackie Johnson is a 60 y.o. here for medical followup.    Diagnoses and all orders for this visit:    1. Chronic bilateral low back pain without sciatica (Primary)  -     Ambulatory Referral to Podiatry  Will refer to podiatry to discuss possible shoe lift to even out her legs.    2. Gastroesophageal reflux disease without esophagitis  Stable.  Patient currently on famotidine 10 mg orally daily as needed.    3. Seasonal allergic rhinitis due to other allergic trigger  Stable.  Patient currently alternating Allegra 180 mg orally daily along with Claritin 10 mg orally daily to reduce tolerance.    4. Needs flu shot  -     FluLaval Quad >6 Months ([id number])        Patient's Body mass index is 28.17 kg/m². BMI is above normal parameters. Recommendations include: exercise counseling and nutrition counseling.     Findings and plans discussed with patient who verbalizes understanding and agreement. Will followup with patient once results are in. Patient to followup at clinic PRN or in three months for further medical followup.    Scarlet Loera MD    EMR Dragon/Transcription Disclaimer:  Much of this encounter note is an electronic transcription/translation of spoken language to printed text.  The electronic translation of spoken language may permit erroneous, or at times, nonsensical words or phrases to be inadvertently transcribed.  Although I have reviewed the note for such errors, some may still exist.

## 2020-11-03 ENCOUNTER — APPOINTMENT (OUTPATIENT)
Dept: ONCOLOGY | Facility: HOSPITAL | Age: 60
End: 2020-11-03

## 2020-11-03 NOTE — PROGRESS NOTES
Date:  11/03/20    Name:  Jackie Johnson  YOB: 1960      Order For Mastectomy Supplies      1.  Please provide breast prosthesis for Right breast.    2.  Please provide 3 Prosthetic Bras.    Diagnosis:  Breast Cancer ICD-10  C50.919    11/03/20          Fatoumata Obrien MD

## 2020-11-15 DIAGNOSIS — Z17.1 MALIGNANT NEOPLASM OF RIGHT BREAST IN FEMALE, ESTROGEN RECEPTOR NEGATIVE, UNSPECIFIED SITE OF BREAST (HCC): Primary | ICD-10-CM

## 2020-11-15 DIAGNOSIS — C50.911 MALIGNANT NEOPLASM OF RIGHT BREAST IN FEMALE, ESTROGEN RECEPTOR NEGATIVE, UNSPECIFIED SITE OF BREAST (HCC): Primary | ICD-10-CM

## 2020-11-15 RX ORDER — SODIUM CHLORIDE 9 MG/ML
250 INJECTION, SOLUTION INTRAVENOUS ONCE
Status: CANCELLED | OUTPATIENT
Start: 2020-11-17

## 2020-11-17 ENCOUNTER — INFUSION (OUTPATIENT)
Dept: ONCOLOGY | Facility: HOSPITAL | Age: 60
End: 2020-11-17

## 2020-11-17 ENCOUNTER — TELEPHONE (OUTPATIENT)
Dept: ONCOLOGY | Facility: CLINIC | Age: 60
End: 2020-11-17

## 2020-11-17 ENCOUNTER — OFFICE VISIT (OUTPATIENT)
Dept: ONCOLOGY | Facility: CLINIC | Age: 60
End: 2020-11-17

## 2020-11-17 ENCOUNTER — APPOINTMENT (OUTPATIENT)
Dept: ONCOLOGY | Facility: HOSPITAL | Age: 60
End: 2020-11-17

## 2020-11-17 VITALS
OXYGEN SATURATION: 96 % | BODY MASS INDEX: 27.84 KG/M2 | RESPIRATION RATE: 18 BRPM | HEART RATE: 71 BPM | WEIGHT: 162.3 LBS | DIASTOLIC BLOOD PRESSURE: 76 MMHG | TEMPERATURE: 96.9 F | SYSTOLIC BLOOD PRESSURE: 121 MMHG

## 2020-11-17 VITALS
DIASTOLIC BLOOD PRESSURE: 70 MMHG | SYSTOLIC BLOOD PRESSURE: 114 MMHG | RESPIRATION RATE: 16 BRPM | TEMPERATURE: 96.8 F | HEART RATE: 70 BPM | OXYGEN SATURATION: 98 %

## 2020-11-17 DIAGNOSIS — C50.911 MALIGNANT NEOPLASM OF RIGHT BREAST IN FEMALE, ESTROGEN RECEPTOR NEGATIVE, UNSPECIFIED SITE OF BREAST (HCC): Primary | ICD-10-CM

## 2020-11-17 DIAGNOSIS — Z17.1 MALIGNANT NEOPLASM OF RIGHT BREAST IN FEMALE, ESTROGEN RECEPTOR NEGATIVE, UNSPECIFIED SITE OF BREAST (HCC): Primary | ICD-10-CM

## 2020-11-17 DIAGNOSIS — Z95.828 PORT-A-CATH IN PLACE: ICD-10-CM

## 2020-11-17 DIAGNOSIS — E55.9 VITAMIN D DEFICIENCY: ICD-10-CM

## 2020-11-17 LAB
ALBUMIN SERPL-MCNC: 4.37 G/DL (ref 3.5–5.2)
ALBUMIN/GLOB SERPL: 1.7 G/DL
ALP SERPL-CCNC: 90 U/L (ref 39–117)
ALT SERPL W P-5'-P-CCNC: 27 U/L (ref 1–33)
ANION GAP SERPL CALCULATED.3IONS-SCNC: 8.5 MMOL/L (ref 5–15)
AST SERPL-CCNC: 18 U/L (ref 1–32)
BASOPHILS # BLD AUTO: 0.05 10*3/MM3 (ref 0–0.2)
BASOPHILS NFR BLD AUTO: 1 % (ref 0–1.5)
BILIRUB SERPL-MCNC: 0.2 MG/DL (ref 0–1.2)
BUN SERPL-MCNC: 18 MG/DL (ref 8–23)
BUN/CREAT SERPL: 22.2 (ref 7–25)
CALCIUM SPEC-SCNC: 9.9 MG/DL (ref 8.6–10.5)
CHLORIDE SERPL-SCNC: 100 MMOL/L (ref 98–107)
CO2 SERPL-SCNC: 26.5 MMOL/L (ref 22–29)
CREAT SERPL-MCNC: 0.81 MG/DL (ref 0.57–1)
DEPRECATED RDW RBC AUTO: 42.3 FL (ref 37–54)
EOSINOPHIL # BLD AUTO: 0.06 10*3/MM3 (ref 0–0.4)
EOSINOPHIL NFR BLD AUTO: 1.2 % (ref 0.3–6.2)
ERYTHROCYTE [DISTWIDTH] IN BLOOD BY AUTOMATED COUNT: 12.5 % (ref 12.3–15.4)
GFR SERPL CREATININE-BSD FRML MDRD: 72 ML/MIN/1.73
GLOBULIN UR ELPH-MCNC: 2.5 GM/DL
GLUCOSE SERPL-MCNC: 99 MG/DL (ref 65–99)
HCT VFR BLD AUTO: 39.6 % (ref 34–46.6)
HGB BLD-MCNC: 12.5 G/DL (ref 12–15.9)
IMM GRANULOCYTES # BLD AUTO: 0.01 10*3/MM3 (ref 0–0.05)
IMM GRANULOCYTES NFR BLD AUTO: 0.2 % (ref 0–0.5)
LYMPHOCYTES # BLD AUTO: 1.74 10*3/MM3 (ref 0.7–3.1)
LYMPHOCYTES NFR BLD AUTO: 35.9 % (ref 19.6–45.3)
MCH RBC QN AUTO: 28.9 PG (ref 26.6–33)
MCHC RBC AUTO-ENTMCNC: 31.6 G/DL (ref 31.5–35.7)
MCV RBC AUTO: 91.7 FL (ref 79–97)
MONOCYTES # BLD AUTO: 0.29 10*3/MM3 (ref 0.1–0.9)
MONOCYTES NFR BLD AUTO: 6 % (ref 5–12)
NEUTROPHILS NFR BLD AUTO: 2.7 10*3/MM3 (ref 1.7–7)
NEUTROPHILS NFR BLD AUTO: 55.7 % (ref 42.7–76)
NRBC BLD AUTO-RTO: 0 /100 WBC (ref 0–0.2)
PLATELET # BLD AUTO: 205 10*3/MM3 (ref 140–450)
PMV BLD AUTO: 9.3 FL (ref 6–12)
POTASSIUM SERPL-SCNC: 3.9 MMOL/L (ref 3.5–5.2)
PROT SERPL-MCNC: 6.9 G/DL (ref 6–8.5)
RBC # BLD AUTO: 4.32 10*6/MM3 (ref 3.77–5.28)
SODIUM SERPL-SCNC: 135 MMOL/L (ref 136–145)
TSH SERPL DL<=0.05 MIU/L-ACNC: 2.12 UIU/ML (ref 0.27–4.2)
WBC # BLD AUTO: 4.85 10*3/MM3 (ref 3.4–10.8)

## 2020-11-17 PROCEDURE — 80053 COMPREHEN METABOLIC PANEL: CPT

## 2020-11-17 PROCEDURE — 85025 COMPLETE CBC W/AUTO DIFF WBC: CPT

## 2020-11-17 PROCEDURE — 99214 OFFICE O/P EST MOD 30 MIN: CPT | Performed by: NURSE PRACTITIONER

## 2020-11-17 PROCEDURE — 96413 CHEMO IV INFUSION 1 HR: CPT

## 2020-11-17 PROCEDURE — 25010000002 PERTUZUMAB 420 MG/14ML SOLUTION 420 MG VIAL: Performed by: INTERNAL MEDICINE

## 2020-11-17 PROCEDURE — 25010000003 HEPARIN LOCK FLUSH PER 10 UNITS: Performed by: INTERNAL MEDICINE

## 2020-11-17 PROCEDURE — 25010000002 TRASTUZUMAB-ANNS 420 MG RECONSTITUTED SOLUTION 1 EACH VIAL: Performed by: INTERNAL MEDICINE

## 2020-11-17 PROCEDURE — 36415 COLL VENOUS BLD VENIPUNCTURE: CPT

## 2020-11-17 PROCEDURE — 96417 CHEMO IV INFUS EACH ADDL SEQ: CPT

## 2020-11-17 PROCEDURE — 84443 ASSAY THYROID STIM HORMONE: CPT

## 2020-11-17 RX ORDER — SODIUM CHLORIDE 0.9 % (FLUSH) 0.9 %
10 SYRINGE (ML) INJECTION AS NEEDED
Status: CANCELLED | OUTPATIENT
Start: 2020-12-08

## 2020-11-17 RX ORDER — HEPARIN SODIUM (PORCINE) LOCK FLUSH IV SOLN 100 UNIT/ML 100 UNIT/ML
300 SOLUTION INTRAVENOUS ONCE
Status: CANCELLED | OUTPATIENT
Start: 2020-11-17

## 2020-11-17 RX ORDER — HEPARIN SODIUM (PORCINE) LOCK FLUSH IV SOLN 100 UNIT/ML 100 UNIT/ML
500 SOLUTION INTRAVENOUS AS NEEDED
Status: DISCONTINUED | OUTPATIENT
Start: 2020-11-17 | End: 2020-11-17 | Stop reason: HOSPADM

## 2020-11-17 RX ORDER — SODIUM CHLORIDE 0.9 % (FLUSH) 0.9 %
10 SYRINGE (ML) INJECTION AS NEEDED
Status: DISCONTINUED | OUTPATIENT
Start: 2020-11-17 | End: 2020-11-17 | Stop reason: HOSPADM

## 2020-11-17 RX ORDER — HEPARIN SODIUM (PORCINE) LOCK FLUSH IV SOLN 100 UNIT/ML 100 UNIT/ML
500 SOLUTION INTRAVENOUS AS NEEDED
Status: CANCELLED | OUTPATIENT
Start: 2020-12-08

## 2020-11-17 RX ORDER — SODIUM CHLORIDE 9 MG/ML
250 INJECTION, SOLUTION INTRAVENOUS ONCE
Status: COMPLETED | OUTPATIENT
Start: 2020-11-17 | End: 2020-11-17

## 2020-11-17 RX ORDER — SODIUM CHLORIDE 0.9 % (FLUSH) 0.9 %
20 SYRINGE (ML) INJECTION AS NEEDED
Status: CANCELLED | OUTPATIENT
Start: 2020-11-17

## 2020-11-17 RX ADMIN — TRASTUZUMAB-ANNS 440 MG: 420 INJECTION, POWDER, LYOPHILIZED, FOR SOLUTION INTRAVENOUS at 14:35

## 2020-11-17 RX ADMIN — SODIUM CHLORIDE, PRESERVATIVE FREE 10 ML: 5 INJECTION INTRAVENOUS at 15:25

## 2020-11-17 RX ADMIN — Medication 500 UNITS: at 15:25

## 2020-11-17 RX ADMIN — SODIUM CHLORIDE 250 ML: 9 INJECTION, SOLUTION INTRAVENOUS at 13:11

## 2020-11-17 RX ADMIN — PERTUZUMAB 420 MG: 30 INJECTION, SOLUTION, CONCENTRATE INTRAVENOUS at 13:16

## 2020-11-17 NOTE — TELEPHONE ENCOUNTER
Jackie's wanting to know if she should eat before she comes in for infusion or if lunch will be served     Phone# [phone]

## 2020-11-17 NOTE — PROGRESS NOTES
DATE:  2020    DIAGNOSIS: Breast cancer    CHIEF COMPLAINT:  Mastectomy changes    Interval History:  Ms. Johnson follows with Dr. Obrien for breast cancer and is being seen today for an acute visit. She underwent right mastectomy with left lumpectomy at [facility] in 2020. She reports that she has recently been painting her house and noticed that she had developed some swelling under right arm and into mastectomy incision. She also reports that she thinks this swelling has somewhat improved over the last couple of days. Denies any redness or drainage. Denies fever or chills. She also reports that she has been seeing what appears to be geometric shapes on occasion. She does recall this happened after her last treatment, while driving home. She also recalls an episode while she was at [facility]. She relates this occurrences to possible hypoglycemia. She notices that she sees these geometric shapes when she has not ate an adequate meal. She denies any other specific complaints today.      The following portions of the patient's history were reviewed and updated as appropriate: allergies, current medications, past family history, past medical history, past social history, past surgical history and problem list.    PAST MEDICAL HISTORY:  Past Medical History:   Diagnosis Date   • Acid reflux    • Asthma    • Breast cancer (CMS/HCC) 2020    rt br ca   • Cancer (CMS/HCC)    • Eczema    • Endometriosis    • GERD (gastroesophageal reflux disease)    • Hiatal hernia    • High cholesterol    • Seasonal allergies    • Sinusitis    • Staph infection 2020    port       PAST SURGICAL HISTORY:  Past Surgical History:   Procedure Laterality Date   • ABDOMINAL SURGERY     • BREAST BIOPSY Bilateral    •  SECTION     • D&C HYSTEROSCOPY ENDOMETRIAL ABLATION     • DIAGNOSTIC LAPAROSCOPY     • FRACTURE SURGERY Left     tib/fib   • FULGURATION ENDOMETRIOSIS     • LEG SURGERY      Left leg following fracture    • LYMPH NODE BIOPSY     • PORTACATH PLACEMENT N/A 3/18/2020    Procedure: INSERTION OF PORTACATH;  Surgeon: Dudley Aldridge MD;  Location:  [facility];  Service: General;  Laterality: N/A;   • SKIN BIOPSY     • TONSILLECTOMY     • VENOUS ACCESS DEVICE (PORT) INSERTION N/A 5/27/2020    Procedure: INSERTION VENOUS ACCESS DEVICE;  Surgeon: Nerissa Wang MD;  Location:  [facility];  Service: General;  Laterality: N/A;   • VENOUS ACCESS DEVICE (PORT) REMOVAL Right 4/19/2020    Procedure: REMOVAL VENOUS ACCESS DEVICE;  Surgeon: Nerissa Wang MD;  Location:  [facility];  Service: General;  Laterality: Right;       SOCIAL HISTORY:  Social History     Socioeconomic History   • Marital status:      Spouse name: Not on file   • Number of children: Not on file   • Years of education: Not on file   • Highest education level: Not on file   Tobacco Use   • Smoking status: Never Smoker   • Smokeless tobacco: Never Used   Substance and Sexual Activity   • Alcohol use: Never     Frequency: Never   • Drug use: Never   • Sexual activity: Defer     Partners: Male     Birth control/protection: None   Social History Narrative    lives alone with      with [facility]    No unusual chemical exposures    Never smoker, never drinker              FAMILY HISTORY:  Family History   Problem Relation Age of Onset   • Breast cancer Mother 75   • Basal cell carcinoma Mother 90        2 small spots on face removed + fair complexion   • Heart disease Mother    • Hypertension Mother    • Diabetes type II Mother    • Breast cancer Sister 55        VUS detected on genetic testing CDKN2A gene (c.-33G>C).    • Lung disease Sister    • Heart disease Father    • Throat cancer Father 79   • Lung cancer Father 79   • Dementia Father    • Parkinsonism Father    • Cancer Maternal Grandmother 65        unknown gynecologic cancer, cervical vs uterine   • Heart disease Maternal Grandmother    • Diabetes type II  Maternal Grandmother    • Lymphoma Maternal Grandfather 64   • Breast cancer Paternal Grandmother 70   • Prostate cancer Paternal Grandfather 70        metastastic to liver   • Breast cancer Paternal Aunt 77   • Breast cancer Maternal Aunt 80   • Breast cancer Other    • Ovarian cancer Sister 66        Elevated ; 1A tumor; Borderline cancer; both ovaries & lg cyst removed   • Ovarian cancer Maternal Cousin 54   • Breast cancer Other 50   • Diabetes type I Nephew          MEDICATIONS:  The current medication list was reviewed in the EMR    Current Outpatient Medications:   •  calcium-vitamin D (Oscal 500/200 D-3) 500-200 MG-UNIT per tablet, Take 1 tablet by mouth 2 (Two) Times a Day., Disp: 60 tablet, Rfl: 11  •  famotidine (PEPCID) 10 MG tablet, Take 10 mg by mouth As Needed for Indigestion or Heartburn., Disp: , Rfl:   •  fexofenadine (ALLEGRA) 180 MG tablet, Take 180 mg by mouth., Disp: , Rfl:   •  letrozole (FEMARA) 2.5 MG tablet, Take 1 tablet by mouth Daily., Disp: 30 tablet, Rfl: 11  •  levalbuterol (XOPENEX HFA) 45 MCG/ACT inhaler, Inhale 1-2 puffs Every 4 (Four) Hours As Needed for Wheezing., Disp: , Rfl:   •  loperamide (IMODIUM A-D) 2 MG tablet, , Disp: , Rfl:   •  loratadine (CLARITIN) 10 MG tablet, Take 10 mg by mouth 2 (Two) Times a Day., Disp: , Rfl:   •  prochlorperazine (COMPAZINE) 10 MG tablet, Take 1 tablet by mouth Every 6 (Six) Hours As Needed for Nausea or Vomiting., Disp: 60 tablet, Rfl: 3  •  sennosides-docusate (Senna-S) 8.6-50 MG per tablet, Take 2 tablets by mouth 2 (Two) Times a Day., Disp: 120 tablet, Rfl: 5  •  vitamin D (ERGOCALCIFEROL) 1.25 MG (12689 UT) capsule capsule, Take 1 capsule by mouth 1 (One) Time Per Week., Disp: 8 capsule, Rfl: 0  No current facility-administered medications for this visit.     Facility-Administered Medications Ordered in Other Visits:   •  heparin injection 500 Units, 500 Units, Intravenous, PRN, Fatoumata Obrien MD, 500 Units at 11/17/20 1525  •   sodium chloride 0.9 % flush 10 mL, 10 mL, Intravenous, PRN, Fatoumata Obrien MD, 10 mL at 11/17/20 1525    ALLERGIES:    Allergies   Allergen Reactions   • Albuterol Other (See Comments)     One time severe headache, questionable for aneurysm, did spinal tap was positive, did Angiogram and MRI were Negative for aneurysm   • Penicillins Hives   • Cefpodoxime Rash     vantin     • Clindamycin Hcl Rash   • Crestor [Rosuvastatin Calcium] Other (See Comments)     Caused high liver enxymes   • Sulfa Antibiotics Other (See Comments)     Mucosal lesions   • Latex Rash   • Pseudoephedrine Other (See Comments)     Soreness on the tongue         REVIEW OF SYSTEMS:    A comprehensive 14 point review of systems was performed.  Significant findings as mentioned above.  All other systems reviewed and are negative.        Physical Exam   Vital Signs:   Vitals:    11/17/20 1548   BP: 114/70   Pulse: 70   Resp: 16   Temp: 96.8 °F (36 °C)   SpO2: 98%     General: Well developed, well nourished, alert and oriented x 3, in no acute distress.   Head: ATNC   Eyes: PERRL, No evidence of conjunctivitis.   Nose: No nasal discharge.   Mouth: Oral mucosal membranes moist.   Neck: Neck supple. No thyromegaly. No JVD.   Lungs: Clear in all fields to A&P without rales, rhonchi or wheezing.   Heart: S1, S2. Regular rate and rhythm. No murmurs, rubs, or gallops.   Breast: S/p R mastectomy.  Surgical incisions well healed.  S/p L lumpectomy/mastopexy.  Surgical incisions are healing well. No palpable masses or axillary adenopathy on either side. No swelling, redness or drainage.  Abdomen: Soft. Bowel sounds are normoactive. Nontender with palpation.   Extremities: No cyanosis or edema.   Integumentary: Warm, dry, intact.  Neurologic: Grossly non-focal exam.    Pain Score:  Pain Score    11/17/20 1548   PainSc: 0-No pain       PHQ-Score Total:  PHQ-9 Total Score:               RECENT LABS:  Lab Results   Component Value Date    WBC 4.85 11/17/2020     HGB 12.5 11/17/2020    HCT 39.6 11/17/2020    MCV 91.7 11/17/2020    RDW 12.5 11/17/2020     11/17/2020    NEUTRORELPCT 55.7 11/17/2020    LYMPHORELPCT 35.9 11/17/2020    MONORELPCT 6.0 11/17/2020    EOSRELPCT 1.2 11/17/2020    BASORELPCT 1.0 11/17/2020    NEUTROABS 2.70 11/17/2020    LYMPHSABS 1.74 11/17/2020       Lab Results   Component Value Date     (L) 11/17/2020    K 3.9 11/17/2020    CO2 26.5 11/17/2020     11/17/2020    BUN 18 11/17/2020    CREATININE 0.81 11/17/2020    EGFRIFNONA 72 11/17/2020    EGFRIFAFRI 85 08/17/2020    GLUCOSE 99 11/17/2020    CALCIUM 9.9 11/17/2020    ALKPHOS 90 11/17/2020    AST 18 11/17/2020    ALT 27 11/17/2020    BILITOT 0.2 11/17/2020    ALBUMIN 4.37 11/17/2020    PROTEINTOT 6.9 11/17/2020    MG 2.1 07/28/2020           Lab Results   Component Value Date    FERRITIN 125 08/31/2020    IRON 40 08/31/2020    TIBC 375 08/31/2020    COYAQDGM75 >2,000 (H) 07/28/2020    HAPTOGLOBIN 142 08/31/2020      ASSESSMENT & PLAN:  Jackie Johnson is a very pleasant 60 y.o. female with    1.  Right Mastectomy  - She underwent right mastectomy with left lumpectomy at [facility] in August 2020. She reports that she has recently been painting her house and noticed that she had developed some swelling under right arm and into mastectomy incision. She reports that this has improved over the last two days.  - On physical exam there is no swelling, redness or drainage observed. Discussed with patient a referral to lymphedema clinic. However, she is planning a follow up with [facility] in the upcoming weeks and prefers to wait until after that visit.   - Advised patient to continue to monitor area and notify clinic if she again notices any changes.  - Will re-evaluate area when patient returns to clinic in 3 weeks or sooner if needed.      2. Vision changes  - Likely related to episodes of hypoglycemia. She reports that she has had a couple of episodes when she notices  geometric shapes and relates this to occurring around the time she has not eaten. She denies any other symptoms.  - Advised patient to eat a well-balanced diet. She has an upcoming appointment with PCP and plans to discuss with them. Also advised patient to keep a log of these occurrences. Will continue to monitor.    3. Right breast cancer  4. L Breast LCIS/ALH  - Follow up with Dr. Obrien as previously planned. Please see Dr. Obrien' most recent follow up note dated 10/14/2020 for full details regarding oncology history.    A total of 25 minutes were spent coordinating this patient’s care in clinic today; more than 50% of this time was face-to-face with the patient, reviewing her interim medical history and counseling on the current treatment and followup plan. All questions were answered to her satisfaction.          Electronically Signed by: SHER Kim , SHER November 17, 2020 15:49 EST       CC:   No ref. provider found  Scarlet Loera MD

## 2020-12-07 DIAGNOSIS — C50.911 MALIGNANT NEOPLASM OF RIGHT BREAST IN FEMALE, ESTROGEN RECEPTOR NEGATIVE, UNSPECIFIED SITE OF BREAST (HCC): Primary | ICD-10-CM

## 2020-12-07 DIAGNOSIS — Z17.1 MALIGNANT NEOPLASM OF RIGHT BREAST IN FEMALE, ESTROGEN RECEPTOR NEGATIVE, UNSPECIFIED SITE OF BREAST (HCC): Primary | ICD-10-CM

## 2020-12-07 RX ORDER — SODIUM CHLORIDE 9 MG/ML
250 INJECTION, SOLUTION INTRAVENOUS ONCE
Status: CANCELLED | OUTPATIENT
Start: 2020-12-08

## 2020-12-08 ENCOUNTER — INFUSION (OUTPATIENT)
Dept: ONCOLOGY | Facility: HOSPITAL | Age: 60
End: 2020-12-08

## 2020-12-08 VITALS
SYSTOLIC BLOOD PRESSURE: 121 MMHG | OXYGEN SATURATION: 98 % | HEART RATE: 66 BPM | WEIGHT: 162.6 LBS | DIASTOLIC BLOOD PRESSURE: 81 MMHG | TEMPERATURE: 97.5 F | BODY MASS INDEX: 27.9 KG/M2 | RESPIRATION RATE: 18 BRPM

## 2020-12-08 DIAGNOSIS — Z95.828 PORT-A-CATH IN PLACE: ICD-10-CM

## 2020-12-08 DIAGNOSIS — Z17.1 MALIGNANT NEOPLASM OF RIGHT BREAST IN FEMALE, ESTROGEN RECEPTOR NEGATIVE, UNSPECIFIED SITE OF BREAST (HCC): Primary | ICD-10-CM

## 2020-12-08 DIAGNOSIS — C50.911 MALIGNANT NEOPLASM OF RIGHT BREAST IN FEMALE, ESTROGEN RECEPTOR NEGATIVE, UNSPECIFIED SITE OF BREAST (HCC): Primary | ICD-10-CM

## 2020-12-08 LAB
ALBUMIN SERPL-MCNC: 4.66 G/DL (ref 3.5–5.2)
ALBUMIN/GLOB SERPL: 1.8 G/DL
ALP SERPL-CCNC: 97 U/L (ref 39–117)
ALT SERPL W P-5'-P-CCNC: 23 U/L (ref 1–33)
ANION GAP SERPL CALCULATED.3IONS-SCNC: 11.3 MMOL/L (ref 5–15)
AST SERPL-CCNC: 16 U/L (ref 1–32)
BASOPHILS # BLD AUTO: 0.04 10*3/MM3 (ref 0–0.2)
BASOPHILS NFR BLD AUTO: 0.8 % (ref 0–1.5)
BILIRUB SERPL-MCNC: 0.3 MG/DL (ref 0–1.2)
BUN SERPL-MCNC: 20 MG/DL (ref 8–23)
BUN/CREAT SERPL: 26.3 (ref 7–25)
CALCIUM SPEC-SCNC: 10.3 MG/DL (ref 8.6–10.5)
CHLORIDE SERPL-SCNC: 102 MMOL/L (ref 98–107)
CO2 SERPL-SCNC: 24.7 MMOL/L (ref 22–29)
CREAT SERPL-MCNC: 0.76 MG/DL (ref 0.57–1)
DEPRECATED RDW RBC AUTO: 44.6 FL (ref 37–54)
EOSINOPHIL # BLD AUTO: 0.06 10*3/MM3 (ref 0–0.4)
EOSINOPHIL NFR BLD AUTO: 1.3 % (ref 0.3–6.2)
ERYTHROCYTE [DISTWIDTH] IN BLOOD BY AUTOMATED COUNT: 13.2 % (ref 12.3–15.4)
GFR SERPL CREATININE-BSD FRML MDRD: 78 ML/MIN/1.73
GLOBULIN UR ELPH-MCNC: 2.6 GM/DL
GLUCOSE SERPL-MCNC: 93 MG/DL (ref 65–99)
HCT VFR BLD AUTO: 41.7 % (ref 34–46.6)
HGB BLD-MCNC: 13.1 G/DL (ref 12–15.9)
IMM GRANULOCYTES # BLD AUTO: 0.02 10*3/MM3 (ref 0–0.05)
IMM GRANULOCYTES NFR BLD AUTO: 0.4 % (ref 0–0.5)
LYMPHOCYTES # BLD AUTO: 1.61 10*3/MM3 (ref 0.7–3.1)
LYMPHOCYTES NFR BLD AUTO: 34.2 % (ref 19.6–45.3)
MCH RBC QN AUTO: 28.5 PG (ref 26.6–33)
MCHC RBC AUTO-ENTMCNC: 31.4 G/DL (ref 31.5–35.7)
MCV RBC AUTO: 90.8 FL (ref 79–97)
MONOCYTES # BLD AUTO: 0.3 10*3/MM3 (ref 0.1–0.9)
MONOCYTES NFR BLD AUTO: 6.4 % (ref 5–12)
NEUTROPHILS NFR BLD AUTO: 2.68 10*3/MM3 (ref 1.7–7)
NEUTROPHILS NFR BLD AUTO: 56.9 % (ref 42.7–76)
NRBC BLD AUTO-RTO: 0 /100 WBC (ref 0–0.2)
PLATELET # BLD AUTO: 235 10*3/MM3 (ref 140–450)
PMV BLD AUTO: 10.1 FL (ref 6–12)
POTASSIUM SERPL-SCNC: 4 MMOL/L (ref 3.5–5.2)
PROT SERPL-MCNC: 7.3 G/DL (ref 6–8.5)
RBC # BLD AUTO: 4.59 10*6/MM3 (ref 3.77–5.28)
SODIUM SERPL-SCNC: 138 MMOL/L (ref 136–145)
WBC # BLD AUTO: 4.71 10*3/MM3 (ref 3.4–10.8)

## 2020-12-08 PROCEDURE — 25010000002 PERTUZUMAB 420 MG/14ML SOLUTION 420 MG VIAL: Performed by: INTERNAL MEDICINE

## 2020-12-08 PROCEDURE — 25010000002 TRASTUZUMAB-ANNS 420 MG RECONSTITUTED SOLUTION 1 EACH VIAL: Performed by: INTERNAL MEDICINE

## 2020-12-08 PROCEDURE — 96413 CHEMO IV INFUSION 1 HR: CPT

## 2020-12-08 PROCEDURE — 25010000003 HEPARIN LOCK FLUSH PER 10 UNITS: Performed by: INTERNAL MEDICINE

## 2020-12-08 PROCEDURE — 36415 COLL VENOUS BLD VENIPUNCTURE: CPT

## 2020-12-08 PROCEDURE — 96417 CHEMO IV INFUS EACH ADDL SEQ: CPT

## 2020-12-08 PROCEDURE — 85025 COMPLETE CBC W/AUTO DIFF WBC: CPT

## 2020-12-08 PROCEDURE — 80053 COMPREHEN METABOLIC PANEL: CPT

## 2020-12-08 RX ORDER — SODIUM CHLORIDE 0.9 % (FLUSH) 0.9 %
10 SYRINGE (ML) INJECTION AS NEEDED
Status: CANCELLED | OUTPATIENT
Start: 2020-12-29

## 2020-12-08 RX ORDER — SODIUM CHLORIDE 0.9 % (FLUSH) 0.9 %
20 SYRINGE (ML) INJECTION AS NEEDED
Status: CANCELLED | OUTPATIENT
Start: 2020-12-29

## 2020-12-08 RX ORDER — HEPARIN SODIUM (PORCINE) LOCK FLUSH IV SOLN 100 UNIT/ML 100 UNIT/ML
300 SOLUTION INTRAVENOUS ONCE
Status: CANCELLED | OUTPATIENT
Start: 2020-12-29

## 2020-12-08 RX ORDER — SODIUM CHLORIDE 0.9 % (FLUSH) 0.9 %
10 SYRINGE (ML) INJECTION AS NEEDED
Status: DISCONTINUED | OUTPATIENT
Start: 2020-12-08 | End: 2020-12-08 | Stop reason: HOSPADM

## 2020-12-08 RX ORDER — SODIUM CHLORIDE 9 MG/ML
250 INJECTION, SOLUTION INTRAVENOUS ONCE
Status: COMPLETED | OUTPATIENT
Start: 2020-12-08 | End: 2020-12-08

## 2020-12-08 RX ORDER — HEPARIN SODIUM (PORCINE) LOCK FLUSH IV SOLN 100 UNIT/ML 100 UNIT/ML
500 SOLUTION INTRAVENOUS AS NEEDED
Status: CANCELLED | OUTPATIENT
Start: 2020-12-29

## 2020-12-08 RX ORDER — HEPARIN SODIUM (PORCINE) LOCK FLUSH IV SOLN 100 UNIT/ML 100 UNIT/ML
500 SOLUTION INTRAVENOUS AS NEEDED
Status: DISCONTINUED | OUTPATIENT
Start: 2020-12-08 | End: 2020-12-08 | Stop reason: HOSPADM

## 2020-12-08 RX ADMIN — SODIUM CHLORIDE 250 ML: 9 INJECTION, SOLUTION INTRAVENOUS at 12:20

## 2020-12-08 RX ADMIN — Medication 500 UNITS: at 14:30

## 2020-12-08 RX ADMIN — SODIUM CHLORIDE, PRESERVATIVE FREE 10 ML: 5 INJECTION INTRAVENOUS at 14:30

## 2020-12-08 RX ADMIN — TRASTUZUMAB-ANNS 440 MG: 420 INJECTION, POWDER, LYOPHILIZED, FOR SOLUTION INTRAVENOUS at 13:42

## 2020-12-08 RX ADMIN — PERTUZUMAB 420 MG: 30 INJECTION, SOLUTION, CONCENTRATE INTRAVENOUS at 12:20

## 2020-12-17 ENCOUNTER — APPOINTMENT (OUTPATIENT)
Dept: CARDIOLOGY | Facility: HOSPITAL | Age: 60
End: 2020-12-17

## 2020-12-17 ENCOUNTER — HOSPITAL ENCOUNTER (OUTPATIENT)
Dept: PHYSICAL THERAPY | Facility: HOSPITAL | Age: 60
Setting detail: THERAPIES SERIES
Discharge: HOME | End: 2020-12-17

## 2020-12-17 DIAGNOSIS — L90.5 SCAR CONDITION AND FIBROSIS OF SKIN: ICD-10-CM

## 2020-12-17 DIAGNOSIS — I97.2 POST-MASTECTOMY LYMPHEDEMA SYNDROME: Primary | ICD-10-CM

## 2020-12-17 DIAGNOSIS — C50.911 MALIGNANT NEOPLASM OF RIGHT BREAST IN FEMALE, ESTROGEN RECEPTOR NEGATIVE, UNSPECIFIED SITE OF BREAST (HCC): ICD-10-CM

## 2020-12-17 DIAGNOSIS — Z17.1 MALIGNANT NEOPLASM OF RIGHT BREAST IN FEMALE, ESTROGEN RECEPTOR NEGATIVE, UNSPECIFIED SITE OF BREAST (HCC): ICD-10-CM

## 2020-12-17 PROCEDURE — 97161 PT EVAL LOW COMPLEX 20 MIN: CPT

## 2020-12-18 ENCOUNTER — HOSPITAL ENCOUNTER (OUTPATIENT)
Dept: CARDIOLOGY | Facility: HOSPITAL | Age: 60
Discharge: HOME | End: 2020-12-18
Admitting: INTERNAL MEDICINE

## 2020-12-18 DIAGNOSIS — Z79.899 ENCOUNTER FOR MONITORING CARDIOTOXIC DRUG THERAPY: ICD-10-CM

## 2020-12-18 DIAGNOSIS — Z51.81 ENCOUNTER FOR MONITORING CARDIOTOXIC DRUG THERAPY: ICD-10-CM

## 2020-12-18 PROCEDURE — 93321 DOPPLER ECHO F-UP/LMTD STD: CPT

## 2020-12-18 PROCEDURE — 93308 TTE F-UP OR LMTD: CPT | Performed by: INTERNAL MEDICINE

## 2020-12-18 PROCEDURE — 93321 DOPPLER ECHO F-UP/LMTD STD: CPT | Performed by: INTERNAL MEDICINE

## 2020-12-18 PROCEDURE — 93308 TTE F-UP OR LMTD: CPT

## 2020-12-18 NOTE — THERAPY EVALUATION
Physical Therapy Lymphedema Initial Evaluation  [facility]     Patient Name: Jackie Johnson  : 1960  MRN: [id number]  Today's Date: 2020      Visit Date: 2020    Visit Dx:    ICD-10-CM ICD-9-CM   1. Post-mastectomy lymphedema syndrome  I97.2 457.0   2. Malignant neoplasm of right breast in female, estrogen receptor negative, unspecified site of breast (CMS/HCC)  C50.911 174.9    Z17.1 V86.1   3. Scar condition and fibrosis of skin  L90.5 709.2       Patient Active Problem List   Diagnosis   • Malignant neoplasm of right breast in female, estrogen receptor negative (CMS/HCC)   • Breast cancer (CMS/HCC)   • Port-A-Cath in place   • Cellulitis of neck   • Asthma   • GERD (gastroesophageal reflux disease)   • High cholesterol   • Seasonal allergies        Past Medical History:   Diagnosis Date   • Acid reflux    • Asthma    • Breast cancer (CMS/HCC) 2020    rt br ca   • Cancer (CMS/HCC)    • Eczema    • Endometriosis    • GERD (gastroesophageal reflux disease)    • Hiatal hernia    • High cholesterol    • Seasonal allergies    • Sinusitis    • Staph infection 2020    port        Past Surgical History:   Procedure Laterality Date   • ABDOMINAL SURGERY     • BREAST BIOPSY Bilateral    •  SECTION     • D&C HYSTEROSCOPY ENDOMETRIAL ABLATION     • DIAGNOSTIC LAPAROSCOPY     • FRACTURE SURGERY Left     tib/fib   • FULGURATION ENDOMETRIOSIS     • LEG SURGERY      Left leg following fracture   • LYMPH NODE BIOPSY     • PORTACATH PLACEMENT N/A 3/18/2020    Procedure: INSERTION OF PORTACATH;  Surgeon: Dudley Aldridge MD;  Location: [facility];  Service: General;  Laterality: N/A;   • SKIN BIOPSY     • TONSILLECTOMY     • VENOUS ACCESS DEVICE (PORT) INSERTION N/A 2020    Procedure: INSERTION VENOUS ACCESS DEVICE;  Surgeon: Nerissa Wang MD;  Location: [facility] OR;  Service: General;  Laterality: N/A;   • VENOUS ACCESS DEVICE (PORT) REMOVAL Right 2020    Procedure:  REMOVAL VENOUS ACCESS DEVICE;  Surgeon: Nerissa Wang MD;  Location: [facility];  Service: General;  Laterality: Right;       Visit Dx:    ICD-10-CM ICD-9-CM   1. Post-mastectomy lymphedema syndrome  I97.2 457.0   2. Malignant neoplasm of right breast in female, estrogen receptor negative, unspecified site of breast (CMS/[facility])  C50.911 174.9    Z17.1 V86.1   3. Scar condition and fibrosis of skin  L90.5 709.2       Patient History     Row Name 12/17/20 1000             History    Chief Complaint  Swelling limited ROM  -LF      Date Current Problem(s) Began  08/18/20  -LF      Brief Description of Current Complaint  Patient presents with c/o swelling under right arm and along mastectomy scar.  She reports onset last month after she had painted some trim in her house.   She says symptoms have improved since onset.  She is s/p right mastectomy and left lumpectomy August 18, 2020.    Concerned regarding what activity she should and shouldn't do.  Also has sister who has BCRL and wants to be proactive in prevenative measures.   She reports difficulty donning/doffing sweaters due to tightness.  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  Reading  -LF      Teaching needs identified  Home Exercise Program;Management of Condition  -LF      Barriers to learning  None  -LF      Pt Participated in POC and Goals  Yes  -LF        User Key  (r) = Recorded By, (t) = Taken By,  (c) = Cosigned By    Initials Name Provider Type    LF Alanna Baxter, PT Physical Therapist          Lymphedema     Row Name 12/17/20 1000             Lymphedema Assessment    Lymphedema Classification  RUE:;Trunk:;at risk/stage 0;secondary  -LF      Lymphedema Cancer Related Sx  right;simple mastectomy;sentinel node biopsy;left;lumpectomy  -LF      Lymphedema Surgery Comments  R total mastectomy and L lumpectomy and segmental mastectomy with onocoplastic reconsturction and mastoplexy at [facility] Aug 2020  -LF      Lymph Nodes Removed #  6  -LF      Positive Lymph Nodes #  0  -LF      Cancer Comments  R breast DCIS, L breast ALH  -LF      Chemo Received  yes  -LF      Chemo Treatments #/Timeframe  neoadjuvant, currently on hercepitin and perjeta, femara  -LF      Radiation Therapy Received  no  -LF      Infections or Cellulitis?  no  -LF         Posture/Observations    Posture- WNL  Posture is WNL  -LF         General ROM    GENERAL ROM COMMENTS  ROM is WFL's except right shoulder flex and abduction impaired ~15 degrees compared to left with increased tightness and pulling noted.  HBH and HBB WNL compared R<>L, but with tightness reported on the R  -LF         MMT (Manual Muscle Testing)    General MMT Comments  UE strength is 5/5  -LF         Lymphedema Edema Assessment    Edema Assessment Comment  trace edema medial pucker area of mastectomy scar, as well as lateral chest inferior to axilla  -LF         Skin Changes/Observations    Location/Assessment  Upper Extremity;Upper Quadrant  -LF      Upper Extremity Conditions  right:;bilateral:;normal;intact  -LF      Upper Extremity Color/Pigment  right:;normal  -LF      Skin Observations Comment  mild-mod soft-tissue restriction across right mastectomy area.  She also had some tenderness upper chest area to light touch  -LF         Lymphedema Sensation    Lymphedema Sensation Reports  RUE:;LUE:;normal  -LF         Lymphedema Measurements    Measurement Type(s)   Quick Girth  -LF      Quick Girth Areas  Upper extremities  -LF      Lymphedema Measurements Comments  Trunk:  axilla 99cm  -LF         LUE Quick Girth (cm)    Axilla  32 cm  -LF      Mid upper arm  28.9 cm  -LF      Elbow  26.3 cm  -LF      Mid forearm  22.5 cm  -LF      Wrist crease  16.3 cm  -LF      Web space  18.3 cm  -LF      Met-heads  18.2 cm  -LF      LUE Quick Girth Total  162.5  -LF         RUE Quick Girth (cm)    Axilla  32.7 cm  -LF      Mid upper arm  30.8 cm  -LF      Elbow  26.6 cm  -LF      Mid forearm  23.3 cm  -LF      Wrist crease  16.8 cm  -LF      Web space  19.6 cm  -LF      Met-heads  19 cm  -LF      RUE Quick Girth Total  168.8  -LF         Manual Lymphatic Drainage    Manual Lymphatic Drainage  initial sequence;opened regional lymph nodes;opened anastamoses  -LF      Initial Sequence  supraclavicular;diaphragmatic breathing  -LF      Supraclavicular  right;left  -LF      Diaphragmatic Breathing  educated on DB which patient is already familiar with  -LF      Opened Regional Lymph Nodes  axillary  -LF      Axillary  right  -LF      Opened Anastamoses  axillo-inguinal  -LF      Axillo-Inguinal  right  -LF         L-Dex Bioimpedence Screening    L-Dex Measurement Extremity  RUE  -LF      L-Dex Patient Position  Standing  -LF      L-Dex UE Dominate Side  Right  -LF      L-Dex UE At Risk Side  Right  -LF      L-Dex UE Comment  Performed BIS, but did not get high quality reading despite attempt x2. Showed  L-dex score was -6.3, but will retest at upcoming visit.  Educated patient on BIS and meaning of results  -LF        User Key  (r) = Recorded By, (t) = Taken By, (c) = Cosigned By    Initials Name Provider Type    Alanna Fisher PT Physical Therapist              Therapy Education  Education Details: Educated on self-MLD initial sequence (DB, supraclavicular, axilla, A-I anastomoses), gentle massage across mastectomy site.    Given: HEP, Symptoms/condition management  Program: New  How  Provided: Verbal, Demonstration, Written  Provided to: Patient  Level of Understanding: Teach back education performed, Verbalized, Demonstrated            PT OP Goals     Row Name 12/17/20 1000          PT Short Term Goals    STG Date to Achieve  01/14/21  -LF     STG 1  DASH score improved by at least 10%  -LF     STG 1 Progress  New  -LF     STG 2  Soft tissue swelling, inflammation, or restriction will be reduced by 25 %   -LF     STG 2 Progress  New  -LF     STG 3  Patient to be independent with a home exercise program to focus on being independent strengthening both upper extremities and improve mobility.  -LF     STG 3 Progress  New  -LF        Long Term Goals    LTG Date to Achieve  02/11/21  -LF     LTG 1  Patient to report 2/5 on the DASH score for putting on a sweater  -LF     LTG 1 Progress  New  -LF     LTG 2  Patient to place an object on a shelf above her head with mild to no difficulty as reported on the DASH  -LF     LTG 2 Progress  New  -LF     LTG 3  Patient independent with self-management of  lymphedema to include skin care, self-MLD, compression, and exercise  -LF     LTG 3 Progress  New  -        Time Calculation    PT Goal Re-Cert Due Date  03/17/21  -LF       User Key  (r) = Recorded By, (t) = Taken By, (c) = Cosigned By    Initials Name Provider Type    Alanna Fisher, PT Physical Therapist          PT Assessment/Plan     Row Name 12/17/20 1000          PT Assessment    Functional Limitations  Limitation in home management;Limitations in functional capacity and performance;Performance in leisure activities;Performance in self-care ADL  -LF     Impairments  Edema;Impaired flexibility;Impaired lymphatic circulation;Pain;Range of motion  -LF     Assessment Comments  Patient presents with post-mastectomy pain and tightness, as well as recent h/o acute axillary/truncal swelling.  Swelling improved since onset   Further treatment indicated to help reduce symptoms and assist return to  PLOF.  -LF     Please refer to paper survey for additional self-reported information  Yes  -LF     Rehab Potential  Good  -LF     Patient/caregiver participated in establishment of treatment plan and goals  Yes  -LF     Patient would benefit from skilled therapy intervention  Yes  -LF        PT Plan    PT Frequency  1x/week  -LF     Planned CPT's?  PT EVAL LOW COMPLEXITY: 93180;PT RE-EVAL: 10714;PT THER PROC EA 15 MIN: 73462;PT THER ACT EA 15 MIN: 15091;PT MANUAL THERAPY EA 15 MIN: 66039;PT SELF CARE/HOME MGMT/TRAIN EA 15: 21174  -LF     PT Plan Comments  cont. 1x/week for CDT treatment.  Provide HEP ROM/flexibility next visit  -LF       User Key  (r) = Recorded By, (t) = Taken By, (c) = Cosigned By    Initials Name Provider Type    Alanna Fisher PT Physical Therapist             Outcome Measure Options: Disabilities of the Arm, Shoulder, and Hand (DASH)  DASH  Open a tight or new jar.: Mild Difficulty  Write: No Difficulty  Turn a key: No Difficulty  Prepare a meal: Mild Difficulty  Push open a heavy door: Mild Difficulty  Place an object on a shelf above your head: Moderate Difficulty  Do heavy household chores (e.g., wash walls, wash floors): Moderate Difficulty  Garden or do yard work: Moderate Difficulty  Make a bed: Mild Difficulty  Carry a shopping bag or briefcase: Mild Difficulty  Carry a heavy object (over 10 lbs): Mild Difficulty  Change a lightbulb overhead: No Difficulty  Wash or blow dry your hair: Mild Difficulty  Wash your back: Mild Difficulty  Put on a pullover sweater: Severe Difficulty  Use a knife to cut food: No Difficulty  Recreational activities in which require little effort (e.g., cardplaying, knitting, etc.): No Difficulty  Recreational activities in which you take some force or impact through your arm, should or hand (e.g. golf, hammering, tennis, etc.): Severe Difficulty  Recreational Activities in which you move your arm freely (e.g., frisbee, badminton, etc.): Severe  Difficulty  Manage transportation needs (getting from one place to another): No Difficulty  Sexual Activities: Moderate Difficulty  During the past week, to what extent has your arm, shoulder, or hand problem interfered with your normal social activites with family, friends, neighbors or groups?: Slightly  During the past week, were you limited in your work or other regular daily activities as a result of your arm, shoulder or hand problem?: Slightly Limited  Arm, Shoulder, or hand pain: None  Arm, shoulder or hand pain when you performed any specific activity: Mild  Tingling (pins and needles) in your arm, shoulder, or hand: None  Weakness in your arm, shoulder or hand: Mild  Stiffness in your arm, shoulder or hand: Moderate  During the past week, how much difficulty have you had sleeping because of the pain in your arm, shoulder or hand?: Mild Difficulty  I feel less capable, less confident or less useful because of my arm, shoulder or hand problem: Neither Agree nor Disagree  DASH Sum : 64  Number of Questions Answered: 30  DASH Score: 28.33         Time Calculation:   Start Time: 1015   Therapy Charges for Today     Code Description Service Date Service Provider Modifiers Qty    10822381206 HC PT EVAL LOW COMPLEXITY 4 12/17/2020 Alanna Baxter, PT GP 1          PT G-Codes  Outcome Measure Options: Disabilities of the Arm, Shoulder, and Hand (DASH)         Alanna Baxter PT  12/18/2020

## 2020-12-20 LAB
BH CV ECHO MEAS - % IVS THICK: -0.89 %
BH CV ECHO MEAS - % LVPW THICK: -0.87 %
BH CV ECHO MEAS - BSA(HAYCOCK): 1.8 M^2
BH CV ECHO MEAS - BSA: 1.8 M^2
BH CV ECHO MEAS - BZI_BMI: 27.8 KILOGRAMS/M^2
BH CV ECHO MEAS - BZI_METRIC_HEIGHT: 162.6 CM
BH CV ECHO MEAS - BZI_METRIC_WEIGHT: 73.5 KG
BH CV ECHO MEAS - EDV(CUBED): 60.7 ML
BH CV ECHO MEAS - EDV(MOD-SP4): 77 ML
BH CV ECHO MEAS - EDV(TEICH): 67.1 ML
BH CV ECHO MEAS - EF(CUBED): 68.5 %
BH CV ECHO MEAS - EF(MOD-SP4): 55.1 %
BH CV ECHO MEAS - EF(TEICH): 60.7 %
BH CV ECHO MEAS - ESV(CUBED): 19.1 ML
BH CV ECHO MEAS - ESV(MOD-SP4): 34.6 ML
BH CV ECHO MEAS - ESV(TEICH): 26.4 ML
BH CV ECHO MEAS - FS: 31.9 %
BH CV ECHO MEAS - IVS/LVPW: 1
BH CV ECHO MEAS - IVSD: 1.1 CM
BH CV ECHO MEAS - IVSS: 1.1 CM
BH CV ECHO MEAS - LV DIASTOLIC VOL/BSA (35-75): 43.1 ML/M^2
BH CV ECHO MEAS - LV MASS(C)D: 142.6 GRAMS
BH CV ECHO MEAS - LV MASS(C)DI: 79.8 GRAMS/M^2
BH CV ECHO MEAS - LV MASS(C)S: 80.5 GRAMS
BH CV ECHO MEAS - LV MASS(C)SI: 45 GRAMS/M^2
BH CV ECHO MEAS - LV SYSTOLIC VOL/BSA (12-30): 19.3 ML/M^2
BH CV ECHO MEAS - LVIDD: 3.9 CM
BH CV ECHO MEAS - LVIDS: 2.7 CM
BH CV ECHO MEAS - LVLD AP4: 6.6 CM
BH CV ECHO MEAS - LVLS AP4: 6.6 CM
BH CV ECHO MEAS - LVPWD: 1.1 CM
BH CV ECHO MEAS - LVPWS: 1.1 CM
BH CV ECHO MEAS - MV A MAX VEL: 70.7 CM/SEC
BH CV ECHO MEAS - MV E MAX VEL: 89.5 CM/SEC
BH CV ECHO MEAS - MV E/A: 1.3
BH CV ECHO MEAS - SI(CUBED): 23.2 ML/M^2
BH CV ECHO MEAS - SI(MOD-SP4): 23.7 ML/M^2
BH CV ECHO MEAS - SI(TEICH): 22.8 ML/M^2
BH CV ECHO MEAS - SV(CUBED): 41.6 ML
BH CV ECHO MEAS - SV(MOD-SP4): 42.4 ML
BH CV ECHO MEAS - SV(TEICH): 40.7 ML
MAXIMAL PREDICTED HEART RATE: 160 BPM
STRESS TARGET HR: 136 BPM

## 2020-12-27 DIAGNOSIS — Z17.1 MALIGNANT NEOPLASM OF RIGHT BREAST IN FEMALE, ESTROGEN RECEPTOR NEGATIVE, UNSPECIFIED SITE OF BREAST (HCC): Primary | ICD-10-CM

## 2020-12-27 DIAGNOSIS — C50.911 MALIGNANT NEOPLASM OF RIGHT BREAST IN FEMALE, ESTROGEN RECEPTOR NEGATIVE, UNSPECIFIED SITE OF BREAST (HCC): Primary | ICD-10-CM

## 2020-12-27 RX ORDER — SODIUM CHLORIDE 9 MG/ML
250 INJECTION, SOLUTION INTRAVENOUS ONCE
Status: CANCELLED | OUTPATIENT
Start: 2020-12-29

## 2020-12-29 ENCOUNTER — INFUSION (OUTPATIENT)
Dept: ONCOLOGY | Facility: HOSPITAL | Age: 60
End: 2020-12-29

## 2020-12-29 VITALS
DIASTOLIC BLOOD PRESSURE: 81 MMHG | SYSTOLIC BLOOD PRESSURE: 124 MMHG | TEMPERATURE: 96.9 F | RESPIRATION RATE: 18 BRPM | HEART RATE: 73 BPM | BODY MASS INDEX: 28.75 KG/M2 | OXYGEN SATURATION: 97 % | WEIGHT: 167.6 LBS

## 2020-12-29 DIAGNOSIS — C50.911 MALIGNANT NEOPLASM OF RIGHT BREAST IN FEMALE, ESTROGEN RECEPTOR NEGATIVE, UNSPECIFIED SITE OF BREAST (HCC): Primary | ICD-10-CM

## 2020-12-29 DIAGNOSIS — Z95.828 PORT-A-CATH IN PLACE: ICD-10-CM

## 2020-12-29 DIAGNOSIS — Z17.1 MALIGNANT NEOPLASM OF RIGHT BREAST IN FEMALE, ESTROGEN RECEPTOR NEGATIVE, UNSPECIFIED SITE OF BREAST (HCC): Primary | ICD-10-CM

## 2020-12-29 LAB
ALBUMIN SERPL-MCNC: 4.22 G/DL (ref 3.5–5.2)
ALBUMIN/GLOB SERPL: 1.6 G/DL
ALP SERPL-CCNC: 82 U/L (ref 39–117)
ALT SERPL W P-5'-P-CCNC: 28 U/L (ref 1–33)
ANION GAP SERPL CALCULATED.3IONS-SCNC: 7.4 MMOL/L (ref 5–15)
AST SERPL-CCNC: 20 U/L (ref 1–32)
BASOPHILS # BLD AUTO: 0.04 10*3/MM3 (ref 0–0.2)
BASOPHILS NFR BLD AUTO: 0.7 % (ref 0–1.5)
BILIRUB SERPL-MCNC: 0.3 MG/DL (ref 0–1.2)
BUN SERPL-MCNC: 17 MG/DL (ref 8–23)
BUN/CREAT SERPL: 18.9 (ref 7–25)
CALCIUM SPEC-SCNC: 9.8 MG/DL (ref 8.6–10.5)
CHLORIDE SERPL-SCNC: 103 MMOL/L (ref 98–107)
CO2 SERPL-SCNC: 28.6 MMOL/L (ref 22–29)
CREAT SERPL-MCNC: 0.9 MG/DL (ref 0.57–1)
DEPRECATED RDW RBC AUTO: 46.8 FL (ref 37–54)
EOSINOPHIL # BLD AUTO: 0.05 10*3/MM3 (ref 0–0.4)
EOSINOPHIL NFR BLD AUTO: 0.8 % (ref 0.3–6.2)
ERYTHROCYTE [DISTWIDTH] IN BLOOD BY AUTOMATED COUNT: 13.8 % (ref 12.3–15.4)
GFR SERPL CREATININE-BSD FRML MDRD: 64 ML/MIN/1.73
GLOBULIN UR ELPH-MCNC: 2.7 GM/DL
GLUCOSE SERPL-MCNC: 95 MG/DL (ref 65–99)
HCT VFR BLD AUTO: 38.9 % (ref 34–46.6)
HGB BLD-MCNC: 12.1 G/DL (ref 12–15.9)
IMM GRANULOCYTES # BLD AUTO: 0.01 10*3/MM3 (ref 0–0.05)
IMM GRANULOCYTES NFR BLD AUTO: 0.2 % (ref 0–0.5)
LYMPHOCYTES # BLD AUTO: 1.76 10*3/MM3 (ref 0.7–3.1)
LYMPHOCYTES NFR BLD AUTO: 28.8 % (ref 19.6–45.3)
MCH RBC QN AUTO: 28.5 PG (ref 26.6–33)
MCHC RBC AUTO-ENTMCNC: 31.1 G/DL (ref 31.5–35.7)
MCV RBC AUTO: 91.7 FL (ref 79–97)
MONOCYTES # BLD AUTO: 0.37 10*3/MM3 (ref 0.1–0.9)
MONOCYTES NFR BLD AUTO: 6 % (ref 5–12)
NEUTROPHILS NFR BLD AUTO: 3.89 10*3/MM3 (ref 1.7–7)
NEUTROPHILS NFR BLD AUTO: 63.5 % (ref 42.7–76)
NRBC BLD AUTO-RTO: 0 /100 WBC (ref 0–0.2)
PLATELET # BLD AUTO: 198 10*3/MM3 (ref 140–450)
PMV BLD AUTO: 9.5 FL (ref 6–12)
POTASSIUM SERPL-SCNC: 4.1 MMOL/L (ref 3.5–5.2)
PROT SERPL-MCNC: 6.9 G/DL (ref 6–8.5)
RBC # BLD AUTO: 4.24 10*6/MM3 (ref 3.77–5.28)
SODIUM SERPL-SCNC: 139 MMOL/L (ref 136–145)
WBC # BLD AUTO: 6.12 10*3/MM3 (ref 3.4–10.8)

## 2020-12-29 PROCEDURE — 85025 COMPLETE CBC W/AUTO DIFF WBC: CPT

## 2020-12-29 PROCEDURE — 25010000003 HEPARIN LOCK FLUSH PER 10 UNITS: Performed by: INTERNAL MEDICINE

## 2020-12-29 PROCEDURE — 96413 CHEMO IV INFUSION 1 HR: CPT

## 2020-12-29 PROCEDURE — 36415 COLL VENOUS BLD VENIPUNCTURE: CPT

## 2020-12-29 PROCEDURE — 25010000002 PERTUZUMAB 420 MG/14ML SOLUTION 420 MG VIAL: Performed by: INTERNAL MEDICINE

## 2020-12-29 PROCEDURE — 96417 CHEMO IV INFUS EACH ADDL SEQ: CPT

## 2020-12-29 PROCEDURE — 25010000002 TRASTUZUMAB-ANNS 420 MG RECONSTITUTED SOLUTION 1 EACH VIAL: Performed by: INTERNAL MEDICINE

## 2020-12-29 PROCEDURE — 80053 COMPREHEN METABOLIC PANEL: CPT

## 2020-12-29 RX ORDER — HEPARIN SODIUM (PORCINE) LOCK FLUSH IV SOLN 100 UNIT/ML 100 UNIT/ML
500 SOLUTION INTRAVENOUS AS NEEDED
Status: DISCONTINUED | OUTPATIENT
Start: 2020-12-29 | End: 2020-12-29 | Stop reason: HOSPADM

## 2020-12-29 RX ORDER — SODIUM CHLORIDE 0.9 % (FLUSH) 0.9 %
10 SYRINGE (ML) INJECTION AS NEEDED
Status: CANCELLED | OUTPATIENT
Start: 2021-01-18

## 2020-12-29 RX ORDER — SODIUM CHLORIDE 9 MG/ML
250 INJECTION, SOLUTION INTRAVENOUS ONCE
Status: COMPLETED | OUTPATIENT
Start: 2020-12-29 | End: 2020-12-29

## 2020-12-29 RX ORDER — SODIUM CHLORIDE 0.9 % (FLUSH) 0.9 %
20 SYRINGE (ML) INJECTION AS NEEDED
Status: CANCELLED | OUTPATIENT
Start: 2020-12-29

## 2020-12-29 RX ORDER — SODIUM CHLORIDE 0.9 % (FLUSH) 0.9 %
10 SYRINGE (ML) INJECTION AS NEEDED
Status: DISCONTINUED | OUTPATIENT
Start: 2020-12-29 | End: 2020-12-29 | Stop reason: HOSPADM

## 2020-12-29 RX ORDER — HEPARIN SODIUM (PORCINE) LOCK FLUSH IV SOLN 100 UNIT/ML 100 UNIT/ML
300 SOLUTION INTRAVENOUS ONCE
Status: CANCELLED | OUTPATIENT
Start: 2020-12-29

## 2020-12-29 RX ORDER — HEPARIN SODIUM (PORCINE) LOCK FLUSH IV SOLN 100 UNIT/ML 100 UNIT/ML
500 SOLUTION INTRAVENOUS AS NEEDED
Status: CANCELLED | OUTPATIENT
Start: 2021-01-18

## 2020-12-29 RX ADMIN — SODIUM CHLORIDE, PRESERVATIVE FREE 10 ML: 5 INJECTION INTRAVENOUS at 14:55

## 2020-12-29 RX ADMIN — SODIUM CHLORIDE 250 ML: 9 INJECTION, SOLUTION INTRAVENOUS at 13:00

## 2020-12-29 RX ADMIN — PERTUZUMAB 420 MG: 30 INJECTION, SOLUTION, CONCENTRATE INTRAVENOUS at 13:00

## 2020-12-29 RX ADMIN — TRASTUZUMAB-ANNS 460 MG: 420 INJECTION, POWDER, LYOPHILIZED, FOR SOLUTION INTRAVENOUS at 14:01

## 2020-12-29 RX ADMIN — Medication 500 UNITS: at 14:55

## 2021-01-08 ENCOUNTER — HOSPITAL ENCOUNTER (OUTPATIENT)
Dept: PHYSICAL THERAPY | Facility: HOSPITAL | Age: 61
Setting detail: THERAPIES SERIES
Discharge: HOME | End: 2021-01-08

## 2021-01-08 DIAGNOSIS — C50.911 MALIGNANT NEOPLASM OF RIGHT BREAST IN FEMALE, ESTROGEN RECEPTOR NEGATIVE, UNSPECIFIED SITE OF BREAST (HCC): ICD-10-CM

## 2021-01-08 DIAGNOSIS — Z17.1 MALIGNANT NEOPLASM OF RIGHT BREAST IN FEMALE, ESTROGEN RECEPTOR NEGATIVE, UNSPECIFIED SITE OF BREAST (HCC): ICD-10-CM

## 2021-01-08 DIAGNOSIS — I97.2 POST-MASTECTOMY LYMPHEDEMA SYNDROME: Primary | ICD-10-CM

## 2021-01-08 DIAGNOSIS — L90.5 SCAR CONDITION AND FIBROSIS OF SKIN: ICD-10-CM

## 2021-01-08 PROCEDURE — 97140 MANUAL THERAPY 1/> REGIONS: CPT

## 2021-01-08 PROCEDURE — 97110 THERAPEUTIC EXERCISES: CPT

## 2021-01-08 NOTE — THERAPY TREATMENT NOTE
Outpatient Physical Therapy Lymphedema Treatment Note  [facility]     Patient Name: Jackie Johnson  : 1960  MRN: [id number]  Today's Date: 2021        Visit Date: 2021    Visit Dx:    ICD-10-CM ICD-9-CM   1. Post-mastectomy lymphedema syndrome  I97.2 457.0   2. Malignant neoplasm of right breast in female, estrogen receptor negative, unspecified site of breast (CMS/HCC)  C50.911 174.9    Z17.1 V86.1   3. Scar condition and fibrosis of skin  L90.5 709.2       Patient Active Problem List   Diagnosis   • Malignant neoplasm of right breast in female, estrogen receptor negative (CMS/HCC)   • Breast cancer (CMS/HCC)   • Port-A-Cath in place   • Cellulitis of neck   • Asthma   • GERD (gastroesophageal reflux disease)   • High cholesterol   • Seasonal allergies        Lymphedema     Row Name 21 1100             Subjective Comments    Subjective Comments  Occasionally feels puffiness under arm, but not to extent it was initially  -LF         Lymphedema Edema Assessment    Edema Assessment Comment  trace medial mastectomy scar, puckering  -LF         Manual Lymphatic Drainage    Manual Lymphatic Drainage  initial sequence;opened regional lymph nodes;opened anastamoses;extremity treatment  -LF      Initial Sequence  supraclavicular;shoulder collectors;abdomen;diaphragmatic breathing  -LF      Supraclavicular  right;left  -LF      Shoulder Collectors  right;left  -LF      Abdomen  superficial;deep  -LF      Diaphragmatic Breathing  completed  -LF      Opened Regional Lymph Nodes  axillary;inguinal  -LF      Axillary  right  -LF      Inguinal  right  -LF      Opened Anastamoses  axillo-inguinal  -LF      Axillo-Inguinal  right  -LF      Extremity Treatment  MLD to proximal limb only  -LF      MLD to Proximal Limb Only  right  -LF      Manual Lymphatic Drainage Comments  gentle STM and MLD right chest and along mastectomy  -LF         L-Dex Bioimpedence Screening    L-Dex Measurement Extremity   "RUE  -LF      L-Dex Patient Position  Standing  -LF      L-Dex UE Dominate Side  Right  -LF      L-Dex UE At Risk Side  Right  -LF      L-Dex UE Score  -1.2  -LF      L-Dex UE Comment  performed again since not high quality reading last visit.  Educated that she is in normal range  -LF        User Key  (r) = Recorded By, (t) = Taken By, (c) = Cosigned By    Initials Name Provider Type     Alanna Baxter PT Physical Therapist            PT Assessment/Plan     Row Name 01/08/21 1100          PT Assessment    Assessment Comments  Symptoms have improved.   Feels she gets some intermittent swelling, but does not feel she needs to return for treatment.  Minimal soft-tissue restrictions at mastectomy area, but she does have some tightness with overhead flexion.  Updated HEP with flexibility exercises.  No significant edema present today.  L-dex score in normal range.  Educated on signs and symptoms to watch for, and management strategies. Encouraged to call with questions or concerns.  -LF        PT Plan    PT Plan Comments  patient to call for follow-up if needed  -LF       User Key  (r) = Recorded By, (t) = Taken By, (c) = Cosigned By    Initials Name Provider Type     Alanna Baxter PT Physical Therapist             OP Exercises     Row Name 01/08/21 1100             Subjective Comments    Subjective Comments  Occasionally feels puffiness under arm, but not to extent it was initially  -LF         Total Minutes    72460 - PT Therapeutic Exercise Minutes  25  -LF      83365 - PT Manual Therapy Minutes  30  -LF         Exercise 1    Exercise Name 1  Ther ex included:  overhead \"moose\" stretch, sidelying windmill stretch, standing hands behind back pectoral stretch, seated HBH sidebending and rotation, supine HBH stretch, HBH and arms overhead with LTR  -LF      Reps 1  10 each  -LF        User Key  (r) = Recorded By, (t) = Taken By, (c) = Cosigned By    Initials Name Provider Type    Alanna Fisher PT " Physical Therapist             Manual Rx (last 36 hours)      Manual Treatments     Row Name 01/08/21 1100             Total Minutes    73351 - PT Manual Therapy Minutes  30  -LF        User Key  (r) = Recorded By, (t) = Taken By, (c) = Cosigned By    Initials Name Provider Type    LF Alanna Baxter, PT Physical Therapist          Therapy Education  Education Details: Updated HEP with stretches completed today.  Provided lymphedema resources including NLN and LE&RN. Encouraged to continue with self-MLD.  If symptom increase or persistant, discussed how some type of compression may be added to help manage  Given: HEP, Symptoms/condition management, Edema management  Program: Reinforced, Progressed  How Provided: Verbal, Demonstration, Written  Provided to: Patient  Level of Understanding: Teach back education performed, Verbalized, Demonstrated     Time Calculation:   Start Time: 1100   Therapy Charges for Today     Code Description Service Date Service Provider Modifiers Qty    [id number]  PT THER PROC EA 15 MIN 1/8/2021 Alanna Baxter, PT GP 2    [id number]  PT MANUAL THERAPY EA 15 MIN 1/8/2021 Alanna Baxter, PT GP 2                    Alanna Baxter PT  1/8/2021

## 2021-01-15 ENCOUNTER — OFFICE VISIT (OUTPATIENT)
Dept: FAMILY MEDICINE CLINIC | Facility: CLINIC | Age: 61
End: 2021-01-15

## 2021-01-15 VITALS
SYSTOLIC BLOOD PRESSURE: 120 MMHG | TEMPERATURE: 98 F | HEART RATE: 80 BPM | DIASTOLIC BLOOD PRESSURE: 74 MMHG | HEIGHT: 64 IN | WEIGHT: 168 LBS | BODY MASS INDEX: 28.68 KG/M2 | OXYGEN SATURATION: 96 %

## 2021-01-15 DIAGNOSIS — R73.03 PREDIABETES: ICD-10-CM

## 2021-01-15 DIAGNOSIS — K21.9 GASTROESOPHAGEAL REFLUX DISEASE WITHOUT ESOPHAGITIS: ICD-10-CM

## 2021-01-15 DIAGNOSIS — E78.2 MIXED HYPERLIPIDEMIA: Primary | ICD-10-CM

## 2021-01-15 PROCEDURE — 99214 OFFICE O/P EST MOD 30 MIN: CPT | Performed by: FAMILY MEDICINE

## 2021-01-15 NOTE — PROGRESS NOTES
NAME: Jackie Johnson    : 1960    DATE:  2021    DIAGNOSIS:   1.  Clinical Stage IIB ([id number]) poorly differentiated invasive ductal carcinoma of the right breast.  Tumor spanned 15-16 cm, though it was unclear how much of this was invasive malignancy v. DCIS.  There were no clinically or radiographically supspicious axillary LNs.  There was associated high grade DCIS.  Tumor was ER-,AK-, HER-2 +  .  Following neoadjuvant TCHP, she had complete pathologic response - ypT0N0(sn).  No invasive malignancy detected.  0/6 LN involved.     2.  Atypical Lobular Hyperplasia and LCIS involving the L breast    CHIEF COMPLAINT:  Follow up of Breast Cancer      TREATMENT HISTORY:  1.      2.  R Mastectectomy and SLNBx with L Lumpectomy and Mastopexy  - Dr. Kadeem Serrano and Dr. Roc Huston ([facility]) 20    3.         HISTORY OF PRESENT ILLNESS:   Jackie Johnson is a very pleasant 60 y.o. female who was referred by Dr. STACIE Richards for evaluation and treatment of breast cancer. She has a strong family h/o breast cancer and so is normally very good about getting her mammograms done.  She recently moved from [facility] and so missed her mammogram in 2019 because of the move.  She got established with a new PCP (Dr. Denver Sheth) in 2019 and was referred to a a new gynecologist  (Dr. Brayan Richards) who she saw in November/December.  Kevin Richards ordered a screening mammogram.  She had an acute febrile illness around the time of the Super Bowl and at that time thought she might have a lump in her R breast.  While waiting on mammogram scheduling, she started to have pain in the breast and in eventually she would occasionally get a little bit of discharge from the right nipple.  She presented for  Screening mammography 19 as below.  This was followed by diagnostic mammogram and U/S on 20.  She had an abnormal span of 15 cm spanning the R breast.  She underwent  stereotactic biopsy on 1-21-20.    This revealed a poorly differentiated invasive ductal carcinoma of the R breast with associated high grade DCIS.  Tumor was ER/MT- and HER-2+ by IHC.  Her sister lives in [place] and was treated at [facility], so she went there for further evaluation. She saw Dr. Romero of medical oncology and Dr. Serrano of surgical oncology.  She had staging with CT CAP and bone scan as well as Brain MRI and had no evidence of distant metastatic spread.  She had reese breast MRI as well which showed an unexpected abnormality in the L breast described as linear non-mass enhancement spanning an 8 cm area in the inferior breast at 6:00.  This was biopsied on 2-28-20 with biopsy result pending.  No abnormal axillary LNs were identified.  Neoadjuvant chemotherapy was recommended to her by her [facility] team of physicians but she wishes to seek this treatment closer to home so is here today for further evaluation and treatment.    She is in good health overall. She reports an isolated episode of fever/chills during an acute illness, abdominal fullness to her right side, fatigue and a rash circling her neck. She denies changes in weight, cp, sob, persistent abdominal pain, n/v/c/d or bony pain.      INTERVAL HISTORY:  Ms. Johnson is here today for follow up of breast cancer.  She says she continues to struggle with a sensation like she is vibrating / twitching all over.  She notes that her daughter has PolG mutation as well as HSP (Hereditary Spastic Paraplegia with SPG7 mutation) and wonders if this might be related.  She hasn't seen Neurology since her initial evaluation at [facility].  She is otherwise doing relatively well.  She is tolerating HP and Femara well. She plans to return to [facility] in March and said she would prefer to wait on breast imaging until that time.      PAST MEDICAL HISTORY:  Past Medical History:   Diagnosis Date   • Acid reflux    • Asthma    • Breast cancer (CMS/[facility]) 01/2020     rt br ca   • Cancer (CMS/HCC)    • Eczema    • Endometriosis    • GERD (gastroesophageal reflux disease)    • Hiatal hernia    • High cholesterol    • Seasonal allergies    • Sinusitis    • Staph infection 2020    port       PAST SURGICAL HISTORY:  Past Surgical History:   Procedure Laterality Date   • ABDOMINAL SURGERY     • BREAST BIOPSY Bilateral    •  SECTION     • D&C HYSTEROSCOPY ENDOMETRIAL ABLATION     • DIAGNOSTIC LAPAROSCOPY     • FRACTURE SURGERY Left     tib/fib   • FULGURATION ENDOMETRIOSIS     • LEG SURGERY      Left leg following fracture   • LYMPH NODE BIOPSY     • PORTACATH PLACEMENT N/A 3/18/2020    Procedure: INSERTION OF PORTACATH;  Surgeon: Dudley Aldridge MD;  Location: [facility];  Service: General;  Laterality: N/A;   • SKIN BIOPSY     • TONSILLECTOMY     • VENOUS ACCESS DEVICE (PORT) INSERTION N/A 2020    Procedure: INSERTION VENOUS ACCESS DEVICE;  Surgeon: Nerissa Wang MD;  Location: [facility] OR;  Service: General;  Laterality: N/A;   • VENOUS ACCESS DEVICE (PORT) REMOVAL Right 2020    Procedure: REMOVAL VENOUS ACCESS DEVICE;  Surgeon: Nerissa Wang MD;  Location: [facility] OR;  Service: General;  Laterality: Right;       FAMILY HISTORY:  Family History   Problem Relation Age of Onset   • Breast cancer Mother 75   • Basal cell carcinoma Mother 90        2 small spots on face removed + fair complexion   • Heart disease Mother    • Hypertension Mother    • Diabetes type II Mother    • Breast cancer Sister 55        VUS detected on genetic testing CDKN2A gene (c.-33G>C).    • Lung disease Sister    • Heart disease Father    • Throat cancer Father 79   • Lung cancer Father 79   • Dementia Father    • Parkinsonism Father    • Cancer Maternal Grandmother 65        unknown gynecologic cancer, cervical vs uterine   • Heart disease Maternal Grandmother    • Diabetes type II Maternal Grandmother    • Lymphoma Maternal Grandfather 64   • Breast cancer Paternal  Grandmother 70   • Prostate cancer Paternal Grandfather 70        metastastic to liver   • Breast cancer Paternal Aunt 77   • Breast cancer Maternal Aunt 80   • Breast cancer Other    • Ovarian cancer Sister 66        Elevated ; 1A tumor; Borderline cancer; both ovaries & lg cyst removed   • Ovarian cancer Maternal Cousin 54   • Breast cancer Other 50   • Diabetes type I Nephew          SOCIAL HISTORY:  Social History     Socioeconomic History   • Marital status:      Spouse name: Not on file   • Number of children: Not on file   • Years of education: Not on file   • Highest education level: Not on file   Tobacco Use   • Smoking status: Never Smoker   • Smokeless tobacco: Never Used   Substance and Sexual Activity   • Alcohol use: Never     Frequency: Never   • Drug use: Never   • Sexual activity: Defer     Partners: Male     Birth control/protection: None   Social History Narrative    lives alone with      with [facility] [employer]    No unusual chemical exposures    Never smoker, never drinker       REVIEW OF SYSTEMS:   A comprehensive 14 point review of systems was performed.  Significant findings as mentioned above.  All other systems reviewed and are negative.      MEDICATIONS:  The current medication list was reviewed in the EMR    Current Outpatient Medications:   •  calcium-vitamin D (Oscal 500/200 D-3) 500-200 MG-UNIT per tablet, Take 1 tablet by mouth 2 (Two) Times a Day., Disp: 60 tablet, Rfl: 11  •  famotidine (PEPCID) 10 MG tablet, Take 10 mg by mouth As Needed for Indigestion or Heartburn., Disp: , Rfl:   •  fexofenadine (ALLEGRA) 180 MG tablet, Take 180 mg by mouth., Disp: , Rfl:   •  letrozole (FEMARA) 2.5 MG tablet, Take 1 tablet by mouth Daily., Disp: 30 tablet, Rfl: 11  •  levalbuterol (XOPENEX HFA) 45 MCG/ACT inhaler, Inhale 1-2 puffs Every 4 (Four) Hours As Needed for Wheezing., Disp: , Rfl:   •  loperamide (IMODIUM A-D) 2 MG tablet, , Disp: , Rfl:   •  loratadine  (CLARITIN) 10 MG tablet, Take 10 mg by mouth 2 (Two) Times a Day., Disp: , Rfl:   •  prochlorperazine (COMPAZINE) 10 MG tablet, Take 1 tablet by mouth Every 6 (Six) Hours As Needed for Nausea or Vomiting., Disp: 60 tablet, Rfl: 3  •  sennosides-docusate (Senna-S) 8.6-50 MG per tablet, Take 2 tablets by mouth 2 (Two) Times a Day., Disp: 120 tablet, Rfl: 5  •  vitamin D (ERGOCALCIFEROL) 1.25 MG (86656 UT) capsule capsule, Take 1 capsule by mouth 1 (One) Time Per Week., Disp: 8 capsule, Rfl: 0    ALLERGIES:    Allergies   Allergen Reactions   • Albuterol Other (See Comments)     One time severe headache, questionable for aneurysm, did spinal tap was positive, did Angiogram and MRI were Negative for aneurysm   • Penicillins Hives   • Cefpodoxime Rash     vantin     • Clindamycin Hcl Rash   • Crestor [Rosuvastatin Calcium] Other (See Comments)     Caused high liver enxymes   • Sulfa Antibiotics Other (See Comments)     Mucosal lesions   • Latex Rash   • Pseudoephedrine Other (See Comments)     Soreness on the tongue       PHYSICAL EXAM:  Vitals:    01/18/21 1017   BP: 118/74   Pulse: 74   Resp: 18   Temp: 96.9 °F (36.1 °C)   SpO2: 98%     Pain Score    01/18/21 1017   PainSc: 0-No pain   ECOG score: 0     General:  Awake, alert and oriented, appears well.    HEENT:  Pupils are equal, round and reactive to light and accommodation, Extra-ocular movements full, Oropharyx clear, mucous membranes moist  Neck:  No JVD, thyromegaly or lymphadenopathy   CV:  Regular rate and rhythm, no murmurs, rubs or gallops  Resp:  Lungs are clear to auscultation bilaterally without wheezes  Breast:  S/p R mastectomy.  Surgical incisions well healed.  S/p L lumpectomy/mastopexy.  Surgical incisions are well-healed.  No palpable masses or axillary adenopathy on either side.   Abd:  Soft, non-tender, sl distended, bowel sounds present, no organomegaly or masses  Ext:  No clubbing, cyanosis or edema.   Lymph:  No cervical, supraclavicular,  axillary, inguinal or femoral adenopathy  Neuro:  MS as above, CN II-XII intact, grossly non-focal exam      PATHOLOGY:  02-12-20 08-18-20  A. SENTINEL LYMPH NODE #1 RIGHT AXILLA, BIOPSY:  - 1 lymph node, no tumor present (0/1)  - Cytokeratin stain shows no evidence of metastatic carcinoma    B. SENTINEL LYMPH NODE #2, RIGHT AXILLA, BIOPSY:  - one lymph node, no tumor present (0/1)  - Cytokeratin stain shows no evidence of metastatic carcinoma    C. RIGHT BREAST, TOTAL MASTECTOMY:  - No residual carcinoma identified  - area of fibrosis with associated biopsy clip, consistent with treated tumor bed.   - Proliferative fibrocystic change.   - Skin and nipple, no tumor present.   - four low axillary lymph nodes, no tumor present (0/4).     D. LEFT BREAST, SEGMENTAL MASTECTOMY:  - FOCI OF LOBULAR NEOPLASIA (ATYPICAL LOBULAR HYPERPLASIA AND LOBULAR CARCINOMA IN SITU). LOW GRADE, CLASSIC TYPE, WITH FOCAL PAGETOID SPREAD INTO DUCTS.   - Two prior biopsy site changes present.   - Proliferative fibrocystic change.     E. RIGHT BREAST SKIN, EXCESS, EXCISION:  - Skin and breast tissue, no tumor present.     F. LEFT BREAST, MASTOPEXY:  - Skin and breast tissue, no tumor present    Tumor Template  Specimen Identification   Procedure: Total mastectomy   Specimen Laterality: Right  Invasive Carcinoma   Tumor Focality: NA   Tumor Size: 0   Histologic Type of Invasive Carcinoma: NA   Histologic Grade Based on Tommy Histologic Score    Glandular (Acinar)/Tubular Differentiation: Not applicable    Nuclear Pleomorphism: not applicable    Mitotic Rate: not applicable   Overall Grade: not applicable   Lymphovascular invasion: not present  In Situ Carcinoma   Ductal Carcinoma in Situ (DCIS): Not present   Lobular Carcinoma in situ (LCIS): Not present  Tumor Extension   Skin: uninvolved    Nipple: uninvolved   Skeletal muscle: not applicable  Margins   Invasive Carcinoma Margins: NA    DCIS Margins: NA  Regional Lymph  nodes   Uninvolved by tumor cells    Total number of lymph nodes examined: 6    Number of sentinel lymph nodes examined: 2  Treatment effect   Treatment effect in the breast: present   Treatment effect in the lymph nodes: not present  RCB input variables   Dimensions of Residual Cancer that is in-situ: 0%   Total Number of Lymph nodes with Residual Metastatic Carcinoma: 0   Size of Largest Metastasis: NA   RCB Index Computed Value (optional): 0   RCB Class (optional): 0  Pathologic Stage Classification 9ypTNM, AJCC 8th Edition)    Primary Tumor (invasive carcinoma)(ypT): ypT0   Regional lymph nodes (ypN): yPN0(sn)   Distant Metastasis (ypM): NA  Ancillary Studies: Please see previous case [id number]      ENDOSCOPY:        IMAGING:  Bilateral Diagnostic mammogram 12-27-19  FINDINGS:  There are scattered areas of fibroglandular density.     The bilateral fibroglandular pattern is unremarkable in appearance. A  few scattered calcifications are present in the left breast. There are  calcifications in the central aspect of the right breast spanning  approximately 15 cm of tissue in the AP dimension and extending up to  the base of the nipple for which additional imaging is recommended.     IMPRESSION:  1. Benign left mammographic findings.  2. Calcifications in the right breast. Recommend additional imaging.        BI-RADS CATEGORY:  0, INCOMPLETE: Need additional imaging evaluation.     RECOMMENDATION:  Right ML and CC magnification views.      R diagnostic mammogram 01-16-20  FINDINGS:  Magnification imaging of the right breast demonstrates  casting-type pleomorphic calcifications spanning over 15 cm of tissue in  the AP dimension in the right 6:00 to 7:00 region. Calcifications do  extend up to the base of the nipple. Findings are highly suspicious for  DCIS.     Right breast ultrasound: Focused sonographic evaluation of the right  6:00 to 7:00 region demonstrates a single 0.5 cm irregular hypoechoic  mass associated  with a coarse calcification located at 7:00, 3 cm from  the nipple. Ultrasound of the right axilla demonstrates no abnormal  appearing axillary lymph nodes.        IMPRESSION:  Findings are highly suspicious for extensive DCIS in the  right 6:00 to 7:00 region extending into the base of the nipple. There  is also small irregular mass noted on ultrasound in the 7:00 region  suspicious for invasion.        BI-RADS CATEGORY:  5, HIGHLY SUGGESTIVE OF MALIGNANCY        RECOMMENDATION:  Recommend stereotactic guided core biopsy of the  anterior and posterior aspect of the calcifications in the 6:00 to 7:00  region to determine extent of disease. Ultrasound-guided core biopsy of  the right 7:00 mass may also be warranted pending pathology results of  the calcifications.    Diagnostic bilateral mammogram 02-25-20  FINDINGS:  There are scattered areas of fibroglandular density.    1:  There are fine-linear branching calcifications measuring 16 x 7 x 7  centimeters with segmental distribution and associated post biopsy clip in the  right breast lower hemisphere at 6 to 7 o'clock.  There are 2 post biopsy clips  noted.  The calcifications extend to the inferior skin and nipple.    2:  There are amorphous calcifications measuring 0.3 centimeters in the left  breast lower hemisphere at 6 to 7 o'clock located 4 centimeters from the nipple.    IMPRESSION:  1:  Fine-linear branching calcifications in the right breast lower hemisphere at  6 to 7 o'clock are known biopsy-proven malignancy. Ultrasound is recommended for  staging. Recommend appropriate evaluation and treatment of this known malignancy.    2:  Amorphous calcifications in the left breast lower hemisphere at 6 to 7  o'clock located 4 centimeters from the nipple are suspicious. Stereotactic  biopsy is recommended.    BI-RADS Category 4:  Suspicious Abnormality      US Chest 02-25-20  Findings:       Right Breast: The extent of calcified disease is best visualized by  mammography. There are dilated ducts with internal calcifications vascularity extending toward the nipple at the 7 o'clock position. This is consistent with the patient's known biopsy-proven malignancy.      Right Charles Basins: No suspicious axillary (level I, II & III) or internal mammary lymphadenopathy is noted.      IMPRESSION:    1. Known RIGHT breast malignancy is best visualized by mammography. A MRI may be obtained for evaluation of disease.    2. No suspicious RIGHT-sided lymphadenopathy      ACR BI-RADS Category: 6. Known malignancy.  Recommend appropriate evaluation and treatment of the known malignancy.         R breast US 02-25-20  Findings:       Right Breast: The extent of calcified disease is best visualized by mammography. There are dilated ducts with internal calcifications vascularity extending toward the nipple at the 7 o'clock position. This is consistent with the patient's known biopsy-proven malignancy.      Right Charles Basins: No suspicious axillary (level I, II & III) or internal mammary lymphadenopathy is noted.      IMPRESSION:    1. Known RIGHT breast malignancy is best visualized by mammography. A MRI may be obtained for evaluation of disease.    2. No suspicious RIGHT-sided lymphadenopathy      ACR BI-RADS Category: 6. Known malignancy.  Recommend appropriate evaluation and treatment of the known malignancy.       MRI Brain w/wo contrast 02-27-20  FINDINGS:     No abnormal parenchymal or leptomeningeal enhancement is noted. The brain parenchyma is unremarkable except of small scattered T2 FLAIR hyperintensities consistent with chronic microangiopathic changes. No acute ischemia, intra or extra-axial hemorrhage, mass effect or midline shift. No hydrocephalus is noted.    The osseous structures and extra-cranial soft tissues are unremarkable.    The orbital structures unremarkable.    The paranasal sinuses and mastoid air cells are clear.    IMPRESSION:  No evidence of metastatic  disease.      CTCAP 02-28-20    Findings:       CHEST:  The breasts are ptotic and there are biopsy clips projecting between the lower quadrants of the right breast.    On image 99 of series 3, there is a nonspecific 1 cm soft tissue nodule in the lower outer quadrant of the right breast.    No lung nodules or pleural effusions are present.    No axillary, internal mammary, mediastinal or hilar adenopathy is seen.    No suspicious bone lesions are present.      ABDOMEN and PELVIS:  The liver is borderline enlarged and diffusely fatty infiltrated without measurable mass or biliary ductal dilatation.    The gallbladder appears normal.    The spleen, pancreas and adrenal glands appear normal.    The kidneys demonstrate function without hydronephrosis and there is a small low dense nidus in the mid right kidney, likely a cyst.    No adenopathy or ascites are seen in the abdomen or pelvis.    A normal-appearing retrocecal appendix is seen.    Multilevel degenerative-like bone changes are present.      IMPRESSION:  No evidence for metastatic disease the chest, abdomen or pelvis.      MRI Breast bilateral 03-02-20    Findings:  The breast composition is heterogeneous fibroglandular tissue. There is no significant early background parenchymal enhancement.    Right breast: There is linearly oriented nonmass enhancement involving the lateral and inferior aspects (anterior, middle, and posterior third) of the right breast 6 - 7 o'clock position extending 17 cm from the base of the nipple to the pectoralis muscle (series 5 image  of 256). There is no evidence for pectoralis muscle invasion. Extension to the inferior skin is better demonstrated mammographically. Susceptibility artifact from 2 postbiopsy clips is noted.    Left breast: There is linearly oriented nonmass enhancement in the inferior aspect (middle third) of the left breast 6 o'clock position extending approximately 8 cm (series 5 image  of 256). There  is no evidence for nipple, skin, or pectoralis muscle involvement. Stereotactic guided biopsy of calcifications at the 6 o'clock position to be performed later today and corresponds to the anterior aspect of the nonmass enhancement. MRI guided biopsy of nonmass enhancement seen on image 94 of 256, series 5 and image 40 of 160, series 8 is recommended (The images have been annotated with a [place]).    Charles Basins: There is no lymphadenopathy in the visualized axillary or internal mammary regions.    IMPRESSION:  Linear nonmass enhancement representing known malignancy right breast as above. Linear nonmass enhancement left breast as above.   MRI guided biopsy of nonmass enhancement seen on image 94 of 256, series 5 and image 40 of 160, series 8 is recommended.    ACR BI-RADS Category: 6. Recommend MR-guided biopsy of nonmass enhancement left breast as above .Recommend appropriate evaluation and treatment of the known malignancy.       Bone scan 03-02-20  FINDINGS:     Radiotracer uptake secondary to degenerative changes are seen involving the shoulders, hips, knees, and feet. Increased uptake along the right tibial shaft likely is likely reactive. Faint focus of activity within the left tibial shaft is compatible with prior injury/fracture. Increased uptake in the skull is compatible with a benign hyperostosis.    Physiologic uptake of the bilateral kidneys and bladder.    IMPRESSION:    No scintigraphic evidence of skeletal metastases.      Echo 03-02-20      Bilateral diagnostic mammogram 05-26-20  FINDINGS: There are scattered areas of fibroglandular density.     A postbiopsy marking clip is now noted in the left breast with expected  surrounding postbiopsy changes best visualized on MLO imaging. The  remaining bilateral fibroglandular pattern is stable in appearance.  Redemonstrated are fine linear branching calcifications in the right  6:00 to 7:00 region spanning approximately 17 cm of tissue in the  AP  dimension. There are 2 associated postbiopsy marking clips. No new areas  of calcification are seen in either breast.     IMPRESSION:  1. Benign left mammographic findings.        2. No significant change in the appearance of calcifications in the  right breast.        BI-RADS CATEGORY:  6, KNOWN BIOPSY PROVEN MALIGNANCY        RECOMMENDATION:  Recommend clinical follow-up.       Echo 07-22-20  · Normal left ventricular cavity size and wall thickness noted. All left ventricular wall segments contract normally.  · Estimated EF appears to be in the range of 61 - 65%  · The pericardium is normal. There is no evidence of pericardial effusion.      DEXA 09-25-20  FINDINGS: The BMD measured at the AP spine L1-L4 is 1.003 gm/cm2 with a  T-score of -1.5.      IMPRESSION:  The patient is considered to be osteopenic according to the  World Health Organization criteria. Fracture risk is moderate      MRI L spine wo contrast 09-28-20  FINDINGS:  Sagittal alignment is normal. Bone marrow signal intensity is normal. There is disc desiccation L1-2 through L3-4 with mild multiple level loss of intervertebral disc height. Endplate spondylosis and Schmorl's node formation most apparent at L1-2 and  L2-3. Conus medullaris terminates at L1-2 and is normal.     At L1-2, there is a mild concentric disc bulge and endplate spondylosis. There is no canal stenosis or foraminal impingement.     At L2-3, there is concentric disc bulging and mild facet hypertrophy but no canal or foraminal impingement.     At L3-4, is mild facet degenerative change left greater the right with ligamentum flavum thickening and a broad posterior disc bulge. There is no canal stenosis. There is mild inferior foraminal narrowing bilaterally.     L4-5, there is moderate facet degenerative change bilaterally with ligamentum flavum thickening. Is mild bilateral foraminal narrowing. There is no canal stenosis.     At L5-S1, there is a broad posterior disc bulge.  There is a tiny posterior annular fissure. There is no canal stenosis. There is a small focal left posterolateral protrusion into the left inferior foramen with mild to moderate left inferior foraminal  narrowing. There is mild bilateral facet degenerative change.     IMPRESSION:     1. There is no evidence for osseous metastatic disease.  2. There are lumbar degenerative changes without evidence for lumbar canal stenosis. Details above.      Echo 09-29-20  · The study is technically difficult for diagnosis.  · Normal left ventricular cavity size and wall thickness noted. All left ventricular wall segments contract normally.  · Left ventricular ejection fraction appears to be 61 - 65%.  · The aortic valve is structurally normal with no regurgitation or stenosis present.  · The mitral valve is structurally normal with no regurgitation or significant stenosis present.  · There is no evidence of pericardial effusion.       Echo 12-20-20  · Normal left ventricular cavity size and wall thickness noted. All left ventricular wall segments contract normally.  · Left ventricular ejection fraction appears to be 61 - 65%.  · The pericardium is normal. There is no evidence of pericardial effusion. .  · Comments: LV systolic function is about the same as on the previous echo study.      RECENT LABS:  Lab Results   Component Value Date    WBC 4.45 01/18/2021    HGB 12.7 01/18/2021    HCT 40.2 01/18/2021    MCV 92.4 01/18/2021    RDW 13.8 01/18/2021     01/18/2021    NEUTRORELPCT 60.2 01/18/2021    LYMPHORELPCT 34.2 01/18/2021    MONORELPCT 3.6 (L) 01/18/2021    EOSRELPCT 0.9 01/18/2021    BASORELPCT 0.9 01/18/2021    NEUTROABS 2.68 01/18/2021    LYMPHSABS 1.52 01/18/2021       Lab Results   Component Value Date     01/18/2021    K 4.2 01/18/2021    CO2 27.2 01/18/2021     01/18/2021    BUN 15 01/18/2021    CREATININE 0.95 01/18/2021    EGFRIFNONA 60 (L) 01/18/2021    EGFRIFAFRI 85 08/17/2020    GLUCOSE 104 (H)  01/18/2021    CALCIUM 9.8 01/18/2021    ALKPHOS 84 01/18/2021    AST 15 01/18/2021    ALT 21 01/18/2021    BILITOT 0.3 01/18/2021    ALBUMIN 4.17 01/18/2021    PROTEINTOT 7.0 01/18/2021    MG 1.9 01/18/2021     Lab Results   Component Value Date    TSH 2.080 01/18/2021       Lab Results   Component Value Date     15.1 03/11/2020     Lab Results   Component Value Date    LABCA2 19.0 03/11/2020       ASSESSMENT & PLAN:  Jackie Johnson is a very pleasant 60 y.o. female with what is clinically a Stage IIB ([id number]) poorly differentiated invasive ductal carcinoma of the right breast.  Tumor spanned 15-16 cm although it was difficult to tell how much of this was invasive malignancy v. DCIS.  There were no clinically or radiographically supspicious axillary LNs.  There was associated high grade DCIS.  Tumor was ER-,IN-, HER-2 +.  She had pathologic complete response with neoadjuvant therapy.  She also has LCIS/ALH involving the left breast.    1.  Right breast cancer:  -  I administered TCHP neoadjuvantly downsize tumor and potentially allow for a more successful surgery. After 6 cycles of TCHP, she had a complete pathologic response.  -  We discussed consideration of radiation given the initial size of her tumor.  It really isn't known how much of her initial disease was invasive malignancy v. DCIS.  She says she was evaluated by a Radiation Oncologist at [facility] who didn't recommend radiation.  -  Given HER-2 positive disease, I have recommended we complete a year of HP.  Echo showed no change and she has restarted without difficulty.  .    2.  L Breast LCIS / ALH:  -  She is s/p lumpectomy.  -  Discussed role for 5 years of hormonal therapy in prevention of future breast cancer development in this breast.  She is post-menopausal, so could use Tamoxifen or an aromatase inhibitor for this purpose.  She has an entact uterus so Tamoxifen would come with increased risk (albeit low risk) of development of  "endometrial hypertrophy/ endometrial cancer.  Aromatase inhibitors would not come with this risk, but would come with risk for loss of bone mineral density and potentially increased risk of side effects.    -  DEXA showed osteopenia with T score -1.5, so I recommended Tamoxifen for prevention.  Ms. Johnson was not comfortable with risk of endometrial hyperplasia / cancer so she said she would prefer aromatase inhibitor.  In particular she would like to take Femara so I ordered this with plan for 5 years of treatment.      3.  Bone health:  -  Ms. Johnson has osteopenia with T score -1.5.  She also has a h/o Vit D Deficiency for which she took a course of weekly high dose Vit D (now completed).   Will repeat Vit D Level. Will start Ca/Vit D BID.  Recommended also weight bearing exercise.  -  Will plan for repeat DEXA after 2 years.    4.  Unusual sense of \"vibration\" and famiily h/o HSP (with SPG7 mutation):  Had some evaluation at [facility].  Will refer to Neuromuscular Neurology specialist at [facility].    5.  Very strong family history of malignancy including several members with breast cancer and a sister with ovarian cancer.  -  She underwent genetic testing at [facility] and testing was negative.      5.  Prophylaxis:  She has had 2020 flu vaccine.  Recommended she get Prevnar 13 which she also had.  She is a lifelong non-smoker.    6.  ACO / MARNI/Other  Quality measures  -  Jackie Johnson received 2020 flu vaccine.     -  Jackie Johnson reports a pain score of 0.  Given her pain assessment as noted, treatment options were discussed and the following options were decided upon as a follow-up plan to address the patient's pain: No intervention needed..  -  Current outpatient and discharge medications have been reconciled for the patient.  Reviewed by: Fatoumata Obrien MD       7.  Follow-up:   - [facility] follow up in March with echocardiogram as well as CBCD, CMP, Vit D Level prior.  -  Referral to [facility] Neurology " (Neuromuscular specialist)    This note was scribed for Fatoumata Obrien MD by Alba Siu RN.    I, Fatoumata Obrien MD, personally performed the services described in this documentation as scribed by the above named individual in my presence, and it is both accurate and complete.  01/18/2021          I spent 25 minutes with Jackie Johnson today.  In the office today, more than 50% of this time was spent face-to-face with her  in counseling / coordination of care, reviewing her medical history and counseling on the current treatment plan.  All questions were answered to her satisfaction      Electronically Signed by: Fatoumata Obrien MD      CC:   MD JUAN PABLO Macdonald MD Bora Lim, MD- [facility] Medical Oncology  Kadeem Serrano MD- [facility] Surgical Oncology  Dr. Roc Huston - [facility] Plastic Surgery

## 2021-01-17 DIAGNOSIS — C50.911 MALIGNANT NEOPLASM OF RIGHT BREAST IN FEMALE, ESTROGEN RECEPTOR NEGATIVE, UNSPECIFIED SITE OF BREAST (HCC): Primary | ICD-10-CM

## 2021-01-17 DIAGNOSIS — Z17.1 MALIGNANT NEOPLASM OF RIGHT BREAST IN FEMALE, ESTROGEN RECEPTOR NEGATIVE, UNSPECIFIED SITE OF BREAST (HCC): Primary | ICD-10-CM

## 2021-01-17 RX ORDER — SODIUM CHLORIDE 9 MG/ML
250 INJECTION, SOLUTION INTRAVENOUS ONCE
Status: CANCELLED | OUTPATIENT
Start: 2021-01-18

## 2021-01-18 ENCOUNTER — OFFICE VISIT (OUTPATIENT)
Dept: ONCOLOGY | Facility: CLINIC | Age: 61
End: 2021-01-18

## 2021-01-18 ENCOUNTER — LAB (OUTPATIENT)
Dept: ONCOLOGY | Facility: CLINIC | Age: 61
End: 2021-01-18

## 2021-01-18 ENCOUNTER — INFUSION (OUTPATIENT)
Dept: ONCOLOGY | Facility: HOSPITAL | Age: 61
End: 2021-01-18

## 2021-01-18 VITALS
OXYGEN SATURATION: 98 % | RESPIRATION RATE: 18 BRPM | WEIGHT: 163.8 LBS | SYSTOLIC BLOOD PRESSURE: 118 MMHG | TEMPERATURE: 96.9 F | DIASTOLIC BLOOD PRESSURE: 74 MMHG | BODY MASS INDEX: 28.12 KG/M2 | HEART RATE: 74 BPM

## 2021-01-18 VITALS
SYSTOLIC BLOOD PRESSURE: 118 MMHG | DIASTOLIC BLOOD PRESSURE: 74 MMHG | OXYGEN SATURATION: 98 % | RESPIRATION RATE: 18 BRPM | BODY MASS INDEX: 28.12 KG/M2 | HEART RATE: 74 BPM | TEMPERATURE: 96.9 F | WEIGHT: 163.8 LBS

## 2021-01-18 DIAGNOSIS — Z79.899 ENCOUNTER FOR MONITORING CARDIOTOXIC DRUG THERAPY: ICD-10-CM

## 2021-01-18 DIAGNOSIS — C50.911 MALIGNANT NEOPLASM OF RIGHT BREAST IN FEMALE, ESTROGEN RECEPTOR NEGATIVE, UNSPECIFIED SITE OF BREAST (HCC): Primary | ICD-10-CM

## 2021-01-18 DIAGNOSIS — Z51.81 ENCOUNTER FOR MONITORING CARDIOTOXIC DRUG THERAPY: ICD-10-CM

## 2021-01-18 DIAGNOSIS — Z17.1 MALIGNANT NEOPLASM OF RIGHT BREAST IN FEMALE, ESTROGEN RECEPTOR NEGATIVE, UNSPECIFIED SITE OF BREAST (HCC): ICD-10-CM

## 2021-01-18 DIAGNOSIS — G70.9 NEUROMUSCULAR DISEASE (HCC): ICD-10-CM

## 2021-01-18 DIAGNOSIS — R73.03 PREDIABETES: ICD-10-CM

## 2021-01-18 DIAGNOSIS — R29.818 NEUROLOGICAL ABNORMALITY: ICD-10-CM

## 2021-01-18 DIAGNOSIS — E55.9 VITAMIN D DEFICIENCY: ICD-10-CM

## 2021-01-18 DIAGNOSIS — E78.2 MIXED HYPERLIPIDEMIA: ICD-10-CM

## 2021-01-18 DIAGNOSIS — Z17.1 MALIGNANT NEOPLASM OF RIGHT BREAST IN FEMALE, ESTROGEN RECEPTOR NEGATIVE, UNSPECIFIED SITE OF BREAST (HCC): Primary | ICD-10-CM

## 2021-01-18 DIAGNOSIS — Z95.828 PORT-A-CATH IN PLACE: ICD-10-CM

## 2021-01-18 DIAGNOSIS — C50.911 MALIGNANT NEOPLASM OF RIGHT BREAST IN FEMALE, ESTROGEN RECEPTOR NEGATIVE, UNSPECIFIED SITE OF BREAST (HCC): ICD-10-CM

## 2021-01-18 LAB
ALBUMIN SERPL-MCNC: 4.17 G/DL (ref 3.5–5.2)
ALBUMIN/GLOB SERPL: 1.5 G/DL
ALP SERPL-CCNC: 84 U/L (ref 39–117)
ALT SERPL W P-5'-P-CCNC: 21 U/L (ref 1–33)
ANION GAP SERPL CALCULATED.3IONS-SCNC: 6.8 MMOL/L (ref 5–15)
AST SERPL-CCNC: 15 U/L (ref 1–32)
BASOPHILS # BLD AUTO: 0.04 10*3/MM3 (ref 0–0.2)
BASOPHILS NFR BLD AUTO: 0.9 % (ref 0–1.5)
BILIRUB SERPL-MCNC: 0.3 MG/DL (ref 0–1.2)
BUN SERPL-MCNC: 15 MG/DL (ref 8–23)
BUN/CREAT SERPL: 15.8 (ref 7–25)
CALCIUM SPEC-SCNC: 9.8 MG/DL (ref 8.6–10.5)
CHLORIDE SERPL-SCNC: 105 MMOL/L (ref 98–107)
CO2 SERPL-SCNC: 27.2 MMOL/L (ref 22–29)
CREAT SERPL-MCNC: 0.95 MG/DL (ref 0.57–1)
DEPRECATED RDW RBC AUTO: 47.3 FL (ref 37–54)
EOSINOPHIL # BLD AUTO: 0.04 10*3/MM3 (ref 0–0.4)
EOSINOPHIL NFR BLD AUTO: 0.9 % (ref 0.3–6.2)
ERYTHROCYTE [DISTWIDTH] IN BLOOD BY AUTOMATED COUNT: 13.8 % (ref 12.3–15.4)
GFR SERPL CREATININE-BSD FRML MDRD: 60 ML/MIN/1.73
GLOBULIN UR ELPH-MCNC: 2.8 GM/DL
GLUCOSE SERPL-MCNC: 104 MG/DL (ref 65–99)
HCT VFR BLD AUTO: 40.2 % (ref 34–46.6)
HGB BLD-MCNC: 12.7 G/DL (ref 12–15.9)
IMM GRANULOCYTES # BLD AUTO: 0.01 10*3/MM3 (ref 0–0.05)
IMM GRANULOCYTES NFR BLD AUTO: 0.2 % (ref 0–0.5)
LYMPHOCYTES # BLD AUTO: 1.52 10*3/MM3 (ref 0.7–3.1)
LYMPHOCYTES NFR BLD AUTO: 34.2 % (ref 19.6–45.3)
MAGNESIUM SERPL-MCNC: 1.9 MG/DL (ref 1.6–2.4)
MCH RBC QN AUTO: 29.2 PG (ref 26.6–33)
MCHC RBC AUTO-ENTMCNC: 31.6 G/DL (ref 31.5–35.7)
MCV RBC AUTO: 92.4 FL (ref 79–97)
MONOCYTES # BLD AUTO: 0.16 10*3/MM3 (ref 0.1–0.9)
MONOCYTES NFR BLD AUTO: 3.6 % (ref 5–12)
NEUTROPHILS NFR BLD AUTO: 2.68 10*3/MM3 (ref 1.7–7)
NEUTROPHILS NFR BLD AUTO: 60.2 % (ref 42.7–76)
NRBC BLD AUTO-RTO: 0 /100 WBC (ref 0–0.2)
PLATELET # BLD AUTO: 226 10*3/MM3 (ref 140–450)
PMV BLD AUTO: 9.5 FL (ref 6–12)
POTASSIUM SERPL-SCNC: 4.2 MMOL/L (ref 3.5–5.2)
PROT SERPL-MCNC: 7 G/DL (ref 6–8.5)
RBC # BLD AUTO: 4.35 10*6/MM3 (ref 3.77–5.28)
SODIUM SERPL-SCNC: 139 MMOL/L (ref 136–145)
T4 FREE SERPL-MCNC: 1.1 NG/DL (ref 0.93–1.7)
TSH SERPL DL<=0.05 MIU/L-ACNC: 2.08 UIU/ML (ref 0.27–4.2)
WBC # BLD AUTO: 4.45 10*3/MM3 (ref 3.4–10.8)

## 2021-01-18 PROCEDURE — 25010000002 PERTUZUMAB 420 MG/14ML SOLUTION 420 MG VIAL: Performed by: INTERNAL MEDICINE

## 2021-01-18 PROCEDURE — 25010000002 TRASTUZUMAB-ANNS 420 MG RECONSTITUTED SOLUTION 1 EACH VIAL: Performed by: INTERNAL MEDICINE

## 2021-01-18 PROCEDURE — 83735 ASSAY OF MAGNESIUM: CPT | Performed by: FAMILY MEDICINE

## 2021-01-18 PROCEDURE — 84443 ASSAY THYROID STIM HORMONE: CPT | Performed by: FAMILY MEDICINE

## 2021-01-18 PROCEDURE — 99214 OFFICE O/P EST MOD 30 MIN: CPT | Performed by: INTERNAL MEDICINE

## 2021-01-18 PROCEDURE — 36415 COLL VENOUS BLD VENIPUNCTURE: CPT

## 2021-01-18 PROCEDURE — 96417 CHEMO IV INFUS EACH ADDL SEQ: CPT

## 2021-01-18 PROCEDURE — 80053 COMPREHEN METABOLIC PANEL: CPT | Performed by: INTERNAL MEDICINE

## 2021-01-18 PROCEDURE — 25010000003 HEPARIN LOCK FLUSH PER 10 UNITS: Performed by: INTERNAL MEDICINE

## 2021-01-18 PROCEDURE — 96413 CHEMO IV INFUSION 1 HR: CPT

## 2021-01-18 PROCEDURE — 84439 ASSAY OF FREE THYROXINE: CPT | Performed by: FAMILY MEDICINE

## 2021-01-18 PROCEDURE — 85025 COMPLETE CBC W/AUTO DIFF WBC: CPT | Performed by: INTERNAL MEDICINE

## 2021-01-18 RX ORDER — SODIUM CHLORIDE 0.9 % (FLUSH) 0.9 %
10 SYRINGE (ML) INJECTION AS NEEDED
Status: DISCONTINUED | OUTPATIENT
Start: 2021-01-18 | End: 2021-01-18 | Stop reason: HOSPADM

## 2021-01-18 RX ORDER — SODIUM CHLORIDE 0.9 % (FLUSH) 0.9 %
20 SYRINGE (ML) INJECTION AS NEEDED
Status: CANCELLED | OUTPATIENT
Start: 2021-02-09

## 2021-01-18 RX ORDER — SODIUM CHLORIDE 9 MG/ML
250 INJECTION, SOLUTION INTRAVENOUS ONCE
Status: COMPLETED | OUTPATIENT
Start: 2021-01-18 | End: 2021-01-18

## 2021-01-18 RX ORDER — SODIUM CHLORIDE 0.9 % (FLUSH) 0.9 %
10 SYRINGE (ML) INJECTION AS NEEDED
Status: CANCELLED | OUTPATIENT
Start: 2021-02-09

## 2021-01-18 RX ORDER — HEPARIN SODIUM (PORCINE) LOCK FLUSH IV SOLN 100 UNIT/ML 100 UNIT/ML
500 SOLUTION INTRAVENOUS AS NEEDED
Status: DISCONTINUED | OUTPATIENT
Start: 2021-01-18 | End: 2021-01-18 | Stop reason: HOSPADM

## 2021-01-18 RX ORDER — HEPARIN SODIUM (PORCINE) LOCK FLUSH IV SOLN 100 UNIT/ML 100 UNIT/ML
300 SOLUTION INTRAVENOUS ONCE
Status: CANCELLED | OUTPATIENT
Start: 2021-02-09

## 2021-01-18 RX ORDER — HEPARIN SODIUM (PORCINE) LOCK FLUSH IV SOLN 100 UNIT/ML 100 UNIT/ML
500 SOLUTION INTRAVENOUS AS NEEDED
Status: CANCELLED | OUTPATIENT
Start: 2021-02-09

## 2021-01-18 RX ADMIN — TRASTUZUMAB-ANNS 450 MG: 420 INJECTION, POWDER, LYOPHILIZED, FOR SOLUTION INTRAVENOUS at 13:30

## 2021-01-18 RX ADMIN — Medication 500 UNITS: at 14:25

## 2021-01-18 RX ADMIN — SODIUM CHLORIDE, PRESERVATIVE FREE 10 ML: 5 INJECTION INTRAVENOUS at 14:25

## 2021-01-18 RX ADMIN — PERTUZUMAB 420 MG: 30 INJECTION, SOLUTION, CONCENTRATE INTRAVENOUS at 12:24

## 2021-01-18 RX ADMIN — SODIUM CHLORIDE 250 ML: 9 INJECTION, SOLUTION INTRAVENOUS at 12:24

## 2021-02-01 NOTE — PROGRESS NOTES
"Jackie Johnson     VITALS: Blood pressure 120/74, pulse 80, temperature 98 °F (36.7 °C), height 162.6 cm (64\"), weight 76.2 kg (168 lb), SpO2 96 %, not currently breastfeeding.    Subjective  Chief Complaint  Heartburn    Subjective          History of Present Illness:  Patient is a 60 y.o.  female with medical conditions significant for GERD, hypertension, and allergic rhinitis who presents to clinic secondary to medical followup.     No complaints about any of the medications.    The following portions of the patient's history were reviewed and updated as appropriate: allergies, current medications, past family history, past medical history, past social history, past surgical history and problem list.    Past Medical History  Past Medical History:   Diagnosis Date   • Acid reflux    • Asthma    • Breast cancer (CMS/HCC) 2020    rt br ca   • Cancer (CMS/HCC)    • Eczema    • Endometriosis    • GERD (gastroesophageal reflux disease)    • Hiatal hernia    • High cholesterol    • Seasonal allergies    • Sinusitis    • Staph infection 2020    port       Surgical History  Past Surgical History:   Procedure Laterality Date   • ABDOMINAL SURGERY     • BREAST BIOPSY Bilateral    •  SECTION     • D&C HYSTEROSCOPY ENDOMETRIAL ABLATION     • DIAGNOSTIC LAPAROSCOPY     • FRACTURE SURGERY Left     tib/fib   • FULGURATION ENDOMETRIOSIS     • LEG SURGERY      Left leg following fracture   • LYMPH NODE BIOPSY     • PORTACATH PLACEMENT N/A 3/18/2020    Procedure: INSERTION OF PORTACATH;  Surgeon: Dudley Aldridge MD;  [place] [facility];  Service: General;  Laterality: N/A;   • SKIN BIOPSY     • TONSILLECTOMY     • VENOUS ACCESS DEVICE (PORT) INSERTION N/A 2020    Procedure: INSERTION VENOUS ACCESS DEVICE;  Surgeon: Nerissa Wang MD;  Location: [facility] OR;  Service: General;  Laterality: N/A;   • VENOUS ACCESS DEVICE (PORT) REMOVAL Right 2020    Procedure: REMOVAL VENOUS ACCESS DEVICE;  " Surgeon: Nerissa Wang MD;  Location: [facility];  Service: General;  Laterality: Right;       Family History  Family History   Problem Relation Age of Onset   • Breast cancer Mother 75   • Basal cell carcinoma Mother 90        2 small spots on face removed + fair complexion   • Heart disease Mother    • Hypertension Mother    • Diabetes type II Mother    • Breast cancer Sister 55        VUS detected on genetic testing CDKN2A gene (c.-33G>C).    • Lung disease Sister    • Heart disease Father    • Throat cancer Father 79   • Lung cancer Father 79   • Dementia Father    • Parkinsonism Father    • Cancer Maternal Grandmother 65        unknown gynecologic cancer, cervical vs uterine   • Heart disease Maternal Grandmother    • Diabetes type II Maternal Grandmother    • Lymphoma Maternal Grandfather 64   • Breast cancer Paternal Grandmother 70   • Prostate cancer Paternal Grandfather 70        metastastic to liver   • Breast cancer Paternal Aunt 77   • Breast cancer Maternal Aunt 80   • Breast cancer Other    • Ovarian cancer Sister 66        Elevated ; 1A tumor; Borderline cancer; both ovaries & lg cyst removed   • Ovarian cancer Maternal Cousin 54   • Breast cancer Other 50   • Diabetes type I Nephew        Social History  Social History     Socioeconomic History   • Marital status:      Spouse name: Not on file   • Number of children: Not on file   • Years of education: Not on file   • Highest education level: Not on file   Tobacco Use   • Smoking status: Never Smoker   • Smokeless tobacco: Never Used   Substance and Sexual Activity   • Alcohol use: Never     Frequency: Never   • Drug use: Never   • Sexual activity: Defer     Partners: Male     Birth control/protection: None   Social History Narrative    lives alone with      with [facility] [employer]    No unusual chemical exposures    Never smoker, never drinker       Objective   Vital Signs:   /74 (BP Location: Left arm,  "Patient Position: Sitting)   Pulse 80   Temp 98 °F (36.7 °C)   Ht 162.6 cm (64\")   Wt 76.2 kg (168 lb)   SpO2 96%   BMI 28.84 kg/m²     Physical Exam     Gen: Patient in NAD. Pleasant and answers appropriately. A&Ox3.    Skin: Warm and dry with normal turgor. No purpura, rashes, or unusual pigmentation noted. Hair is normal in appearance and distribution.    HEENT: NC/AT. No lesions noted. Conjunctiva clear, sclera nonicteric. PERRL. EOMI without nystagmus or strabismus. Fundi appear benign. No hemorrhages or exudates of eyes. Auditory canals are patent bilaterally without lesions. TMs intact,  nonerythematous, nonbulging without lesions. Nasal mucosa pink, nonerythematous, and nonedematous. Frontal and maxillary sinuses are nontender. O/P nonerythematous and moist without exudate.    Neck: Supple without lymph nodes palpated. FROM.     Lungs: CTA B/L without rales, rhonchi, crackles, or wheezes.    Heart: RRR. S1 and S2 normal. No S3 or S4. No MRGT.    Abd: Soft, nontender,nondistended. (+)BSx4 quadrants.     Extrem: No CCE. Radial pulses 2+/4 and equal B/L. FROMx4. No bone, joint, or muscle tenderness noted.    Neuro: No focal motor/sensory deficits.    Procedures    Result Review :   The following data was reviewed by: Scarlet Loera MD on 01/15/2021:                Assessment and Plan    Jackie Johnson is a 60 y.o. here for medical followup.    Problem List Items Addressed This Visit        Gastrointestinal Abdominal     GERD (gastroesophageal reflux disease)      Other Visit Diagnoses     Mixed hyperlipidemia    -  Primary    Relevant Orders    CBC Auto Differential    Comprehensive Metabolic Panel    TSH (Completed)    T4, Free (Completed)    Magnesium (Completed)    Prediabetes        Relevant Orders    CBC Auto Differential    Comprehensive Metabolic Panel    TSH (Completed)    T4, Free (Completed)    Magnesium (Completed)            Patient's Body mass index is 28.84 kg/m². BMI is above normal " parameters. Recommendations include: exercise counseling and nutrition counseling.       Follow Up   Return in about 6 weeks (around 2/26/2021).  Findings and plans discussed with patient who verbalizes understanding and agreement. Will followup with patient once results are in. Patient was given instructions and counseling regarding her condition or for health maintenance advice. Please see specific information pulled into the AVS if appropriate.       Scarlet Loera MD    EMR Dragon/Transcription Disclaimer:  Much of this encounter note is an electronic transcription/translation of spoken language to printed text.

## 2021-02-07 DIAGNOSIS — Z17.1 MALIGNANT NEOPLASM OF RIGHT BREAST IN FEMALE, ESTROGEN RECEPTOR NEGATIVE, UNSPECIFIED SITE OF BREAST (HCC): Primary | ICD-10-CM

## 2021-02-07 DIAGNOSIS — C50.911 MALIGNANT NEOPLASM OF RIGHT BREAST IN FEMALE, ESTROGEN RECEPTOR NEGATIVE, UNSPECIFIED SITE OF BREAST (HCC): Primary | ICD-10-CM

## 2021-02-07 RX ORDER — SODIUM CHLORIDE 9 MG/ML
250 INJECTION, SOLUTION INTRAVENOUS ONCE
Status: CANCELLED | OUTPATIENT
Start: 2021-02-09

## 2021-02-09 ENCOUNTER — INFUSION (OUTPATIENT)
Dept: ONCOLOGY | Facility: HOSPITAL | Age: 61
End: 2021-02-09

## 2021-02-09 VITALS
RESPIRATION RATE: 18 BRPM | TEMPERATURE: 97.5 F | BODY MASS INDEX: 28.03 KG/M2 | HEART RATE: 72 BPM | WEIGHT: 163.3 LBS | OXYGEN SATURATION: 97 % | DIASTOLIC BLOOD PRESSURE: 76 MMHG | SYSTOLIC BLOOD PRESSURE: 122 MMHG

## 2021-02-09 DIAGNOSIS — Z17.1 MALIGNANT NEOPLASM OF RIGHT BREAST IN FEMALE, ESTROGEN RECEPTOR NEGATIVE, UNSPECIFIED SITE OF BREAST (HCC): Primary | ICD-10-CM

## 2021-02-09 DIAGNOSIS — Z95.828 PORT-A-CATH IN PLACE: ICD-10-CM

## 2021-02-09 DIAGNOSIS — C50.911 MALIGNANT NEOPLASM OF RIGHT BREAST IN FEMALE, ESTROGEN RECEPTOR NEGATIVE, UNSPECIFIED SITE OF BREAST (HCC): Primary | ICD-10-CM

## 2021-02-09 LAB
ALBUMIN SERPL-MCNC: 4.2 G/DL (ref 3.5–5.2)
ALBUMIN/GLOB SERPL: 1.5 G/DL
ALP SERPL-CCNC: 84 U/L (ref 39–117)
ALT SERPL W P-5'-P-CCNC: 24 U/L (ref 1–33)
ANION GAP SERPL CALCULATED.3IONS-SCNC: 7.1 MMOL/L (ref 5–15)
AST SERPL-CCNC: 18 U/L (ref 1–32)
BASOPHILS # BLD AUTO: 0.06 10*3/MM3 (ref 0–0.2)
BASOPHILS NFR BLD AUTO: 1.3 % (ref 0–1.5)
BILIRUB SERPL-MCNC: 0.2 MG/DL (ref 0–1.2)
BUN SERPL-MCNC: 17 MG/DL (ref 8–23)
BUN/CREAT SERPL: 22.1 (ref 7–25)
CALCIUM SPEC-SCNC: 9.8 MG/DL (ref 8.6–10.5)
CHLORIDE SERPL-SCNC: 103 MMOL/L (ref 98–107)
CO2 SERPL-SCNC: 26.9 MMOL/L (ref 22–29)
CREAT SERPL-MCNC: 0.77 MG/DL (ref 0.57–1)
DEPRECATED RDW RBC AUTO: 45.1 FL (ref 37–54)
EOSINOPHIL # BLD AUTO: 0.08 10*3/MM3 (ref 0–0.4)
EOSINOPHIL NFR BLD AUTO: 1.7 % (ref 0.3–6.2)
ERYTHROCYTE [DISTWIDTH] IN BLOOD BY AUTOMATED COUNT: 13.4 % (ref 12.3–15.4)
GFR SERPL CREATININE-BSD FRML MDRD: 76 ML/MIN/1.73
GLOBULIN UR ELPH-MCNC: 2.8 GM/DL
GLUCOSE SERPL-MCNC: 101 MG/DL (ref 65–99)
HCT VFR BLD AUTO: 39.3 % (ref 34–46.6)
HGB BLD-MCNC: 12.6 G/DL (ref 12–15.9)
IMM GRANULOCYTES # BLD AUTO: 0 10*3/MM3 (ref 0–0.05)
IMM GRANULOCYTES NFR BLD AUTO: 0 % (ref 0–0.5)
LYMPHOCYTES # BLD AUTO: 1.62 10*3/MM3 (ref 0.7–3.1)
LYMPHOCYTES NFR BLD AUTO: 34.4 % (ref 19.6–45.3)
MCH RBC QN AUTO: 29.3 PG (ref 26.6–33)
MCHC RBC AUTO-ENTMCNC: 32.1 G/DL (ref 31.5–35.7)
MCV RBC AUTO: 91.4 FL (ref 79–97)
MONOCYTES # BLD AUTO: 0.36 10*3/MM3 (ref 0.1–0.9)
MONOCYTES NFR BLD AUTO: 7.6 % (ref 5–12)
NEUTROPHILS NFR BLD AUTO: 2.59 10*3/MM3 (ref 1.7–7)
NEUTROPHILS NFR BLD AUTO: 55 % (ref 42.7–76)
NRBC BLD AUTO-RTO: 0 /100 WBC (ref 0–0.2)
PLATELET # BLD AUTO: 211 10*3/MM3 (ref 140–450)
PMV BLD AUTO: 9.7 FL (ref 6–12)
POTASSIUM SERPL-SCNC: 4.2 MMOL/L (ref 3.5–5.2)
PROT SERPL-MCNC: 7 G/DL (ref 6–8.5)
RBC # BLD AUTO: 4.3 10*6/MM3 (ref 3.77–5.28)
SODIUM SERPL-SCNC: 137 MMOL/L (ref 136–145)
WBC # BLD AUTO: 4.71 10*3/MM3 (ref 3.4–10.8)

## 2021-02-09 PROCEDURE — 25010000003 HEPARIN LOCK FLUSH PER 10 UNITS: Performed by: INTERNAL MEDICINE

## 2021-02-09 PROCEDURE — 85025 COMPLETE CBC W/AUTO DIFF WBC: CPT

## 2021-02-09 PROCEDURE — 80053 COMPREHEN METABOLIC PANEL: CPT

## 2021-02-09 PROCEDURE — 96417 CHEMO IV INFUS EACH ADDL SEQ: CPT

## 2021-02-09 PROCEDURE — 25010000002 PERTUZUMAB 420 MG/14ML SOLUTION 420 MG VIAL: Performed by: INTERNAL MEDICINE

## 2021-02-09 PROCEDURE — 25010000002 TRASTUZUMAB-ANNS 420 MG RECONSTITUTED SOLUTION 1 EACH VIAL: Performed by: INTERNAL MEDICINE

## 2021-02-09 PROCEDURE — 96413 CHEMO IV INFUSION 1 HR: CPT

## 2021-02-09 RX ORDER — SODIUM CHLORIDE 0.9 % (FLUSH) 0.9 %
10 SYRINGE (ML) INJECTION AS NEEDED
Status: DISCONTINUED | OUTPATIENT
Start: 2021-02-09 | End: 2021-02-09 | Stop reason: HOSPADM

## 2021-02-09 RX ORDER — HEPARIN SODIUM (PORCINE) LOCK FLUSH IV SOLN 100 UNIT/ML 100 UNIT/ML
500 SOLUTION INTRAVENOUS AS NEEDED
Status: DISCONTINUED | OUTPATIENT
Start: 2021-02-09 | End: 2021-02-09 | Stop reason: HOSPADM

## 2021-02-09 RX ORDER — SODIUM CHLORIDE 0.9 % (FLUSH) 0.9 %
20 SYRINGE (ML) INJECTION AS NEEDED
Status: CANCELLED | OUTPATIENT
Start: 2021-02-09

## 2021-02-09 RX ORDER — HEPARIN SODIUM (PORCINE) LOCK FLUSH IV SOLN 100 UNIT/ML 100 UNIT/ML
300 SOLUTION INTRAVENOUS ONCE
Status: CANCELLED | OUTPATIENT
Start: 2021-02-09

## 2021-02-09 RX ORDER — SODIUM CHLORIDE 0.9 % (FLUSH) 0.9 %
10 SYRINGE (ML) INJECTION AS NEEDED
Status: CANCELLED | OUTPATIENT
Start: 2021-03-02

## 2021-02-09 RX ORDER — HEPARIN SODIUM (PORCINE) LOCK FLUSH IV SOLN 100 UNIT/ML 100 UNIT/ML
500 SOLUTION INTRAVENOUS AS NEEDED
Status: CANCELLED | OUTPATIENT
Start: 2021-03-02

## 2021-02-09 RX ORDER — SODIUM CHLORIDE 9 MG/ML
250 INJECTION, SOLUTION INTRAVENOUS ONCE
Status: COMPLETED | OUTPATIENT
Start: 2021-02-09 | End: 2021-02-09

## 2021-02-09 RX ADMIN — PERTUZUMAB 420 MG: 30 INJECTION, SOLUTION, CONCENTRATE INTRAVENOUS at 11:52

## 2021-02-09 RX ADMIN — TRASTUZUMAB-ANNS 450 MG: 420 INJECTION, POWDER, LYOPHILIZED, FOR SOLUTION INTRAVENOUS at 12:56

## 2021-02-09 RX ADMIN — SODIUM CHLORIDE, PRESERVATIVE FREE 10 ML: 5 INJECTION INTRAVENOUS at 13:45

## 2021-02-09 RX ADMIN — SODIUM CHLORIDE 250 ML: 9 INJECTION, SOLUTION INTRAVENOUS at 11:52

## 2021-02-09 RX ADMIN — Medication 500 UNITS: at 13:45

## 2021-02-15 ENCOUNTER — TELEPHONE (OUTPATIENT)
Dept: FAMILY MEDICINE CLINIC | Facility: CLINIC | Age: 61
End: 2021-02-15

## 2021-02-15 NOTE — TELEPHONE ENCOUNTER
Jackie called this morning to follow up on referrals.  She stated that Dr Loera would be placing referrals for Neurology and Ophthalmology.  I can only see that a Neuro referral was placed by Dr Obrien and there was no Ophthalmology referral put in.  Please Advise.

## 2021-02-18 DIAGNOSIS — H53.9 VISION CHANGES: Primary | ICD-10-CM

## 2021-02-22 DIAGNOSIS — Z23 IMMUNIZATION DUE: ICD-10-CM

## 2021-02-28 DIAGNOSIS — C50.911 MALIGNANT NEOPLASM OF RIGHT BREAST IN FEMALE, ESTROGEN RECEPTOR NEGATIVE, UNSPECIFIED SITE OF BREAST (HCC): Primary | ICD-10-CM

## 2021-02-28 DIAGNOSIS — Z17.1 MALIGNANT NEOPLASM OF RIGHT BREAST IN FEMALE, ESTROGEN RECEPTOR NEGATIVE, UNSPECIFIED SITE OF BREAST (HCC): Primary | ICD-10-CM

## 2021-02-28 RX ORDER — SODIUM CHLORIDE 9 MG/ML
250 INJECTION, SOLUTION INTRAVENOUS ONCE
Status: CANCELLED | OUTPATIENT
Start: 2021-03-02

## 2021-03-02 ENCOUNTER — INFUSION (OUTPATIENT)
Dept: ONCOLOGY | Facility: HOSPITAL | Age: 61
End: 2021-03-02

## 2021-03-02 ENCOUNTER — OFFICE VISIT (OUTPATIENT)
Dept: ONCOLOGY | Facility: CLINIC | Age: 61
End: 2021-03-02

## 2021-03-02 VITALS
RESPIRATION RATE: 18 BRPM | SYSTOLIC BLOOD PRESSURE: 116 MMHG | HEART RATE: 72 BPM | WEIGHT: 166.3 LBS | BODY MASS INDEX: 28.55 KG/M2 | DIASTOLIC BLOOD PRESSURE: 77 MMHG | TEMPERATURE: 97.1 F | OXYGEN SATURATION: 98 %

## 2021-03-02 VITALS
DIASTOLIC BLOOD PRESSURE: 77 MMHG | HEART RATE: 72 BPM | BODY MASS INDEX: 28.55 KG/M2 | SYSTOLIC BLOOD PRESSURE: 116 MMHG | RESPIRATION RATE: 18 BRPM | WEIGHT: 166.3 LBS | OXYGEN SATURATION: 98 % | TEMPERATURE: 97.1 F

## 2021-03-02 DIAGNOSIS — E55.9 VITAMIN D DEFICIENCY: ICD-10-CM

## 2021-03-02 DIAGNOSIS — E55.9 VITAMIN D DEFICIENCY: Primary | ICD-10-CM

## 2021-03-02 DIAGNOSIS — R29.818 NEUROLOGICAL ABNORMALITY: ICD-10-CM

## 2021-03-02 DIAGNOSIS — N60.92 ATYPICAL LOBULAR HYPERPLASIA (ALH) OF LEFT BREAST: ICD-10-CM

## 2021-03-02 DIAGNOSIS — Z95.828 PORT-A-CATH IN PLACE: ICD-10-CM

## 2021-03-02 DIAGNOSIS — Z17.1 MALIGNANT NEOPLASM OF RIGHT BREAST IN FEMALE, ESTROGEN RECEPTOR NEGATIVE, UNSPECIFIED SITE OF BREAST (HCC): Primary | ICD-10-CM

## 2021-03-02 DIAGNOSIS — Z17.1 MALIGNANT NEOPLASM OF RIGHT BREAST IN FEMALE, ESTROGEN RECEPTOR NEGATIVE, UNSPECIFIED SITE OF BREAST (HCC): ICD-10-CM

## 2021-03-02 DIAGNOSIS — C50.911 MALIGNANT NEOPLASM OF RIGHT BREAST IN FEMALE, ESTROGEN RECEPTOR NEGATIVE, UNSPECIFIED SITE OF BREAST (HCC): ICD-10-CM

## 2021-03-02 DIAGNOSIS — C50.911 MALIGNANT NEOPLASM OF RIGHT BREAST IN FEMALE, ESTROGEN RECEPTOR NEGATIVE, UNSPECIFIED SITE OF BREAST (HCC): Primary | ICD-10-CM

## 2021-03-02 DIAGNOSIS — M85.80 OSTEOPENIA, UNSPECIFIED LOCATION: ICD-10-CM

## 2021-03-02 DIAGNOSIS — H53.9 VISION CHANGES: ICD-10-CM

## 2021-03-02 LAB
25(OH)D3 SERPL-MCNC: 30.3 NG/ML
ALBUMIN SERPL-MCNC: 4.31 G/DL (ref 3.5–5.2)
ALBUMIN/GLOB SERPL: 1.7 G/DL
ALP SERPL-CCNC: 81 U/L (ref 39–117)
ALT SERPL W P-5'-P-CCNC: 52 U/L (ref 1–33)
ANION GAP SERPL CALCULATED.3IONS-SCNC: 6.6 MMOL/L (ref 5–15)
AST SERPL-CCNC: 25 U/L (ref 1–32)
BASOPHILS # BLD AUTO: 0.04 10*3/MM3 (ref 0–0.2)
BASOPHILS NFR BLD AUTO: 1 % (ref 0–1.5)
BILIRUB SERPL-MCNC: 0.2 MG/DL (ref 0–1.2)
BUN SERPL-MCNC: 17 MG/DL (ref 8–23)
BUN/CREAT SERPL: 20.5 (ref 7–25)
CALCIUM SPEC-SCNC: 10 MG/DL (ref 8.6–10.5)
CHLORIDE SERPL-SCNC: 100 MMOL/L (ref 98–107)
CO2 SERPL-SCNC: 29.4 MMOL/L (ref 22–29)
CREAT SERPL-MCNC: 0.83 MG/DL (ref 0.57–1)
DEPRECATED RDW RBC AUTO: 45.3 FL (ref 37–54)
EOSINOPHIL # BLD AUTO: 0.09 10*3/MM3 (ref 0–0.4)
EOSINOPHIL NFR BLD AUTO: 2.2 % (ref 0.3–6.2)
ERYTHROCYTE [DISTWIDTH] IN BLOOD BY AUTOMATED COUNT: 13.2 % (ref 12.3–15.4)
GFR SERPL CREATININE-BSD FRML MDRD: 70 ML/MIN/1.73
GLOBULIN UR ELPH-MCNC: 2.6 GM/DL
GLUCOSE SERPL-MCNC: 92 MG/DL (ref 65–99)
HCT VFR BLD AUTO: 39.9 % (ref 34–46.6)
HGB BLD-MCNC: 12.7 G/DL (ref 12–15.9)
IMM GRANULOCYTES # BLD AUTO: 0.01 10*3/MM3 (ref 0–0.05)
IMM GRANULOCYTES NFR BLD AUTO: 0.2 % (ref 0–0.5)
LYMPHOCYTES # BLD AUTO: 1.2 10*3/MM3 (ref 0.7–3.1)
LYMPHOCYTES NFR BLD AUTO: 29.1 % (ref 19.6–45.3)
MCH RBC QN AUTO: 29.5 PG (ref 26.6–33)
MCHC RBC AUTO-ENTMCNC: 31.8 G/DL (ref 31.5–35.7)
MCV RBC AUTO: 92.8 FL (ref 79–97)
MONOCYTES # BLD AUTO: 0.26 10*3/MM3 (ref 0.1–0.9)
MONOCYTES NFR BLD AUTO: 6.3 % (ref 5–12)
NEUTROPHILS NFR BLD AUTO: 2.52 10*3/MM3 (ref 1.7–7)
NEUTROPHILS NFR BLD AUTO: 61.2 % (ref 42.7–76)
NRBC BLD AUTO-RTO: 0 /100 WBC (ref 0–0.2)
PLATELET # BLD AUTO: 187 10*3/MM3 (ref 140–450)
PMV BLD AUTO: 9.6 FL (ref 6–12)
POTASSIUM SERPL-SCNC: 4 MMOL/L (ref 3.5–5.2)
PROT SERPL-MCNC: 6.9 G/DL (ref 6–8.5)
RBC # BLD AUTO: 4.3 10*6/MM3 (ref 3.77–5.28)
SODIUM SERPL-SCNC: 136 MMOL/L (ref 136–145)
WBC # BLD AUTO: 4.12 10*3/MM3 (ref 3.4–10.8)

## 2021-03-02 PROCEDURE — 96417 CHEMO IV INFUS EACH ADDL SEQ: CPT

## 2021-03-02 PROCEDURE — 82306 VITAMIN D 25 HYDROXY: CPT

## 2021-03-02 PROCEDURE — 25010000002 PERTUZUMAB 420 MG/14ML SOLUTION 420 MG VIAL: Performed by: INTERNAL MEDICINE

## 2021-03-02 PROCEDURE — 25010000002 TRASTUZUMAB-ANNS 420 MG RECONSTITUTED SOLUTION 1 EACH VIAL: Performed by: INTERNAL MEDICINE

## 2021-03-02 PROCEDURE — 36415 COLL VENOUS BLD VENIPUNCTURE: CPT

## 2021-03-02 PROCEDURE — 96413 CHEMO IV INFUSION 1 HR: CPT

## 2021-03-02 PROCEDURE — 99214 OFFICE O/P EST MOD 30 MIN: CPT | Performed by: NURSE PRACTITIONER

## 2021-03-02 PROCEDURE — 85025 COMPLETE CBC W/AUTO DIFF WBC: CPT

## 2021-03-02 PROCEDURE — 80053 COMPREHEN METABOLIC PANEL: CPT

## 2021-03-02 PROCEDURE — 25010000003 HEPARIN LOCK FLUSH PER 10 UNITS: Performed by: INTERNAL MEDICINE

## 2021-03-02 RX ORDER — SODIUM CHLORIDE 0.9 % (FLUSH) 0.9 %
10 SYRINGE (ML) INJECTION AS NEEDED
Status: DISCONTINUED | OUTPATIENT
Start: 2021-03-02 | End: 2021-03-02 | Stop reason: HOSPADM

## 2021-03-02 RX ORDER — HEPARIN SODIUM (PORCINE) LOCK FLUSH IV SOLN 100 UNIT/ML 100 UNIT/ML
500 SOLUTION INTRAVENOUS AS NEEDED
Status: CANCELLED | OUTPATIENT
Start: 2021-03-23

## 2021-03-02 RX ORDER — HEPARIN SODIUM (PORCINE) LOCK FLUSH IV SOLN 100 UNIT/ML 100 UNIT/ML
500 SOLUTION INTRAVENOUS AS NEEDED
Status: DISCONTINUED | OUTPATIENT
Start: 2021-03-02 | End: 2021-03-02 | Stop reason: HOSPADM

## 2021-03-02 RX ORDER — HEPARIN SODIUM (PORCINE) LOCK FLUSH IV SOLN 100 UNIT/ML 100 UNIT/ML
300 SOLUTION INTRAVENOUS ONCE
Status: CANCELLED | OUTPATIENT
Start: 2021-03-02

## 2021-03-02 RX ORDER — SODIUM CHLORIDE 0.9 % (FLUSH) 0.9 %
20 SYRINGE (ML) INJECTION AS NEEDED
Status: CANCELLED | OUTPATIENT
Start: 2021-03-02

## 2021-03-02 RX ORDER — SODIUM CHLORIDE 9 MG/ML
250 INJECTION, SOLUTION INTRAVENOUS ONCE
Status: COMPLETED | OUTPATIENT
Start: 2021-03-02 | End: 2021-03-02

## 2021-03-02 RX ORDER — SODIUM CHLORIDE 0.9 % (FLUSH) 0.9 %
10 SYRINGE (ML) INJECTION AS NEEDED
Status: CANCELLED | OUTPATIENT
Start: 2021-03-23

## 2021-03-02 RX ADMIN — SODIUM CHLORIDE 250 ML: 9 INJECTION, SOLUTION INTRAVENOUS at 12:23

## 2021-03-02 RX ADMIN — SODIUM CHLORIDE, PRESERVATIVE FREE 10 ML: 5 INJECTION INTRAVENOUS at 14:16

## 2021-03-02 RX ADMIN — SODIUM CHLORIDE, PRESERVATIVE FREE 500 UNITS: 5 INJECTION INTRAVENOUS at 14:16

## 2021-03-02 RX ADMIN — TRASTUZUMAB-ANNS 450 MG: 420 INJECTION, POWDER, LYOPHILIZED, FOR SOLUTION INTRAVENOUS at 13:30

## 2021-03-02 RX ADMIN — PERTUZUMAB 420 MG: 30 INJECTION, SOLUTION, CONCENTRATE INTRAVENOUS at 12:24

## 2021-03-02 NOTE — PROGRESS NOTES
NAME: Jackie Johnson    : 1960    DATE:  3/2/2021    DIAGNOSIS:   1.  Clinical Stage IIB ([id number]) poorly differentiated invasive ductal carcinoma of the right breast.  Tumor spanned 15-16 cm, though it was unclear how much of this was invasive malignancy v. DCIS.  There were no clinically or radiographically supspicious axillary LNs.  There was associated high grade DCIS.  Tumor was ER-,MI-, HER-2 +  .  Following neoadjuvant TCHP, she had complete pathologic response - ypT0N0(sn).  No invasive malignancy detected.  0/6 LN involved.     2.  Atypical Lobular Hyperplasia and LCIS involving the L breast    CHIEF COMPLAINT:  Follow up of Breast Cancer/toxicity check     TREATMENT HISTORY:  1.      2.  R Mastectectomy and SLNBx with L Lumpectomy and Mastopexy  - Dr. Kadeem Serrano and Dr. Roc Huston ([facility]) 20    3.         HISTORY OF PRESENT ILLNESS:   Jackie Johnson is a very pleasant 61 y.o. female who was referred by Dr. STACIE Richards for evaluation and treatment of breast cancer. She has a strong family h/o breast cancer and so is normally very good about getting her mammograms done.  She recently moved from [facility] and so missed her mammogram in 2019 because of the move.  She got established with a new PCP (Dr. Denver Sheth) in 2019 and was referred to a a new gynecologist  (Dr. Brayan Richards) who she saw in November/December.  Kevin Richards ordered a screening mammogram.  She had an acute febrile illness around the time of the Super Bowl and at that time thought she might have a lump in her R breast.  While waiting on mammogram scheduling, she started to have pain in the breast and in eventually she would occasionally get a little bit of discharge from the right nipple.  She presented for  Screening mammography 19 as below.  This was followed by diagnostic mammogram and U/S on 20.  She had an abnormal span of 15 cm spanning the R breast.  She  underwent stereotactic biopsy on 1-21-20.    This revealed a poorly differentiated invasive ductal carcinoma of the R breast with associated high grade DCIS.  Tumor was ER/AL- and HER-2+ by IHC.  Her sister lives in [place] and was treated at [facility], so she went there for further evaluation. She saw Dr. Romero of medical oncology and Dr. Serrano of surgical oncology.  She had staging with CT CAP and bone scan as well as Brain MRI and had no evidence of distant metastatic spread.  She had reese breast MRI as well which showed an unexpected abnormality in the L breast described as linear non-mass enhancement spanning an 8 cm area in the inferior breast at 6:00.  This was biopsied on 2-28-20 with biopsy result pending.  No abnormal axillary LNs were identified.  Neoadjuvant chemotherapy was recommended to her by her [facility] team of physicians but she wishes to seek this treatment closer to home so is here today for further evaluation and treatment.    She is in good health overall. She reports an isolated episode of fever/chills during an acute illness, abdominal fullness to her right side, fatigue and a rash circling her neck. She denies changes in weight, cp, sob, persistent abdominal pain, n/v/c/d or bony pain.    INTERVAL HISTORY:  Ms. Johnson is here today for follow up of breast cancer/toxicity check. Since her last visit, she reports she has been doing about the same. She continues to struggle with a sensation of vibrating / twitching particularly in her back and lower extremities. She notes that her daughter has PolG mutation as well as HSP (Hereditary Spastic Paraplegia with SPG7 mutation) and wonders if this might be related. She has been referred to neurology at [facility] and is currently awaiting an appointment. She continues to complain of seeing geometric shapes when her port is accessed and says she is scheduled to see ophthalmology later today. She will follow up with [facility] next week.  She is  otherwise doing relatively well.  She is tolerating HP and Femara well. She denies any other complaints at this time.     PAST MEDICAL HISTORY:  Past Medical History:   Diagnosis Date   • Acid reflux    • Asthma    • Breast cancer (CMS/HCC) 2020    rt br ca   • Cancer (CMS/HCC)    • Eczema    • Endometriosis    • GERD (gastroesophageal reflux disease)    • Hiatal hernia    • High cholesterol    • Seasonal allergies    • Sinusitis    • Staph infection 2020    port       PAST SURGICAL HISTORY:  Past Surgical History:   Procedure Laterality Date   • ABDOMINAL SURGERY     • BREAST BIOPSY Bilateral    •  SECTION     • D&C HYSTEROSCOPY ENDOMETRIAL ABLATION     • DIAGNOSTIC LAPAROSCOPY     • FRACTURE SURGERY Left     tib/fib   • FULGURATION ENDOMETRIOSIS     • LEG SURGERY      Left leg following fracture   • LYMPH NODE BIOPSY     • PORTACATH PLACEMENT N/A 3/18/2020    Procedure: INSERTION OF PORTACATH;  Surgeon: Dudley Aldridge MD;  Location: [facility];  Service: General;  Laterality: N/A;   • SKIN BIOPSY     • TONSILLECTOMY     • VENOUS ACCESS DEVICE (PORT) INSERTION N/A 2020    Procedure: INSERTION VENOUS ACCESS DEVICE;  Surgeon: Nerissa Wang MD;  Location: [facility];  Service: General;  Laterality: N/A;   • VENOUS ACCESS DEVICE (PORT) REMOVAL Right 2020    Procedure: REMOVAL VENOUS ACCESS DEVICE;  Surgeon: Nerissa Wang MD;  Location: [facility];  Service: General;  Laterality: Right;       FAMILY HISTORY:  Family History   Problem Relation Age of Onset   • Breast cancer Mother 75   • Basal cell carcinoma Mother 90        2 small spots on face removed + fair complexion   • Heart disease Mother    • Hypertension Mother    • Diabetes type II Mother    • Breast cancer Sister 55        VUS detected on genetic testing CDKN2A gene (c.-33G>C).    • Lung disease Sister    • Heart disease Father    • Throat cancer Father 79   • Lung cancer Father 79   • Dementia Father    • Parkinsonism  Father    • Cancer Maternal Grandmother 65        unknown gynecologic cancer, cervical vs uterine   • Heart disease Maternal Grandmother    • Diabetes type II Maternal Grandmother    • Lymphoma Maternal Grandfather 64   • Breast cancer Paternal Grandmother 70   • Prostate cancer Paternal Grandfather 70        metastastic to liver   • Breast cancer Paternal Aunt 77   • Breast cancer Maternal Aunt 80   • Breast cancer Other    • Ovarian cancer Sister 66        Elevated ; 1A tumor; Borderline cancer; both ovaries & lg cyst removed   • Ovarian cancer Maternal Cousin 54   • Breast cancer Other 50   • Diabetes type I Nephew          SOCIAL HISTORY:  Social History     Socioeconomic History   • Marital status:      Spouse name: Not on file   • Number of children: Not on file   • Years of education: Not on file   • Highest education level: Not on file   Tobacco Use   • Smoking status: Never Smoker   • Smokeless tobacco: Never Used   Substance and Sexual Activity   • Alcohol use: Never     Frequency: Never   • Drug use: Never   • Sexual activity: Defer     Partners: Male     Birth control/protection: None   Social History Narrative    lives alone with      with [facility]    No unusual chemical exposures    Never smoker, never drinker     REVIEW OF SYSTEMS:   A comprehensive 14 point review of systems was performed.  Significant findings as mentioned above.  All other systems reviewed and are negative.      MEDICATIONS:  The current medication list was reviewed in the EMR    Current Outpatient Medications:   •  calcium-vitamin D (Oscal 500/200 D-3) 500-200 MG-UNIT per tablet, Take 1 tablet by mouth 2 (Two) Times a Day., Disp: 60 tablet, Rfl: 11  •  famotidine (PEPCID) 10 MG tablet, Take 10 mg by mouth As Needed for Indigestion or Heartburn., Disp: , Rfl:   •  fexofenadine (ALLEGRA) 180 MG tablet, Take 180 mg by mouth., Disp: , Rfl:   •  letrozole (FEMARA) 2.5 MG tablet, Take 1 tablet  by mouth Daily., Disp: 30 tablet, Rfl: 11  •  levalbuterol (XOPENEX HFA) 45 MCG/ACT inhaler, Inhale 1-2 puffs Every 4 (Four) Hours As Needed for Wheezing., Disp: , Rfl:   •  loperamide (IMODIUM A-D) 2 MG tablet, , Disp: , Rfl:   •  loratadine (CLARITIN) 10 MG tablet, Take 10 mg by mouth 2 (Two) Times a Day., Disp: , Rfl:   •  prochlorperazine (COMPAZINE) 10 MG tablet, Take 1 tablet by mouth Every 6 (Six) Hours As Needed for Nausea or Vomiting., Disp: 60 tablet, Rfl: 3  •  sennosides-docusate (Senna-S) 8.6-50 MG per tablet, Take 2 tablets by mouth 2 (Two) Times a Day., Disp: 120 tablet, Rfl: 5  •  vitamin D (ERGOCALCIFEROL) 1.25 MG (35733 UT) capsule capsule, Take 1 capsule by mouth 1 (One) Time Per Week., Disp: 8 capsule, Rfl: 0    ALLERGIES:    Allergies   Allergen Reactions   • Albuterol Other (See Comments)     One time severe headache, questionable for aneurysm, did spinal tap was positive, did Angiogram and MRI were Negative for aneurysm   • Penicillins Hives   • Cefpodoxime Rash     vantin     • Clindamycin Hcl Rash   • Crestor [Rosuvastatin Calcium] Other (See Comments)     Caused high liver enxymes   • Sulfa Antibiotics Other (See Comments)     Mucosal lesions   • Latex Rash   • Pseudoephedrine Other (See Comments)     Soreness on the tongue       PHYSICAL EXAM:  Vitals:    03/02/21 1044   BP: 116/77   Pulse: 72   Resp: 18   Temp: 97.1 °F (36.2 °C)   SpO2: 98%     Pain Score    03/02/21 1044   PainSc: 0-No pain   ECOG score: 0   General:  Awake, alert and oriented, appears well.    HEENT:  Pupils are equal, round and reactive to light and accommodation, Extra-ocular movements full, Oropharyx clear, mucous membranes moist  Neck:  No JVD, thyromegaly or lymphadenopathy   CV:  Regular rate and rhythm, no murmurs, rubs or gallops  Resp:  Lungs are clear to auscultation bilaterally without wheezes  Breast: Deferred today. Previously: S/p R mastectomy.  Surgical incisions well healed.  S/p L lumpectomy/mastopexy.   Surgical incisions are well-healed.  No palpable masses or axillary adenopathy on either side.   Abd:  Soft, non-tender, sl distended, bowel sounds present, no organomegaly or masses  Ext:  No clubbing, cyanosis or edema.   Lymph:  No cervical, supraclavicular, axillary,adenopathy  Neuro:  MS as above, grossly non-focal exam    PATHOLOGY:  02-12-20 08-18-20  A. SENTINEL LYMPH NODE #1 RIGHT AXILLA, BIOPSY:  - 1 lymph node, no tumor present (0/1)  - Cytokeratin stain shows no evidence of metastatic carcinoma    B. SENTINEL LYMPH NODE #2, RIGHT AXILLA, BIOPSY:  - one lymph node, no tumor present (0/1)  - Cytokeratin stain shows no evidence of metastatic carcinoma    C. RIGHT BREAST, TOTAL MASTECTOMY:  - No residual carcinoma identified  - area of fibrosis with associated biopsy clip, consistent with treated tumor bed.   - Proliferative fibrocystic change.   - Skin and nipple, no tumor present.   - four low axillary lymph nodes, no tumor present (0/4).     D. LEFT BREAST, SEGMENTAL MASTECTOMY:  - FOCI OF LOBULAR NEOPLASIA (ATYPICAL LOBULAR HYPERPLASIA AND LOBULAR CARCINOMA IN SITU). LOW GRADE, CLASSIC TYPE, WITH FOCAL PAGETOID SPREAD INTO DUCTS.   - Two prior biopsy site changes present.   - Proliferative fibrocystic change.     E. RIGHT BREAST SKIN, EXCESS, EXCISION:  - Skin and breast tissue, no tumor present.     F. LEFT BREAST, MASTOPEXY:  - Skin and breast tissue, no tumor present    Tumor Template  Specimen Identification   Procedure: Total mastectomy   Specimen Laterality: Right  Invasive Carcinoma   Tumor Focality: NA   Tumor Size: 0   Histologic Type of Invasive Carcinoma: NA   Histologic Grade Based on Tommy Histologic Score    Glandular (Acinar)/Tubular Differentiation: Not applicable    Nuclear Pleomorphism: not applicable    Mitotic Rate: not applicable   Overall Grade: not applicable   Lymphovascular invasion: not present  In Situ Carcinoma   Ductal Carcinoma in Situ (DCIS): Not  present   Lobular Carcinoma in situ (LCIS): Not present  Tumor Extension   Skin: uninvolved    Nipple: uninvolved   Skeletal muscle: not applicable  Margins   Invasive Carcinoma Margins: NA    DCIS Margins: NA  Regional Lymph nodes   Uninvolved by tumor cells    Total number of lymph nodes examined: 6    Number of sentinel lymph nodes examined: 2  Treatment effect   Treatment effect in the breast: present   Treatment effect in the lymph nodes: not present  RCB input variables   Dimensions of Residual Cancer that is in-situ: 0%   Total Number of Lymph nodes with Residual Metastatic Carcinoma: 0   Size of Largest Metastasis: NA   RCB Index Computed Value (optional): 0   RCB Class (optional): 0  Pathologic Stage Classification 9ypTNM, AJCC 8th Edition)    Primary Tumor (invasive carcinoma)(ypT): ypT0   Regional lymph nodes (ypN): yPN0(sn)   Distant Metastasis (ypM): NA  Ancillary Studies: Please see previous case [id number]      ENDOSCOPY:        IMAGING:  Bilateral Diagnostic mammogram 12-27-19  FINDINGS:  There are scattered areas of fibroglandular density.     The bilateral fibroglandular pattern is unremarkable in appearance. A  few scattered calcifications are present in the left breast. There are  calcifications in the central aspect of the right breast spanning  approximately 15 cm of tissue in the AP dimension and extending up to  the base of the nipple for which additional imaging is recommended.     IMPRESSION:  1. Benign left mammographic findings.  2. Calcifications in the right breast. Recommend additional imaging.        BI-RADS CATEGORY:  0, INCOMPLETE: Need additional imaging evaluation.     RECOMMENDATION:  Right ML and CC magnification views.      R diagnostic mammogram 01-16-20  FINDINGS:  Magnification imaging of the right breast demonstrates  casting-type pleomorphic calcifications spanning over 15 cm of tissue in  the AP dimension in the right 6:00 to 7:00 region. Calcifications do  extend up to  the base of the nipple. Findings are highly suspicious for  DCIS.     Right breast ultrasound: Focused sonographic evaluation of the right  6:00 to 7:00 region demonstrates a single 0.5 cm irregular hypoechoic  mass associated with a coarse calcification located at 7:00, 3 cm from  the nipple. Ultrasound of the right axilla demonstrates no abnormal  appearing axillary lymph nodes.        IMPRESSION:  Findings are highly suspicious for extensive DCIS in the  right 6:00 to 7:00 region extending into the base of the nipple. There  is also small irregular mass noted on ultrasound in the 7:00 region  suspicious for invasion.        BI-RADS CATEGORY:  5, HIGHLY SUGGESTIVE OF MALIGNANCY        RECOMMENDATION:  Recommend stereotactic guided core biopsy of the  anterior and posterior aspect of the calcifications in the 6:00 to 7:00  region to determine extent of disease. Ultrasound-guided core biopsy of  the right 7:00 mass may also be warranted pending pathology results of  the calcifications.    Diagnostic bilateral mammogram 02-25-20  FINDINGS:  There are scattered areas of fibroglandular density.    1:  There are fine-linear branching calcifications measuring 16 x 7 x 7  centimeters with segmental distribution and associated post biopsy clip in the  right breast lower hemisphere at 6 to 7 o'clock.  There are 2 post biopsy clips  noted.  The calcifications extend to the inferior skin and nipple.    2:  There are amorphous calcifications measuring 0.3 centimeters in the left  breast lower hemisphere at 6 to 7 o'clock located 4 centimeters from the nipple.    IMPRESSION:  1:  Fine-linear branching calcifications in the right breast lower hemisphere at  6 to 7 o'clock are known biopsy-proven malignancy. Ultrasound is recommended for  staging. Recommend appropriate evaluation and treatment of this known malignancy.    2:  Amorphous calcifications in the left breast lower hemisphere at 6 to 7  o'clock located 4 centimeters  from the nipple are suspicious. Stereotactic  biopsy is recommended.    BI-RADS Category 4:  Suspicious Abnormality      US Chest 02-25-20  Findings:       Right Breast: The extent of calcified disease is best visualized by mammography. There are dilated ducts with internal calcifications vascularity extending toward the nipple at the 7 o'clock position. This is consistent with the patient's known biopsy-proven malignancy.      Right Charles Basins: No suspicious axillary (level I, II & III) or internal mammary lymphadenopathy is noted.      IMPRESSION:    1. Known RIGHT breast malignancy is best visualized by mammography. A MRI may be obtained for evaluation of disease.    2. No suspicious RIGHT-sided lymphadenopathy      ACR BI-RADS Category: 6. Known malignancy.  Recommend appropriate evaluation and treatment of the known malignancy.         R breast US 02-25-20  Findings:       Right Breast: The extent of calcified disease is best visualized by mammography. There are dilated ducts with internal calcifications vascularity extending toward the nipple at the 7 o'clock position. This is consistent with the patient's known biopsy-proven malignancy.      Right Charles Basins: No suspicious axillary (level I, II & III) or internal mammary lymphadenopathy is noted.      IMPRESSION:    1. Known RIGHT breast malignancy is best visualized by mammography. A MRI may be obtained for evaluation of disease.    2. No suspicious RIGHT-sided lymphadenopathy      ACR BI-RADS Category: 6. Known malignancy.  Recommend appropriate evaluation and treatment of the known malignancy.       MRI Brain w/wo contrast 02-27-20  FINDINGS:     No abnormal parenchymal or leptomeningeal enhancement is noted. The brain parenchyma is unremarkable except of small scattered T2 FLAIR hyperintensities consistent with chronic microangiopathic changes. No acute ischemia, intra or extra-axial hemorrhage, mass effect or midline shift. No hydrocephalus is  noted.    The osseous structures and extra-cranial soft tissues are unremarkable.    The orbital structures unremarkable.    The paranasal sinuses and mastoid air cells are clear.    IMPRESSION:  No evidence of metastatic disease.      CTCAP 02-28-20    Findings:       CHEST:  The breasts are ptotic and there are biopsy clips projecting between the lower quadrants of the right breast.    On image 99 of series 3, there is a nonspecific 1 cm soft tissue nodule in the lower outer quadrant of the right breast.    No lung nodules or pleural effusions are present.    No axillary, internal mammary, mediastinal or hilar adenopathy is seen.    No suspicious bone lesions are present.      ABDOMEN and PELVIS:  The liver is borderline enlarged and diffusely fatty infiltrated without measurable mass or biliary ductal dilatation.    The gallbladder appears normal.    The spleen, pancreas and adrenal glands appear normal.    The kidneys demonstrate function without hydronephrosis and there is a small low dense nidus in the mid right kidney, likely a cyst.    No adenopathy or ascites are seen in the abdomen or pelvis.    A normal-appearing retrocecal appendix is seen.    Multilevel degenerative-like bone changes are present.      IMPRESSION:  No evidence for metastatic disease the chest, abdomen or pelvis.      MRI Breast bilateral 03-02-20    Findings:  The breast composition is heterogeneous fibroglandular tissue. There is no significant early background parenchymal enhancement.    Right breast: There is linearly oriented nonmass enhancement involving the lateral and inferior aspects (anterior, middle, and posterior third) of the right breast 6 - 7 o'clock position extending 17 cm from the base of the nipple to the pectoralis muscle (series 5 image  of 256). There is no evidence for pectoralis muscle invasion. Extension to the inferior skin is better demonstrated mammographically. Susceptibility artifact from 2 postbiopsy  clips is noted.    Left breast: There is linearly oriented nonmass enhancement in the inferior aspect (middle third) of the left breast 6 o'clock position extending approximately 8 cm (series 5 image  of 256). There is no evidence for nipple, skin, or pectoralis muscle involvement. Stereotactic guided biopsy of calcifications at the 6 o'clock position to be performed later today and corresponds to the anterior aspect of the nonmass enhancement. MRI guided biopsy of nonmass enhancement seen on image 94 of 256, series 5 and image 40 of 160, series 8 is recommended (The images have been annotated with a Cherokee).    Charles Basins: There is no lymphadenopathy in the visualized axillary or internal mammary regions.    IMPRESSION:  Linear nonmass enhancement representing known malignancy right breast as above. Linear nonmass enhancement left breast as above.   MRI guided biopsy of nonmass enhancement seen on image 94 of 256, series 5 and image 40 of 160, series 8 is recommended.    ACR BI-RADS Category: 6. Recommend MR-guided biopsy of nonmass enhancement left breast as above .Recommend appropriate evaluation and treatment of the known malignancy.       Bone scan 03-02-20  FINDINGS:     Radiotracer uptake secondary to degenerative changes are seen involving the shoulders, hips, knees, and feet. Increased uptake along the right tibial shaft likely is likely reactive. Faint focus of activity within the left tibial shaft is compatible with prior injury/fracture. Increased uptake in the skull is compatible with a benign hyperostosis.    Physiologic uptake of the bilateral kidneys and bladder.    IMPRESSION:    No scintigraphic evidence of skeletal metastases.      Echo 03-02-20      Bilateral diagnostic mammogram 05-26-20  FINDINGS: There are scattered areas of fibroglandular density.     A postbiopsy marking clip is now noted in the left breast with expected  surrounding postbiopsy changes best visualized on MLO imaging.  The  remaining bilateral fibroglandular pattern is stable in appearance.  Redemonstrated are fine linear branching calcifications in the right  6:00 to 7:00 region spanning approximately 17 cm of tissue in the AP  dimension. There are 2 associated postbiopsy marking clips. No new areas  of calcification are seen in either breast.     IMPRESSION:  1. Benign left mammographic findings.        2. No significant change in the appearance of calcifications in the  right breast.        BI-RADS CATEGORY:  6, KNOWN BIOPSY PROVEN MALIGNANCY        RECOMMENDATION:  Recommend clinical follow-up.       Echo 07-22-20  · Normal left ventricular cavity size and wall thickness noted. All left ventricular wall segments contract normally.  · Estimated EF appears to be in the range of 61 - 65%  · The pericardium is normal. There is no evidence of pericardial effusion.      DEXA 09-25-20  FINDINGS: The BMD measured at the AP spine L1-L4 is 1.003 gm/cm2 with a  T-score of -1.5.      IMPRESSION:  The patient is considered to be osteopenic according to the  World Health Organization criteria. Fracture risk is moderate      MRI L spine wo contrast 09-28-20  FINDINGS:  Sagittal alignment is normal. Bone marrow signal intensity is normal. There is disc desiccation L1-2 through L3-4 with mild multiple level loss of intervertebral disc height. Endplate spondylosis and Schmorl's node formation most apparent at L1-2 and  L2-3. Conus medullaris terminates at L1-2 and is normal.     At L1-2, there is a mild concentric disc bulge and endplate spondylosis. There is no canal stenosis or foraminal impingement.     At L2-3, there is concentric disc bulging and mild facet hypertrophy but no canal or foraminal impingement.     At L3-4, is mild facet degenerative change left greater the right with ligamentum flavum thickening and a broad posterior disc bulge. There is no canal stenosis. There is mild inferior foraminal narrowing bilaterally.     L4-5,  there is moderate facet degenerative change bilaterally with ligamentum flavum thickening. Is mild bilateral foraminal narrowing. There is no canal stenosis.     At L5-S1, there is a broad posterior disc bulge. There is a tiny posterior annular fissure. There is no canal stenosis. There is a small focal left posterolateral protrusion into the left inferior foramen with mild to moderate left inferior foraminal  narrowing. There is mild bilateral facet degenerative change.     IMPRESSION:     1. There is no evidence for osseous metastatic disease.  2. There are lumbar degenerative changes without evidence for lumbar canal stenosis. Details above.      Echo 09-29-20  · The study is technically difficult for diagnosis.  · Normal left ventricular cavity size and wall thickness noted. All left ventricular wall segments contract normally.  · Left ventricular ejection fraction appears to be 61 - 65%.  · The aortic valve is structurally normal with no regurgitation or stenosis present.  · The mitral valve is structurally normal with no regurgitation or significant stenosis present.  · There is no evidence of pericardial effusion.       Echo 12-20-20  · Normal left ventricular cavity size and wall thickness noted. All left ventricular wall segments contract normally.  · Left ventricular ejection fraction appears to be 61 - 65%.  · The pericardium is normal. There is no evidence of pericardial effusion. .  · Comments: LV systolic function is about the same as on the previous echo study.      RECENT LABS:  Lab Results   Component Value Date    WBC 4.12 03/02/2021    HGB 12.7 03/02/2021    HCT 39.9 03/02/2021    MCV 92.8 03/02/2021    RDW 13.2 03/02/2021     03/02/2021    NEUTRORELPCT 61.2 03/02/2021    LYMPHORELPCT 29.1 03/02/2021    MONORELPCT 6.3 03/02/2021    EOSRELPCT 2.2 03/02/2021    BASORELPCT 1.0 03/02/2021    NEUTROABS 2.52 03/02/2021    LYMPHSABS 1.20 03/02/2021       Lab Results   Component Value Date    NA  136 03/02/2021    K 4.0 03/02/2021    CO2 29.4 (H) 03/02/2021     03/02/2021    BUN 17 03/02/2021    CREATININE 0.83 03/02/2021    EGFRIFNONA 70 03/02/2021    EGFRIFAFRI 85 08/17/2020    GLUCOSE 92 03/02/2021    CALCIUM 10.0 03/02/2021    ALKPHOS 81 03/02/2021    AST 25 03/02/2021    ALT 52 (H) 03/02/2021    BILITOT 0.2 03/02/2021    ALBUMIN 4.31 03/02/2021    PROTEINTOT 6.9 03/02/2021    MG 1.9 01/18/2021     Lab Results   Component Value Date    TSH 2.080 01/18/2021       Lab Results   Component Value Date     15.1 03/11/2020     Lab Results   Component Value Date    LABCA2 19.0 03/11/2020       ASSESSMENT & PLAN:  Jackie Johnson is a very pleasant 61 y.o. female with what is clinically a Stage IIB ([id number]) poorly differentiated invasive ductal carcinoma of the right breast.  Tumor spanned 15-16 cm although it was difficult to tell how much of this was invasive malignancy v. DCIS.  There were no clinically or radiographically supspicious axillary LNs.  There was associated high grade DCIS.  Tumor was ER-,TN-, HER-2 +.  She had pathologic complete response with neoadjuvant therapy.  She also has LCIS/ALH involving the left breast.    1.  Right breast cancer:  -  Administered TCHP neoadjuvantly to downsize tumor and potentially allow for a more successful surgery. After 6 cycles of TCHP, she had a complete pathologic response.  -  We discussed consideration of radiation given the initial size of her tumor.  It really isn't known how much of her initial disease was invasive malignancy v. DCIS.  She says she was evaluated by a Radiation Oncologist at [facility] who didn't recommend radiation.  -  Given HER-2 positive disease, recommended we complete a year of HP which she is tolerating well. Will proceed with treatment today. She will have repeat ECHO and follow up with MD as scheduled prior to cycle 9.     2.  L Breast LCIS / ALH:  -  She is s/p lumpectomy.  -  Discussed role for 5 years of hormonal  "therapy in prevention of future breast cancer development in this breast.  She is post-menopausal, so could use Tamoxifen or an aromatase inhibitor for this purpose.  She has an entact uterus so Tamoxifen would come with increased risk (albeit low risk) of development of endometrial hypertrophy/ endometrial cancer.  Aromatase inhibitors would not come with this risk, but would come with risk for loss of bone mineral density and potentially increased risk of side effects.    -  DEXA showed osteopenia with T score -1.5, so recommended Tamoxifen for prevention.  Ms. Johnson was not comfortable with risk of endometrial hyperplasia / cancer so she said she would prefer aromatase inhibitor.  In particular she would like to take Femara so ordered this with plan for 5 years of treatment. She is tolerating Femara well with no noticeable SE.       3.  Bone health:  -  Ms. Johnson has osteopenia with T score -1.5.  She also has a h/o Vit D Deficiency for which she took a course of weekly high dose Vit D (now completed). Repeat Vit D Level from today is pending. Continue Ca/Vit D BID.  Recommended weight bearing exercise.  -  Will plan for repeat DEXA after 2 years.    4.  Unusual sense of \"vibration\" and famiily h/o HSP (with SPG7 mutation):  Had some evaluation at [facility]. Referral was placed to Neuromuscular Neurology specialist at [facility] and currently awaiting an appointment.     5.  Very strong family history of malignancy including several members with breast cancer and a sister with ovarian cancer.  -  She underwent genetic testing at [facility] and testing was negative.      6. Vision changes:  -Reports seeing geometric shapes when her PAC is accessed. She is being seen later today by ophthalmology for evaluation.     7.  Prophylaxis:  She has had 2020 flu vaccine.  Recommended she get Prevnar 13 which she also had.  She has received COVID vaccine x 2.     ACO / MARNI/Other  Quality measures  -  Jackiemoy Johnson " received 2020 flu vaccine.  -  Jackie Johnson reports a pain score of 0.    -  Current outpatient and discharge medications have been reconciled for the patient.  Reviewed by: SHER Sherwood      8.  Follow-up:   - MD follow up as scheduled with C9 with echocardiogram as well as CBCD, CMP, Vit D Level prior.       I spent 30 minutes with Jackie Johnson today.  In the office today, more than 50% of this time was spent face-to-face with her  in counseling / coordination of care, reviewing her medical history and counseling on the current treatment plan.  All questions were answered to her satisfaction      Electronically Signed by: SHER Alaniz      CC:   MD JUAN PABLO Macdonald MD Bora Lim, MD- [facility]  Kadeem Serrano MD- [facility]  Dr. Roc Huston - [facility]

## 2021-03-04 ENCOUNTER — OFFICE VISIT (OUTPATIENT)
Dept: FAMILY MEDICINE CLINIC | Facility: CLINIC | Age: 61
End: 2021-03-04

## 2021-03-04 VITALS
TEMPERATURE: 97.3 F | BODY MASS INDEX: 28.24 KG/M2 | OXYGEN SATURATION: 98 % | HEIGHT: 64 IN | HEART RATE: 79 BPM | WEIGHT: 165.4 LBS | SYSTOLIC BLOOD PRESSURE: 110 MMHG | DIASTOLIC BLOOD PRESSURE: 78 MMHG

## 2021-03-04 DIAGNOSIS — J30.89 SEASONAL ALLERGIC RHINITIS DUE TO OTHER ALLERGIC TRIGGER: ICD-10-CM

## 2021-03-04 DIAGNOSIS — K21.9 GASTROESOPHAGEAL REFLUX DISEASE WITHOUT ESOPHAGITIS: ICD-10-CM

## 2021-03-04 DIAGNOSIS — G11.4 HEREDITARY SPASTIC PARAPARESIS (HCC): Primary | ICD-10-CM

## 2021-03-04 PROCEDURE — 99214 OFFICE O/P EST MOD 30 MIN: CPT | Performed by: FAMILY MEDICINE

## 2021-03-05 ENCOUNTER — TELEPHONE (OUTPATIENT)
Dept: FAMILY MEDICINE CLINIC | Facility: CLINIC | Age: 61
End: 2021-03-05

## 2021-03-05 NOTE — TELEPHONE ENCOUNTER
PATIENT CALLED TO PROVIDE THE NAME OF A DOCTOR AT [facility] THAT SHE WANTED REFERRED TO.  HIS NAME IS GREGORIA CANNON PH:  [phone], FAX:  [phone].  EMAIL ADDRESS IS:  [email or web address]    SHE DOES WANT TO MAKE SURE THAT HER INSURANCE WILL COVER IT.    SHE WOULD LIKE THE APPOINTMENTS SCHEDULED FOR DR. CANNON AND DR. MILTON FOR THE SAME WEEK SINCE SHE HAS TO TRAVEL SO FAR.    CALLBACK:  [phone]

## 2021-03-16 ENCOUNTER — DOCUMENTATION (OUTPATIENT)
Dept: PHYSICAL THERAPY | Facility: HOSPITAL | Age: 61
End: 2021-03-16

## 2021-03-16 DIAGNOSIS — C50.911 MALIGNANT NEOPLASM OF RIGHT BREAST IN FEMALE, ESTROGEN RECEPTOR NEGATIVE, UNSPECIFIED SITE OF BREAST (HCC): ICD-10-CM

## 2021-03-16 DIAGNOSIS — L90.5 SCAR CONDITION AND FIBROSIS OF SKIN: ICD-10-CM

## 2021-03-16 DIAGNOSIS — Z17.1 MALIGNANT NEOPLASM OF RIGHT BREAST IN FEMALE, ESTROGEN RECEPTOR NEGATIVE, UNSPECIFIED SITE OF BREAST (HCC): ICD-10-CM

## 2021-03-16 DIAGNOSIS — I97.2 POST-MASTECTOMY LYMPHEDEMA SYNDROME: Primary | ICD-10-CM

## 2021-03-16 NOTE — THERAPY DISCHARGE NOTE
Outpatient Physical Therapy Discharge Summary         Patient Name: Jackie Johnson  : 1960  MRN: [id number]    Today's Date: 3/16/2021    Visit Dx:    ICD-10-CM ICD-9-CM   1. Post-mastectomy lymphedema syndrome  I97.2 457.0   2. Malignant neoplasm of right breast in female, estrogen receptor negative, unspecified site of breast (CMS/[facility])  C50.911 174.9    Z17.1 V86.1   3. Scar condition and fibrosis of skin  L90.5 709.2           OP PT Discharge Summary  Date of Discharge: 21  Reason for Discharge: Maximum functional potential achieved  Discharge Destination: Home with home program  Discharge Instructions/Additional Comments: Patient was seen for evaluation and 1 follow-up visit.  She was doing well at her last visit, and HEP updated at that time.  Patient was to call if she felt she needed to reschedule.  No other appointments, and she is discharge due to lapse in treatment.            Alanna Baxter, PT  3/16/2021

## 2021-03-19 ENCOUNTER — HOSPITAL ENCOUNTER (OUTPATIENT)
Dept: CARDIOLOGY | Facility: HOSPITAL | Age: 61
Discharge: HOME | End: 2021-03-19
Admitting: INTERNAL MEDICINE

## 2021-03-19 DIAGNOSIS — Z79.899 ENCOUNTER FOR MONITORING CARDIOTOXIC DRUG THERAPY: ICD-10-CM

## 2021-03-19 DIAGNOSIS — C50.911 MALIGNANT NEOPLASM OF RIGHT BREAST IN FEMALE, ESTROGEN RECEPTOR NEGATIVE, UNSPECIFIED SITE OF BREAST (HCC): ICD-10-CM

## 2021-03-19 DIAGNOSIS — Z17.1 MALIGNANT NEOPLASM OF RIGHT BREAST IN FEMALE, ESTROGEN RECEPTOR NEGATIVE, UNSPECIFIED SITE OF BREAST (HCC): ICD-10-CM

## 2021-03-19 DIAGNOSIS — Z51.81 ENCOUNTER FOR MONITORING CARDIOTOXIC DRUG THERAPY: ICD-10-CM

## 2021-03-19 LAB
BH CV ECHO MEAS - % IVS THICK: -4.8 %
BH CV ECHO MEAS - % LVPW THICK: 14.4 %
BH CV ECHO MEAS - BSA(HAYCOCK): 1.9 M^2
BH CV ECHO MEAS - BSA: 1.8 M^2
BH CV ECHO MEAS - BZI_BMI: 28.3 KILOGRAMS/M^2
BH CV ECHO MEAS - BZI_METRIC_HEIGHT: 162.6 CM
BH CV ECHO MEAS - BZI_METRIC_WEIGHT: 74.8 KG
BH CV ECHO MEAS - EDV(CUBED): 92.3 ML
BH CV ECHO MEAS - EDV(MOD-SP4): 53.4 ML
BH CV ECHO MEAS - EDV(TEICH): 93.4 ML
BH CV ECHO MEAS - EF(CUBED): 72.8 %
BH CV ECHO MEAS - EF(MOD-SP4): 60.5 %
BH CV ECHO MEAS - EF(TEICH): 64.6 %
BH CV ECHO MEAS - ESV(CUBED): 25.2 ML
BH CV ECHO MEAS - ESV(MOD-SP4): 21.1 ML
BH CV ECHO MEAS - ESV(TEICH): 33 ML
BH CV ECHO MEAS - FS: 35.2 %
BH CV ECHO MEAS - IVS/LVPW: 1
BH CV ECHO MEAS - IVSD: 1 CM
BH CV ECHO MEAS - IVSS: 1 CM
BH CV ECHO MEAS - LV DIASTOLIC VOL/BSA (35-75): 29.6 ML/M^2
BH CV ECHO MEAS - LV MASS(C)D: 164 GRAMS
BH CV ECHO MEAS - LV MASS(C)DI: 91 GRAMS/M^2
BH CV ECHO MEAS - LV MASS(C)S: 91.3 GRAMS
BH CV ECHO MEAS - LV MASS(C)SI: 50.6 GRAMS/M^2
BH CV ECHO MEAS - LV SYSTOLIC VOL/BSA (12-30): 11.7 ML/M^2
BH CV ECHO MEAS - LVIDD: 4.5 CM
BH CV ECHO MEAS - LVIDS: 2.9 CM
BH CV ECHO MEAS - LVLD AP4: 6.8 CM
BH CV ECHO MEAS - LVLS AP4: 5.5 CM
BH CV ECHO MEAS - LVPWD: 1 CM
BH CV ECHO MEAS - LVPWS: 1.2 CM
BH CV ECHO MEAS - SI(CUBED): 37.3 ML/M^2
BH CV ECHO MEAS - SI(MOD-SP4): 17.9 ML/M^2
BH CV ECHO MEAS - SI(TEICH): 33.5 ML/M^2
BH CV ECHO MEAS - SV(CUBED): 67.2 ML
BH CV ECHO MEAS - SV(MOD-SP4): 32.3 ML
BH CV ECHO MEAS - SV(TEICH): 60.4 ML

## 2021-03-19 PROCEDURE — 93321 DOPPLER ECHO F-UP/LMTD STD: CPT | Performed by: INTERNAL MEDICINE

## 2021-03-19 PROCEDURE — 93321 DOPPLER ECHO F-UP/LMTD STD: CPT

## 2021-03-19 PROCEDURE — 93308 TTE F-UP OR LMTD: CPT

## 2021-03-19 PROCEDURE — 93308 TTE F-UP OR LMTD: CPT | Performed by: INTERNAL MEDICINE

## 2021-03-22 DIAGNOSIS — Z17.1 MALIGNANT NEOPLASM OF RIGHT BREAST IN FEMALE, ESTROGEN RECEPTOR NEGATIVE, UNSPECIFIED SITE OF BREAST (HCC): Primary | ICD-10-CM

## 2021-03-22 DIAGNOSIS — C50.911 MALIGNANT NEOPLASM OF RIGHT BREAST IN FEMALE, ESTROGEN RECEPTOR NEGATIVE, UNSPECIFIED SITE OF BREAST (HCC): Primary | ICD-10-CM

## 2021-03-22 RX ORDER — SODIUM CHLORIDE 9 MG/ML
250 INJECTION, SOLUTION INTRAVENOUS ONCE
Status: CANCELLED | OUTPATIENT
Start: 2021-03-23

## 2021-03-22 NOTE — PROGRESS NOTES
"Jackie Johnson     VITALS: Blood pressure 110/78, pulse 79, temperature 97.3 °F (36.3 °C), temperature source Temporal, height 162.6 cm (64\"), weight 75 kg (165 lb 6.4 oz), SpO2 98 %, not currently breastfeeding.    Subjective  Chief Complaint  Eye Problem (recent visit with opthamologist) and Follow-up    Subjective          History of Present Illness:  Patient is a 61 y.o.  female with medical conditions significant for hereditary spastic paraparesis, GERD, and allergic rhinitis who presents to clinic secondary to medical followup.  No new acute concerns today.  Patient is here for follow-up    No complaints about any of the medications.    The following portions of the patient's history were reviewed and updated as appropriate: allergies, current medications, past family history, past medical history, past social history, past surgical history and problem list.    Past Medical History  Past Medical History:   Diagnosis Date   • Acid reflux    • Asthma    • Breast cancer (CMS/HCC) 2020    rt br ca   • Cancer (CMS/HCC)    • Eczema    • Endometriosis    • GERD (gastroesophageal reflux disease)    • Hiatal hernia    • High cholesterol    • Seasonal allergies    • Sinusitis    • Staph infection 2020    port       Surgical History  Past Surgical History:   Procedure Laterality Date   • ABDOMINAL SURGERY     • BREAST BIOPSY Bilateral    •  SECTION     • D&C HYSTEROSCOPY ENDOMETRIAL ABLATION     • DIAGNOSTIC LAPAROSCOPY     • FRACTURE SURGERY Left     tib/fib   • FULGURATION ENDOMETRIOSIS     • LEG SURGERY      Left leg following fracture   • LYMPH NODE BIOPSY     • PORTACATH PLACEMENT N/A 3/18/2020    Procedure: INSERTION OF PORTACATH;  Surgeon: Dudley Aldridge MD;  Location: [facility];  Service: General;  Laterality: N/A;   • SKIN BIOPSY     • TONSILLECTOMY     • VENOUS ACCESS DEVICE (PORT) INSERTION N/A 2020    Procedure: INSERTION VENOUS ACCESS DEVICE;  Surgeon: Nerissa Wang MD;  " Location:  [facility];  Service: General;  Laterality: N/A;   • VENOUS ACCESS DEVICE (PORT) REMOVAL Right 4/19/2020    Procedure: REMOVAL VENOUS ACCESS DEVICE;  Surgeon: Nerissa Wang MD;  Location:  [facility];  Service: General;  Laterality: Right;       Family History  Family History   Problem Relation Age of Onset   • Breast cancer Mother 75   • Basal cell carcinoma Mother 90        2 small spots on face removed + fair complexion   • Heart disease Mother    • Hypertension Mother    • Diabetes type II Mother    • Breast cancer Sister 55        VUS detected on genetic testing CDKN2A gene (c.-33G>C).    • Lung disease Sister    • Heart disease Father    • Throat cancer Father 79   • Lung cancer Father 79   • Dementia Father    • Parkinsonism Father    • Cancer Maternal Grandmother 65        unknown gynecologic cancer, cervical vs uterine   • Heart disease Maternal Grandmother    • Diabetes type II Maternal Grandmother    • Lymphoma Maternal Grandfather 64   • Breast cancer Paternal Grandmother 70   • Prostate cancer Paternal Grandfather 70        metastastic to liver   • Breast cancer Paternal Aunt 77   • Breast cancer Maternal Aunt 80   • Breast cancer Other    • Ovarian cancer Sister 66        Elevated ; 1A tumor; Borderline cancer; both ovaries & lg cyst removed   • Ovarian cancer Maternal Cousin 54   • Breast cancer Other 50   • Diabetes type I Nephew        Social History  Social History     Socioeconomic History   • Marital status:      Spouse name: Not on file   • Number of children: Not on file   • Years of education: Not on file   • Highest education level: Not on file   Tobacco Use   • Smoking status: Never Smoker   • Smokeless tobacco: Never Used   Substance and Sexual Activity   • Alcohol use: Never   • Drug use: Never   • Sexual activity: Defer     Partners: Male     Birth control/protection: None       Objective   Vital Signs:   /78   Pulse 79   Temp 97.3 °F (36.3 °C) (Temporal)   " Ht 162.6 cm (64\")   Wt 75 kg (165 lb 6.4 oz)   SpO2 98%   BMI 28.39 kg/m²     Physical Exam     Gen: Patient in NAD. Pleasant and answers appropriately. A&Ox3.    Skin: Warm and dry with normal turgor. No purpura, rashes, or unusual pigmentation noted. Hair is normal in appearance and distribution.    HEENT: NC/AT. No lesions noted. Conjunctiva clear, sclera nonicteric. PERRL. EOMI without nystagmus or strabismus. Fundi appear benign. No hemorrhages or exudates of eyes. Auditory canals are patent bilaterally without lesions. TMs intact,  nonerythematous, nonbulging without lesions. Nasal mucosa pink, nonerythematous, and nonedematous. Frontal and maxillary sinuses are nontender. O/P nonerythematous and moist without exudate.    Neck: Supple without lymph nodes palpated. FROM.     Lungs: CTA B/L without rales, rhonchi, crackles, or wheezes.    Heart: RRR. S1 and S2 normal. No S3 or S4. No MRGT.    Abd: Soft, nontender,nondistended. (+)BSx4 quadrants.     Extrem: No CCE. Radial pulses 2+/4 and equal B/L. FROMx4. No bone, joint, or muscle tenderness noted.    Neuro: No focal motor/sensory deficits.    Procedures    Result Review :   The following data was reviewed by: Scarlet Loera MD on 03/04/2021:                Assessment and Plan    Jackie Johnson is a 61 y.o. here for medical followup.    Problem List Items Addressed This Visit        Gastrointestinal Abdominal     GERD (gastroesophageal reflux disease)      Other Visit Diagnoses     Hereditary spastic paraparesis (CMS/HCC)    -  Primary    Relevant Orders    Ambulatory Referral to Neurology (Completed)    Ambulatory Referral to Genetics (Completed)    Seasonal allergic rhinitis due to other allergic trigger                Patient's Body mass index is 28.39 kg/m². BMI is above normal parameters. Recommendations include: exercise counseling and nutrition counseling.            Follow Up   Return in about 6 weeks (around 4/15/2021).  Findings and plans " discussed with patient who verbalizes understanding and agreement. Will followup with patient once results are in. Patient was given instructions and counseling regarding her condition or for health maintenance advice. Please see specific information pulled into the AVS if appropriate.       Scarlet Loera MD    EMR Dragon/Transcription Disclaimer:  Much of this encounter note is an electronic transcription/translation of spoken language to printed text.

## 2021-03-23 ENCOUNTER — LAB (OUTPATIENT)
Dept: ONCOLOGY | Facility: CLINIC | Age: 61
End: 2021-03-23

## 2021-03-23 ENCOUNTER — INFUSION (OUTPATIENT)
Dept: ONCOLOGY | Facility: HOSPITAL | Age: 61
End: 2021-03-23

## 2021-03-23 ENCOUNTER — OFFICE VISIT (OUTPATIENT)
Dept: ONCOLOGY | Facility: CLINIC | Age: 61
End: 2021-03-23

## 2021-03-23 VITALS
WEIGHT: 163.3 LBS | HEART RATE: 72 BPM | OXYGEN SATURATION: 96 % | DIASTOLIC BLOOD PRESSURE: 76 MMHG | BODY MASS INDEX: 28.03 KG/M2 | RESPIRATION RATE: 18 BRPM | TEMPERATURE: 98.4 F | SYSTOLIC BLOOD PRESSURE: 112 MMHG

## 2021-03-23 VITALS
WEIGHT: 163.3 LBS | HEART RATE: 72 BPM | DIASTOLIC BLOOD PRESSURE: 76 MMHG | BODY MASS INDEX: 28.03 KG/M2 | SYSTOLIC BLOOD PRESSURE: 112 MMHG | OXYGEN SATURATION: 96 % | RESPIRATION RATE: 18 BRPM | TEMPERATURE: 98.4 F

## 2021-03-23 DIAGNOSIS — Z95.828 PORT-A-CATH IN PLACE: ICD-10-CM

## 2021-03-23 DIAGNOSIS — C50.911 MALIGNANT NEOPLASM OF RIGHT BREAST IN FEMALE, ESTROGEN RECEPTOR NEGATIVE, UNSPECIFIED SITE OF BREAST (HCC): ICD-10-CM

## 2021-03-23 DIAGNOSIS — Z17.1 MALIGNANT NEOPLASM OF RIGHT BREAST IN FEMALE, ESTROGEN RECEPTOR NEGATIVE, UNSPECIFIED SITE OF BREAST (HCC): Primary | ICD-10-CM

## 2021-03-23 DIAGNOSIS — E55.9 VITAMIN D DEFICIENCY: ICD-10-CM

## 2021-03-23 DIAGNOSIS — M85.80 OSTEOPENIA, UNSPECIFIED LOCATION: ICD-10-CM

## 2021-03-23 DIAGNOSIS — N60.92 ATYPICAL LOBULAR HYPERPLASIA (ALH) OF LEFT BREAST: ICD-10-CM

## 2021-03-23 DIAGNOSIS — Z17.1 MALIGNANT NEOPLASM OF RIGHT BREAST IN FEMALE, ESTROGEN RECEPTOR NEGATIVE, UNSPECIFIED SITE OF BREAST (HCC): ICD-10-CM

## 2021-03-23 DIAGNOSIS — Z51.81 ENCOUNTER FOR MONITORING CARDIOTOXIC DRUG THERAPY: ICD-10-CM

## 2021-03-23 DIAGNOSIS — R29.818 NEUROLOGICAL ABNORMALITY: ICD-10-CM

## 2021-03-23 DIAGNOSIS — C50.911 MALIGNANT NEOPLASM OF RIGHT BREAST IN FEMALE, ESTROGEN RECEPTOR NEGATIVE, UNSPECIFIED SITE OF BREAST (HCC): Primary | ICD-10-CM

## 2021-03-23 DIAGNOSIS — Z79.899 ENCOUNTER FOR MONITORING CARDIOTOXIC DRUG THERAPY: ICD-10-CM

## 2021-03-23 LAB
ALBUMIN SERPL-MCNC: 4.6 G/DL (ref 3.5–5.2)
ALBUMIN/GLOB SERPL: 1.9 G/DL
ALP SERPL-CCNC: 82 U/L (ref 39–117)
ALT SERPL W P-5'-P-CCNC: 24 U/L (ref 1–33)
ANION GAP SERPL CALCULATED.3IONS-SCNC: 12.1 MMOL/L (ref 5–15)
AST SERPL-CCNC: 17 U/L (ref 1–32)
BASOPHILS # BLD AUTO: 0.06 10*3/MM3 (ref 0–0.2)
BASOPHILS NFR BLD AUTO: 1 % (ref 0–1.5)
BILIRUB SERPL-MCNC: 0.3 MG/DL (ref 0–1.2)
BUN SERPL-MCNC: 21 MG/DL (ref 8–23)
BUN/CREAT SERPL: 25.9 (ref 7–25)
CALCIUM SPEC-SCNC: 9.9 MG/DL (ref 8.6–10.5)
CHLORIDE SERPL-SCNC: 101 MMOL/L (ref 98–107)
CO2 SERPL-SCNC: 23.9 MMOL/L (ref 22–29)
CREAT SERPL-MCNC: 0.81 MG/DL (ref 0.57–1)
DEPRECATED RDW RBC AUTO: 45.3 FL (ref 37–54)
EOSINOPHIL # BLD AUTO: 0.06 10*3/MM3 (ref 0–0.4)
EOSINOPHIL NFR BLD AUTO: 1 % (ref 0.3–6.2)
ERYTHROCYTE [DISTWIDTH] IN BLOOD BY AUTOMATED COUNT: 13 % (ref 12.3–15.4)
GFR SERPL CREATININE-BSD FRML MDRD: 72 ML/MIN/1.73
GLOBULIN UR ELPH-MCNC: 2.4 GM/DL
GLUCOSE SERPL-MCNC: 95 MG/DL (ref 65–99)
HCT VFR BLD AUTO: 41.2 % (ref 34–46.6)
HGB BLD-MCNC: 12.8 G/DL (ref 12–15.9)
IMM GRANULOCYTES # BLD AUTO: 0.01 10*3/MM3 (ref 0–0.05)
IMM GRANULOCYTES NFR BLD AUTO: 0.2 % (ref 0–0.5)
LYMPHOCYTES # BLD AUTO: 1.7 10*3/MM3 (ref 0.7–3.1)
LYMPHOCYTES NFR BLD AUTO: 29 % (ref 19.6–45.3)
MCH RBC QN AUTO: 29 PG (ref 26.6–33)
MCHC RBC AUTO-ENTMCNC: 31.1 G/DL (ref 31.5–35.7)
MCV RBC AUTO: 93.4 FL (ref 79–97)
MONOCYTES # BLD AUTO: 0.39 10*3/MM3 (ref 0.1–0.9)
MONOCYTES NFR BLD AUTO: 6.7 % (ref 5–12)
NEUTROPHILS NFR BLD AUTO: 3.64 10*3/MM3 (ref 1.7–7)
NEUTROPHILS NFR BLD AUTO: 62.1 % (ref 42.7–76)
NRBC BLD AUTO-RTO: 0 /100 WBC (ref 0–0.2)
PLATELET # BLD AUTO: 207 10*3/MM3 (ref 140–450)
PMV BLD AUTO: 10 FL (ref 6–12)
POTASSIUM SERPL-SCNC: 4.1 MMOL/L (ref 3.5–5.2)
PROT SERPL-MCNC: 7 G/DL (ref 6–8.5)
RBC # BLD AUTO: 4.41 10*6/MM3 (ref 3.77–5.28)
SODIUM SERPL-SCNC: 137 MMOL/L (ref 136–145)
WBC # BLD AUTO: 5.86 10*3/MM3 (ref 3.4–10.8)

## 2021-03-23 PROCEDURE — 85025 COMPLETE CBC W/AUTO DIFF WBC: CPT | Performed by: INTERNAL MEDICINE

## 2021-03-23 PROCEDURE — 25010000003 HEPARIN LOCK FLUSH PER 10 UNITS: Performed by: INTERNAL MEDICINE

## 2021-03-23 PROCEDURE — 80053 COMPREHEN METABOLIC PANEL: CPT | Performed by: INTERNAL MEDICINE

## 2021-03-23 PROCEDURE — 25010000002 TRASTUZUMAB-ANNS 420 MG RECONSTITUTED SOLUTION 1 EACH VIAL: Performed by: INTERNAL MEDICINE

## 2021-03-23 PROCEDURE — 99214 OFFICE O/P EST MOD 30 MIN: CPT | Performed by: INTERNAL MEDICINE

## 2021-03-23 PROCEDURE — 96413 CHEMO IV INFUSION 1 HR: CPT

## 2021-03-23 PROCEDURE — 25010000002 PERTUZUMAB 420 MG/14ML SOLUTION 420 MG VIAL: Performed by: INTERNAL MEDICINE

## 2021-03-23 PROCEDURE — 96417 CHEMO IV INFUS EACH ADDL SEQ: CPT

## 2021-03-23 PROCEDURE — 36415 COLL VENOUS BLD VENIPUNCTURE: CPT

## 2021-03-23 RX ORDER — SODIUM CHLORIDE 9 MG/ML
250 INJECTION, SOLUTION INTRAVENOUS ONCE
Status: COMPLETED | OUTPATIENT
Start: 2021-03-23 | End: 2021-03-23

## 2021-03-23 RX ORDER — SODIUM CHLORIDE 0.9 % (FLUSH) 0.9 %
20 SYRINGE (ML) INJECTION AS NEEDED
Status: CANCELLED | OUTPATIENT
Start: 2021-04-13

## 2021-03-23 RX ORDER — SODIUM CHLORIDE 0.9 % (FLUSH) 0.9 %
10 SYRINGE (ML) INJECTION AS NEEDED
Status: CANCELLED | OUTPATIENT
Start: 2021-04-13

## 2021-03-23 RX ORDER — HEPARIN SODIUM (PORCINE) LOCK FLUSH IV SOLN 100 UNIT/ML 100 UNIT/ML
300 SOLUTION INTRAVENOUS ONCE
Status: CANCELLED | OUTPATIENT
Start: 2021-03-23

## 2021-03-23 RX ORDER — SODIUM CHLORIDE 0.9 % (FLUSH) 0.9 %
20 SYRINGE (ML) INJECTION AS NEEDED
Status: CANCELLED | OUTPATIENT
Start: 2021-03-23

## 2021-03-23 RX ORDER — HEPARIN SODIUM (PORCINE) LOCK FLUSH IV SOLN 100 UNIT/ML 100 UNIT/ML
500 SOLUTION INTRAVENOUS AS NEEDED
Status: CANCELLED | OUTPATIENT
Start: 2021-04-13

## 2021-03-23 RX ORDER — HEPARIN SODIUM (PORCINE) LOCK FLUSH IV SOLN 100 UNIT/ML 100 UNIT/ML
500 SOLUTION INTRAVENOUS AS NEEDED
Status: DISCONTINUED | OUTPATIENT
Start: 2021-03-23 | End: 2021-03-23 | Stop reason: HOSPADM

## 2021-03-23 RX ORDER — SODIUM CHLORIDE 0.9 % (FLUSH) 0.9 %
10 SYRINGE (ML) INJECTION AS NEEDED
Status: DISCONTINUED | OUTPATIENT
Start: 2021-03-23 | End: 2021-03-23 | Stop reason: HOSPADM

## 2021-03-23 RX ADMIN — SODIUM CHLORIDE 250 ML: 9 INJECTION, SOLUTION INTRAVENOUS at 14:02

## 2021-03-23 RX ADMIN — TRASTUZUMAB-ANNS 450 MG: 420 INJECTION, POWDER, LYOPHILIZED, FOR SOLUTION INTRAVENOUS at 15:10

## 2021-03-23 RX ADMIN — PERTUZUMAB 420 MG: 30 INJECTION, SOLUTION, CONCENTRATE INTRAVENOUS at 14:03

## 2021-03-23 RX ADMIN — SODIUM CHLORIDE, PRESERVATIVE FREE 500 UNITS: 5 INJECTION INTRAVENOUS at 16:05

## 2021-03-23 RX ADMIN — SODIUM CHLORIDE, PRESERVATIVE FREE 10 ML: 5 INJECTION INTRAVENOUS at 16:05

## 2021-03-23 NOTE — PROGRESS NOTES
NAME: Jackie Johnson    : 1960    DATE:  3/23/2021    DIAGNOSIS:   1.  Clinical Stage IIB ([id number]) poorly differentiated invasive ductal carcinoma of the right breast.  Tumor spanned 15-16 cm, though it was unclear how much of this was invasive malignancy v. DCIS.  There were no clinically or radiographically supspicious axillary LNs.  There was associated high grade DCIS.  Tumor was ER-,MO-, HER-2 +  .  Following neoadjuvant TCHP, she had complete pathologic response - ypT0N0(sn).  No invasive malignancy detected.  0/6 LN involved.     2.  Atypical Lobular Hyperplasia and LCIS involving the L breast    CHIEF COMPLAINT:  Follow up of Breast Cancer    TREATMENT HISTORY:  1.      2.  R Mastectectomy and SLNBx with L Lumpectomy and Mastopexy  - Dr. Kadeem Serrano and Dr. Roc Huston ([facility]) 20    3.         HISTORY OF PRESENT ILLNESS:   Jackie Johnson is a very pleasant 61 y.o. female who was referred by Dr. STACIE Richards for evaluation and treatment of breast cancer. She has a strong family h/o breast cancer and so is normally very good about getting her mammograms done.  She recently moved from [facility] and so missed her mammogram in 2019 because of the move.  She got established with a new PCP (Dr. Denver Sheth) in 2019 and was referred to a a new gynecologist  (Dr. Brayan Richards) who she saw in November/December.  Kevin Richards ordered a screening mammogram.  She had an acute febrile illness around the time of the Super Bowl and at that time thought she might have a lump in her R breast.  While waiting on mammogram scheduling, she started to have pain in the breast and in eventually she would occasionally get a little bit of discharge from the right nipple.  She presented for  Screening mammography 19 as below.  This was followed by diagnostic mammogram and U/S on 20.  She had an abnormal span of 15 cm spanning the R breast.  She underwent  stereotactic biopsy on 1-21-20.    This revealed a poorly differentiated invasive ductal carcinoma of the R breast with associated high grade DCIS.  Tumor was ER/IN- and HER-2+ by IHC.  Her sister lives in [place] and was treated at [facility], so she went there for further evaluation. She saw Dr. Romero of medical oncology and Dr. Serrano of surgical oncology.  She had staging with CT CAP and bone scan as well as Brain MRI and had no evidence of distant metastatic spread.  She had reese breast MRI as well which showed an unexpected abnormality in the L breast described as linear non-mass enhancement spanning an 8 cm area in the inferior breast at 6:00.  This was biopsied on 2-28-20 with biopsy result pending.  No abnormal axillary LNs were identified.  Neoadjuvant chemotherapy was recommended to her by her [facility] team of physicians but she wishes to seek this treatment closer to home so is here today for further evaluation and treatment.    She is in good health overall. She reports an isolated episode of fever/chills during an acute illness, abdominal fullness to her right side, fatigue and a rash circling her neck. She denies changes in weight, cp, sob, persistent abdominal pain, n/v/c/d or bony pain.    INTERVAL HISTORY:  Ms. Johnson is here today for follow up of breast cancer. She continues to take Arimidex and Ca/Vit D BID.  She has an appt to return to [facility] in September with repeat mammogram planned at that time. She plans to move this appt up to late July or early August.  She has had some discomfort in the muscles across her upper back when she exercises but denies shortness of breath or other symptoms.     PAST MEDICAL HISTORY:  Past Medical History:   Diagnosis Date   • Acid reflux    • Asthma    • Breast cancer (CMS/HCC) 01/2020    rt br ca   • Cancer (CMS/HCC)    • Eczema    • Endometriosis    • GERD (gastroesophageal reflux disease)    • Hiatal hernia    • High cholesterol    • Seasonal allergies     • Sinusitis    • Staph infection 2020    port       PAST SURGICAL HISTORY:  Past Surgical History:   Procedure Laterality Date   • ABDOMINAL SURGERY     • BREAST BIOPSY Bilateral    •  SECTION     • D&C HYSTEROSCOPY ENDOMETRIAL ABLATION     • DIAGNOSTIC LAPAROSCOPY     • FRACTURE SURGERY Left     tib/fib   • FULGURATION ENDOMETRIOSIS     • LEG SURGERY      Left leg following fracture   • LYMPH NODE BIOPSY     • PORTACATH PLACEMENT N/A 3/18/2020    Procedure: INSERTION OF PORTACATH;  Surgeon: Dudley Aldridge MD;  Location: [facility] OR;  Service: General;  Laterality: N/A;   • SKIN BIOPSY     • TONSILLECTOMY     • VENOUS ACCESS DEVICE (PORT) INSERTION N/A 2020    Procedure: INSERTION VENOUS ACCESS DEVICE;  Surgeon: Nerissa Wang MD;  Location:  [facility] OR;  Service: General;  Laterality: N/A;   • VENOUS ACCESS DEVICE (PORT) REMOVAL Right 2020    Procedure: REMOVAL VENOUS ACCESS DEVICE;  Surgeon: Nerissa Wang MD;  Location: [facility] OR;  Service: General;  Laterality: Right;       FAMILY HISTORY:  Family History   Problem Relation Age of Onset   • Breast cancer Mother 75   • Basal cell carcinoma Mother 90        2 small spots on face removed + fair complexion   • Heart disease Mother    • Hypertension Mother    • Diabetes type II Mother    • Breast cancer Sister 55        VUS detected on genetic testing CDKN2A gene (c.-33G>C).    • Lung disease Sister    • Heart disease Father    • Throat cancer Father 79   • Lung cancer Father 79   • Dementia Father    • Parkinsonism Father    • Cancer Maternal Grandmother 65        unknown gynecologic cancer, cervical vs uterine   • Heart disease Maternal Grandmother    • Diabetes type II Maternal Grandmother    • Lymphoma Maternal Grandfather 64   • Breast cancer Paternal Grandmother 70   • Prostate cancer Paternal Grandfather 70        metastastic to liver   • Breast cancer Paternal Aunt 77   • Breast cancer Maternal Aunt 80   • Breast cancer  Other    • Ovarian cancer Sister 66        Elevated ; 1A tumor; Borderline cancer; both ovaries & lg cyst removed   • Ovarian cancer Maternal Cousin 54   • Breast cancer Other 50   • Diabetes type I Nephew          SOCIAL HISTORY:  Social History     Socioeconomic History   • Marital status:      Spouse name: Not on file   • Number of children: Not on file   • Years of education: Not on file   • Highest education level: Not on file   Tobacco Use   • Smoking status: Never Smoker   • Smokeless tobacco: Never Used   Substance and Sexual Activity   • Alcohol use: Never   • Drug use: Never   • Sexual activity: Defer     Partners: Male     Birth control/protection: None     REVIEW OF SYSTEMS:   A comprehensive 14 point review of systems was performed.  Significant findings as mentioned above.  All other systems reviewed and are negative.      MEDICATIONS:  The current medication list was reviewed in the EMR    Current Outpatient Medications:   •  calcium-vitamin D (Oscal 500/200 D-3) 500-200 MG-UNIT per tablet, Take 1 tablet by mouth 2 (Two) Times a Day., Disp: 60 tablet, Rfl: 11  •  famotidine (PEPCID) 10 MG tablet, Take 10 mg by mouth As Needed for Indigestion or Heartburn., Disp: , Rfl:   •  letrozole (FEMARA) 2.5 MG tablet, Take 1 tablet by mouth Daily., Disp: 30 tablet, Rfl: 11  •  levalbuterol (XOPENEX HFA) 45 MCG/ACT inhaler, Inhale 1-2 puffs Every 4 (Four) Hours As Needed for Wheezing., Disp: , Rfl:   •  loratadine (CLARITIN) 10 MG tablet, Take 10 mg by mouth 2 (Two) Times a Day., Disp: , Rfl:   •  Pertuzumab (PERJETA IV), Infuse  into a venous catheter., Disp: , Rfl:   •  Trastuzumab (HERCEPTIN IV), Infuse  into a venous catheter., Disp: , Rfl:   •  vitamin D (ERGOCALCIFEROL) 1.25 MG (74132 UT) capsule capsule, Take 1 capsule by mouth 1 (One) Time Per Week., Disp: 8 capsule, Rfl: 0    ALLERGIES:    Allergies   Allergen Reactions   • Albuterol Other (See Comments)     One time severe headache,  questionable for aneurysm, did spinal tap was positive, did Angiogram and MRI were Negative for aneurysm   • Penicillins Hives   • Cefpodoxime Rash     vantin     • Clindamycin Hcl Rash   • Crestor [Rosuvastatin Calcium] Other (See Comments)     Caused high liver enxymes   • Sulfa Antibiotics Other (See Comments)     Mucosal lesions   • Latex Rash   • Pseudoephedrine Other (See Comments)     Soreness on the tongue       PHYSICAL EXAM:  Vitals:    03/23/21 1222   BP: 112/76   Pulse: 72   Resp: 18   Temp: 98.4 °F (36.9 °C)   SpO2: 96%     Pain Score    03/23/21 1222   PainSc: 0-No pain   ECOG score: 0   General:  Awake, alert and oriented, appears well.    HEENT:  Pupils are equal, round and reactive to light and accommodation, Extra-ocular movements full, Oropharyx clear, mucous membranes moist  Neck:  No JVD, thyromegaly or lymphadenopathy   CV:  Regular rate and rhythm, no murmurs, rubs or gallops  Resp:  Lungs are clear to auscultation bilaterally without cracl;es  Breast:S/p R mastectomy.  Surgical incisions well healed.  S/p L lumpectomy/mastopexy.  Surgical scars smooth and intact.  No palpable masses or axillary adenopathy on either side.   Abd:  Soft, non-tender, sl distended, bowel sounds present, no organomegaly or masses  Ext:  No clubbing, cyanosis or edema.   Lymph:  No cervical, supraclavicular, axillary,adenopathy  Neuro:  MS as above, grossly non-focal exam    PATHOLOGY:  02-12-20 08-18-20  A. SENTINEL LYMPH NODE #1 RIGHT AXILLA, BIOPSY:  - 1 lymph node, no tumor present (0/1)  - Cytokeratin stain shows no evidence of metastatic carcinoma    B. SENTINEL LYMPH NODE #2, RIGHT AXILLA, BIOPSY:  - one lymph node, no tumor present (0/1)  - Cytokeratin stain shows no evidence of metastatic carcinoma    C. RIGHT BREAST, TOTAL MASTECTOMY:  - No residual carcinoma identified  - area of fibrosis with associated biopsy clip, consistent with treated tumor bed.   - Proliferative fibrocystic change.   -  Skin and nipple, no tumor present.   - four low axillary lymph nodes, no tumor present (0/4).     D. LEFT BREAST, SEGMENTAL MASTECTOMY:  - FOCI OF LOBULAR NEOPLASIA (ATYPICAL LOBULAR HYPERPLASIA AND LOBULAR CARCINOMA IN SITU). LOW GRADE, CLASSIC TYPE, WITH FOCAL PAGETOID SPREAD INTO DUCTS.   - Two prior biopsy site changes present.   - Proliferative fibrocystic change.     E. RIGHT BREAST SKIN, EXCESS, EXCISION:  - Skin and breast tissue, no tumor present.     F. LEFT BREAST, MASTOPEXY:  - Skin and breast tissue, no tumor present    Tumor Template  Specimen Identification   Procedure: Total mastectomy   Specimen Laterality: Right  Invasive Carcinoma   Tumor Focality: NA   Tumor Size: 0   Histologic Type of Invasive Carcinoma: NA   Histologic Grade Based on [place] Histologic Score    Glandular (Acinar)/Tubular Differentiation: Not applicable    Nuclear Pleomorphism: not applicable    Mitotic Rate: not applicable   Overall Grade: not applicable   Lymphovascular invasion: not present  In Situ Carcinoma   Ductal Carcinoma in Situ (DCIS): Not present   Lobular Carcinoma in situ (LCIS): Not present  Tumor Extension   Skin: uninvolved    Nipple: uninvolved   Skeletal muscle: not applicable  Margins   Invasive Carcinoma Margins: NA    DCIS Margins: NA  Regional Lymph nodes   Uninvolved by tumor cells    Total number of lymph nodes examined: 6    Number of sentinel lymph nodes examined: 2  Treatment effect   Treatment effect in the breast: present   Treatment effect in the lymph nodes: not present  RCB input variables   Dimensions of Residual Cancer that is in-situ: 0%   Total Number of Lymph nodes with Residual Metastatic Carcinoma: 0   Size of Largest Metastasis: NA   RCB Index Computed Value (optional): 0   RCB Class (optional): 0  Pathologic Stage Classification 9ypTNM, AJCC 8th Edition)    Primary Tumor (invasive carcinoma)(ypT): ypT0   Regional lymph nodes (ypN): yPN0(sn)   Distant Metastasis (ypM): NA  Ancillary  Studies: Please see previous case [id number]      ENDOSCOPY:        IMAGING:  Bilateral Diagnostic mammogram 12-27-19  FINDINGS:  There are scattered areas of fibroglandular density.     The bilateral fibroglandular pattern is unremarkable in appearance. A  few scattered calcifications are present in the left breast. There are  calcifications in the central aspect of the right breast spanning  approximately 15 cm of tissue in the AP dimension and extending up to  the base of the nipple for which additional imaging is recommended.     IMPRESSION:  1. Benign left mammographic findings.  2. Calcifications in the right breast. Recommend additional imaging.        BI-RADS CATEGORY:  0, INCOMPLETE: Need additional imaging evaluation.     RECOMMENDATION:  Right ML and CC magnification views.      R diagnostic mammogram 01-16-20  FINDINGS:  Magnification imaging of the right breast demonstrates  casting-type pleomorphic calcifications spanning over 15 cm of tissue in  the AP dimension in the right 6:00 to 7:00 region. Calcifications do  extend up to the base of the nipple. Findings are highly suspicious for  DCIS.     Right breast ultrasound: Focused sonographic evaluation of the right  6:00 to 7:00 region demonstrates a single 0.5 cm irregular hypoechoic  mass associated with a coarse calcification located at 7:00, 3 cm from  the nipple. Ultrasound of the right axilla demonstrates no abnormal  appearing axillary lymph nodes.        IMPRESSION:  Findings are highly suspicious for extensive DCIS in the  right 6:00 to 7:00 region extending into the base of the nipple. There  is also small irregular mass noted on ultrasound in the 7:00 region  suspicious for invasion.        BI-RADS CATEGORY:  5, HIGHLY SUGGESTIVE OF MALIGNANCY        RECOMMENDATION:  Recommend stereotactic guided core biopsy of the  anterior and posterior aspect of the calcifications in the 6:00 to 7:00  region to determine extent of disease.  Ultrasound-guided core biopsy of  the right 7:00 mass may also be warranted pending pathology results of  the calcifications.    Diagnostic bilateral mammogram 02-25-20  FINDINGS:  There are scattered areas of fibroglandular density.    1:  There are fine-linear branching calcifications measuring 16 x 7 x 7  centimeters with segmental distribution and associated post biopsy clip in the  right breast lower hemisphere at 6 to 7 o'clock.  There are 2 post biopsy clips  noted.  The calcifications extend to the inferior skin and nipple.    2:  There are amorphous calcifications measuring 0.3 centimeters in the left  breast lower hemisphere at 6 to 7 o'clock located 4 centimeters from the nipple.    IMPRESSION:  1:  Fine-linear branching calcifications in the right breast lower hemisphere at  6 to 7 o'clock are known biopsy-proven malignancy. Ultrasound is recommended for  staging. Recommend appropriate evaluation and treatment of this known malignancy.    2:  Amorphous calcifications in the left breast lower hemisphere at 6 to 7  o'clock located 4 centimeters from the nipple are suspicious. Stereotactic  biopsy is recommended.    BI-RADS Category 4:  Suspicious Abnormality      US Chest 02-25-20  Findings:       Right Breast: The extent of calcified disease is best visualized by mammography. There are dilated ducts with internal calcifications vascularity extending toward the nipple at the 7 o'clock position. This is consistent with the patient's known biopsy-proven malignancy.      Right Charles Basins: No suspicious axillary (level I, II & III) or internal mammary lymphadenopathy is noted.      IMPRESSION:    1. Known RIGHT breast malignancy is best visualized by mammography. A MRI may be obtained for evaluation of disease.    2. No suspicious RIGHT-sided lymphadenopathy      ACR BI-RADS Category: 6. Known malignancy.  Recommend appropriate evaluation and treatment of the known malignancy.         R breast US  02-25-20  Findings:       Right Breast: The extent of calcified disease is best visualized by mammography. There are dilated ducts with internal calcifications vascularity extending toward the nipple at the 7 o'clock position. This is consistent with the patient's known biopsy-proven malignancy.      Right Charles Basins: No suspicious axillary (level I, II & III) or internal mammary lymphadenopathy is noted.      IMPRESSION:    1. Known RIGHT breast malignancy is best visualized by mammography. A MRI may be obtained for evaluation of disease.    2. No suspicious RIGHT-sided lymphadenopathy      ACR BI-RADS Category: 6. Known malignancy.  Recommend appropriate evaluation and treatment of the known malignancy.       MRI Brain w/wo contrast 02-27-20  FINDINGS:     No abnormal parenchymal or leptomeningeal enhancement is noted. The brain parenchyma is unremarkable except of small scattered T2 FLAIR hyperintensities consistent with chronic microangiopathic changes. No acute ischemia, intra or extra-axial hemorrhage, mass effect or midline shift. No hydrocephalus is noted.    The osseous structures and extra-cranial soft tissues are unremarkable.    The orbital structures unremarkable.    The paranasal sinuses and mastoid air cells are clear.    IMPRESSION:  No evidence of metastatic disease.      CTCAP 02-28-20    Findings:       CHEST:  The breasts are ptotic and there are biopsy clips projecting between the lower quadrants of the right breast.    On image 99 of series 3, there is a nonspecific 1 cm soft tissue nodule in the lower outer quadrant of the right breast.    No lung nodules or pleural effusions are present.    No axillary, internal mammary, mediastinal or hilar adenopathy is seen.    No suspicious bone lesions are present.      ABDOMEN and PELVIS:  The liver is borderline enlarged and diffusely fatty infiltrated without measurable mass or biliary ductal dilatation.    The gallbladder appears normal.    The  spleen, pancreas and adrenal glands appear normal.    The kidneys demonstrate function without hydronephrosis and there is a small low dense nidus in the mid right kidney, likely a cyst.    No adenopathy or ascites are seen in the abdomen or pelvis.    A normal-appearing retrocecal appendix is seen.    Multilevel degenerative-like bone changes are present.      IMPRESSION:  No evidence for metastatic disease the chest, abdomen or pelvis.      MRI Breast bilateral 03-02-20    Findings:  The breast composition is heterogeneous fibroglandular tissue. There is no significant early background parenchymal enhancement.    Right breast: There is linearly oriented nonmass enhancement involving the lateral and inferior aspects (anterior, middle, and posterior third) of the right breast 6 - 7 o'clock position extending 17 cm from the base of the nipple to the pectoralis muscle (series 5 image  of 256). There is no evidence for pectoralis muscle invasion. Extension to the inferior skin is better demonstrated mammographically. Susceptibility artifact from 2 postbiopsy clips is noted.    Left breast: There is linearly oriented nonmass enhancement in the inferior aspect (middle third) of the left breast 6 o'clock position extending approximately 8 cm (series 5 image  of 256). There is no evidence for nipple, skin, or pectoralis muscle involvement. Stereotactic guided biopsy of calcifications at the 6 o'clock position to be performed later today and corresponds to the anterior aspect of the nonmass enhancement. MRI guided biopsy of nonmass enhancement seen on image 94 of 256, series 5 and image 40 of 160, series 8 is recommended (The images have been annotated with a [place]).    Charles Basins: There is no lymphadenopathy in the visualized axillary or internal mammary regions.    IMPRESSION:  Linear nonmass enhancement representing known malignancy right breast as above. Linear nonmass enhancement left breast as above.    MRI guided biopsy of nonmass enhancement seen on image 94 of 256, series 5 and image 40 of 160, series 8 is recommended.    ACR BI-RADS Category: 6. Recommend MR-guided biopsy of nonmass enhancement left breast as above .Recommend appropriate evaluation and treatment of the known malignancy.       Bone scan 03-02-20  FINDINGS:     Radiotracer uptake secondary to degenerative changes are seen involving the shoulders, hips, knees, and feet. Increased uptake along the right tibial shaft likely is likely reactive. Faint focus of activity within the left tibial shaft is compatible with prior injury/fracture. Increased uptake in the skull is compatible with a benign hyperostosis.    Physiologic uptake of the bilateral kidneys and bladder.    IMPRESSION:    No scintigraphic evidence of skeletal metastases.      Echo 03-02-20      Bilateral diagnostic mammogram 05-26-20  FINDINGS: There are scattered areas of fibroglandular density.     A postbiopsy marking clip is now noted in the left breast with expected  surrounding postbiopsy changes best visualized on MLO imaging. The  remaining bilateral fibroglandular pattern is stable in appearance.  Redemonstrated are fine linear branching calcifications in the right  6:00 to 7:00 region spanning approximately 17 cm of tissue in the AP  dimension. There are 2 associated postbiopsy marking clips. No new areas  of calcification are seen in either breast.     IMPRESSION:  1. Benign left mammographic findings.        2. No significant change in the appearance of calcifications in the  right breast.        BI-RADS CATEGORY:  6, KNOWN BIOPSY PROVEN MALIGNANCY        RECOMMENDATION:  Recommend clinical follow-up.       Echo 07-22-20  · Normal left ventricular cavity size and wall thickness noted. All left ventricular wall segments contract normally.  · Estimated EF appears to be in the range of 61 - 65%  · The pericardium is normal. There is no evidence of pericardial  effusion.      DEXA 09-25-20  FINDINGS: The BMD measured at the AP spine L1-L4 is 1.003 gm/cm2 with a  T-score of -1.5.      IMPRESSION:  The patient is considered to be osteopenic according to the  World Health Organization criteria. Fracture risk is moderate      MRI L spine wo contrast 09-28-20  FINDINGS:  Sagittal alignment is normal. Bone marrow signal intensity is normal. There is disc desiccation L1-2 through L3-4 with mild multiple level loss of intervertebral disc height. Endplate spondylosis and Schmorl's node formation most apparent at L1-2 and  L2-3. Conus medullaris terminates at L1-2 and is normal.     At L1-2, there is a mild concentric disc bulge and endplate spondylosis. There is no canal stenosis or foraminal impingement.     At L2-3, there is concentric disc bulging and mild facet hypertrophy but no canal or foraminal impingement.     At L3-4, is mild facet degenerative change left greater the right with ligamentum flavum thickening and a broad posterior disc bulge. There is no canal stenosis. There is mild inferior foraminal narrowing bilaterally.     L4-5, there is moderate facet degenerative change bilaterally with ligamentum flavum thickening. Is mild bilateral foraminal narrowing. There is no canal stenosis.     At L5-S1, there is a broad posterior disc bulge. There is a tiny posterior annular fissure. There is no canal stenosis. There is a small focal left posterolateral protrusion into the left inferior foramen with mild to moderate left inferior foraminal  narrowing. There is mild bilateral facet degenerative change.     IMPRESSION:     1. There is no evidence for osseous metastatic disease.  2. There are lumbar degenerative changes without evidence for lumbar canal stenosis. Details above.      Echo 09-29-20  · The study is technically difficult for diagnosis.  · Normal left ventricular cavity size and wall thickness noted. All left ventricular wall segments contract normally.  · Left  ventricular ejection fraction appears to be 61 - 65%.  · The aortic valve is structurally normal with no regurgitation or stenosis present.  · The mitral valve is structurally normal with no regurgitation or significant stenosis present.  · There is no evidence of pericardial effusion.       Echo 12-20-20  · Normal left ventricular cavity size and wall thickness noted. All left ventricular wall segments contract normally.  · Left ventricular ejection fraction appears to be 61 - 65%.  · The pericardium is normal. There is no evidence of pericardial effusion. .  · Comments: LV systolic function is about the same as on the previous echo study.      Echo 03-19-21  · Normal left ventricular cavity size and wall thickness noted. All left ventricular wall segments contract normally.  · Left ventricular ejection fraction appears to be 61 - 65%.  · There is no evidence of pericardial effusion.      RECENT LABS:  Lab Results   Component Value Date    WBC 5.86 03/23/2021    HGB 12.8 03/23/2021    HCT 41.2 03/23/2021    MCV 93.4 03/23/2021    RDW 13.0 03/23/2021     03/23/2021    NEUTRORELPCT 62.1 03/23/2021    LYMPHORELPCT 29.0 03/23/2021    MONORELPCT 6.7 03/23/2021    EOSRELPCT 1.0 03/23/2021    BASORELPCT 1.0 03/23/2021    NEUTROABS 3.64 03/23/2021    LYMPHSABS 1.70 03/23/2021       Lab Results   Component Value Date     03/23/2021    K 4.1 03/23/2021    CO2 23.9 03/23/2021     03/23/2021    BUN 21 03/23/2021    CREATININE 0.81 03/23/2021    EGFRIFNONA 72 03/23/2021    EGFRIFAFRI 85 08/17/2020    GLUCOSE 95 03/23/2021    CALCIUM 9.9 03/23/2021    ALKPHOS 82 03/23/2021    AST 17 03/23/2021    ALT 24 03/23/2021    BILITOT 0.3 03/23/2021    ALBUMIN 4.60 03/23/2021    PROTEINTOT 7.0 03/23/2021    MG 1.9 01/18/2021     Lab Results   Component Value Date    TSH 2.080 01/18/2021       Lab Results   Component Value Date     15.1 03/11/2020     Lab Results   Component Value Date    LABCA2 19.0 03/11/2020        ASSESSMENT & PLAN:  Jackie Johnson is a very pleasant 61 y.o. female with what is clinically a Stage IIB ([id number]) poorly differentiated invasive ductal carcinoma of the right breast.  Tumor spanned 15-16 cm although it was difficult to tell how much of this was invasive malignancy v. DCIS.  There were no clinically or radiographically supspicious axillary LNs.  There was associated high grade DCIS.  Tumor was ER-,ME-, HER-2 +.  She had pathologic complete response with neoadjuvant therapy.  She also has LCIS/ALH involving the left breast.    1.  Right breast cancer:  -  Administered TCHP neoadjuvantly to downsize tumor and potentially allow for a more successful surgery. After 6 cycles of TCHP, she had a complete pathologic response.  -  We discussed consideration of radiation given the initial size of her tumor.  It really isn't known how much of her initial disease was invasive malignancy v. DCIS.  She says she was evaluated by a Radiation Oncologist at [facility] who didn't recommend radiation.  -  Given HER-2 positive disease, recommended we complete a year of HP which she is tolerating well. Will proceed with treatment today. Recent echo with normal LVEF. She will have repeat ECHO at the end of her treatment.    2.  L Breast LCIS / ALH:  -  She is s/p lumpectomy.  -  Discussed role for 5 years of hormonal therapy in prevention of future breast cancer development in this breast.  She is post-menopausal, so could use Tamoxifen or an aromatase inhibitor for this purpose.  She has an entact uterus so Tamoxifen would come with increased risk (albeit low risk) of development of endometrial hypertrophy/ endometrial cancer.  Aromatase inhibitors would not come with this risk, but would come with risk for loss of bone mineral density and potentially increased risk of side effects.    -  DEXA showed osteopenia with T score -1.5, so recommended Tamoxifen for prevention.  Ms. Johnson was not comfortable with  "risk of endometrial hyperplasia / cancer so she said she would prefer aromatase inhibitor.  In particular she would like to take Femara so ordered this with plan for 5 years of treatment. She is tolerating Femara well with no noticeable SE.   -  Mammogram will be due in late July and she plans to have this done at [facility].      3.  Bone health:  -  Ms. Johnson has osteopenia with T score -1.5.  She also has a h/o Vit D Deficiency for which she took a course of weekly high dose Vit D (now completed). Repeat Vit D Level from today is pending. Continue Ca/Vit D BID.  Recommended weight bearing exercise.  -  Will plan for repeat DEXA after 2 years.    4.  Unusual sense of \"vibration\" and famiily h/o HSP (with SPG7 mutation):  Had some evaluation at [facility]. Referral was placed to Neuromuscular Neurology specialist at [facility] and currently awaiting an appointment on a waiting list.  Otherwise she has appt in July.    5.  Very strong family history of malignancy including several members with breast cancer and a sister with ovarian cancer.  -  She underwent genetic testing at [facility] and testing was negative.      6. Vision changes:  - she saw ophthalmology and was told she had blocked oil ducts in her bottom eyelid.  She followed instructions (massage with baby shampoo, warm compresses, flax seed oil) with relief.     7.  Prophylaxis:  She has had 2020 flu vaccine.  Recommended she get Prevnar 13 which she also had.  She has received COVID vaccine x 2.     ACO / MARNI/Other  Quality measures  -  Jackie Johnson received 2020 flu vaccine.  -  Jackie Johnson reports a pain score of .    -  Current outpatient and discharge medications have been reconciled for the patient.  Reviewed by: Fatoumata Obrien MD       8.  Follow-up:   - MD follow up following C12.  -  Mammogram planned at [facility] in July/August.    This note was scribed for Fatoumata Obrien MD by Alba Siu RN.    I, Fatoumata Obrien, " MD, personally performed the services described in this documentation as scribed by the above named individual in my presence, and it is both accurate and complete.  03/23/2021          I spent 25 minutes with Jackie Johnson today.  In the office today, more than 50% of this time was spent face-to-face with her  in counseling / coordination of care, reviewing her medical history and counseling on the current treatment plan.  All questions were answered to her satisfaction      Electronically Signed by: Fatoumata Obrien MD       CC:   MD JUAN PABLO Macdonald MD Bora Lim, MD- [facility] Medical Oncology  Kadeem Serrano MD- [facility] Surgical Oncology  Dr. Roc Huston - [facility] Plastic Surgery

## 2021-04-11 DIAGNOSIS — C50.911 MALIGNANT NEOPLASM OF RIGHT BREAST IN FEMALE, ESTROGEN RECEPTOR NEGATIVE, UNSPECIFIED SITE OF BREAST (HCC): Primary | ICD-10-CM

## 2021-04-11 DIAGNOSIS — Z17.1 MALIGNANT NEOPLASM OF RIGHT BREAST IN FEMALE, ESTROGEN RECEPTOR NEGATIVE, UNSPECIFIED SITE OF BREAST (HCC): Primary | ICD-10-CM

## 2021-04-11 RX ORDER — SODIUM CHLORIDE 9 MG/ML
250 INJECTION, SOLUTION INTRAVENOUS ONCE
Status: CANCELLED | OUTPATIENT
Start: 2021-04-13

## 2021-04-13 ENCOUNTER — INFUSION (OUTPATIENT)
Dept: ONCOLOGY | Facility: HOSPITAL | Age: 61
End: 2021-04-13

## 2021-04-13 ENCOUNTER — NURSE NAVIGATOR (OUTPATIENT)
Dept: ONCOLOGY | Facility: HOSPITAL | Age: 61
End: 2021-04-13

## 2021-04-13 VITALS
HEART RATE: 71 BPM | DIASTOLIC BLOOD PRESSURE: 77 MMHG | BODY MASS INDEX: 28.15 KG/M2 | WEIGHT: 164 LBS | SYSTOLIC BLOOD PRESSURE: 128 MMHG | OXYGEN SATURATION: 97 % | TEMPERATURE: 97.7 F | RESPIRATION RATE: 18 BRPM

## 2021-04-13 DIAGNOSIS — Z17.1 MALIGNANT NEOPLASM OF RIGHT BREAST IN FEMALE, ESTROGEN RECEPTOR NEGATIVE, UNSPECIFIED SITE OF BREAST (HCC): Primary | ICD-10-CM

## 2021-04-13 DIAGNOSIS — Z95.828 PORT-A-CATH IN PLACE: ICD-10-CM

## 2021-04-13 DIAGNOSIS — C50.911 MALIGNANT NEOPLASM OF RIGHT BREAST IN FEMALE, ESTROGEN RECEPTOR NEGATIVE, UNSPECIFIED SITE OF BREAST (HCC): Primary | ICD-10-CM

## 2021-04-13 LAB
ALBUMIN SERPL-MCNC: 4.18 G/DL (ref 3.5–5.2)
ALBUMIN/GLOB SERPL: 1.5 G/DL
ALP SERPL-CCNC: 84 U/L (ref 39–117)
ALT SERPL W P-5'-P-CCNC: 23 U/L (ref 1–33)
ANION GAP SERPL CALCULATED.3IONS-SCNC: 9.1 MMOL/L (ref 5–15)
AST SERPL-CCNC: 17 U/L (ref 1–32)
BASOPHILS # BLD AUTO: 0.05 10*3/MM3 (ref 0–0.2)
BASOPHILS NFR BLD AUTO: 0.8 % (ref 0–1.5)
BILIRUB SERPL-MCNC: 0.3 MG/DL (ref 0–1.2)
BUN SERPL-MCNC: 15 MG/DL (ref 8–23)
BUN/CREAT SERPL: 19 (ref 7–25)
CALCIUM SPEC-SCNC: 9.7 MG/DL (ref 8.6–10.5)
CHLORIDE SERPL-SCNC: 100 MMOL/L (ref 98–107)
CO2 SERPL-SCNC: 25.9 MMOL/L (ref 22–29)
CREAT SERPL-MCNC: 0.79 MG/DL (ref 0.57–1)
DEPRECATED RDW RBC AUTO: 44.3 FL (ref 37–54)
EOSINOPHIL # BLD AUTO: 0.04 10*3/MM3 (ref 0–0.4)
EOSINOPHIL NFR BLD AUTO: 0.6 % (ref 0.3–6.2)
ERYTHROCYTE [DISTWIDTH] IN BLOOD BY AUTOMATED COUNT: 13 % (ref 12.3–15.4)
GFR SERPL CREATININE-BSD FRML MDRD: 74 ML/MIN/1.73
GLOBULIN UR ELPH-MCNC: 2.7 GM/DL
GLUCOSE SERPL-MCNC: 96 MG/DL (ref 65–99)
HCT VFR BLD AUTO: 39.9 % (ref 34–46.6)
HGB BLD-MCNC: 12.6 G/DL (ref 12–15.9)
IMM GRANULOCYTES # BLD AUTO: 0.01 10*3/MM3 (ref 0–0.05)
IMM GRANULOCYTES NFR BLD AUTO: 0.2 % (ref 0–0.5)
LYMPHOCYTES # BLD AUTO: 1.84 10*3/MM3 (ref 0.7–3.1)
LYMPHOCYTES NFR BLD AUTO: 29.4 % (ref 19.6–45.3)
MCH RBC QN AUTO: 29.2 PG (ref 26.6–33)
MCHC RBC AUTO-ENTMCNC: 31.6 G/DL (ref 31.5–35.7)
MCV RBC AUTO: 92.4 FL (ref 79–97)
MONOCYTES # BLD AUTO: 0.35 10*3/MM3 (ref 0.1–0.9)
MONOCYTES NFR BLD AUTO: 5.6 % (ref 5–12)
NEUTROPHILS NFR BLD AUTO: 3.97 10*3/MM3 (ref 1.7–7)
NEUTROPHILS NFR BLD AUTO: 63.4 % (ref 42.7–76)
NRBC BLD AUTO-RTO: 0 /100 WBC (ref 0–0.2)
PLATELET # BLD AUTO: 225 10*3/MM3 (ref 140–450)
PMV BLD AUTO: 9.5 FL (ref 6–12)
POTASSIUM SERPL-SCNC: 3.9 MMOL/L (ref 3.5–5.2)
PROT SERPL-MCNC: 6.9 G/DL (ref 6–8.5)
RBC # BLD AUTO: 4.32 10*6/MM3 (ref 3.77–5.28)
SODIUM SERPL-SCNC: 135 MMOL/L (ref 136–145)
WBC # BLD AUTO: 6.26 10*3/MM3 (ref 3.4–10.8)

## 2021-04-13 PROCEDURE — 36415 COLL VENOUS BLD VENIPUNCTURE: CPT

## 2021-04-13 PROCEDURE — 96417 CHEMO IV INFUS EACH ADDL SEQ: CPT

## 2021-04-13 PROCEDURE — 96413 CHEMO IV INFUSION 1 HR: CPT

## 2021-04-13 PROCEDURE — 25010000002 PERTUZUMAB 420 MG/14ML SOLUTION 420 MG VIAL: Performed by: INTERNAL MEDICINE

## 2021-04-13 PROCEDURE — 85025 COMPLETE CBC W/AUTO DIFF WBC: CPT

## 2021-04-13 PROCEDURE — 80053 COMPREHEN METABOLIC PANEL: CPT

## 2021-04-13 PROCEDURE — 25010000002 TRASTUZUMAB-ANNS 420 MG RECONSTITUTED SOLUTION 1 EACH VIAL: Performed by: INTERNAL MEDICINE

## 2021-04-13 PROCEDURE — 25010000002 HEPARIN LOCK FLUSH PER 10 UNITS: Performed by: INTERNAL MEDICINE

## 2021-04-13 RX ORDER — SODIUM CHLORIDE 0.9 % (FLUSH) 0.9 %
10 SYRINGE (ML) INJECTION AS NEEDED
Status: CANCELLED | OUTPATIENT
Start: 2021-05-04

## 2021-04-13 RX ORDER — SODIUM CHLORIDE 0.9 % (FLUSH) 0.9 %
10 SYRINGE (ML) INJECTION AS NEEDED
Status: DISCONTINUED | OUTPATIENT
Start: 2021-04-13 | End: 2021-04-13 | Stop reason: HOSPADM

## 2021-04-13 RX ORDER — HEPARIN SODIUM (PORCINE) LOCK FLUSH IV SOLN 100 UNIT/ML 100 UNIT/ML
300 SOLUTION INTRAVENOUS ONCE
Status: CANCELLED | OUTPATIENT
Start: 2021-04-13

## 2021-04-13 RX ORDER — HEPARIN SODIUM (PORCINE) LOCK FLUSH IV SOLN 100 UNIT/ML 100 UNIT/ML
500 SOLUTION INTRAVENOUS AS NEEDED
Status: CANCELLED | OUTPATIENT
Start: 2021-05-04

## 2021-04-13 RX ORDER — SODIUM CHLORIDE 0.9 % (FLUSH) 0.9 %
20 SYRINGE (ML) INJECTION AS NEEDED
Status: CANCELLED | OUTPATIENT
Start: 2021-04-13

## 2021-04-13 RX ORDER — HEPARIN SODIUM (PORCINE) LOCK FLUSH IV SOLN 100 UNIT/ML 100 UNIT/ML
500 SOLUTION INTRAVENOUS AS NEEDED
Status: DISCONTINUED | OUTPATIENT
Start: 2021-04-13 | End: 2021-04-13 | Stop reason: HOSPADM

## 2021-04-13 RX ORDER — SODIUM CHLORIDE 9 MG/ML
250 INJECTION, SOLUTION INTRAVENOUS ONCE
Status: COMPLETED | OUTPATIENT
Start: 2021-04-13 | End: 2021-04-13

## 2021-04-13 RX ADMIN — Medication 500 UNITS: at 16:02

## 2021-04-13 RX ADMIN — SODIUM CHLORIDE, PRESERVATIVE FREE 10 ML: 5 INJECTION INTRAVENOUS at 16:02

## 2021-04-13 RX ADMIN — SODIUM CHLORIDE 250 ML: 9 INJECTION, SOLUTION INTRAVENOUS at 14:07

## 2021-04-13 RX ADMIN — PERTUZUMAB 420 MG: 30 INJECTION, SOLUTION, CONCENTRATE INTRAVENOUS at 14:09

## 2021-04-13 RX ADMIN — TRASTUZUMAB-ANNS 450 MG: 420 INJECTION, POWDER, LYOPHILIZED, FOR SOLUTION INTRAVENOUS at 15:17

## 2021-04-13 NOTE — PROGRESS NOTES
Nurse Navigator at chairside.  Pt is a 62 y/o female diagnosed with breast cancer.  Patient is here today to receive C10 Kanjinti/ Perjeta infusion.  Pt stated she was doing okay.  Pt c/o frequent sores in her nose but stated that was not anything new.  Patient aware to let us know if nasal sores get worse. Nurse Navigator spent time talking to patient.   Nurse Navigator will continue to follow and assist as needed.

## 2021-04-15 ENCOUNTER — OFFICE VISIT (OUTPATIENT)
Dept: FAMILY MEDICINE CLINIC | Facility: CLINIC | Age: 61
End: 2021-04-15

## 2021-04-15 VITALS
HEART RATE: 76 BPM | TEMPERATURE: 96.8 F | OXYGEN SATURATION: 96 % | SYSTOLIC BLOOD PRESSURE: 126 MMHG | WEIGHT: 164 LBS | HEIGHT: 64 IN | DIASTOLIC BLOOD PRESSURE: 72 MMHG | BODY MASS INDEX: 28 KG/M2

## 2021-04-15 DIAGNOSIS — E78.2 MIXED HYPERLIPIDEMIA: Primary | ICD-10-CM

## 2021-04-15 DIAGNOSIS — E53.8 B12 DEFICIENCY: ICD-10-CM

## 2021-04-15 PROCEDURE — 99214 OFFICE O/P EST MOD 30 MIN: CPT | Performed by: FAMILY MEDICINE

## 2021-04-15 PROCEDURE — 96372 THER/PROPH/DIAG INJ SC/IM: CPT | Performed by: FAMILY MEDICINE

## 2021-04-15 RX ORDER — CYANOCOBALAMIN 1000 UG/ML
1000 INJECTION, SOLUTION INTRAMUSCULAR; SUBCUTANEOUS ONCE
Status: COMPLETED | OUTPATIENT
Start: 2021-04-15 | End: 2021-04-15

## 2021-04-15 RX ADMIN — CYANOCOBALAMIN 1000 MCG: 1000 INJECTION, SOLUTION INTRAMUSCULAR; SUBCUTANEOUS at 17:41

## 2021-04-26 ENCOUNTER — TELEPHONE (OUTPATIENT)
Dept: FAMILY MEDICINE CLINIC | Facility: CLINIC | Age: 61
End: 2021-04-26

## 2021-04-26 NOTE — TELEPHONE ENCOUNTER
Patient called reports she received a B12 injection a week or so ago & by the next morning she felt better,tremors were better & she had more energy,would like to receive another one if OK ?

## 2021-04-28 NOTE — TELEPHONE ENCOUNTER
She can get one every 2 weeks. I'll check a B12 on her next time she comes in and sees me. Thanks.      Spoke with patient & she verbalized understanding.

## 2021-04-28 NOTE — TELEPHONE ENCOUNTER
She can get one every 2 weeks. I'll check a B12 on her next time she comes in and sees me. Thanks.

## 2021-04-29 ENCOUNTER — CLINICAL SUPPORT (OUTPATIENT)
Dept: FAMILY MEDICINE CLINIC | Facility: CLINIC | Age: 61
End: 2021-04-29

## 2021-04-29 DIAGNOSIS — E53.8 B12 DEFICIENCY: Primary | ICD-10-CM

## 2021-04-29 PROCEDURE — 96372 THER/PROPH/DIAG INJ SC/IM: CPT | Performed by: FAMILY MEDICINE

## 2021-04-29 RX ORDER — CYANOCOBALAMIN 1000 UG/ML
1000 INJECTION, SOLUTION INTRAMUSCULAR; SUBCUTANEOUS ONCE
Status: COMPLETED | OUTPATIENT
Start: 2021-04-29 | End: 2021-04-29

## 2021-04-29 RX ADMIN — CYANOCOBALAMIN 1000 MCG: 1000 INJECTION, SOLUTION INTRAMUSCULAR; SUBCUTANEOUS at 15:37

## 2021-05-02 DIAGNOSIS — C50.911 MALIGNANT NEOPLASM OF RIGHT BREAST IN FEMALE, ESTROGEN RECEPTOR NEGATIVE, UNSPECIFIED SITE OF BREAST (HCC): Primary | ICD-10-CM

## 2021-05-02 DIAGNOSIS — Z17.1 MALIGNANT NEOPLASM OF RIGHT BREAST IN FEMALE, ESTROGEN RECEPTOR NEGATIVE, UNSPECIFIED SITE OF BREAST (HCC): Primary | ICD-10-CM

## 2021-05-02 RX ORDER — SODIUM CHLORIDE 9 MG/ML
250 INJECTION, SOLUTION INTRAVENOUS ONCE
Status: CANCELLED | OUTPATIENT
Start: 2021-05-04

## 2021-05-03 NOTE — PROGRESS NOTES
NAME: Jackie Johnson    : 1960    DATE:  2021    DIAGNOSIS:   1.  Clinical Stage IIB ([id number]) poorly differentiated invasive ductal carcinoma of the right breast.  Tumor spanned 15-16 cm, though it was unclear how much of this was invasive malignancy v. DCIS.  There were no clinically or radiographically supspicious axillary LNs.  There was associated high grade DCIS.  Tumor was ER-,SC-, HER-2 +  .  Following neoadjuvant TCHP, she had complete pathologic response - ypT0N0(sn).  No invasive malignancy detected.  0/6 LN involved.     2.  Atypical Lobular Hyperplasia and LCIS involving the L breast    CHIEF COMPLAINT:  Follow up of Breast Cancer/toxicity check     TREATMENT HISTORY:  1.      2.  R Mastectectomy and SLNBx with L Lumpectomy and Mastopexy  - Dr. Kadeem Serrano and Dr. Roc Huston ([facility]) 20    3.       HISTORY OF PRESENT ILLNESS:   Jackie Johnson is a very pleasant 61 y.o. female who was referred by Dr. STACIE Richards for evaluation and treatment of breast cancer. She has a strong family h/o breast cancer and so is normally very good about getting her mammograms done.  She recently moved from [facility] and so missed her mammogram in 2019 because of the move.  She got established with a new PCP (Dr. Denver Sheth) in 2019 and was referred to a a new gynecologist  (Dr. Brayan Richards) who she saw in November/December.  Kevin Richards ordered a screening mammogram.  She had an acute febrile illness around the time of the Super Bowl and at that time thought she might have a lump in her R breast.  While waiting on mammogram scheduling, she started to have pain in the breast and in eventually she would occasionally get a little bit of discharge from the right nipple.  She presented for  Screening mammography 19 as below.  This was followed by diagnostic mammogram and U/S on 20.  She had an abnormal span of 15 cm spanning the R breast.  She  underwent stereotactic biopsy on 1-21-20.    This revealed a poorly differentiated invasive ductal carcinoma of the R breast with associated high grade DCIS.  Tumor was ER/OH- and HER-2+ by IHC.  Her sister lives in [place] and was treated at [facility], so she went there for further evaluation. She saw Dr. Romero of medical oncology and Dr. Serrano of surgical oncology.  She had staging with CT CAP and bone scan as well as Brain MRI and had no evidence of distant metastatic spread.  She had reese breast MRI as well which showed an unexpected abnormality in the L breast described as linear non-mass enhancement spanning an 8 cm area in the inferior breast at 6:00.  This was biopsied on 2-28-20 with biopsy result pending.  No abnormal axillary LNs were identified.  Neoadjuvant chemotherapy was recommended to her by her [facility] team of physicians but she wishes to seek this treatment closer to home so is here today for further evaluation and treatment.    She is in good health overall. She reports an isolated episode of fever/chills during an acute illness, abdominal fullness to her right side, fatigue and a rash circling her neck. She denies changes in weight, cp, sob, persistent abdominal pain, n/v/c/d or bony pain.    INTERVAL HISTORY:  Ms. Johnson is here today for follow up of breast cancer/toxicity check.  Since her last visit, overall, she has been doing well. She continues to tolerate HP and Femara well. She is also taking Calcium/Vit D.  She will have repeat mammogram with MD Forrest late August/September. She reports being started on B12 injections per PCP [id number] which she has found helpful with improvement in fatigue and tremors. She decided to be seen by neurology at [place] as this is where her daughter is being seen and has an appointment scheduled in October. She is overall well today and denies any specific complaints.       PAST MEDICAL HISTORY:  Past Medical History:   Diagnosis Date   • Acid reflux    •  Asthma    • Breast cancer (CMS/HCC) 2020    rt br ca   • Cancer (CMS/HCC)    • Eczema    • Endometriosis    • GERD (gastroesophageal reflux disease)    • Hiatal hernia    • High cholesterol    • Seasonal allergies    • Sinusitis    • Staph infection 2020    port       PAST SURGICAL HISTORY:  Past Surgical History:   Procedure Laterality Date   • ABDOMINAL SURGERY     • BREAST BIOPSY Bilateral    •  SECTION     • D & C HYSTEROSCOPY ENDOMETRIAL ABLATION     • DIAGNOSTIC LAPAROSCOPY     • FRACTURE SURGERY Left     tib/fib   • FULGURATION ENDOMETRIOSIS     • LEG SURGERY      Left leg following fracture   • LYMPH NODE BIOPSY     • PORTACATH PLACEMENT N/A 3/18/2020    Procedure: INSERTION OF PORTACATH;  Surgeon: Dudley Aldridge MD;  Location: [facility] OR;  Service: General;  Laterality: N/A;   • SKIN BIOPSY     • TONSILLECTOMY     • VENOUS ACCESS DEVICE (PORT) INSERTION N/A 2020    Procedure: INSERTION VENOUS ACCESS DEVICE;  Surgeon: Nerissa Wang MD;  Location: [facility] OR;  Service: General;  Laterality: N/A;   • VENOUS ACCESS DEVICE (PORT) REMOVAL Right 2020    Procedure: REMOVAL VENOUS ACCESS DEVICE;  Surgeon: Nerissa Wang MD;  Location: [facility] OR;  Service: General;  Laterality: Right;       FAMILY HISTORY:  Family History   Problem Relation Age of Onset   • Breast cancer Mother 75   • Basal cell carcinoma Mother 90        2 small spots on face removed + fair complexion   • Heart disease Mother    • Hypertension Mother    • Diabetes type II Mother    • Breast cancer Sister 55        VUS detected on genetic testing CDKN2A gene (c.-33G>C).    • Lung disease Sister    • Heart disease Father    • Throat cancer Father 79   • Lung cancer Father 79   • Dementia Father    • Parkinsonism Father    • Cancer Maternal Grandmother 65        unknown gynecologic cancer, cervical vs uterine   • Heart disease Maternal Grandmother    • Diabetes type II Maternal Grandmother    • Lymphoma Maternal  Grandfather 64   • Breast cancer Paternal Grandmother 70   • Prostate cancer Paternal Grandfather 70        metastastic to liver   • Breast cancer Paternal Aunt 77   • Breast cancer Maternal Aunt 80   • Breast cancer Other    • Ovarian cancer Sister 66        Elevated ; 1A tumor; Borderline cancer; both ovaries & lg cyst removed   • Ovarian cancer Maternal Cousin 54   • Breast cancer Other 50   • Diabetes type I Nephew          SOCIAL HISTORY:  Social History     Socioeconomic History   • Marital status:      Spouse name: Not on file   • Number of children: Not on file   • Years of education: Not on file   • Highest education level: Not on file   Tobacco Use   • Smoking status: Never Smoker   • Smokeless tobacco: Never Used   Substance and Sexual Activity   • Alcohol use: Never   • Drug use: Never   • Sexual activity: Defer     Partners: Male     Birth control/protection: None     REVIEW OF SYSTEMS:   A comprehensive 14 point review of systems was performed.  Significant findings as mentioned above.  All other systems reviewed and are negative.      MEDICATIONS:  The current medication list was reviewed in the EMR    Current Outpatient Medications:   •  Calcium Carbonate-Vitamin D (calcium-vitamin D) 500-200 MG-UNIT tablet per tablet, Take 1 tablet by mouth 2 (two) times a day., Disp: , Rfl:   •  calcium-vitamin D (Oscal 500/200 D-3) 500-200 MG-UNIT per tablet, Take 1 tablet by mouth 2 (Two) Times a Day., Disp: 60 tablet, Rfl: 11  •  famotidine (PEPCID) 10 MG tablet, Take 10 mg by mouth As Needed for Indigestion or Heartburn., Disp: , Rfl:   •  FLAXSEED, LINSEED, PO, Take 1 tablet by mouth 2 (two) times a day., Disp: , Rfl:   •  letrozole (FEMARA) 2.5 MG tablet, Take 1 tablet by mouth Daily., Disp: 30 tablet, Rfl: 11  •  levalbuterol (XOPENEX HFA) 45 MCG/ACT inhaler, Inhale 1-2 puffs Every 4 (Four) Hours As Needed for Wheezing., Disp: , Rfl:   •  loratadine (CLARITIN) 10 MG tablet, Take 10 mg by mouth 2  (Two) Times a Day., Disp: , Rfl:   •  Pertuzumab (PERJETA IV), Infuse  into a venous catheter., Disp: , Rfl:   •  Trastuzumab (HERCEPTIN IV), Infuse  into a venous catheter., Disp: , Rfl:   •  vitamin D (ERGOCALCIFEROL) 1.25 MG (32946 UT) capsule capsule, Take 1 capsule by mouth 1 (One) Time Per Week., Disp: 8 capsule, Rfl: 0    ALLERGIES:    Allergies   Allergen Reactions   • Albuterol Other (See Comments)     One time severe headache, questionable for aneurysm, did spinal tap was positive, did Angiogram and MRI were Negative for aneurysm   • Penicillins Hives   • Cefpodoxime Rash     vantin     • Clindamycin Hcl Rash   • Crestor [Rosuvastatin Calcium] Other (See Comments)     Caused high liver enxymes   • Sulfa Antibiotics Other (See Comments)     Mucosal lesions   • Latex Rash   • Pseudoephedrine Other (See Comments)     Soreness on the tongue       PHYSICAL EXAM:  Vitals:    05/04/21 1313   BP: 115/73   Pulse: 69   Resp: 18   Temp: 97.7 °F (36.5 °C)   SpO2: 98%     Pain Score    05/04/21 1313   PainSc: 0-No pain       General:  Awake, alert and oriented, appears well.    HEENT:  Pupils are equal, round and reactive to light and accommodation, Extra-ocular movements full, Oropharyx clear, mucous membranes moist  Neck:  No JVD, thyromegaly or lymphadenopathy   CV:  Regular rate and rhythm, no murmurs, rubs or gallops  Resp:  Lungs are clear to auscultation bilaterally without crackles  Breast: Deferred today. Previously: S/p R mastectomy.  Surgical incisions well healed.  S/p L lumpectomy/mastopexy.  Surgical scars smooth and intact.  No palpable masses or axillary adenopathy on either side.   Abd:  Soft, non-tender, sl distended, bowel sounds present, no organomegaly or masses  Ext:  No clubbing, cyanosis or edema.   Lymph:  No cervical, supraclavicular, axillary,adenopathy  Neuro:  MS as above, grossly non-focal exam    PATHOLOGY:  02-12-20 08-18-20  A. SENTINEL LYMPH NODE #1 RIGHT AXILLA, BIOPSY:  - 1  lymph node, no tumor present (0/1)  - Cytokeratin stain shows no evidence of metastatic carcinoma    B. SENTINEL LYMPH NODE #2, RIGHT AXILLA, BIOPSY:  - one lymph node, no tumor present (0/1)  - Cytokeratin stain shows no evidence of metastatic carcinoma    C. RIGHT BREAST, TOTAL MASTECTOMY:  - No residual carcinoma identified  - area of fibrosis with associated biopsy clip, consistent with treated tumor bed.   - Proliferative fibrocystic change.   - Skin and nipple, no tumor present.   - four low axillary lymph nodes, no tumor present (0/4).     D. LEFT BREAST, SEGMENTAL MASTECTOMY:  - FOCI OF LOBULAR NEOPLASIA (ATYPICAL LOBULAR HYPERPLASIA AND LOBULAR CARCINOMA IN SITU). LOW GRADE, CLASSIC TYPE, WITH FOCAL PAGETOID SPREAD INTO DUCTS.   - Two prior biopsy site changes present.   - Proliferative fibrocystic change.     E. RIGHT BREAST SKIN, EXCESS, EXCISION:  - Skin and breast tissue, no tumor present.     F. LEFT BREAST, MASTOPEXY:  - Skin and breast tissue, no tumor present    Tumor Template  Specimen Identification   Procedure: Total mastectomy   Specimen Laterality: Right  Invasive Carcinoma   Tumor Focality: NA   Tumor Size: 0   Histologic Type of Invasive Carcinoma: NA   Histologic Grade Based on Tommy Histologic Score    Glandular (Acinar)/Tubular Differentiation: Not applicable    Nuclear Pleomorphism: not applicable    Mitotic Rate: not applicable   Overall Grade: not applicable   Lymphovascular invasion: not present  In Situ Carcinoma   Ductal Carcinoma in Situ (DCIS): Not present   Lobular Carcinoma in situ (LCIS): Not present  Tumor Extension   Skin: uninvolved    Nipple: uninvolved   Skeletal muscle: not applicable  Margins   Invasive Carcinoma Margins: NA    DCIS Margins: NA  Regional Lymph nodes   Uninvolved by tumor cells    Total number of lymph nodes examined: 6    Number of sentinel lymph nodes examined: 2  Treatment effect   Treatment effect in the breast: present   Treatment effect in the  lymph nodes: not present  RCB input variables   Dimensions of Residual Cancer that is in-situ: 0%   Total Number of Lymph nodes with Residual Metastatic Carcinoma: 0   Size of Largest Metastasis: NA   RCB Index Computed Value (optional): 0   RCB Class (optional): 0  Pathologic Stage Classification 9ypTNM, AJCC 8th Edition)    Primary Tumor (invasive carcinoma)(ypT): ypT0   Regional lymph nodes (ypN): yPN0(sn)   Distant Metastasis (ypM): NA  Ancillary Studies: Please see previous case [id number]      ENDOSCOPY:        IMAGING:  Bilateral Diagnostic mammogram 12-27-19  FINDINGS:  There are scattered areas of fibroglandular density.     The bilateral fibroglandular pattern is unremarkable in appearance. A  few scattered calcifications are present in the left breast. There are  calcifications in the central aspect of the right breast spanning  approximately 15 cm of tissue in the AP dimension and extending up to  the base of the nipple for which additional imaging is recommended.     IMPRESSION:  1. Benign left mammographic findings.  2. Calcifications in the right breast. Recommend additional imaging.        BI-RADS CATEGORY:  0, INCOMPLETE: Need additional imaging evaluation.     RECOMMENDATION:  Right ML and CC magnification views.      R diagnostic mammogram 01-16-20  FINDINGS:  Magnification imaging of the right breast demonstrates  casting-type pleomorphic calcifications spanning over 15 cm of tissue in  the AP dimension in the right 6:00 to 7:00 region. Calcifications do  extend up to the base of the nipple. Findings are highly suspicious for  DCIS.     Right breast ultrasound: Focused sonographic evaluation of the right  6:00 to 7:00 region demonstrates a single 0.5 cm irregular hypoechoic  mass associated with a coarse calcification located at 7:00, 3 cm from  the nipple. Ultrasound of the right axilla demonstrates no abnormal  appearing axillary lymph nodes.        IMPRESSION:  Findings are highly suspicious  for extensive DCIS in the  right 6:00 to 7:00 region extending into the base of the nipple. There  is also small irregular mass noted on ultrasound in the 7:00 region  suspicious for invasion.        BI-RADS CATEGORY:  5, HIGHLY SUGGESTIVE OF MALIGNANCY        RECOMMENDATION:  Recommend stereotactic guided core biopsy of the  anterior and posterior aspect of the calcifications in the 6:00 to 7:00  region to determine extent of disease. Ultrasound-guided core biopsy of  the right 7:00 mass may also be warranted pending pathology results of  the calcifications.    Diagnostic bilateral mammogram 02-25-20  FINDINGS:  There are scattered areas of fibroglandular density.    1:  There are fine-linear branching calcifications measuring 16 x 7 x 7  centimeters with segmental distribution and associated post biopsy clip in the  right breast lower hemisphere at 6 to 7 o'clock.  There are 2 post biopsy clips  noted.  The calcifications extend to the inferior skin and nipple.    2:  There are amorphous calcifications measuring 0.3 centimeters in the left  breast lower hemisphere at 6 to 7 o'clock located 4 centimeters from the nipple.    IMPRESSION:  1:  Fine-linear branching calcifications in the right breast lower hemisphere at  6 to 7 o'clock are known biopsy-proven malignancy. Ultrasound is recommended for  staging. Recommend appropriate evaluation and treatment of this known malignancy.    2:  Amorphous calcifications in the left breast lower hemisphere at 6 to 7  o'clock located 4 centimeters from the nipple are suspicious. Stereotactic  biopsy is recommended.    BI-RADS Category 4:  Suspicious Abnormality      US Chest 02-25-20  Findings:       Right Breast: The extent of calcified disease is best visualized by mammography. There are dilated ducts with internal calcifications vascularity extending toward the nipple at the 7 o'clock position. This is consistent with the patient's known biopsy-proven malignancy.      Right  Charles Basins: No suspicious axillary (level I, II & III) or internal mammary lymphadenopathy is noted.      IMPRESSION:    1. Known RIGHT breast malignancy is best visualized by mammography. A MRI may be obtained for evaluation of disease.    2. No suspicious RIGHT-sided lymphadenopathy      ACR BI-RADS Category: 6. Known malignancy.  Recommend appropriate evaluation and treatment of the known malignancy.         R breast US 02-25-20  Findings:       Right Breast: The extent of calcified disease is best visualized by mammography. There are dilated ducts with internal calcifications vascularity extending toward the nipple at the 7 o'clock position. This is consistent with the patient's known biopsy-proven malignancy.      Right Charles Basins: No suspicious axillary (level I, II & III) or internal mammary lymphadenopathy is noted.      IMPRESSION:    1. Known RIGHT breast malignancy is best visualized by mammography. A MRI may be obtained for evaluation of disease.    2. No suspicious RIGHT-sided lymphadenopathy      ACR BI-RADS Category: 6. Known malignancy.  Recommend appropriate evaluation and treatment of the known malignancy.       MRI Brain w/wo contrast 02-27-20  FINDINGS:     No abnormal parenchymal or leptomeningeal enhancement is noted. The brain parenchyma is unremarkable except of small scattered T2 FLAIR hyperintensities consistent with chronic microangiopathic changes. No acute ischemia, intra or extra-axial hemorrhage, mass effect or midline shift. No hydrocephalus is noted.    The osseous structures and extra-cranial soft tissues are unremarkable.    The orbital structures unremarkable.    The paranasal sinuses and mastoid air cells are clear.    IMPRESSION:  No evidence of metastatic disease.      CTCAP 02-28-20    Findings:       CHEST:  The breasts are ptotic and there are biopsy clips projecting between the lower quadrants of the right breast.    On image 99 of series 3, there is a nonspecific 1 cm  soft tissue nodule in the lower outer quadrant of the right breast.    No lung nodules or pleural effusions are present.    No axillary, internal mammary, mediastinal or hilar adenopathy is seen.    No suspicious bone lesions are present.      ABDOMEN and PELVIS:  The liver is borderline enlarged and diffusely fatty infiltrated without measurable mass or biliary ductal dilatation.    The gallbladder appears normal.    The spleen, pancreas and adrenal glands appear normal.    The kidneys demonstrate function without hydronephrosis and there is a small low dense nidus in the mid right kidney, likely a cyst.    No adenopathy or ascites are seen in the abdomen or pelvis.    A normal-appearing retrocecal appendix is seen.    Multilevel degenerative-like bone changes are present.      IMPRESSION:  No evidence for metastatic disease the chest, abdomen or pelvis.      MRI Breast bilateral 03-02-20    Findings:  The breast composition is heterogeneous fibroglandular tissue. There is no significant early background parenchymal enhancement.    Right breast: There is linearly oriented nonmass enhancement involving the lateral and inferior aspects (anterior, middle, and posterior third) of the right breast 6 - 7 o'clock position extending 17 cm from the base of the nipple to the pectoralis muscle (series 5 image  of 256). There is no evidence for pectoralis muscle invasion. Extension to the inferior skin is better demonstrated mammographically. Susceptibility artifact from 2 postbiopsy clips is noted.    Left breast: There is linearly oriented nonmass enhancement in the inferior aspect (middle third) of the left breast 6 o'clock position extending approximately 8 cm (series 5 image  of 256). There is no evidence for nipple, skin, or pectoralis muscle involvement. Stereotactic guided biopsy of calcifications at the 6 o'clock position to be performed later today and corresponds to the anterior aspect of the nonmass  enhancement. MRI guided biopsy of nonmass enhancement seen on image 94 of 256, series 5 and image 40 of 160, series 8 is recommended (The images have been annotated with a [place]).    Charles Basins: There is no lymphadenopathy in the visualized axillary or internal mammary regions.    IMPRESSION:  Linear nonmass enhancement representing known malignancy right breast as above. Linear nonmass enhancement left breast as above.   MRI guided biopsy of nonmass enhancement seen on image 94 of 256, series 5 and image 40 of 160, series 8 is recommended.    ACR BI-RADS Category: 6. Recommend MR-guided biopsy of nonmass enhancement left breast as above .Recommend appropriate evaluation and treatment of the known malignancy.       Bone scan 03-02-20  FINDINGS:     Radiotracer uptake secondary to degenerative changes are seen involving the shoulders, hips, knees, and feet. Increased uptake along the right tibial shaft likely is likely reactive. Faint focus of activity within the left tibial shaft is compatible with prior injury/fracture. Increased uptake in the skull is compatible with a benign hyperostosis.    Physiologic uptake of the bilateral kidneys and bladder.    IMPRESSION:    No scintigraphic evidence of skeletal metastases.      Echo 03-02-20      Bilateral diagnostic mammogram 05-26-20  FINDINGS: There are scattered areas of fibroglandular density.     A postbiopsy marking clip is now noted in the left breast with expected  surrounding postbiopsy changes best visualized on MLO imaging. The  remaining bilateral fibroglandular pattern is stable in appearance.  Redemonstrated are fine linear branching calcifications in the right  6:00 to 7:00 region spanning approximately 17 cm of tissue in the AP  dimension. There are 2 associated postbiopsy marking clips. No new areas  of calcification are seen in either breast.     IMPRESSION:  1. Benign left mammographic findings.        2. No significant change in the appearance of  calcifications in the  right breast.        BI-RADS CATEGORY:  6, KNOWN BIOPSY PROVEN MALIGNANCY        RECOMMENDATION:  Recommend clinical follow-up.       Echo 07-22-20  · Normal left ventricular cavity size and wall thickness noted. All left ventricular wall segments contract normally.  · Estimated EF appears to be in the range of 61 - 65%  · The pericardium is normal. There is no evidence of pericardial effusion.      DEXA 09-25-20  FINDINGS: The BMD measured at the AP spine L1-L4 is 1.003 gm/cm2 with a  T-score of -1.5.      IMPRESSION:  The patient is considered to be osteopenic according to the  World Health Organization criteria. Fracture risk is moderate      MRI L spine wo contrast 09-28-20  FINDINGS:  Sagittal alignment is normal. Bone marrow signal intensity is normal. There is disc desiccation L1-2 through L3-4 with mild multiple level loss of intervertebral disc height. Endplate spondylosis and Schmorl's node formation most apparent at L1-2 and  L2-3. Conus medullaris terminates at L1-2 and is normal.     At L1-2, there is a mild concentric disc bulge and endplate spondylosis. There is no canal stenosis or foraminal impingement.     At L2-3, there is concentric disc bulging and mild facet hypertrophy but no canal or foraminal impingement.     At L3-4, is mild facet degenerative change left greater the right with ligamentum flavum thickening and a broad posterior disc bulge. There is no canal stenosis. There is mild inferior foraminal narrowing bilaterally.     L4-5, there is moderate facet degenerative change bilaterally with ligamentum flavum thickening. Is mild bilateral foraminal narrowing. There is no canal stenosis.     At L5-S1, there is a broad posterior disc bulge. There is a tiny posterior annular fissure. There is no canal stenosis. There is a small focal left posterolateral protrusion into the left inferior foramen with mild to moderate left inferior foraminal  narrowing. There is mild  bilateral facet degenerative change.     IMPRESSION:     1. There is no evidence for osseous metastatic disease.  2. There are lumbar degenerative changes without evidence for lumbar canal stenosis. Details above.      Echo 09-29-20  · The study is technically difficult for diagnosis.  · Normal left ventricular cavity size and wall thickness noted. All left ventricular wall segments contract normally.  · Left ventricular ejection fraction appears to be 61 - 65%.  · The aortic valve is structurally normal with no regurgitation or stenosis present.  · The mitral valve is structurally normal with no regurgitation or significant stenosis present.  · There is no evidence of pericardial effusion.       Echo 12-20-20  · Normal left ventricular cavity size and wall thickness noted. All left ventricular wall segments contract normally.  · Left ventricular ejection fraction appears to be 61 - 65%.  · The pericardium is normal. There is no evidence of pericardial effusion. .  · Comments: LV systolic function is about the same as on the previous echo study.      Echo 03-19-21  · Normal left ventricular cavity size and wall thickness noted. All left ventricular wall segments contract normally.  · Left ventricular ejection fraction appears to be 61 - 65%.  · There is no evidence of pericardial effusion.      RECENT LABS:  Lab Results   Component Value Date    WBC 5.23 05/04/2021    HGB 12.6 05/04/2021    HCT 39.7 05/04/2021    MCV 91.7 05/04/2021    RDW 13.2 05/04/2021     05/04/2021    NEUTRORELPCT 64.0 05/04/2021    LYMPHORELPCT 28.5 05/04/2021    MONORELPCT 5.9 05/04/2021    EOSRELPCT 0.6 05/04/2021    BASORELPCT 0.8 05/04/2021    NEUTROABS 3.35 05/04/2021    LYMPHSABS 1.49 05/04/2021       Lab Results   Component Value Date     05/04/2021    K 4.2 05/04/2021    CO2 28.1 05/04/2021     05/04/2021    BUN 14 05/04/2021    CREATININE 0.78 05/04/2021    EGFRIFNONA 75 05/04/2021    EGFRIFAFRI 85 08/17/2020     GLUCOSE 94 05/04/2021    CALCIUM 10.0 05/04/2021    ALKPHOS 82 05/04/2021    AST 17 05/04/2021    ALT 22 05/04/2021    BILITOT 0.2 05/04/2021    ALBUMIN 4.29 05/04/2021    PROTEINTOT 6.9 05/04/2021    MG 1.9 01/18/2021     Lab Results   Component Value Date    TSH 2.080 01/18/2021       Lab Results   Component Value Date     15.1 03/11/2020     Lab Results   Component Value Date    LABCA2 19.0 03/11/2020       ASSESSMENT & PLAN:  Jackie Johnson is a very pleasant 61 y.o. female with what is clinically a Stage IIB ([id number]) poorly differentiated invasive ductal carcinoma of the right breast.  Tumor spanned 15-16 cm although it was difficult to tell how much of this was invasive malignancy v. DCIS.  There were no clinically or radiographically supspicious axillary LNs.  There was associated high grade DCIS.  Tumor was ER-,MS-, HER-2 +.  She had pathologic complete response with neoadjuvant therapy.  She also has LCIS/ALH involving the left breast.    1.  Right breast cancer:  -  Administered TCHP neoadjuvantly to downsize tumor and potentially allow for a more successful surgery. After 6 cycles of TCHP, she had a complete pathologic response.  -  We discussed consideration of radiation given the initial size of her tumor.  It really isn't known how much of her initial disease was invasive malignancy v. DCIS.  She says she was evaluated by a Radiation Oncologist at [facility] who didn't recommend radiation.  -  Given HER-2 positive disease, recommended we complete a year of HP which she is tolerating well. Will proceed with treatment today, cycle 11. Most recent echo with normal LVEF. She will follow up with MD after she completes C12 with repeat ECHO prior.     2. L Breast LCIS / ALH:  -  She is s/p lumpectomy.  -  Discussed role for 5 years of hormonal therapy in prevention of future breast cancer development in this breast.  She is post-menopausal, so could use Tamoxifen or an aromatase inhibitor for this  "purpose.  She has an entact uterus so Tamoxifen would come with increased risk (albeit low risk) of development of endometrial hypertrophy/ endometrial cancer.  Aromatase inhibitors would not come with this risk, but would come with risk for loss of bone mineral density and potentially increased risk of side effects.    -  DEXA showed osteopenia with T score -1.5, so recommended Tamoxifen for prevention.  Ms. Johnson was not comfortable with risk of endometrial hyperplasia / cancer so she said she would prefer aromatase inhibitor.  In particular she would like to take Femara so ordered this with plan for 5 years of treatment. She is tolerating Femara well with no noticeable SE.   -  She will have repeat mammogram with [facility] late August/September.    3.  Bone health:  -  Ms. Johnson has osteopenia with T score -1.5.  She also has a h/o Vit D Deficiency for which she took a course of weekly high dose Vit D (now completed). Repeat Vit D Level normal. Continue Ca/Vit D BID.  Recommended weight bearing exercise.  -  Will plan for repeat DEXA after 2 years (September 2022).     4.  Unusual sense of \"vibration\" and family h/o HSP (with SPG7 mutation):  Had some evaluation at [facility]. Referral was placed to Neuromuscular Neurology specialist at [facility] and currently awaiting an appointment on a waiting list.  Otherwise she has appt in July. Since her last visit, she has been started on Vit B12 injections [id number] per PCP which she feels is helping with her symptoms. She has also decided to see neurology at [facility] as this is where her daughter goes and has an appointment scheduled in October.     5.  Very strong family history of malignancy including several members with breast cancer and a sister with ovarian cancer.  -  She underwent genetic testing at [facility] and testing was negative.      6. Vision changes:  - She saw ophthalmology and was told she had blocked oil ducts in her bottom eyelid.  She followed " instructions (massage with baby shampoo, warm compresses, flax seed oil) with relief. She will follow up again in July.      7.  Prophylaxis:  She has had 2020 flu vaccine.  Recommended she get Prevnar 13 which she also had.  She has received COVID vaccine x 2.       8.  Follow-up:   - MD follow up following C12 with repeat ECHO prior.   -  Mammogram planned at [facility] in late August/September.    ACO / MARNI/Other  Quality measures  -  Jackie Johnson received 2020 flu vaccine.  -  Jackie Johnson reports a pain score of 0.    -  Current outpatient and discharge medications have been reconciled for the patient.  Reviewed by: SHER Sherwood     I spent 30 minutes with Jackie Johnson today.  In the office today, more than 50% of this time was spent face-to-face with her  in counseling / coordination of care, reviewing her medical history and counseling on the current treatment plan.  All questions were answered to her satisfaction      Electronically Signed by: SHER Alaniz      CC:   MD JUAN PABLO Macdonald MD Bora Lim, MD- [facility]  Kadeem Serrano MD- [facility]  Dr. Roc Huston - [facility] Plastic Surgery

## 2021-05-04 ENCOUNTER — OFFICE VISIT (OUTPATIENT)
Dept: ONCOLOGY | Facility: CLINIC | Age: 61
End: 2021-05-04

## 2021-05-04 ENCOUNTER — INFUSION (OUTPATIENT)
Dept: ONCOLOGY | Facility: HOSPITAL | Age: 61
End: 2021-05-04

## 2021-05-04 VITALS
OXYGEN SATURATION: 98 % | TEMPERATURE: 97.7 F | RESPIRATION RATE: 18 BRPM | BODY MASS INDEX: 27.9 KG/M2 | DIASTOLIC BLOOD PRESSURE: 73 MMHG | SYSTOLIC BLOOD PRESSURE: 115 MMHG | HEART RATE: 69 BPM | WEIGHT: 162.6 LBS

## 2021-05-04 VITALS
TEMPERATURE: 97.7 F | DIASTOLIC BLOOD PRESSURE: 73 MMHG | RESPIRATION RATE: 18 BRPM | HEART RATE: 69 BPM | SYSTOLIC BLOOD PRESSURE: 115 MMHG | WEIGHT: 162.6 LBS | BODY MASS INDEX: 27.9 KG/M2 | OXYGEN SATURATION: 98 %

## 2021-05-04 DIAGNOSIS — M85.80 OSTEOPENIA, UNSPECIFIED LOCATION: ICD-10-CM

## 2021-05-04 DIAGNOSIS — N60.92 ATYPICAL LOBULAR HYPERPLASIA (ALH) OF LEFT BREAST: ICD-10-CM

## 2021-05-04 DIAGNOSIS — H53.9 VISION CHANGES: ICD-10-CM

## 2021-05-04 DIAGNOSIS — C50.911 MALIGNANT NEOPLASM OF RIGHT BREAST IN FEMALE, ESTROGEN RECEPTOR NEGATIVE, UNSPECIFIED SITE OF BREAST (HCC): Primary | ICD-10-CM

## 2021-05-04 DIAGNOSIS — E78.2 MIXED HYPERLIPIDEMIA: ICD-10-CM

## 2021-05-04 DIAGNOSIS — R29.818 NEUROLOGICAL ABNORMALITY: ICD-10-CM

## 2021-05-04 DIAGNOSIS — Z17.1 MALIGNANT NEOPLASM OF RIGHT BREAST IN FEMALE, ESTROGEN RECEPTOR NEGATIVE, UNSPECIFIED SITE OF BREAST (HCC): Primary | ICD-10-CM

## 2021-05-04 DIAGNOSIS — Z95.828 PORT-A-CATH IN PLACE: ICD-10-CM

## 2021-05-04 DIAGNOSIS — R73.03 PREDIABETES: ICD-10-CM

## 2021-05-04 LAB
ALBUMIN SERPL-MCNC: 4.29 G/DL (ref 3.5–5.2)
ALBUMIN/GLOB SERPL: 1.6 G/DL
ALP SERPL-CCNC: 82 U/L (ref 39–117)
ALT SERPL W P-5'-P-CCNC: 22 U/L (ref 1–33)
ANION GAP SERPL CALCULATED.3IONS-SCNC: 8.9 MMOL/L (ref 5–15)
AST SERPL-CCNC: 17 U/L (ref 1–32)
BASOPHILS # BLD AUTO: 0.04 10*3/MM3 (ref 0–0.2)
BASOPHILS NFR BLD AUTO: 0.8 % (ref 0–1.5)
BILIRUB SERPL-MCNC: 0.2 MG/DL (ref 0–1.2)
BUN SERPL-MCNC: 14 MG/DL (ref 8–23)
BUN/CREAT SERPL: 17.9 (ref 7–25)
CALCIUM SPEC-SCNC: 10 MG/DL (ref 8.6–10.5)
CHLORIDE SERPL-SCNC: 101 MMOL/L (ref 98–107)
CHOLEST SERPL-MCNC: 214 MG/DL (ref 0–200)
CO2 SERPL-SCNC: 28.1 MMOL/L (ref 22–29)
CREAT SERPL-MCNC: 0.78 MG/DL (ref 0.57–1)
DEPRECATED RDW RBC AUTO: 44.5 FL (ref 37–54)
EOSINOPHIL # BLD AUTO: 0.03 10*3/MM3 (ref 0–0.4)
EOSINOPHIL NFR BLD AUTO: 0.6 % (ref 0.3–6.2)
ERYTHROCYTE [DISTWIDTH] IN BLOOD BY AUTOMATED COUNT: 13.2 % (ref 12.3–15.4)
GFR SERPL CREATININE-BSD FRML MDRD: 75 ML/MIN/1.73
GLOBULIN UR ELPH-MCNC: 2.6 GM/DL
GLUCOSE SERPL-MCNC: 94 MG/DL (ref 65–99)
HCT VFR BLD AUTO: 39.7 % (ref 34–46.6)
HDLC SERPL-MCNC: 43 MG/DL (ref 40–60)
HGB BLD-MCNC: 12.6 G/DL (ref 12–15.9)
IMM GRANULOCYTES # BLD AUTO: 0.01 10*3/MM3 (ref 0–0.05)
IMM GRANULOCYTES NFR BLD AUTO: 0.2 % (ref 0–0.5)
LDLC SERPL CALC-MCNC: 148 MG/DL (ref 0–100)
LDLC/HDLC SERPL: 3.38 {RATIO}
LYMPHOCYTES # BLD AUTO: 1.49 10*3/MM3 (ref 0.7–3.1)
LYMPHOCYTES NFR BLD AUTO: 28.5 % (ref 19.6–45.3)
MCH RBC QN AUTO: 29.1 PG (ref 26.6–33)
MCHC RBC AUTO-ENTMCNC: 31.7 G/DL (ref 31.5–35.7)
MCV RBC AUTO: 91.7 FL (ref 79–97)
MONOCYTES # BLD AUTO: 0.31 10*3/MM3 (ref 0.1–0.9)
MONOCYTES NFR BLD AUTO: 5.9 % (ref 5–12)
NEUTROPHILS NFR BLD AUTO: 3.35 10*3/MM3 (ref 1.7–7)
NEUTROPHILS NFR BLD AUTO: 64 % (ref 42.7–76)
NRBC BLD AUTO-RTO: 0 /100 WBC (ref 0–0.2)
PLATELET # BLD AUTO: 222 10*3/MM3 (ref 140–450)
PMV BLD AUTO: 9.8 FL (ref 6–12)
POTASSIUM SERPL-SCNC: 4.2 MMOL/L (ref 3.5–5.2)
PROT SERPL-MCNC: 6.9 G/DL (ref 6–8.5)
RBC # BLD AUTO: 4.33 10*6/MM3 (ref 3.77–5.28)
SODIUM SERPL-SCNC: 138 MMOL/L (ref 136–145)
TRIGL SERPL-MCNC: 129 MG/DL (ref 0–150)
VLDLC SERPL-MCNC: 23 MG/DL (ref 5–40)
WBC # BLD AUTO: 5.23 10*3/MM3 (ref 3.4–10.8)

## 2021-05-04 PROCEDURE — 36415 COLL VENOUS BLD VENIPUNCTURE: CPT

## 2021-05-04 PROCEDURE — 80053 COMPREHEN METABOLIC PANEL: CPT

## 2021-05-04 PROCEDURE — 85025 COMPLETE CBC W/AUTO DIFF WBC: CPT

## 2021-05-04 PROCEDURE — 25010000002 HEPARIN LOCK FLUSH PER 10 UNITS: Performed by: INTERNAL MEDICINE

## 2021-05-04 PROCEDURE — 80061 LIPID PANEL: CPT

## 2021-05-04 PROCEDURE — 25010000002 PERTUZUMAB 420 MG/14ML SOLUTION 420 MG VIAL: Performed by: INTERNAL MEDICINE

## 2021-05-04 PROCEDURE — 99214 OFFICE O/P EST MOD 30 MIN: CPT | Performed by: NURSE PRACTITIONER

## 2021-05-04 PROCEDURE — 25010000002 TRASTUZUMAB-ANNS 420 MG RECONSTITUTED SOLUTION 1 EACH VIAL: Performed by: INTERNAL MEDICINE

## 2021-05-04 PROCEDURE — 96417 CHEMO IV INFUS EACH ADDL SEQ: CPT

## 2021-05-04 PROCEDURE — 96413 CHEMO IV INFUSION 1 HR: CPT

## 2021-05-04 RX ORDER — SODIUM CHLORIDE 0.9 % (FLUSH) 0.9 %
10 SYRINGE (ML) INJECTION AS NEEDED
Status: DISCONTINUED | OUTPATIENT
Start: 2021-05-04 | End: 2021-05-04 | Stop reason: HOSPADM

## 2021-05-04 RX ORDER — HEPARIN SODIUM (PORCINE) LOCK FLUSH IV SOLN 100 UNIT/ML 100 UNIT/ML
500 SOLUTION INTRAVENOUS AS NEEDED
Status: CANCELLED | OUTPATIENT
Start: 2021-05-25

## 2021-05-04 RX ORDER — SODIUM CHLORIDE 0.9 % (FLUSH) 0.9 %
10 SYRINGE (ML) INJECTION AS NEEDED
Status: CANCELLED | OUTPATIENT
Start: 2021-05-25

## 2021-05-04 RX ORDER — HEPARIN SODIUM (PORCINE) LOCK FLUSH IV SOLN 100 UNIT/ML 100 UNIT/ML
300 SOLUTION INTRAVENOUS ONCE
Status: CANCELLED | OUTPATIENT
Start: 2021-05-04

## 2021-05-04 RX ORDER — SODIUM CHLORIDE 0.9 % (FLUSH) 0.9 %
20 SYRINGE (ML) INJECTION AS NEEDED
Status: CANCELLED | OUTPATIENT
Start: 2021-05-04

## 2021-05-04 RX ORDER — HEPARIN SODIUM (PORCINE) LOCK FLUSH IV SOLN 100 UNIT/ML 100 UNIT/ML
500 SOLUTION INTRAVENOUS AS NEEDED
Status: DISCONTINUED | OUTPATIENT
Start: 2021-05-04 | End: 2021-05-04 | Stop reason: HOSPADM

## 2021-05-04 RX ORDER — SODIUM CHLORIDE 9 MG/ML
250 INJECTION, SOLUTION INTRAVENOUS ONCE
Status: COMPLETED | OUTPATIENT
Start: 2021-05-04 | End: 2021-05-04

## 2021-05-04 RX ADMIN — SODIUM CHLORIDE, PRESERVATIVE FREE 500 UNITS: 5 INJECTION INTRAVENOUS at 16:35

## 2021-05-04 RX ADMIN — TRASTUZUMAB-ANNS 440 MG: 420 INJECTION, POWDER, LYOPHILIZED, FOR SOLUTION INTRAVENOUS at 15:50

## 2021-05-04 RX ADMIN — PERTUZUMAB 420 MG: 30 INJECTION, SOLUTION, CONCENTRATE INTRAVENOUS at 14:44

## 2021-05-04 RX ADMIN — SODIUM CHLORIDE, PRESERVATIVE FREE 10 ML: 5 INJECTION INTRAVENOUS at 16:35

## 2021-05-04 RX ADMIN — SODIUM CHLORIDE 250 ML: 9 INJECTION, SOLUTION INTRAVENOUS at 14:43

## 2021-05-07 ENCOUNTER — TELEPHONE (OUTPATIENT)
Dept: FAMILY MEDICINE CLINIC | Facility: CLINIC | Age: 61
End: 2021-05-07

## 2021-05-14 ENCOUNTER — TELEPHONE (OUTPATIENT)
Dept: FAMILY MEDICINE CLINIC | Facility: CLINIC | Age: 61
End: 2021-05-14

## 2021-05-14 ENCOUNTER — CLINICAL SUPPORT (OUTPATIENT)
Dept: FAMILY MEDICINE CLINIC | Facility: CLINIC | Age: 61
End: 2021-05-14

## 2021-05-14 DIAGNOSIS — E53.8 B12 DEFICIENCY: Primary | ICD-10-CM

## 2021-05-14 PROCEDURE — 96372 THER/PROPH/DIAG INJ SC/IM: CPT | Performed by: FAMILY MEDICINE

## 2021-05-14 RX ORDER — CYANOCOBALAMIN 1000 UG/ML
1000 INJECTION, SOLUTION INTRAMUSCULAR; SUBCUTANEOUS ONCE
Status: COMPLETED | OUTPATIENT
Start: 2021-05-14 | End: 2021-05-14

## 2021-05-14 RX ADMIN — CYANOCOBALAMIN 1000 MCG: 1000 INJECTION, SOLUTION INTRAMUSCULAR; SUBCUTANEOUS at 16:10

## 2021-05-14 NOTE — TELEPHONE ENCOUNTER
Caller: Jackie Johnson    Relationship to patient: Self    Best call back number: [phone] OR LEAVE MESSAGE     Type of visit: NURSE VISIT     Requested date: 5-14-21 AROUND 3:30 OR AFTER     Additional notes: PATIENT WOULD LIKE TO COME IN FOR HER B-12 INJECTION

## 2021-05-21 ENCOUNTER — TELEPHONE (OUTPATIENT)
Dept: FAMILY MEDICINE CLINIC | Facility: CLINIC | Age: 61
End: 2021-05-21

## 2021-05-21 RX ORDER — SODIUM CHLORIDE 9 MG/ML
250 INJECTION, SOLUTION INTRAVENOUS ONCE
Status: CANCELLED | OUTPATIENT
Start: 2021-05-25

## 2021-05-25 ENCOUNTER — INFUSION (OUTPATIENT)
Dept: ONCOLOGY | Facility: HOSPITAL | Age: 61
End: 2021-05-25

## 2021-05-25 VITALS
OXYGEN SATURATION: 97 % | RESPIRATION RATE: 18 BRPM | SYSTOLIC BLOOD PRESSURE: 117 MMHG | BODY MASS INDEX: 27.64 KG/M2 | TEMPERATURE: 98 F | DIASTOLIC BLOOD PRESSURE: 80 MMHG | WEIGHT: 161.1 LBS | HEART RATE: 80 BPM

## 2021-05-25 DIAGNOSIS — Z95.828 PORT-A-CATH IN PLACE: ICD-10-CM

## 2021-05-25 DIAGNOSIS — Z17.1 MALIGNANT NEOPLASM OF RIGHT BREAST IN FEMALE, ESTROGEN RECEPTOR NEGATIVE, UNSPECIFIED SITE OF BREAST (HCC): Primary | ICD-10-CM

## 2021-05-25 DIAGNOSIS — C50.911 MALIGNANT NEOPLASM OF RIGHT BREAST IN FEMALE, ESTROGEN RECEPTOR NEGATIVE, UNSPECIFIED SITE OF BREAST (HCC): Primary | ICD-10-CM

## 2021-05-25 LAB
ALBUMIN SERPL-MCNC: 4.32 G/DL (ref 3.5–5.2)
ALBUMIN/GLOB SERPL: 1.7 G/DL
ALP SERPL-CCNC: 90 U/L (ref 39–117)
ALT SERPL W P-5'-P-CCNC: 26 U/L (ref 1–33)
ANION GAP SERPL CALCULATED.3IONS-SCNC: 11.8 MMOL/L (ref 5–15)
AST SERPL-CCNC: 20 U/L (ref 1–32)
BASOPHILS # BLD AUTO: 0.06 10*3/MM3 (ref 0–0.2)
BASOPHILS NFR BLD AUTO: 1.1 % (ref 0–1.5)
BILIRUB SERPL-MCNC: 0.3 MG/DL (ref 0–1.2)
BUN SERPL-MCNC: 18 MG/DL (ref 8–23)
BUN/CREAT SERPL: 19.6 (ref 7–25)
CALCIUM SPEC-SCNC: 10.1 MG/DL (ref 8.6–10.5)
CHLORIDE SERPL-SCNC: 103 MMOL/L (ref 98–107)
CO2 SERPL-SCNC: 23.2 MMOL/L (ref 22–29)
CREAT SERPL-MCNC: 0.92 MG/DL (ref 0.57–1)
DEPRECATED RDW RBC AUTO: 43.6 FL (ref 37–54)
EOSINOPHIL # BLD AUTO: 0.03 10*3/MM3 (ref 0–0.4)
EOSINOPHIL NFR BLD AUTO: 0.5 % (ref 0.3–6.2)
ERYTHROCYTE [DISTWIDTH] IN BLOOD BY AUTOMATED COUNT: 13.1 % (ref 12.3–15.4)
GFR SERPL CREATININE-BSD FRML MDRD: 62 ML/MIN/1.73
GLOBULIN UR ELPH-MCNC: 2.5 GM/DL
GLUCOSE SERPL-MCNC: 94 MG/DL (ref 65–99)
HCT VFR BLD AUTO: 38.7 % (ref 34–46.6)
HGB BLD-MCNC: 12.7 G/DL (ref 12–15.9)
IMM GRANULOCYTES # BLD AUTO: 0.01 10*3/MM3 (ref 0–0.05)
IMM GRANULOCYTES NFR BLD AUTO: 0.2 % (ref 0–0.5)
LYMPHOCYTES # BLD AUTO: 1.45 10*3/MM3 (ref 0.7–3.1)
LYMPHOCYTES NFR BLD AUTO: 25.8 % (ref 19.6–45.3)
MCH RBC QN AUTO: 29.5 PG (ref 26.6–33)
MCHC RBC AUTO-ENTMCNC: 32.8 G/DL (ref 31.5–35.7)
MCV RBC AUTO: 89.8 FL (ref 79–97)
MONOCYTES # BLD AUTO: 0.34 10*3/MM3 (ref 0.1–0.9)
MONOCYTES NFR BLD AUTO: 6 % (ref 5–12)
NEUTROPHILS NFR BLD AUTO: 3.73 10*3/MM3 (ref 1.7–7)
NEUTROPHILS NFR BLD AUTO: 66.4 % (ref 42.7–76)
NRBC BLD AUTO-RTO: 0 /100 WBC (ref 0–0.2)
PLATELET # BLD AUTO: 224 10*3/MM3 (ref 140–450)
PMV BLD AUTO: 9.9 FL (ref 6–12)
POTASSIUM SERPL-SCNC: 4.3 MMOL/L (ref 3.5–5.2)
PROT SERPL-MCNC: 6.8 G/DL (ref 6–8.5)
RBC # BLD AUTO: 4.31 10*6/MM3 (ref 3.77–5.28)
SODIUM SERPL-SCNC: 138 MMOL/L (ref 136–145)
WBC # BLD AUTO: 5.62 10*3/MM3 (ref 3.4–10.8)

## 2021-05-25 PROCEDURE — 96417 CHEMO IV INFUS EACH ADDL SEQ: CPT

## 2021-05-25 PROCEDURE — 25010000002 TRASTUZUMAB-ANNS 420 MG RECONSTITUTED SOLUTION 1 EACH VIAL: Performed by: INTERNAL MEDICINE

## 2021-05-25 PROCEDURE — 25010000002 HEPARIN LOCK FLUSH PER 10 UNITS: Performed by: INTERNAL MEDICINE

## 2021-05-25 PROCEDURE — 85025 COMPLETE CBC W/AUTO DIFF WBC: CPT

## 2021-05-25 PROCEDURE — 96413 CHEMO IV INFUSION 1 HR: CPT

## 2021-05-25 PROCEDURE — 80053 COMPREHEN METABOLIC PANEL: CPT

## 2021-05-25 PROCEDURE — 36415 COLL VENOUS BLD VENIPUNCTURE: CPT

## 2021-05-25 PROCEDURE — 25010000002 PERTUZUMAB 420 MG/14ML SOLUTION 420 MG VIAL: Performed by: INTERNAL MEDICINE

## 2021-05-25 RX ORDER — HEPARIN SODIUM (PORCINE) LOCK FLUSH IV SOLN 100 UNIT/ML 100 UNIT/ML
500 SOLUTION INTRAVENOUS AS NEEDED
Status: CANCELLED | OUTPATIENT
Start: 2021-07-14

## 2021-05-25 RX ORDER — HEPARIN SODIUM (PORCINE) LOCK FLUSH IV SOLN 100 UNIT/ML 100 UNIT/ML
500 SOLUTION INTRAVENOUS AS NEEDED
Status: DISCONTINUED | OUTPATIENT
Start: 2021-05-25 | End: 2021-05-25 | Stop reason: HOSPADM

## 2021-05-25 RX ORDER — SODIUM CHLORIDE 0.9 % (FLUSH) 0.9 %
10 SYRINGE (ML) INJECTION AS NEEDED
Status: CANCELLED | OUTPATIENT
Start: 2021-07-14

## 2021-05-25 RX ORDER — SODIUM CHLORIDE 9 MG/ML
250 INJECTION, SOLUTION INTRAVENOUS ONCE
Status: COMPLETED | OUTPATIENT
Start: 2021-05-25 | End: 2021-05-25

## 2021-05-25 RX ORDER — HEPARIN SODIUM (PORCINE) LOCK FLUSH IV SOLN 100 UNIT/ML 100 UNIT/ML
300 SOLUTION INTRAVENOUS ONCE
Status: CANCELLED | OUTPATIENT
Start: 2021-05-25

## 2021-05-25 RX ORDER — SODIUM CHLORIDE 0.9 % (FLUSH) 0.9 %
10 SYRINGE (ML) INJECTION AS NEEDED
Status: DISCONTINUED | OUTPATIENT
Start: 2021-05-25 | End: 2021-05-25 | Stop reason: HOSPADM

## 2021-05-25 RX ORDER — SODIUM CHLORIDE 0.9 % (FLUSH) 0.9 %
20 SYRINGE (ML) INJECTION AS NEEDED
Status: CANCELLED | OUTPATIENT
Start: 2021-05-25

## 2021-05-25 RX ADMIN — SODIUM CHLORIDE, PRESERVATIVE FREE 500 UNITS: 5 INJECTION INTRAVENOUS at 16:03

## 2021-05-25 RX ADMIN — PERTUZUMAB 420 MG: 30 INJECTION, SOLUTION, CONCENTRATE INTRAVENOUS at 13:51

## 2021-05-25 RX ADMIN — TRASTUZUMAB-ANNS 440 MG: 420 INJECTION, POWDER, LYOPHILIZED, FOR SOLUTION INTRAVENOUS at 15:15

## 2021-05-25 RX ADMIN — SODIUM CHLORIDE 250 ML: 9 INJECTION, SOLUTION INTRAVENOUS at 13:50

## 2021-05-25 RX ADMIN — SODIUM CHLORIDE, PRESERVATIVE FREE 10 ML: 5 INJECTION INTRAVENOUS at 16:03

## 2021-05-28 ENCOUNTER — CLINICAL SUPPORT (OUTPATIENT)
Dept: FAMILY MEDICINE CLINIC | Facility: CLINIC | Age: 61
End: 2021-05-28

## 2021-05-28 DIAGNOSIS — Z12.11 ENCOUNTER FOR SCREENING FOR MALIGNANT NEOPLASM OF COLON: Primary | ICD-10-CM

## 2021-05-28 DIAGNOSIS — N81.4 UTERINE PROLAPSE: Primary | ICD-10-CM

## 2021-05-28 DIAGNOSIS — E53.8 B12 DEFICIENCY: Primary | ICD-10-CM

## 2021-05-28 DIAGNOSIS — Z01.818 PREOPERATIVE CLEARANCE: ICD-10-CM

## 2021-05-28 PROCEDURE — 96372 THER/PROPH/DIAG INJ SC/IM: CPT | Performed by: FAMILY MEDICINE

## 2021-05-28 RX ORDER — CYANOCOBALAMIN 1000 UG/ML
1000 INJECTION, SOLUTION INTRAMUSCULAR; SUBCUTANEOUS ONCE
Status: COMPLETED | OUTPATIENT
Start: 2021-05-28 | End: 2021-05-28

## 2021-05-28 RX ADMIN — CYANOCOBALAMIN 1000 MCG: 1000 INJECTION, SOLUTION INTRAMUSCULAR; SUBCUTANEOUS at 16:18

## 2021-05-28 NOTE — TELEPHONE ENCOUNTER
Will discuss statins with her at her next appointment. Colonoscopy referral placed.     The 10-year ASCVD risk score (Reebcca TYSON Jr., et al., 2013) is: 3.8%    Values used to calculate the score:      Age: 61 years      Sex: Female      Is Non- : No      Diabetic: No      Tobacco smoker: No      Systolic Blood Pressure: 117 mmHg      Is BP treated: No      HDL Cholesterol: 43 mg/dL      Total Cholesterol: 214 mg/dL        Patient notified & verbalized understanding.

## 2021-05-28 NOTE — TELEPHONE ENCOUNTER
Will discuss statins with her at her next appointment. Colonoscopy referral placed.     The 10-year ASCVD risk score (Rebecca TYSON Jr., et al., 2013) is: 3.8%    Values used to calculate the score:      Age: 61 years      Sex: Female      Is Non- : No      Diabetic: No      Tobacco smoker: No      Systolic Blood Pressure: 117 mmHg      Is BP treated: No      HDL Cholesterol: 43 mg/dL      Total Cholesterol: 214 mg/dL

## 2021-06-01 ENCOUNTER — HOSPITAL ENCOUNTER (OUTPATIENT)
Dept: CARDIOLOGY | Facility: HOSPITAL | Age: 61
Discharge: HOME | End: 2021-06-01
Admitting: INTERNAL MEDICINE

## 2021-06-01 DIAGNOSIS — Z17.1 MALIGNANT NEOPLASM OF RIGHT BREAST IN FEMALE, ESTROGEN RECEPTOR NEGATIVE, UNSPECIFIED SITE OF BREAST (HCC): ICD-10-CM

## 2021-06-01 DIAGNOSIS — C50.911 MALIGNANT NEOPLASM OF RIGHT BREAST IN FEMALE, ESTROGEN RECEPTOR NEGATIVE, UNSPECIFIED SITE OF BREAST (HCC): ICD-10-CM

## 2021-06-01 DIAGNOSIS — Z79.899 ENCOUNTER FOR MONITORING CARDIOTOXIC DRUG THERAPY: ICD-10-CM

## 2021-06-01 DIAGNOSIS — Z51.81 ENCOUNTER FOR MONITORING CARDIOTOXIC DRUG THERAPY: ICD-10-CM

## 2021-06-01 LAB
BH CV ECHO MEAS - % IVS THICK: 36.5 %
BH CV ECHO MEAS - % LVPW THICK: 65.4 %
BH CV ECHO MEAS - BSA(HAYCOCK): 1.8 M^2
BH CV ECHO MEAS - BSA: 1.8 M^2
BH CV ECHO MEAS - BZI_BMI: 27.6 KILOGRAMS/M^2
BH CV ECHO MEAS - BZI_METRIC_HEIGHT: 162.6 CM
BH CV ECHO MEAS - BZI_METRIC_WEIGHT: 73 KG
BH CV ECHO MEAS - EDV(CUBED): 98.6 ML
BH CV ECHO MEAS - EDV(MOD-SP4): 41.3 ML
BH CV ECHO MEAS - EDV(TEICH): 98.3 ML
BH CV ECHO MEAS - EF(CUBED): 74.2 %
BH CV ECHO MEAS - EF(MOD-SP4): 74.8 %
BH CV ECHO MEAS - EF(TEICH): 66.1 %
BH CV ECHO MEAS - ESV(CUBED): 25.4 ML
BH CV ECHO MEAS - ESV(MOD-SP4): 10.4 ML
BH CV ECHO MEAS - ESV(TEICH): 33.3 ML
BH CV ECHO MEAS - FS: 36.4 %
BH CV ECHO MEAS - IVS/LVPW: 1.2
BH CV ECHO MEAS - IVSD: 1.2 CM
BH CV ECHO MEAS - IVSS: 1.6 CM
BH CV ECHO MEAS - LV DIASTOLIC VOL/BSA (35-75): 23.2 ML/M^2
BH CV ECHO MEAS - LV MASS(C)D: 168.9 GRAMS
BH CV ECHO MEAS - LV MASS(C)DI: 94.7 GRAMS/M^2
BH CV ECHO MEAS - LV MASS(C)S: 163.7 GRAMS
BH CV ECHO MEAS - LV MASS(C)SI: 91.8 GRAMS/M^2
BH CV ECHO MEAS - LV SYSTOLIC VOL/BSA (12-30): 5.8 ML/M^2
BH CV ECHO MEAS - LVIDD: 4.6 CM
BH CV ECHO MEAS - LVIDS: 2.9 CM
BH CV ECHO MEAS - LVLD AP4: 6.9 CM
BH CV ECHO MEAS - LVLS AP4: 5 CM
BH CV ECHO MEAS - LVPWD: 0.93 CM
BH CV ECHO MEAS - LVPWS: 1.5 CM
BH CV ECHO MEAS - MV A MAX VEL: 69.8 CM/SEC
BH CV ECHO MEAS - MV E MAX VEL: 106 CM/SEC
BH CV ECHO MEAS - MV E/A: 1.5
BH CV ECHO MEAS - SI(CUBED): 41 ML/M^2
BH CV ECHO MEAS - SI(MOD-SP4): 17.3 ML/M^2
BH CV ECHO MEAS - SI(TEICH): 36.5 ML/M^2
BH CV ECHO MEAS - SV(CUBED): 73.2 ML
BH CV ECHO MEAS - SV(MOD-SP4): 30.9 ML
BH CV ECHO MEAS - SV(TEICH): 65 ML
MAXIMAL PREDICTED HEART RATE: 159 BPM
STRESS TARGET HR: 135 BPM

## 2021-06-01 PROCEDURE — 93308 TTE F-UP OR LMTD: CPT | Performed by: INTERNAL MEDICINE

## 2021-06-01 PROCEDURE — 93321 DOPPLER ECHO F-UP/LMTD STD: CPT | Performed by: INTERNAL MEDICINE

## 2021-06-01 PROCEDURE — 93321 DOPPLER ECHO F-UP/LMTD STD: CPT

## 2021-06-01 PROCEDURE — 93308 TTE F-UP OR LMTD: CPT

## 2021-06-04 ENCOUNTER — OFFICE VISIT (OUTPATIENT)
Dept: ONCOLOGY | Facility: CLINIC | Age: 61
End: 2021-06-04

## 2021-06-04 VITALS
DIASTOLIC BLOOD PRESSURE: 77 MMHG | RESPIRATION RATE: 18 BRPM | HEART RATE: 65 BPM | BODY MASS INDEX: 27.69 KG/M2 | OXYGEN SATURATION: 97 % | SYSTOLIC BLOOD PRESSURE: 113 MMHG | TEMPERATURE: 97.4 F | WEIGHT: 161.4 LBS

## 2021-06-04 DIAGNOSIS — Z17.1 MALIGNANT NEOPLASM OF RIGHT BREAST IN FEMALE, ESTROGEN RECEPTOR NEGATIVE, UNSPECIFIED SITE OF BREAST (HCC): Primary | ICD-10-CM

## 2021-06-04 DIAGNOSIS — C50.911 MALIGNANT NEOPLASM OF RIGHT BREAST IN FEMALE, ESTROGEN RECEPTOR NEGATIVE, UNSPECIFIED SITE OF BREAST (HCC): Primary | ICD-10-CM

## 2021-06-04 DIAGNOSIS — R29.818 NEUROLOGICAL ABNORMALITY: ICD-10-CM

## 2021-06-04 DIAGNOSIS — N60.92 ATYPICAL LOBULAR HYPERPLASIA (ALH) OF LEFT BREAST: ICD-10-CM

## 2021-06-04 PROCEDURE — 99214 OFFICE O/P EST MOD 30 MIN: CPT | Performed by: INTERNAL MEDICINE

## 2021-06-04 NOTE — PROGRESS NOTES
NAME: Jackie Johnson    : 1960    DATE:  2021    DIAGNOSIS:   1.  Clinical Stage IIB ([id number]) poorly differentiated invasive ductal carcinoma of the right breast.  Tumor spanned 15-16 cm, though it was unclear how much of this was invasive malignancy v. DCIS.  There were no clinically or radiographically supspicious axillary LNs.  There was associated high grade DCIS.  Tumor was ER-,ME-, HER-2 +  .  Following neoadjuvant TCHP, she had complete pathologic response - ypT0N0(sn).  No invasive malignancy detected.  0/6 LN involved.     2.  Atypical Lobular Hyperplasia and LCIS involving the L breast    CHIEF COMPLAINT:  Follow up of Breast Cancer    TREATMENT HISTORY:  1.      2.  R Mastectectomy and SLNBx with L Lumpectomy and Mastopexy  - Dr. Kadeem Serrano and Dr. Roc Huston ([facility]) 20    3.           HISTORY OF PRESENT ILLNESS:   Jackie Johnson is a very pleasant 61 y.o. female who was referred by Dr. STACIE Richards for evaluation and treatment of breast cancer. She has a strong family h/o breast cancer and so is normally very good about getting her mammograms done.  She recently moved from [facility] and so missed her mammogram in 2019 because of the move.  She got established with a new PCP (Dr. Denver Sheth) in 2019 and was referred to a a new gynecologist  (Dr. Brayan Richards) who she saw in November/December.  Kevin Richards ordered a screening mammogram.  She had an acute febrile illness around the time of the Super Bowl and at that time thought she might have a lump in her R breast.  While waiting on mammogram scheduling, she started to have pain in the breast and in eventually she would occasionally get a little bit of discharge from the right nipple.  She presented for  Screening mammography 19 as below.  This was followed by diagnostic mammogram and U/S on 20.  She had an abnormal span of 15 cm spanning the R breast.  She underwent  stereotactic biopsy on 1-21-20.    This revealed a poorly differentiated invasive ductal carcinoma of the R breast with associated high grade DCIS.  Tumor was ER/SD- and HER-2+ by IHC.  Her sister lives in [place] and was treated at [facility], so she went there for further evaluation. She saw Dr. Romero of medical oncology and Dr. Serrano of surgical oncology.  She had staging with CT CAP and bone scan as well as Brain MRI and had no evidence of distant metastatic spread.  She had reese breast MRI as well which showed an unexpected abnormality in the L breast described as linear non-mass enhancement spanning an 8 cm area in the inferior breast at 6:00.  This was biopsied on 2-28-20 with biopsy result pending.  No abnormal axillary LNs were identified.  Neoadjuvant chemotherapy was recommended to her by her [facility] team of physicians but she wishes to seek this treatment closer to home so is here today for further evaluation and treatment.    She is in good health overall. She reports an isolated episode of fever/chills during an acute illness, abdominal fullness to her right side, fatigue and a rash circling her neck. She denies changes in weight, cp, sob, persistent abdominal pain, n/v/c/d or bony pain.    INTERVAL HISTORY:  Ms. Johnson is here today for follow up of breast cancer. She has completed maintenance HP and tolerated it well. She continues to take Femara and is tolerating it well. She reports difficulty sleeping but has slept better the last 2 nights after taking Femara in the AM. She also reports fatigue and generalized muscle loss since starting treatment but is working to get reconditioned. She plans to return to [facility] at the end of August for f/u and mammogram.  She has plan for c-scope in the near future and has appt with [facility] on 7-19-21 and with [facility] later in the year.  She says Dr. Loera gave her a B12 injection and her tremulousness has improved.       PAST MEDICAL  HISTORY:  Past Medical History:   Diagnosis Date   • Acid reflux    • Asthma    • Breast cancer (CMS/HCC) 2020    rt br ca   • Cancer (CMS/HCC)    • Eczema    • Endometriosis    • GERD (gastroesophageal reflux disease)    • Hiatal hernia    • High cholesterol    • Seasonal allergies    • Sinusitis    • Staph infection 2020    port       PAST SURGICAL HISTORY:  Past Surgical History:   Procedure Laterality Date   • ABDOMINAL SURGERY     • BREAST BIOPSY Bilateral    •  SECTION     • D & C HYSTEROSCOPY ENDOMETRIAL ABLATION     • DIAGNOSTIC LAPAROSCOPY     • FRACTURE SURGERY Left     tib/fib   • FULGURATION ENDOMETRIOSIS     • LEG SURGERY      Left leg following fracture   • LYMPH NODE BIOPSY     • PORTACATH PLACEMENT N/A 3/18/2020    Procedure: INSERTION OF PORTACATH;  Surgeon: Dudley Aldridge MD;  Location: [facility] OR;  Service: General;  Laterality: N/A;   • SKIN BIOPSY     • TONSILLECTOMY     • VENOUS ACCESS DEVICE (PORT) INSERTION N/A 2020    Procedure: INSERTION VENOUS ACCESS DEVICE;  Surgeon: Nerissa Wang MD;  Location: [facility] OR;  Service: General;  Laterality: N/A;   • VENOUS ACCESS DEVICE (PORT) REMOVAL Right 2020    Procedure: REMOVAL VENOUS ACCESS DEVICE;  Surgeon: Nerissa Wang MD;  Location: [facility] OR;  Service: General;  Laterality: Right;       FAMILY HISTORY:  Family History   Problem Relation Age of Onset   • Breast cancer Mother 75   • Basal cell carcinoma Mother 90        2 small spots on face removed + fair complexion   • Heart disease Mother    • Hypertension Mother    • Diabetes type II Mother    • Breast cancer Sister 55        VUS detected on genetic testing CDKN2A gene (c.-33G>C).    • Lung disease Sister    • Heart disease Father    • Throat cancer Father 79   • Lung cancer Father 79   • Dementia Father    • Parkinsonism Father    • Cancer Maternal Grandmother 65        unknown gynecologic cancer, cervical vs uterine   • Heart disease Maternal  Grandmother    • Diabetes type II Maternal Grandmother    • Lymphoma Maternal Grandfather 64   • Breast cancer Paternal Grandmother 70   • Prostate cancer Paternal Grandfather 70        metastastic to liver   • Breast cancer Paternal Aunt 77   • Breast cancer Maternal Aunt 80   • Breast cancer Other    • Ovarian cancer Sister 66        Elevated ; 1A tumor; Borderline cancer; both ovaries & lg cyst removed   • Ovarian cancer Maternal Cousin 54   • Breast cancer Other 50   • Diabetes type I Nephew          SOCIAL HISTORY:  Social History     Socioeconomic History   • Marital status:      Spouse name: Not on file   • Number of children: Not on file   • Years of education: Not on file   • Highest education level: Not on file   Tobacco Use   • Smoking status: Never Smoker   • Smokeless tobacco: Never Used   Substance and Sexual Activity   • Alcohol use: Never   • Drug use: Never   • Sexual activity: Defer     Partners: Male     Birth control/protection: None     REVIEW OF SYSTEMS:   A comprehensive 14 point review of systems was performed.  Significant findings as mentioned above.  All other systems reviewed and are negative.      MEDICATIONS:  The current medication list was reviewed in the EMR    Current Outpatient Medications:   •  Calcium Carbonate-Vitamin D (calcium-vitamin D) 500-200 MG-UNIT tablet per tablet, Take 1 tablet by mouth 2 (two) times a day., Disp: , Rfl:   •  calcium-vitamin D (Oscal 500/200 D-3) 500-200 MG-UNIT per tablet, Take 1 tablet by mouth 2 (Two) Times a Day., Disp: 60 tablet, Rfl: 11  •  FLAXSEED, LINSEED, PO, Take 1 tablet by mouth 2 (two) times a day., Disp: , Rfl:   •  levalbuterol (XOPENEX HFA) 45 MCG/ACT inhaler, Inhale 1-2 puffs Every 4 (Four) Hours As Needed for Wheezing., Disp: , Rfl:   •  loratadine (CLARITIN) 10 MG tablet, Take 10 mg by mouth 2 (Two) Times a Day., Disp: , Rfl:   •  famotidine (PEPCID) 10 MG tablet, Take 10 mg by mouth As Needed for Indigestion or  Heartburn., Disp: , Rfl:   •  letrozole (FEMARA) 2.5 MG tablet, Take 1 tablet by mouth Daily., Disp: 30 tablet, Rfl: 11  •  Pertuzumab (PERJETA IV), Infuse  into a venous catheter., Disp: , Rfl:   •  Trastuzumab (HERCEPTIN IV), Infuse  into a venous catheter., Disp: , Rfl:   •  vitamin D (ERGOCALCIFEROL) 1.25 MG (31842 UT) capsule capsule, Take 1 capsule by mouth 1 (One) Time Per Week., Disp: 8 capsule, Rfl: 0    ALLERGIES:    Allergies   Allergen Reactions   • Albuterol Other (See Comments)     One time severe headache, questionable for aneurysm, did spinal tap was positive, did Angiogram and MRI were Negative for aneurysm   • Penicillins Hives   • Cefpodoxime Rash     vantin     • Clindamycin Hcl Rash   • Crestor [Rosuvastatin Calcium] Other (See Comments)     Caused high liver enxymes   • Sulfa Antibiotics Other (See Comments)     Mucosal lesions   • Latex Rash   • Pseudoephedrine Other (See Comments)     Soreness on the tongue       PHYSICAL EXAM:  Vitals:    06/04/21 1128   BP: 113/77   Pulse: 65   Resp: 18   Temp: 97.4 °F (36.3 °C)   SpO2: 97%     Pain Score    06/04/21 1128   PainSc: 0-No pain   ECOG score: 0   General:  Awake, alert and oriented, appears well.    HEENT:  Pupils are equal, round and reactive to light and accommodation, Extra-ocular movements full, Oropharyx clear, mucous membranes moist  Neck:  No JVD, thyromegaly or lymphadenopathy   CV:  Regular rate and rhythm, no murmurs, rubs or gallops  Resp:  Lungs are clear to auscultation bilaterally without wheezing  Breast: S/p R mastectomy.  Surgical scars smooth and intact.  S/p L lumpectomy/mastopexy.  Surgical scars smooth and intact.  No palpable masses or axillary adenopathy on either side.   Abd:  Soft, non-tender, sl distended, bowel sounds present, no organomegaly or masses  Ext:  No clubbing, cyanosis or edema.   Lymph:  No cervical, supraclavicular, axillary,adenopathy  Neuro:  MS as above, grossly non-focal  exam    PATHOLOGY:  02-12-20 08-18-20  A. SENTINEL LYMPH NODE #1 RIGHT AXILLA, BIOPSY:  - 1 lymph node, no tumor present (0/1)  - Cytokeratin stain shows no evidence of metastatic carcinoma    B. SENTINEL LYMPH NODE #2, RIGHT AXILLA, BIOPSY:  - one lymph node, no tumor present (0/1)  - Cytokeratin stain shows no evidence of metastatic carcinoma    C. RIGHT BREAST, TOTAL MASTECTOMY:  - No residual carcinoma identified  - area of fibrosis with associated biopsy clip, consistent with treated tumor bed.   - Proliferative fibrocystic change.   - Skin and nipple, no tumor present.   - four low axillary lymph nodes, no tumor present (0/4).     D. LEFT BREAST, SEGMENTAL MASTECTOMY:  - FOCI OF LOBULAR NEOPLASIA (ATYPICAL LOBULAR HYPERPLASIA AND LOBULAR CARCINOMA IN SITU). LOW GRADE, CLASSIC TYPE, WITH FOCAL PAGETOID SPREAD INTO DUCTS.   - Two prior biopsy site changes present.   - Proliferative fibrocystic change.     E. RIGHT BREAST SKIN, EXCESS, EXCISION:  - Skin and breast tissue, no tumor present.     F. LEFT BREAST, MASTOPEXY:  - Skin and breast tissue, no tumor present    Tumor Template  Specimen Identification   Procedure: Total mastectomy   Specimen Laterality: Right  Invasive Carcinoma   Tumor Focality: NA   Tumor Size: 0   Histologic Type of Invasive Carcinoma: NA   Histologic Grade Based on Tommy Histologic Score    Glandular (Acinar)/Tubular Differentiation: Not applicable    Nuclear Pleomorphism: not applicable    Mitotic Rate: not applicable   Overall Grade: not applicable   Lymphovascular invasion: not present  In Situ Carcinoma   Ductal Carcinoma in Situ (DCIS): Not present   Lobular Carcinoma in situ (LCIS): Not present  Tumor Extension   Skin: uninvolved    Nipple: uninvolved   Skeletal muscle: not applicable  Margins   Invasive Carcinoma Margins: NA    DCIS Margins: NA  Regional Lymph nodes   Uninvolved by tumor cells    Total number of lymph nodes examined: 6    Number of sentinel lymph  nodes examined: 2  Treatment effect   Treatment effect in the breast: present   Treatment effect in the lymph nodes: not present  RCB input variables   Dimensions of Residual Cancer that is in-situ: 0%   Total Number of Lymph nodes with Residual Metastatic Carcinoma: 0   Size of Largest Metastasis: NA   RCB Index Computed Value (optional): 0   RCB Class (optional): 0  Pathologic Stage Classification 9ypTNM, AJCC 8th Edition)    Primary Tumor (invasive carcinoma)(ypT): ypT0   Regional lymph nodes (ypN): yPN0(sn)   Distant Metastasis (ypM): NA  Ancillary Studies: Please see previous case [id number]      ENDOSCOPY:        IMAGING:  Bilateral Diagnostic mammogram 12-27-19  FINDINGS:  There are scattered areas of fibroglandular density.     The bilateral fibroglandular pattern is unremarkable in appearance. A  few scattered calcifications are present in the left breast. There are  calcifications in the central aspect of the right breast spanning  approximately 15 cm of tissue in the AP dimension and extending up to  the base of the nipple for which additional imaging is recommended.     IMPRESSION:  1. Benign left mammographic findings.  2. Calcifications in the right breast. Recommend additional imaging.        BI-RADS CATEGORY:  0, INCOMPLETE: Need additional imaging evaluation.     RECOMMENDATION:  Right ML and CC magnification views.      R diagnostic mammogram 01-16-20  FINDINGS:  Magnification imaging of the right breast demonstrates  casting-type pleomorphic calcifications spanning over 15 cm of tissue in  the AP dimension in the right 6:00 to 7:00 region. Calcifications do  extend up to the base of the nipple. Findings are highly suspicious for  DCIS.     Right breast ultrasound: Focused sonographic evaluation of the right  6:00 to 7:00 region demonstrates a single 0.5 cm irregular hypoechoic  mass associated with a coarse calcification located at 7:00, 3 cm from  the nipple. Ultrasound of the right axilla  demonstrates no abnormal  appearing axillary lymph nodes.        IMPRESSION:  Findings are highly suspicious for extensive DCIS in the  right 6:00 to 7:00 region extending into the base of the nipple. There  is also small irregular mass noted on ultrasound in the 7:00 region  suspicious for invasion.        BI-RADS CATEGORY:  5, HIGHLY SUGGESTIVE OF MALIGNANCY        RECOMMENDATION:  Recommend stereotactic guided core biopsy of the  anterior and posterior aspect of the calcifications in the 6:00 to 7:00  region to determine extent of disease. Ultrasound-guided core biopsy of  the right 7:00 mass may also be warranted pending pathology results of  the calcifications.    Diagnostic bilateral mammogram 02-25-20  FINDINGS:  There are scattered areas of fibroglandular density.    1:  There are fine-linear branching calcifications measuring 16 x 7 x 7  centimeters with segmental distribution and associated post biopsy clip in the  right breast lower hemisphere at 6 to 7 o'clock.  There are 2 post biopsy clips  noted.  The calcifications extend to the inferior skin and nipple.    2:  There are amorphous calcifications measuring 0.3 centimeters in the left  breast lower hemisphere at 6 to 7 o'clock located 4 centimeters from the nipple.    IMPRESSION:  1:  Fine-linear branching calcifications in the right breast lower hemisphere at  6 to 7 o'clock are known biopsy-proven malignancy. Ultrasound is recommended for  staging. Recommend appropriate evaluation and treatment of this known malignancy.    2:  Amorphous calcifications in the left breast lower hemisphere at 6 to 7  o'clock located 4 centimeters from the nipple are suspicious. Stereotactic  biopsy is recommended.    BI-RADS Category 4:  Suspicious Abnormality      US Chest 02-25-20  Findings:       Right Breast: The extent of calcified disease is best visualized by mammography. There are dilated ducts with internal calcifications vascularity extending toward the nipple  at the 7 o'clock position. This is consistent with the patient's known biopsy-proven malignancy.      Right Charles Basins: No suspicious axillary (level I, II & III) or internal mammary lymphadenopathy is noted.      IMPRESSION:    1. Known RIGHT breast malignancy is best visualized by mammography. A MRI may be obtained for evaluation of disease.    2. No suspicious RIGHT-sided lymphadenopathy      ACR BI-RADS Category: 6. Known malignancy.  Recommend appropriate evaluation and treatment of the known malignancy.         R breast US 02-25-20  Findings:       Right Breast: The extent of calcified disease is best visualized by mammography. There are dilated ducts with internal calcifications vascularity extending toward the nipple at the 7 o'clock position. This is consistent with the patient's known biopsy-proven malignancy.      Right Charles Basins: No suspicious axillary (level I, II & III) or internal mammary lymphadenopathy is noted.      IMPRESSION:    1. Known RIGHT breast malignancy is best visualized by mammography. A MRI may be obtained for evaluation of disease.    2. No suspicious RIGHT-sided lymphadenopathy      ACR BI-RADS Category: 6. Known malignancy.  Recommend appropriate evaluation and treatment of the known malignancy.       MRI Brain w/wo contrast 02-27-20  FINDINGS:     No abnormal parenchymal or leptomeningeal enhancement is noted. The brain parenchyma is unremarkable except of small scattered T2 FLAIR hyperintensities consistent with chronic microangiopathic changes. No acute ischemia, intra or extra-axial hemorrhage, mass effect or midline shift. No hydrocephalus is noted.    The osseous structures and extra-cranial soft tissues are unremarkable.    The orbital structures unremarkable.    The paranasal sinuses and mastoid air cells are clear.    IMPRESSION:  No evidence of metastatic disease.      CTCAP 02-28-20    Findings:       CHEST:  The breasts are ptotic and there are biopsy clips  projecting between the lower quadrants of the right breast.    On image 99 of series 3, there is a nonspecific 1 cm soft tissue nodule in the lower outer quadrant of the right breast.    No lung nodules or pleural effusions are present.    No axillary, internal mammary, mediastinal or hilar adenopathy is seen.    No suspicious bone lesions are present.      ABDOMEN and PELVIS:  The liver is borderline enlarged and diffusely fatty infiltrated without measurable mass or biliary ductal dilatation.    The gallbladder appears normal.    The spleen, pancreas and adrenal glands appear normal.    The kidneys demonstrate function without hydronephrosis and there is a small low dense nidus in the mid right kidney, likely a cyst.    No adenopathy or ascites are seen in the abdomen or pelvis.    A normal-appearing retrocecal appendix is seen.    Multilevel degenerative-like bone changes are present.      IMPRESSION:  No evidence for metastatic disease the chest, abdomen or pelvis.      MRI Breast bilateral 03-02-20    Findings:  The breast composition is heterogeneous fibroglandular tissue. There is no significant early background parenchymal enhancement.    Right breast: There is linearly oriented nonmass enhancement involving the lateral and inferior aspects (anterior, middle, and posterior third) of the right breast 6 - 7 o'clock position extending 17 cm from the base of the nipple to the pectoralis muscle (series 5 image  of 256). There is no evidence for pectoralis muscle invasion. Extension to the inferior skin is better demonstrated mammographically. Susceptibility artifact from 2 postbiopsy clips is noted.    Left breast: There is linearly oriented nonmass enhancement in the inferior aspect (middle third) of the left breast 6 o'clock position extending approximately 8 cm (series 5 image  of 256). There is no evidence for nipple, skin, or pectoralis muscle involvement. Stereotactic guided biopsy of  calcifications at the 6 o'clock position to be performed later today and corresponds to the anterior aspect of the nonmass enhancement. MRI guided biopsy of nonmass enhancement seen on image 94 of 256, series 5 and image 40 of 160, series 8 is recommended (The images have been annotated with a [place]).    Charles Basins: There is no lymphadenopathy in the visualized axillary or internal mammary regions.    IMPRESSION:  Linear nonmass enhancement representing known malignancy right breast as above. Linear nonmass enhancement left breast as above.   MRI guided biopsy of nonmass enhancement seen on image 94 of 256, series 5 and image 40 of 160, series 8 is recommended.    ACR BI-RADS Category: 6. Recommend MR-guided biopsy of nonmass enhancement left breast as above .Recommend appropriate evaluation and treatment of the known malignancy.       Bone scan 03-02-20  FINDINGS:     Radiotracer uptake secondary to degenerative changes are seen involving the shoulders, hips, knees, and feet. Increased uptake along the right tibial shaft likely is likely reactive. Faint focus of activity within the left tibial shaft is compatible with prior injury/fracture. Increased uptake in the skull is compatible with a benign hyperostosis.    Physiologic uptake of the bilateral kidneys and bladder.    IMPRESSION:    No scintigraphic evidence of skeletal metastases.      Echo 03-02-20      Bilateral diagnostic mammogram 05-26-20  FINDINGS: There are scattered areas of fibroglandular density.     A postbiopsy marking clip is now noted in the left breast with expected  surrounding postbiopsy changes best visualized on MLO imaging. The  remaining bilateral fibroglandular pattern is stable in appearance.  Redemonstrated are fine linear branching calcifications in the right  6:00 to 7:00 region spanning approximately 17 cm of tissue in the AP  dimension. There are 2 associated postbiopsy marking clips. No new areas  of calcification are seen in  either breast.     IMPRESSION:  1. Benign left mammographic findings.        2. No significant change in the appearance of calcifications in the  right breast.        BI-RADS CATEGORY:  6, KNOWN BIOPSY PROVEN MALIGNANCY        RECOMMENDATION:  Recommend clinical follow-up.       Echo 07-22-20  · Normal left ventricular cavity size and wall thickness noted. All left ventricular wall segments contract normally.  · Estimated EF appears to be in the range of 61 - 65%  · The pericardium is normal. There is no evidence of pericardial effusion.      DEXA 09-25-20  FINDINGS: The BMD measured at the AP spine L1-L4 is 1.003 gm/cm2 with a  T-score of -1.5.      IMPRESSION:  The patient is considered to be osteopenic according to the  World Health Organization criteria. Fracture risk is moderate      MRI L spine wo contrast 09-28-20  FINDINGS:  Sagittal alignment is normal. Bone marrow signal intensity is normal. There is disc desiccation L1-2 through L3-4 with mild multiple level loss of intervertebral disc height. Endplate spondylosis and Schmorl's node formation most apparent at L1-2 and  L2-3. Conus medullaris terminates at L1-2 and is normal.     At L1-2, there is a mild concentric disc bulge and endplate spondylosis. There is no canal stenosis or foraminal impingement.     At L2-3, there is concentric disc bulging and mild facet hypertrophy but no canal or foraminal impingement.     At L3-4, is mild facet degenerative change left greater the right with ligamentum flavum thickening and a broad posterior disc bulge. There is no canal stenosis. There is mild inferior foraminal narrowing bilaterally.     L4-5, there is moderate facet degenerative change bilaterally with ligamentum flavum thickening. Is mild bilateral foraminal narrowing. There is no canal stenosis.     At L5-S1, there is a broad posterior disc bulge. There is a tiny posterior annular fissure. There is no canal stenosis. There is a small focal left  posterolateral protrusion into the left inferior foramen with mild to moderate left inferior foraminal  narrowing. There is mild bilateral facet degenerative change.     IMPRESSION:     1. There is no evidence for osseous metastatic disease.  2. There are lumbar degenerative changes without evidence for lumbar canal stenosis. Details above.      Echo 09-29-20  · The study is technically difficult for diagnosis.  · Normal left ventricular cavity size and wall thickness noted. All left ventricular wall segments contract normally.  · Left ventricular ejection fraction appears to be 61 - 65%.  · The aortic valve is structurally normal with no regurgitation or stenosis present.  · The mitral valve is structurally normal with no regurgitation or significant stenosis present.  · There is no evidence of pericardial effusion.       Echo 12-20-20  · Normal left ventricular cavity size and wall thickness noted. All left ventricular wall segments contract normally.  · Left ventricular ejection fraction appears to be 61 - 65%.  · The pericardium is normal. There is no evidence of pericardial effusion. .  · Comments: LV systolic function is about the same as on the previous echo study.      Echo 03-19-21  · Normal left ventricular cavity size and wall thickness noted. All left ventricular wall segments contract normally.  · Left ventricular ejection fraction appears to be 61 - 65%.  · There is no evidence of pericardial effusion.      Echo 06-01-21  · Normal left ventricular cavity size and wall thickness noted. All left ventricular wall segments contract normally.  · Left ventricular ejection fraction appears to be 66 - 70%.  · There is no evidence of pericardial effusion. .      RECENT LABS:  Lab Results   Component Value Date    WBC 5.62 05/25/2021    HGB 12.7 05/25/2021    HCT 38.7 05/25/2021    MCV 89.8 05/25/2021    RDW 13.1 05/25/2021     05/25/2021    NEUTRORELPCT 66.4 05/25/2021    LYMPHORELPCT 25.8 05/25/2021     MONORELPCT 6.0 05/25/2021    EOSRELPCT 0.5 05/25/2021    BASORELPCT 1.1 05/25/2021    NEUTROABS 3.73 05/25/2021    LYMPHSABS 1.45 05/25/2021       Lab Results   Component Value Date     05/25/2021    K 4.3 05/25/2021    CO2 23.2 05/25/2021     05/25/2021    BUN 18 05/25/2021    CREATININE 0.92 05/25/2021    EGFRIFNONA 62 05/25/2021    EGFRIFAFRI 85 08/17/2020    GLUCOSE 94 05/25/2021    CALCIUM 10.1 05/25/2021    ALKPHOS 90 05/25/2021    AST 20 05/25/2021    ALT 26 05/25/2021    BILITOT 0.3 05/25/2021    ALBUMIN 4.32 05/25/2021    PROTEINTOT 6.8 05/25/2021    MG 1.9 01/18/2021     Lab Results   Component Value Date    TSH 2.080 01/18/2021       Lab Results   Component Value Date     15.1 03/11/2020     Lab Results   Component Value Date    LABCA2 19.0 03/11/2020       ASSESSMENT & PLAN:  Jackie Johnson is a very pleasant 61 y.o. female with what is clinically a Stage IIB ([id number]) poorly differentiated invasive ductal carcinoma of the right breast.  Tumor spanned 15-16 cm although it was difficult to tell how much of this was invasive malignancy v. DCIS.  There were no clinically or radiographically supspicious axillary LNs.  There was associated high grade DCIS.  Tumor was ER-,AZ-, HER-2 +.  She had pathologic complete response with neoadjuvant therapy.  She also has LCIS/ALH involving the left breast.    1.  Right breast cancer:  -  Administered TCHP neoadjuvantly to downsize tumor and potentially allow for a more successful surgery. After 6 cycles of TCHP, she had a complete pathologic response.  -  We discussed consideration of radiation given the initial size of her tumor.  It really isn't known how much of her initial disease was invasive malignancy v. DCIS.  She says she was evaluated by a Radiation Oncologist at [facility] who didn't recommend radiation.  -  Given HER-2 positive disease, recommended we complete a year of HP which she tolerated well. Post-treatment echo with normal LVEF.  "   - Exam/labs without cause for concern. Will repeat exam and labs in 3 months.   - Repeat mammogram is being done at [facility] in late August/early September.     2. L Breast LCIS / ALH:  -  She is s/p lumpectomy.  -  Discussed role for 5 years of hormonal therapy in prevention of future breast cancer development in this breast.  She is post-menopausal, so could use Tamoxifen or an aromatase inhibitor for this purpose.  She has an entact uterus so Tamoxifen would come with increased risk (albeit low risk) of development of endometrial hypertrophy/ endometrial cancer.  Aromatase inhibitors would not come with this risk, but would come with risk for loss of bone mineral density and potentially increased risk of side effects.    -  DEXA showed osteopenia with T score -1.5, so recommended Tamoxifen for prevention.  Ms. Johnson was not comfortable with risk of endometrial hyperplasia / cancer so she said she would prefer aromatase inhibitor.  In particular she would like to take Femara so ordered this with plan for 5 years of treatment. She is tolerating Femara well.  Will continue.  -  She will have repeat mammogram with MD Forrest late August/September.  - Exam/labs without cause for concern.     3.  Bone health:  -  Ms. Johnson has osteopenia with T score -1.5.  She also has a h/o Vit D Deficiency for which she took a course of weekly high dose Vit D (now completed). Repeat Vit D Level normal. Continue Ca/Vit D BID.  Recommended weight bearing exercise.  -  Will plan for repeat DEXA after 2 years (September 2022).     4.  Unusual sense of \"vibration\" and family h/o HSP (with SPG7 mutation):  Had some evaluation at [facility]. Referral was placed to Neuromuscular Neurology specialist at [facility] and she has appt there 7-19-21. Since her last visit, she has been started on Vit B12 injections [id number] per PCP which she feels is helping with her symptoms. She has also decided to see neurology at [facility] as this is where " her daughter goes and has an appointment scheduled in October.     5.  Very strong family history of malignancy including several members with breast cancer and a sister with ovarian cancer.  -  She underwent genetic testing at [facility] and testing was negative.      6.  Prophylaxis:  She has had 2020 flu vaccine.  Recommended she get Prevnar 13 which she also had.  She has received COVID vaccine x 2.       7.  Follow-up:   - Survivorship visit in 3 months with labs and mammogram (scheduled late August/Early Sept at [facility]) prior.     8.  ACO / MARNI/Other  Quality measures  -  Jackie Johnson received 2020 flu vaccine.  -  Jackie Johnson reports a pain score of 0.    -  Current outpatient and discharge medications have been reconciled for the patient.  Reviewed by: Fatoumata Obrien MD      This note was scribed for Fatoumata Obrien MD by Alba Siu RN.    I, Fatoumata Obrien MD, personally performed the services described in this documentation as scribed by the above named individual in my presence, and it is both accurate and complete.  06/04/2021       I spent 25 minutes with Jackie Johnson today.  In the office today, more than 50% of this time was spent face-to-face with her  in counseling / coordination of care, reviewing her medical history and counseling on the current treatment plan.  All questions were answered to her satisfaction      Electronically Signed by: Fatoumata Obrien MD     CC:   MD JUAN PABLO Macdonald MD Bora Lim, MD- [facility] Medical Oncology  Kadeem Serrano MD- [facility] Surgical Oncology  Dr. Roc Huston - [facility] Plastic Surgery

## 2021-06-11 ENCOUNTER — TELEPHONE (OUTPATIENT)
Dept: FAMILY MEDICINE CLINIC | Facility: CLINIC | Age: 61
End: 2021-06-11

## 2021-06-11 NOTE — TELEPHONE ENCOUNTER
Caller: Jackie Johnson    Relationship: Self    Best call back number: [phone]    What medications are you currently taking:   Current Outpatient Medications on File Prior to Visit   Medication Sig Dispense Refill   • Calcium Carbonate-Vitamin D (calcium-vitamin D) 500-200 MG-UNIT tablet per tablet Take 1 tablet by mouth 2 (two) times a day.     • calcium-vitamin D (Oscal 500/200 D-3) 500-200 MG-UNIT per tablet Take 1 tablet by mouth 2 (Two) Times a Day. 60 tablet 11   • famotidine (PEPCID) 10 MG tablet Take 10 mg by mouth As Needed for Indigestion or Heartburn.     • FLAXSEED, LINSEED, PO Take 1 tablet by mouth 2 (two) times a day.     • letrozole (FEMARA) 2.5 MG tablet Take 1 tablet by mouth Daily. 30 tablet 11   • levalbuterol (XOPENEX HFA) 45 MCG/ACT inhaler Inhale 1-2 puffs Every 4 (Four) Hours As Needed for Wheezing.     • loratadine (CLARITIN) 10 MG tablet Take 10 mg by mouth 2 (Two) Times a Day.     • Pertuzumab (PERJETA IV) Infuse  into a venous catheter.     • Trastuzumab (HERCEPTIN IV) Infuse  into a venous catheter.     • vitamin D (ERGOCALCIFEROL) 1.25 MG (38920 UT) capsule capsule Take 1 capsule by mouth 1 (One) Time Per Week. 8 capsule 0     No current facility-administered medications on file prior to visit.       Which medication are you concerned about: B-12    Who prescribed you this medication: GAMBREL    What are your concerns: IN [place] FOR 3 MONTHS.  PATIENT WAS COMING TO GET B-12 SHOTS.  HAS GOTTEN OVER THE COUNTER B-12.  PATIENT IS NOT SURE WHAT AMOUNT TO TAKE/GET?  PATIENT HAS TREMORS AGAIN.  PATIENT IS TAKING ONE 1000 MCG A DAY.  IS THIS CORRECT?

## 2021-07-07 ENCOUNTER — APPOINTMENT (OUTPATIENT)
Dept: ONCOLOGY | Facility: HOSPITAL | Age: 61
End: 2021-07-07

## 2021-07-14 ENCOUNTER — INFUSION (OUTPATIENT)
Dept: ONCOLOGY | Facility: HOSPITAL | Age: 61
End: 2021-07-14

## 2021-07-14 VITALS
DIASTOLIC BLOOD PRESSURE: 80 MMHG | BODY MASS INDEX: 27.79 KG/M2 | WEIGHT: 162 LBS | HEART RATE: 65 BPM | SYSTOLIC BLOOD PRESSURE: 118 MMHG | OXYGEN SATURATION: 94 % | RESPIRATION RATE: 18 BRPM | TEMPERATURE: 97.7 F

## 2021-07-14 DIAGNOSIS — C50.911 MALIGNANT NEOPLASM OF RIGHT BREAST IN FEMALE, ESTROGEN RECEPTOR NEGATIVE, UNSPECIFIED SITE OF BREAST (HCC): ICD-10-CM

## 2021-07-14 DIAGNOSIS — Z17.1 MALIGNANT NEOPLASM OF RIGHT BREAST IN FEMALE, ESTROGEN RECEPTOR NEGATIVE, UNSPECIFIED SITE OF BREAST (HCC): ICD-10-CM

## 2021-07-14 DIAGNOSIS — Z95.828 PORT-A-CATH IN PLACE: Primary | ICD-10-CM

## 2021-07-14 PROCEDURE — 96523 IRRIG DRUG DELIVERY DEVICE: CPT

## 2021-07-14 PROCEDURE — 25010000002 HEPARIN LOCK FLUSH PER 10 UNITS: Performed by: INTERNAL MEDICINE

## 2021-07-14 RX ORDER — HEPARIN SODIUM (PORCINE) LOCK FLUSH IV SOLN 100 UNIT/ML 100 UNIT/ML
500 SOLUTION INTRAVENOUS AS NEEDED
Status: CANCELLED | OUTPATIENT
Start: 2021-08-25

## 2021-07-14 RX ORDER — SODIUM CHLORIDE 0.9 % (FLUSH) 0.9 %
20 SYRINGE (ML) INJECTION AS NEEDED
Status: CANCELLED | OUTPATIENT
Start: 2021-07-14

## 2021-07-14 RX ORDER — HEPARIN SODIUM (PORCINE) LOCK FLUSH IV SOLN 100 UNIT/ML 100 UNIT/ML
300 SOLUTION INTRAVENOUS ONCE
Status: CANCELLED | OUTPATIENT
Start: 2021-07-14

## 2021-07-14 RX ORDER — SODIUM CHLORIDE 0.9 % (FLUSH) 0.9 %
10 SYRINGE (ML) INJECTION AS NEEDED
Status: DISCONTINUED | OUTPATIENT
Start: 2021-07-14 | End: 2021-07-14 | Stop reason: HOSPADM

## 2021-07-14 RX ORDER — HEPARIN SODIUM (PORCINE) LOCK FLUSH IV SOLN 100 UNIT/ML 100 UNIT/ML
500 SOLUTION INTRAVENOUS AS NEEDED
Status: DISCONTINUED | OUTPATIENT
Start: 2021-07-14 | End: 2021-07-14 | Stop reason: HOSPADM

## 2021-07-14 RX ORDER — SODIUM CHLORIDE 0.9 % (FLUSH) 0.9 %
10 SYRINGE (ML) INJECTION AS NEEDED
Status: CANCELLED | OUTPATIENT
Start: 2021-08-25

## 2021-07-14 RX ADMIN — SODIUM CHLORIDE, PRESERVATIVE FREE 500 UNITS: 5 INJECTION INTRAVENOUS at 13:15

## 2021-07-14 RX ADMIN — SODIUM CHLORIDE, PRESERVATIVE FREE 10 ML: 5 INJECTION INTRAVENOUS at 13:15

## 2021-07-20 ENCOUNTER — TELEPHONE (OUTPATIENT)
Dept: GASTROENTEROLOGY | Facility: CLINIC | Age: 61
End: 2021-07-20

## 2021-07-20 DIAGNOSIS — Z01.818 PREOPERATIVE CLEARANCE: ICD-10-CM

## 2021-07-20 DIAGNOSIS — Z12.11 ENCOUNTER FOR SCREENING FOR MALIGNANT NEOPLASM OF COLON: Primary | ICD-10-CM

## 2021-07-20 NOTE — TELEPHONE ENCOUNTER
Can you please put case request in for screening colonoscopy and send mag and dulcolax tablets to her pharmacy for 7-27-21 with dr. Alvarez?    Thank you

## 2021-07-21 DIAGNOSIS — Z12.11 ENCOUNTER FOR SCREENING FOR MALIGNANT NEOPLASM OF COLON: Primary | ICD-10-CM

## 2021-07-21 RX ORDER — BISACODYL 5 MG/1
20 TABLET, DELAYED RELEASE ORAL ONCE
Qty: 4 TABLET | Refills: 0 | Status: SHIPPED | OUTPATIENT
Start: 2021-07-21 | End: 2021-07-21

## 2021-07-21 RX ORDER — MAGNESIUM CARB/ALUMINUM HYDROX 105-160MG
296 TABLET,CHEWABLE ORAL TAKE AS DIRECTED
Qty: 592 ML | Refills: 0 | Status: SHIPPED | OUTPATIENT
Start: 2021-07-21 | End: 2021-07-27 | Stop reason: HOSPADM

## 2021-07-26 RX ORDER — FEXOFENADINE HCL 180 MG/1
180 TABLET ORAL DAILY
COMMUNITY
End: 2021-10-05

## 2021-07-26 RX ORDER — LANOLIN ALCOHOL/MO/W.PET/CERES
1000 CREAM (GRAM) TOPICAL DAILY
COMMUNITY
End: 2021-10-05

## 2021-07-27 ENCOUNTER — ANESTHESIA (OUTPATIENT)
Dept: PERIOP | Facility: HOSPITAL | Age: 61
End: 2021-07-27

## 2021-07-27 ENCOUNTER — HOSPITAL ENCOUNTER (OUTPATIENT)
Facility: HOSPITAL | Age: 61
Setting detail: HOSPITAL OUTPATIENT SURGERY
Discharge: HOME | End: 2021-07-27
Attending: INTERNAL MEDICINE | Admitting: INTERNAL MEDICINE

## 2021-07-27 ENCOUNTER — ANESTHESIA EVENT (OUTPATIENT)
Dept: PERIOP | Facility: HOSPITAL | Age: 61
End: 2021-07-27

## 2021-07-27 VITALS
WEIGHT: 157 LBS | TEMPERATURE: 98 F | DIASTOLIC BLOOD PRESSURE: 75 MMHG | HEIGHT: 64 IN | OXYGEN SATURATION: 98 % | RESPIRATION RATE: 20 BRPM | SYSTOLIC BLOOD PRESSURE: 115 MMHG | BODY MASS INDEX: 26.8 KG/M2 | HEART RATE: 60 BPM

## 2021-07-27 PROCEDURE — 25010000002 ONDANSETRON PER 1 MG: Performed by: NURSE ANESTHETIST, CERTIFIED REGISTERED

## 2021-07-27 PROCEDURE — 25010000002 PROPOFOL 10 MG/ML EMULSION: Performed by: NURSE ANESTHETIST, CERTIFIED REGISTERED

## 2021-07-27 PROCEDURE — 25010000003 LIDOCAINE 1 % SOLUTION: Performed by: NURSE ANESTHETIST, CERTIFIED REGISTERED

## 2021-07-27 PROCEDURE — 25010000002 MIDAZOLAM PER 1 MG: Performed by: NURSE ANESTHETIST, CERTIFIED REGISTERED

## 2021-07-27 PROCEDURE — 45378 DIAGNOSTIC COLONOSCOPY: CPT | Performed by: INTERNAL MEDICINE

## 2021-07-27 RX ORDER — LIDOCAINE HYDROCHLORIDE 10 MG/ML
INJECTION, SOLUTION INFILTRATION; PERINEURAL AS NEEDED
Status: DISCONTINUED | OUTPATIENT
Start: 2021-07-27 | End: 2021-07-27 | Stop reason: SURG

## 2021-07-27 RX ORDER — SODIUM CHLORIDE 0.9 % (FLUSH) 0.9 %
10 SYRINGE (ML) INJECTION AS NEEDED
Status: DISCONTINUED | OUTPATIENT
Start: 2021-07-27 | End: 2021-07-27 | Stop reason: HOSPADM

## 2021-07-27 RX ORDER — SODIUM CHLORIDE, SODIUM LACTATE, POTASSIUM CHLORIDE, CALCIUM CHLORIDE 600; 310; 30; 20 MG/100ML; MG/100ML; MG/100ML; MG/100ML
INJECTION, SOLUTION INTRAVENOUS CONTINUOUS PRN
Status: DISCONTINUED | OUTPATIENT
Start: 2021-07-27 | End: 2021-07-27 | Stop reason: SURG

## 2021-07-27 RX ORDER — ONDANSETRON 2 MG/ML
INJECTION INTRAMUSCULAR; INTRAVENOUS AS NEEDED
Status: DISCONTINUED | OUTPATIENT
Start: 2021-07-27 | End: 2021-07-27 | Stop reason: SURG

## 2021-07-27 RX ORDER — SODIUM CHLORIDE 0.9 % (FLUSH) 0.9 %
10 SYRINGE (ML) INJECTION EVERY 12 HOURS SCHEDULED
Status: DISCONTINUED | OUTPATIENT
Start: 2021-07-27 | End: 2021-07-27 | Stop reason: HOSPADM

## 2021-07-27 RX ORDER — ONDANSETRON 2 MG/ML
4 INJECTION INTRAMUSCULAR; INTRAVENOUS AS NEEDED
Status: DISCONTINUED | OUTPATIENT
Start: 2021-07-27 | End: 2021-07-27 | Stop reason: HOSPADM

## 2021-07-27 RX ORDER — PROPOFOL 10 MG/ML
VIAL (ML) INTRAVENOUS AS NEEDED
Status: DISCONTINUED | OUTPATIENT
Start: 2021-07-27 | End: 2021-07-27 | Stop reason: SURG

## 2021-07-27 RX ORDER — FAMOTIDINE 10 MG/ML
INJECTION, SOLUTION INTRAVENOUS AS NEEDED
Status: DISCONTINUED | OUTPATIENT
Start: 2021-07-27 | End: 2021-07-27 | Stop reason: SURG

## 2021-07-27 RX ORDER — FENTANYL CITRATE 50 UG/ML
50 INJECTION, SOLUTION INTRAMUSCULAR; INTRAVENOUS
Status: DISCONTINUED | OUTPATIENT
Start: 2021-07-27 | End: 2021-07-27 | Stop reason: HOSPADM

## 2021-07-27 RX ORDER — SODIUM CHLORIDE, SODIUM LACTATE, POTASSIUM CHLORIDE, CALCIUM CHLORIDE 600; 310; 30; 20 MG/100ML; MG/100ML; MG/100ML; MG/100ML
125 INJECTION, SOLUTION INTRAVENOUS ONCE
Status: DISCONTINUED | OUTPATIENT
Start: 2021-07-27 | End: 2021-07-27 | Stop reason: HOSPADM

## 2021-07-27 RX ORDER — MEPERIDINE HYDROCHLORIDE 25 MG/ML
12.5 INJECTION INTRAMUSCULAR; INTRAVENOUS; SUBCUTANEOUS
Status: DISCONTINUED | OUTPATIENT
Start: 2021-07-27 | End: 2021-07-27 | Stop reason: HOSPADM

## 2021-07-27 RX ORDER — MIDAZOLAM HYDROCHLORIDE 1 MG/ML
INJECTION INTRAMUSCULAR; INTRAVENOUS AS NEEDED
Status: DISCONTINUED | OUTPATIENT
Start: 2021-07-27 | End: 2021-07-27 | Stop reason: SURG

## 2021-07-27 RX ORDER — MIDAZOLAM HYDROCHLORIDE 1 MG/ML
1 INJECTION INTRAMUSCULAR; INTRAVENOUS
Status: DISCONTINUED | OUTPATIENT
Start: 2021-07-27 | End: 2021-07-27 | Stop reason: HOSPADM

## 2021-07-27 RX ADMIN — PROPOFOL 100 MG: 10 INJECTION, EMULSION INTRAVENOUS at 08:18

## 2021-07-27 RX ADMIN — SODIUM CHLORIDE, POTASSIUM CHLORIDE, SODIUM LACTATE AND CALCIUM CHLORIDE: 600; 310; 30; 20 INJECTION, SOLUTION INTRAVENOUS at 08:17

## 2021-07-27 RX ADMIN — PROPOFOL 50 MG: 10 INJECTION, EMULSION INTRAVENOUS at 08:24

## 2021-07-27 RX ADMIN — PROPOFOL 50 MG: 10 INJECTION, EMULSION INTRAVENOUS at 08:30

## 2021-07-27 RX ADMIN — MIDAZOLAM 2 MG: 1 INJECTION INTRAMUSCULAR; INTRAVENOUS at 08:17

## 2021-07-27 RX ADMIN — FAMOTIDINE 20 MG: 10 INJECTION INTRAVENOUS at 08:17

## 2021-07-27 RX ADMIN — ONDANSETRON 4 MG: 2 INJECTION INTRAMUSCULAR; INTRAVENOUS at 08:17

## 2021-07-27 RX ADMIN — LIDOCAINE HYDROCHLORIDE 60 MG: 10 INJECTION, SOLUTION INFILTRATION; PERINEURAL at 08:18

## 2021-07-27 NOTE — H&P
[facility] HISTORY AND PHYSICAL    Patient Identification:  Name:  Jackie Johnson  Age:  61 y.o.  Sex:  female  :  1960  MRN:  [id number]   Visit Number:  [id number]  Primary Care Physician:  Scarlet Loera MD       Chief complaint:   Screening colonoscopy  History of presenting illness:  61 y.o. female presents today for screening colonoscopy.  She denies bright red blood per rectum or melena.  There is no family history of colon cancer.  She has had no weight loss.  The patient has recently been treated for bilateral breast cancer.  She is her-2+.  ---------------------------------------------------------------------------------------------------------------------   Review of Systems I have reviewed and confirmed the accuracy of the ROS as documented by the MA/ZACH/JON Birch-MD Antonio    ---------------------------------------------------------------------------------------------------------------------   Past Medical History:   Diagnosis Date   • Acid reflux    • Asthma    • Breast cancer (CMS/HCC) 2020    rt br ca   • Cancer (CMS/HCC)    • Eczema    • Elevated cholesterol    • Endometriosis    • GERD (gastroesophageal reflux disease)    • Hiatal hernia    • High cholesterol    • Seasonal allergies    • Shaking     INTERMITTANT   • Sinusitis    • Staph infection 2020    port     Past Surgical History:   Procedure Laterality Date   • ABDOMINAL SURGERY     • BREAST BIOPSY Bilateral    •  SECTION     • D & C HYSTEROSCOPY ENDOMETRIAL ABLATION     • DIAGNOSTIC LAPAROSCOPY     • FRACTURE SURGERY Left     tib/fib   • FULGURATION ENDOMETRIOSIS     • LEG SURGERY      Left leg following fracture   • LYMPH NODE BIOPSY     • MASTECTOMY Right     WITH LYMPH NODES REMOVAL   • PORTACATH PLACEMENT N/A 3/18/2020    Procedure: INSERTION OF PORTACATH;  Surgeon: Dudley Aldridge MD;  Location: [facility];  Service: General;  Laterality: N/A;   • SKIN BIOPSY      • TONSILLECTOMY     • TRANSESOPHAGEAL ECHOCARDIOGRAM (CINTIA)     • VENOUS ACCESS DEVICE (PORT) INSERTION N/A 5/27/2020    Procedure: INSERTION VENOUS ACCESS DEVICE;  Surgeon: Nerissa Wang MD;  Location:  [facility];  Service: General;  Laterality: N/A;   • VENOUS ACCESS DEVICE (PORT) REMOVAL Right 4/19/2020    Procedure: REMOVAL VENOUS ACCESS DEVICE;  Surgeon: Nerissa Wang MD;  Location:  [facility];  Service: General;  Laterality: Right;     Family History   Problem Relation Age of Onset   • Breast cancer Mother 75   • Basal cell carcinoma Mother 90        2 small spots on face removed + fair complexion   • Heart disease Mother    • Hypertension Mother    • Diabetes type II Mother    • Breast cancer Sister 55        VUS detected on genetic testing CDKN2A gene (c.-33G>C).    • Lung disease Sister    • Heart disease Father    • Throat cancer Father 79   • Lung cancer Father 79   • Dementia Father    • Parkinsonism Father    • Cancer Maternal Grandmother 65        unknown gynecologic cancer, cervical vs uterine   • Heart disease Maternal Grandmother    • Diabetes type II Maternal Grandmother    • Lymphoma Maternal Grandfather 64   • Breast cancer Paternal Grandmother 70   • Prostate cancer Paternal Grandfather 70        metastastic to liver   • Breast cancer Paternal Aunt 77   • Breast cancer Maternal Aunt 80   • Breast cancer Other    • Ovarian cancer Sister 66        Elevated ; 1A tumor; Borderline cancer; both ovaries & lg cyst removed   • Ovarian cancer Maternal Cousin 54   • Breast cancer Other 50   • Diabetes type I Nephew      Social History     Socioeconomic History   • Marital status:      Spouse name: Not on file   • Number of children: Not on file   • Years of education: Not on file   • Highest education level: Not on file   Tobacco Use   • Smoking status: Never Smoker   • Smokeless tobacco: Never Used   Vaping Use   • Vaping Use: Never used   Substance and Sexual Activity   • Alcohol  use: Never   • Drug use: Never   • Sexual activity: Defer     Partners: Male     Birth control/protection: None     ---------------------------------------------------------------------------------------------------------------------   Allergies:  Albuterol, Penicillins, Cefpodoxime, Clindamycin hcl, Crestor [rosuvastatin calcium], Sulfa antibiotics, Latex, and Pseudoephedrine  ---------------------------------------------------------------------------------------------------------------------   Prior to Admission Medications     Prescriptions Last Dose Informant Patient Reported? Taking?    Calcium Carbonate-Vitamin D (calcium-vitamin D) 500-200 MG-UNIT tablet per tablet 7/26/2021  Yes Yes    Take 1 tablet by mouth 2 (two) times a day.    calcium-vitamin D (Oscal 500/200 D-3) 500-200 MG-UNIT per tablet 7/26/2021  No Yes    Take 1 tablet by mouth 2 (Two) Times a Day.    famotidine (PEPCID) 10 MG tablet Past Month Self Yes Yes    Take 10 mg by mouth As Needed for Indigestion or Heartburn.    fexofenadine (ALLEGRA) 180 MG tablet 7/26/2021 Self Yes Yes    Take 180 mg by mouth Daily.    FLAXSEED, LINSEED, PO 7/26/2021  Yes Yes    Take 1 tablet by mouth 2 (two) times a day.    letrozole (FEMARA) 2.5 MG tablet 7/26/2021  No Yes    Take 1 tablet by mouth Daily.    magnesium citrate 1.745 GM/30ML solution solution 7/26/2021  No Yes    Take 296 mL by mouth Take As Directed for 2 doses. Take one full bottle at 4:00 PM and take second bottle at 10:00 PM.    vitamin B-12 (CYANOCOBALAMIN) 1000 MCG tablet 7/26/2021 Self Yes Yes    Take 1,000 mcg by mouth Daily.    levalbuterol (XOPENEX HFA) 45 MCG/ACT inhaler More than a month Self Yes No    Inhale 1-2 puffs Every 4 (Four) Hours As Needed for Wheezing.        Hospital Scheduled Meds:  lactated ringers, 125 mL/hr, Intravenous, Once  sodium chloride, 10 mL, Intravenous, Q12H          ---------------------------------------------------------------------------------------------------------------------   Vital Signs:  Temp:  [98.1 °F (36.7 °C)] 98.1 °F (36.7 °C)  Heart Rate:  [66] 66  Resp:  [18] 18  BP: (128)/(87) 128/87      07/27/21  0724   Weight: 71.2 kg (157 lb)     Body mass index is 26.95 kg/m².  ---------------------------------------------------------------------------------------------------------------------   Physical Exam:  Constitutional:  Well-developed and well-nourished.  No respiratory distress.      HENT:  Head: Normocephalic and atraumatic.  Mouth:  Moist mucous membranes.    Eyes:  Conjunctivae and EOM are normal.  Pupils are equal, round, and reactive to light.  No scleral icterus.  Neck:  Neck supple.  No JVD present.    Cardiovascular:  Normal rate, regular rhythm and normal heart sounds with no murmur.  Pulmonary/Chest:  No respiratory distress, no wheezes, no crackles, with normal breath sounds and good air movement.  Abdominal:  Soft.  Bowel sounds are normal.  No distension and no tenderness.   Musculoskeletal:  No edema, no tenderness, and no deformity.  No red or swollen joints anywhere.    Neurological:  Alert and oriented to person, place, and time.  No cranial nerve deficit.  No tongue deviation.  No facial droop.  No slurred speech.   Skin:  Skin is warm and dry.  No rash noted.  No pallor.   Psychiatric:  Normal mood and affect.  Behavior is normal.  Judgment and thought content normal.   Peripheral vascular:  No edema and strong pulses on all 4 extremities.  Genitourinary:  ---------------------------------------------------------------------------------------------------------------------    ---------------------------------------------------------------------------------------------------------------------                 Invalid input(s): PROTCrCl cannot be calculated (Patient's most recent lab result is older than the maximum 30 days allowed.).  No results  found for: AMMONIA          Lab Results   Component Value Date    HGBA1C 5.65 (H) 07/28/2020     Lab Results   Component Value Date    TSH 2.080 01/18/2021    FREET4 1.10 01/18/2021     No results found for: PREGTESTUR, PREGSERUM, HCG, HCGQUANT  Pain Management Panel    There is no flowsheet data to display.       No results found for: BLOODCX  No results found for: URINECX  No results found for: WOUNDCX  No results found for: STOOLCX      ---------------------------------------------------------------------------------------------------------------------  Imaging Results (Last 7 Days)     ** No results found for the last 168 hours. **          I have personally reviewed the radiology images and read the final radiology report.  ---------------------------------------------------------------------------------------------------------------------  Assessment and Plan:  Proceed with screening colonoscopy.    Keerthi Meneses MD  07/27/21  07:37 EDT

## 2021-07-27 NOTE — ANESTHESIA POSTPROCEDURE EVALUATION
Patient: Jackie Johnson    Procedure Summary     Date: 07/27/21 Room / Location: [facility] OR  /  [facility] OR    Anesthesia Start: 0817 Anesthesia Stop: 0836    Procedure: COLONOSCOPY FOR SCREENING (N/A ) Diagnosis:       Preoperative clearance      Encounter for screening for malignant neoplasm of colon      (Preoperative clearance [Z01.818])      (Encounter for screening for malignant neoplasm of colon [Z12.11])    Surgeons: Keerthi Meneses MD Provider: Jung Hanna MD    Anesthesia Type: general ASA Status: 3          Anesthesia Type: general    Vitals  Vitals Value Taken Time   /75 07/27/21 0907   Temp 98 °F (36.7 °C) 07/27/21 0837   Pulse 60 07/27/21 0907   Resp 20 07/27/21 0907   SpO2 98 % 07/27/21 0907           Post Anesthesia Care and Evaluation    Patient location during evaluation: PHASE II  Patient participation: complete - patient participated  Level of consciousness: awake  Pain score: 0  Pain management: adequate  Airway patency: patent  Anesthetic complications: No anesthetic complications  PONV Status: none  Cardiovascular status: acceptable  Respiratory status: acceptable  Hydration status: acceptable

## 2021-07-27 NOTE — OP NOTE
COLONOSCOPY PROCEDURE NOTE    Jackie Johnson  7/27/2021    GASTROENTEROLOGIST:  Keerthi Meneses MD      PRE-PROCEDURE DIAGNOSIS:   Preoperative clearance [Z01.818]  Encounter for screening for malignant neoplasm of colon [Z12.11]    POST-PROCEDURE DIAGNOSIS:  1.  Normal colon  2.  Internal hemorrhoids    PROCEDURE:  COLONOSCOPY      ANESTHESIA:  Propofol administered by anesthesia.  See anesthesia notes for ASA classification    STAFF  Circulator: Trinidad Morgan RN  Endo Technician: Cuca Barahona    Findings:  1.  Normal-appearing colonic mucosa.  2.  Small internal hemorrhoids.  3.  Normal-appearing terminal ileum.    OPERATIVE PROCEDURE   After proper informed consent was obtained, the patient was taken the operating suite and placed in left lateral decubitus position.  An Olympus video colonoscope 180 series was inserted in the rectum and advanced to the terminal ileum under direct visualization.  Cecum and terminal ileum were identified by visualization of the appendiceal orifice and ileocecal valve.  The colonoscope was then slowly withdrawn from the cecum to the rectum and passed a second time from rectum to cecum.  The colonoscope was retroflexed in the cecum and rectum. Scope was then withdrawn. Patient tolerated the procedure well. There were no immediate complications. Cecal withdrawal time was 8 minutes.    ESTIMATED BLOOD LOSS  None     COMPLICATIONS  None    RECOMMENDATIONS:  1.  Jaime colonoscopy in 10 years for screening purposes.    Keerthi Meneses MD  07/27/21 08:37 EDT

## 2021-07-27 NOTE — ANESTHESIA PREPROCEDURE EVALUATION
Anesthesia Evaluation     Patient summary reviewed and Nursing notes reviewed   no history of anesthetic complications:  NPO Solid Status: > 8 hours  NPO Liquid Status: > 8 hours           Airway   Mallampati: II  TM distance: >3 FB  Neck ROM: full  no difficulty expected  Dental - normal exam     Pulmonary - normal exam   (+) asthma,sleep apnea,   (-) not a smoker  Cardiovascular - normal exam  Exercise tolerance: good (4-7 METS)    NYHA Classification: II    (+) dysrhythmias, hyperlipidemia,     ROS comment: 3/21 Echo  Echocardiogram Findings    Left Ventricle Left ventricular systolic function is normal. Estimated EF appears to be in the range of 61 - 65%. Normal left ventricular cavity size and wall thickness noted. All left ventricular wall segments contract normally. Septal wall motion is normal. Left ventricular diastolic function not assessed on this study.  Right Ventricle Normal right ventricular cavity size and systolic function noted.  Left Atrium Normal left atrial size noted. Left atrial appendage was found to be singularly lobar in nature.  No evidence of a left atrial appendage thrombus was present.  Right Atrium Normal right atrial size noted.  Aortic Valve The aortic valve is structurally normal. No aortic valve regurgitation is present. No hemodynamically significant aortic valve stenosis is present.  Mitral Valve The mitral valve is normal in structure. Trace mitral valve regurgitation is present. No significant mitral valve stenosis is present.  Tricuspid Valve The tricuspid valve is normal.  No evidence of tricuspid valve stenosis is present. Trace tricuspid valve regurgitation is present.  Pulmonic Valve The pulmonic valve is structurally normal. There is no significant pulmonic valve stenosis present. There is no pulmonic valve regurgitation present.  Greater Vessels No dilation of the aortic root is present. No dilation of the sinuses of Valsalva is present.  Pericardium There is no  evidence of pericardial           Neuro/Psych- negative ROS  GI/Hepatic/Renal/Endo    (+) obesity,  hiatal hernia, GERD,      Musculoskeletal (-) negative ROS    Abdominal  - normal exam    Bowel sounds: normal.   Substance History - negative use     OB/GYN negative ob/gyn ROS         Other      history of cancer active                      Anesthesia Plan    ASA 3     general     intravenous induction     Anesthetic plan, all risks, benefits, and alternatives have been provided, discussed and informed consent has been obtained with: patient.  Use of blood products discussed with patient  Consented to blood products.   Plan discussed with CRNA.

## 2021-07-27 NOTE — DISCHARGE INSTRUCTIONS
Colonoscopy, Adult, Care After  This sheet gives you information about how to care for yourself after your procedure. Your doctor may also give you more specific instructions. If you have problems or questions, call your doctor.  What can I expect after the procedure?  After the procedure, it is common to have:  · A small amount of blood in your poop (stool) for 24 hours.  · Some gas.  · Mild cramping or bloating in your belly (abdomen).  Follow these instructions at home:  Eating and drinking    · Drink enough fluid to keep your pee (urine) pale yellow.  · Follow instructions from your doctor about what you cannot eat or drink.  · Return to your normal diet as told by your doctor. Avoid heavy or fried foods that are hard to digest.  Activity  · Rest as told by your doctor.  · Do not sit for a long time without moving. Get up to take short walks every 1-2 hours. This is important. Ask for help if you feel weak or unsteady.  · Return to your normal activities as told by your doctor. Ask your doctor what activities are safe for you.  To help cramping and bloating:    · Try walking around.  · Put heat on your belly as told by your doctor. Use the heat source that your doctor recommends, such as a moist heat pack or a heating pad.  ? Put a towel between your skin and the heat source.  ? Leave the heat on for 20-30 minutes.  ? Remove the heat if your skin turns bright red. This is very important if you are unable to feel pain, heat, or cold. You may have a greater risk of getting burned.  General instructions  · If you were given a medicine to help you relax (sedative) during your procedure, it can affect you for many hours. Do not drive or use machinery until your doctor says that it is safe.  · For the first 24 hours after the procedure:  ? Do not sign important documents.  ? Do not drink alcohol.  ? Do your daily activities more slowly than normal.  ? Eat foods that are soft and easy to digest.  · Take  over-the-counter or prescription medicines only as told by your doctor.  · Keep all follow-up visits as told by your doctor. This is important.  Contact a doctor if:  · You have blood in your poop 2-3 days after the procedure.  Get help right away if:  · You have more than a small amount of blood in your poop.  · You see large clumps of tissue (blood clots) in your poop.  · Your belly is swollen.  · You feel like you may vomit (nauseous).  · You vomit.  · You have a fever.  · You have belly pain that gets worse, and medicine does not help your pain.  Summary  · After the procedure, it is common to have a small amount of blood in your poop. You may also have mild cramping and bloating in your belly.  · If you were given a medicine to help you relax (sedative) during your procedure, it can affect you for many hours. Do not drive or use machinery until your doctor says that it is safe.  · Get help right away if you have a lot of blood in your poop, feel like you may vomit, have a fever, or have more belly pain.  This information is not intended to replace advice given to you by your health care provider. Make sure you discuss any questions you have with your health care provider.  Document Revised: 10/23/2020 Document Reviewed: 07/13/2020  [employer] Patient Education © 2021 [employer]  General Anesthesia, Adult, Care After  This sheet gives you information about how to care for yourself after your procedure. Your health care provider may also give you more specific instructions. If you have problems or questions, contact your health care provider.  What can I expect after the procedure?  After the procedure, the following side effects are common:  · Pain or discomfort at the IV site.  · Nausea.  · Vomiting.  · Sore throat.  · Trouble concentrating.  · Feeling cold or chills.  · Weak or tired.  · Sleepiness and fatigue.  · Soreness and body aches. These side effects can affect parts of the body that were not involved  in surgery.  Follow these instructions at home:    For at least 24 hours after the procedure:  · Have a responsible adult stay with you. It is important to have someone help care for you until you are awake and alert.  · Rest as needed.  · Do not:  ? Participate in activities in which you could fall or become injured.  ? Drive.  ? Use heavy machinery.  ? Drink alcohol.  ? Take sleeping pills or medicines that cause drowsiness.  ? Make important decisions or sign legal documents.  ? Take care of children on your own.  Eating and drinking  · Follow any instructions from your health care provider about eating or drinking restrictions.  · When you feel hungry, start by eating small amounts of foods that are soft and easy to digest (bland), such as toast. Gradually return to your regular diet.  · Drink enough fluid to keep your urine pale yellow.  · If you vomit, rehydrate by drinking water, juice, or clear broth.  General instructions  · If you have sleep apnea, surgery and certain medicines can increase your risk for breathing problems. Follow instructions from your health care provider about wearing your sleep device:  ? Anytime you are sleeping, including during daytime naps.  ? While taking prescription pain medicines, sleeping medicines, or medicines that make you drowsy.  · Return to your normal activities as told by your health care provider. Ask your health care provider what activities are safe for you.  · Take over-the-counter and prescription medicines only as told by your health care provider.  · If you smoke, do not smoke without supervision.  · Keep all follow-up visits as told by your health care provider. This is important.  Contact a health care provider if:  · You have nausea or vomiting that does not get better with medicine.  · You cannot eat or drink without vomiting.  · You have pain that does not get better with medicine.  · You are unable to pass urine.  · You develop a skin rash.  · You have a  fever.  · You have redness around your IV site that gets worse.  Get help right away if:  · You have difficulty breathing.  · You have chest pain.  · You have blood in your urine or stool, or you vomit blood.  Summary  · After the procedure, it is common to have a sore throat or nausea. It is also common to feel tired.  · Have a responsible adult stay with you for the first 24 hours after general anesthesia. It is important to have someone help care for you until you are awake and alert.  · When you feel hungry, start by eating small amounts of foods that are soft and easy to digest (bland), such as toast. Gradually return to your regular diet.  · Drink enough fluid to keep your urine pale yellow.  · Return to your normal activities as told by your health care provider. Ask your health care provider what activities are safe for you.  This information is not intended to replace advice given to you by your health care provider. Make sure you discuss any questions you have with your health care provider.  Document Revised: 12/21/2018 Document Reviewed: 08/03/2018  [employer] Patient Education © 2021 [employer]

## 2021-07-28 ENCOUNTER — OFFICE VISIT (OUTPATIENT)
Dept: FAMILY MEDICINE CLINIC | Facility: CLINIC | Age: 61
End: 2021-07-28

## 2021-07-28 VITALS
BODY MASS INDEX: 27.11 KG/M2 | OXYGEN SATURATION: 97 % | WEIGHT: 158.8 LBS | SYSTOLIC BLOOD PRESSURE: 114 MMHG | TEMPERATURE: 97.1 F | HEART RATE: 72 BPM | DIASTOLIC BLOOD PRESSURE: 78 MMHG | HEIGHT: 64 IN

## 2021-07-28 DIAGNOSIS — E53.8 B12 DEFICIENCY: Primary | ICD-10-CM

## 2021-07-28 DIAGNOSIS — K21.9 GASTROESOPHAGEAL REFLUX DISEASE WITHOUT ESOPHAGITIS: ICD-10-CM

## 2021-07-28 DIAGNOSIS — J30.89 SEASONAL ALLERGIC RHINITIS DUE TO OTHER ALLERGIC TRIGGER: ICD-10-CM

## 2021-07-28 PROCEDURE — 99214 OFFICE O/P EST MOD 30 MIN: CPT | Performed by: FAMILY MEDICINE

## 2021-07-28 PROCEDURE — 96372 THER/PROPH/DIAG INJ SC/IM: CPT | Performed by: FAMILY MEDICINE

## 2021-07-28 RX ORDER — CYANOCOBALAMIN 1000 UG/ML
1000 INJECTION, SOLUTION INTRAMUSCULAR; SUBCUTANEOUS ONCE
Status: COMPLETED | OUTPATIENT
Start: 2021-07-28 | End: 2021-07-28

## 2021-07-28 RX ADMIN — CYANOCOBALAMIN 1000 MCG: 1000 INJECTION, SOLUTION INTRAMUSCULAR; SUBCUTANEOUS at 12:11

## 2021-08-04 ENCOUNTER — CLINICAL SUPPORT (OUTPATIENT)
Dept: FAMILY MEDICINE CLINIC | Facility: CLINIC | Age: 61
End: 2021-08-04

## 2021-08-04 DIAGNOSIS — E53.8 B12 DEFICIENCY: Primary | ICD-10-CM

## 2021-08-04 PROCEDURE — 96372 THER/PROPH/DIAG INJ SC/IM: CPT | Performed by: FAMILY MEDICINE

## 2021-08-04 RX ORDER — CYANOCOBALAMIN 1000 UG/ML
1000 INJECTION, SOLUTION INTRAMUSCULAR; SUBCUTANEOUS ONCE
Status: COMPLETED | OUTPATIENT
Start: 2021-08-04 | End: 2021-08-04

## 2021-08-04 RX ADMIN — CYANOCOBALAMIN 1000 MCG: 1000 INJECTION, SOLUTION INTRAMUSCULAR; SUBCUTANEOUS at 17:00

## 2021-08-12 ENCOUNTER — CLINICAL SUPPORT (OUTPATIENT)
Dept: FAMILY MEDICINE CLINIC | Facility: CLINIC | Age: 61
End: 2021-08-12

## 2021-08-12 DIAGNOSIS — E53.8 B12 DEFICIENCY: Primary | ICD-10-CM

## 2021-08-12 PROCEDURE — 96372 THER/PROPH/DIAG INJ SC/IM: CPT | Performed by: FAMILY MEDICINE

## 2021-08-12 RX ORDER — CYANOCOBALAMIN 1000 UG/ML
1000 INJECTION, SOLUTION INTRAMUSCULAR; SUBCUTANEOUS ONCE
Status: COMPLETED | OUTPATIENT
Start: 2021-08-12 | End: 2021-08-12

## 2021-08-12 RX ADMIN — CYANOCOBALAMIN 1000 MCG: 1000 INJECTION, SOLUTION INTRAMUSCULAR; SUBCUTANEOUS at 16:22

## 2021-08-23 ENCOUNTER — CLINICAL SUPPORT (OUTPATIENT)
Dept: FAMILY MEDICINE CLINIC | Facility: CLINIC | Age: 61
End: 2021-08-23

## 2021-08-23 DIAGNOSIS — E53.8 B12 DEFICIENCY: Primary | ICD-10-CM

## 2021-08-23 PROCEDURE — 96372 THER/PROPH/DIAG INJ SC/IM: CPT | Performed by: FAMILY MEDICINE

## 2021-08-23 RX ORDER — CYANOCOBALAMIN 1000 UG/ML
1000 INJECTION, SOLUTION INTRAMUSCULAR; SUBCUTANEOUS ONCE
Status: COMPLETED | OUTPATIENT
Start: 2021-08-23 | End: 2021-08-23

## 2021-08-23 RX ADMIN — CYANOCOBALAMIN 1000 MCG: 1000 INJECTION, SOLUTION INTRAMUSCULAR; SUBCUTANEOUS at 16:29

## 2021-08-25 ENCOUNTER — INFUSION (OUTPATIENT)
Dept: ONCOLOGY | Facility: HOSPITAL | Age: 61
End: 2021-08-25

## 2021-08-25 VITALS
SYSTOLIC BLOOD PRESSURE: 114 MMHG | TEMPERATURE: 98 F | DIASTOLIC BLOOD PRESSURE: 79 MMHG | HEART RATE: 68 BPM | WEIGHT: 160.9 LBS | BODY MASS INDEX: 27.6 KG/M2 | RESPIRATION RATE: 18 BRPM | OXYGEN SATURATION: 96 %

## 2021-08-25 DIAGNOSIS — C50.911 MALIGNANT NEOPLASM OF RIGHT BREAST IN FEMALE, ESTROGEN RECEPTOR NEGATIVE, UNSPECIFIED SITE OF BREAST (HCC): ICD-10-CM

## 2021-08-25 DIAGNOSIS — Z17.1 MALIGNANT NEOPLASM OF RIGHT BREAST IN FEMALE, ESTROGEN RECEPTOR NEGATIVE, UNSPECIFIED SITE OF BREAST (HCC): ICD-10-CM

## 2021-08-25 DIAGNOSIS — Z95.828 PORT-A-CATH IN PLACE: Primary | ICD-10-CM

## 2021-08-25 PROCEDURE — 96523 IRRIG DRUG DELIVERY DEVICE: CPT

## 2021-08-25 PROCEDURE — 25010000002 HEPARIN LOCK FLUSH PER 10 UNITS: Performed by: INTERNAL MEDICINE

## 2021-08-25 RX ORDER — HEPARIN SODIUM (PORCINE) LOCK FLUSH IV SOLN 100 UNIT/ML 100 UNIT/ML
500 SOLUTION INTRAVENOUS AS NEEDED
Status: CANCELLED | OUTPATIENT
Start: 2021-10-08

## 2021-08-25 RX ORDER — SODIUM CHLORIDE 0.9 % (FLUSH) 0.9 %
10 SYRINGE (ML) INJECTION AS NEEDED
Status: CANCELLED | OUTPATIENT
Start: 2021-10-08

## 2021-08-25 RX ORDER — HEPARIN SODIUM (PORCINE) LOCK FLUSH IV SOLN 100 UNIT/ML 100 UNIT/ML
500 SOLUTION INTRAVENOUS AS NEEDED
Status: DISCONTINUED | OUTPATIENT
Start: 2021-08-25 | End: 2021-08-25 | Stop reason: HOSPADM

## 2021-08-25 RX ORDER — SODIUM CHLORIDE 0.9 % (FLUSH) 0.9 %
10 SYRINGE (ML) INJECTION AS NEEDED
Status: DISCONTINUED | OUTPATIENT
Start: 2021-08-25 | End: 2021-08-25 | Stop reason: HOSPADM

## 2021-08-25 RX ORDER — SODIUM CHLORIDE 0.9 % (FLUSH) 0.9 %
20 SYRINGE (ML) INJECTION AS NEEDED
Status: CANCELLED | OUTPATIENT
Start: 2021-08-25

## 2021-08-25 RX ORDER — HEPARIN SODIUM (PORCINE) LOCK FLUSH IV SOLN 100 UNIT/ML 100 UNIT/ML
300 SOLUTION INTRAVENOUS ONCE
Status: CANCELLED | OUTPATIENT
Start: 2021-08-25

## 2021-08-25 RX ADMIN — SODIUM CHLORIDE, PRESERVATIVE FREE 10 ML: 5 INJECTION INTRAVENOUS at 15:25

## 2021-08-25 RX ADMIN — Medication 500 UNITS: at 15:25

## 2021-09-02 ENCOUNTER — CLINICAL SUPPORT (OUTPATIENT)
Dept: FAMILY MEDICINE CLINIC | Facility: CLINIC | Age: 61
End: 2021-09-02

## 2021-09-02 DIAGNOSIS — E53.8 B12 DEFICIENCY: Primary | ICD-10-CM

## 2021-09-02 PROCEDURE — 96372 THER/PROPH/DIAG INJ SC/IM: CPT | Performed by: FAMILY MEDICINE

## 2021-09-02 RX ORDER — CYANOCOBALAMIN 1000 UG/ML
1000 INJECTION, SOLUTION INTRAMUSCULAR; SUBCUTANEOUS ONCE
Status: COMPLETED | OUTPATIENT
Start: 2021-09-02 | End: 2021-09-02

## 2021-09-02 RX ADMIN — CYANOCOBALAMIN 1000 MCG: 1000 INJECTION, SOLUTION INTRAMUSCULAR; SUBCUTANEOUS at 17:05

## 2021-09-07 ENCOUNTER — TELEPHONE (OUTPATIENT)
Dept: FAMILY MEDICINE CLINIC | Facility: CLINIC | Age: 61
End: 2021-09-07

## 2021-09-07 NOTE — TELEPHONE ENCOUNTER
PATIENT WONDERING ABOUT BOOSTER SHOT FOR COVID.  FLYING TO [place] IN 2 WEEKS.  FIRST AND SECOND SHOT IN February.  SHE THINKS SHE NEEDS DOCTOR RECOMMENDATION FOR THIS SHOT. HAS MODER[employer] SHOT BEEN APPROVED FOR BOOSTER?  WHAT TO DO?    [facility]    PLEASE CALL [phone]

## 2021-09-07 NOTE — TELEPHONE ENCOUNTER
Caller: Jackie Johnson    Relationship: Self    Best call back number: [phone]    What form or medical record are you requesting: LETTER TO HAVE COVID VACCINE BOOSTER SINCE SHE IS IMMUNOCOMPROMISED.     Who is requesting this form or medical record from you: [facility]    How would you like to receive the form or medical records (pick-up, mail, fax): PICK-UP  I  Timeframe paperwork needed: AS SOON AS POSSIBLE.  SHE HAS AN APPOINTMENT TO GET THE VACCINE ON Thursday AFTERNOON .

## 2021-09-07 NOTE — TELEPHONE ENCOUNTER
With her cancer history, she counts as immunocompromised. She can get a booster now. It has been approved. Just go back to where she got her other two and let them know she is immunocompromised and they will give it to her.

## 2021-09-07 NOTE — TELEPHONE ENCOUNTER
With her cancer history, she counts as immunocompromised. She can get a booster now. It has been approved. Just go back to where she got her other two and let them know she is immunocompromised and they will give it to her.       Spoke with patient & she verbalized understanding.

## 2021-09-08 NOTE — TELEPHONE ENCOUNTER
Please call. Letter should be in MyChart. Please print if she needs a hard copy. Thanks.      Patient notified,will pick it up this evening.

## 2021-09-14 ENCOUNTER — CLINICAL SUPPORT (OUTPATIENT)
Dept: FAMILY MEDICINE CLINIC | Facility: CLINIC | Age: 61
End: 2021-09-14

## 2021-09-14 DIAGNOSIS — E53.8 B12 DEFICIENCY: Primary | ICD-10-CM

## 2021-09-14 PROCEDURE — 96372 THER/PROPH/DIAG INJ SC/IM: CPT | Performed by: FAMILY MEDICINE

## 2021-09-14 RX ORDER — CYANOCOBALAMIN 1000 UG/ML
1000 INJECTION, SOLUTION INTRAMUSCULAR; SUBCUTANEOUS ONCE
Status: COMPLETED | OUTPATIENT
Start: 2021-09-14 | End: 2021-09-14

## 2021-09-14 RX ADMIN — CYANOCOBALAMIN 1000 MCG: 1000 INJECTION, SOLUTION INTRAMUSCULAR; SUBCUTANEOUS at 17:05

## 2021-09-22 ENCOUNTER — TELEPHONE (OUTPATIENT)
Dept: ONCOLOGY | Facility: CLINIC | Age: 61
End: 2021-09-22

## 2021-09-22 NOTE — TELEPHONE ENCOUNTER
Caller: ANA MARÍA    Relationship to patient: PATIENT    Best call back number: [phone]     Type of visit: VAD FLUSH, LAB, AND SURVIVORSHIP     Requested date: 10/01 2:30PM OR AFTER,   OR 10/01, 10/04, 10/05, 10/08 ANYTIME OF DAY    If rescheduling, when is the original appointment: 09/27

## 2021-09-27 ENCOUNTER — APPOINTMENT (OUTPATIENT)
Dept: ONCOLOGY | Facility: HOSPITAL | Age: 61
End: 2021-09-27

## 2021-10-04 ENCOUNTER — CLINICAL SUPPORT (OUTPATIENT)
Dept: FAMILY MEDICINE CLINIC | Facility: CLINIC | Age: 61
End: 2021-10-04

## 2021-10-04 DIAGNOSIS — E53.8 B12 DEFICIENCY: Primary | ICD-10-CM

## 2021-10-04 PROCEDURE — 96372 THER/PROPH/DIAG INJ SC/IM: CPT | Performed by: FAMILY MEDICINE

## 2021-10-04 RX ORDER — CYANOCOBALAMIN 1000 UG/ML
1000 INJECTION, SOLUTION INTRAMUSCULAR; SUBCUTANEOUS ONCE
Status: COMPLETED | OUTPATIENT
Start: 2021-10-04 | End: 2021-10-04

## 2021-10-04 RX ADMIN — CYANOCOBALAMIN 1000 MCG: 1000 INJECTION, SOLUTION INTRAMUSCULAR; SUBCUTANEOUS at 12:22

## 2021-10-05 ENCOUNTER — OFFICE VISIT (OUTPATIENT)
Dept: FAMILY MEDICINE CLINIC | Facility: CLINIC | Age: 61
End: 2021-10-05

## 2021-10-05 VITALS
HEIGHT: 64 IN | OXYGEN SATURATION: 97 % | WEIGHT: 159 LBS | TEMPERATURE: 97.1 F | BODY MASS INDEX: 27.14 KG/M2 | HEART RATE: 79 BPM | DIASTOLIC BLOOD PRESSURE: 64 MMHG | SYSTOLIC BLOOD PRESSURE: 100 MMHG

## 2021-10-05 DIAGNOSIS — R73.03 PREDIABETES: ICD-10-CM

## 2021-10-05 DIAGNOSIS — E78.2 MIXED HYPERLIPIDEMIA: ICD-10-CM

## 2021-10-05 DIAGNOSIS — J30.89 SEASONAL ALLERGIC RHINITIS DUE TO OTHER ALLERGIC TRIGGER: Primary | ICD-10-CM

## 2021-10-05 DIAGNOSIS — E53.8 B12 DEFICIENCY: ICD-10-CM

## 2021-10-05 DIAGNOSIS — R53.83 OTHER FATIGUE: ICD-10-CM

## 2021-10-05 PROCEDURE — 99213 OFFICE O/P EST LOW 20 MIN: CPT | Performed by: FAMILY MEDICINE

## 2021-10-05 PROCEDURE — 80053 COMPREHEN METABOLIC PANEL: CPT | Performed by: FAMILY MEDICINE

## 2021-10-05 PROCEDURE — 80061 LIPID PANEL: CPT | Performed by: FAMILY MEDICINE

## 2021-10-05 PROCEDURE — 85025 COMPLETE CBC W/AUTO DIFF WBC: CPT | Performed by: FAMILY MEDICINE

## 2021-10-05 PROCEDURE — 82306 VITAMIN D 25 HYDROXY: CPT | Performed by: FAMILY MEDICINE

## 2021-10-05 PROCEDURE — 83036 HEMOGLOBIN GLYCOSYLATED A1C: CPT | Performed by: FAMILY MEDICINE

## 2021-10-05 PROCEDURE — 84443 ASSAY THYROID STIM HORMONE: CPT | Performed by: FAMILY MEDICINE

## 2021-10-05 PROCEDURE — 82607 VITAMIN B-12: CPT | Performed by: FAMILY MEDICINE

## 2021-10-05 PROCEDURE — 36415 COLL VENOUS BLD VENIPUNCTURE: CPT | Performed by: FAMILY MEDICINE

## 2021-10-05 PROCEDURE — 84439 ASSAY OF FREE THYROXINE: CPT | Performed by: FAMILY MEDICINE

## 2021-10-05 PROCEDURE — 83735 ASSAY OF MAGNESIUM: CPT | Performed by: FAMILY MEDICINE

## 2021-10-05 RX ORDER — FLUTICASONE PROPIONATE 50 MCG
2 SPRAY, SUSPENSION (ML) NASAL DAILY
Qty: 1 G | Refills: 2 | Status: SHIPPED | OUTPATIENT
Start: 2021-10-05 | End: 2023-01-03

## 2021-10-05 RX ORDER — MONTELUKAST SODIUM 10 MG/1
10 TABLET ORAL NIGHTLY
Qty: 30 TABLET | Refills: 2 | Status: SHIPPED | OUTPATIENT
Start: 2021-10-05 | End: 2021-11-10

## 2021-10-05 RX ORDER — CETIRIZINE HYDROCHLORIDE 10 MG/1
10 TABLET ORAL DAILY
Qty: 30 TABLET | Refills: 2 | Status: SHIPPED | OUTPATIENT
Start: 2021-10-05 | End: 2022-02-14

## 2021-10-05 NOTE — PROGRESS NOTES
"Chief Complaint  Allergies    Subjective          Jackie Johnson presents to [facility]  URI   This is a recurrent problem. The current episode started more than 1 year ago. The problem has been waxing and waning. There has been no fever. Associated symptoms include congestion and sinus pain. Pertinent negatives include no headaches, rhinorrhea or sore throat. Associated symptoms comments: States she has chronic congestion. Was told years ago that she has a deviated septum. Stays congested and it gets worse of a night.   . She has tried antihistamine, increased fluids and steam for the symptoms. The treatment provided mild relief.   Fatigue  This is a new problem. The current episode started more than 1 year ago. The problem occurs daily. The problem has been waxing and waning. Associated symptoms include congestion and fatigue. Pertinent negatives include no headaches or sore throat. The symptoms are aggravated by exertion. She has tried rest, sleep, lying down and relaxation (She was previously doing cancer treatments. Has been off of them for about a year and is still having the fatigue.) for the symptoms. The treatment provided mild relief.       Objective   Vital Signs:   /64 (BP Location: Left arm, Patient Position: Sitting, Cuff Size: Adult)   Pulse 79   Temp 97.1 °F (36.2 °C)   Ht 162.6 cm (64\")   Wt 72.1 kg (159 lb)   SpO2 97%   BMI 27.29 kg/m²     Physical Exam  Constitutional:       General: She is not in acute distress.     Appearance: Normal appearance. She is well-developed and well-groomed. She is not ill-appearing, toxic-appearing or diaphoretic.   HENT:      Head: Normocephalic.      Right Ear: Tympanic membrane, ear canal and external ear normal.      Left Ear: Tympanic membrane, ear canal and external ear normal.      Nose: Nose normal. No congestion or rhinorrhea.      Mouth/Throat:      Mouth: Mucous membranes are moist.      Pharynx: Oropharynx " is clear. No oropharyngeal exudate or posterior oropharyngeal erythema.   Eyes:      General: Lids are normal.         Right eye: No discharge.         Left eye: No discharge.      Extraocular Movements: Extraocular movements intact.      Pupils: Pupils are equal, round, and reactive to light.   Neck:      Vascular: No carotid bruit.   Cardiovascular:      Rate and Rhythm: Normal rate and regular rhythm.      Pulses: Normal pulses.      Heart sounds: Normal heart sounds. No murmur heard.   No friction rub. No gallop.    Pulmonary:      Effort: Pulmonary effort is normal. No respiratory distress.      Breath sounds: Normal breath sounds. No stridor. No wheezing, rhonchi or rales.   Chest:      Chest wall: No tenderness.   Abdominal:      General: Bowel sounds are normal. There is no distension.      Palpations: Abdomen is soft. There is no mass.      Tenderness: There is no abdominal tenderness. There is no right CVA tenderness, left CVA tenderness, guarding or rebound.      Hernia: No hernia is present.   Musculoskeletal:         General: No swelling or tenderness. Normal range of motion.      Cervical back: Normal range of motion and neck supple. No rigidity or tenderness.      Right lower leg: No edema.      Left lower leg: No edema.   Lymphadenopathy:      Cervical: No cervical adenopathy.   Skin:     General: Skin is warm.      Capillary Refill: Capillary refill takes less than 2 seconds.      Coloration: Skin is not jaundiced.      Findings: No bruising, erythema or rash.   Neurological:      General: No focal deficit present.      Mental Status: She is alert and oriented to person, place, and time.      Motor: Motor function is intact. No weakness.      Coordination: Coordination is intact.      Gait: Gait is intact. Gait normal.   Psychiatric:         Attention and Perception: Attention normal.         Mood and Affect: Mood normal.         Speech: Speech normal.         Behavior: Behavior normal.          Cognition and Memory: Cognition normal.         Judgment: Judgment normal.        Social History     Tobacco Use   • Smoking status: Never Smoker   • Smokeless tobacco: Never Used   Substance Use Topics   • Alcohol use: Never      Past Medical History:   • Acid reflux   • Asthma   • Breast cancer (HCC)    rt br ca   • Cancer (HCC)   • Eczema   • Elevated cholesterol   • Endometriosis   • GERD (gastroesophageal reflux disease)   • Hiatal hernia   • High cholesterol   • Seasonal allergies   • Shaking    INTERMITTANT   • Sinusitis   • Staph infection    port      Current Outpatient Medications on File Prior to Visit   Medication Sig   • calcium carbonate-vitamin d 600-400 MG-UNIT per tablet Take 2 tablets by mouth Daily.   • letrozole (FEMARA) 2.5 MG tablet Take 1 tablet by mouth Daily.   • levalbuterol (XOPENEX HFA) 45 MCG/ACT inhaler Inhale 1-2 puffs Every 4 (Four) Hours As Needed for Wheezing.   • [DISCONTINUED] fexofenadine (ALLEGRA) 180 MG tablet Take 180 mg by mouth Daily.   • [DISCONTINUED] FLAXSEED, LINSEED, PO Take 1 tablet by mouth 2 (two) times a day.   • [DISCONTINUED] vitamin B-12 (CYANOCOBALAMIN) 1000 MCG tablet Take 1,000 mcg by mouth Daily.     Current Facility-Administered Medications on File Prior to Visit   Medication   • [COMPLETED] cyanocobalamin injection 1,000 mcg      Result Review :                 Assessment and Plan    Diagnoses and all orders for this visit:    1. Seasonal allergic rhinitis due to other allergic trigger (Primary)  -     fluticasone (Flonase) 50 MCG/ACT nasal spray; 2 sprays into the nostril(s) as directed by provider Daily.  Dispense: 1 g; Refill: 2  -     montelukast (Singulair) 10 MG tablet; Take 1 tablet by mouth Every Night.  Dispense: 30 tablet; Refill: 2  -     cetirizine (zyrTEC) 10 MG tablet; Take 1 tablet by mouth Daily.  Dispense: 30 tablet; Refill: 2    2. B12 deficiency  -     Vitamin B12; Future  -     Vitamin B12    3. Mixed hyperlipidemia  -     Lipid Panel;  Future  -     Lipid Panel    4. Prediabetes  -     Hemoglobin A1c; Future  -     Hemoglobin A1c    5. Other fatigue  -     CBC Auto Differential; Future  -     Comprehensive Metabolic Panel; Future  -     TSH; Future  -     T4, Free; Future  -     Vitamin D 25 Hydroxy; Future  -     Magnesium; Future  -     CBC Auto Differential  -     Comprehensive Metabolic Panel  -     TSH  -     T4, Free  -     Vitamin D 25 Hydroxy  -     Magnesium        Follow Up   Return labs today, for Next scheduled follow up.  Patient was given instructions and counseling regarding her condition or for health maintenance advice. Please see specific information pulled into the AVS if appropriate.     Jackie Johnson  reports that she has never smoked. She has never used smokeless tobacco.. I have educated her on the risk of diseases from using tobacco products such as cancer, COPD and heart disease.

## 2021-10-06 ENCOUNTER — TELEPHONE (OUTPATIENT)
Dept: FAMILY MEDICINE CLINIC | Facility: CLINIC | Age: 61
End: 2021-10-06

## 2021-10-06 DIAGNOSIS — E78.2 MIXED HYPERLIPIDEMIA: Primary | ICD-10-CM

## 2021-10-06 LAB
25(OH)D3 SERPL-MCNC: 34.3 NG/ML (ref 30–100)
ALBUMIN SERPL-MCNC: 4.7 G/DL (ref 3.5–5.2)
ALBUMIN/GLOB SERPL: 2 G/DL
ALP SERPL-CCNC: 80 U/L (ref 39–117)
ALT SERPL W P-5'-P-CCNC: 22 U/L (ref 1–33)
ANION GAP SERPL CALCULATED.3IONS-SCNC: 10.6 MMOL/L (ref 5–15)
AST SERPL-CCNC: 19 U/L (ref 1–32)
BASOPHILS # BLD AUTO: 0.06 10*3/MM3 (ref 0–0.2)
BASOPHILS NFR BLD AUTO: 1.2 % (ref 0–1.5)
BILIRUB SERPL-MCNC: 0.2 MG/DL (ref 0–1.2)
BUN SERPL-MCNC: 15 MG/DL (ref 8–23)
BUN/CREAT SERPL: 17.2 (ref 7–25)
CALCIUM SPEC-SCNC: 10.1 MG/DL (ref 8.6–10.5)
CHLORIDE SERPL-SCNC: 104 MMOL/L (ref 98–107)
CHOLEST SERPL-MCNC: 236 MG/DL (ref 0–200)
CO2 SERPL-SCNC: 25.4 MMOL/L (ref 22–29)
CREAT SERPL-MCNC: 0.87 MG/DL (ref 0.57–1)
DEPRECATED RDW RBC AUTO: 45.7 FL (ref 37–54)
EOSINOPHIL # BLD AUTO: 0.06 10*3/MM3 (ref 0–0.4)
EOSINOPHIL NFR BLD AUTO: 1.2 % (ref 0.3–6.2)
ERYTHROCYTE [DISTWIDTH] IN BLOOD BY AUTOMATED COUNT: 13.2 % (ref 12.3–15.4)
GFR SERPL CREATININE-BSD FRML MDRD: 66 ML/MIN/1.73
GLOBULIN UR ELPH-MCNC: 2.3 GM/DL
GLUCOSE SERPL-MCNC: 85 MG/DL (ref 65–99)
HBA1C MFR BLD: 5.96 % (ref 4.8–5.6)
HCT VFR BLD AUTO: 43.4 % (ref 34–46.6)
HDLC SERPL-MCNC: 46 MG/DL (ref 40–60)
HGB BLD-MCNC: 13.8 G/DL (ref 12–15.9)
IMM GRANULOCYTES # BLD AUTO: 0.01 10*3/MM3 (ref 0–0.05)
IMM GRANULOCYTES NFR BLD AUTO: 0.2 % (ref 0–0.5)
LDLC SERPL CALC-MCNC: 167 MG/DL (ref 0–100)
LDLC/HDLC SERPL: 3.58 {RATIO}
LYMPHOCYTES # BLD AUTO: 1.72 10*3/MM3 (ref 0.7–3.1)
LYMPHOCYTES NFR BLD AUTO: 33.7 % (ref 19.6–45.3)
MAGNESIUM SERPL-MCNC: 2.1 MG/DL (ref 1.6–2.4)
MCH RBC QN AUTO: 29.6 PG (ref 26.6–33)
MCHC RBC AUTO-ENTMCNC: 31.8 G/DL (ref 31.5–35.7)
MCV RBC AUTO: 92.9 FL (ref 79–97)
MONOCYTES # BLD AUTO: 0.41 10*3/MM3 (ref 0.1–0.9)
MONOCYTES NFR BLD AUTO: 8 % (ref 5–12)
NEUTROPHILS NFR BLD AUTO: 2.84 10*3/MM3 (ref 1.7–7)
NEUTROPHILS NFR BLD AUTO: 55.7 % (ref 42.7–76)
NRBC BLD AUTO-RTO: 0 /100 WBC (ref 0–0.2)
PLATELET # BLD AUTO: 238 10*3/MM3 (ref 140–450)
PMV BLD AUTO: 11 FL (ref 6–12)
POTASSIUM SERPL-SCNC: 4.4 MMOL/L (ref 3.5–5.2)
PROT SERPL-MCNC: 7 G/DL (ref 6–8.5)
RBC # BLD AUTO: 4.67 10*6/MM3 (ref 3.77–5.28)
SODIUM SERPL-SCNC: 140 MMOL/L (ref 136–145)
T4 FREE SERPL-MCNC: 1.35 NG/DL (ref 0.93–1.7)
TRIGL SERPL-MCNC: 127 MG/DL (ref 0–150)
TSH SERPL DL<=0.05 MIU/L-ACNC: 2.39 UIU/ML (ref 0.27–4.2)
VIT B12 BLD-MCNC: >2000 PG/ML (ref 211–946)
VLDLC SERPL-MCNC: 23 MG/DL (ref 5–40)
WBC # BLD AUTO: 5.1 10*3/MM3 (ref 3.4–10.8)

## 2021-10-06 NOTE — TELEPHONE ENCOUNTER
----- Message from SHER Danielson sent at 10/6/2021 11:25 AM EDT -----  Please call the patient regarding her abnormal result. Her bad cholesterol is high she needs to start on lovastatin. I called it in to the pharmacy. Also her a1c was back up to 5.96 she needs to limit her carbs and sugars.     Left a message to return call.    Spoke with patient & she verbalized understanding.

## 2021-10-08 ENCOUNTER — CLINICAL SUPPORT (OUTPATIENT)
Dept: ONCOLOGY | Facility: CLINIC | Age: 61
End: 2021-10-08

## 2021-10-08 ENCOUNTER — INFUSION (OUTPATIENT)
Dept: ONCOLOGY | Facility: HOSPITAL | Age: 61
End: 2021-10-08

## 2021-10-08 VITALS
TEMPERATURE: 98.2 F | RESPIRATION RATE: 18 BRPM | BODY MASS INDEX: 27.67 KG/M2 | HEART RATE: 74 BPM | WEIGHT: 161.2 LBS | TEMPERATURE: 98.2 F | OXYGEN SATURATION: 97 % | BODY MASS INDEX: 27.67 KG/M2 | SYSTOLIC BLOOD PRESSURE: 112 MMHG | SYSTOLIC BLOOD PRESSURE: 112 MMHG | DIASTOLIC BLOOD PRESSURE: 77 MMHG | OXYGEN SATURATION: 97 % | RESPIRATION RATE: 18 BRPM | WEIGHT: 161.2 LBS | HEART RATE: 74 BPM | DIASTOLIC BLOOD PRESSURE: 77 MMHG

## 2021-10-08 DIAGNOSIS — Z95.828 PORT-A-CATH IN PLACE: Primary | ICD-10-CM

## 2021-10-08 DIAGNOSIS — M85.80 OSTEOPENIA, UNSPECIFIED LOCATION: ICD-10-CM

## 2021-10-08 DIAGNOSIS — N60.92 ATYPICAL LOBULAR HYPERPLASIA (ALH) OF LEFT BREAST: ICD-10-CM

## 2021-10-08 DIAGNOSIS — C50.911 MALIGNANT NEOPLASM OF RIGHT BREAST IN FEMALE, ESTROGEN RECEPTOR NEGATIVE, UNSPECIFIED SITE OF BREAST (HCC): ICD-10-CM

## 2021-10-08 DIAGNOSIS — Z17.1 MALIGNANT NEOPLASM OF RIGHT BREAST IN FEMALE, ESTROGEN RECEPTOR NEGATIVE, UNSPECIFIED SITE OF BREAST (HCC): ICD-10-CM

## 2021-10-08 PROCEDURE — 99214 OFFICE O/P EST MOD 30 MIN: CPT | Performed by: INTERNAL MEDICINE

## 2021-10-08 PROCEDURE — 96523 IRRIG DRUG DELIVERY DEVICE: CPT

## 2021-10-08 PROCEDURE — 25010000002 HEPARIN LOCK FLUSH PER 10 UNITS: Performed by: INTERNAL MEDICINE

## 2021-10-08 RX ORDER — SODIUM CHLORIDE 0.9 % (FLUSH) 0.9 %
10 SYRINGE (ML) INJECTION AS NEEDED
OUTPATIENT
Start: 2021-11-19

## 2021-10-08 RX ORDER — HEPARIN SODIUM (PORCINE) LOCK FLUSH IV SOLN 100 UNIT/ML 100 UNIT/ML
500 SOLUTION INTRAVENOUS AS NEEDED
Status: DISCONTINUED | OUTPATIENT
Start: 2021-10-08 | End: 2021-10-08 | Stop reason: HOSPADM

## 2021-10-08 RX ORDER — SODIUM CHLORIDE 0.9 % (FLUSH) 0.9 %
20 SYRINGE (ML) INJECTION AS NEEDED
OUTPATIENT
Start: 2021-10-08

## 2021-10-08 RX ORDER — HEPARIN SODIUM (PORCINE) LOCK FLUSH IV SOLN 100 UNIT/ML 100 UNIT/ML
500 SOLUTION INTRAVENOUS AS NEEDED
OUTPATIENT
Start: 2021-11-19

## 2021-10-08 RX ORDER — HEPARIN SODIUM (PORCINE) LOCK FLUSH IV SOLN 100 UNIT/ML 100 UNIT/ML
300 SOLUTION INTRAVENOUS ONCE
OUTPATIENT
Start: 2021-10-08

## 2021-10-08 RX ORDER — SODIUM CHLORIDE 0.9 % (FLUSH) 0.9 %
10 SYRINGE (ML) INJECTION AS NEEDED
Status: DISCONTINUED | OUTPATIENT
Start: 2021-10-08 | End: 2021-10-08 | Stop reason: HOSPADM

## 2021-10-08 RX ADMIN — Medication 500 UNITS: at 14:49

## 2021-10-08 RX ADMIN — SODIUM CHLORIDE, PRESERVATIVE FREE 10 ML: 5 INJECTION INTRAVENOUS at 14:49

## 2021-10-08 NOTE — PROGRESS NOTES
NAME: Jackie Johnson    : 1960    DATE:  10/8/2021    DIAGNOSIS:   1.  Clinical Stage IIB (yT6H7T1) poorly differentiated invasive ductal carcinoma of the right breast.  Tumor spanned 15-16 cm, though it was unclear how much of this was invasive malignancy v. DCIS.  There were no clinically or radiographically supspicious axillary LNs.  There was associated high grade DCIS.  Tumor was ER-,AK-, HER-2 +  .  Following neoadjuvant TCHP, she had complete pathologic response - ypT0N0(sn).  No invasive malignancy detected.  0/6 LN involved.     2.  Atypical Lobular Hyperplasia and LCIS involving the L breast    CHIEF COMPLAINT:  Follow up of Breast Cancer    TREATMENT HISTORY:  1.      2.  R Mastectectomy and SLNBx with L Lumpectomy and Mastopexy  - Dr. Kadeem Serrano and Dr. Roc Huston ([facility]) 20    3.           HISTORY OF PRESENT ILLNESS:   Jackie Johnson is a very pleasant 61 y.o. female who was referred by Dr. STACIE Richards for evaluation and treatment of breast cancer. She has a strong family h/o breast cancer and so is normally very good about getting her mammograms done.  She recently moved from [facility] and so missed her mammogram in 2019 because of the move.  She got established with a new PCP (Dr. Denver Sheth) in 2019 and was referred to a a new gynecologist  (Dr. Brayan Richards) who she saw in November/December.  Kevin Richards ordered a screening mammogram.  She had an acute febrile illness around the time of the Super Bowl and at that time thought she might have a lump in her R breast.  While waiting on mammogram scheduling, she started to have pain in the breast and in eventually she would occasionally get a little bit of discharge from the right nipple.  She presented for  Screening mammography 19 as below.  This was followed by diagnostic mammogram and U/S on 20.  She had an abnormal span of 15 cm spanning the R breast.  She underwent  stereotactic biopsy on 1-21-20.    This revealed a poorly differentiated invasive ductal carcinoma of the R breast with associated high grade DCIS.  Tumor was ER/RI- and HER-2+ by IHC.  Her sister lives in [place] and was treated at [facility], so she went there for further evaluation. She saw Dr. Romero of medical oncology and Dr. Serrano of surgical oncology.  She had staging with CT CAP and bone scan as well as Brain MRI and had no evidence of distant metastatic spread.  She had reese breast MRI as well which showed an unexpected abnormality in the L breast described as linear non-mass enhancement spanning an 8 cm area in the inferior breast at 6:00.  This was biopsied on 2-28-20 with biopsy result pending.  No abnormal axillary LNs were identified.  Neoadjuvant chemotherapy was recommended to her by her [facility] team of physicians but she wishes to seek this treatment closer to home so is here today for further evaluation and treatment.    She is in good health overall. She reports an isolated episode of fever/chills during an acute illness, abdominal fullness to her right side, fatigue and a rash circling her neck. She denies changes in weight, cp, sob, persistent abdominal pain, n/v/c/d or bony pain.    INTERVAL HISTORY:  Ms. Johnson is here today for follow up of breast cancer. She was recently seen at [facility].  There she had mammogram and CXR which were unremarkable.  She also saw a neurologist about this sensation of vibrations through her body.  She says they told her she should see another kind of specialist like an endocrinologist.  She is overall doing well.  She continues to take Femara without difficulty.  We discussed PAC removal today.  She feels ready to have this removed.      PAST MEDICAL HISTORY:  Past Medical History:   Diagnosis Date   • Acid reflux    • Asthma    • Breast cancer (HCC) 01/2020    rt br ca   • Cancer (HCC)    • Eczema    • Elevated cholesterol    • Endometriosis    • GERD  (gastroesophageal reflux disease)    • Hiatal hernia    • High cholesterol    • Seasonal allergies    • Shaking     INTERMITTANT   • Sinusitis    • Staph infection 2020    port       PAST SURGICAL HISTORY:  Past Surgical History:   Procedure Laterality Date   • ABDOMINAL SURGERY     • BREAST BIOPSY Bilateral    •  SECTION     • COLONOSCOPY N/A 2021    Procedure: COLONOSCOPY FOR SCREENING;  Surgeon: Keerthi Meneses MD;  Location: [facility];  Service: Gastroenterology;  Laterality: N/A;   • D & C HYSTEROSCOPY ENDOMETRIAL ABLATION     • DIAGNOSTIC LAPAROSCOPY     • FRACTURE SURGERY Left     tib/fib   • FULGURATION ENDOMETRIOSIS     • LEG SURGERY      Left leg following fracture   • LYMPH NODE BIOPSY     • MASTECTOMY Right     WITH LYMPH NODES REMOVAL   • PORTACATH PLACEMENT N/A 3/18/2020    Procedure: INSERTION OF PORTACATH;  Surgeon: Dudley Aldridge MD;  Location: [facility];  Service: General;  Laterality: N/A;   • SKIN BIOPSY     • TONSILLECTOMY     • TRANSESOPHAGEAL ECHOCARDIOGRAM (CINTIA)     • VENOUS ACCESS DEVICE (PORT) INSERTION N/A 2020    Procedure: INSERTION VENOUS ACCESS DEVICE;  Surgeon: Nerissa Wang MD;  Location: [facility];  Service: General;  Laterality: N/A;   • VENOUS ACCESS DEVICE (PORT) REMOVAL Right 2020    Procedure: REMOVAL VENOUS ACCESS DEVICE;  Surgeon: Nerissa Wang MD;  Location: [facility];  Service: General;  Laterality: Right;       FAMILY HISTORY:  Family History   Problem Relation Age of Onset   • Breast cancer Mother 75   • Basal cell carcinoma Mother 90        2 small spots on face removed + fair complexion   • Heart disease Mother    • Hypertension Mother    • Diabetes type II Mother    • Breast cancer Sister 55        VUS detected on genetic testing CDKN2A gene (c.-33G>C).    • Lung disease Sister    • Heart disease Father    • Throat cancer Father 79   • Lung cancer Father 79   • Dementia Father    • Parkinsonism Father    • Cancer  Maternal Grandmother 65        unknown gynecologic cancer, cervical vs uterine   • Heart disease Maternal Grandmother    • Diabetes type II Maternal Grandmother    • Lymphoma Maternal Grandfather 64   • Breast cancer Paternal Grandmother 70   • Prostate cancer Paternal Grandfather 70        metastastic to liver   • Breast cancer Paternal Aunt 77   • Breast cancer Maternal Aunt 80   • Breast cancer Other    • Ovarian cancer Sister 66        Elevated ; 1A tumor; Borderline cancer; both ovaries & lg cyst removed   • Ovarian cancer Maternal Cousin 54   • Breast cancer Other 50   • Diabetes type I Nephew          SOCIAL HISTORY:  Social History     Socioeconomic History   • Marital status:      Spouse name: Not on file   • Number of children: Not on file   • Years of education: Not on file   • Highest education level: Not on file   Tobacco Use   • Smoking status: Never Smoker   • Smokeless tobacco: Never Used   Vaping Use   • Vaping Use: Never used   Substance and Sexual Activity   • Alcohol use: Never   • Drug use: Never   • Sexual activity: Defer     Partners: Male     Birth control/protection: None     REVIEW OF SYSTEMS:   A comprehensive 14 point review of systems was performed.  Significant findings as mentioned above.  All other systems reviewed and are negative.      MEDICATIONS:  The current medication list was reviewed in the EMR    Current Outpatient Medications:   •  calcium carbonate-vitamin d 600-400 MG-UNIT per tablet, Take 2 tablets by mouth Daily., Disp: , Rfl:   •  cetirizine (zyrTEC) 10 MG tablet, Take 1 tablet by mouth Daily., Disp: 30 tablet, Rfl: 2  •  fluticasone (Flonase) 50 MCG/ACT nasal spray, 2 sprays into the nostril(s) as directed by provider Daily., Disp: 1 g, Rfl: 2  •  letrozole (FEMARA) 2.5 MG tablet, Take 1 tablet by mouth Daily., Disp: 30 tablet, Rfl: 11  •  levalbuterol (XOPENEX HFA) 45 MCG/ACT inhaler, Inhale 1-2 puffs Every 4 (Four) Hours As Needed for Wheezing., Disp: ,  Rfl:   •  lovastatin (ALTOPREV) 40 MG 24 hr tablet, Take 1 tablet by mouth Every Night., Disp: 30 tablet, Rfl: 3  •  montelukast (Singulair) 10 MG tablet, Take 1 tablet by mouth Every Night., Disp: 30 tablet, Rfl: 2    ALLERGIES:    Allergies   Allergen Reactions   • Albuterol Other (See Comments)     One time severe headache, questionable for aneurysm, did spinal tap was positive, did Angiogram and MRI were Negative for aneurysm   • Penicillins Hives   • Cefpodoxime Rash     vantin     • Clindamycin Hcl Rash   • Crestor [Rosuvastatin Calcium] Other (See Comments)     Caused high liver enxymes   • Sulfa Antibiotics Other (See Comments)     Mucosal lesions   • Latex Rash   • Pseudoephedrine Other (See Comments)     Soreness on the tongue       PHYSICAL EXAM:  Vitals:    10/08/21 1446   BP: 112/77   Pulse: 74   Resp: 18   Temp: 98.2 °F (36.8 °C)   SpO2: 97%     Pain Score    10/08/21 1446   PainSc: 0-No pain   ECOG score: 0   General:  Awake, alert and oriented, appears well.    HEENT:  Pupils are equal, round and reactive to light and accommodation, Extra-ocular movements full, Oropharyx clear, mucous membranes moist  Neck:  No JVD, thyromegaly or lymphadenopathy   CV:  Regular rate and rhythm, no murmurs, rubs or gallops  Resp:  Lungs are clear to auscultation bilaterally without crackles  Breast: S/p R mastectomy.  Surgical scars smooth and intact.  S/p L lumpectomy/mastopexy.  Surgical scars smooth and intact.  No palpable masses or axillary adenopathy on either side.   Abd:  Soft, non-tender, sl distended, bowel sounds present, no organomegaly or masses  Ext:  No clubbing, cyanosis or edema.   Lymph:  No cervical, supraclavicular, axillary,adenopathy  Neuro:  MS as above, grossly non-focal exam    PATHOLOGY:  02-12-20 08-18-20  A. SENTINEL LYMPH NODE #1 RIGHT AXILLA, BIOPSY:  - 1 lymph node, no tumor present (0/1)  - Cytokeratin stain shows no evidence of metastatic carcinoma    B. SENTINEL LYMPH NODE  #2, RIGHT AXILLA, BIOPSY:  - one lymph node, no tumor present (0/1)  - Cytokeratin stain shows no evidence of metastatic carcinoma    C. RIGHT BREAST, TOTAL MASTECTOMY:  - No residual carcinoma identified  - area of fibrosis with associated biopsy clip, consistent with treated tumor bed.   - Proliferative fibrocystic change.   - Skin and nipple, no tumor present.   - four low axillary lymph nodes, no tumor present (0/4).     D. LEFT BREAST, SEGMENTAL MASTECTOMY:  - FOCI OF LOBULAR NEOPLASIA (ATYPICAL LOBULAR HYPERPLASIA AND LOBULAR CARCINOMA IN SITU). LOW GRADE, CLASSIC TYPE, WITH FOCAL PAGETOID SPREAD INTO DUCTS.   - Two prior biopsy site changes present.   - Proliferative fibrocystic change.     E. RIGHT BREAST SKIN, EXCESS, EXCISION:  - Skin and breast tissue, no tumor present.     F. LEFT BREAST, MASTOPEXY:  - Skin and breast tissue, no tumor present    Tumor Template  Specimen Identification   Procedure: Total mastectomy   Specimen Laterality: Right  Invasive Carcinoma   Tumor Focality: NA   Tumor Size: 0   Histologic Type of Invasive Carcinoma: NA   Histologic Grade Based on Tommy Histologic Score    Glandular (Acinar)/Tubular Differentiation: Not applicable    Nuclear Pleomorphism: not applicable    Mitotic Rate: not applicable   Overall Grade: not applicable   Lymphovascular invasion: not present  In Situ Carcinoma   Ductal Carcinoma in Situ (DCIS): Not present   Lobular Carcinoma in situ (LCIS): Not present  Tumor Extension   Skin: uninvolved    Nipple: uninvolved   Skeletal muscle: not applicable  Margins   Invasive Carcinoma Margins: NA    DCIS Margins: NA  Regional Lymph nodes   Uninvolved by tumor cells    Total number of lymph nodes examined: 6    Number of sentinel lymph nodes examined: 2  Treatment effect   Treatment effect in the breast: present   Treatment effect in the lymph nodes: not present  RCB input variables   Dimensions of Residual Cancer that is in-situ: 0%   Total Number of Lymph  nodes with Residual Metastatic Carcinoma: 0   Size of Largest Metastasis: NA   RCB Index Computed Value (optional): 0   RCB Class (optional): 0  Pathologic Stage Classification 9ypTNM, AJCC 8th Edition)    Primary Tumor (invasive carcinoma)(ypT): ypT0   Regional lymph nodes (ypN): yPN0(sn)   Distant Metastasis (ypM): NA  Ancillary Studies: Please see previous case [id number]      ENDOSCOPY:        IMAGING:  Bilateral Diagnostic mammogram 12-27-19  FINDINGS:  There are scattered areas of fibroglandular density.     The bilateral fibroglandular pattern is unremarkable in appearance. A  few scattered calcifications are present in the left breast. There are  calcifications in the central aspect of the right breast spanning  approximately 15 cm of tissue in the AP dimension and extending up to  the base of the nipple for which additional imaging is recommended.     IMPRESSION:  1. Benign left mammographic findings.  2. Calcifications in the right breast. Recommend additional imaging.        BI-RADS CATEGORY:  0, INCOMPLETE: Need additional imaging evaluation.     RECOMMENDATION:  Right ML and CC magnification views.      R diagnostic mammogram 01-16-20  FINDINGS:  Magnification imaging of the right breast demonstrates  casting-type pleomorphic calcifications spanning over 15 cm of tissue in  the AP dimension in the right 6:00 to 7:00 region. Calcifications do  extend up to the base of the nipple. Findings are highly suspicious for  DCIS.     Right breast ultrasound: Focused sonographic evaluation of the right  6:00 to 7:00 region demonstrates a single 0.5 cm irregular hypoechoic  mass associated with a coarse calcification located at 7:00, 3 cm from  the nipple. Ultrasound of the right axilla demonstrates no abnormal  appearing axillary lymph nodes.        IMPRESSION:  Findings are highly suspicious for extensive DCIS in the  right 6:00 to 7:00 region extending into the base of the nipple. There  is also small irregular  mass noted on ultrasound in the 7:00 region  suspicious for invasion.        BI-RADS CATEGORY:  5, HIGHLY SUGGESTIVE OF MALIGNANCY        RECOMMENDATION:  Recommend stereotactic guided core biopsy of the  anterior and posterior aspect of the calcifications in the 6:00 to 7:00  region to determine extent of disease. Ultrasound-guided core biopsy of  the right 7:00 mass may also be warranted pending pathology results of  the calcifications.    Diagnostic bilateral mammogram 02-25-20  FINDINGS:  There are scattered areas of fibroglandular density.    1:  There are fine-linear branching calcifications measuring 16 x 7 x 7  centimeters with segmental distribution and associated post biopsy clip in the  right breast lower hemisphere at 6 to 7 o'clock.  There are 2 post biopsy clips  noted.  The calcifications extend to the inferior skin and nipple.    2:  There are amorphous calcifications measuring 0.3 centimeters in the left  breast lower hemisphere at 6 to 7 o'clock located 4 centimeters from the nipple.    IMPRESSION:  1:  Fine-linear branching calcifications in the right breast lower hemisphere at  6 to 7 o'clock are known biopsy-proven malignancy. Ultrasound is recommended for  staging. Recommend appropriate evaluation and treatment of this known malignancy.    2:  Amorphous calcifications in the left breast lower hemisphere at 6 to 7  o'clock located 4 centimeters from the nipple are suspicious. Stereotactic  biopsy is recommended.    BI-RADS Category 4:  Suspicious Abnormality      US Chest 02-25-20  Findings:       Right Breast: The extent of calcified disease is best visualized by mammography. There are dilated ducts with internal calcifications vascularity extending toward the nipple at the 7 o'clock position. This is consistent with the patient's known biopsy-proven malignancy.      Right Charles Basins: No suspicious axillary (level I, II & III) or internal mammary lymphadenopathy is  noted.      IMPRESSION:    1. Known RIGHT breast malignancy is best visualized by mammography. A MRI may be obtained for evaluation of disease.    2. No suspicious RIGHT-sided lymphadenopathy      ACR BI-RADS Category: 6. Known malignancy.  Recommend appropriate evaluation and treatment of the known malignancy.         R breast US 02-25-20  Findings:       Right Breast: The extent of calcified disease is best visualized by mammography. There are dilated ducts with internal calcifications vascularity extending toward the nipple at the 7 o'clock position. This is consistent with the patient's known biopsy-proven malignancy.      Right Charles Basins: No suspicious axillary (level I, II & III) or internal mammary lymphadenopathy is noted.      IMPRESSION:    1. Known RIGHT breast malignancy is best visualized by mammography. A MRI may be obtained for evaluation of disease.    2. No suspicious RIGHT-sided lymphadenopathy      ACR BI-RADS Category: 6. Known malignancy.  Recommend appropriate evaluation and treatment of the known malignancy.       MRI Brain w/wo contrast 02-27-20  FINDINGS:     No abnormal parenchymal or leptomeningeal enhancement is noted. The brain parenchyma is unremarkable except of small scattered T2 FLAIR hyperintensities consistent with chronic microangiopathic changes. No acute ischemia, intra or extra-axial hemorrhage, mass effect or midline shift. No hydrocephalus is noted.    The osseous structures and extra-cranial soft tissues are unremarkable.    The orbital structures unremarkable.    The paranasal sinuses and mastoid air cells are clear.    IMPRESSION:  No evidence of metastatic disease.      CTCAP 02-28-20    Findings:       CHEST:  The breasts are ptotic and there are biopsy clips projecting between the lower quadrants of the right breast.    On image 99 of series 3, there is a nonspecific 1 cm soft tissue nodule in the lower outer quadrant of the right breast.    No lung nodules or  pleural effusions are present.    No axillary, internal mammary, mediastinal or hilar adenopathy is seen.    No suspicious bone lesions are present.      ABDOMEN and PELVIS:  The liver is borderline enlarged and diffusely fatty infiltrated without measurable mass or biliary ductal dilatation.    The gallbladder appears normal.    The spleen, pancreas and adrenal glands appear normal.    The kidneys demonstrate function without hydronephrosis and there is a small low dense nidus in the mid right kidney, likely a cyst.    No adenopathy or ascites are seen in the abdomen or pelvis.    A normal-appearing retrocecal appendix is seen.    Multilevel degenerative-like bone changes are present.      IMPRESSION:  No evidence for metastatic disease the chest, abdomen or pelvis.      MRI Breast bilateral 03-02-20    Findings:  The breast composition is heterogeneous fibroglandular tissue. There is no significant early background parenchymal enhancement.    Right breast: There is linearly oriented nonmass enhancement involving the lateral and inferior aspects (anterior, middle, and posterior third) of the right breast 6 - 7 o'clock position extending 17 cm from the base of the nipple to the pectoralis muscle (series 5 image  of 256). There is no evidence for pectoralis muscle invasion. Extension to the inferior skin is better demonstrated mammographically. Susceptibility artifact from 2 postbiopsy clips is noted.    Left breast: There is linearly oriented nonmass enhancement in the inferior aspect (middle third) of the left breast 6 o'clock position extending approximately 8 cm (series 5 image  of 256). There is no evidence for nipple, skin, or pectoralis muscle involvement. Stereotactic guided biopsy of calcifications at the 6 o'clock position to be performed later today and corresponds to the anterior aspect of the nonmass enhancement. MRI guided biopsy of nonmass enhancement seen on image 94 of 256, series 5 and  image 40 of 160, series 8 is recommended (The images have been annotated with a [place]).    Charles Basins: There is no lymphadenopathy in the visualized axillary or internal mammary regions.    IMPRESSION:  Linear nonmass enhancement representing known malignancy right breast as above. Linear nonmass enhancement left breast as above.   MRI guided biopsy of nonmass enhancement seen on image 94 of 256, series 5 and image 40 of 160, series 8 is recommended.    ACR BI-RADS Category: 6. Recommend MR-guided biopsy of nonmass enhancement left breast as above .Recommend appropriate evaluation and treatment of the known malignancy.       Bone scan 03-02-20  FINDINGS:     Radiotracer uptake secondary to degenerative changes are seen involving the shoulders, hips, knees, and feet. Increased uptake along the right tibial shaft likely is likely reactive. Faint focus of activity within the left tibial shaft is compatible with prior injury/fracture. Increased uptake in the skull is compatible with a benign hyperostosis.    Physiologic uptake of the bilateral kidneys and bladder.    IMPRESSION:    No scintigraphic evidence of skeletal metastases.      Echo 03-02-20      Bilateral diagnostic mammogram 05-26-20  FINDINGS: There are scattered areas of fibroglandular density.     A postbiopsy marking clip is now noted in the left breast with expected  surrounding postbiopsy changes best visualized on MLO imaging. The  remaining bilateral fibroglandular pattern is stable in appearance.  Redemonstrated are fine linear branching calcifications in the right  6:00 to 7:00 region spanning approximately 17 cm of tissue in the AP  dimension. There are 2 associated postbiopsy marking clips. No new areas  of calcification are seen in either breast.     IMPRESSION:  1. Benign left mammographic findings.        2. No significant change in the appearance of calcifications in the  right breast.        BI-RADS CATEGORY:  6, KNOWN BIOPSY PROVEN  MALIGNANCY        RECOMMENDATION:  Recommend clinical follow-up.       Echo 07-22-20  · Normal left ventricular cavity size and wall thickness noted. All left ventricular wall segments contract normally.  · Estimated EF appears to be in the range of 61 - 65%  · The pericardium is normal. There is no evidence of pericardial effusion.      DEXA 09-25-20  FINDINGS: The BMD measured at the AP spine L1-L4 is 1.003 gm/cm2 with a  T-score of -1.5.      IMPRESSION:  The patient is considered to be osteopenic according to the  World Health Organization criteria. Fracture risk is moderate      MRI L spine wo contrast 09-28-20  FINDINGS:  Sagittal alignment is normal. Bone marrow signal intensity is normal. There is disc desiccation L1-2 through L3-4 with mild multiple level loss of intervertebral disc height. Endplate spondylosis and Schmorl's node formation most apparent at L1-2 and  L2-3. Conus medullaris terminates at L1-2 and is normal.     At L1-2, there is a mild concentric disc bulge and endplate spondylosis. There is no canal stenosis or foraminal impingement.     At L2-3, there is concentric disc bulging and mild facet hypertrophy but no canal or foraminal impingement.     At L3-4, is mild facet degenerative change left greater the right with ligamentum flavum thickening and a broad posterior disc bulge. There is no canal stenosis. There is mild inferior foraminal narrowing bilaterally.     L4-5, there is moderate facet degenerative change bilaterally with ligamentum flavum thickening. Is mild bilateral foraminal narrowing. There is no canal stenosis.     At L5-S1, there is a broad posterior disc bulge. There is a tiny posterior annular fissure. There is no canal stenosis. There is a small focal left posterolateral protrusion into the left inferior foramen with mild to moderate left inferior foraminal  narrowing. There is mild bilateral facet degenerative change.     IMPRESSION:     1. There is no evidence for  osseous metastatic disease.  2. There are lumbar degenerative changes without evidence for lumbar canal stenosis. Details above.      Echo 09-29-20  · The study is technically difficult for diagnosis.  · Normal left ventricular cavity size and wall thickness noted. All left ventricular wall segments contract normally.  · Left ventricular ejection fraction appears to be 61 - 65%.  · The aortic valve is structurally normal with no regurgitation or stenosis present.  · The mitral valve is structurally normal with no regurgitation or significant stenosis present.  · There is no evidence of pericardial effusion.       Echo 12-20-20  · Normal left ventricular cavity size and wall thickness noted. All left ventricular wall segments contract normally.  · Left ventricular ejection fraction appears to be 61 - 65%.  · The pericardium is normal. There is no evidence of pericardial effusion. .  · Comments: LV systolic function is about the same as on the previous echo study.      Echo 03-19-21  · Normal left ventricular cavity size and wall thickness noted. All left ventricular wall segments contract normally.  · Left ventricular ejection fraction appears to be 61 - 65%.  · There is no evidence of pericardial effusion.      Echo 06-01-21  · Normal left ventricular cavity size and wall thickness noted. All left ventricular wall segments contract normally.  · Left ventricular ejection fraction appears to be 66 - 70%.  · There is no evidence of pericardial effusion. .        Left diagnostic mammogram 09-23-21  FINDINGS:   The breast is heterogeneously dense, which may obscure small masses.     1:  There is a post surgical scar with associated surgical clips in the left   breast.     2:  There are benign appearing calcifications with scattered distribution and   associated post reduction change in the left breast.     Tomosynthesis performed in CC and MLO projections.     IMPRESSION:   There is no mammographic evidence of  malignancy.     Follow-up mammogram in 1 year is recommended.     BI-RADS Category 2:   Benign Finding(s)        DEXA 09-23-21  FULL RESULT:       Examination: Bone Mineral Density (DXA), 9/23/2021     Clinical History: 61-year-old postmenopausal female with breast cancer.     Indication: Assessment of bone mineral density.     Comparison: None.     Technique: Bone mineral density was obtained using Hologic dual-energy X-ray absorptiometry.     Findings: The findings are provided in the below table(s).       Bone Density:   ---------------------------------------------------------------------------   Region     Exam Date   BMD      T-        Z-                                            g/cm2  Score  Score   ---------------------------------------------------------------------------   AP Spine (L1-L4)                    09/23/2021  0.863   -1.7     -0.1       Femoral Neck (Left)                    09/23/2021  0.712   -1.2      0.1       Total Hip (Left)                    09/23/2021  0.814   -1.0      0.0       Femoral Neck (Right)                    09/23/2021  0.808   -0.4      1.0       Total Hip (Right)                    09/23/2021  0.840   -0.8      0.2       -----------------------------------------------------------------   \For postmenopausal women and men age 50 and over, the World Health   Organization criteria for BMD interpretation classify patients as: Normal   (T-score at or above -1.0), Osteopenia (T-score between -1.0 and     -2.5), or Osteoporosis (T-score at or below -2.5).           IMPRESSION:   Osteopenia based on measurements of the lumbar spine and left femoral neck.     RECENT LABS:  Lab Results   Component Value Date    WBC 5.10 10/05/2021    HGB 13.8 10/05/2021    HCT 43.4 10/05/2021    MCV 92.9 10/05/2021    RDW 13.2 10/05/2021     10/05/2021    NEUTRORELPCT 55.7 10/05/2021    LYMPHORELPCT 33.7 10/05/2021    MONORELPCT 8.0 10/05/2021    EOSRELPCT 1.2 10/05/2021    BASORELPCT 1.2  10/05/2021    NEUTROABS 2.84 10/05/2021    LYMPHSABS 1.72 10/05/2021       Lab Results   Component Value Date     10/05/2021    K 4.4 10/05/2021    CO2 25.4 10/05/2021     10/05/2021    BUN 15 10/05/2021    CREATININE 0.87 10/05/2021    EGFRIFNONA 66 10/05/2021    EGFRIFAFRI 85 08/17/2020    GLUCOSE 85 10/05/2021    CALCIUM 10.1 10/05/2021    ALKPHOS 80 10/05/2021    AST 19 10/05/2021    ALT 22 10/05/2021    BILITOT 0.2 10/05/2021    ALBUMIN 4.70 10/05/2021    PROTEINTOT 7.0 10/05/2021    MG 2.1 10/05/2021     Lab Results   Component Value Date    TSH 2.390 10/05/2021       Lab Results   Component Value Date     15.1 03/11/2020     Lab Results   Component Value Date    LABCA2 19.0 03/11/2020       ASSESSMENT & PLAN:  Jackie Johnson is a very pleasant 61 y.o. female with what is clinically a Stage IIB ([id number]) poorly differentiated invasive ductal carcinoma of the right breast.  Tumor spanned 15-16 cm although it was difficult to tell how much of this was invasive malignancy v. DCIS.  There were no clinically or radiographically supspicious axillary LNs.  There was associated high grade DCIS.  Tumor was ER-,WI-, HER-2 +.  She had pathologic complete response with neoadjuvant therapy.  She also has LCIS/ALH involving the left breast.    1.  Right breast cancer:  -  Administered TCHP neoadjuvantly to downsize tumor and potentially allow for a more successful surgery. After 6 cycles of TCHP, she had a complete pathologic response at time of mastectomy..  -  We discussed consideration of radiation given the initial size of her tumor.  It really isn't known how much of her initial disease was invasive malignancy v. DCIS.  She says she was evaluated by a Radiation Oncologist at [facility] who didn't recommend radiation.  -  Given HER-2 positive disease, recommended we complete a year of HP which she tolerated well. Post-treatment echo with normal LVEF.    - Exam/labs without cause for concern.   "    2. L Breast LCIS / ALH:  -  She is s/p lumpectomy.  -  Discussed role for 5 years of hormonal therapy in prevention of future breast cancer development in this breast.  She is post-menopausal, so could use Tamoxifen or an aromatase inhibitor for this purpose.  She has an entact uterus so Tamoxifen would come with increased risk (albeit low risk) of development of endometrial hypertrophy/ endometrial cancer.  Aromatase inhibitors would not come with this risk, but would come with risk for loss of bone mineral density and potentially increased risk of side effects.    -  DEXA showed osteopenia with T score -1.5, so recommended Tamoxifen for prevention.  Ms. Johnson was not comfortable with risk of endometrial hyperplasia / cancer so she said she would prefer aromatase inhibitor.  In particular she would like to take Femara so ordered this with plan for 5 years of treatment. She is tolerating Femara well.  Will continue.  She had repeat DEXA at [facility] again showing osteopenia with T score -1.7.  -  She had diagnostic L mammogram at [facility] 9-23-21 as above without cause for concern..  Repeat exam recommended after 1 year.  - Exam/labs without cause for concern.     3.  Bone health:  -  Ms. Johnson has osteopenia.  Pre-treatment  T score -1.5.  She also has a h/o Vit D Deficiency for which she took a course of weekly high dose Vit D (now completed). Repeat Vit D Level normal. Continue Ca/Vit D BID.  Recommended weight bearing exercise.  -  At [facility], they repeated DEXA after 1 year (9-23-21) and T score was -1.7.    4.  Unusual sense of \"vibration\" and family h/o HSP (with SPG7 mutation):   - Had some evaluation at [facility] with imaging, EEG which was unremarkable.  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  I think this is not a likely cause for the " symptoms she describes.  She more recently followed up with Neurology at [facility] in September 2021.  They told her they didn't feel any further neurologic evaluation would be helpful and suggested she see someone in a different specialty such as an endocrinologist.  I believe she also has plans to see a Neurologist at [facility] as this is where her daughter goes.  I'm not sure what is causing her symptoms.  I don't feel this is related to her cancer or its treatment.      5.  Very strong family history of malignancy including several members with breast cancer and a sister with ovarian cancer.  -  She underwent genetic testing at [facility] and testing was negative.      6.  Prophylaxis:  She has had 2020 flu vaccine as well as Prevnar 13 which she also had.  She has received COVID vaccine x 3 (Moderna).  Recommended she get 2021 influenza vaccine.       7.  Follow-up:   - Continue Femara  -  Continue Ca/Vit D.  -  L diagnostic mammogram due in September 2022  -  DEXA due in September 2023  - Recommend influenza vaccine.  -  Refer to Dr. Wang for PAC removal  -  Return in 3 months for exam with CBCD, CMP, TSH, Vit D level at that time.  - Survivorship visit in 3 months    8.  ACO / MARNI/Other  Quality measures  -  Jackie Johnson did not receive 2021 flu vaccine.  Recommended she have this done.  -  Jackie Johnson reports a pain score of 0.    -  Current outpatient and discharge medications have been reconciled for the patient.  Reviewed by: Fatoumata Obrien MD     This note was scribed for Fatoumata Obrien MD by Alba Siu RN.    I, Fatoumata Obrien MD, personally performed the services described in this documentation as scribed by the above named individual in my presence, and it is both accurate and complete.  10/08/2021       I spent 30 minutes with Jackie Johnson today.  In the office today, more than 50% of this time was spent face-to-face with her  in counseling / coordination of care,  reviewing her medical history and counseling on the current treatment plan.  All questions were answered to her satisfaction      Electronically Signed by: Fatoumata Obrien MD     CC:   MD JUAN PABLO Macdonald MD Bora Lim, MD- [facility] Medical Oncology  Kadeem Serrano MD- [facility] Surgical Oncology  Dr. Roc Huston - [facility] Plastic Surgery

## 2021-10-11 ENCOUNTER — TELEPHONE (OUTPATIENT)
Dept: GENERAL RADIOLOGY | Facility: CLINIC | Age: 61
End: 2021-10-11

## 2021-10-11 NOTE — TELEPHONE ENCOUNTER
PA request for Altoprev 40mg ER Tablet; pharmacist states none of the ER tablets are covered and there are no generic for ER Tablets. Pt was unreachable. Chart reflects this is the first time pt has tried this or other statins. Please advise to proceed with PA or change medication. Thank you.

## 2021-10-14 ENCOUNTER — TELEPHONE (OUTPATIENT)
Dept: FAMILY MEDICINE CLINIC | Facility: CLINIC | Age: 61
End: 2021-10-14

## 2021-10-14 ENCOUNTER — CLINICAL SUPPORT (OUTPATIENT)
Dept: FAMILY MEDICINE CLINIC | Facility: CLINIC | Age: 61
End: 2021-10-14

## 2021-10-14 DIAGNOSIS — E53.8 B12 DEFICIENCY: Primary | ICD-10-CM

## 2021-10-14 PROCEDURE — 96372 THER/PROPH/DIAG INJ SC/IM: CPT | Performed by: FAMILY MEDICINE

## 2021-10-14 RX ORDER — CYANOCOBALAMIN 1000 UG/ML
1000 INJECTION, SOLUTION INTRAMUSCULAR; SUBCUTANEOUS ONCE
Status: COMPLETED | OUTPATIENT
Start: 2021-10-14 | End: 2021-10-14

## 2021-10-14 RX ADMIN — CYANOCOBALAMIN 1000 MCG: 1000 INJECTION, SOLUTION INTRAMUSCULAR; SUBCUTANEOUS at 16:47

## 2021-10-14 NOTE — TELEPHONE ENCOUNTER
Spoke with patient she states has tried crestor she states it raised her liver enzymes. Patient also states she has tried welchol also.

## 2021-10-14 NOTE — TELEPHONE ENCOUNTER
Augusta Hoover    10/14/21 4:30 PM  Note  Spoke with patient she states has tried crestor she states it raised her liver enzymes. Patient also states she has tried welchol also.

## 2021-10-27 ENCOUNTER — CLINICAL SUPPORT (OUTPATIENT)
Dept: FAMILY MEDICINE CLINIC | Facility: CLINIC | Age: 61
End: 2021-10-27

## 2021-10-27 DIAGNOSIS — E53.8 B12 DEFICIENCY: Primary | ICD-10-CM

## 2021-10-27 PROCEDURE — 96372 THER/PROPH/DIAG INJ SC/IM: CPT | Performed by: FAMILY MEDICINE

## 2021-10-27 RX ORDER — CYANOCOBALAMIN 1000 UG/ML
1000 INJECTION, SOLUTION INTRAMUSCULAR; SUBCUTANEOUS
Status: DISCONTINUED | OUTPATIENT
Start: 2021-10-27 | End: 2021-11-10

## 2021-10-27 RX ADMIN — CYANOCOBALAMIN 1000 MCG: 1000 INJECTION, SOLUTION INTRAMUSCULAR; SUBCUTANEOUS at 11:03

## 2021-11-01 ENCOUNTER — OFFICE VISIT (OUTPATIENT)
Dept: SURGERY | Facility: CLINIC | Age: 61
End: 2021-11-01

## 2021-11-01 VITALS
DIASTOLIC BLOOD PRESSURE: 68 MMHG | HEIGHT: 64 IN | BODY MASS INDEX: 27.42 KG/M2 | WEIGHT: 160.6 LBS | HEART RATE: 70 BPM | SYSTOLIC BLOOD PRESSURE: 116 MMHG

## 2021-11-01 DIAGNOSIS — Z17.1 MALIGNANT NEOPLASM OF RIGHT BREAST IN FEMALE, ESTROGEN RECEPTOR NEGATIVE, UNSPECIFIED SITE OF BREAST (HCC): Primary | ICD-10-CM

## 2021-11-01 DIAGNOSIS — C50.911 MALIGNANT NEOPLASM OF RIGHT BREAST IN FEMALE, ESTROGEN RECEPTOR NEGATIVE, UNSPECIFIED SITE OF BREAST (HCC): Primary | ICD-10-CM

## 2021-11-01 PROCEDURE — 36590 REMOVAL TUNNELED CV CATH: CPT | Performed by: SURGERY

## 2021-11-01 NOTE — PROGRESS NOTES
Subjective   Jackie Johnson is a 61 y.o. female here today port removal.    History of Present Illness  Ms. Knutson was seen in the office today for port removal following completion of chemotherapy for stage IIb carcinoma of the right breast.  Her initial port became infected and was removed and replaced in May 2020 she has had no further problems since then  Allergies   Allergen Reactions   • Albuterol Other (See Comments)     One time severe headache, questionable for aneurysm, did spinal tap was positive, did Angiogram and MRI were Negative for aneurysm   • Penicillins Hives   • Cefpodoxime Rash     vantin     • Clindamycin Hcl Rash   • Crestor [Rosuvastatin Calcium] Other (See Comments)     Caused high liver enxymes   • Sulfa Antibiotics Other (See Comments)     Mucosal lesions   • Latex Rash   • Pseudoephedrine Other (See Comments)     Soreness on the tongue       Current Outpatient Medications   Medication Sig Dispense Refill   • calcium carbonate-vitamin d 600-400 MG-UNIT per tablet Take 2 tablets by mouth Daily.     • cetirizine (zyrTEC) 10 MG tablet Take 1 tablet by mouth Daily. 30 tablet 2   • fluticasone (Flonase) 50 MCG/ACT nasal spray 2 sprays into the nostril(s) as directed by provider Daily. 1 g 2   • letrozole (FEMARA) 2.5 MG tablet Take 1 tablet by mouth Daily. 30 tablet 11   • levalbuterol (XOPENEX HFA) 45 MCG/ACT inhaler Inhale 1-2 puffs Every 4 (Four) Hours As Needed for Wheezing.     • lovastatin (ALTOPREV) 40 MG 24 hr tablet Take 1 tablet by mouth Every Night. 30 tablet 3   • montelukast (Singulair) 10 MG tablet Take 1 tablet by mouth Every Night. 30 tablet 2     Current Facility-Administered Medications   Medication Dose Route Frequency Provider Last Rate Last Admin   • cyanocobalamin injection 1,000 mcg  1,000 mcg Intramuscular Q28 Days Scarlet Loera MD   1,000 mcg at 10/27/21 1103     Past Medical History:   Diagnosis Date   • Acid reflux    • Asthma    • Breast cancer (HCC)  "2020    rt br ca   • Cancer (HCC)    • Eczema    • Elevated cholesterol    • Endometriosis    • GERD (gastroesophageal reflux disease)    • Hiatal hernia    • High cholesterol    • Seasonal allergies    • Shaking     INTERMITTANT   • Sinusitis    • Staph infection 2020    port     Past Surgical History:   Procedure Laterality Date   • ABDOMINAL SURGERY     • BREAST BIOPSY Bilateral    •  SECTION     • COLONOSCOPY N/A 2021    Procedure: COLONOSCOPY FOR SCREENING;  Surgeon: Keerthi Meneses MD;  Location: [facility];  Service: Gastroenterology;  Laterality: N/A;   • D & C HYSTEROSCOPY ENDOMETRIAL ABLATION     • DIAGNOSTIC LAPAROSCOPY     • FRACTURE SURGERY Left     tib/fib   • FULGURATION ENDOMETRIOSIS     • LEG SURGERY      Left leg following fracture   • LYMPH NODE BIOPSY     • MASTECTOMY Right     WITH LYMPH NODES REMOVAL   • PORTACATH PLACEMENT N/A 3/18/2020    Procedure: INSERTION OF PORTACATH;  Surgeon: Dudley Aldridge MD;  Location: [facility];  Service: General;  Laterality: N/A;   • SKIN BIOPSY     • TONSILLECTOMY     • TRANSESOPHAGEAL ECHOCARDIOGRAM (CINTIA)     • VENOUS ACCESS DEVICE (PORT) INSERTION N/A 2020    Procedure: INSERTION VENOUS ACCESS DEVICE;  Surgeon: Nerissa Wang MD;  Location: [facility];  Service: General;  Laterality: N/A;   • VENOUS ACCESS DEVICE (PORT) REMOVAL Right 2020    Procedure: REMOVAL VENOUS ACCESS DEVICE;  Surgeon: Nerissa Wang MD;  Location: [facility];  Service: General;  Laterality: Right;       Pertinent Review of Systems    Objective   /68 (BP Location: Left arm)   Pulse 70   Ht 162.6 cm (64\")   Wt 72.8 kg (160 lb 9.6 oz)   BMI 27.57 kg/m²    Physical Exam  On examination this is a well-developed well-nourished female in no acute distress  HEENT examination: Within normal limits.  Conjunctiva pink.  Nose and ears appear normal.  Neck: Supple, full range of motion.  No JVD.  Musculoskeletal: Full range of motion all " extremities without focal weakness. Normal gait. No digital cyanosis.  Psych: Patient is alert, oriented x3. Mood and affect are appropriate.  Skin: Noninfected [id number] in the left infraclavicular area    Procedures   Procedure Note    Procedure: Removal of [id number]    Location: Left infraclavicular area    Anesthesia: 1% lidocaine with epinephrine    Description:  The risks and benefits of the procedure were discussed.  After prep of the skin with Betadine and infiltration with lidocaine a transverse incision over the previous incision was made and carried down into the subcutaneous tissue.  The port was reached from the surrounding tissue.  Sutures were cut and port was freed up.  A 3-0 Vicryl pursestring was placed around the catheter and the catheter was removed from the vein.  The port was then freed up.  The skin was closed with a running 3-0 Vicryl sutures to Neisha's fascia followed by 4-0 Vicryl subcuticular stitch      Complications:  none      Results/Data:  Imaging:   Notes: Records from medical oncology from 10/8/2021 were reviewed  Lab:   Other:     Assessment/Plan   Clinical stage IIb poorly differentiated invasive ductal carcinoma of the right breast following PACs positive data arrest protocol for the rest I do want  Status post port removal    Follow-up as needed       Discussion/Summary:    Time spent:     Patient's Body mass index is 27.57 kg/m². indicating that she is overweight (BMI 25-29.9). Obesity-related health conditions include the following: none. Obesity is improving with lifestyle modifications. BMI is is above average; BMI management plan is completed. We discussed portion control and increasing exercise..         Future Appointments   Date Time Provider Department [place]   11/1/2021  4:30 PM Nerissa Wang MD [facility]  [facility]   11/19/2021 12:30 PM  COR OP INFUS CHAIR 17  COR INF COR   12/6/2021  4:00 PM Scarlet Loera MD [facility] PC [facility] COR   1/13/2022   3:00 PM NIELS NURSE LAB [facility] ONC COR COR   1/13/2022  3:30 PM Fatoumata Obrien MD [facility] ONC COR COR         Please note that portions of this note were completed with a voice recognition program.

## 2021-11-03 ENCOUNTER — CLINICAL SUPPORT (OUTPATIENT)
Dept: FAMILY MEDICINE CLINIC | Facility: CLINIC | Age: 61
End: 2021-11-03

## 2021-11-03 DIAGNOSIS — E53.8 B12 DEFICIENCY: Primary | ICD-10-CM

## 2021-11-03 PROCEDURE — 96372 THER/PROPH/DIAG INJ SC/IM: CPT | Performed by: FAMILY MEDICINE

## 2021-11-03 RX ORDER — CYANOCOBALAMIN 1000 UG/ML
1000 INJECTION, SOLUTION INTRAMUSCULAR; SUBCUTANEOUS ONCE
Status: COMPLETED | OUTPATIENT
Start: 2021-11-03 | End: 2021-11-03

## 2021-11-03 RX ADMIN — CYANOCOBALAMIN 1000 MCG: 1000 INJECTION, SOLUTION INTRAMUSCULAR; SUBCUTANEOUS at 16:21

## 2021-11-10 ENCOUNTER — CLINICAL SUPPORT (OUTPATIENT)
Dept: FAMILY MEDICINE CLINIC | Facility: CLINIC | Age: 61
End: 2021-11-10

## 2021-11-10 ENCOUNTER — OFFICE VISIT (OUTPATIENT)
Dept: FAMILY MEDICINE CLINIC | Facility: CLINIC | Age: 61
End: 2021-11-10

## 2021-11-10 VITALS
DIASTOLIC BLOOD PRESSURE: 62 MMHG | BODY MASS INDEX: 27.49 KG/M2 | WEIGHT: 161 LBS | OXYGEN SATURATION: 98 % | HEART RATE: 88 BPM | HEIGHT: 64 IN | TEMPERATURE: 97.7 F | SYSTOLIC BLOOD PRESSURE: 104 MMHG

## 2021-11-10 DIAGNOSIS — Z51.89 VISIT FOR WOUND CHECK: Primary | ICD-10-CM

## 2021-11-10 DIAGNOSIS — E53.8 B12 DEFICIENCY: Primary | ICD-10-CM

## 2021-11-10 PROCEDURE — 99212 OFFICE O/P EST SF 10 MIN: CPT | Performed by: FAMILY MEDICINE

## 2021-11-10 PROCEDURE — 96372 THER/PROPH/DIAG INJ SC/IM: CPT | Performed by: FAMILY MEDICINE

## 2021-11-10 RX ORDER — CYANOCOBALAMIN 1000 UG/ML
1000 INJECTION, SOLUTION INTRAMUSCULAR; SUBCUTANEOUS ONCE
Status: COMPLETED | OUTPATIENT
Start: 2021-11-10 | End: 2021-11-10

## 2021-11-10 RX ORDER — FEXOFENADINE HCL 180 MG/1
180 TABLET ORAL DAILY
COMMUNITY
End: 2022-02-14

## 2021-11-10 RX ADMIN — CYANOCOBALAMIN 1000 MCG: 1000 INJECTION, SOLUTION INTRAMUSCULAR; SUBCUTANEOUS at 16:18

## 2021-11-10 NOTE — ASSESSMENT & PLAN NOTE
Continue to keep site clean and dry. Monitor for signs and symptoms such as redness or discharge for infection, call asap will symptoms develop.

## 2021-11-10 NOTE — PROGRESS NOTES
"Chief Complaint  Vascular Access Problem (redness around port site )    Subjective          Jackie Johnson presents to [facility]  Wound Infection  This is a new problem. Episode onset: Patient states her port site, that she had removed last monday is tender would like to have it looked at.  Pertinent negatives include no chills, fever or rash. Associated symptoms comments: Area is pink, intact, no redness noted, no discharge.   Has mild tenderness with moving arm at times. States surgeon had to do a lot of \"scraping\" while removing the port. .       Objective   Vital Signs:   /62 (BP Location: Left arm, Patient Position: Sitting, Cuff Size: Adult)   Pulse 88   Temp 97.7 °F (36.5 °C)   Ht 162.6 cm (64\")   Wt 73 kg (161 lb)   SpO2 98%   BMI 27.64 kg/m²     Physical Exam  Constitutional:       General: She is not in acute distress.     Appearance: Normal appearance. She is well-developed and well-groomed. She is not ill-appearing, toxic-appearing or diaphoretic.   HENT:      Head: Normocephalic.      Nose: Nose normal. No congestion or rhinorrhea.      Mouth/Throat:      Mouth: Mucous membranes are moist.      Pharynx: Oropharynx is clear. No oropharyngeal exudate or posterior oropharyngeal erythema.   Eyes:      General: Lids are normal.         Right eye: No discharge.         Left eye: No discharge.      Extraocular Movements: Extraocular movements intact.      Pupils: Pupils are equal, round, and reactive to light.   Neck:      Vascular: No carotid bruit.   Cardiovascular:      Rate and Rhythm: Normal rate and regular rhythm.      Pulses: Normal pulses.      Heart sounds: Normal heart sounds. No murmur heard.  No friction rub. No gallop.    Pulmonary:      Effort: Pulmonary effort is normal. No respiratory distress.      Breath sounds: Normal breath sounds. No stridor. No wheezing, rhonchi or rales.   Chest:      Chest wall: No tenderness.   Abdominal:      " General: Bowel sounds are normal. There is no distension.      Palpations: Abdomen is soft. There is no mass.      Tenderness: There is no abdominal tenderness. There is no right CVA tenderness, left CVA tenderness, guarding or rebound.      Hernia: No hernia is present.   Musculoskeletal:         General: No swelling or tenderness. Normal range of motion.      Cervical back: Normal range of motion and neck supple. No rigidity or tenderness.      Right lower leg: No edema.      Left lower leg: No edema.   Lymphadenopathy:      Cervical: No cervical adenopathy.   Skin:     General: Skin is warm.      Capillary Refill: Capillary refill takes less than 2 seconds.      Coloration: Skin is not jaundiced.      Findings: Wound present. No bruising, erythema or rash.      Comments: Surgical wound pink, dry, intact   Neurological:      General: No focal deficit present.      Mental Status: She is alert and oriented to person, place, and time.      Motor: Motor function is intact. No weakness.      Coordination: Coordination is intact.      Gait: Gait is intact. Gait normal.   Psychiatric:         Attention and Perception: Attention normal.         Mood and Affect: Mood normal.         Speech: Speech normal.         Behavior: Behavior normal.         Cognition and Memory: Cognition normal.         Judgment: Judgment normal.        Social History     Tobacco Use   • Smoking status: Never Smoker   • Smokeless tobacco: Never Used   Substance Use Topics   • Alcohol use: Never      Past Medical History:   • Acid reflux   • Asthma   • Breast cancer (HCC)    rt br ca   • Cancer (HCC)   • Eczema   • Elevated cholesterol   • Endometriosis   • GERD (gastroesophageal reflux disease)   • Hiatal hernia   • High cholesterol   • Seasonal allergies   • Shaking    INTERMITTANT   • Sinusitis   • Staph infection    port      Current Outpatient Medications on File Prior to Visit   Medication Sig   • calcium carbonate-vitamin d 600-400 MG-UNIT  per tablet Take 2 tablets by mouth Daily.   • fexofenadine (ALLEGRA) 180 MG tablet Take 180 mg by mouth Daily.   • fluticasone (Flonase) 50 MCG/ACT nasal spray 2 sprays into the nostril(s) as directed by provider Daily.   • letrozole (FEMARA) 2.5 MG tablet Take 1 tablet by mouth Daily.   • levalbuterol (XOPENEX HFA) 45 MCG/ACT inhaler Inhale 1-2 puffs Every 4 (Four) Hours As Needed for Wheezing.   • cetirizine (zyrTEC) 10 MG tablet Take 1 tablet by mouth Daily.   • lovastatin (ALTOPREV) 40 MG 24 hr tablet Take 1 tablet by mouth Every Night.   • [DISCONTINUED] montelukast (Singulair) 10 MG tablet Take 1 tablet by mouth Every Night.     Current Facility-Administered Medications on File Prior to Visit   Medication   • [COMPLETED] cyanocobalamin injection 1,000 mcg   • [DISCONTINUED] cyanocobalamin injection 1,000 mcg      Result Review :                 Assessment and Plan    Diagnoses and all orders for this visit:    1. Visit for wound check (Primary)  Assessment & Plan:  Continue to keep site clean and dry. Monitor for signs and symptoms such as redness or discharge for infection, call asap will symptoms develop.           Follow Up   Return if symptoms worsen or fail to improve, for Next scheduled follow up.  Patient was given instructions and counseling regarding her condition or for health maintenance advice. Please see specific information pulled into the AVS if appropriate.     Jackie Alex  reports that she has never smoked. She has never used smokeless tobacco.. I have educated her on the risk of diseases from using tobacco products such as cancer, COPD and heart disease.

## 2021-11-12 DIAGNOSIS — E78.2 MIXED HYPERLIPIDEMIA: Primary | ICD-10-CM

## 2021-11-15 RX ORDER — LETROZOLE 2.5 MG/1
2.5 TABLET, FILM COATED ORAL DAILY
Qty: 30 TABLET | Refills: 11 | Status: SHIPPED | OUTPATIENT
Start: 2021-11-15 | End: 2022-11-09 | Stop reason: SDUPTHER

## 2021-11-17 ENCOUNTER — CLINICAL SUPPORT (OUTPATIENT)
Dept: FAMILY MEDICINE CLINIC | Facility: CLINIC | Age: 61
End: 2021-11-17

## 2021-11-17 DIAGNOSIS — E53.8 B12 DEFICIENCY: Primary | ICD-10-CM

## 2021-11-17 PROCEDURE — 96372 THER/PROPH/DIAG INJ SC/IM: CPT | Performed by: FAMILY MEDICINE

## 2021-11-17 RX ORDER — CYANOCOBALAMIN 1000 UG/ML
1000 INJECTION, SOLUTION INTRAMUSCULAR; SUBCUTANEOUS ONCE
Status: COMPLETED | OUTPATIENT
Start: 2021-11-17 | End: 2021-11-17

## 2021-11-17 RX ADMIN — CYANOCOBALAMIN 1000 MCG: 1000 INJECTION, SOLUTION INTRAMUSCULAR; SUBCUTANEOUS at 16:30

## 2021-11-19 ENCOUNTER — APPOINTMENT (OUTPATIENT)
Dept: ONCOLOGY | Facility: HOSPITAL | Age: 61
End: 2021-11-19

## 2021-11-24 ENCOUNTER — CLINICAL SUPPORT (OUTPATIENT)
Dept: FAMILY MEDICINE CLINIC | Facility: CLINIC | Age: 61
End: 2021-11-24

## 2021-11-24 DIAGNOSIS — E53.8 B12 DEFICIENCY: Primary | ICD-10-CM

## 2021-11-24 PROCEDURE — 96372 THER/PROPH/DIAG INJ SC/IM: CPT | Performed by: NURSE PRACTITIONER

## 2021-11-24 RX ORDER — CYANOCOBALAMIN 1000 UG/ML
1000 INJECTION, SOLUTION INTRAMUSCULAR; SUBCUTANEOUS
Status: SHIPPED | OUTPATIENT
Start: 2021-11-24

## 2021-11-24 RX ADMIN — CYANOCOBALAMIN 1000 MCG: 1000 INJECTION, SOLUTION INTRAMUSCULAR; SUBCUTANEOUS at 16:21

## 2021-12-02 ENCOUNTER — CLINICAL SUPPORT (OUTPATIENT)
Dept: FAMILY MEDICINE CLINIC | Facility: CLINIC | Age: 61
End: 2021-12-02

## 2021-12-02 DIAGNOSIS — E53.8 B12 DEFICIENCY: Primary | ICD-10-CM

## 2021-12-02 PROCEDURE — 96372 THER/PROPH/DIAG INJ SC/IM: CPT | Performed by: FAMILY MEDICINE

## 2021-12-02 RX ORDER — CYANOCOBALAMIN 1000 UG/ML
1000 INJECTION, SOLUTION INTRAMUSCULAR; SUBCUTANEOUS ONCE
Status: COMPLETED | OUTPATIENT
Start: 2021-12-02 | End: 2021-12-02

## 2021-12-02 RX ADMIN — CYANOCOBALAMIN 1000 MCG: 1000 INJECTION, SOLUTION INTRAMUSCULAR; SUBCUTANEOUS at 16:48

## 2021-12-06 ENCOUNTER — OFFICE VISIT (OUTPATIENT)
Dept: FAMILY MEDICINE CLINIC | Facility: CLINIC | Age: 61
End: 2021-12-06

## 2021-12-06 VITALS
OXYGEN SATURATION: 99 % | BODY MASS INDEX: 27.79 KG/M2 | HEART RATE: 59 BPM | HEIGHT: 64 IN | WEIGHT: 162.8 LBS | SYSTOLIC BLOOD PRESSURE: 118 MMHG | DIASTOLIC BLOOD PRESSURE: 82 MMHG | TEMPERATURE: 97.8 F

## 2021-12-06 DIAGNOSIS — E78.2 MIXED HYPERLIPIDEMIA: Primary | ICD-10-CM

## 2021-12-06 DIAGNOSIS — G11.4 HEREDITARY SPASTIC PARAPARESIS (HCC): ICD-10-CM

## 2021-12-06 DIAGNOSIS — E53.8 B12 DEFICIENCY: ICD-10-CM

## 2021-12-06 PROCEDURE — 99214 OFFICE O/P EST MOD 30 MIN: CPT | Performed by: FAMILY MEDICINE

## 2021-12-07 RX ORDER — PRAVASTATIN SODIUM 20 MG
20 TABLET ORAL DAILY
Qty: 90 TABLET | Refills: 1 | Status: SHIPPED | OUTPATIENT
Start: 2021-12-07 | End: 2022-06-20 | Stop reason: SDUPTHER

## 2021-12-07 NOTE — PROGRESS NOTES
"Jackie Johnson     VITALS: Blood pressure 118/82, pulse 59, temperature 97.8 °F (36.6 °C), height 162.6 cm (64.02\"), weight 73.8 kg (162 lb 12.8 oz), SpO2 99 %, not currently breastfeeding.    Subjective  Chief Complaint  Heartburn    Subjective          History of Present Illness:  Patient is a 61 y.o.  female with medical conditions significant for breast cancer and hyperlipidemia who presents to clinic secondary to medical followup.     The patient states that she is doing better. She reports that the B12 injections have really helped and she is taking them once weekly. The patient reports that she has a follow-up at  neurology, but she cancelled that appointment. She states that she has had \"lumpy\" feelings at times. The patient reports that her fatigue has improved. She states that she had a scan a few years ago to make sure that her liver looked enlarged. The patient reports that she had a  section and a laparoscopic procedure and they cauterized a little bit of endometriosis.     She reports that her \"chemo brain\" causes her to have word finding issues and is hoping that will improve. The patient is not taking a multivitamin. She states that she is taking the letrozole. The patient reports that she had another scan and she was informed that her arthritis is flaring up. She states that she went to the dermatologist. The patient reports that she went in and saw Dr. Barnes and there was a spot on her back that they biopsied and it came back with a precancerous thing. She states that she is going to go back on 12/15/2021 and Dr. Barnes will cut it with a margin around and stitch it up. The patient reports that she was also put on a medication on her face for 16 weeks twice weekly for 4 months. She states that she was informed that there was a little bit of flakiness.     No complaints about any of the medications.    The following portions of the patient's history were reviewed and updated as " appropriate: allergies, current medications, past family history, past medical history, past social history, past surgical history and problem list.    Past Medical History  Past Medical History:   Diagnosis Date   • Acid reflux    • Asthma    • Breast cancer (HCC) 2020    rt br ca   • Cancer (HCC)    • Eczema    • Elevated cholesterol    • Endometriosis    • GERD (gastroesophageal reflux disease)    • Hiatal hernia    • High cholesterol    • Seasonal allergies    • Shaking     INTERMITTANT   • Sinusitis    • Staph infection 2020    port       Surgical History  Past Surgical History:   Procedure Laterality Date   • BREAST BIOPSY Bilateral    •  SECTION     • COLONOSCOPY N/A 2021    Procedure: COLONOSCOPY FOR SCREENING;  Surgeon: Keerthi Meneses MD;  Location: [facility];  Service: Gastroenterology;  Laterality: N/A;   • D & C HYSTEROSCOPY ENDOMETRIAL ABLATION     • DIAGNOSTIC LAPAROSCOPY     • FRACTURE SURGERY Left     tib/fib   • FULGURATION ENDOMETRIOSIS     • LEG SURGERY      Left leg following fracture   • LYMPH NODE BIOPSY     • MASTECTOMY Right     WITH LYMPH NODES REMOVAL   • PORTACATH PLACEMENT N/A 3/18/2020    Procedure: INSERTION OF PORTACATH;  Surgeon: Dudley Aldridge MD;  Location: [facility];  Service: General;  Laterality: N/A;   • SKIN BIOPSY     • TONSILLECTOMY     • TRANSESOPHAGEAL ECHOCARDIOGRAM (CINTIA)     • VENOUS ACCESS DEVICE (PORT) INSERTION N/A 2020    Procedure: INSERTION VENOUS ACCESS DEVICE;  Surgeon: Nerissa Wang MD;  Location: [facility];  Service: General;  Laterality: N/A;   • VENOUS ACCESS DEVICE (PORT) INSERTION AND REMOVAL     • VENOUS ACCESS DEVICE (PORT) REMOVAL Right 2020    Procedure: REMOVAL VENOUS ACCESS DEVICE;  Surgeon: Nerissa Wang MD;  Location: [facility];  Service: General;  Laterality: Right;       Family History  Family History   Problem Relation Age of Onset   • Breast cancer Mother 75   • Basal cell carcinoma Mother  "90        2 small spots on face removed + fair complexion   • Heart disease Mother    • Hypertension Mother    • Diabetes type II Mother    • Breast cancer Sister 55        VUS detected on genetic testing CDKN2A gene (c.-33G>C).    • Lung disease Sister    • Heart disease Father    • Throat cancer Father 79   • Lung cancer Father 79   • Dementia Father    • Parkinsonism Father    • Cancer Maternal Grandmother 65        unknown gynecologic cancer, cervical vs uterine   • Heart disease Maternal Grandmother    • Diabetes type II Maternal Grandmother    • Lymphoma Maternal Grandfather 64   • Breast cancer Paternal Grandmother 70   • Prostate cancer Paternal Grandfather 70        metastastic to liver   • Breast cancer Paternal Aunt 77   • Breast cancer Maternal Aunt 80   • Breast cancer Other    • Ovarian cancer Sister 66        Elevated ; 1A tumor; Borderline cancer; both ovaries & lg cyst removed   • Ovarian cancer Maternal Cousin 54   • Breast cancer Other 50   • Diabetes type I Nephew        Social History  Social History     Socioeconomic History   • Marital status:    Tobacco Use   • Smoking status: Never Smoker   • Smokeless tobacco: Never Used   Vaping Use   • Vaping Use: Never used   Substance and Sexual Activity   • Alcohol use: Never   • Drug use: Never   • Sexual activity: Defer     Partners: Male     Birth control/protection: None       Objective   Vital Signs:   /82   Pulse 59   Temp 97.8 °F (36.6 °C)   Ht 162.6 cm (64.02\")   Wt 73.8 kg (162 lb 12.8 oz)   SpO2 99%   BMI 27.93 kg/m²     Physical Exam     Gen: Patient in NAD. Pleasant and answers appropriately. A&Ox3.    Skin: Warm and dry with normal turgor. No purpura, rashes, or unusual pigmentation noted. Hair is normal in appearance and distribution.    HEENT: NC/AT. No lesions noted. Conjunctiva clear, sclera nonicteric. PERRL. EOMI without nystagmus or strabismus. Fundi appear benign. No hemorrhages or exudates of eyes. Auditory " canals are patent bilaterally without lesions. TMs intact,  nonerythematous, nonbulging without lesions. Nasal mucosa pink, nonerythematous, and nonedematous. Frontal and maxillary sinuses are nontender. O/P nonerythematous and moist without exudate.    Neck: Supple without lymph nodes palpated. FROM.     Lungs: CTA B/L without rales, rhonchi, crackles, or wheezes.    Heart: RRR. S1 and S2 normal. No S3 or S4. No MRGT.    Abd: Soft, slightly tender,nondistended. (+)BSx4 quadrants.  No HSM or masses.    Extrem: No CCE. Radial pulses 2+/4 and equal B/L. FROMx4.     Neuro: No focal motor/sensory deficits.    Procedures         Assessment and Plan    Jackie Johnson is a 61 y.o. here for medical followup.    Problem List Items Addressed This Visit     None        Vitamin B insufficiency         The patient will check with her insurance company to see if she can self-inject vitamin B12 at home rather than coming to the office once a week.         Hypercholesterolemia          I prescribed pravastatin but asked that she get her liver enzymes checked in 3 months.      Hereditary spastic paraparesis (HCC)  Continue to monitor.      Patient's Body mass index is 27.93 kg/m². indicating that she is overweight (BMI 25-29.9). Obesity-related health conditions include the following: dyslipidemias. Obesity is unchanged. BMI is is above average; BMI management plan is completed. We discussed portion control and increasing exercise..              Follow Up   Return in about 6 months (around 6/6/2022), or ([facility] in [place]).  Findings and plans discussed with patient who verbalizes understanding and agreement. Will followup with patient once results are in. Patient was given instructions and counseling regarding her condition or for health maintenance advice. Please see specific information pulled into the AVS if appropriate.       Transcribed from ambient dictation for Scarlet Loera MD by Hetal Hull.  12/06/21    21:56 EST    Patient verbalized consent to the visit recording.  I have personally performed the services described in this document as transcribed by the above individual, and it is both accurate and complete.  Scarlet Loera MD  12/27/2021  05:44 EST

## 2021-12-10 ENCOUNTER — CLINICAL SUPPORT (OUTPATIENT)
Dept: FAMILY MEDICINE CLINIC | Facility: CLINIC | Age: 61
End: 2021-12-10

## 2021-12-10 DIAGNOSIS — E53.8 B12 DEFICIENCY: Primary | ICD-10-CM

## 2021-12-10 PROCEDURE — 96372 THER/PROPH/DIAG INJ SC/IM: CPT | Performed by: FAMILY MEDICINE

## 2021-12-10 RX ORDER — CYANOCOBALAMIN 1000 UG/ML
1000 INJECTION, SOLUTION INTRAMUSCULAR; SUBCUTANEOUS ONCE
Status: COMPLETED | OUTPATIENT
Start: 2021-12-10 | End: 2021-12-10

## 2021-12-10 RX ADMIN — CYANOCOBALAMIN 1000 MCG: 1000 INJECTION, SOLUTION INTRAMUSCULAR; SUBCUTANEOUS at 16:55

## 2021-12-17 ENCOUNTER — CLINICAL SUPPORT (OUTPATIENT)
Dept: FAMILY MEDICINE CLINIC | Facility: CLINIC | Age: 61
End: 2021-12-17

## 2021-12-17 DIAGNOSIS — E53.8 B12 DEFICIENCY: Primary | ICD-10-CM

## 2021-12-17 PROCEDURE — 96372 THER/PROPH/DIAG INJ SC/IM: CPT | Performed by: FAMILY MEDICINE

## 2021-12-17 RX ORDER — CYANOCOBALAMIN 1000 UG/ML
1000 INJECTION, SOLUTION INTRAMUSCULAR; SUBCUTANEOUS ONCE
Status: COMPLETED | OUTPATIENT
Start: 2021-12-17 | End: 2021-12-17

## 2021-12-17 RX ADMIN — CYANOCOBALAMIN 1000 MCG: 1000 INJECTION, SOLUTION INTRAMUSCULAR; SUBCUTANEOUS at 16:00

## 2021-12-27 ENCOUNTER — TELEPHONE (OUTPATIENT)
Dept: FAMILY MEDICINE CLINIC | Facility: CLINIC | Age: 61
End: 2021-12-27

## 2021-12-27 NOTE — TELEPHONE ENCOUNTER
"Caller: Jackie Johnson    Relationship: Self    Best call back number: [phone]    Who are you requesting to speak with (clinical staff, provider,  specific staff member): CLINICAL STAFF    What was the call regarding: PATIENT STATED SHE CONTACTED HER INSURANCE AS DIRECTED AND WAS INFORMED THEY WILL COVER \"30 FOR  30\" AT HOME VITAMIN B 12 INJECTIONS. PATIENT WOULD LIKE TO KNOW IF THIS COULD BE ORDERED FOR HER.    Do you require a callback: YES    PATIENT IS CURRENTLY IN [place] AND STATED IF THIS IS CALLED IN PRIOR TO 12/31/21 IT WILL NEED TO GO TO:  [id number] - [place], [place] - [address] AT [facility] [address] [facility] - [phone]  - [phone] FX   "

## 2021-12-28 RX ORDER — CYANOCOBALAMIN 1000 UG/ML
1000 INJECTION, SOLUTION INTRAMUSCULAR; SUBCUTANEOUS WEEKLY
Qty: 10 ML | Refills: 1 | Status: SHIPPED | OUTPATIENT
Start: 2021-12-28 | End: 2022-05-27

## 2021-12-28 NOTE — TELEPHONE ENCOUNTER
Can you clarify this 30 for 30 thing? We are not doing them daily. She can get them weekly though. Does she want a script for weekly?    Spoke with patient weekly is fine with her.

## 2021-12-28 NOTE — TELEPHONE ENCOUNTER
Can you clarify this 30 for 30 thing? We are not doing them daily. She can get them weekly though. Does she want a script for weekly?

## 2022-01-13 ENCOUNTER — OFFICE VISIT (OUTPATIENT)
Dept: ONCOLOGY | Facility: CLINIC | Age: 62
End: 2022-01-13

## 2022-01-13 ENCOUNTER — APPOINTMENT (OUTPATIENT)
Dept: ONCOLOGY | Facility: HOSPITAL | Age: 62
End: 2022-01-13

## 2022-01-13 ENCOUNTER — LAB (OUTPATIENT)
Dept: ONCOLOGY | Facility: CLINIC | Age: 62
End: 2022-01-13

## 2022-01-13 VITALS
WEIGHT: 164.4 LBS | OXYGEN SATURATION: 98 % | HEART RATE: 66 BPM | TEMPERATURE: 97.1 F | HEIGHT: 64 IN | BODY MASS INDEX: 28.07 KG/M2 | RESPIRATION RATE: 18 BRPM | SYSTOLIC BLOOD PRESSURE: 115 MMHG | DIASTOLIC BLOOD PRESSURE: 76 MMHG

## 2022-01-13 DIAGNOSIS — C50.911 MALIGNANT NEOPLASM OF RIGHT BREAST IN FEMALE, ESTROGEN RECEPTOR NEGATIVE, UNSPECIFIED SITE OF BREAST: ICD-10-CM

## 2022-01-13 DIAGNOSIS — Z95.828 PORT-A-CATH IN PLACE: ICD-10-CM

## 2022-01-13 DIAGNOSIS — N60.92 ATYPICAL LOBULAR HYPERPLASIA (ALH) OF LEFT BREAST: ICD-10-CM

## 2022-01-13 DIAGNOSIS — Z17.1 MALIGNANT NEOPLASM OF RIGHT BREAST IN FEMALE, ESTROGEN RECEPTOR NEGATIVE, UNSPECIFIED SITE OF BREAST: Primary | ICD-10-CM

## 2022-01-13 DIAGNOSIS — E55.9 VITAMIN D DEFICIENCY: ICD-10-CM

## 2022-01-13 DIAGNOSIS — Z79.899 LONG-TERM USE OF HIGH-RISK MEDICATION: ICD-10-CM

## 2022-01-13 DIAGNOSIS — E55.9 VITAMIN D DEFICIENCY: Primary | ICD-10-CM

## 2022-01-13 DIAGNOSIS — Z17.1 MALIGNANT NEOPLASM OF RIGHT BREAST IN FEMALE, ESTROGEN RECEPTOR NEGATIVE, UNSPECIFIED SITE OF BREAST: ICD-10-CM

## 2022-01-13 DIAGNOSIS — C50.911 MALIGNANT NEOPLASM OF RIGHT BREAST IN FEMALE, ESTROGEN RECEPTOR NEGATIVE, UNSPECIFIED SITE OF BREAST: Primary | ICD-10-CM

## 2022-01-13 DIAGNOSIS — M85.80 OSTEOPENIA, UNSPECIFIED LOCATION: ICD-10-CM

## 2022-01-13 LAB
ALBUMIN SERPL-MCNC: 4.28 G/DL (ref 3.5–5.2)
ALBUMIN/GLOB SERPL: 1.8 G/DL
ALP SERPL-CCNC: 87 U/L (ref 39–117)
ALT SERPL W P-5'-P-CCNC: 22 U/L (ref 1–33)
ANION GAP SERPL CALCULATED.3IONS-SCNC: 10.2 MMOL/L (ref 5–15)
AST SERPL-CCNC: 18 U/L (ref 1–32)
BASOPHILS # BLD AUTO: 0.05 10*3/MM3 (ref 0–0.2)
BASOPHILS NFR BLD AUTO: 0.9 % (ref 0–1.5)
BILIRUB SERPL-MCNC: 0.2 MG/DL (ref 0–1.2)
BUN SERPL-MCNC: 16 MG/DL (ref 8–23)
BUN/CREAT SERPL: 18.4 (ref 7–25)
CALCIUM SPEC-SCNC: 9.5 MG/DL (ref 8.6–10.5)
CHLORIDE SERPL-SCNC: 104 MMOL/L (ref 98–107)
CO2 SERPL-SCNC: 24.8 MMOL/L (ref 22–29)
CREAT SERPL-MCNC: 0.87 MG/DL (ref 0.57–1)
DEPRECATED RDW RBC AUTO: 45.1 FL (ref 37–54)
EOSINOPHIL # BLD AUTO: 0.06 10*3/MM3 (ref 0–0.4)
EOSINOPHIL NFR BLD AUTO: 1.1 % (ref 0.3–6.2)
ERYTHROCYTE [DISTWIDTH] IN BLOOD BY AUTOMATED COUNT: 13.2 % (ref 12.3–15.4)
GFR SERPL CREATININE-BSD FRML MDRD: 66 ML/MIN/1.73
GLOBULIN UR ELPH-MCNC: 2.4 GM/DL
GLUCOSE SERPL-MCNC: 90 MG/DL (ref 65–99)
HCT VFR BLD AUTO: 41.1 % (ref 34–46.6)
HGB BLD-MCNC: 13.2 G/DL (ref 12–15.9)
IMM GRANULOCYTES # BLD AUTO: 0.01 10*3/MM3 (ref 0–0.05)
IMM GRANULOCYTES NFR BLD AUTO: 0.2 % (ref 0–0.5)
LYMPHOCYTES # BLD AUTO: 1.92 10*3/MM3 (ref 0.7–3.1)
LYMPHOCYTES NFR BLD AUTO: 34.5 % (ref 19.6–45.3)
MCH RBC QN AUTO: 29.4 PG (ref 26.6–33)
MCHC RBC AUTO-ENTMCNC: 32.1 G/DL (ref 31.5–35.7)
MCV RBC AUTO: 91.5 FL (ref 79–97)
MONOCYTES # BLD AUTO: 0.42 10*3/MM3 (ref 0.1–0.9)
MONOCYTES NFR BLD AUTO: 7.6 % (ref 5–12)
NEUTROPHILS NFR BLD AUTO: 3.1 10*3/MM3 (ref 1.7–7)
NEUTROPHILS NFR BLD AUTO: 55.7 % (ref 42.7–76)
NRBC BLD AUTO-RTO: 0 /100 WBC (ref 0–0.2)
PLATELET # BLD AUTO: 208 10*3/MM3 (ref 140–450)
PMV BLD AUTO: 10 FL (ref 6–12)
POTASSIUM SERPL-SCNC: 3.6 MMOL/L (ref 3.5–5.2)
PROT SERPL-MCNC: 6.7 G/DL (ref 6–8.5)
RBC # BLD AUTO: 4.49 10*6/MM3 (ref 3.77–5.28)
SODIUM SERPL-SCNC: 139 MMOL/L (ref 136–145)
TSH SERPL DL<=0.05 MIU/L-ACNC: 2.39 UIU/ML (ref 0.27–4.2)
WBC NRBC COR # BLD: 5.56 10*3/MM3 (ref 3.4–10.8)

## 2022-01-13 PROCEDURE — 80050 GENERAL HEALTH PANEL: CPT | Performed by: INTERNAL MEDICINE

## 2022-01-13 PROCEDURE — 99214 OFFICE O/P EST MOD 30 MIN: CPT | Performed by: INTERNAL MEDICINE

## 2022-01-13 PROCEDURE — 82306 VITAMIN D 25 HYDROXY: CPT | Performed by: INTERNAL MEDICINE

## 2022-01-13 RX ORDER — IMIQUIMOD 12.5 MG/.25G
1 CREAM TOPICAL 2 TIMES WEEKLY
COMMUNITY
End: 2022-12-07

## 2022-01-14 LAB — 25(OH)D3 SERPL-MCNC: 34.4 NG/ML

## 2022-02-12 DIAGNOSIS — J30.89 SEASONAL ALLERGIC RHINITIS DUE TO OTHER ALLERGIC TRIGGER: ICD-10-CM

## 2022-02-14 RX ORDER — CETIRIZINE HYDROCHLORIDE 10 MG/1
TABLET ORAL
Qty: 30 TABLET | Refills: 2 | Status: SHIPPED | OUTPATIENT
Start: 2022-02-14 | End: 2022-05-24

## 2022-02-25 ENCOUNTER — OFFICE VISIT (OUTPATIENT)
Dept: FAMILY MEDICINE CLINIC | Facility: CLINIC | Age: 62
End: 2022-02-25

## 2022-02-25 VITALS
SYSTOLIC BLOOD PRESSURE: 126 MMHG | HEART RATE: 71 BPM | TEMPERATURE: 96.6 F | OXYGEN SATURATION: 99 % | BODY MASS INDEX: 28.2 KG/M2 | HEIGHT: 64 IN | DIASTOLIC BLOOD PRESSURE: 78 MMHG | WEIGHT: 165.2 LBS

## 2022-02-25 DIAGNOSIS — R39.89 ABNORMAL URINE COLOR: Primary | ICD-10-CM

## 2022-02-25 DIAGNOSIS — R10.11 RUQ PAIN: ICD-10-CM

## 2022-02-25 DIAGNOSIS — J02.0 STREP SORE THROAT: ICD-10-CM

## 2022-02-25 DIAGNOSIS — R53.83 OTHER FATIGUE: ICD-10-CM

## 2022-02-25 LAB
BILIRUB BLD-MCNC: NEGATIVE MG/DL
CLARITY, POC: CLEAR
COLOR UR: YELLOW
EXPIRATION DATE: NORMAL
EXPIRATION DATE: NORMAL
GLUCOSE UR STRIP-MCNC: NEGATIVE MG/DL
INTERNAL CONTROL: NORMAL
KETONES UR QL: NEGATIVE
LEUKOCYTE EST, POC: NEGATIVE
Lab: NORMAL
Lab: NORMAL
NITRITE UR-MCNC: NEGATIVE MG/ML
PH UR: 6 [PH] (ref 5–8)
PROT UR STRIP-MCNC: NEGATIVE MG/DL
RBC # UR STRIP: NEGATIVE /UL
S PYO AG THROAT QL: NEGATIVE
SP GR UR: 1.01 (ref 1–1.03)
UROBILINOGEN UR QL: NORMAL

## 2022-02-25 PROCEDURE — 99214 OFFICE O/P EST MOD 30 MIN: CPT | Performed by: FAMILY MEDICINE

## 2022-02-25 PROCEDURE — U0004 COV-19 TEST NON-CDC HGH THRU: HCPCS | Performed by: FAMILY MEDICINE

## 2022-02-25 PROCEDURE — 81003 URINALYSIS AUTO W/O SCOPE: CPT | Performed by: FAMILY MEDICINE

## 2022-02-25 PROCEDURE — 87880 STREP A ASSAY W/OPTIC: CPT | Performed by: FAMILY MEDICINE

## 2022-02-26 LAB — SARS-COV-2 RNA PNL SPEC NAA+PROBE: NOT DETECTED

## 2022-03-02 ENCOUNTER — TELEPHONE (OUTPATIENT)
Dept: FAMILY MEDICINE CLINIC | Facility: CLINIC | Age: 62
End: 2022-03-02

## 2022-03-02 NOTE — TELEPHONE ENCOUNTER
----- Message from Scarlet Loera MD sent at 3/2/2022  8:41 AM EST -----  Labs stable. Okay to either call or send letter to patient. Thanks.  (Was low risk for COVID).        Left a message to return call.    Patient notified.

## 2022-03-11 ENCOUNTER — HOSPITAL ENCOUNTER (OUTPATIENT)
Dept: NUCLEAR MEDICINE | Facility: HOSPITAL | Age: 62
Discharge: HOME | End: 2022-03-11

## 2022-03-11 DIAGNOSIS — R10.11 RUQ PAIN: ICD-10-CM

## 2022-03-11 DIAGNOSIS — R53.83 OTHER FATIGUE: ICD-10-CM

## 2022-03-11 DIAGNOSIS — R39.89 ABNORMAL URINE COLOR: ICD-10-CM

## 2022-03-12 ENCOUNTER — HOSPITAL ENCOUNTER (OUTPATIENT)
Dept: NUCLEAR MEDICINE | Facility: HOSPITAL | Age: 62
Discharge: HOME | End: 2022-03-12

## 2022-03-12 DIAGNOSIS — R53.83 OTHER FATIGUE: ICD-10-CM

## 2022-03-12 DIAGNOSIS — R39.89 ABNORMAL URINE COLOR: ICD-10-CM

## 2022-03-12 DIAGNOSIS — R10.11 RUQ PAIN: ICD-10-CM

## 2022-03-12 PROCEDURE — A9537 TC99M MEBROFENIN: HCPCS | Performed by: FAMILY MEDICINE

## 2022-03-12 PROCEDURE — 78226 HEPATOBILIARY SYSTEM IMAGING: CPT | Performed by: RADIOLOGY

## 2022-03-12 PROCEDURE — 78226 HEPATOBILIARY SYSTEM IMAGING: CPT

## 2022-03-12 PROCEDURE — 0 TECHNETIUM TC 99M MEBROFENIN KIT: Performed by: FAMILY MEDICINE

## 2022-03-12 RX ORDER — KIT FOR THE PREPARATION OF TECHNETIUM TC 99M MEBROFENIN 45 MG/10ML
1 INJECTION, POWDER, LYOPHILIZED, FOR SOLUTION INTRAVENOUS
Status: COMPLETED | OUTPATIENT
Start: 2022-03-12 | End: 2022-03-12

## 2022-03-12 RX ADMIN — MEBROFENIN 1 DOSE: 45 INJECTION, POWDER, LYOPHILIZED, FOR SOLUTION INTRAVENOUS at 12:37

## 2022-03-22 NOTE — TELEPHONE ENCOUNTER
"Caller: AlexJackie awad    Relationship: Self    Best call back number: [phone]    Requested Prescriptions:   Requested Prescriptions     Pending Prescriptions Disp Refills   • Syringe/Needle, Disp, 25G X 5/8\" 1 ML misc 50 each 0     Si application 1 (One) Time Per Week. To use with B12 injections.        Pharmacy where request should be sent: [facility] [address] [address] [address] [facility] [address] [facility] [address] [facility] [phone] [facility] [phone] FX     Additional details provided by patient:     Does the patient have less than a 3 day supply:  [x] Yes  [] No    Chrissy Anne, PCT   22 15:23 EDT   "

## 2022-05-23 NOTE — PROGRESS NOTES
NAME: Jackie Johnson    : 1960    DATE:  2022    DIAGNOSIS:   1.  Clinical Stage IIB ([id number]) poorly differentiated invasive ductal carcinoma of the right breast.  Tumor spanned 15-16 cm, though it was unclear how much of this was invasive malignancy v. DCIS.  There were no clinically or radiographically supspicious axillary LNs.  There was associated high grade DCIS.  Tumor was ER-,OR-, HER-2 +  .  Following neoadjuvant TCHP, she had complete pathologic response - ypT0N0(sn).  No invasive malignancy detected.  0/6 LN involved.     2.  Atypical Lobular Hyperplasia and LCIS involving the L breast    CHIEF COMPLAINT:  Follow up of Breast Cancer    TREATMENT HISTORY:  1.      2.  R Mastectectomy and SLNBx with L Lumpectomy and Mastopexy  - Dr. Kadeem Serrano and Dr. Roc Huston ([facility]) 20    3.           HISTORY OF PRESENT ILLNESS:   Jackie Johnson is a very pleasant 62 y.o. female who was referred by Dr. STACIE Richards for evaluation and treatment of breast cancer. She has a strong family h/o breast cancer and so is normally very good about getting her mammograms done.  She recently moved from [facility] and so missed her mammogram in 2019 because of the move.  She got established with a new PCP (Dr. Denver Sheth) in 2019 and was referred to a a new gynecologist  (Dr. Brayan Richards) who she saw in November/December.  Kevin Richards ordered a screening mammogram.  She had an acute febrile illness around the time of the Super Bowl and at that time thought she might have a lump in her R breast.  While waiting on mammogram scheduling, she started to have pain in the breast and in eventually she would occasionally get a little bit of discharge from the right nipple.  She presented for  Screening mammography 19 as below.  This was followed by diagnostic mammogram and U/S on 20.  She had an abnormal span of 15 cm spanning the R breast.  She underwent  "stereotactic biopsy on 1-21-20.    This revealed a poorly differentiated invasive ductal carcinoma of the R breast with associated high grade DCIS.  Tumor was ER/PA- and HER-2+ by IHC.  Her sister lives in [place] and was treated at [facility], so she went there for further evaluation. She saw Dr. Romero of medical oncology and Dr. Serrano of surgical oncology.  She had staging with CT CAP and bone scan as well as Brain MRI and had no evidence of distant metastatic spread.  She had reese breast MRI as well which showed an unexpected abnormality in the L breast described as linear non-mass enhancement spanning an 8 cm area in the inferior breast at 6:00.  This was biopsied on 2-28-20 with biopsy result pending.  No abnormal axillary LNs were identified.  Neoadjuvant chemotherapy was recommended to her by her [facility] team of physicians but she wishes to seek this treatment closer to home so is here today for further evaluation and treatment.    She is in good health overall. She reports an isolated episode of fever/chills during an acute illness, abdominal fullness to her right side, fatigue and a rash circling her neck. She denies changes in weight, cp, sob, persistent abdominal pain, n/v/c/d or bony pain.    INTERVAL HISTORY:  Ms. Johnson is here today for follow up of breast cancer. She continues to take Femara and is tolerating it well. She reports recent weight gain. She also reports experiencing slight pain and pressure in her RUQ and had a CTAP done at [facility] without concerning findings.  She says her \"tremors\" are getting better.  She saw a neurogenecist at [place] who treats her daughter and this person evaluated her and said she doesn't have what her daughter has.  She says this person suggested she see a neurologist once/year.   She complains of having a \"funky fingernail\" on her R pointer finger and complains of fatigue for which she was seen at the fatigue clinic at [facility].  She says they recommended " paying attention to make sure she gets enough sleep and maybe considering a sleep study.  Overall however, she says she is feeling more and more like her usual self.  She is excited to prepare for her son' s wedding.       PAST MEDICAL HISTORY:  Past Medical History:   Diagnosis Date   • Acid reflux    • Asthma    • Breast cancer (HCC) 2020    rt br ca   • Cancer (HCC)    • Eczema    • Elevated cholesterol    • Endometriosis    • GERD (gastroesophageal reflux disease)    • Hiatal hernia    • High cholesterol    • Seasonal allergies    • Shaking     INTERMITTANT   • Sinusitis    • Staph infection 2020    port       PAST SURGICAL HISTORY:  Past Surgical History:   Procedure Laterality Date   • BREAST BIOPSY Bilateral    •  SECTION     • COLONOSCOPY N/A 2021    Procedure: COLONOSCOPY FOR SCREENING;  Surgeon: Keerthi Meneses MD;  Location: [facility];  Service: Gastroenterology;  Laterality: N/A;   • D & C HYSTEROSCOPY ENDOMETRIAL ABLATION     • DIAGNOSTIC LAPAROSCOPY     • FRACTURE SURGERY Left     tib/fib   • FULGURATION ENDOMETRIOSIS     • LEG SURGERY      Left leg following fracture   • LYMPH NODE BIOPSY     • MASTECTOMY Right     WITH LYMPH NODES REMOVAL   • PORTACATH PLACEMENT N/A 3/18/2020    Procedure: INSERTION OF PORTACATH;  Surgeon: Dudley Aldridge MD;  Location: [facility];  Service: General;  Laterality: N/A;   • SKIN BIOPSY     • TONSILLECTOMY     • TRANSESOPHAGEAL ECHOCARDIOGRAM (CINTIA)     • VENOUS ACCESS DEVICE (PORT) INSERTION N/A 2020    Procedure: INSERTION VENOUS ACCESS DEVICE;  Surgeon: Nerissa Wang MD;  Location: [facility];  Service: General;  Laterality: N/A;   • VENOUS ACCESS DEVICE (PORT) INSERTION AND REMOVAL     • VENOUS ACCESS DEVICE (PORT) REMOVAL Right 2020    Procedure: REMOVAL VENOUS ACCESS DEVICE;  Surgeon: Nerissa Wang MD;  Location: [facility];  Service: General;  Laterality: Right;       FAMILY HISTORY:  Family History   Problem  Relation Age of Onset   • Breast cancer Mother 75   • Basal cell carcinoma Mother 90        2 small spots on face removed + fair complexion   • Heart disease Mother    • Hypertension Mother    • Diabetes type II Mother    • Breast cancer Sister 55        VUS detected on genetic testing CDKN2A gene (c.-33G>C).    • Lung disease Sister    • Heart disease Father    • Throat cancer Father 79   • Lung cancer Father 79   • Dementia Father    • Parkinsonism Father    • Cancer Maternal Grandmother 65        unknown gynecologic cancer, cervical vs uterine   • Heart disease Maternal Grandmother    • Diabetes type II Maternal Grandmother    • Lymphoma Maternal Grandfather 64   • Breast cancer Paternal Grandmother 70   • Prostate cancer Paternal Grandfather 70        metastastic to liver   • Breast cancer Paternal Aunt 77   • Breast cancer Maternal Aunt 80   • Breast cancer Other    • Ovarian cancer Sister 66        Elevated ; 1A tumor; Borderline cancer; both ovaries & lg cyst removed   • Ovarian cancer Maternal Cousin 54   • Breast cancer Other 50   • Diabetes type I Nephew          SOCIAL HISTORY:  Social History     Socioeconomic History   • Marital status:    Tobacco Use   • Smoking status: Never Smoker   • Smokeless tobacco: Never Used   Vaping Use   • Vaping Use: Never used   Substance and Sexual Activity   • Alcohol use: Never   • Drug use: Never   • Sexual activity: Defer     Partners: Male     Birth control/protection: None     REVIEW OF SYSTEMS:   A comprehensive 14 point review of systems was performed.  Significant findings as mentioned above.  All other systems reviewed and are negative.      MEDICATIONS:  The current medication list was reviewed in the EMR    Current Outpatient Medications:   •  calcium carbonate-vitamin d 600-400 MG-UNIT per tablet, Take 2 tablets by mouth Daily., Disp: , Rfl:   •  cetirizine (zyrTEC) 10 MG tablet, TAKE 1 TABLET BY MOUTH EVERY DAY, Disp: 30 tablet, Rfl: 2  •   "cyanocobalamin 1000 MCG/ML injection, Inject 1 mL into the appropriate muscle as directed by prescriber 1 (One) Time Per Week., Disp: 10 mL, Rfl: 1  •  fluticasone (Flonase) 50 MCG/ACT nasal spray, 2 sprays into the nostril(s) as directed by provider Daily., Disp: 1 g, Rfl: 2  •  imiquimod (ALDARA) 5 % cream, Apply 1 application topically to the appropriate area as directed 2 (Two) Times a Week., Disp: , Rfl:   •  letrozole (FEMARA) 2.5 MG tablet, Take 1 tablet by mouth Daily., Disp: 30 tablet, Rfl: 11  •  levalbuterol (XOPENEX HFA) 45 MCG/ACT inhaler, Inhale 1-2 puffs Every 4 (Four) Hours As Needed for Wheezing., Disp: , Rfl:   •  pravastatin (Pravachol) 20 MG tablet, Take 1 tablet by mouth Daily., Disp: 90 tablet, Rfl: 1  •  Syringe/Needle, Disp, 25G X 5/8\" 1 ML misc, 1 application 1 (One) Time Per Week. To use with B12 injections., Disp: 50 each, Rfl: 0    Current Facility-Administered Medications:   •  cyanocobalamin injection 1,000 mcg, 1,000 mcg, Intramuscular, Q28 Days, Concepcion Queen APRN, 1,000 mcg at 11/24/21 1621    ALLERGIES:    Allergies   Allergen Reactions   • Albuterol Other (See Comments)     One time severe headache, questionable for aneurysm, did spinal tap was positive, did Angiogram and MRI were Negative for aneurysm   • Penicillins Hives   • Cefpodoxime Rash     vantin     • Clindamycin Hcl Rash   • Crestor [Rosuvastatin Calcium] Other (See Comments)     Caused high liver enxymes   • Sulfa Antibiotics Other (See Comments)     Mucosal lesions   • Latex Rash   • Pseudoephedrine Other (See Comments)     Soreness on the tongue       PHYSICAL EXAM:  Vitals:    05/24/22 1350   BP: 119/79   Pulse: 77   Resp: 18   Temp: 97.8 °F (36.6 °C)   SpO2: 98%     Pain Score    05/24/22 1350   PainSc: 0-No pain   ECOG score: 0   General:  Awake, alert and oriented, appears well.    HEENT:  Pupils are equal, round and reactive to light and accommodation, Extra-ocular movements full, Oropharyx clear, mucous " membranes moist  Neck:  No JVD, thyromegaly or lymphadenopathy   CV:  Regular rate and rhythm, no murmurs, rubs or gallops  Resp:  Lungs are clear to auscultation bilaterally, no crackles  Breast: S/p R mastectomy.  Surgical scars smooth and intact.  S/p L lumpectomy/mastopexy.  Surgical scars smooth and intact.  No palpable masses or axillary adenopathy on either side.   Abd:  Soft, non-tender, non- distended, bowel sounds present, no organomegaly or masses  Ext:  No clubbing, cyanosis or edema.   Lymph:  No cervical, supraclavicular, axillary,adenopathy  Neuro:  MS as above, grossly non-focal exam  Nails:  There is slight ridging of her fingernails, sl more pronounced on the R 2nd digit    PATHOLOGY:  02-12-20 08-18-20  A. SENTINEL LYMPH NODE #1 RIGHT AXILLA, BIOPSY:  - 1 lymph node, no tumor present (0/1)  - Cytokeratin stain shows no evidence of metastatic carcinoma    B. SENTINEL LYMPH NODE #2, RIGHT AXILLA, BIOPSY:  - one lymph node, no tumor present (0/1)  - Cytokeratin stain shows no evidence of metastatic carcinoma    C. RIGHT BREAST, TOTAL MASTECTOMY:  - No residual carcinoma identified  - area of fibrosis with associated biopsy clip, consistent with treated tumor bed.   - Proliferative fibrocystic change.   - Skin and nipple, no tumor present.   - four low axillary lymph nodes, no tumor present (0/4).     D. LEFT BREAST, SEGMENTAL MASTECTOMY:  - FOCI OF LOBULAR NEOPLASIA (ATYPICAL LOBULAR HYPERPLASIA AND LOBULAR CARCINOMA IN SITU). LOW GRADE, CLASSIC TYPE, WITH FOCAL PAGETOID SPREAD INTO DUCTS.   - Two prior biopsy site changes present.   - Proliferative fibrocystic change.     E. RIGHT BREAST SKIN, EXCESS, EXCISION:  - Skin and breast tissue, no tumor present.     F. LEFT BREAST, MASTOPEXY:  - Skin and breast tissue, no tumor present    Tumor Template  Specimen Identification   Procedure: Total mastectomy   Specimen Laterality: Right  Invasive Carcinoma   Tumor Focality: NA   Tumor Size:  0   Histologic Type of Invasive Carcinoma: NA   Histologic Grade Based on Auberry Histologic Score    Glandular (Acinar)/Tubular Differentiation: Not applicable    Nuclear Pleomorphism: not applicable    Mitotic Rate: not applicable   Overall Grade: not applicable   Lymphovascular invasion: not present  In Situ Carcinoma   Ductal Carcinoma in Situ (DCIS): Not present   Lobular Carcinoma in situ (LCIS): Not present  Tumor Extension   Skin: uninvolved    Nipple: uninvolved   Skeletal muscle: not applicable  Margins   Invasive Carcinoma Margins: NA    DCIS Margins: NA  Regional Lymph nodes   Uninvolved by tumor cells    Total number of lymph nodes examined: 6    Number of sentinel lymph nodes examined: 2  Treatment effect   Treatment effect in the breast: present   Treatment effect in the lymph nodes: not present  RCB input variables   Dimensions of Residual Cancer that is in-situ: 0%   Total Number of Lymph nodes with Residual Metastatic Carcinoma: 0   Size of Largest Metastasis: NA   RCB Index Computed Value (optional): 0   RCB Class (optional): 0  Pathologic Stage Classification 9ypTNM, AJCC 8th Edition)    Primary Tumor (invasive carcinoma)(ypT): ypT0   Regional lymph nodes (ypN): yPN0(sn)   Distant Metastasis (ypM): NA  Ancillary Studies: Please see previous case [id number]      ENDOSCOPY:        IMAGING:  Bilateral Diagnostic mammogram 12-27-19  FINDINGS:  There are scattered areas of fibroglandular density.     The bilateral fibroglandular pattern is unremarkable in appearance. A  few scattered calcifications are present in the left breast. There are  calcifications in the central aspect of the right breast spanning  approximately 15 cm of tissue in the AP dimension and extending up to  the base of the nipple for which additional imaging is recommended.     IMPRESSION:  1. Benign left mammographic findings.  2. Calcifications in the right breast. Recommend additional imaging.        BI-RADS CATEGORY:  0,  INCOMPLETE: Need additional imaging evaluation.     RECOMMENDATION:  Right ML and CC magnification views.      R diagnostic mammogram 01-16-20  FINDINGS:  Magnification imaging of the right breast demonstrates  casting-type pleomorphic calcifications spanning over 15 cm of tissue in  the AP dimension in the right 6:00 to 7:00 region. Calcifications do  extend up to the base of the nipple. Findings are highly suspicious for  DCIS.     Right breast ultrasound: Focused sonographic evaluation of the right  6:00 to 7:00 region demonstrates a single 0.5 cm irregular hypoechoic  mass associated with a coarse calcification located at 7:00, 3 cm from  the nipple. Ultrasound of the right axilla demonstrates no abnormal  appearing axillary lymph nodes.        IMPRESSION:  Findings are highly suspicious for extensive DCIS in the  right 6:00 to 7:00 region extending into the base of the nipple. There  is also small irregular mass noted on ultrasound in the 7:00 region  suspicious for invasion.        BI-RADS CATEGORY:  5, HIGHLY SUGGESTIVE OF MALIGNANCY        RECOMMENDATION:  Recommend stereotactic guided core biopsy of the  anterior and posterior aspect of the calcifications in the 6:00 to 7:00  region to determine extent of disease. Ultrasound-guided core biopsy of  the right 7:00 mass may also be warranted pending pathology results of  the calcifications.    Diagnostic bilateral mammogram 02-25-20  FINDINGS:  There are scattered areas of fibroglandular density.    1:  There are fine-linear branching calcifications measuring 16 x 7 x 7  centimeters with segmental distribution and associated post biopsy clip in the  right breast lower hemisphere at 6 to 7 o'clock.  There are 2 post biopsy clips  noted.  The calcifications extend to the inferior skin and nipple.    2:  There are amorphous calcifications measuring 0.3 centimeters in the left  breast lower hemisphere at 6 to 7 o'clock located 4 centimeters from the  nipple.    IMPRESSION:  1:  Fine-linear branching calcifications in the right breast lower hemisphere at  6 to 7 o'clock are known biopsy-proven malignancy. Ultrasound is recommended for  staging. Recommend appropriate evaluation and treatment of this known malignancy.    2:  Amorphous calcifications in the left breast lower hemisphere at 6 to 7  o'clock located 4 centimeters from the nipple are suspicious. Stereotactic  biopsy is recommended.    BI-RADS Category 4:  Suspicious Abnormality      US Chest 02-25-20  Findings:       Right Breast: The extent of calcified disease is best visualized by mammography. There are dilated ducts with internal calcifications vascularity extending toward the nipple at the 7 o'clock position. This is consistent with the patient's known biopsy-proven malignancy.      Right Charles Basins: No suspicious axillary (level I, II & III) or internal mammary lymphadenopathy is noted.      IMPRESSION:    1. Known RIGHT breast malignancy is best visualized by mammography. A MRI may be obtained for evaluation of disease.    2. No suspicious RIGHT-sided lymphadenopathy      ACR BI-RADS Category: 6. Known malignancy.  Recommend appropriate evaluation and treatment of the known malignancy.         R breast US 02-25-20  Findings:       Right Breast: The extent of calcified disease is best visualized by mammography. There are dilated ducts with internal calcifications vascularity extending toward the nipple at the 7 o'clock position. This is consistent with the patient's known biopsy-proven malignancy.      Right Charles Basins: No suspicious axillary (level I, II & III) or internal mammary lymphadenopathy is noted.      IMPRESSION:    1. Known RIGHT breast malignancy is best visualized by mammography. A MRI may be obtained for evaluation of disease.    2. No suspicious RIGHT-sided lymphadenopathy      ACR BI-RADS Category: 6. Known malignancy.  Recommend appropriate evaluation and treatment of the  known malignancy.       MRI Brain w/wo contrast 02-27-20  FINDINGS:     No abnormal parenchymal or leptomeningeal enhancement is noted. The brain parenchyma is unremarkable except of small scattered T2 FLAIR hyperintensities consistent with chronic microangiopathic changes. No acute ischemia, intra or extra-axial hemorrhage, mass effect or midline shift. No hydrocephalus is noted.    The osseous structures and extra-cranial soft tissues are unremarkable.    The orbital structures unremarkable.    The paranasal sinuses and mastoid air cells are clear.    IMPRESSION:  No evidence of metastatic disease.      CTCAP 02-28-20    Findings:       CHEST:  The breasts are ptotic and there are biopsy clips projecting between the lower quadrants of the right breast.    On image 99 of series 3, there is a nonspecific 1 cm soft tissue nodule in the lower outer quadrant of the right breast.    No lung nodules or pleural effusions are present.    No axillary, internal mammary, mediastinal or hilar adenopathy is seen.    No suspicious bone lesions are present.      ABDOMEN and PELVIS:  The liver is borderline enlarged and diffusely fatty infiltrated without measurable mass or biliary ductal dilatation.    The gallbladder appears normal.    The spleen, pancreas and adrenal glands appear normal.    The kidneys demonstrate function without hydronephrosis and there is a small low dense nidus in the mid right kidney, likely a cyst.    No adenopathy or ascites are seen in the abdomen or pelvis.    A normal-appearing retrocecal appendix is seen.    Multilevel degenerative-like bone changes are present.      IMPRESSION:  No evidence for metastatic disease the chest, abdomen or pelvis.      MRI Breast bilateral 03-02-20    Findings:  The breast composition is heterogeneous fibroglandular tissue. There is no significant early background parenchymal enhancement.    Right breast: There is linearly oriented nonmass enhancement involving the  lateral and inferior aspects (anterior, middle, and posterior third) of the right breast 6 - 7 o'clock position extending 17 cm from the base of the nipple to the pectoralis muscle (series 5 image  of 256). There is no evidence for pectoralis muscle invasion. Extension to the inferior skin is better demonstrated mammographically. Susceptibility artifact from 2 postbiopsy clips is noted.    Left breast: There is linearly oriented nonmass enhancement in the inferior aspect (middle third) of the left breast 6 o'clock position extending approximately 8 cm (series 5 image  of 256). There is no evidence for nipple, skin, or pectoralis muscle involvement. Stereotactic guided biopsy of calcifications at the 6 o'clock position to be performed later today and corresponds to the anterior aspect of the nonmass enhancement. MRI guided biopsy of nonmass enhancement seen on image 94 of 256, series 5 and image 40 of 160, series 8 is recommended (The images have been annotated with a [place]).    Charles Basins: There is no lymphadenopathy in the visualized axillary or internal mammary regions.    IMPRESSION:  Linear nonmass enhancement representing known malignancy right breast as above. Linear nonmass enhancement left breast as above.   MRI guided biopsy of nonmass enhancement seen on image 94 of 256, series 5 and image 40 of 160, series 8 is recommended.    ACR BI-RADS Category: 6. Recommend MR-guided biopsy of nonmass enhancement left breast as above .Recommend appropriate evaluation and treatment of the known malignancy.       Bone scan 03-02-20  FINDINGS:     Radiotracer uptake secondary to degenerative changes are seen involving the shoulders, hips, knees, and feet. Increased uptake along the right tibial shaft likely is likely reactive. Faint focus of activity within the left tibial shaft is compatible with prior injury/fracture. Increased uptake in the skull is compatible with a benign hyperostosis.    Physiologic  uptake of the bilateral kidneys and bladder.    IMPRESSION:    No scintigraphic evidence of skeletal metastases.      Echo 03-02-20      Bilateral diagnostic mammogram 05-26-20  FINDINGS: There are scattered areas of fibroglandular density.     A postbiopsy marking clip is now noted in the left breast with expected  surrounding postbiopsy changes best visualized on MLO imaging. The  remaining bilateral fibroglandular pattern is stable in appearance.  Redemonstrated are fine linear branching calcifications in the right  6:00 to 7:00 region spanning approximately 17 cm of tissue in the AP  dimension. There are 2 associated postbiopsy marking clips. No new areas  of calcification are seen in either breast.     IMPRESSION:  1. Benign left mammographic findings.        2. No significant change in the appearance of calcifications in the  right breast.        BI-RADS CATEGORY:  6, KNOWN BIOPSY PROVEN MALIGNANCY        RECOMMENDATION:  Recommend clinical follow-up.       Echo 07-22-20  · Normal left ventricular cavity size and wall thickness noted. All left ventricular wall segments contract normally.  · Estimated EF appears to be in the range of 61 - 65%  · The pericardium is normal. There is no evidence of pericardial effusion.      DEXA 09-25-20  FINDINGS: The BMD measured at the AP spine L1-L4 is 1.003 gm/cm2 with a  T-score of -1.5.      IMPRESSION:  The patient is considered to be osteopenic according to the  World Health Organization criteria. Fracture risk is moderate      MRI L spine wo contrast 09-28-20  FINDINGS:  Sagittal alignment is normal. Bone marrow signal intensity is normal. There is disc desiccation L1-2 through L3-4 with mild multiple level loss of intervertebral disc height. Endplate spondylosis and Schmorl's node formation most apparent at L1-2 and  L2-3. Conus medullaris terminates at L1-2 and is normal.     At L1-2, there is a mild concentric disc bulge and endplate spondylosis. There is no canal  stenosis or foraminal impingement.     At L2-3, there is concentric disc bulging and mild facet hypertrophy but no canal or foraminal impingement.     At L3-4, is mild facet degenerative change left greater the right with ligamentum flavum thickening and a broad posterior disc bulge. There is no canal stenosis. There is mild inferior foraminal narrowing bilaterally.     L4-5, there is moderate facet degenerative change bilaterally with ligamentum flavum thickening. Is mild bilateral foraminal narrowing. There is no canal stenosis.     At L5-S1, there is a broad posterior disc bulge. There is a tiny posterior annular fissure. There is no canal stenosis. There is a small focal left posterolateral protrusion into the left inferior foramen with mild to moderate left inferior foraminal  narrowing. There is mild bilateral facet degenerative change.     IMPRESSION:     1. There is no evidence for osseous metastatic disease.  2. There are lumbar degenerative changes without evidence for lumbar canal stenosis. Details above.      Echo 09-29-20  · The study is technically difficult for diagnosis.  · Normal left ventricular cavity size and wall thickness noted. All left ventricular wall segments contract normally.  · Left ventricular ejection fraction appears to be 61 - 65%.  · The aortic valve is structurally normal with no regurgitation or stenosis present.  · The mitral valve is structurally normal with no regurgitation or significant stenosis present.  · There is no evidence of pericardial effusion.       Echo 12-20-20  · Normal left ventricular cavity size and wall thickness noted. All left ventricular wall segments contract normally.  · Left ventricular ejection fraction appears to be 61 - 65%.  · The pericardium is normal. There is no evidence of pericardial effusion. .  · Comments: LV systolic function is about the same as on the previous echo study.      Echo 03-19-21  · Normal left ventricular cavity size and wall  thickness noted. All left ventricular wall segments contract normally.  · Left ventricular ejection fraction appears to be 61 - 65%.  · There is no evidence of pericardial effusion.      Echo 06-01-21  · Normal left ventricular cavity size and wall thickness noted. All left ventricular wall segments contract normally.  · Left ventricular ejection fraction appears to be 66 - 70%.  · There is no evidence of pericardial effusion. .        Left diagnostic mammogram 09-23-21  FINDINGS:   The breast is heterogeneously dense, which may obscure small masses.     1:  There is a post surgical scar with associated surgical clips in the left   breast.     2:  There are benign appearing calcifications with scattered distribution and   associated post reduction change in the left breast.     Tomosynthesis performed in CC and MLO projections.     IMPRESSION:   There is no mammographic evidence of malignancy.     Follow-up mammogram in 1 year is recommended.     BI-RADS Category 2:   Benign Finding(s)        DEXA 09-23-21  FULL RESULT:       Examination: Bone Mineral Density (DXA), 9/23/2021     Clinical History: 61-year-old postmenopausal female with breast cancer.     Indication: Assessment of bone mineral density.     Comparison: None.     Technique: Bone mineral density was obtained using Hologic dual-energy X-ray absorptiometry.     Findings: The findings are provided in the below table(s).       Bone Density:   ---------------------------------------------------------------------------   Region     Exam Date   BMD      T-        Z-                                            g/cm2  Score  Score   ---------------------------------------------------------------------------   AP Spine (L1-L4)                    09/23/2021  0.863   -1.7     -0.1       Femoral Neck (Left)                    09/23/2021  0.712   -1.2      0.1       Total Hip (Left)                    09/23/2021  0.814   -1.0      0.0       Femoral Neck (Right)                     09/23/2021  0.808   -0.4      1.0       Total Hip (Right)                    09/23/2021  0.840   -0.8      0.2       -----------------------------------------------------------------   \For postmenopausal women and men age 50 and over, the World Health   Organization criteria for BMD interpretation classify patients as: Normal   (T-score at or above -1.0), Osteopenia (T-score between -1.0 and     -2.5), or Osteoporosis (T-score at or below -2.5).           IMPRESSION:   Osteopenia based on measurements of the lumbar spine and left femoral neck.     RECENT LABS:  Lab Results   Component Value Date    WBC 5.41 05/24/2022    HGB 13.3 05/24/2022    HCT 40.0 05/24/2022    MCV 90.1 05/24/2022    RDW 13.1 05/24/2022     05/24/2022    NEUTRORELPCT 60.3 05/24/2022    LYMPHORELPCT 32.9 05/24/2022    MONORELPCT 4.8 (L) 05/24/2022    EOSRELPCT 0.9 05/24/2022    BASORELPCT 0.9 05/24/2022    NEUTROABS 3.26 05/24/2022    LYMPHSABS 1.78 05/24/2022       Lab Results   Component Value Date     05/24/2022    K 3.7 05/24/2022    CO2 22.5 05/24/2022     05/24/2022    BUN 17 05/24/2022    CREATININE 0.81 05/24/2022    EGFRIFNONA 73 03/28/2022    EGFRIFAFRI 84 03/28/2022    GLUCOSE 115 (H) 05/24/2022    CALCIUM 9.8 05/24/2022    ALKPHOS 96 05/24/2022    AST 15 05/24/2022    ALT 17 05/24/2022    BILITOT 0.3 05/24/2022    ALBUMIN 4.35 05/24/2022    PROTEINTOT 6.6 05/24/2022    MG 2.1 10/05/2021     Lab Results   Component Value Date    TSH 1.640 05/24/2022    C7ERJSK 7.5 03/28/2022       Lab Results   Component Value Date     15.1 03/11/2020     Lab Results   Component Value Date    LABCA2 19.0 03/11/2020       ASSESSMENT & PLAN:  Jackie Johnson is a very pleasant 62 y.o. female with what is clinically a Stage IIB ([id number]) poorly differentiated invasive ductal carcinoma of the right breast.  Tumor spanned 15-16 cm although it was difficult to tell how much of this was invasive malignancy v. DCIS.   There were no clinically or radiographically supspicious axillary LNs.  There was associated high grade DCIS.  Tumor was ER-,MO-, HER-2 +.  She had pathologic complete response with neoadjuvant therapy.  She also has LCIS/ALH involving the left breast.    1.  Right breast cancer:  -  Administered TCHP neoadjuvantly to downsize tumor and potentially allow for a more successful surgery. After 6 cycles of TCHP, she had a complete pathologic response at time of mastectomy..  -  We discussed consideration of radiation given the initial size of her tumor.  It really isn't known how much of her initial disease was invasive malignancy v. DCIS.  She says she was evaluated by a Radiation Oncologist at [facility] who didn't recommend radiation.  -  Given HER-2 positive disease, recommended we complete a year of HP which she tolerated well. Post-treatment echo with normal LVEF.    - Exam/labs without cause for concern.      2. L Breast LCIS / ALH:  -  She is s/p lumpectomy.  -  Discussed role for 5 years of hormonal therapy in prevention of future breast cancer development in this breast.  She is post-menopausal, so could use Tamoxifen or an aromatase inhibitor for this purpose.  She has an entact uterus so Tamoxifen would come with increased risk (albeit low risk) of development of endometrial hypertrophy/ endometrial cancer.  Aromatase inhibitors would not come with this risk, but would come with risk for loss of bone mineral density and potentially increased risk of side effects.    -  DEXA showed osteopenia with T score -1.5, so recommended Tamoxifen for prevention.  Ms. Johnson was not comfortable with risk of endometrial hyperplasia / cancer so she said she would prefer aromatase inhibitor.  In particular she would like to take Femara so ordered this with plan for 5 years of treatment. She is tolerating Femara well without any significant side effects.  Will continue.  She had repeat DEXA at [facility] again showing  "osteopenia with T score -1.7.  -  She had diagnostic L mammogram at [facility] 9-23-21 as above without cause for concern..  Repeat exam recommended after 1 year.  She plans to return to [facility] the 1st week of October to have this done.  - She continues to do well on Femara. Will continue. .   - Exam/labs without cause for concern.     3.  Bone health:  -  Ms. Johnson has osteopenia.  Pre-treatment  T score -1.5.  She also has a h/o Vit D Deficiency for which she took a course of weekly high dose Vit D (now completed). Repeat Vit D Level normal. Continue Ca/Vit D BID.  Recommended weight bearing exercise.  -  At [facility], they repeated DEXA after 1 year (9-23-21) and T score was -1.7.  -  Vit D level drawn today and is pending.    4.  Unusual sense of \"vibration\" and family h/o HSP (with SPG7 mutation):   - Had some evaluation at [facility] with imaging, EEG which was unremarkable.  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  I think this is not a likely cause for the symptoms she describes.  She more recently followed up with Neurology at [facility] in September 2021.  They told her they didn't feel any further neurologic evaluation would be helpful and suggested she see someone in a different specialty such as an endocrinologist.  She saw a neurogeneticist at [place] (as this is where her daughter goes) and was tested and was told she didn't have what her daughter has..  I'm not sure what is causing her symptoms.  I again told her that I don't feel this is related to her cancer or its treatment.  Happily whatever is causing it, her symptoms seem to be improving.      5.  Very strong family history of malignancy including several members with breast cancer and a sister with ovarian cancer.  -  She underwent genetic testing at [facility] and testing was " negative.      6.  Prophylaxis:  She has had 2021 flu vaccine as well as Prevnar 13.  She has received COVID vaccine x 3 (Moderna).   Recommended 4th dose / 2nd booster to her.    7.  Follow-up:   - Continue Femara  - Continue Ca/Vit D.  - L diagnostic mammogram due in September 2022. Scheduled at [facility] the first week of October.  - DEXA due in September 2023  - Return in October after mammogram for exam with CBCD, CMP, TSH, Vit D level at that time.      8.  ACO / MARNI/Other  Quality measures  -  Jackie Johnson received 2021 flu vaccine.    -  Jackie Johnson reports a pain score of 0.    -  Current outpatient and discharge medications have been reconciled for the patient.  Reviewed by: Fatoumata Obrien MD     This note was scribed for Fatoumata Obrien MD by Alba Siu RN.    I, Fatoumata Obrien MD, personally performed the services described in this documentation as scribed by the above named individual in my presence, and it is both accurate and complete.  05/24/2022       I spent 30 minutes with Jackie Johnson today.  In the office today, more than 50% of this time was spent face-to-face with her  in counseling / coordination of care, reviewing her medical history and counseling on the current treatment plan.  All questions were answered to her satisfaction      Electronically Signed by: Fatoumata Obrien MD     CC:   MD JUAN PABLO Macdonald MD Bora Lim, MD- [facility] Medical Oncology  Kadeem Serrano MD- [facility] Surgical Oncology  Dr. Roc Huston - [facility] Plastic Surgery

## 2022-05-24 ENCOUNTER — TELEPHONE (OUTPATIENT)
Dept: FAMILY MEDICINE CLINIC | Facility: CLINIC | Age: 62
End: 2022-05-24

## 2022-05-24 ENCOUNTER — LAB (OUTPATIENT)
Dept: ONCOLOGY | Facility: CLINIC | Age: 62
End: 2022-05-24

## 2022-05-24 ENCOUNTER — OFFICE VISIT (OUTPATIENT)
Dept: ONCOLOGY | Facility: CLINIC | Age: 62
End: 2022-05-24

## 2022-05-24 VITALS
WEIGHT: 165 LBS | DIASTOLIC BLOOD PRESSURE: 79 MMHG | HEIGHT: 64 IN | SYSTOLIC BLOOD PRESSURE: 119 MMHG | OXYGEN SATURATION: 98 % | HEART RATE: 77 BPM | BODY MASS INDEX: 28.17 KG/M2 | RESPIRATION RATE: 18 BRPM | TEMPERATURE: 97.8 F

## 2022-05-24 DIAGNOSIS — Z17.1 MALIGNANT NEOPLASM OF RIGHT BREAST IN FEMALE, ESTROGEN RECEPTOR NEGATIVE, UNSPECIFIED SITE OF BREAST: ICD-10-CM

## 2022-05-24 DIAGNOSIS — M85.80 OSTEOPENIA, UNSPECIFIED LOCATION: ICD-10-CM

## 2022-05-24 DIAGNOSIS — E55.9 VITAMIN D DEFICIENCY: ICD-10-CM

## 2022-05-24 DIAGNOSIS — C50.911 MALIGNANT NEOPLASM OF RIGHT BREAST IN FEMALE, ESTROGEN RECEPTOR NEGATIVE, UNSPECIFIED SITE OF BREAST: ICD-10-CM

## 2022-05-24 DIAGNOSIS — J30.89 SEASONAL ALLERGIC RHINITIS DUE TO OTHER ALLERGIC TRIGGER: ICD-10-CM

## 2022-05-24 DIAGNOSIS — Z17.1 MALIGNANT NEOPLASM OF RIGHT BREAST IN FEMALE, ESTROGEN RECEPTOR NEGATIVE, UNSPECIFIED SITE OF BREAST: Primary | ICD-10-CM

## 2022-05-24 DIAGNOSIS — R53.83 FATIGUE, UNSPECIFIED TYPE: ICD-10-CM

## 2022-05-24 DIAGNOSIS — N60.92 ATYPICAL LOBULAR HYPERPLASIA (ALH) OF LEFT BREAST: ICD-10-CM

## 2022-05-24 DIAGNOSIS — C50.911 MALIGNANT NEOPLASM OF RIGHT BREAST IN FEMALE, ESTROGEN RECEPTOR NEGATIVE, UNSPECIFIED SITE OF BREAST: Primary | ICD-10-CM

## 2022-05-24 LAB
ALBUMIN SERPL-MCNC: 4.35 G/DL (ref 3.5–5.2)
ALBUMIN/GLOB SERPL: 1.9 G/DL
ALP SERPL-CCNC: 96 U/L (ref 39–117)
ALT SERPL W P-5'-P-CCNC: 17 U/L (ref 1–33)
ANION GAP SERPL CALCULATED.3IONS-SCNC: 12.5 MMOL/L (ref 5–15)
AST SERPL-CCNC: 15 U/L (ref 1–32)
BASOPHILS # BLD AUTO: 0.05 10*3/MM3 (ref 0–0.2)
BASOPHILS NFR BLD AUTO: 0.9 % (ref 0–1.5)
BILIRUB SERPL-MCNC: 0.3 MG/DL (ref 0–1.2)
BUN SERPL-MCNC: 17 MG/DL (ref 8–23)
BUN/CREAT SERPL: 21 (ref 7–25)
CALCIUM SPEC-SCNC: 9.8 MG/DL (ref 8.6–10.5)
CHLORIDE SERPL-SCNC: 102 MMOL/L (ref 98–107)
CO2 SERPL-SCNC: 22.5 MMOL/L (ref 22–29)
CREAT SERPL-MCNC: 0.81 MG/DL (ref 0.57–1)
DEPRECATED RDW RBC AUTO: 43.4 FL (ref 37–54)
EGFRCR SERPLBLD CKD-EPI 2021: 82.2 ML/MIN/1.73
EOSINOPHIL # BLD AUTO: 0.05 10*3/MM3 (ref 0–0.4)
EOSINOPHIL NFR BLD AUTO: 0.9 % (ref 0.3–6.2)
ERYTHROCYTE [DISTWIDTH] IN BLOOD BY AUTOMATED COUNT: 13.1 % (ref 12.3–15.4)
GLOBULIN UR ELPH-MCNC: 2.3 GM/DL
GLUCOSE SERPL-MCNC: 115 MG/DL (ref 65–99)
HCT VFR BLD AUTO: 40 % (ref 34–46.6)
HGB BLD-MCNC: 13.3 G/DL (ref 12–15.9)
IMM GRANULOCYTES # BLD AUTO: 0.01 10*3/MM3 (ref 0–0.05)
IMM GRANULOCYTES NFR BLD AUTO: 0.2 % (ref 0–0.5)
LYMPHOCYTES # BLD AUTO: 1.78 10*3/MM3 (ref 0.7–3.1)
LYMPHOCYTES NFR BLD AUTO: 32.9 % (ref 19.6–45.3)
MCH RBC QN AUTO: 30 PG (ref 26.6–33)
MCHC RBC AUTO-ENTMCNC: 33.3 G/DL (ref 31.5–35.7)
MCV RBC AUTO: 90.1 FL (ref 79–97)
MONOCYTES # BLD AUTO: 0.26 10*3/MM3 (ref 0.1–0.9)
MONOCYTES NFR BLD AUTO: 4.8 % (ref 5–12)
NEUTROPHILS NFR BLD AUTO: 3.26 10*3/MM3 (ref 1.7–7)
NEUTROPHILS NFR BLD AUTO: 60.3 % (ref 42.7–76)
NRBC BLD AUTO-RTO: 0 /100 WBC (ref 0–0.2)
PLATELET # BLD AUTO: 209 10*3/MM3 (ref 140–450)
PMV BLD AUTO: 10.2 FL (ref 6–12)
POTASSIUM SERPL-SCNC: 3.7 MMOL/L (ref 3.5–5.2)
PROT SERPL-MCNC: 6.6 G/DL (ref 6–8.5)
RBC # BLD AUTO: 4.44 10*6/MM3 (ref 3.77–5.28)
SODIUM SERPL-SCNC: 137 MMOL/L (ref 136–145)
TSH SERPL DL<=0.05 MIU/L-ACNC: 1.64 UIU/ML (ref 0.27–4.2)
WBC NRBC COR # BLD: 5.41 10*3/MM3 (ref 3.4–10.8)

## 2022-05-24 PROCEDURE — 80050 GENERAL HEALTH PANEL: CPT | Performed by: INTERNAL MEDICINE

## 2022-05-24 PROCEDURE — 99214 OFFICE O/P EST MOD 30 MIN: CPT | Performed by: INTERNAL MEDICINE

## 2022-05-24 PROCEDURE — 82306 VITAMIN D 25 HYDROXY: CPT | Performed by: INTERNAL MEDICINE

## 2022-05-24 RX ORDER — CETIRIZINE HYDROCHLORIDE 10 MG/1
TABLET ORAL
Qty: 30 TABLET | Refills: 2 | Status: SHIPPED | OUTPATIENT
Start: 2022-05-24 | End: 2022-08-24

## 2022-05-24 NOTE — TELEPHONE ENCOUNTER
Caller: Alex Jackie    Relationship: Self    Best call back number:     [phone]    Requested Prescriptions:      cetirizine (zyrTEC) 10 MG tablet    IT WAS NOTED THIS MEDICATION NEEDS TO BE REORDERED    PATIENT STATED SHE WAS ADVISED BY PHARMACY THAT THE MEDICATION WAS NOT APPROVED BY OFFICE    Pharmacy where request should be sent: [id number] [place] [address] [facility] [address] [facility] [address] - [phone]  - [phone] FX     Additional details provided by patient:     PATIENT STATED SHE IS OUT OF THE MEDICATION    Does the patient have less than a 3 day supply:  [x] Yes  [] No    Trinidad Solares LPN   05/24/22 15:49 EDT     DR SWAIN      It has been sent.

## 2022-05-24 NOTE — TELEPHONE ENCOUNTER
Caller: Jackie Johnson    Relationship: Self    Best call back number:     [phone]    Requested Prescriptions:      cetirizine (zyrTEC) 10 MG tablet    IT WAS NOTED THIS MEDICATION NEEDS TO BE REORDERED    PATIENT STATED SHE WAS ADVISED BY PHARMACY THAT THE MEDICATION WAS NOT APPROVED BY OFFICE    Pharmacy where request should be sent: [id number] [place] [address] [facility] [address] [facility] [address]  - [phone]  - [phone] FX     Additional details provided by patient:     PATIENT STATED SHE IS OUT OF THE MEDICATION    Does the patient have less than a 3 day supply:  [x] Yes  [] No    Jimbo Medrano Rep   05/24/22 15:43 EDT     DR SWAIN

## 2022-05-25 LAB — 25(OH)D3 SERPL-MCNC: 33.1 NG/ML (ref 30–100)

## 2022-05-27 RX ORDER — CYANOCOBALAMIN 1000 UG/ML
INJECTION, SOLUTION INTRAMUSCULAR; SUBCUTANEOUS
Qty: 15 ML | Refills: 1 | Status: SHIPPED | OUTPATIENT
Start: 2022-05-27 | End: 2023-01-10 | Stop reason: SDUPTHER

## 2022-06-03 ENCOUNTER — OFFICE VISIT (OUTPATIENT)
Dept: FAMILY MEDICINE CLINIC | Facility: CLINIC | Age: 62
End: 2022-06-03

## 2022-06-03 VITALS
WEIGHT: 163 LBS | OXYGEN SATURATION: 98 % | BODY MASS INDEX: 27.83 KG/M2 | TEMPERATURE: 96.8 F | SYSTOLIC BLOOD PRESSURE: 118 MMHG | DIASTOLIC BLOOD PRESSURE: 80 MMHG | HEART RATE: 54 BPM | HEIGHT: 64 IN

## 2022-06-03 DIAGNOSIS — E55.9 VITAMIN D DEFICIENCY: ICD-10-CM

## 2022-06-03 DIAGNOSIS — Z17.1 MALIGNANT NEOPLASM OF RIGHT BREAST IN FEMALE, ESTROGEN RECEPTOR NEGATIVE, UNSPECIFIED SITE OF BREAST: ICD-10-CM

## 2022-06-03 DIAGNOSIS — C50.911 MALIGNANT NEOPLASM OF RIGHT BREAST IN FEMALE, ESTROGEN RECEPTOR NEGATIVE, UNSPECIFIED SITE OF BREAST: ICD-10-CM

## 2022-06-03 DIAGNOSIS — R53.83 FATIGUE, UNSPECIFIED TYPE: ICD-10-CM

## 2022-06-03 DIAGNOSIS — E78.2 MIXED HYPERLIPIDEMIA: Primary | ICD-10-CM

## 2022-06-03 LAB
ALBUMIN SERPL-MCNC: 4.46 G/DL (ref 3.5–5.2)
ALBUMIN/GLOB SERPL: 1.8 G/DL
ALP SERPL-CCNC: 106 U/L (ref 39–117)
ALT SERPL W P-5'-P-CCNC: 20 U/L (ref 1–33)
ANION GAP SERPL CALCULATED.3IONS-SCNC: 8.9 MMOL/L (ref 5–15)
AST SERPL-CCNC: 16 U/L (ref 1–32)
BASOPHILS # BLD AUTO: 0.06 10*3/MM3 (ref 0–0.2)
BASOPHILS NFR BLD AUTO: 1.4 % (ref 0–1.5)
BILIRUB SERPL-MCNC: 0.4 MG/DL (ref 0–1.2)
BUN SERPL-MCNC: 17 MG/DL (ref 8–23)
BUN/CREAT SERPL: 19.5 (ref 7–25)
CALCIUM SPEC-SCNC: 9.8 MG/DL (ref 8.6–10.5)
CHLORIDE SERPL-SCNC: 105 MMOL/L (ref 98–107)
CHOLEST SERPL-MCNC: 206 MG/DL (ref 0–200)
CO2 SERPL-SCNC: 25.1 MMOL/L (ref 22–29)
CREAT SERPL-MCNC: 0.87 MG/DL (ref 0.57–1)
DEPRECATED RDW RBC AUTO: 45 FL (ref 37–54)
EGFRCR SERPLBLD CKD-EPI 2021: 75.4 ML/MIN/1.73
EOSINOPHIL # BLD AUTO: 0.08 10*3/MM3 (ref 0–0.4)
EOSINOPHIL NFR BLD AUTO: 1.8 % (ref 0.3–6.2)
ERYTHROCYTE [DISTWIDTH] IN BLOOD BY AUTOMATED COUNT: 13.2 % (ref 12.3–15.4)
GLOBULIN UR ELPH-MCNC: 2.4 GM/DL
GLUCOSE SERPL-MCNC: 98 MG/DL (ref 65–99)
HCT VFR BLD AUTO: 43.4 % (ref 34–46.6)
HDLC SERPL-MCNC: 52 MG/DL (ref 40–60)
HGB BLD-MCNC: 14.2 G/DL (ref 12–15.9)
IMM GRANULOCYTES # BLD AUTO: 0 10*3/MM3 (ref 0–0.05)
IMM GRANULOCYTES NFR BLD AUTO: 0 % (ref 0–0.5)
LDLC SERPL CALC-MCNC: 134 MG/DL (ref 0–100)
LDLC/HDLC SERPL: 2.53 {RATIO}
LYMPHOCYTES # BLD AUTO: 1.64 10*3/MM3 (ref 0.7–3.1)
LYMPHOCYTES NFR BLD AUTO: 37.3 % (ref 19.6–45.3)
MCH RBC QN AUTO: 29.9 PG (ref 26.6–33)
MCHC RBC AUTO-ENTMCNC: 32.7 G/DL (ref 31.5–35.7)
MCV RBC AUTO: 91.4 FL (ref 79–97)
MONOCYTES # BLD AUTO: 0.31 10*3/MM3 (ref 0.1–0.9)
MONOCYTES NFR BLD AUTO: 7 % (ref 5–12)
NEUTROPHILS NFR BLD AUTO: 2.31 10*3/MM3 (ref 1.7–7)
NEUTROPHILS NFR BLD AUTO: 52.5 % (ref 42.7–76)
NRBC BLD AUTO-RTO: 0 /100 WBC (ref 0–0.2)
PLATELET # BLD AUTO: 216 10*3/MM3 (ref 140–450)
PMV BLD AUTO: 10.9 FL (ref 6–12)
POTASSIUM SERPL-SCNC: 4.4 MMOL/L (ref 3.5–5.2)
PROT SERPL-MCNC: 6.9 G/DL (ref 6–8.5)
RBC # BLD AUTO: 4.75 10*6/MM3 (ref 3.77–5.28)
SODIUM SERPL-SCNC: 139 MMOL/L (ref 136–145)
TRIGL SERPL-MCNC: 111 MG/DL (ref 0–150)
TSH SERPL DL<=0.05 MIU/L-ACNC: 1.54 UIU/ML (ref 0.27–4.2)
VLDLC SERPL-MCNC: 20 MG/DL (ref 5–40)
WBC NRBC COR # BLD: 4.4 10*3/MM3 (ref 3.4–10.8)

## 2022-06-03 PROCEDURE — 36415 COLL VENOUS BLD VENIPUNCTURE: CPT | Performed by: FAMILY MEDICINE

## 2022-06-03 PROCEDURE — 99214 OFFICE O/P EST MOD 30 MIN: CPT | Performed by: FAMILY MEDICINE

## 2022-06-03 PROCEDURE — 80061 LIPID PANEL: CPT | Performed by: FAMILY MEDICINE

## 2022-06-03 PROCEDURE — 82306 VITAMIN D 25 HYDROXY: CPT | Performed by: INTERNAL MEDICINE

## 2022-06-03 PROCEDURE — 80050 GENERAL HEALTH PANEL: CPT | Performed by: INTERNAL MEDICINE

## 2022-06-03 NOTE — PROGRESS NOTES
"Jackie Johnson     VITALS: Blood pressure 118/80, pulse 54, temperature 96.8 °F (36 °C), temperature source Temporal, height 162.6 cm (64\"), weight 73.9 kg (163 lb), SpO2 98 %, not currently breastfeeding.    Subjective  Chief Complaint  Hyperlipidemia    Subjective          History of Present Illness:  Patient is a 62 y.o.  female with medical conditions significant for asthma, hyperlipidemia, and allergic rhinitis along with breast cancer who presents to clinic secondary to medical followup.     The patient states she has been doing well. She reports she started using Flonase every night before she goes to bed and she thinks she is sleeping better. She  explains her allergy symptoms are currently \"okay\". She reports her last Pap smear in 11/2018 was normal. She reports she had labs performed by Dr. Obrien. She reports she was on a statin and her liver enzymes went up. She explains she felt hungry when she was taking the statin and she gained 3 pounds \"that week\". She reports she gained 6 pounds. She reports that she is swimming a lot more and she is doing better. She has lost 2 pounds since 05/2022. She reports that when she weighed herself at home, she was 157 pounds. She reports that she is 163 pounds \"at home now\". She reports she has been trying to exercising 3 times a week during school and now she is going 4-5 times a week. She reports that she does 30 minutes of exercise. She reports she does not feel like her clothes are fitting better.     She reports she has a big ridge in her finger nail. She reports she does not like kale or spinach. She explains she has been eating Caesar salads. She reports she is still taking the B12 injection. She reports her energy is better.    No complaints about any of the medications.    The following portions of the patient's history were reviewed and updated as appropriate: allergies, current medications, past family history, past medical history, past social history, " past surgical history and problem list.    Past Medical History  Past Medical History:   Diagnosis Date   • Acid reflux    • Asthma    • Breast cancer (HCC) 2020    rt br ca   • Cancer (HCC)    • Eczema    • Elevated cholesterol    • Endometriosis    • GERD (gastroesophageal reflux disease)    • Hiatal hernia    • High cholesterol    • Seasonal allergies    • Shaking     INTERMITTANT   • Sinusitis    • Staph infection 2020    port       Surgical History  Past Surgical History:   Procedure Laterality Date   • BREAST BIOPSY Bilateral    •  SECTION     • COLONOSCOPY N/A 2021    Procedure: COLONOSCOPY FOR SCREENING;  Surgeon: Keerthi Meneses MD;  Location: [facility];  Service: Gastroenterology;  Laterality: N/A;   • D & C HYSTEROSCOPY ENDOMETRIAL ABLATION     • DIAGNOSTIC LAPAROSCOPY     • FRACTURE SURGERY Left     tib/fib   • FULGURATION ENDOMETRIOSIS     • LEG SURGERY      Left leg following fracture   • LYMPH NODE BIOPSY     • MASTECTOMY Right     WITH LYMPH NODES REMOVAL   • PORTACATH PLACEMENT N/A 3/18/2020    Procedure: INSERTION OF PORTACATH;  Surgeon: Dudley Aldridge MD;  Location: [facility];  Service: General;  Laterality: N/A;   • SKIN BIOPSY     • TONSILLECTOMY     • TRANSESOPHAGEAL ECHOCARDIOGRAM (CINTIA)     • VENOUS ACCESS DEVICE (PORT) INSERTION N/A 2020    Procedure: INSERTION VENOUS ACCESS DEVICE;  Surgeon: Nerissa Wang MD;  Location: [facility];  Service: General;  Laterality: N/A;   • VENOUS ACCESS DEVICE (PORT) INSERTION AND REMOVAL     • VENOUS ACCESS DEVICE (PORT) REMOVAL Right 2020    Procedure: REMOVAL VENOUS ACCESS DEVICE;  Surgeon: Nerissa Wang MD;  Location: [facility];  Service: General;  Laterality: Right;       Family History  Family History   Problem Relation Age of Onset   • Breast cancer Mother 75   • Basal cell carcinoma Mother 90        2 small spots on face removed + fair complexion   • Heart disease Mother    • Hypertension Mother   "  • Diabetes type II Mother    • Breast cancer Sister 55        VUS detected on genetic testing CDKN2A gene (c.-33G>C).    • Lung disease Sister    • Heart disease Father    • Throat cancer Father 79   • Lung cancer Father 79   • Dementia Father    • Parkinsonism Father    • Cancer Maternal Grandmother 65        unknown gynecologic cancer, cervical vs uterine   • Heart disease Maternal Grandmother    • Diabetes type II Maternal Grandmother    • Lymphoma Maternal Grandfather 64   • Breast cancer Paternal Grandmother 70   • Prostate cancer Paternal Grandfather 70        metastastic to liver   • Breast cancer Paternal Aunt 77   • Breast cancer Maternal Aunt 80   • Breast cancer Other    • Ovarian cancer Sister 66        Elevated ; 1A tumor; Borderline cancer; both ovaries & lg cyst removed   • Ovarian cancer Maternal Cousin 54   • Breast cancer Other 50   • Diabetes type I Nephew        Social History  Social History     Socioeconomic History   • Marital status:    Tobacco Use   • Smoking status: Never Smoker   • Smokeless tobacco: Never Used   Vaping Use   • Vaping Use: Never used   Substance and Sexual Activity   • Alcohol use: Never   • Drug use: Never   • Sexual activity: Defer     Partners: Male     Birth control/protection: None       Objective   Vital Signs:   /80 (BP Location: Left arm, Patient Position: Sitting, Cuff Size: Adult)   Pulse 54   Temp 96.8 °F (36 °C) (Temporal)   Ht 162.6 cm (64\")   Wt 73.9 kg (163 lb)   SpO2 98%   BMI 27.98 kg/m²     Physical Exam     Gen: Patient in NAD. Pleasant and answers appropriately. A&Ox3.    Skin: Warm and dry with normal turgor. No purpura, rashes, or unusual pigmentation noted. Hair is normal in appearance and distribution.    HEENT: NC/AT. No lesions noted. Conjunctiva clear, sclera nonicteric. PERRL. EOMI without nystagmus or strabismus. Fundi appear benign. No hemorrhages or exudates of eyes. Auditory canals are patent bilaterally without " "lesions. TMs intact,  nonerythematous, nonbulging without lesions. Nasal mucosa pink, nonerythematous, and nonedematous. Frontal and maxillary sinuses are nontender. O/P nonerythematous and moist without exudate.    Neck: Supple without lymph nodes palpated. FROM.     Lungs: CTA B/L without rales, rhonchi, crackles, or wheezes.    Heart: RRR. S1 and S2 normal. No S3 or S4. No MRGT.    Abd: Soft, nontender,nondistended. (+)BSx4 quadrants.     Extrem: No CCE. Radial pulses 2+/4 and equal B/L. FROMx4. No bone, joint, or muscle tenderness noted.    Neuro: No focal motor/sensory deficits.    Procedures    Result Review :   The following data was reviewed by: Scarlet Loera MD on 06/03/2022:         Her weight was 158 pounds \"last summer\".        Assessment and Plan    Jackie Johnson is a 62 y.o. here for medical followup.    Problem List Items Addressed This Visit        Hematology and Neoplasia    Malignant neoplasm of right breast in female, estrogen receptor negative (HCC)      Other Visit Diagnoses     Mixed hyperlipidemia    -  Primary    Relevant Orders    Lipid Panel    Vitamin D deficiency        Fatigue, unspecified type            1. Hyperlipidemia.  I will obtain lab work as above.    2. Allergic rhinitis.  She will continue to use Flonase every night.    3. Breast cancer.  She will continue to monitor her symptoms.    BMI is >= 25 and < 30. (Overweight) The following options were offered after discussion: exercise counseling/recommendations and nutrition counseling/recommendations              Follow Up   Return in about 6 months (around 12/3/2022), or (ROR 11/2022 Pap from [facility])LABS.  Findings and plans discussed with patient who verbalizes understanding and agreement. Will followup with patient once results are in. Patient was given instructions and counseling regarding her condition or for health maintenance advice. Please see specific information pulled into the AVS if appropriate. "     Transcribed from ambient dictation for Scarlet Loera MD by ROBBI SANTANA.  06/03/22   10:58 EDT    Patient verbalized consent to the visit recording.

## 2022-06-04 LAB — 25(OH)D3 SERPL-MCNC: 36.7 NG/ML (ref 30–100)

## 2022-06-20 ENCOUNTER — TELEPHONE (OUTPATIENT)
Dept: FAMILY MEDICINE CLINIC | Facility: CLINIC | Age: 62
End: 2022-06-20

## 2022-06-20 DIAGNOSIS — E78.2 MIXED HYPERLIPIDEMIA: ICD-10-CM

## 2022-06-20 RX ORDER — PRAVASTATIN SODIUM 20 MG
20 TABLET ORAL DAILY
Qty: 90 TABLET | Refills: 1 | Status: SHIPPED | OUTPATIENT
Start: 2022-06-20 | End: 2022-12-15 | Stop reason: SDUPTHER

## 2022-06-20 NOTE — TELEPHONE ENCOUNTER
----- Message from Scarlet Loera MD sent at 6/19/2022 11:01 PM EDT -----  Please call Jackie. Labs okay. Cholesterol is still high, but has gone back down to where it was before it elevated up. I'm going to leave her on the pravastatin and hopefully, next time we check it, it'll have gone another 20 points lower and we can discuss whether she wants to stay on it or not.       The 10-year ASCVD risk score (Rebecca JAH Jr., et al., 2013) is: 3.7%    Values used to calculate the score:      Age: 62 years      Sex: Female      Is Non- : No      Diabetic: No      Tobacco smoker: No      Systolic Blood Pressure: 118 mmHg      Is BP treated: No      HDL Cholesterol: 52 mg/dL      Total Cholesterol: 206 mg/dL    Patient notified & verbalized understanding,requested refill,sent per orders.

## 2022-06-30 ENCOUNTER — OFFICE VISIT (OUTPATIENT)
Dept: FAMILY MEDICINE CLINIC | Facility: CLINIC | Age: 62
End: 2022-06-30

## 2022-06-30 VITALS
HEIGHT: 64 IN | BODY MASS INDEX: 28.1 KG/M2 | OXYGEN SATURATION: 100 % | SYSTOLIC BLOOD PRESSURE: 108 MMHG | TEMPERATURE: 97.8 F | RESPIRATION RATE: 18 BRPM | WEIGHT: 164.6 LBS | DIASTOLIC BLOOD PRESSURE: 72 MMHG | HEART RATE: 66 BPM

## 2022-06-30 DIAGNOSIS — J02.9 SORE THROAT: ICD-10-CM

## 2022-06-30 DIAGNOSIS — J06.9 VIRAL URI WITH COUGH: Primary | ICD-10-CM

## 2022-06-30 DIAGNOSIS — E66.3 OVERWEIGHT (BMI 25.0-29.9): ICD-10-CM

## 2022-06-30 DIAGNOSIS — H61.21 IMPACTED CERUMEN OF RIGHT EAR: ICD-10-CM

## 2022-06-30 DIAGNOSIS — J45.20 MILD INTERMITTENT ASTHMA WITHOUT COMPLICATION: ICD-10-CM

## 2022-06-30 LAB
EXPIRATION DATE: NORMAL
EXPIRATION DATE: NORMAL
FLUAV AG UPPER RESP QL IA.RAPID: NOT DETECTED
FLUBV AG UPPER RESP QL IA.RAPID: NOT DETECTED
INTERNAL CONTROL: NORMAL
INTERNAL CONTROL: NORMAL
Lab: NORMAL
Lab: NORMAL
S PYO AG THROAT QL: NEGATIVE
SARS-COV-2 AG UPPER RESP QL IA.RAPID: NOT DETECTED

## 2022-06-30 PROCEDURE — 87428 SARSCOV & INF VIR A&B AG IA: CPT | Performed by: NURSE PRACTITIONER

## 2022-06-30 PROCEDURE — 87880 STREP A ASSAY W/OPTIC: CPT | Performed by: NURSE PRACTITIONER

## 2022-06-30 PROCEDURE — 99214 OFFICE O/P EST MOD 30 MIN: CPT | Performed by: NURSE PRACTITIONER

## 2022-06-30 RX ORDER — LEVALBUTEROL TARTRATE 45 UG/1
1-2 AEROSOL, METERED ORAL EVERY 4 HOURS PRN
Qty: 15 G | Refills: 2 | Status: SHIPPED | OUTPATIENT
Start: 2022-06-30 | End: 2022-11-23 | Stop reason: SDUPTHER

## 2022-06-30 RX ORDER — OXYMETAZOLINE HYDROCHLORIDE 0.05 G/100ML
2 SPRAY NASAL 2 TIMES DAILY
Qty: 2 ML | Refills: 0 | Status: SHIPPED | OUTPATIENT
Start: 2022-06-30 | End: 2022-07-03

## 2022-06-30 RX ORDER — PHENYLEPHRINE HCL 10 MG/1
10 TABLET, FILM COATED ORAL EVERY 4 HOURS PRN
Qty: 42 TABLET | Refills: 0 | Status: SHIPPED | OUTPATIENT
Start: 2022-06-30 | End: 2022-07-07

## 2022-06-30 RX ORDER — AZITHROMYCIN 250 MG/1
TABLET, FILM COATED ORAL
Qty: 6 TABLET | Refills: 0 | Status: SHIPPED | OUTPATIENT
Start: 2022-06-30 | End: 2022-09-19

## 2022-06-30 NOTE — PROGRESS NOTES
Chief Complaint  Sore Throat, Nasal Congestion, and Headache    Subjective          Jackie Johnson is a 62 y.o. female who presents today to [facility] for initial evaluation     HPI:   History of Present Illness    Presents to clinic for complaint of congestion, low-grade fever (99.0), sore throat. Symptoms started 2 days ago.  Predominantly having sore throat which got worse last night. Had negative home COVID test on 6/28/2022 and 6/29/2022. Treatments: tylenol and sudafed PE which helps. Would like testing as she is about to go out of town.     Requesting refill of xopenex.  Reports tolerates this well with no issues or side effects.    The following portions of the patient's history were reviewed and updated as appropriate: allergies, current medications, past family history, past medical history, past social history, past surgical history and problem list.    Objective     Allergy:   Allergies   Allergen Reactions   • Albuterol Other (See Comments)     One time severe headache, questionable for aneurysm, did spinal tap was positive, did Angiogram and MRI were Negative for aneurysm   • Penicillins Hives   • Cefpodoxime Rash     vantin     • Clindamycin Hcl Rash   • Crestor [Rosuvastatin Calcium] Other (See Comments)     Caused high liver enxymes   • Sulfa Antibiotics Other (See Comments) and Swelling     Mucosal lesions   • Triprolidine-Pseudoephedrine Swelling   • Latex Rash   • Pseudoephedrine Other (See Comments)     Soreness on the tongue        Current Medications:   Current Outpatient Medications   Medication Sig Dispense Refill   • calcium carbonate-vitamin d 600-400 MG-UNIT per tablet Take 2 tablets by mouth Daily.     • cetirizine (zyrTEC) 10 MG tablet TAKE 1 TABLET BY MOUTH EVERY DAY 30 tablet 2   • cyanocobalamin 1000 MCG/ML injection ADMINISTER 1 ML IN THE MUSCLE 1 TIME EVERY WEEK AS DIRECTED BY PRESCRIBER 15 mL 1   • fluticasone (Flonase) 50 MCG/ACT nasal spray  "2 sprays into the nostril(s) as directed by provider Daily. 1 g 2   • imiquimod (ALDARA) 5 % cream Apply 1 application topically to the appropriate area as directed 2 (Two) Times a Week.     • letrozole (FEMARA) 2.5 MG tablet Take 1 tablet by mouth Daily. 30 tablet 11   • levalbuterol (XOPENEX HFA) 45 MCG/ACT inhaler Inhale 1-2 puffs Every 4 (Four) Hours As Needed for Wheezing. 15 g 2   • pravastatin (Pravachol) 20 MG tablet Take 1 tablet by mouth Daily. 90 tablet 1   • Syringe/Needle, Disp, 25G X 5/8\" 1 ML misc 1 application 1 (One) Time Per Week. To use with B12 injections. 50 each 0   • azithromycin (Zithromax Z-Emmanuel) 250 MG tablet Take 2 tablets by mouth on day 1, then 1 tablet daily on days 2-5 6 tablet 0   • oxymetazoline (Afrin Nasal Spray) 0.05 % nasal spray 2 sprays into the nostril(s) as directed by provider 2 (Two) Times a Day for 3 days. 2 mL 0   • phenylephrine (SUDAFED PE) 10 MG tablet Take 1 tablet by mouth Every 4 (Four) Hours As Needed for Congestion for up to 7 days. 42 tablet 0     Current Facility-Administered Medications   Medication Dose Route Frequency Provider Last Rate Last Admin   • cyanocobalamin injection 1,000 mcg  1,000 mcg Intramuscular Q28 Days Concepcion Queen APRN   1,000 mcg at 21 1621       Past Medical History:  Past Medical History:   Diagnosis Date   • Acid reflux    • Asthma    • Breast cancer (HCC) 2020    rt br ca   • Cancer (HCC)    • Eczema    • Elevated cholesterol    • Endometriosis    • GERD (gastroesophageal reflux disease)    • Hiatal hernia    • High cholesterol    • Seasonal allergies    • Shaking     INTERMITTANT   • Sinusitis    • Staph infection 2020    port       Past Surgical History:  Past Surgical History:   Procedure Laterality Date   • BREAST BIOPSY Bilateral    •  SECTION     • COLONOSCOPY N/A 2021    Procedure: COLONOSCOPY FOR SCREENING;  Surgeon: Keerthi Meneses MD;  Location: [facility];  Service: Gastroenterology;  " Laterality: N/A;   • D & C HYSTEROSCOPY ENDOMETRIAL ABLATION     • DIAGNOSTIC LAPAROSCOPY     • FRACTURE SURGERY Left     tib/fib   • FULGURATION ENDOMETRIOSIS     • LEG SURGERY      Left leg following fracture   • LYMPH NODE BIOPSY     • MASTECTOMY Right     WITH LYMPH NODES REMOVAL   • PORTACATH PLACEMENT N/A 3/18/2020    Procedure: INSERTION OF PORTACATH;  Surgeon: Dudley Aldridge MD;  Location: [facility];  Service: General;  Laterality: N/A;   • SKIN BIOPSY     • TONSILLECTOMY     • TRANSESOPHAGEAL ECHOCARDIOGRAM (CINTIA)     • VENOUS ACCESS DEVICE (PORT) INSERTION N/A 5/27/2020    Procedure: INSERTION VENOUS ACCESS DEVICE;  Surgeon: Nerissa Wang MD;  Location: [facility] OR;  Service: General;  Laterality: N/A;   • VENOUS ACCESS DEVICE (PORT) INSERTION AND REMOVAL     • VENOUS ACCESS DEVICE (PORT) REMOVAL Right 4/19/2020    Procedure: REMOVAL VENOUS ACCESS DEVICE;  Surgeon: Nerissa Wang MD;  Location: [facility];  Service: General;  Laterality: Right;       Social History:  Social History     Socioeconomic History   • Marital status:    Tobacco Use   • Smoking status: Never Smoker   • Smokeless tobacco: Never Used   Vaping Use   • Vaping Use: Never used   Substance and Sexual Activity   • Alcohol use: Never   • Drug use: Never   • Sexual activity: Defer     Partners: Male     Birth control/protection: None       Family History:  Family History   Problem Relation Age of Onset   • Breast cancer Mother 75   • Basal cell carcinoma Mother 90        2 small spots on face removed + fair complexion   • Heart disease Mother    • Hypertension Mother    • Diabetes type II Mother    • Breast cancer Sister 55        VUS detected on genetic testing CDKN2A gene (c.-33G>C).    • Lung disease Sister    • Heart disease Father    • Throat cancer Father 79   • Lung cancer Father 79   • Dementia Father    • Parkinsonism Father    • Cancer Maternal Grandmother 65        unknown gynecologic cancer, cervical vs  "uterine   • Heart disease Maternal Grandmother    • Diabetes type II Maternal Grandmother    • Lymphoma Maternal Grandfather 64   • Breast cancer Paternal Grandmother 70   • Prostate cancer Paternal Grandfather 70        metastastic to liver   • Breast cancer Paternal Aunt 77   • Breast cancer Maternal Aunt 80   • Breast cancer Other    • Ovarian cancer Sister 66        Elevated ; 1A tumor; Borderline cancer; both ovaries & lg cyst removed   • Ovarian cancer Maternal Cousin 54   • Breast cancer Other 50   • Diabetes type I Nephew        Review of Systems:  Review of Systems   Constitutional: Negative for chills.   HENT: Negative for ear pain.    Respiratory: Positive for cough. Negative for shortness of breath and wheezing.    Gastrointestinal: Negative for nausea and vomiting.       Vital Signs:   /72 (BP Location: Right arm, Patient Position: Sitting, Cuff Size: Adult)   Pulse 66   Temp 97.8 °F (36.6 °C) (Temporal)   Resp 18   Ht 162.6 cm (64\")   Wt 74.7 kg (164 lb 9.6 oz)   SpO2 100%   BMI 28.25 kg/m²      Physical Exam:  Physical Exam  Vitals and nursing note reviewed.   Constitutional:       General: She is not in acute distress.     Appearance: Normal appearance. She is not ill-appearing or toxic-appearing.   HENT:      Head: Normocephalic.      Right Ear: Hearing, tympanic membrane, ear canal and external ear normal. There is impacted cerumen.      Left Ear: Ear canal and external ear normal. A middle ear effusion is present.      Ears:      Comments: Post procedure for cerumen removal: Assessment WNL     Nose: Congestion present.      Mouth/Throat:      Lips: Pink.      Mouth: Mucous membranes are moist.      Pharynx: Oropharynx is clear.      Comments: +PND, cobblestoning  Eyes:      Conjunctiva/sclera: Conjunctivae normal.   Cardiovascular:      Rate and Rhythm: Normal rate and regular rhythm.      Heart sounds: Normal heart sounds.   Pulmonary:      Effort: Pulmonary effort is normal. No " respiratory distress.      Breath sounds: Normal breath sounds. No wheezing.   Abdominal:      General: Bowel sounds are normal. There is no distension.      Palpations: Abdomen is soft.      Tenderness: There is no abdominal tenderness.   Lymphadenopathy:      Cervical: No cervical adenopathy.   Skin:     General: Skin is warm and dry.      Capillary Refill: Capillary refill takes less than 2 seconds.      Coloration: Skin is not pale.   Neurological:      Mental Status: She is alert and oriented to person, place, and time.   Psychiatric:         Mood and Affect: Mood normal.         Behavior: Behavior normal.         Thought Content: Thought content normal.         Judgment: Judgment normal.              Assessment and Plan   Diagnoses and all orders for this visit:    1. Viral URI with cough (Primary)  -     POCT SARS-CoV-2 Antigen JACINTO + Flu  -     POCT rapid strep A  -     oxymetazoline (Afrin Nasal Spray) 0.05 % nasal spray; 2 sprays into the nostril(s) as directed by provider 2 (Two) Times a Day for 3 days.  Dispense: 2 mL; Refill: 0  -     phenylephrine (SUDAFED PE) 10 MG tablet; Take 1 tablet by mouth Every 4 (Four) Hours As Needed for Congestion for up to 7 days.  Dispense: 42 tablet; Refill: 0  -     azithromycin (Zithromax Z-Emmanuel) 250 MG tablet; Take 2 tablets by mouth on day 1, then 1 tablet daily on days 2-5  Dispense: 6 tablet; Refill: 0  Cough and deep breathe.  Increase humidification and hydration.  Can do Afrin or Sudafed PE for symptom management.    2. Sore throat  -     POCT SARS-CoV-2 Antigen JACINTO + Flu  -     POCT rapid strep A  Symptom management as discussed.    3. Overweight (BMI 25.0-29.9)  Diet and lifestyle modifications to promote weight loss.    4. Impacted cerumen of right ear  Resolved.    5. Mild intermittent asthma without complication  -     azithromycin (Zithromax Z-Emmanuel) 250 MG tablet; Take 2 tablets by mouth on day 1, then 1 tablet daily on days 2-5  Dispense: 6 tablet; Refill:  0  Inhalers as directed.  Xopenex refilled.    Patient reports she gets a sore on her tongue when she takes pseudoephedrine but reports she cannot take phenylephrine with no issues.    Discussed possible differential diagnoses, testing, treatment, recommended non-pharmacological interventions, risks, warning signs to monitor for that would indicate need for follow-up in clinic or ER. If no improvement with these regimens or you have new or worsening symptoms follow-up. Patient verbalizes understanding and agreement with plan of care. Denies further needs or concerns.     Patient was given instructions and counseling regarding her condition and for health maintenance advised.    BMI is >= 25 and <30. (Overweight) The following options were offered after discussion;: weight loss educational material (shared in after visit summary), exercise counseling/recommendations and nutrition counseling/recommendations       I spent 30 minutes caring for patient on this date of service. This time includes time spent by me in the following activities: preparing for the visit, reviewing tests, obtaining and/or reviewing a separately obtained history, performing a medically appropriate examination and/or evaluation, counseling and educating the patient/family/caregiver, ordering medications, tests, or procedures and documenting information in the medical record    Meds ordered during this visit:  New Medications Ordered This Visit   Medications   • oxymetazoline (Afrin Nasal Spray) 0.05 % nasal spray     Si sprays into the nostril(s) as directed by provider 2 (Two) Times a Day for 3 days.     Dispense:  2 mL     Refill:  0   • phenylephrine (SUDAFED PE) 10 MG tablet     Sig: Take 1 tablet by mouth Every 4 (Four) Hours As Needed for Congestion for up to 7 days.     Dispense:  42 tablet     Refill:  0   • azithromycin (Zithromax Z-Emmanuel) 250 MG tablet     Sig: Take 2 tablets by mouth on day 1, then 1 tablet daily on days 2-5      Dispense:  6 tablet     Refill:  0   • levalbuterol (XOPENEX HFA) 45 MCG/ACT inhaler     Sig: Inhale 1-2 puffs Every 4 (Four) Hours As Needed for Wheezing.     Dispense:  15 g     Refill:  2       Patient Instructions:  Patient instructions given for the following visit diagnosis:    ICD-10-CM ICD-9-CM   1. Viral URI with cough  J06.9 465.9   2. Sore throat  J02.9 462   3. Overweight (BMI 25.0-29.9)  E66.3 278.02   4. Impacted cerumen of right ear  H61.21 380.4   5. Mild intermittent asthma without complication  J45.20 493.90       Follow Up   Return if symptoms worsen or fail to improve.        This document has been electronically signed by SHER Salas  June 30, 2022 09:51 EDT    Patient was given instructions and counseling regarding her condition or for health maintenance advice. Please see specific information pulled into the AVS if appropriate.     Part of this note may be an electronic transcription/translation of spoken language to printed text using the Dragon Dictation System.

## 2022-08-08 ENCOUNTER — OFFICE VISIT (OUTPATIENT)
Dept: FAMILY MEDICINE CLINIC | Facility: CLINIC | Age: 62
End: 2022-08-08

## 2022-08-08 ENCOUNTER — TELEPHONE (OUTPATIENT)
Dept: ONCOLOGY | Facility: CLINIC | Age: 62
End: 2022-08-08

## 2022-08-08 VITALS
RESPIRATION RATE: 20 BRPM | SYSTOLIC BLOOD PRESSURE: 136 MMHG | HEIGHT: 64 IN | HEART RATE: 67 BPM | BODY MASS INDEX: 28.17 KG/M2 | DIASTOLIC BLOOD PRESSURE: 77 MMHG | WEIGHT: 165 LBS | TEMPERATURE: 96.9 F | OXYGEN SATURATION: 96 %

## 2022-08-08 VITALS
HEART RATE: 81 BPM | HEIGHT: 64 IN | TEMPERATURE: 97.3 F | OXYGEN SATURATION: 98 % | SYSTOLIC BLOOD PRESSURE: 116 MMHG | WEIGHT: 166.8 LBS | BODY MASS INDEX: 28.48 KG/M2 | DIASTOLIC BLOOD PRESSURE: 80 MMHG

## 2022-08-08 DIAGNOSIS — Z53.21 PATIENT LEFT AFTER TRIAGE: Primary | ICD-10-CM

## 2022-08-08 PROCEDURE — 99282 EMERGENCY DEPT VISIT SF MDM: CPT

## 2022-08-08 PROCEDURE — 99024 POSTOP FOLLOW-UP VISIT: CPT | Performed by: FAMILY MEDICINE

## 2022-08-08 RX ORDER — CETIRIZINE HYDROCHLORIDE 10 MG/1
10 TABLET ORAL DAILY
Qty: 30 TABLET | Refills: 2 | Status: CANCELLED | OUTPATIENT
Start: 2022-08-08

## 2022-08-08 NOTE — PROGRESS NOTES
Patient called at 817 for a same day appointment. Upon arrival for her 915 appointment, she informed staff that she needed to leave by 930 for a meeting. Patient left after triage without being seen by physician.

## 2022-08-08 NOTE — TELEPHONE ENCOUNTER
The patient called in stating that she was stung by a bee two times on her left arm, which is the same side as her lumpectomy and lymph node removal. She reported swelling, itching, redness and warmth at the site of the bee sting. She doesn't recall being allergic to bee venom, but hasn't been stung since she was a child. She stated that she hadn't taken benadryl due to being on a zyrtec and flonase daily regimen. She has used cortisone cream, calamine lotion and another topical cream, but states they haven't helped much other than stopped the itching temporarily. I recommended she try taking Benadryl to see if it would help with the symptoms. She stated she would call back with an update tomorrow. No other questions/concerns at this time.

## 2022-08-09 ENCOUNTER — HOSPITAL ENCOUNTER (EMERGENCY)
Facility: HOSPITAL | Age: 62
Discharge: HOME | End: 2022-08-09
Attending: STUDENT IN AN ORGANIZED HEALTH CARE EDUCATION/TRAINING PROGRAM | Admitting: STUDENT IN AN ORGANIZED HEALTH CARE EDUCATION/TRAINING PROGRAM

## 2022-08-09 DIAGNOSIS — T63.461A WASP STING, ACCIDENTAL OR UNINTENTIONAL, INITIAL ENCOUNTER: Primary | ICD-10-CM

## 2022-08-09 LAB
BASOPHILS # BLD AUTO: 0.04 10*3/MM3 (ref 0–0.2)
BASOPHILS NFR BLD AUTO: 0.6 % (ref 0–1.5)
DEPRECATED RDW RBC AUTO: 48.4 FL (ref 37–54)
EOSINOPHIL # BLD AUTO: 0.17 10*3/MM3 (ref 0–0.4)
EOSINOPHIL NFR BLD AUTO: 2.7 % (ref 0.3–6.2)
ERYTHROCYTE [DISTWIDTH] IN BLOOD BY AUTOMATED COUNT: 13.4 % (ref 12.3–15.4)
HCT VFR BLD AUTO: 43.1 % (ref 34–46.6)
HGB BLD-MCNC: 13.3 G/DL (ref 12–15.9)
IMM GRANULOCYTES # BLD AUTO: 0.01 10*3/MM3 (ref 0–0.05)
IMM GRANULOCYTES NFR BLD AUTO: 0.2 % (ref 0–0.5)
LYMPHOCYTES # BLD AUTO: 2.08 10*3/MM3 (ref 0.7–3.1)
LYMPHOCYTES NFR BLD AUTO: 33.6 % (ref 19.6–45.3)
MCH RBC QN AUTO: 29.8 PG (ref 26.6–33)
MCHC RBC AUTO-ENTMCNC: 30.9 G/DL (ref 31.5–35.7)
MCV RBC AUTO: 96.6 FL (ref 79–97)
MONOCYTES # BLD AUTO: 0.51 10*3/MM3 (ref 0.1–0.9)
MONOCYTES NFR BLD AUTO: 8.2 % (ref 5–12)
NEUTROPHILS NFR BLD AUTO: 3.38 10*3/MM3 (ref 1.7–7)
NEUTROPHILS NFR BLD AUTO: 54.7 % (ref 42.7–76)
NRBC BLD AUTO-RTO: 0 /100 WBC (ref 0–0.2)
PLATELET # BLD AUTO: 219 10*3/MM3 (ref 140–450)
PMV BLD AUTO: 9.9 FL (ref 6–12)
RBC # BLD AUTO: 4.46 10*6/MM3 (ref 3.77–5.28)
WBC NRBC COR # BLD: 6.19 10*3/MM3 (ref 3.4–10.8)

## 2022-08-09 PROCEDURE — 36415 COLL VENOUS BLD VENIPUNCTURE: CPT

## 2022-08-09 PROCEDURE — 85025 COMPLETE CBC W/AUTO DIFF WBC: CPT | Performed by: PHYSICIAN ASSISTANT

## 2022-08-09 PROCEDURE — 25010000002 DEXAMETHASONE PER 1 MG: Performed by: PHYSICIAN ASSISTANT

## 2022-08-09 PROCEDURE — 96372 THER/PROPH/DIAG INJ SC/IM: CPT

## 2022-08-09 RX ORDER — DEXAMETHASONE SODIUM PHOSPHATE 4 MG/ML
6 INJECTION, SOLUTION INTRA-ARTICULAR; INTRALESIONAL; INTRAMUSCULAR; INTRAVENOUS; SOFT TISSUE ONCE
Status: COMPLETED | OUTPATIENT
Start: 2022-08-09 | End: 2022-08-09

## 2022-08-09 RX ORDER — PREDNISONE 20 MG/1
20 TABLET ORAL DAILY
Qty: 7 TABLET | Refills: 0 | Status: SHIPPED | OUTPATIENT
Start: 2022-08-09 | End: 2022-11-23

## 2022-08-09 RX ADMIN — DEXAMETHASONE SODIUM PHOSPHATE 6 MG: 4 INJECTION, SOLUTION INTRA-ARTICULAR; INTRALESIONAL; INTRAMUSCULAR; INTRAVENOUS; SOFT TISSUE at 02:04

## 2022-08-09 NOTE — ED PROVIDER NOTES
Subjective     History provided by:  Patient  Insect Bite  Contact animal:  Insect (wasp sting)  Location:  Shoulder/arm  Shoulder/arm injury location:  R forearm  Time since incident:  2 days  Pain details:     Quality:  Sore    Severity:  Mild    Progression:  Worsening  Incident location:  Outside  Provoked: unprovoked    Relieved by: benadryl has helped.  Associated symptoms: swelling    Associated symptoms: no fever        Review of Systems   Constitutional: Negative.  Negative for fever.   HENT: Negative.    Respiratory: Negative.    Cardiovascular: Negative.  Negative for chest pain.   Gastrointestinal: Negative.  Negative for abdominal pain.   Endocrine: Negative.    Genitourinary: Negative.  Negative for dysuria.   Skin: Positive for color change.   Neurological: Negative.    Psychiatric/Behavioral: Negative.    All other systems reviewed and are negative.      Past Medical History:   Diagnosis Date   • Acid reflux    • Asthma    • Breast cancer (HCC) 2020    rt br ca   • Cancer (HCC)    • Eczema    • Elevated cholesterol    • Endometriosis    • GERD (gastroesophageal reflux disease)    • Hiatal hernia    • High cholesterol    • Seasonal allergies    • Shaking     INTERMITTANT   • Sinusitis    • Staph infection 2020    [facility]       Allergies   Allergen Reactions   • Albuterol Other (See Comments)     One time severe headache, questionable for aneurysm, did spinal tap was positive, did Angiogram and MRI were Negative for aneurysm   • Penicillins Hives   • Clindamycin Hcl Rash   • Crestor [Rosuvastatin Calcium] Other (See Comments)     Caused high liver enxymes   • Sulfa Antibiotics Other (See Comments) and Swelling     Mucosal lesions   • Triprolidine-Pseudoephedrine Swelling   • Latex Rash   • Pseudoephedrine Other (See Comments)     Soreness on the tongue       Past Surgical History:   Procedure Laterality Date   • BREAST BIOPSY Bilateral    •  SECTION     • COLONOSCOPY N/A 2021     Procedure: COLONOSCOPY FOR SCREENING;  Surgeon: Keerthi Meneses MD;  Location: [facility];  Service: Gastroenterology;  Laterality: N/A;   • D & C HYSTEROSCOPY ENDOMETRIAL ABLATION     • DIAGNOSTIC LAPAROSCOPY     • FRACTURE SURGERY Left     tib/fib   • FULGURATION ENDOMETRIOSIS     • LEG SURGERY      Left leg following fracture   • LYMPH NODE BIOPSY     • MASTECTOMY Right     WITH LYMPH NODES REMOVAL   • PORTACATH PLACEMENT N/A 3/18/2020    Procedure: INSERTION OF PORTACATH;  Surgeon: Dudley Aldridge MD;  Location: [facility];  Service: General;  Laterality: N/A;   • SKIN BIOPSY     • TONSILLECTOMY     • TRANSESOPHAGEAL ECHOCARDIOGRAM (CINTIA)     • VENOUS ACCESS DEVICE (PORT) INSERTION N/A 5/27/2020    Procedure: INSERTION VENOUS ACCESS DEVICE;  Surgeon: Nerissa Wang MD;  Location: [facility];  Service: General;  Laterality: N/A;   • VENOUS ACCESS DEVICE (PORT) INSERTION AND REMOVAL     • VENOUS ACCESS DEVICE (PORT) REMOVAL Right 4/19/2020    Procedure: REMOVAL VENOUS ACCESS DEVICE;  Surgeon: Nerissa Wang MD;  Location: [facility];  Service: General;  Laterality: Right;       Family History   Problem Relation Age of Onset   • Breast cancer Mother 75   • Basal cell carcinoma Mother 90        2 small spots on face removed + fair complexion   • Heart disease Mother    • Hypertension Mother    • Diabetes type II Mother    • Breast cancer Sister 55        VUS detected on genetic testing CDKN2A gene (c.-33G>C).    • Lung disease Sister    • Heart disease Father    • Throat cancer Father 79   • Lung cancer Father 79   • Dementia Father    • Parkinsonism Father    • Cancer Maternal Grandmother 65        unknown gynecologic cancer, cervical vs uterine   • Heart disease Maternal Grandmother    • Diabetes type II Maternal Grandmother    • Lymphoma Maternal Grandfather 64   • Breast cancer Paternal Grandmother 70   • Prostate cancer Paternal Grandfather 70        metastastic to liver   • Breast cancer  Paternal Aunt 77   • Breast cancer Maternal Aunt 80   • Breast cancer Other    • Ovarian cancer Sister 66        Elevated ; 1A tumor; Borderline cancer; both ovaries & lg cyst removed   • Ovarian cancer Maternal Cousin 54   • Breast cancer Other 50   • Diabetes type I Nephew        Social History     Socioeconomic History   • Marital status:    Tobacco Use   • Smoking status: Never Smoker   • Smokeless tobacco: Never Used   Vaping Use   • Vaping Use: Never used   Substance and Sexual Activity   • Alcohol use: Never   • Drug use: Never   • Sexual activity: Defer     Partners: Male     Birth control/protection: None           Objective   Physical Exam  Vitals and nursing note reviewed.   Constitutional:       General: She is not in acute distress.     Appearance: She is well-developed. She is not diaphoretic.   HENT:      Head: Normocephalic and atraumatic.      Right Ear: External ear normal.      Left Ear: External ear normal.      Nose: Nose normal.   Eyes:      Conjunctiva/sclera: Conjunctivae normal.   Neck:      Vascular: No JVD.      Trachea: No tracheal deviation.   Cardiovascular:      Rate and Rhythm: Normal rate.      Heart sounds: No murmur heard.  Pulmonary:      Effort: Pulmonary effort is normal. No respiratory distress.      Breath sounds: No wheezing.   Abdominal:      Palpations: Abdomen is soft.      Tenderness: There is no abdominal tenderness.   Musculoskeletal:         General: No deformity. Normal range of motion.      Cervical back: Normal range of motion and neck supple.   Skin:     General: Skin is warm and dry.      Coloration: Skin is not pale.      Findings: Erythema present. No rash.      Comments: Erythema to the anterior right forearm.  There is no concerns at this point in time for infection.   Neurological:      Mental Status: She is alert and oriented to person, place, and time.      Cranial Nerves: No cranial nerve deficit.   Psychiatric:         Behavior: Behavior  normal.         Thought Content: Thought content normal.         Procedures           ED Course                                           MDM  Number of Diagnoses or Management Options  Wasp sting, accidental or unintentional, initial encounter: new and requires workup     Amount and/or Complexity of Data Reviewed  Clinical lab tests: ordered and reviewed    Risk of Complications, Morbidity, and/or Mortality  Presenting problems: low  Diagnostic procedures: low  Management options: low    Patient Progress  Patient progress: stable      Final diagnoses:   Wasp sting, accidental or unintentional, initial encounter       ED Disposition  ED Disposition     ED Disposition   Discharge    Condition   Stable    Comment   --             Scarlet Loera MD  [address] [place] [address] [phone]    Schedule an appointment as soon as possible for a visit            Medication List      New Prescriptions    predniSONE 20 MG tablet  Commonly known as: DELTASONE  Take 1 tablet by mouth Daily.           Where to Get Your Medications      These medications were sent to [id number] - [place] [address] [facility] [address] [facility] [address] [facility] [address] - [phone] PH - [phone] [place] [address] [facility] [address] [facility] [address]    Phone: [phone]   · predniSONE 20 MG tablet          Krystian Mejia II, PA  08/09/22 0145

## 2022-08-09 NOTE — ED NOTES
MEDICAL SCREENING:    Reason for Visit: wasp sting  Patient initially seen in triage.  The patient was advised further evaluation and diagnostic testing will be needed, some of the treatment and testing will be initiated in the lobby in order to begin the process.  The patient will be returned to the waiting area for the time being and possibly be re-assessed by a subsequent ED provider.  The patient will be brought back to the treatment area in as timely manner as possible.       Krystian Mejia II, PA  08/08/22 8036

## 2022-08-24 DIAGNOSIS — J30.89 SEASONAL ALLERGIC RHINITIS DUE TO OTHER ALLERGIC TRIGGER: ICD-10-CM

## 2022-08-24 RX ORDER — CETIRIZINE HYDROCHLORIDE 10 MG/1
TABLET ORAL
Qty: 30 TABLET | Refills: 2 | Status: SHIPPED | OUTPATIENT
Start: 2022-08-24 | End: 2022-11-28

## 2022-09-19 ENCOUNTER — OFFICE VISIT (OUTPATIENT)
Dept: FAMILY MEDICINE CLINIC | Facility: CLINIC | Age: 62
End: 2022-09-19

## 2022-09-19 VITALS — OXYGEN SATURATION: 98 % | HEART RATE: 78 BPM | TEMPERATURE: 96.1 F

## 2022-09-19 DIAGNOSIS — R50.9 FEVER, UNSPECIFIED FEVER CAUSE: Primary | ICD-10-CM

## 2022-09-19 DIAGNOSIS — J01.10 ACUTE NON-RECURRENT FRONTAL SINUSITIS: ICD-10-CM

## 2022-09-19 LAB
EXPIRATION DATE: ABNORMAL
INTERNAL CONTROL: ABNORMAL
Lab: ABNORMAL
SARS-COV-2 AG UPPER RESP QL IA.RAPID: NOT DETECTED

## 2022-09-19 PROCEDURE — 87426 SARSCOV CORONAVIRUS AG IA: CPT | Performed by: FAMILY MEDICINE

## 2022-09-19 PROCEDURE — 99213 OFFICE O/P EST LOW 20 MIN: CPT | Performed by: FAMILY MEDICINE

## 2022-09-19 RX ORDER — THIAMINE HCL 50 MG
50 TABLET ORAL DAILY
COMMUNITY
End: 2022-12-07

## 2022-09-19 RX ORDER — AZITHROMYCIN 250 MG/1
TABLET, FILM COATED ORAL
Qty: 6 TABLET | Refills: 0 | Status: SHIPPED | OUTPATIENT
Start: 2022-09-19 | End: 2022-11-23

## 2022-09-21 NOTE — PROGRESS NOTES
Chief Complaint  Fever    Subjective          Jackie Johnson presents to [facility]  Sinusitis  This is a new problem. The current episode started 1 to 4 weeks ago. The problem has been gradually worsening since onset. The maximum temperature recorded prior to her arrival was 100.4 - 100.9 F. The fever has been present for 1 to 2 days. The pain is moderate. Associated symptoms include congestion, coughing, headaches, sinus pressure, sneezing and a sore throat. Past treatments include acetaminophen and lying down. The treatment provided moderate relief.       Review of Systems   HENT: Positive for congestion, sinus pressure, sneezing and sore throat.    Respiratory: Positive for cough.    Neurological: Positive for headaches.         Objective   Vital Signs:   Pulse 78   Temp 96.1 °F (35.6 °C) (Temporal)   SpO2 98%     Physical Exam  Constitutional:       General: She is not in acute distress.     Appearance: Normal appearance. She is well-developed and well-groomed. She is not ill-appearing, toxic-appearing or diaphoretic.   HENT:      Head: Normocephalic.      Nose: Congestion and rhinorrhea present.      Mouth/Throat:      Mouth: Mucous membranes are moist.      Pharynx: Oropharynx is clear. Posterior oropharyngeal erythema present. No oropharyngeal exudate.   Eyes:      General: Lids are normal.         Right eye: No discharge.         Left eye: No discharge.      Extraocular Movements: Extraocular movements intact.      Pupils: Pupils are equal, round, and reactive to light.   Neck:      Vascular: No carotid bruit.   Cardiovascular:      Rate and Rhythm: Normal rate and regular rhythm.      Pulses: Normal pulses.      Heart sounds: Normal heart sounds. No murmur heard.    No friction rub. No gallop.   Pulmonary:      Effort: Pulmonary effort is normal. No respiratory distress.      Breath sounds: No stridor. No wheezing, rhonchi or rales.   Chest:      Chest wall: No  tenderness.   Abdominal:      General: There is no distension.      Palpations: There is no mass.      Tenderness: There is no abdominal tenderness. There is no right CVA tenderness, left CVA tenderness, guarding or rebound.      Hernia: No hernia is present.   Musculoskeletal:         General: No swelling or tenderness. Normal range of motion.      Cervical back: Normal range of motion and neck supple. No rigidity or tenderness.      Right lower leg: No edema.      Left lower leg: No edema.   Lymphadenopathy:      Cervical: No cervical adenopathy.   Skin:     General: Skin is warm.      Capillary Refill: Capillary refill takes less than 2 seconds.      Coloration: Skin is not jaundiced.      Findings: No bruising, erythema or rash.   Neurological:      General: No focal deficit present.      Mental Status: She is alert and oriented to person, place, and time.      Motor: Motor function is intact. No weakness.      Coordination: Coordination is intact.      Gait: Gait is intact. Gait normal.   Psychiatric:         Attention and Perception: Attention normal.         Mood and Affect: Mood normal.         Speech: Speech normal.         Behavior: Behavior normal.         Cognition and Memory: Cognition normal.         Judgment: Judgment normal.        Result Review :                 Assessment and Plan    Diagnoses and all orders for this visit:    1. Fever, unspecified fever cause (Primary)  -     POCT SARS-CoV-2 Antigen JACINTO    2. Acute non-recurrent frontal sinusitis  -     azithromycin (Zithromax Z-Emmanuel) 250 MG tablet; Take 2 tablets by mouth on day 1, then 1 tablet daily on days 2-5  Dispense: 6 tablet; Refill: 0      Patient's There is no height or weight on file to calculate BMI. indicating that she is could not have BMI calculated because patient was not weighed for this visit .    Follow Up   No follow-ups on file.  Patient was given instructions and counseling regarding her condition or for health maintenance  advice. Please see specific information pulled into the AVS if appropriate.     This document has been electronically signed by SHER Danielson  September 21, 2022 14:47 EDT

## 2022-10-06 ENCOUNTER — TELEPHONE (OUTPATIENT)
Dept: ONCOLOGY | Facility: CLINIC | Age: 62
End: 2022-10-06

## 2022-10-06 NOTE — TELEPHONE ENCOUNTER
Caller: Jackie Johnson    Relationship to patient: Self    Best call back number: [phone]    Type of visit: LAB & F/U 2  Requested date: 10/26, 10/27. 10/28 BUT NEEDS TO BE AS LATE AS POSSIBLE.    CALL TO [facility]     If rescheduling, when is the original appointment: 10/20

## 2022-10-24 DIAGNOSIS — C50.911 MALIGNANT NEOPLASM OF RIGHT BREAST IN FEMALE, ESTROGEN RECEPTOR NEGATIVE, UNSPECIFIED SITE OF BREAST: Primary | ICD-10-CM

## 2022-10-24 DIAGNOSIS — E55.9 VITAMIN D DEFICIENCY: ICD-10-CM

## 2022-10-24 DIAGNOSIS — R53.83 FATIGUE, UNSPECIFIED TYPE: ICD-10-CM

## 2022-10-24 DIAGNOSIS — Z17.1 MALIGNANT NEOPLASM OF RIGHT BREAST IN FEMALE, ESTROGEN RECEPTOR NEGATIVE, UNSPECIFIED SITE OF BREAST: Primary | ICD-10-CM

## 2022-10-26 ENCOUNTER — LAB (OUTPATIENT)
Dept: ONCOLOGY | Facility: CLINIC | Age: 62
End: 2022-10-26

## 2022-10-26 ENCOUNTER — OFFICE VISIT (OUTPATIENT)
Dept: ONCOLOGY | Facility: CLINIC | Age: 62
End: 2022-10-26

## 2022-10-26 VITALS
WEIGHT: 162.4 LBS | HEIGHT: 64 IN | SYSTOLIC BLOOD PRESSURE: 130 MMHG | OXYGEN SATURATION: 97 % | HEART RATE: 70 BPM | BODY MASS INDEX: 27.72 KG/M2 | RESPIRATION RATE: 18 BRPM | DIASTOLIC BLOOD PRESSURE: 88 MMHG | TEMPERATURE: 97.3 F

## 2022-10-26 DIAGNOSIS — Z17.1 MALIGNANT NEOPLASM OF RIGHT BREAST IN FEMALE, ESTROGEN RECEPTOR NEGATIVE, UNSPECIFIED SITE OF BREAST: ICD-10-CM

## 2022-10-26 DIAGNOSIS — E55.9 VITAMIN D DEFICIENCY: ICD-10-CM

## 2022-10-26 DIAGNOSIS — C50.911 MALIGNANT NEOPLASM OF RIGHT BREAST IN FEMALE, ESTROGEN RECEPTOR NEGATIVE, UNSPECIFIED SITE OF BREAST: ICD-10-CM

## 2022-10-26 DIAGNOSIS — R53.83 FATIGUE, UNSPECIFIED TYPE: ICD-10-CM

## 2022-10-26 DIAGNOSIS — C50.911 MALIGNANT NEOPLASM OF RIGHT BREAST IN FEMALE, ESTROGEN RECEPTOR NEGATIVE, UNSPECIFIED SITE OF BREAST: Primary | ICD-10-CM

## 2022-10-26 DIAGNOSIS — Z17.1 MALIGNANT NEOPLASM OF RIGHT BREAST IN FEMALE, ESTROGEN RECEPTOR NEGATIVE, UNSPECIFIED SITE OF BREAST: Primary | ICD-10-CM

## 2022-10-26 DIAGNOSIS — M85.80 OSTEOPENIA, UNSPECIFIED LOCATION: ICD-10-CM

## 2022-10-26 LAB
ALBUMIN SERPL-MCNC: 4.65 G/DL (ref 3.5–5.2)
ALBUMIN/GLOB SERPL: 1.8 G/DL
ALP SERPL-CCNC: 104 U/L (ref 39–117)
ALT SERPL W P-5'-P-CCNC: 25 U/L (ref 1–33)
ANION GAP SERPL CALCULATED.3IONS-SCNC: 9.9 MMOL/L (ref 5–15)
AST SERPL-CCNC: 22 U/L (ref 1–32)
BASOPHILS # BLD AUTO: 0.08 10*3/MM3 (ref 0–0.2)
BASOPHILS NFR BLD AUTO: 0.9 % (ref 0–1.5)
BILIRUB SERPL-MCNC: 0.2 MG/DL (ref 0–1.2)
BUN SERPL-MCNC: 16 MG/DL (ref 8–23)
BUN/CREAT SERPL: 18 (ref 7–25)
CALCIUM SPEC-SCNC: 10.2 MG/DL (ref 8.6–10.5)
CHLORIDE SERPL-SCNC: 103 MMOL/L (ref 98–107)
CO2 SERPL-SCNC: 27.1 MMOL/L (ref 22–29)
CREAT SERPL-MCNC: 0.89 MG/DL (ref 0.57–1)
DEPRECATED RDW RBC AUTO: 45.3 FL (ref 37–54)
EGFRCR SERPLBLD CKD-EPI 2021: 73.4 ML/MIN/1.73
EOSINOPHIL # BLD AUTO: 0.1 10*3/MM3 (ref 0–0.4)
EOSINOPHIL NFR BLD AUTO: 1.1 % (ref 0.3–6.2)
ERYTHROCYTE [DISTWIDTH] IN BLOOD BY AUTOMATED COUNT: 13.2 % (ref 12.3–15.4)
GLOBULIN UR ELPH-MCNC: 2.6 GM/DL
GLUCOSE SERPL-MCNC: 98 MG/DL (ref 65–99)
HCT VFR BLD AUTO: 42.2 % (ref 34–46.6)
HGB BLD-MCNC: 13.5 G/DL (ref 12–15.9)
IMM GRANULOCYTES # BLD AUTO: 0.02 10*3/MM3 (ref 0–0.05)
IMM GRANULOCYTES NFR BLD AUTO: 0.2 % (ref 0–0.5)
LYMPHOCYTES # BLD AUTO: 2.1 10*3/MM3 (ref 0.7–3.1)
LYMPHOCYTES NFR BLD AUTO: 24.1 % (ref 19.6–45.3)
MCH RBC QN AUTO: 29.8 PG (ref 26.6–33)
MCHC RBC AUTO-ENTMCNC: 32 G/DL (ref 31.5–35.7)
MCV RBC AUTO: 93.2 FL (ref 79–97)
MONOCYTES # BLD AUTO: 0.63 10*3/MM3 (ref 0.1–0.9)
MONOCYTES NFR BLD AUTO: 7.2 % (ref 5–12)
NEUTROPHILS NFR BLD AUTO: 5.77 10*3/MM3 (ref 1.7–7)
NEUTROPHILS NFR BLD AUTO: 66.5 % (ref 42.7–76)
NRBC BLD AUTO-RTO: 0 /100 WBC (ref 0–0.2)
PLATELET # BLD AUTO: 249 10*3/MM3 (ref 140–450)
PMV BLD AUTO: 9.8 FL (ref 6–12)
POTASSIUM SERPL-SCNC: 4.1 MMOL/L (ref 3.5–5.2)
PROT SERPL-MCNC: 7.2 G/DL (ref 6–8.5)
RBC # BLD AUTO: 4.53 10*6/MM3 (ref 3.77–5.28)
SODIUM SERPL-SCNC: 140 MMOL/L (ref 136–145)
TSH SERPL DL<=0.05 MIU/L-ACNC: 2.1 UIU/ML (ref 0.27–4.2)
WBC NRBC COR # BLD: 8.7 10*3/MM3 (ref 3.4–10.8)

## 2022-10-26 PROCEDURE — 99214 OFFICE O/P EST MOD 30 MIN: CPT | Performed by: INTERNAL MEDICINE

## 2022-10-26 PROCEDURE — 82306 VITAMIN D 25 HYDROXY: CPT | Performed by: INTERNAL MEDICINE

## 2022-10-26 PROCEDURE — 36415 COLL VENOUS BLD VENIPUNCTURE: CPT

## 2022-10-26 PROCEDURE — 80050 GENERAL HEALTH PANEL: CPT | Performed by: INTERNAL MEDICINE

## 2022-10-27 LAB — 25(OH)D3 SERPL-MCNC: 38.7 NG/ML (ref 30–100)

## 2022-11-09 RX ORDER — LETROZOLE 2.5 MG/1
2.5 TABLET, FILM COATED ORAL DAILY
Qty: 30 TABLET | Refills: 11 | Status: SHIPPED | OUTPATIENT
Start: 2022-11-09

## 2022-11-22 ENCOUNTER — TELEPHONE (OUTPATIENT)
Dept: FAMILY MEDICINE CLINIC | Facility: CLINIC | Age: 62
End: 2022-11-22

## 2022-11-22 NOTE — TELEPHONE ENCOUNTER
Caller: Jackie Johnson    Relationship to patient: Self    Best call back number: [phone]    Chief complaint: INJURED LEFT KNEE SEVERAL WEEKS AND TOUCH BASE WITH FREQUENT USE OF INHALER    Type of visit: OFFICE VISIT    Requested date: N/A    If rescheduling, when is the original appointment: TODAY    Additional notes: PATIENT STATED THAT SHE COULD MAKE HER APPOINTMENT BUT WANTED TO MAKE SURE THAT WHEN SHE GOT THERE SHE COULD BE SEEN BY PROVIDER SO THAT SHE COULD GET BACK TO [facility]; OTHERWISE SHE WOULD HAVE TO RESCHEDULE THE APPOINTMENT TODAY    PLEASE ADVISE ASAP

## 2022-11-23 ENCOUNTER — OFFICE VISIT (OUTPATIENT)
Dept: FAMILY MEDICINE CLINIC | Facility: CLINIC | Age: 62
End: 2022-11-23

## 2022-11-23 ENCOUNTER — TELEPHONE (OUTPATIENT)
Dept: FAMILY MEDICINE CLINIC | Facility: CLINIC | Age: 62
End: 2022-11-23

## 2022-11-23 VITALS
HEIGHT: 64 IN | TEMPERATURE: 97.6 F | HEART RATE: 76 BPM | SYSTOLIC BLOOD PRESSURE: 127 MMHG | WEIGHT: 165.6 LBS | BODY MASS INDEX: 28.27 KG/M2 | OXYGEN SATURATION: 98 % | DIASTOLIC BLOOD PRESSURE: 84 MMHG

## 2022-11-23 DIAGNOSIS — M25.562 ACUTE PAIN OF LEFT KNEE: Primary | ICD-10-CM

## 2022-11-23 DIAGNOSIS — R05.1 ACUTE COUGH: ICD-10-CM

## 2022-11-23 PROCEDURE — 99213 OFFICE O/P EST LOW 20 MIN: CPT | Performed by: FAMILY MEDICINE

## 2022-11-23 RX ORDER — LEVALBUTEROL TARTRATE 45 UG/1
1-2 AEROSOL, METERED ORAL EVERY 4 HOURS PRN
Qty: 15 G | Refills: 2 | Status: SHIPPED | OUTPATIENT
Start: 2022-11-23 | End: 2022-12-15 | Stop reason: SDUPTHER

## 2022-11-23 RX ORDER — METHYLPREDNISOLONE 4 MG/1
TABLET ORAL
Qty: 21 TABLET | Refills: 0 | Status: SHIPPED | OUTPATIENT
Start: 2022-11-23 | End: 2022-12-07

## 2022-11-23 NOTE — TELEPHONE ENCOUNTER
----- Message from SHER Danielson sent at 11/23/2022  1:23 PM EST -----  Please let patient know results are normal. Chest and knee was normal. Thank you.     Patient notified.

## 2022-11-23 NOTE — PROGRESS NOTES
"Chief Complaint  Wheezing    Subjective          Jackie Johnson presents to [facility]  Knee Pain   The incident occurred more than 1 week ago. The incident occurred at home. The injury mechanism was a twisting injury. The pain is present in the left knee. The quality of the pain is described as aching. The pain is moderate. The pain has been fluctuating since onset. Pertinent negatives include no numbness or tingling. She reports no foreign bodies present. The symptoms are aggravated by weight bearing and movement. She has tried NSAIDs and rest for the symptoms. The treatment provided moderate relief.   Cough  This is a recurrent problem. The current episode started more than 1 month ago. The problem has been waxing and waning. The cough is productive of sputum (at times). Associated symptoms include shortness of breath and wheezing. Associated symptoms comments: At times worse in the morning. She has tried steroid inhaler for the symptoms. The treatment provided significant relief.       Review of Systems   Respiratory: Positive for cough, shortness of breath and wheezing.    Neurological: Negative for tingling and numbness.         Objective   Vital Signs:   /84 (BP Location: Left arm, Patient Position: Sitting)   Pulse 76   Temp 97.6 °F (36.4 °C)   Ht 162.6 cm (64\")   Wt 75.1 kg (165 lb 9.6 oz)   SpO2 98%   BMI 28.43 kg/m²     Physical Exam  Constitutional:       General: She is not in acute distress.     Appearance: Normal appearance. She is well-developed and well-groomed. She is not ill-appearing, toxic-appearing or diaphoretic.   HENT:      Head: Normocephalic.      Right Ear: Tympanic membrane, ear canal and external ear normal.      Left Ear: Tympanic membrane, ear canal and external ear normal.      Nose: Nose normal. No congestion or rhinorrhea.      Mouth/Throat:      Mouth: Mucous membranes are moist.      Pharynx: Oropharynx is clear. No oropharyngeal " exudate or posterior oropharyngeal erythema.   Eyes:      General: Lids are normal.         Right eye: No discharge.         Left eye: No discharge.      Extraocular Movements: Extraocular movements intact.      Pupils: Pupils are equal, round, and reactive to light.   Neck:      Vascular: No carotid bruit.   Cardiovascular:      Rate and Rhythm: Normal rate and regular rhythm.      Pulses: Normal pulses.      Heart sounds: Normal heart sounds. No murmur heard.    No friction rub. No gallop.   Pulmonary:      Effort: Pulmonary effort is normal. No respiratory distress.      Breath sounds: Normal breath sounds. No stridor. No wheezing, rhonchi or rales.   Chest:      Chest wall: No tenderness.   Abdominal:      General: Bowel sounds are normal. There is no distension.      Palpations: Abdomen is soft. There is no mass.      Tenderness: There is no abdominal tenderness. There is no right CVA tenderness, left CVA tenderness, guarding or rebound.      Hernia: No hernia is present.   Musculoskeletal:         General: No swelling or tenderness. Normal range of motion.      Cervical back: Normal range of motion and neck supple. No rigidity or tenderness.      Right lower leg: No edema.      Left lower leg: No edema.   Lymphadenopathy:      Cervical: No cervical adenopathy.   Skin:     General: Skin is warm.      Capillary Refill: Capillary refill takes less than 2 seconds.      Coloration: Skin is not jaundiced.      Findings: No bruising, erythema or rash.   Neurological:      General: No focal deficit present.      Mental Status: She is alert and oriented to person, place, and time.      Motor: Motor function is intact. No weakness.      Coordination: Coordination is intact.      Gait: Gait is intact. Gait normal.   Psychiatric:         Attention and Perception: Attention normal.         Mood and Affect: Mood normal.         Speech: Speech normal.         Behavior: Behavior normal.         Cognition and Memory: Cognition  normal.         Judgment: Judgment normal.        Result Review :                 Assessment and Plan    Diagnoses and all orders for this visit:    1. Acute pain of left knee (Primary)  -     XR Knee 3 View Left; Future  -     Ambulatory Referral to Physical Therapy Evaluate and treat    2. Acute cough  -     XR Chest PA & Lateral (In Office)  -     methylPREDNISolone (MEDROL) 4 MG dose pack; Take as directed on package instructions.  Dispense: 21 tablet; Refill: 0  -     levalbuterol (XOPENEX HFA) 45 MCG/ACT inhaler; Inhale 1-2 puffs Every 4 (Four) Hours As Needed for Wheezing.  Dispense: 15 g; Refill: 2      Patient's Body mass index is 28.43 kg/m². indicating that she is overweight (BMI 25-29.9). Patient's (Body mass index is 28.43 kg/m².) indicates that they are overweight with health conditions that include dyslipidemias . Weight is unchanged. BMI is is above average; BMI management plan is completed. We discussed low calorie, low carb based diet program, portion control and increasing exercise. .    Follow Up   Return xray today.  Patient was given instructions and counseling regarding her condition or for health maintenance advice. Please see specific information pulled into the AVS if appropriate.     This document has been electronically signed by SHER Danielson  November 23, 2022 10:51 EST

## 2022-11-26 DIAGNOSIS — J30.89 SEASONAL ALLERGIC RHINITIS DUE TO OTHER ALLERGIC TRIGGER: ICD-10-CM

## 2022-11-28 RX ORDER — CETIRIZINE HYDROCHLORIDE 10 MG/1
TABLET ORAL
Qty: 30 TABLET | Refills: 2 | Status: SHIPPED | OUTPATIENT
Start: 2022-11-28 | End: 2023-01-03

## 2022-12-07 ENCOUNTER — OFFICE VISIT (OUTPATIENT)
Dept: FAMILY MEDICINE CLINIC | Facility: CLINIC | Age: 62
End: 2022-12-07

## 2022-12-07 VITALS
WEIGHT: 163 LBS | OXYGEN SATURATION: 96 % | DIASTOLIC BLOOD PRESSURE: 76 MMHG | SYSTOLIC BLOOD PRESSURE: 120 MMHG | BODY MASS INDEX: 27.83 KG/M2 | HEIGHT: 64 IN | TEMPERATURE: 97.8 F | HEART RATE: 76 BPM

## 2022-12-07 DIAGNOSIS — J10.1 INFLUENZA A: Primary | ICD-10-CM

## 2022-12-07 LAB
EXPIRATION DATE: ABNORMAL
EXPIRATION DATE: NORMAL
FLUAV AG UPPER RESP QL IA.RAPID: DETECTED
FLUAV RNA RESP QL NAA+PROBE: DETECTED
FLUBV AG UPPER RESP QL IA.RAPID: NOT DETECTED
FLUBV RNA RESP QL NAA+PROBE: NOT DETECTED
INTERNAL CONTROL: ABNORMAL
INTERNAL CONTROL: NORMAL
Lab: ABNORMAL
Lab: NORMAL
RSV RNA NPH QL NAA+NON-PROBE: NOT DETECTED
S PYO AG THROAT QL: NEGATIVE
SARS-COV-2 AG UPPER RESP QL IA.RAPID: NOT DETECTED
SARS-COV-2 RNA RESP QL NAA+PROBE: NOT DETECTED

## 2022-12-07 PROCEDURE — C9803 HOPD COVID-19 SPEC COLLECT: HCPCS | Performed by: NURSE PRACTITIONER

## 2022-12-07 PROCEDURE — 87880 STREP A ASSAY W/OPTIC: CPT | Performed by: NURSE PRACTITIONER

## 2022-12-07 PROCEDURE — 87428 SARSCOV & INF VIR A&B AG IA: CPT | Performed by: NURSE PRACTITIONER

## 2022-12-07 PROCEDURE — 87637 SARSCOV2&INF A&B&RSV AMP PRB: CPT | Performed by: NURSE PRACTITIONER

## 2022-12-07 PROCEDURE — 99213 OFFICE O/P EST LOW 20 MIN: CPT | Performed by: NURSE PRACTITIONER

## 2022-12-07 NOTE — PROGRESS NOTES
Chief Complaint  Sore Throat (/), Fever, Generalized Body Aches, and Cough    Subjective          Jackie Johnson is a 62 y.o. female who presents today to [facility] for initial evaluation     HPI:   History of Present Illness    Presents to clinic for complaint of sore throat, fever, body aches, cough.  Associated symptoms: none  Symptoms started: 3 days ago   Treatments: sudafed   Requesting RSV testing.    The following portions of the patient's history were reviewed and updated as appropriate: allergies, current medications, past family history, past medical history, past social history, past surgical history and problem list.    Objective     Allergy:   Allergies   Allergen Reactions   • Albuterol Other (See Comments)     One time severe headache, questionable for aneurysm, did spinal tap was positive, did Angiogram and MRI were Negative for aneurysm   • Penicillins Hives   • Clindamycin Hcl Rash   • Crestor [Rosuvastatin Calcium] Other (See Comments)     Caused high liver enxymes   • Sulfa Antibiotics Other (See Comments) and Swelling     Mucosal lesions   • Triprolidine-Pseudoephedrine Swelling   • Latex Rash        Current Medications:   Current Outpatient Medications   Medication Sig Dispense Refill   • calcium carbonate-vitamin d 600-400 MG-UNIT per tablet Take 2 tablets by mouth Daily.     • cetirizine (zyrTEC) 10 MG tablet TAKE 1 TABLET BY MOUTH EVERY DAY 30 tablet 2   • cyanocobalamin 1000 MCG/ML injection ADMINISTER 1 ML IN THE MUSCLE 1 TIME EVERY WEEK AS DIRECTED BY PRESCRIBER 15 mL 1   • fluticasone (Flonase) 50 MCG/ACT nasal spray 2 sprays into the nostril(s) as directed by provider Daily. 1 g 2   • letrozole (FEMARA) 2.5 MG tablet Take 1 tablet by mouth Daily. 30 tablet 11   • levalbuterol (XOPENEX HFA) 45 MCG/ACT inhaler Inhale 1-2 puffs Every 4 (Four) Hours As Needed for Wheezing. 15 g 2   • pravastatin (Pravachol) 20 MG tablet Take 1 tablet by mouth Daily.  90 tablet 1     Current Facility-Administered Medications   Medication Dose Route Frequency Provider Last Rate Last Admin   • cyanocobalamin injection 1,000 mcg  1,000 mcg Intramuscular Q28 Days Concepcion Queen APRN   1,000 mcg at 21 1621       Past Medical History:  Past Medical History:   Diagnosis Date   • Acid reflux    • Asthma    • Breast cancer (HCC) 2020    rt br ca   • Cancer (HCC)    • Eczema    • Elevated cholesterol    • Endometriosis    • GERD (gastroesophageal reflux disease)    • Hiatal hernia    • High cholesterol    • Seasonal allergies    • Shaking     INTERMITTANT   • Sinusitis    • Staph infection 2020    port       Past Surgical History:  Past Surgical History:   Procedure Laterality Date   • BREAST BIOPSY Bilateral    •  SECTION     • COLONOSCOPY N/A 2021    Procedure: COLONOSCOPY FOR SCREENING;  Surgeon: Keerthi Meneses MD;  Location: [facility];  Service: Gastroenterology;  Laterality: N/A;   • D & C HYSTEROSCOPY ENDOMETRIAL ABLATION     • DIAGNOSTIC LAPAROSCOPY     • FRACTURE SURGERY Left     tib/fib   • FULGURATION ENDOMETRIOSIS     • LEG SURGERY      Left leg following fracture   • LYMPH NODE BIOPSY     • MASTECTOMY Right     WITH LYMPH NODES REMOVAL   • PORTACATH PLACEMENT N/A 3/18/2020    Procedure: INSERTION OF PORTACATH;  Surgeon: Dudley Aldridge MD;  Location: [facility];  Service: General;  Laterality: N/A;   • SKIN BIOPSY     • TONSILLECTOMY     • TRANSESOPHAGEAL ECHOCARDIOGRAM (CINTIA)     • VENOUS ACCESS DEVICE (PORT) INSERTION N/A 2020    Procedure: INSERTION VENOUS ACCESS DEVICE;  Surgeon: Nerissa Wang MD;  Location: [facility];  Service: General;  Laterality: N/A;   • VENOUS ACCESS DEVICE (PORT) INSERTION AND REMOVAL     • VENOUS ACCESS DEVICE (PORT) REMOVAL Right 2020    Procedure: REMOVAL VENOUS ACCESS DEVICE;  Surgeon: Nerissa Wang MD;  Location: [facility];  Service: General;  Laterality: Right;       Social  "History:  Social History     Socioeconomic History   • Marital status:    Tobacco Use   • Smoking status: Never   • Smokeless tobacco: Never   Vaping Use   • Vaping Use: Never used   Substance and Sexual Activity   • Alcohol use: Never   • Drug use: Never   • Sexual activity: Defer     Partners: Male     Birth control/protection: None       Family History:  Family History   Problem Relation Age of Onset   • Breast cancer Mother 75   • Basal cell carcinoma Mother 90        2 small spots on face removed + fair complexion   • Heart disease Mother    • Hypertension Mother    • Diabetes type II Mother    • Breast cancer Sister 55        VUS detected on genetic testing CDKN2A gene (c.-33G>C).    • Lung disease Sister    • Heart disease Father    • Throat cancer Father 79   • Lung cancer Father 79   • Dementia Father    • Parkinsonism Father    • Cancer Maternal Grandmother 65        unknown gynecologic cancer, cervical vs uterine   • Heart disease Maternal Grandmother    • Diabetes type II Maternal Grandmother    • Lymphoma Maternal Grandfather 64   • Breast cancer Paternal Grandmother 70   • Prostate cancer Paternal Grandfather 70        metastastic to liver   • Breast cancer Paternal Aunt 77   • Breast cancer Maternal Aunt 80   • Breast cancer Other    • Ovarian cancer Sister 66        Elevated ; 1A tumor; Borderline cancer; both ovaries & lg cyst removed   • Ovarian cancer Maternal Cousin 54   • Breast cancer Other 50   • Diabetes type I Nephew        Review of Systems:  Review of Systems   Respiratory: Negative for shortness of breath and wheezing.        Vital Signs:   /76   Pulse 76   Temp 97.8 °F (36.6 °C)   Ht 162.6 cm (64\")   Wt 73.9 kg (163 lb)   SpO2 96%   BMI 27.98 kg/m²  RR: 15    Physical Exam:  Physical Exam  Vitals and nursing note reviewed.   Constitutional:       General: She is not in acute distress.     Appearance: Normal appearance. She is not ill-appearing or toxic-appearing. "   HENT:      Head: Normocephalic.      Right Ear: Ear canal and external ear normal. A middle ear effusion is present.      Left Ear: Ear canal and external ear normal. A middle ear effusion is present.      Nose: Congestion and rhinorrhea present.      Mouth/Throat:      Lips: Pink.      Mouth: Mucous membranes are moist.      Pharynx: Oropharynx is clear.   Eyes:      Conjunctiva/sclera: Conjunctivae normal.   Cardiovascular:      Rate and Rhythm: Normal rate and regular rhythm.      Heart sounds: Normal heart sounds.   Pulmonary:      Effort: Pulmonary effort is normal. No respiratory distress.      Breath sounds: Normal breath sounds.   Abdominal:      General: Bowel sounds are normal. There is no distension.      Palpations: Abdomen is soft.      Tenderness: There is no abdominal tenderness.   Lymphadenopathy:      Cervical: No cervical adenopathy.   Skin:     General: Skin is warm and dry.      Capillary Refill: Capillary refill takes less than 2 seconds.      Coloration: Skin is not pale.   Neurological:      Mental Status: She is alert and oriented to person, place, and time.   Psychiatric:         Mood and Affect: Mood normal.         Behavior: Behavior normal.         Thought Content: Thought content normal.         Judgment: Judgment normal.                  Assessment and Plan   Diagnoses and all orders for this visit:    1. Influenza A (Primary)  -     COVID-19, FLU A/B, RSV PCR - Swab, Nasopharynx; Future  -     POCT rapid strep A  -     POCT SARS-CoV-2 Antigen JACINTO + Flu  -     COVID-19, FLU A/B, RSV PCR - Swab, Nasopharynx    Cough and deep breathe. Increase humidification and hydration. Symptom management as discussed.     Discussed possible differential diagnoses, testing, treatment, recommended non-pharmacological interventions, risks, warning signs to monitor for that would indicate need for follow-up in clinic or ER. If no improvement with these regimens or you have new or worsening symptoms  follow-up. Patient verbalizes understanding and agreement with plan of care. Denies further needs or concerns.     Patient was given instructions and counseling regarding her condition and for health maintenance advised.    BMI is >= 25 and <30. (Overweight) The following options were offered after discussion;: weight loss educational material (shared in after visit summary), exercise counseling/recommendations and nutrition counseling/recommendations       I spent 20 minutes caring for patient on this date of service. This time includes time spent by me in the following activities: preparing for the visit, reviewing tests, obtaining and/or reviewing a separately obtained history, performing a medically appropriate examination and/or evaluation, counseling and educating the patient/family/caregiver, ordering medications, tests, or procedures and documenting information in the medical record    Meds ordered during this visit:  No orders of the defined types were placed in this encounter.      Patient Instructions:  Patient instructions given for the following visit diagnosis:    ICD-10-CM ICD-9-CM   1. Influenza A  J10.1 487.1       Follow Up   Return for Recheck in 2-3 days , Sooner if needed.        This document has been electronically signed by SHER Salas  December 7, 2022 10:19 EST    Patient was given instructions and counseling regarding her condition or for health maintenance advice. Please see specific information pulled into the AVS if appropriate.     Part of this note may be an electronic transcription/translation of spoken language to printed text using the Dragon Dictation System.

## 2022-12-15 ENCOUNTER — OFFICE VISIT (OUTPATIENT)
Dept: FAMILY MEDICINE CLINIC | Facility: CLINIC | Age: 62
End: 2022-12-15

## 2022-12-15 VITALS
RESPIRATION RATE: 16 BRPM | OXYGEN SATURATION: 96 % | BODY MASS INDEX: 28.41 KG/M2 | TEMPERATURE: 97.8 F | SYSTOLIC BLOOD PRESSURE: 126 MMHG | WEIGHT: 166.4 LBS | DIASTOLIC BLOOD PRESSURE: 70 MMHG | HEIGHT: 64 IN | HEART RATE: 75 BPM

## 2022-12-15 DIAGNOSIS — E78.2 MIXED HYPERLIPIDEMIA: ICD-10-CM

## 2022-12-15 DIAGNOSIS — H66.001 NON-RECURRENT ACUTE SUPPURATIVE OTITIS MEDIA OF RIGHT EAR WITHOUT SPONTANEOUS RUPTURE OF TYMPANIC MEMBRANE: Primary | ICD-10-CM

## 2022-12-15 DIAGNOSIS — J01.20 ACUTE NON-RECURRENT ETHMOIDAL SINUSITIS: ICD-10-CM

## 2022-12-15 DIAGNOSIS — J45.20 MILD INTERMITTENT ASTHMA WITHOUT COMPLICATION: ICD-10-CM

## 2022-12-15 PROCEDURE — 99214 OFFICE O/P EST MOD 30 MIN: CPT | Performed by: NURSE PRACTITIONER

## 2022-12-15 RX ORDER — LEVALBUTEROL TARTRATE 45 UG/1
1-2 AEROSOL, METERED ORAL EVERY 4 HOURS PRN
Qty: 15 G | Refills: 2 | Status: SHIPPED | OUTPATIENT
Start: 2022-12-15

## 2022-12-15 RX ORDER — PRAVASTATIN SODIUM 20 MG
20 TABLET ORAL DAILY
Qty: 90 TABLET | Refills: 1 | Status: SHIPPED | OUTPATIENT
Start: 2022-12-15

## 2022-12-15 RX ORDER — DOXYCYCLINE HYCLATE 100 MG/1
100 CAPSULE ORAL 2 TIMES DAILY
Qty: 20 CAPSULE | Refills: 0 | Status: SHIPPED | OUTPATIENT
Start: 2022-12-15 | End: 2022-12-25

## 2022-12-15 RX ORDER — MULTIPLE VITAMINS W/ MINERALS TAB 9MG-400MCG
1 TAB ORAL DAILY
COMMUNITY

## 2022-12-15 NOTE — PROGRESS NOTES
Chief Complaint  Cough, Nasal Congestion, Earache, Headache, and Fatigue    Subjective          Jackie Johnson is a 62 y.o. female who presents today to [facility] for follow up    HPI:   History of Present Illness    Presents to clinic for complaint of right ear pain, cough, congestion, headache, fatigue.  On 12/7/22, she tested positve for flu a.  Reports she is feeling a little better but started feel worse again about 3 days ago.  Does have a history of asthma and has had some mild wheezing.  Admits she has not been using her inhaler as she does not know where it is.  Has been doing Flonase and cetirizine which has helped.  Requesting refill of pravastatin.  Reports she tolerates this well with no issues or side effects.  No other issues or concerns at this time.      The following portions of the patient's history were reviewed and updated as appropriate: allergies, current medications, past family history, past medical history, past social history, past surgical history and problem list.    Objective     Allergy:   Allergies   Allergen Reactions   • Albuterol Other (See Comments)     One time severe headache, questionable for aneurysm, did spinal tap was positive, did Angiogram and MRI were Negative for aneurysm   • Penicillins Hives   • Clindamycin Hcl Rash   • Crestor [Rosuvastatin Calcium] Other (See Comments)     Caused high liver enxymes   • Sulfa Antibiotics Other (See Comments) and Swelling     Mucosal lesions   • Triprolidine-Pseudoephedrine Swelling   • Latex Rash        Current Medications:   Current Outpatient Medications   Medication Sig Dispense Refill   • calcium carbonate-vitamin d 600-400 MG-UNIT per tablet Take 2 tablets by mouth Daily.     • cetirizine (zyrTEC) 10 MG tablet TAKE 1 TABLET BY MOUTH EVERY DAY 30 tablet 2   • cyanocobalamin 1000 MCG/ML injection ADMINISTER 1 ML IN THE MUSCLE 1 TIME EVERY WEEK AS DIRECTED BY PRESCRIBER 15 mL 1   • fluticasone  (Flonase) 50 MCG/ACT nasal spray 2 sprays into the nostril(s) as directed by provider Daily. 1 g 2   • letrozole (FEMARA) 2.5 MG tablet Take 1 tablet by mouth Daily. 30 tablet 11   • levalbuterol (XOPENEX HFA) 45 MCG/ACT inhaler Inhale 1-2 puffs Every 4 (Four) Hours As Needed for Wheezing. 15 g 2   • multivitamin with minerals (MULTIVITAMIN ADULT PO) Take 1 tablet by mouth Daily.     • pravastatin (Pravachol) 20 MG tablet Take 1 tablet by mouth Daily. 90 tablet 1   • doxycycline (VIBRAMYCIN) 100 MG capsule Take 1 capsule by mouth 2 (Two) Times a Day for 10 days. 20 capsule 0     Current Facility-Administered Medications   Medication Dose Route Frequency Provider Last Rate Last Admin   • cyanocobalamin injection 1,000 mcg  1,000 mcg Intramuscular Q28 Days Concepcion Queen APRN   1,000 mcg at 21 1621       Past Medical History:  Past Medical History:   Diagnosis Date   • Acid reflux    • Asthma    • Breast cancer (HCC) 2020    rt br ca   • Cancer (HCC)    • Eczema    • Elevated cholesterol    • Endometriosis    • GERD (gastroesophageal reflux disease)    • Hiatal hernia    • High cholesterol    • Seasonal allergies    • Shaking     INTERMITTANT   • Sinusitis    • Staph infection 2020    port       Past Surgical History:  Past Surgical History:   Procedure Laterality Date   • BREAST BIOPSY Bilateral    •  SECTION     • COLONOSCOPY N/A 2021    Procedure: COLONOSCOPY FOR SCREENING;  Surgeon: Keerthi Meneses MD;  Location: [facility] OR;  Service: Gastroenterology;  Laterality: N/A;   • D & C HYSTEROSCOPY ENDOMETRIAL ABLATION     • DIAGNOSTIC LAPAROSCOPY     • FRACTURE SURGERY Left     tib/fib   • FULGURATION ENDOMETRIOSIS     • LEG SURGERY      Left leg following fracture   • LYMPH NODE BIOPSY     • MASTECTOMY Right     WITH LYMPH NODES REMOVAL   • PORTACATH PLACEMENT N/A 3/18/2020    Procedure: INSERTION OF PORTACATH;  Surgeon: Dudley Aldridge MD;  Location: [facility] OR;  Service:  General;  Laterality: N/A;   • SKIN BIOPSY     • TONSILLECTOMY     • TRANSESOPHAGEAL ECHOCARDIOGRAM (CINTIA)     • VENOUS ACCESS DEVICE (PORT) INSERTION N/A 5/27/2020    Procedure: INSERTION VENOUS ACCESS DEVICE;  Surgeon: Nerissa Wang MD;  Location: [facility] OR;  Service: General;  Laterality: N/A;   • VENOUS ACCESS DEVICE (PORT) INSERTION AND REMOVAL     • VENOUS ACCESS DEVICE (PORT) REMOVAL Right 4/19/2020    Procedure: REMOVAL VENOUS ACCESS DEVICE;  Surgeon: Nerissa Wang MD;  Location: [facility] OR;  Service: General;  Laterality: Right;       Social History:  Social History     Socioeconomic History   • Marital status:    Tobacco Use   • Smoking status: Never   • Smokeless tobacco: Never   Vaping Use   • Vaping Use: Never used   Substance and Sexual Activity   • Alcohol use: Never   • Drug use: Never   • Sexual activity: Defer     Partners: Male     Birth control/protection: None       Family History:  Family History   Problem Relation Age of Onset   • Breast cancer Mother 75   • Basal cell carcinoma Mother 90        2 small spots on face removed + fair complexion   • Heart disease Mother    • Hypertension Mother    • Diabetes type II Mother    • Breast cancer Sister 55        VUS detected on genetic testing CDKN2A gene (c.-33G>C).    • Lung disease Sister    • Heart disease Father    • Throat cancer Father 79   • Lung cancer Father 79   • Dementia Father    • Parkinsonism Father    • Cancer Maternal Grandmother 65        unknown gynecologic cancer, cervical vs uterine   • Heart disease Maternal Grandmother    • Diabetes type II Maternal Grandmother    • Lymphoma Maternal Grandfather 64   • Breast cancer Paternal Grandmother 70   • Prostate cancer Paternal Grandfather 70        metastastic to liver   • Breast cancer Paternal Aunt 77   • Breast cancer Maternal Aunt 80   • Breast cancer Other    • Ovarian cancer Sister 66        Elevated ; 1A tumor; Borderline cancer; both ovaries & lg cyst  "removed   • Ovarian cancer Maternal Cousin 54   • Breast cancer Other 50   • Diabetes type I Nephew        Review of Systems:  Review of Systems   Constitutional: Negative for fever.   Respiratory: Positive for wheezing. Negative for shortness of breath.        Vital Signs:   /70 (BP Location: Left arm, Patient Position: Sitting, Cuff Size: Large Adult)   Pulse 75   Temp 97.8 °F (36.6 °C) (Temporal)   Resp 16   Ht 162.6 cm (64\")   Wt 75.5 kg (166 lb 6.4 oz)   SpO2 96%   BMI 28.56 kg/m²      Physical Exam:  Physical Exam  Vitals and nursing note reviewed.   Constitutional:       General: She is not in acute distress.     Appearance: Normal appearance. She is not ill-appearing or toxic-appearing.   HENT:      Head: Normocephalic.      Right Ear: Ear canal and external ear normal. Tenderness present. Tympanic membrane is erythematous.      Left Ear: Ear canal and external ear normal. A middle ear effusion is present.      Nose: Congestion present.      Mouth/Throat:      Lips: Pink.      Mouth: Mucous membranes are moist.      Pharynx: Oropharynx is clear.   Eyes:      Conjunctiva/sclera: Conjunctivae normal.   Cardiovascular:      Rate and Rhythm: Normal rate and regular rhythm.      Heart sounds: Normal heart sounds.   Pulmonary:      Effort: Pulmonary effort is normal. No respiratory distress.      Breath sounds: Normal breath sounds.   Abdominal:      General: Bowel sounds are normal. There is no distension.      Palpations: Abdomen is soft.      Tenderness: There is no abdominal tenderness.   Lymphadenopathy:      Cervical: No cervical adenopathy.   Skin:     General: Skin is warm and dry.      Capillary Refill: Capillary refill takes less than 2 seconds.      Coloration: Skin is not pale.   Neurological:      Mental Status: She is alert and oriented to person, place, and time.   Psychiatric:         Mood and Affect: Mood normal.         Behavior: Behavior normal.         Thought Content: Thought " content normal.         Judgment: Judgment normal.                  Assessment and Plan   Diagnoses and all orders for this visit:    1. Non-recurrent acute suppurative otitis media of right ear without spontaneous rupture of tympanic membrane (Primary)  -     doxycycline (VIBRAMYCIN) 100 MG capsule; Take 1 capsule by mouth 2 (Two) Times a Day for 10 days.  Dispense: 20 capsule; Refill: 0    2. Mixed hyperlipidemia  -     pravastatin (Pravachol) 20 MG tablet; Take 1 tablet by mouth Daily.  Dispense: 90 tablet; Refill: 1    3. Acute non-recurrent ethmoidal sinusitis  -     doxycycline (VIBRAMYCIN) 100 MG capsule; Take 1 capsule by mouth 2 (Two) Times a Day for 10 days.  Dispense: 20 capsule; Refill: 0    4. Mild intermittent asthma without complication  -     levalbuterol (XOPENEX HFA) 45 MCG/ACT inhaler; Inhale 1-2 puffs Every 4 (Four) Hours As Needed for Wheezing.  Dispense: 15 g; Refill: 2    1.  Initiate regimen as above.  2.  Medication refilled.  Patient agreeable to keep appointment on 12/19/2022 for labs.  3.  Regimen as above.  Symptom management as discussed.  Cough and deep breathe.  Increase humidification and hydration.    4.  Medication refilled.  Take as directed.    Discussed possible differential diagnoses, testing, treatment, recommended non-pharmacological interventions, risks, warning signs to monitor for that would indicate need for follow-up in clinic or ER. If no improvement with these regimens or you have new or worsening symptoms follow-up. Patient verbalizes understanding and agreement with plan of care. Denies further needs or concerns.     Patient was given instructions and counseling regarding her condition and for health maintenance advised.    BMI is >= 25 and <30. (Overweight) The following options were offered after discussion;: weight loss educational material (shared in after visit summary), exercise counseling/recommendations and nutrition counseling/recommendations       I spent  30 minutes caring for patient on this date of service. This time includes time spent by me in the following activities: preparing for the visit, reviewing tests, obtaining and/or reviewing a separately obtained history, performing a medically appropriate examination and/or evaluation, counseling and educating the patient/family/caregiver, ordering medications, tests, or procedures and documenting information in the medical record    Meds ordered during this visit:  New Medications Ordered This Visit   Medications   • pravastatin (Pravachol) 20 MG tablet     Sig: Take 1 tablet by mouth Daily.     Dispense:  90 tablet     Refill:  1   • levalbuterol (XOPENEX HFA) 45 MCG/ACT inhaler     Sig: Inhale 1-2 puffs Every 4 (Four) Hours As Needed for Wheezing.     Dispense:  15 g     Refill:  2   • doxycycline (VIBRAMYCIN) 100 MG capsule     Sig: Take 1 capsule by mouth 2 (Two) Times a Day for 10 days.     Dispense:  20 capsule     Refill:  0       Patient Instructions:  Patient instructions given for the following visit diagnosis:    ICD-10-CM ICD-9-CM   1. Non-recurrent acute suppurative otitis media of right ear without spontaneous rupture of tympanic membrane  H66.001 382.00   2. Mixed hyperlipidemia  E78.2 272.2   3. Acute non-recurrent ethmoidal sinusitis  J01.20 461.2   4. Mild intermittent asthma without complication  J45.20 493.90       Follow Up   Return for Next scheduled follow up on 12/19/2022, sooner if needed..        This document has been electronically signed by SHER Salas  December 15, 2022 14:43 EST    Patient was given instructions and counseling regarding her condition or for health maintenance advice. Please see specific information pulled into the AVS if appropriate.     Part of this note may be an electronic transcription/translation of spoken language to printed text using the Dragon Dictation System.

## 2022-12-30 DIAGNOSIS — J30.89 SEASONAL ALLERGIC RHINITIS DUE TO OTHER ALLERGIC TRIGGER: ICD-10-CM

## 2023-01-03 RX ORDER — FLUTICASONE PROPIONATE 50 MCG
SPRAY, SUSPENSION (ML) NASAL
Qty: 16 G | Refills: 2 | Status: SHIPPED | OUTPATIENT
Start: 2023-01-03

## 2023-01-03 RX ORDER — CETIRIZINE HYDROCHLORIDE 10 MG/1
TABLET ORAL
Qty: 30 TABLET | Refills: 2 | Status: SHIPPED | OUTPATIENT
Start: 2023-01-03 | End: 2023-03-13 | Stop reason: SDUPTHER

## 2023-01-10 ENCOUNTER — OFFICE VISIT (OUTPATIENT)
Dept: FAMILY MEDICINE CLINIC | Facility: CLINIC | Age: 63
End: 2023-01-10
Payer: COMMERCIAL

## 2023-01-10 VITALS
OXYGEN SATURATION: 98 % | BODY MASS INDEX: 28.03 KG/M2 | SYSTOLIC BLOOD PRESSURE: 114 MMHG | DIASTOLIC BLOOD PRESSURE: 70 MMHG | WEIGHT: 164.2 LBS | TEMPERATURE: 97.3 F | HEIGHT: 64 IN | HEART RATE: 59 BPM

## 2023-01-10 DIAGNOSIS — J30.89 SEASONAL ALLERGIC RHINITIS DUE TO OTHER ALLERGIC TRIGGER: ICD-10-CM

## 2023-01-10 DIAGNOSIS — M25.562 CHRONIC PAIN OF LEFT KNEE: Primary | ICD-10-CM

## 2023-01-10 DIAGNOSIS — G89.29 CHRONIC PAIN OF LEFT KNEE: Primary | ICD-10-CM

## 2023-01-10 DIAGNOSIS — M23.52 CHRONIC INSTABILITY OF LEFT KNEE: ICD-10-CM

## 2023-01-10 DIAGNOSIS — E53.8 VITAMIN B12 DEFICIENCY: ICD-10-CM

## 2023-01-10 PROCEDURE — 99214 OFFICE O/P EST MOD 30 MIN: CPT | Performed by: FAMILY MEDICINE

## 2023-01-10 RX ORDER — CYANOCOBALAMIN 1000 UG/ML
1000 INJECTION, SOLUTION INTRAMUSCULAR; SUBCUTANEOUS
Qty: 15 ML | Refills: 1 | Status: SHIPPED | OUTPATIENT
Start: 2023-01-10

## 2023-01-10 NOTE — PROGRESS NOTES
"Jackie Johnson     VITALS: Blood pressure 114/70, pulse 59, temperature 97.3 °F (36.3 °C), height 162.6 cm (64\"), weight 74.5 kg (164 lb 3.2 oz), SpO2 98 %, not currently breastfeeding.    Subjective  Chief Complaint  Knee Injury    Subjective          History of Present Illness:  The patient is a 62 y.o.  female with medical conditions significant for asthma, hyperlipidemia, and allergic rhinitis who presents to the clinic secondary to medical follow-up. She is experiencing problems with her left knee.    The patient reports she injured her knee in 10/2022, while she was carrying her old dog to go to the bathroom at night and stepped off the step and twisted her left knee. She reports that she was seen at [facility] in [place], [place] and was told that she may need lifts in her shoes. She expresses she was told that she may have a meniscus tear by physical therapy and reveals she has completed 6 physical therapy sessions so far. She mentions she has been using hot and cold compresses and notes she has not taken any ibuprofen. The patient reports she has a history of a compound complex fracture of her left lower extremity when she was 16 years old. She states she has been seeing a podiatrist. She mentions she has been wearing lifts in her left shoe. She adds she has an appointment with Dr. Yap on 01/16/2023. She denies wearing a brace and states she attends physical therapy at [place]. She expresses a week and a half ago the patient's son accidently bumped the patient's toe and she began to cry.     No complaints about any of the medications currently prescribed.       The following portions of the patient's history were reviewed and updated as appropriate: allergies, current medications, past family history, past medical history, past social history, past surgical history and problem list.    Past Medical History  Past Medical History:   Diagnosis Date   • Acid reflux    • Asthma    • Breast " cancer (HCC) 2020    rt br ca   • Cancer (HCC)    • Eczema    • Elevated cholesterol    • Endometriosis    • GERD (gastroesophageal reflux disease)    • Hiatal hernia    • High cholesterol    • Seasonal allergies    • Shaking     INTERMITTANT   • Sinusitis    • Staph infection 2020    port       Surgical History  Past Surgical History:   Procedure Laterality Date   • BREAST BIOPSY Bilateral    •  SECTION     • COLONOSCOPY N/A 2021    Procedure: COLONOSCOPY FOR SCREENING;  Surgeon: Keerthi Meneses MD;  Location: [facility];  Service: Gastroenterology;  Laterality: N/A;   • D & C HYSTEROSCOPY ENDOMETRIAL ABLATION     • DIAGNOSTIC LAPAROSCOPY     • FRACTURE SURGERY Left     tib/fib   • FULGURATION ENDOMETRIOSIS     • LEG SURGERY      Left leg following fracture   • LYMPH NODE BIOPSY     • MASTECTOMY Right     WITH LYMPH NODES REMOVAL   • PORTACATH PLACEMENT N/A 3/18/2020    Procedure: INSERTION OF PORTACATH;  Surgeon: Dudley Aldridge MD;  Location: [facility];  Service: General;  Laterality: N/A;   • SKIN BIOPSY     • TONSILLECTOMY     • TRANSESOPHAGEAL ECHOCARDIOGRAM (CINTIA)     • VENOUS ACCESS DEVICE (PORT) INSERTION N/A 2020    Procedure: INSERTION VENOUS ACCESS DEVICE;  Surgeon: Nerissa Wang MD;  Location: [facility];  Service: General;  Laterality: N/A;   • VENOUS ACCESS DEVICE (PORT) INSERTION AND REMOVAL     • VENOUS ACCESS DEVICE (PORT) REMOVAL Right 2020    Procedure: REMOVAL VENOUS ACCESS DEVICE;  Surgeon: Nerissa Wang MD;  Location: [facility];  Service: General;  Laterality: Right;       Family History  Family History   Problem Relation Age of Onset   • Breast cancer Mother 75   • Basal cell carcinoma Mother 90        2 small spots on face removed + fair complexion   • Heart disease Mother    • Hypertension Mother    • Diabetes type II Mother    • Breast cancer Sister 55        VUS detected on genetic testing CDKN2A gene (c.-33G>C).    • Lung disease  "Sister    • Heart disease Father    • Throat cancer Father 79   • Lung cancer Father 79   • Dementia Father    • Parkinsonism Father    • Cancer Maternal Grandmother 65        unknown gynecologic cancer, cervical vs uterine   • Heart disease Maternal Grandmother    • Diabetes type II Maternal Grandmother    • Lymphoma Maternal Grandfather 64   • Breast cancer Paternal Grandmother 70   • Prostate cancer Paternal Grandfather 70        metastastic to liver   • Breast cancer Paternal Aunt 77   • Breast cancer Maternal Aunt 80   • Breast cancer Other    • Ovarian cancer Sister 66        Elevated ; 1A tumor; Borderline cancer; both ovaries & lg cyst removed   • Ovarian cancer Maternal Cousin 54   • Breast cancer Other 50   • Diabetes type I Nephew        Social History  Social History     Socioeconomic History   • Marital status:    Tobacco Use   • Smoking status: Never   • Smokeless tobacco: Never   Vaping Use   • Vaping Use: Never used   Substance and Sexual Activity   • Alcohol use: Never   • Drug use: Never   • Sexual activity: Defer     Partners: Male     Birth control/protection: None       Objective   Vital Signs:   /70 (BP Location: Right arm, Patient Position: Sitting, Cuff Size: Adult)   Pulse 59   Temp 97.3 °F (36.3 °C)   Ht 162.6 cm (64\")   Wt 74.5 kg (164 lb 3.2 oz)   SpO2 98%   BMI 28.18 kg/m²     Physical Exam     Gen: Patient in NAD. Pleasant and answers appropriately. A&Ox3.    Skin: Warm and dry with normal turgor. No purpura, rashes, or unusual pigmentation noted. Hair is normal in appearance and distribution.    HEENT: NC/AT. No lesions noted. Conjunctiva clear, sclera nonicteric. PERRL. EOMI without nystagmus or strabismus. Fundi appear benign. No hemorrhages or exudates of eyes. Auditory canals are patent bilaterally without lesions. TMs intact,  nonerythematous, nonbulging without lesions. Nasal mucosa pink, nonerythematous, and nonedematous. Frontal and maxillary sinuses are " nontender. O/P nonerythematous and moist without exudate.    Neck: Supple without lymph nodes palpated. FROM.     Lungs: CTA B/L without rales, rhonchi, crackles, or wheezes.    Heart: RRR. S1 and S2 normal. No S3 or S4. No MRGT.    Abd: Soft, nontender,nondistended. (+)BSx4 quadrants.     Extrem: No CCE. Radial pulses 2+/4 and equal B/L.  Left knee: Decreased range of motion.  Tenderness to palpation diffusely.  Cannot squat.  Positive anterior drawer.    Neuro: No focal motor/sensory deficits.    Procedures         Assessment and Plan    Jackie Jhonson is a 62 y.o. here for medical followup.    Diagnoses and all orders for this visit:    1. Chronic pain of left knee (Primary)  -     Ambulatory Referral to Orthopedic Surgery    2. Seasonal allergic rhinitis due to other allergic trigger  Encouraged saline rinse    3. Chronic instability of left knee  Knee brace ordered.      4.  Vitamin B12 deficiency.  -     cyanocobalamin 1000 MCG/ML injection; Inject 1 mL into the appropriate muscle as directed by prescriber Every 30 (Thirty) Days.  Dispense: 15 mL; Refill: 1        Plan:     1. Left knee pain.  - I will obtain an x-ray of the patient's left knee.  - I will refer the patient to orthopedics and I recommend the patient to wear a knee brace.   - I will prescribe ibuprofen 800 mg 3 times a day for 4 to 5 days and advised the patient to use hot/cold compresses.       BMI is >= 25 and <30. (Overweight) The following options were offered after discussion;: exercise counseling/recommendations and nutrition counseling/recommendations        Follow Up   Return in about 3 months (around 4/10/2023).  Findings and plans discussed with patient who verbalizes understanding and agreement. Will followup with patient once results are in. Patient was given instructions and counseling regarding her condition or for health maintenance advice. Please see specific information pulled into the AVS if appropriate.       Transcribed  from ambient dictation for Scarlet Loera MD by Nadia Guillen.  01/10/23   13:02 EST        Scarlet Loera MD

## 2023-01-11 ENCOUNTER — TELEPHONE (OUTPATIENT)
Dept: FAMILY MEDICINE CLINIC | Facility: CLINIC | Age: 63
End: 2023-01-11
Payer: COMMERCIAL

## 2023-01-11 NOTE — TELEPHONE ENCOUNTER
Caller: Alex Jackie    Relationship: Self    Best call back number: [phone]    What medications are you currently taking:   Current Outpatient Medications on File Prior to Visit   Medication Sig Dispense Refill   • calcium carbonate-vitamin d 600-400 MG-UNIT per tablet Take 2 tablets by mouth Daily.     • cetirizine (zyrTEC) 10 MG tablet TAKE 1 TABLET BY MOUTH EVERY DAY 30 tablet 2   • cyanocobalamin 1000 MCG/ML injection Inject 1 mL into the appropriate muscle as directed by prescriber Every 30 (Thirty) Days. 15 mL 1   • fluticasone (FLONASE) 50 MCG/ACT nasal spray SPRAY TWICE IN EACH NOSTRIL EVERY DAY 16 g 2   • letrozole (FEMARA) 2.5 MG tablet Take 1 tablet by mouth Daily. 30 tablet 11   • levalbuterol (XOPENEX HFA) 45 MCG/ACT inhaler Inhale 1-2 puffs Every 4 (Four) Hours As Needed for Wheezing. 15 g 2   • multivitamin with minerals tablet tablet Take 1 tablet by mouth Daily.     • pravastatin (Pravachol) 20 MG tablet Take 1 tablet by mouth Daily. 90 tablet 1     Current Facility-Administered Medications on File Prior to Visit   Medication Dose Route Frequency Provider Last Rate Last Admin   • cyanocobalamin injection 1,000 mcg  1,000 mcg Intramuscular Q28 Days Concepcion Queen APRN   1,000 mcg at 11/24/21 1621        What are your concerns: PT CALLED TO VERIFY WHETHER OR NOT PCP WOULD LIKE HER TO TAKE OTC IBUPROFEN 80MG, PT NOT SURE SHE REMEMBER THE MG

## 2023-01-12 NOTE — TELEPHONE ENCOUNTER
Ibuprofen 800 mg orally TID x about 5 days to see if it helps any with the pain.    Patient notified & verbalized understanding.

## 2023-01-18 ENCOUNTER — TELEPHONE (OUTPATIENT)
Dept: FAMILY MEDICINE CLINIC | Facility: CLINIC | Age: 63
End: 2023-01-18
Payer: COMMERCIAL

## 2023-01-18 NOTE — TELEPHONE ENCOUNTER
Caller: Jackie Johnson    Relationship: Self    Best call back number: [phone]    What is the best time to reach you: ANYTIME     Who are you requesting to speak with (clinical staff, provider,  specific staff member): CLINICAL     What was the call regarding: PATIENT CALLED IN TO LET DR SWAIN KNOW HOW HER KNEE WAS DOING, IT GOT BETTER WHERE SHE WAS OFF Friday AND Monday SO IT GOT BETTER BUT AFTER BEING ON HER KNEE THE SWELLING CAME BACK AND A NEW SPOT ON HER KNEE HAS CAME UP WITH BEING IN PAIN. SHE ALSO NEEDS A NOTE FROM THE PROVIDER STATING SHE NEEDS TO LIMIT THE AMOUNT OF STEPS SHE TAKES WHILE WORKING. IF YOU CAN E-MAIL IT PLEASE SEND IT TO [email or web address].     Do you require a callback: YES

## 2023-01-18 NOTE — TELEPHONE ENCOUNTER
Spoke with Jackie to confirm information received.  She also indicated she has been taking ibuprofen as you suggested (yesterday was day #6).  It seems to help with swelling and pain as long as she is off of it.  Of note, she called ortho as she had not heard from them in regards to the referral and she is scheduled for 2/28/23.    Please advise.

## 2023-01-18 NOTE — TELEPHONE ENCOUNTER
Patient notified and expressed understanding.  She will  letter tomorrow.  I will leave it up front.  In the meantime, is there a max dose of the ibuprofen she can take daily?  She also states you had mentioned possibly getting fitted for a brace?    Please advise.

## 2023-01-18 NOTE — TELEPHONE ENCOUNTER
She can dial back on the ibuprofen if she wants and take if she thinks it's going to help her.   Letter written.

## 2023-01-19 NOTE — TELEPHONE ENCOUNTER
Max of ibuprofen is 800 mg three times a day. So 2400 mg. I am trying to find a nonshady appropriate bioskin rep to get her a brace.

## 2023-01-19 NOTE — TELEPHONE ENCOUNTER
Max of ibuprofen is 800 mg three times a day. So 2400 mg. I am trying to find a nonshady appropriate bioskin rep to get her a brace.      Patient notified & verbalized understanding.

## 2023-01-26 ENCOUNTER — TRANSCRIBE ORDERS (OUTPATIENT)
Dept: ADMINISTRATIVE | Facility: HOSPITAL | Age: 63
End: 2023-01-26
Payer: COMMERCIAL

## 2023-01-26 DIAGNOSIS — M25.562 LEFT KNEE PAIN, UNSPECIFIED CHRONICITY: Primary | ICD-10-CM

## 2023-01-28 ENCOUNTER — HOSPITAL ENCOUNTER (EMERGENCY)
Facility: HOSPITAL | Age: 63
Discharge: HOME | End: 2023-01-28
Attending: STUDENT IN AN ORGANIZED HEALTH CARE EDUCATION/TRAINING PROGRAM | Admitting: STUDENT IN AN ORGANIZED HEALTH CARE EDUCATION/TRAINING PROGRAM
Payer: COMMERCIAL

## 2023-01-28 ENCOUNTER — APPOINTMENT (OUTPATIENT)
Dept: GENERAL RADIOLOGY | Facility: HOSPITAL | Age: 63
End: 2023-01-28
Payer: COMMERCIAL

## 2023-01-28 VITALS
RESPIRATION RATE: 18 BRPM | DIASTOLIC BLOOD PRESSURE: 96 MMHG | TEMPERATURE: 97.2 F | SYSTOLIC BLOOD PRESSURE: 187 MMHG | HEIGHT: 64 IN | OXYGEN SATURATION: 98 % | BODY MASS INDEX: 28 KG/M2 | WEIGHT: 164 LBS | HEART RATE: 75 BPM

## 2023-01-28 DIAGNOSIS — G89.29 CHRONIC PAIN OF LEFT KNEE: Primary | ICD-10-CM

## 2023-01-28 DIAGNOSIS — M25.562 CHRONIC PAIN OF LEFT KNEE: Primary | ICD-10-CM

## 2023-01-28 PROCEDURE — 73564 X-RAY EXAM KNEE 4 OR MORE: CPT | Performed by: RADIOLOGY

## 2023-01-28 PROCEDURE — 99283 EMERGENCY DEPT VISIT LOW MDM: CPT

## 2023-01-28 PROCEDURE — 73564 X-RAY EXAM KNEE 4 OR MORE: CPT

## 2023-01-28 RX ORDER — HYDROCODONE BITARTRATE AND ACETAMINOPHEN 5; 325 MG/1; MG/1
1 TABLET ORAL ONCE
Status: COMPLETED | OUTPATIENT
Start: 2023-01-28 | End: 2023-01-28

## 2023-01-28 RX ORDER — HYDROCODONE BITARTRATE AND ACETAMINOPHEN 5; 325 MG/1; MG/1
1 TABLET ORAL EVERY 6 HOURS PRN
Qty: 12 TABLET | Refills: 0 | Status: SHIPPED | OUTPATIENT
Start: 2023-01-28 | End: 2023-01-28

## 2023-01-28 RX ADMIN — HYDROCODONE BITARTRATE AND ACETAMINOPHEN 1 TABLET: 5; 325 TABLET ORAL at 12:34

## 2023-02-15 ENCOUNTER — HOSPITAL ENCOUNTER (OUTPATIENT)
Dept: MRI IMAGING | Facility: HOSPITAL | Age: 63
Discharge: HOME | End: 2023-02-15
Admitting: ORTHOPAEDIC SURGERY
Payer: COMMERCIAL

## 2023-02-15 DIAGNOSIS — M25.562 LEFT KNEE PAIN, UNSPECIFIED CHRONICITY: ICD-10-CM

## 2023-02-15 PROCEDURE — 73721 MRI JNT OF LWR EXTRE W/O DYE: CPT | Performed by: RADIOLOGY

## 2023-02-15 PROCEDURE — 73721 MRI JNT OF LWR EXTRE W/O DYE: CPT

## 2023-02-22 ENCOUNTER — TELEPHONE (OUTPATIENT)
Dept: FAMILY MEDICINE CLINIC | Facility: CLINIC | Age: 63
End: 2023-02-22
Payer: COMMERCIAL

## 2023-02-22 NOTE — TELEPHONE ENCOUNTER
Spoke with Theron Soria [employer] [employer] as he has had difficulty reaching patient in regards to brace.  He indicated he would continue to try and contact patient.

## 2023-03-09 ENCOUNTER — TELEPHONE (OUTPATIENT)
Dept: FAMILY MEDICINE CLINIC | Facility: CLINIC | Age: 63
End: 2023-03-09
Payer: COMMERCIAL

## 2023-03-09 NOTE — TELEPHONE ENCOUNTER
Caller: Jackie Johnson    Relationship: Self    Best call back number: [phone]    What is the medical concern/diagnosis: KNEE PAIN    What specialty or service is being requested: PHYSICAL THERAPY    Any additional details: PATIENT STATES THAT SHE SAW DR ADRIAN AND HE WANTED HER TO GO TO PHYSICAL THERAPY.

## 2023-03-10 ENCOUNTER — OFFICE VISIT (OUTPATIENT)
Dept: FAMILY MEDICINE CLINIC | Facility: CLINIC | Age: 63
End: 2023-03-10
Payer: COMMERCIAL

## 2023-03-10 VITALS
BODY MASS INDEX: 28.03 KG/M2 | OXYGEN SATURATION: 97 % | WEIGHT: 164.2 LBS | SYSTOLIC BLOOD PRESSURE: 122 MMHG | DIASTOLIC BLOOD PRESSURE: 86 MMHG | TEMPERATURE: 96.8 F | HEART RATE: 77 BPM | HEIGHT: 64 IN

## 2023-03-10 DIAGNOSIS — G89.29 CHRONIC PAIN OF LEFT KNEE: ICD-10-CM

## 2023-03-10 DIAGNOSIS — M25.562 CHRONIC PAIN OF LEFT KNEE: ICD-10-CM

## 2023-03-10 DIAGNOSIS — S83.207S ACUTE MENISCAL TEAR OF KNEE, LEFT, SEQUELA: ICD-10-CM

## 2023-03-10 DIAGNOSIS — R51.9 NONINTRACTABLE HEADACHE, UNSPECIFIED CHRONICITY PATTERN, UNSPECIFIED HEADACHE TYPE: Primary | ICD-10-CM

## 2023-03-10 RX ORDER — KETOROLAC TROMETHAMINE 30 MG/ML
30 INJECTION, SOLUTION INTRAMUSCULAR; INTRAVENOUS EVERY 6 HOURS PRN
Status: SHIPPED | OUTPATIENT
Start: 2023-03-10 | End: 2023-03-15

## 2023-03-10 RX ADMIN — KETOROLAC TROMETHAMINE 30 MG: 30 INJECTION, SOLUTION INTRAMUSCULAR; INTRAVENOUS at 17:37

## 2023-03-10 NOTE — TELEPHONE ENCOUNTER
But she already was at [facility] prior to this. Does he want her to try somewhere else? Can you get his note for me? [facility].

## 2023-03-10 NOTE — TELEPHONE ENCOUNTER
But she already was at [facility] prior to this. Does he want her to try somewhere else? Can you get his note for me? [facility].     Left a message to return call.    Faxed a records request.    Spoke with patient she reports she has the referral [facility] gave her she is going to call [facility].

## 2023-03-10 NOTE — PROGRESS NOTES
"Jackie Johnson     VITALS: Blood pressure 122/86, pulse 77, temperature 96.8 °F (36 °C), height 162.6 cm (64\"), weight 74.5 kg (164 lb 3.2 oz), SpO2 97 %, not currently breastfeeding.    Subjective  Chief Complaint  Headache (X 1 week)    Subjective          History of Present Illness:  Patient is a 63 y.o.  female with medical conditions significant for meniscus tear, hyperlipidemia, and allergic rhinitis who presents to clinic secondary to medical followup.     The patient reports that her right knee has improved. She was evaluated at [facility] and was diagnosed with a radial meniscus tear. The patient was not encouraged to have surgery. She signed up for physical therapy today, 03/10/2023. She initially described feeling a pop in her right knee that produced pain. The patient is navigating stairs one foot at a time. She is ambulating well but still feels as if her right leg will give out. The patient was reportedly told by Dr. Hernandez to avoid stairs, lunges, and deep knee bends; however, physical therapy was performing these exercises. She denies having a physical therapist, she has always worked with a \"tech\".    The patient has had an intermittent headache located in the back of her head, but since 03/03/2023, her headaches have been more constant. She denies pain disturbing her sleep, but she woke up today, 03/10/2023, with a headache. The patient used Flonase this morning which cleared out her right passage, but it did not improve her headache. She took 2 Tylenol which did not help. The patient admits that Advil seems to help her symptoms. She describes the headache to not be life stopping but that it is just there. The patient wondered if she had developed COVID-19 but she tested negative. The patient does not believe her headaches are related to allergies. She reports that she changed her B12 injections to once a month. She did give herself an injection on either 02/28/03/2023 or " "2023, and on Saturday, 2023, she felt \"really bad\". The patient drinks pop and does not drink coffee. She is unsure of how much water she drinks in a day.     The patient has migraines with aura and visual disturbance that lasts approximately 30 minutes. She does not believe her recent headaches are associated with her migraines. She has trouble remember things which she believes may be secondary to chemotherapy. She denies ever having had COVID-19.     The patient is experiencing intermittent tremors in her jaw. She mentions that the B12 injections helped with the tremors. She has asked a neurologist about her teeth chattering.     The patient denies having any stress at this time. She has had recurrence of leg cramping. She has a deviated septum. The patient has never had a Toradol injection. She is unsure if pain medication makes her drowsy.     An angiogram was performed approximately 10 years ago, which was normal, and she was diagnosed with an aneurysm.     No complaints about any of the medications.    The following portions of the patient's history were reviewed and updated as appropriate: allergies, current medications, past family history, past medical history, past social history, past surgical history and problem list.    Past Medical History  Past Medical History:   Diagnosis Date   • Acid reflux    • Asthma    • Breast cancer (HCC) 2020    rt br ca   • Cancer (HCC)    • Eczema    • Elevated cholesterol    • Endometriosis    • GERD (gastroesophageal reflux disease)    • Hiatal hernia    • High cholesterol    • Seasonal allergies    • Shaking     INTERMITTANT   • Sinusitis    • Staph infection 2020    port       Surgical History  Past Surgical History:   Procedure Laterality Date   • BREAST BIOPSY Bilateral    •  SECTION     • COLONOSCOPY N/A 2021    Procedure: COLONOSCOPY FOR SCREENING;  Surgeon: Keerthi Meneses MD;  Location: [facility] OR;  Service: " Gastroenterology;  Laterality: N/A;   • D & C HYSTEROSCOPY ENDOMETRIAL ABLATION     • DIAGNOSTIC LAPAROSCOPY     • FRACTURE SURGERY Left     tib/fib   • FULGURATION ENDOMETRIOSIS     • LEG SURGERY      Left leg following fracture   • LYMPH NODE BIOPSY     • MASTECTOMY Right     WITH LYMPH NODES REMOVAL   • PORTACATH PLACEMENT N/A 3/18/2020    Procedure: INSERTION OF PORTACATH;  Surgeon: Dudley Aldridge MD;  Location: [facility] OR;  Service: General;  Laterality: N/A;   • SKIN BIOPSY     • TONSILLECTOMY     • TRANSESOPHAGEAL ECHOCARDIOGRAM (CINTIA)     • VENOUS ACCESS DEVICE (PORT) INSERTION N/A 5/27/2020    Procedure: INSERTION VENOUS ACCESS DEVICE;  Surgeon: Nerissa Wang MD;  Location: [facility] OR;  Service: General;  Laterality: N/A;   • VENOUS ACCESS DEVICE (PORT) INSERTION AND REMOVAL     • VENOUS ACCESS DEVICE (PORT) REMOVAL Right 4/19/2020    Procedure: REMOVAL VENOUS ACCESS DEVICE;  Surgeon: Nerissa Wang MD;  Location: [facility] OR;  Service: General;  Laterality: Right;       Family History  Family History   Problem Relation Age of Onset   • Breast cancer Mother 75   • Basal cell carcinoma Mother 90        2 small spots on face removed + fair complexion   • Heart disease Mother    • Hypertension Mother    • Diabetes type II Mother    • Breast cancer Sister 55        VUS detected on genetic testing CDKN2A gene (c.-33G>C).    • Lung disease Sister    • Heart disease Father    • Throat cancer Father 79   • Lung cancer Father 79   • Dementia Father    • Parkinsonism Father    • Cancer Maternal Grandmother 65        unknown gynecologic cancer, cervical vs uterine   • Heart disease Maternal Grandmother    • Diabetes type II Maternal Grandmother    • Lymphoma Maternal Grandfather 64   • Breast cancer Paternal Grandmother 70   • Prostate cancer Paternal Grandfather 70        metastastic to liver   • Breast cancer Paternal Aunt 77   • Breast cancer Maternal Aunt 80   • Breast cancer Other    • Ovarian cancer  "Sister 66        Elevated ; 1A tumor; Borderline cancer; both ovaries & lg cyst removed   • Ovarian cancer Maternal Cousin 54   • Breast cancer Other 50   • Diabetes type I Nephew        Social History  Social History     Socioeconomic History   • Marital status:    Tobacco Use   • Smoking status: Never   • Smokeless tobacco: Never   Vaping Use   • Vaping Use: Never used   Substance and Sexual Activity   • Alcohol use: Never   • Drug use: Never   • Sexual activity: Defer     Partners: Male     Birth control/protection: None       Objective   Vital Signs:   /86 (BP Location: Right arm, Patient Position: Sitting, Cuff Size: Adult)   Pulse 77   Temp 96.8 °F (36 °C)   Ht 162.6 cm (64\")   Wt 74.5 kg (164 lb 3.2 oz)   SpO2 97%   BMI 28.18 kg/m²     Physical Exam     Gen: Patient in NAD. Pleasant and answers appropriately. A&Ox3.    Skin: Warm and dry with normal turgor. No purpura, rashes, or unusual pigmentation noted. Hair is normal in appearance and distribution.    HEENT: NC/AT. No lesions noted. Conjunctiva clear, sclera nonicteric. PERRL. EOMI without nystagmus or strabismus. Fundi appear benign. No hemorrhages or exudates of eyes. Auditory canals are patent bilaterally without lesions. TMs intact,  nonerythematous, nonbulging without lesions. Nasal mucosa pink, nonerythematous, and nonedematous. Frontal and maxillary sinuses are nontender. O/P nonerythematous and moist without exudate.    Neck: Supple without lymph nodes palpated. FROM.     Lungs: CTA B/L without rales, rhonchi, crackles, or wheezes.    Heart: RRR. S1 and S2 normal. No S3 or S4. No MRGT.    Abd: Soft, nontender,nondistended. (+)BSx4 quadrants.     Extrem: No CCE. Radial pulses 2+/4 and equal B/L.  Left knee pain.    Neuro: No focal motor/sensory deficits.    Procedures    Result Review :   The following data was reviewed by: Scarlet Loera MD   on 03/10/2023:                Assessment and Plan    Jackie Johnson is a " 63 y.o. here for medical followup.    1.  Left knee pain  The patient will be referred to [facility] for physical therapy.    2. Headaches  The patient will have laboratory testing performed tomorrow, 03/11/2023. Treatment options will be discussed at that time.   The patient will receive a Toradol injection today.  She was given samples for headache relief.    Diagnoses and all orders for this visit:    1. Nonintractable headache, unspecified chronicity pattern, unspecified headache type (Primary)  -     ketorolac (TORADOL) injection 30 mg  -     CBC Auto Differential; Future  -     Comprehensive Metabolic Panel; Future  -     TSH; Future  -     Vitamin B12; Future  -     Vitamin D,25-Hydroxy; Future  -     Magnesium; Future    2. Chronic pain of left knee  -     Ambulatory Referral to Physical Therapy Evaluate and treat    3. Acute meniscal tear of knee, left, sequela  -     Ambulatory Referral to Physical Therapy Evaluate and treat            BMI is >= 25 and <30. (Overweight) The following options were offered after discussion;: exercise counseling/recommendations and nutrition counseling/recommendations         Follow Up   Return in about 4 weeks (around 4/7/2023), or (already has appt).  Findings and plans discussed with patient who verbalizes understanding and agreement. Will followup with patient once results are in. Patient was given instructions and counseling regarding her condition or for health maintenance advice. Please see specific information pulled into the AVS if appropriate.       Scarlet Loera MD    Transcribed from ambient dictation for Scarlet Loera MD by Misty Whyte.  03/10/23   18:38 EST    Patient or patient representative verbalized consent to the visit recording.  I have personally performed the services described in this document as transcribed by the above individual, and it is both accurate and complete.

## 2023-03-11 ENCOUNTER — LAB (OUTPATIENT)
Dept: LAB | Facility: HOSPITAL | Age: 63
End: 2023-03-11
Payer: COMMERCIAL

## 2023-03-11 DIAGNOSIS — R51.9 NONINTRACTABLE HEADACHE, UNSPECIFIED CHRONICITY PATTERN, UNSPECIFIED HEADACHE TYPE: ICD-10-CM

## 2023-03-11 LAB
25(OH)D3 SERPL-MCNC: 40.1 NG/ML (ref 30–100)
ALBUMIN SERPL-MCNC: 4.3 G/DL (ref 3.5–5.2)
ALBUMIN/GLOB SERPL: 2 G/DL
ALP SERPL-CCNC: 87 U/L (ref 39–117)
ALT SERPL W P-5'-P-CCNC: 16 U/L (ref 1–33)
ANION GAP SERPL CALCULATED.3IONS-SCNC: 8.6 MMOL/L (ref 5–15)
AST SERPL-CCNC: 17 U/L (ref 1–32)
BASOPHILS # BLD AUTO: 0.06 10*3/MM3 (ref 0–0.2)
BASOPHILS NFR BLD AUTO: 0.9 % (ref 0–1.5)
BILIRUB SERPL-MCNC: 0.4 MG/DL (ref 0–1.2)
BUN SERPL-MCNC: 12 MG/DL (ref 8–23)
BUN/CREAT SERPL: 15.4 (ref 7–25)
CALCIUM SPEC-SCNC: 9.7 MG/DL (ref 8.6–10.5)
CHLORIDE SERPL-SCNC: 103 MMOL/L (ref 98–107)
CO2 SERPL-SCNC: 26.4 MMOL/L (ref 22–29)
CREAT SERPL-MCNC: 0.78 MG/DL (ref 0.57–1)
DEPRECATED RDW RBC AUTO: 45 FL (ref 37–54)
EGFRCR SERPLBLD CKD-EPI 2021: 85.5 ML/MIN/1.73
EOSINOPHIL # BLD AUTO: 0.06 10*3/MM3 (ref 0–0.4)
EOSINOPHIL NFR BLD AUTO: 0.9 % (ref 0.3–6.2)
ERYTHROCYTE [DISTWIDTH] IN BLOOD BY AUTOMATED COUNT: 13.5 % (ref 12.3–15.4)
GLOBULIN UR ELPH-MCNC: 2.2 GM/DL
GLUCOSE SERPL-MCNC: 97 MG/DL (ref 65–99)
HCT VFR BLD AUTO: 42.5 % (ref 34–46.6)
HGB BLD-MCNC: 13.6 G/DL (ref 12–15.9)
IMM GRANULOCYTES # BLD AUTO: 0.02 10*3/MM3 (ref 0–0.05)
IMM GRANULOCYTES NFR BLD AUTO: 0.3 % (ref 0–0.5)
LYMPHOCYTES # BLD AUTO: 1.48 10*3/MM3 (ref 0.7–3.1)
LYMPHOCYTES NFR BLD AUTO: 21.2 % (ref 19.6–45.3)
MAGNESIUM SERPL-MCNC: 2.4 MG/DL (ref 1.6–2.4)
MCH RBC QN AUTO: 28.9 PG (ref 26.6–33)
MCHC RBC AUTO-ENTMCNC: 32 G/DL (ref 31.5–35.7)
MCV RBC AUTO: 90.4 FL (ref 79–97)
MONOCYTES # BLD AUTO: 0.43 10*3/MM3 (ref 0.1–0.9)
MONOCYTES NFR BLD AUTO: 6.2 % (ref 5–12)
NEUTROPHILS NFR BLD AUTO: 4.94 10*3/MM3 (ref 1.7–7)
NEUTROPHILS NFR BLD AUTO: 70.5 % (ref 42.7–76)
NRBC BLD AUTO-RTO: 0 /100 WBC (ref 0–0.2)
PLATELET # BLD AUTO: 301 10*3/MM3 (ref 140–450)
PMV BLD AUTO: 10.3 FL (ref 6–12)
POTASSIUM SERPL-SCNC: 4.5 MMOL/L (ref 3.5–5.2)
PROT SERPL-MCNC: 6.5 G/DL (ref 6–8.5)
RBC # BLD AUTO: 4.7 10*6/MM3 (ref 3.77–5.28)
SODIUM SERPL-SCNC: 138 MMOL/L (ref 136–145)
TSH SERPL DL<=0.05 MIU/L-ACNC: 1.84 UIU/ML (ref 0.27–4.2)
VIT B12 BLD-MCNC: 1399 PG/ML (ref 211–946)
WBC NRBC COR # BLD: 6.99 10*3/MM3 (ref 3.4–10.8)

## 2023-03-11 PROCEDURE — 82607 VITAMIN B-12: CPT

## 2023-03-11 PROCEDURE — 82306 VITAMIN D 25 HYDROXY: CPT

## 2023-03-11 PROCEDURE — 83735 ASSAY OF MAGNESIUM: CPT

## 2023-03-11 PROCEDURE — 80050 GENERAL HEALTH PANEL: CPT

## 2023-03-11 PROCEDURE — 36415 COLL VENOUS BLD VENIPUNCTURE: CPT

## 2023-03-13 ENCOUNTER — TELEPHONE (OUTPATIENT)
Dept: FAMILY MEDICINE CLINIC | Facility: CLINIC | Age: 63
End: 2023-03-13
Payer: COMMERCIAL

## 2023-03-13 DIAGNOSIS — J30.89 SEASONAL ALLERGIC RHINITIS DUE TO OTHER ALLERGIC TRIGGER: ICD-10-CM

## 2023-03-13 RX ORDER — CETIRIZINE HYDROCHLORIDE 10 MG/1
10 TABLET ORAL DAILY
Qty: 30 TABLET | Refills: 2 | Status: SHIPPED | OUTPATIENT
Start: 2023-03-13

## 2023-03-13 NOTE — TELEPHONE ENCOUNTER
Caller: Jackie Johnson    Relationship: Self    Best call back number: [phone]    Requested Prescriptions:   Requested Prescriptions     Pending Prescriptions Disp Refills   • cetirizine (zyrTEC) 10 MG tablet 30 tablet 2     Sig: Take 1 tablet by mouth Daily.        Pharmacy where request should be sent: [id number] [place] [address] [facility] [address] [facility] [address] - [phone]  - [phone] FX         Does the patient have less than a 3 day supply:  [] Yes  [x] No    Would you like a call back once the refill request has been completed: [] Yes [x] No    If the office needs to give you a call back, can they leave a voicemail: [] Yes [x] No    Jimbo Victoria Rep   03/13/23 13:26 EDT

## 2023-03-13 NOTE — TELEPHONE ENCOUNTER
Maybe a migraine then. I'd just have her monitor and if it really doesn't come back, then she's a-ok.If she uses the other ubrelvy and wants her own script, just let us know, but she might not need anything.

## 2023-03-13 NOTE — TELEPHONE ENCOUNTER
Caller: Jackie Johnson    Relationship: Self    Best call back number: [phone]    What is the best time to reach you: ANYTIME    Who are you requesting to speak with (clinical staff, provider,  specific staff member): CLINICAL    What was the call regarding: FEEDBACK REGARDING UBRELVY. PATIENT TOOK 1/2 PILL DIDN'T HELP THEN OTHER 1/2 HALF AND HEADACHE WENT AWAY AND HASN'T COME BACK.    Do you require a callback: NO.

## 2023-03-13 NOTE — TELEPHONE ENCOUNTER
Maybe a migraine then. I'd just have her monitor and if it really doesn't come back, then she's a-ok.If she uses the other ubrelvy and wants her own script, just let us know, but she might not need anything.       Spoke with patient & she verbalized understanding.

## 2023-03-20 ENCOUNTER — TELEPHONE (OUTPATIENT)
Dept: FAMILY MEDICINE CLINIC | Facility: CLINIC | Age: 63
End: 2023-03-20
Payer: COMMERCIAL

## 2023-03-20 NOTE — TELEPHONE ENCOUNTER
Caller: Jackie Johnson    Relationship: Self    Best call back number:  [phone]     Requested Prescriptions:   Requested Prescriptions      No prescriptions requested or ordered in this encounter      MIGRAINE MEDICATION     Pharmacy where request should be sent: "[facility] #[id number] - [place] [address] [facility] [address] - [phone] PH - [phone] FX     Additional details provided by patient:  SAMPLE MIGRAINE MEDICATION  SHE TOOK THE SECOND PILL 100 MG YESTERDAY BECAUSE SHE STARTED HAVING A MIGRAINE      SHE WOULD LIKE TO GET A PRESCRIPTION OF THIS AND DIRECTIONS ON HOW TO USE IT     Does the patient have less than a 3 day supply:  [x] Yes  [] No

## 2023-03-22 ENCOUNTER — TELEPHONE (OUTPATIENT)
Dept: FAMILY MEDICINE CLINIC | Facility: CLINIC | Age: 63
End: 2023-03-22
Payer: COMMERCIAL

## 2023-03-22 NOTE — TELEPHONE ENCOUNTER
Can you PA ubrelvy?    Dx: Post COVID headaches  Has tried topamax, propranolol, imitrex and maxalt.Ubrelvy has worked the best.

## 2023-03-22 NOTE — TELEPHONE ENCOUNTER
Was this insurance covered copay or does it need a PA? That might be the problem.      She was told it needs a PA.

## 2023-03-22 NOTE — TELEPHONE ENCOUNTER
Caller: Jackie Johnson    Relationship: Self    Best call back number: [phone]     Who are you requesting to speak with (clinical staff, provider,  specific staff member):  CLINICAL     What was the call regarding: PATIENT SAID SHE WENT TO  THE MEDICATION UBREKVY AND IT WAS GOING TO COST $3,000  SHE IS REQUESTING ANOTHER MEDICATION TO BE SENT IN FOR MIGRAINES     [facility] CONFIRMED   Do you require a callback:  YES

## 2023-03-30 NOTE — TELEPHONE ENCOUNTER
PA for Ubrelvy has been approved. I contacted the pt and informed her of the approval and informed her to contact us back if she has any further issues with the medication. The pt verbalized understanding.

## 2023-04-12 ENCOUNTER — TELEPHONE (OUTPATIENT)
Dept: ONCOLOGY | Facility: CLINIC | Age: 63
End: 2023-04-12

## 2023-04-12 NOTE — TELEPHONE ENCOUNTER
Caller: Jackie Johnson    Relationship to patient: Self    Best call back number: [phone]    Chief complaint: R/S    Type of visit: LAB AND FOLLOW UP    Requested date: MAY 4 OR 5,  LATE AFTERNOON NOT 5-8 OR 5-9, OR AFTER 5-11    If rescheduling, when is the original appointment: 4-19    Additional notes:PLEASE ADVISE

## 2023-04-18 ENCOUNTER — TELEPHONE (OUTPATIENT)
Dept: FAMILY MEDICINE CLINIC | Facility: CLINIC | Age: 63
End: 2023-04-18
Payer: COMMERCIAL

## 2023-04-18 RX ORDER — CYANOCOBALAMIN 1000 UG/ML
1000 INJECTION, SOLUTION INTRAMUSCULAR; SUBCUTANEOUS
Qty: 10 ML | Refills: 1 | Status: SHIPPED | OUTPATIENT
Start: 2023-04-18

## 2023-04-18 NOTE — TELEPHONE ENCOUNTER
Caller: Jackie Johnson    Relationship: Self    Best call back number: [phone]       What was the call regarding:PATIENT CALLED ASKING IF SHE COULD GET cyanocobalamin 1000 MCG/ML injection CHANGED TO TAKE EVERY OTHER WEEK INSTEAD OF ONCE A MONTH.  PATIENT ALSO NEEDS NEEDLES.  PATIENT WOULD LIKE THE SHORT ONES. PATIENT IS OUT OF MEDICATION.    Do you require a callback:YES    [facility] #[id number] - [place] [address] [address] [facility] [address] - [phone]  - [phone] FX       Walking

## 2023-05-24 ENCOUNTER — TREATMENT (OUTPATIENT)
Dept: PHYSICAL THERAPY | Facility: CLINIC | Age: 63
End: 2023-05-24
Payer: COMMERCIAL

## 2023-05-24 DIAGNOSIS — G89.29 CHRONIC PAIN OF LEFT KNEE: Primary | ICD-10-CM

## 2023-05-24 DIAGNOSIS — M25.562 CHRONIC PAIN OF LEFT KNEE: Primary | ICD-10-CM

## 2023-05-24 DIAGNOSIS — S83.207S ACUTE MENISCAL TEAR OF KNEE, LEFT, SEQUELA: ICD-10-CM

## 2023-05-24 NOTE — PROGRESS NOTES
"    Physical Therapy Initial Evaluation and Plan of Care    Patient: Jackie Johnson   : 1960  Diagnosis/ICD-10 Code:  Chronic pain of left knee [M25.562, G89.29]  Referring practitioner: Scarlet Loera MD  Date of Initial Visit: 2023  Today's Date: 2023  Patient seen for 1 session         Visit Diagnoses:    ICD-10-CM ICD-9-CM   1. Chronic pain of left knee  M25.562 719.46    G89.29 338.29   2. Acute meniscal tear of knee, left, sequela  S83.207S 905.7         Subjective Questionnaire: LEFS: 36%      Subjective Evaluation    History of Present Illness  Onset date: 2022.  Mechanism of injury: In 2022, the patient stepped off of a step and experienced a \"pop\" in her left knee.  The next day she had difficulty with gait and impairments with swimming due to knee pain. The patient was evaluated by her PCP and was referred to physical therapy for treatment of pain.  Her pain had no significant change and she noted a significant increase in pain around late December. Early 2023, the patient was descending stairs and experienced another injury and \"pop\" in her left knee and she collapsed to the ground.  She was taken to a local ER and was diagnosed with a left knee strain.  The patient followed up with her PCP and was referred to an orthopedic MD in 2023 and was advised to initiate therapy for treatment of pain.      Patient Occupation: teacher Quality of life: good    Pain  Current pain ratin  At best pain ratin  At worst pain ratin  Location: left knee  Quality: sharp  Relieving factors: ice, rest and medications  Aggravating factors: lifting, stairs, squatting and repetitive movement  Progression: improved    Hand dominance: right    Diagnostic Tests  X-ray: normal  MRI studies: abnormal    Patient Goals  Patient goals for therapy: decreased edema, decreased pain, improved balance, increased motion, increased strength, independence with " ADLs/IADLs and return to sport/leisure activities             Objective          Observations     Additional Knee Observation Details  Left medial knee edema    Palpation     Additional Palpation Details  Left medial and lateral joint line    Neurological Testing     Sensation     Knee   Left Knee   Intact: light touch    Right Knee   Intact: light touch     Active Range of Motion   Left Knee   Flexion: 128 degrees   Extension: 0 degrees     Right Knee   Flexion: 135 degrees   Extension: 0 degrees     Strength/Myotome Testing     Left Hip   Planes of Motion   Flexion: 4    Right Hip   Planes of Motion   Flexion: 4+    Left Knee   Flexion: 4-  Extension: 4    Right Knee   Flexion: 4+  Extension: 4+    Left Ankle/Foot   Dorsiflexion: 4  Plantar flexion: 4    Right Ankle/Foot   Dorsiflexion: 4+  Plantar flexion: 4+    Tests     Left Knee   Negative anterior drawer, lateral Urbano, medial Urbano, posterior drawer, valgus stress test at 0 degrees and varus stress test at 0 degrees.           Assessment & Plan     Assessment  Impairments: abnormal coordination, abnormal gait, abnormal muscle firing, abnormal or restricted ROM, activity intolerance, impaired balance, impaired physical strength, lacks appropriate home exercise program, pain with function and weight-bearing intolerance  Functional Limitations: walking, uncomfortable because of pain, standing and unable to perform repetitive tasks  Assessment details: Pt is a 64 y/o female referred to therapy for treatment of a left meniscal tear.  Pt presents with decreased knee stability, impaired functional mobility and increased knee pain.  Therapy will follow for improved knee stability and decreased pain.  Prognosis: good    Goals  Plan Goals: STG 6 weeks    1 Pt will be instructed in a HEP.  2 Pt will improve her LEFs to less than 20%    LTG 12 weeks    1 Pt will be able to ambulate for 30 min without pain greater than 1/10.  2 Pt will demonstrate 4+/5 gross LE  strength.  3 Pt will be independent with a progressive knee stability program.    Plan  Therapy options: will be seen for skilled therapy services  Planned modality interventions: cryotherapy, TENS, thermotherapy (hydrocollator packs) and ultrasound  Planned therapy interventions: ADL retraining, balance/weight-bearing training, body mechanics training, flexibility, functional ROM exercises, gait training, home exercise program, IADL retraining, joint mobilization, manual therapy, neuromuscular re-education, postural training, soft tissue mobilization, strengthening, stretching and therapeutic activities  Frequency: 1x week  Duration in weeks: 12  Plan details: Will follow for optimal gains.  Moderate Evaluation  13274  Re-evaluation   99792  Therapeutic exercise  27640  Therapeutic activity    24378  Neuromuscular re-education   96216  Manual therapy   16893  Gait training  48947  Unattended e-stim (Private)  40339  Moist heat/cryotherapy 75642   Ultrasound   51759              Timed:         Manual Therapy:         mins  19382;     Therapeutic Exercise:    12     mins  56986;     Neuromuscular Eros:        mins  90396;    Therapeutic Activity:          mins  34254;     Gait Training:           mins  10190;     Ultrasound:          mins  71709;    Ionto                                   mins   41547  Self Care                            mins   10248  Canalith Repos         mins 40432      Un-Timed:  Electrical Stimulation:         mins  88215 ( );  Dry Needling          mins self-pay  Traction          mins 68975  Low Eval          Mins  75534  Mod Eval    40      Mins  51691  High Eval                            Mins  41082        Timed Treatment:  12    mins   Total Treatment:     52   mins          PT: Ap Beyer PT     License Number: [id number]  Electronically signed by Ap Beyer, PT, 05/24/23, 2:06 PM EDT    Certification Period: 5/24/2023 thru 8/21/2023  I certify that the therapy  services are furnished while this patient is under my care.  The services outlined above are required by this patient, and will be reviewed every 90 days.         Physician Signature:__________________________________________________    PHYSICIAN: Scarlet Loera MD  NPI: [id number]                                      DATE:      Please sign and return via fax to [email or web address] . Thank you, [facility].

## 2023-05-25 ENCOUNTER — OFFICE VISIT (OUTPATIENT)
Dept: ONCOLOGY | Facility: CLINIC | Age: 63
End: 2023-05-25
Payer: COMMERCIAL

## 2023-05-25 ENCOUNTER — LAB (OUTPATIENT)
Dept: ONCOLOGY | Facility: CLINIC | Age: 63
End: 2023-05-25
Payer: COMMERCIAL

## 2023-05-25 VITALS
DIASTOLIC BLOOD PRESSURE: 89 MMHG | HEIGHT: 64 IN | TEMPERATURE: 98.2 F | SYSTOLIC BLOOD PRESSURE: 128 MMHG | RESPIRATION RATE: 18 BRPM | BODY MASS INDEX: 27.86 KG/M2 | HEART RATE: 82 BPM | WEIGHT: 163.2 LBS | OXYGEN SATURATION: 97 %

## 2023-05-25 DIAGNOSIS — Z17.1 MALIGNANT NEOPLASM OF RIGHT BREAST IN FEMALE, ESTROGEN RECEPTOR NEGATIVE, UNSPECIFIED SITE OF BREAST: Primary | ICD-10-CM

## 2023-05-25 DIAGNOSIS — M85.80 OSTEOPENIA, UNSPECIFIED LOCATION: ICD-10-CM

## 2023-05-25 DIAGNOSIS — E55.9 VITAMIN D DEFICIENCY: ICD-10-CM

## 2023-05-25 DIAGNOSIS — Z17.1 MALIGNANT NEOPLASM OF RIGHT BREAST IN FEMALE, ESTROGEN RECEPTOR NEGATIVE, UNSPECIFIED SITE OF BREAST: ICD-10-CM

## 2023-05-25 DIAGNOSIS — N60.92 ATYPICAL LOBULAR HYPERPLASIA (ALH) OF LEFT BREAST: ICD-10-CM

## 2023-05-25 DIAGNOSIS — C50.911 MALIGNANT NEOPLASM OF RIGHT BREAST IN FEMALE, ESTROGEN RECEPTOR NEGATIVE, UNSPECIFIED SITE OF BREAST: ICD-10-CM

## 2023-05-25 DIAGNOSIS — R29.818 NEUROLOGICAL ABNORMALITY: ICD-10-CM

## 2023-05-25 DIAGNOSIS — R53.83 FATIGUE, UNSPECIFIED TYPE: ICD-10-CM

## 2023-05-25 DIAGNOSIS — C50.911 MALIGNANT NEOPLASM OF RIGHT BREAST IN FEMALE, ESTROGEN RECEPTOR NEGATIVE, UNSPECIFIED SITE OF BREAST: Primary | ICD-10-CM

## 2023-05-25 LAB
ALBUMIN SERPL-MCNC: 4.2 G/DL (ref 3.5–5.2)
ALBUMIN/GLOB SERPL: 1.8 G/DL
ALP SERPL-CCNC: 83 U/L (ref 39–117)
ALT SERPL W P-5'-P-CCNC: 22 U/L (ref 1–33)
ANION GAP SERPL CALCULATED.3IONS-SCNC: 7.5 MMOL/L (ref 5–15)
AST SERPL-CCNC: 14 U/L (ref 1–32)
BASOPHILS # BLD AUTO: 0.06 10*3/MM3 (ref 0–0.2)
BASOPHILS NFR BLD AUTO: 0.9 % (ref 0–1.5)
BILIRUB SERPL-MCNC: 0.2 MG/DL (ref 0–1.2)
BUN SERPL-MCNC: 14 MG/DL (ref 8–23)
BUN/CREAT SERPL: 15.1 (ref 7–25)
CALCIUM SPEC-SCNC: 9.5 MG/DL (ref 8.6–10.5)
CHLORIDE SERPL-SCNC: 101 MMOL/L (ref 98–107)
CO2 SERPL-SCNC: 28.5 MMOL/L (ref 22–29)
CREAT SERPL-MCNC: 0.93 MG/DL (ref 0.57–1)
DEPRECATED RDW RBC AUTO: 47.8 FL (ref 37–54)
EGFRCR SERPLBLD CKD-EPI 2021: 69.2 ML/MIN/1.73
EOSINOPHIL # BLD AUTO: 0.05 10*3/MM3 (ref 0–0.4)
EOSINOPHIL NFR BLD AUTO: 0.7 % (ref 0.3–6.2)
ERYTHROCYTE [DISTWIDTH] IN BLOOD BY AUTOMATED COUNT: 13.5 % (ref 12.3–15.4)
GLOBULIN UR ELPH-MCNC: 2.3 GM/DL
GLUCOSE SERPL-MCNC: 161 MG/DL (ref 65–99)
HCT VFR BLD AUTO: 42 % (ref 34–46.6)
HGB BLD-MCNC: 13.4 G/DL (ref 12–15.9)
IMM GRANULOCYTES # BLD AUTO: 0.03 10*3/MM3 (ref 0–0.05)
IMM GRANULOCYTES NFR BLD AUTO: 0.4 % (ref 0–0.5)
LYMPHOCYTES # BLD AUTO: 1.75 10*3/MM3 (ref 0.7–3.1)
LYMPHOCYTES NFR BLD AUTO: 25 % (ref 19.6–45.3)
MCH RBC QN AUTO: 30.3 PG (ref 26.6–33)
MCHC RBC AUTO-ENTMCNC: 31.9 G/DL (ref 31.5–35.7)
MCV RBC AUTO: 95 FL (ref 79–97)
MONOCYTES # BLD AUTO: 0.35 10*3/MM3 (ref 0.1–0.9)
MONOCYTES NFR BLD AUTO: 5 % (ref 5–12)
NEUTROPHILS NFR BLD AUTO: 4.76 10*3/MM3 (ref 1.7–7)
NEUTROPHILS NFR BLD AUTO: 68 % (ref 42.7–76)
NRBC BLD AUTO-RTO: 0 /100 WBC (ref 0–0.2)
PLATELET # BLD AUTO: 257 10*3/MM3 (ref 140–450)
PMV BLD AUTO: 9.5 FL (ref 6–12)
POTASSIUM SERPL-SCNC: 3.8 MMOL/L (ref 3.5–5.2)
PROT SERPL-MCNC: 6.5 G/DL (ref 6–8.5)
RBC # BLD AUTO: 4.42 10*6/MM3 (ref 3.77–5.28)
SODIUM SERPL-SCNC: 137 MMOL/L (ref 136–145)
TSH SERPL DL<=0.05 MIU/L-ACNC: 1.87 UIU/ML (ref 0.27–4.2)
WBC NRBC COR # BLD: 7 10*3/MM3 (ref 3.4–10.8)

## 2023-05-25 PROCEDURE — 82306 VITAMIN D 25 HYDROXY: CPT | Performed by: INTERNAL MEDICINE

## 2023-05-25 PROCEDURE — 99214 OFFICE O/P EST MOD 30 MIN: CPT | Performed by: NURSE PRACTITIONER

## 2023-05-25 PROCEDURE — 80050 GENERAL HEALTH PANEL: CPT | Performed by: INTERNAL MEDICINE

## 2023-05-25 NOTE — PROGRESS NOTES
NAME: Jackie Johnson    : 1960    DATE:  2023    DIAGNOSIS:   1.  Clinical Stage IIB ([id number]) poorly differentiated invasive ductal carcinoma of the right breast.  Tumor spanned 15-16 cm, though it was unclear how much of this was invasive malignancy v. DCIS.  There were no clinically or radiographically supspicious axillary LNs.  There was associated high grade DCIS.  Tumor was ER-,MA-, HER-2 +  .  Following neoadjuvant TCHP, she had complete pathologic response - ypT0N0(sn).  No invasive malignancy detected.  0/6 LN involved.     2.  Atypical Lobular Hyperplasia and LCIS involving the L breast    CHIEF COMPLAINT:  Follow up of Breast Cancer    TREATMENT HISTORY:  1.      2.  R Mastectectomy and SLNBx with L Lumpectomy and Mastopexy  - Dr. Kadeem Serrano and Dr. Roc Huston ([facility]) 20    3.           HISTORY OF PRESENT ILLNESS:   Jackie Johnson is a very pleasant 63 y.o. female who was referred by Dr. STACIE Richards for evaluation and treatment of breast cancer. She has a strong family h/o breast cancer and so is normally very good about getting her mammograms done.  She recently moved from [facility] and so missed her mammogram in 2019 because of the move.  She got established with a new PCP (Dr. Denver Sheth) in 2019 and was referred to a a new gynecologist  (Dr. Brayan Richards) who she saw in November/December.  Kevin Richards ordered a screening mammogram.  She had an acute febrile illness around the time of the Super Bowl and at that time thought she might have a lump in her R breast.  While waiting on mammogram scheduling, she started to have pain in the breast and in eventually she would occasionally get a little bit of discharge from the right nipple.  She presented for  Screening mammography 19 as below.  This was followed by diagnostic mammogram and U/S on 20.  She had an abnormal span of 15 cm spanning the R breast.  She underwent  stereotactic biopsy on 1-21-20.    This revealed a poorly differentiated invasive ductal carcinoma of the R breast with associated high grade DCIS.  Tumor was ER/RI- and HER-2+ by IHC.  Her sister lives in [place] and was treated at [facility], so she went there for further evaluation. She saw Dr. Romero of medical oncology and Dr. Serrano of surgical oncology.  She had staging with CT CAP and bone scan as well as Brain MRI and had no evidence of distant metastatic spread.  She had reese breast MRI as well which showed an unexpected abnormality in the L breast described as linear non-mass enhancement spanning an 8 cm area in the inferior breast at 6:00.  This was biopsied on 2-28-20 with biopsy result pending.  No abnormal axillary LNs were identified.  Neoadjuvant chemotherapy was recommended to her by her [facility] team of physicians but she wishes to seek this treatment closer to home so is here today for further evaluation and treatment.    She is in good health overall. She reports an isolated episode of fever/chills during an acute illness, abdominal fullness to her right side, fatigue and a rash circling her neck. She denies changes in weight, cp, sob, persistent abdominal pain, n/v/c/d or bony pain.    INTERVAL HISTORY:  Ms. Johnson presents today for follow up of breast cancer. Overall, she reports that she has been doing well since her last visit. She reports that [facility] recently performed MRI due to visual disturbances which was negative. She reports today that she thinks her visual disturbances were related to worsening migraines. She was started on Ubrelvy and notes improvement. She continues to take Femara daily and has been tolerating this well. She will be due for repeat left mammogram in October at [facility]. She denies any other specific complaints today.         PAST MEDICAL HISTORY:  Past Medical History:   Diagnosis Date   • Acid reflux    • Asthma    • Breast cancer 01/2020    rt br ca   •  Cancer    • Eczema    • Elevated cholesterol    • Endometriosis    • GERD (gastroesophageal reflux disease)    • Hiatal hernia    • High cholesterol    • Seasonal allergies    • Shaking     INTERMITTANT   • Sinusitis    • Staph infection 2020    port       PAST SURGICAL HISTORY:  Past Surgical History:   Procedure Laterality Date   • BREAST BIOPSY Bilateral    •  SECTION     • COLONOSCOPY N/A 2021    Procedure: COLONOSCOPY FOR SCREENING;  Surgeon: Keerthi Meneses MD;  Location: [facility];  Service: Gastroenterology;  Laterality: N/A;   • D & C HYSTEROSCOPY ENDOMETRIAL ABLATION     • DIAGNOSTIC LAPAROSCOPY     • FRACTURE SURGERY Left     tib/fib   • FULGURATION ENDOMETRIOSIS     • LEG SURGERY      Left leg following fracture   • LYMPH NODE BIOPSY     • MASTECTOMY Right     WITH LYMPH NODES REMOVAL   • PORTACATH PLACEMENT N/A 3/18/2020    Procedure: INSERTION OF PORTACATH;  Surgeon: Dudley Aldridge MD;  Location: [facility];  Service: General;  Laterality: N/A;   • SKIN BIOPSY     • TONSILLECTOMY     • TRANSESOPHAGEAL ECHOCARDIOGRAM (CINTIA)     • VENOUS ACCESS DEVICE (PORT) INSERTION N/A 2020    Procedure: INSERTION VENOUS ACCESS DEVICE;  Surgeon: Nerissa Wang MD;  Location: [facility];  Service: General;  Laterality: N/A;   • VENOUS ACCESS DEVICE (PORT) INSERTION AND REMOVAL     • VENOUS ACCESS DEVICE (PORT) REMOVAL Right 2020    Procedure: REMOVAL VENOUS ACCESS DEVICE;  Surgeon: Nerissa Wang MD;  Location: [facility];  Service: General;  Laterality: Right;       FAMILY HISTORY:  Family History   Problem Relation Age of Onset   • Breast cancer Mother 75   • Basal cell carcinoma Mother 90        2 small spots on face removed + fair complexion   • Heart disease Mother    • Hypertension Mother    • Diabetes type II Mother    • Breast cancer Sister 55        VUS detected on genetic testing CDKN2A gene (c.-33G>C).    • Lung disease Sister    • Heart disease Father    •  Throat cancer Father 79   • Lung cancer Father 79   • Dementia Father    • Parkinsonism Father    • Cancer Maternal Grandmother 65        unknown gynecologic cancer, cervical vs uterine   • Heart disease Maternal Grandmother    • Diabetes type II Maternal Grandmother    • Lymphoma Maternal Grandfather 64   • Breast cancer Paternal Grandmother 70   • Prostate cancer Paternal Grandfather 70        metastastic to liver   • Breast cancer Paternal Aunt 77   • Breast cancer Maternal Aunt 80   • Breast cancer Other    • Ovarian cancer Sister 66        Elevated ; 1A tumor; Borderline cancer; both ovaries & lg cyst removed   • Ovarian cancer Maternal Cousin 54   • Breast cancer Other 50   • Diabetes type I Nephew          SOCIAL HISTORY:  Social History     Socioeconomic History   • Marital status:    Tobacco Use   • Smoking status: Never   • Smokeless tobacco: Never   Vaping Use   • Vaping Use: Never used   Substance and Sexual Activity   • Alcohol use: Never   • Drug use: Never   • Sexual activity: Defer     Partners: Male     Birth control/protection: None     REVIEW OF SYSTEMS:   A comprehensive 14 point review of systems was performed.  Significant findings as mentioned above.  All other systems reviewed and are negative.      MEDICATIONS:  The current medication list was reviewed in the EMR    Current Outpatient Medications:   •  calcium carbonate-vitamin d 600-400 MG-UNIT per tablet, Take 2 tablets by mouth Daily., Disp: , Rfl:   •  cetirizine (zyrTEC) 10 MG tablet, Take 1 tablet by mouth Daily., Disp: 30 tablet, Rfl: 2  •  cyanocobalamin 1000 MCG/ML injection, Inject 1 mL into the appropriate muscle as directed by prescriber Every 14 (Fourteen) Days., Disp: 10 mL, Rfl: 1  •  fluticasone (FLONASE) 50 MCG/ACT nasal spray, SPRAY TWICE IN EACH NOSTRIL EVERY DAY, Disp: 16 g, Rfl: 2  •  letrozole (FEMARA) 2.5 MG tablet, Take 1 tablet by mouth Daily., Disp: 30 tablet, Rfl: 11  •  levalbuterol (XOPENEX HFA) 45  "MCG/ACT inhaler, Inhale 1-2 puffs Every 4 (Four) Hours As Needed for Wheezing., Disp: 15 g, Rfl: 2  •  multivitamin with minerals tablet tablet, Take 1 tablet by mouth Daily., Disp: , Rfl:   •  pravastatin (Pravachol) 20 MG tablet, Take 1 tablet by mouth Daily., Disp: 90 tablet, Rfl: 1  •  Syringe/Needle, Disp, 25G X 5/8\" 1 ML misc, 1 application Every 14 (Fourteen) Days., Disp: 50 each, Rfl: 0  •  ubrogepant (UBRELVY) 100 MG tablet, Take 1 tablet by mouth 1 (One) Time As Needed (headache). Can take a second one an hour after if headache persists., Disp: 30 tablet, Rfl: 2    Current Facility-Administered Medications:   •  cyanocobalamin injection 1,000 mcg, 1,000 mcg, Intramuscular, Q28 Days, Bernice, Concepcion, APRN, 1,000 mcg at 11/24/21 1621    ALLERGIES:    Allergies   Allergen Reactions   • Albuterol Other (See Comments)     One time severe headache, questionable for aneurysm, did spinal tap was positive, did Angiogram and MRI were Negative for aneurysm   • Penicillins Hives   • Clindamycin Hcl Rash   • Crestor [Rosuvastatin Calcium] Other (See Comments)     Caused high liver enxymes   • Sulfa Antibiotics Other (See Comments) and Swelling     Mucosal lesions   • Triprolidine-Pseudoephedrine Swelling   • Latex Rash       PHYSICAL EXAM:  Vitals:    05/25/23 1501   BP: 128/89   Pulse: 82   Resp: 18   Temp: 98.2 °F (36.8 °C)   SpO2: 97%     Pain Score    05/25/23 1501   PainSc: 0-No pain   ECOG score: 0   General:  Awake, alert and oriented, appears well. In no acute distress.  HEENT:  Pupils are equal, round and reactive to light and accommodation, Extra-ocular movements full, Oropharyx clear, mucous membranes moist  Neck:  No JVD, thyromegaly or lymphadenopathy   CV:  Regular rate and rhythm, no murmurs, rubs or gallops  Resp:  Lungs are clear to auscultation bilaterally, no wheezing  Breast: S/p R mastectomy.  Surgical scars smooth and intact.  S/p L lumpectomy/mastopexy. Surgical scars smooth and intact.  No palpable " masses or axillary adenopathy on either side.   Abd:  Soft, non-tender, non- distended, bowel sounds present, no organomegaly or masses  Ext:  No clubbing, cyanosis or edema.   Lymph:  No cervical, supraclavicular, axillary,adenopathy  Neuro:  MS as above, grossly non-focal exam      PATHOLOGY:  02-12-20 08-18-20  A. SENTINEL LYMPH NODE #1 RIGHT AXILLA, BIOPSY:  - 1 lymph node, no tumor present (0/1)  - Cytokeratin stain shows no evidence of metastatic carcinoma    B. SENTINEL LYMPH NODE #2, RIGHT AXILLA, BIOPSY:  - one lymph node, no tumor present (0/1)  - Cytokeratin stain shows no evidence of metastatic carcinoma    C. RIGHT BREAST, TOTAL MASTECTOMY:  - No residual carcinoma identified  - area of fibrosis with associated biopsy clip, consistent with treated tumor bed.   - Proliferative fibrocystic change.   - Skin and nipple, no tumor present.   - four low axillary lymph nodes, no tumor present (0/4).     D. LEFT BREAST, SEGMENTAL MASTECTOMY:  - FOCI OF LOBULAR NEOPLASIA (ATYPICAL LOBULAR HYPERPLASIA AND LOBULAR CARCINOMA IN SITU). LOW GRADE, CLASSIC TYPE, WITH FOCAL PAGETOID SPREAD INTO DUCTS.   - Two prior biopsy site changes present.   - Proliferative fibrocystic change.     E. RIGHT BREAST SKIN, EXCESS, EXCISION:  - Skin and breast tissue, no tumor present.     F. LEFT BREAST, MASTOPEXY:  - Skin and breast tissue, no tumor present    Tumor Template  Specimen Identification   Procedure: Total mastectomy   Specimen Laterality: Right  Invasive Carcinoma   Tumor Focality: NA   Tumor Size: 0   Histologic Type of Invasive Carcinoma: NA   Histologic Grade Based on [place] Histologic Score    Glandular (Acinar)/Tubular Differentiation: Not applicable    Nuclear Pleomorphism: not applicable    Mitotic Rate: not applicable   Overall Grade: not applicable   Lymphovascular invasion: not present  In Situ Carcinoma   Ductal Carcinoma in Situ (DCIS): Not present   Lobular Carcinoma in situ (LCIS): Not  present  Tumor Extension   Skin: uninvolved    Nipple: uninvolved   Skeletal muscle: not applicable  Margins   Invasive Carcinoma Margins: NA    DCIS Margins: NA  Regional Lymph nodes   Uninvolved by tumor cells    Total number of lymph nodes examined: 6    Number of sentinel lymph nodes examined: 2  Treatment effect   Treatment effect in the breast: present   Treatment effect in the lymph nodes: not present  RCB input variables   Dimensions of Residual Cancer that is in-situ: 0%   Total Number of Lymph nodes with Residual Metastatic Carcinoma: 0   Size of Largest Metastasis: NA   RCB Index Computed Value (optional): 0   RCB Class (optional): 0  Pathologic Stage Classification 9ypTNM, AJCC 8th Edition)    Primary Tumor (invasive carcinoma)(ypT): ypT0   Regional lymph nodes (ypN): yPN0(sn)   Distant Metastasis (ypM): NA  Ancillary Studies: Please see previous case [id number]      ENDOSCOPY:        IMAGING:  Bilateral Diagnostic mammogram 12-27-19  FINDINGS:  There are scattered areas of fibroglandular density.     The bilateral fibroglandular pattern is unremarkable in appearance. A  few scattered calcifications are present in the left breast. There are  calcifications in the central aspect of the right breast spanning  approximately 15 cm of tissue in the AP dimension and extending up to  the base of the nipple for which additional imaging is recommended.     IMPRESSION:  1. Benign left mammographic findings.  2. Calcifications in the right breast. Recommend additional imaging.        BI-RADS CATEGORY:  0, INCOMPLETE: Need additional imaging evaluation.     RECOMMENDATION:  Right ML and CC magnification views.      R diagnostic mammogram 01-16-20  FINDINGS:  Magnification imaging of the right breast demonstrates  casting-type pleomorphic calcifications spanning over 15 cm of tissue in  the AP dimension in the right 6:00 to 7:00 region. Calcifications do  extend up to the base of the nipple. Findings are highly  suspicious for  DCIS.     Right breast ultrasound: Focused sonographic evaluation of the right  6:00 to 7:00 region demonstrates a single 0.5 cm irregular hypoechoic  mass associated with a coarse calcification located at 7:00, 3 cm from  the nipple. Ultrasound of the right axilla demonstrates no abnormal  appearing axillary lymph nodes.        IMPRESSION:  Findings are highly suspicious for extensive DCIS in the  right 6:00 to 7:00 region extending into the base of the nipple. There  is also small irregular mass noted on ultrasound in the 7:00 region  suspicious for invasion.        BI-RADS CATEGORY:  5, HIGHLY SUGGESTIVE OF MALIGNANCY        RECOMMENDATION:  Recommend stereotactic guided core biopsy of the  anterior and posterior aspect of the calcifications in the 6:00 to 7:00  region to determine extent of disease. Ultrasound-guided core biopsy of  the right 7:00 mass may also be warranted pending pathology results of  the calcifications.    Diagnostic bilateral mammogram 02-25-20  FINDINGS:  There are scattered areas of fibroglandular density.    1:  There are fine-linear branching calcifications measuring 16 x 7 x 7  centimeters with segmental distribution and associated post biopsy clip in the  right breast lower hemisphere at 6 to 7 o'clock.  There are 2 post biopsy clips  noted.  The calcifications extend to the inferior skin and nipple.    2:  There are amorphous calcifications measuring 0.3 centimeters in the left  breast lower hemisphere at 6 to 7 o'clock located 4 centimeters from the nipple.    IMPRESSION:  1:  Fine-linear branching calcifications in the right breast lower hemisphere at  6 to 7 o'clock are known biopsy-proven malignancy. Ultrasound is recommended for  staging. Recommend appropriate evaluation and treatment of this known malignancy.    2:  Amorphous calcifications in the left breast lower hemisphere at 6 to 7  o'clock located 4 centimeters from the nipple are suspicious.  Stereotactic  biopsy is recommended.    BI-RADS Category 4:  Suspicious Abnormality      US Chest 02-25-20  Findings:       Right Breast: The extent of calcified disease is best visualized by mammography. There are dilated ducts with internal calcifications vascularity extending toward the nipple at the 7 o'clock position. This is consistent with the patient's known biopsy-proven malignancy.      Right Charles Basins: No suspicious axillary (level I, II & III) or internal mammary lymphadenopathy is noted.      IMPRESSION:    1. Known RIGHT breast malignancy is best visualized by mammography. A MRI may be obtained for evaluation of disease.    2. No suspicious RIGHT-sided lymphadenopathy      ACR BI-RADS Category: 6. Known malignancy.  Recommend appropriate evaluation and treatment of the known malignancy.         R breast US 02-25-20  Findings:       Right Breast: The extent of calcified disease is best visualized by mammography. There are dilated ducts with internal calcifications vascularity extending toward the nipple at the 7 o'clock position. This is consistent with the patient's known biopsy-proven malignancy.      Right Charles Basins: No suspicious axillary (level I, II & III) or internal mammary lymphadenopathy is noted.      IMPRESSION:    1. Known RIGHT breast malignancy is best visualized by mammography. A MRI may be obtained for evaluation of disease.    2. No suspicious RIGHT-sided lymphadenopathy      ACR BI-RADS Category: 6. Known malignancy.  Recommend appropriate evaluation and treatment of the known malignancy.       MRI Brain w/wo contrast 02-27-20  FINDINGS:     No abnormal parenchymal or leptomeningeal enhancement is noted. The brain parenchyma is unremarkable except of small scattered T2 FLAIR hyperintensities consistent with chronic microangiopathic changes. No acute ischemia, intra or extra-axial hemorrhage, mass effect or midline shift. No hydrocephalus is noted.    The osseous structures and  extra-cranial soft tissues are unremarkable.    The orbital structures unremarkable.    The paranasal sinuses and mastoid air cells are clear.    IMPRESSION:  No evidence of metastatic disease.      CTCAP 02-28-20    Findings:       CHEST:  The breasts are ptotic and there are biopsy clips projecting between the lower quadrants of the right breast.    On image 99 of series 3, there is a nonspecific 1 cm soft tissue nodule in the lower outer quadrant of the right breast.    No lung nodules or pleural effusions are present.    No axillary, internal mammary, mediastinal or hilar adenopathy is seen.    No suspicious bone lesions are present.      ABDOMEN and PELVIS:  The liver is borderline enlarged and diffusely fatty infiltrated without measurable mass or biliary ductal dilatation.    The gallbladder appears normal.    The spleen, pancreas and adrenal glands appear normal.    The kidneys demonstrate function without hydronephrosis and there is a small low dense nidus in the mid right kidney, likely a cyst.    No adenopathy or ascites are seen in the abdomen or pelvis.    A normal-appearing retrocecal appendix is seen.    Multilevel degenerative-like bone changes are present.      IMPRESSION:  No evidence for metastatic disease the chest, abdomen or pelvis.      MRI Breast bilateral 03-02-20    Findings:  The breast composition is heterogeneous fibroglandular tissue. There is no significant early background parenchymal enhancement.    Right breast: There is linearly oriented nonmass enhancement involving the lateral and inferior aspects (anterior, middle, and posterior third) of the right breast 6 - 7 o'clock position extending 17 cm from the base of the nipple to the pectoralis muscle (series 5 image  of 256). There is no evidence for pectoralis muscle invasion. Extension to the inferior skin is better demonstrated mammographically. Susceptibility artifact from 2 postbiopsy clips is noted.    Left breast: There  is linearly oriented nonmass enhancement in the inferior aspect (middle third) of the left breast 6 o'clock position extending approximately 8 cm (series 5 image  of 256). There is no evidence for nipple, skin, or pectoralis muscle involvement. Stereotactic guided biopsy of calcifications at the 6 o'clock position to be performed later today and corresponds to the anterior aspect of the nonmass enhancement. MRI guided biopsy of nonmass enhancement seen on image 94 of 256, series 5 and image 40 of 160, series 8 is recommended (The images have been annotated with a [place]).    Charles Basins: There is no lymphadenopathy in the visualized axillary or internal mammary regions.    IMPRESSION:  Linear nonmass enhancement representing known malignancy right breast as above. Linear nonmass enhancement left breast as above.   MRI guided biopsy of nonmass enhancement seen on image 94 of 256, series 5 and image 40 of 160, series 8 is recommended.    ACR BI-RADS Category: 6. Recommend MR-guided biopsy of nonmass enhancement left breast as above .Recommend appropriate evaluation and treatment of the known malignancy.       Bone scan 03-02-20  FINDINGS:     Radiotracer uptake secondary to degenerative changes are seen involving the shoulders, hips, knees, and feet. Increased uptake along the right tibial shaft likely is likely reactive. Faint focus of activity within the left tibial shaft is compatible with prior injury/fracture. Increased uptake in the skull is compatible with a benign hyperostosis.    Physiologic uptake of the bilateral kidneys and bladder.    IMPRESSION:    No scintigraphic evidence of skeletal metastases.      Echo 03-02-20      Bilateral diagnostic mammogram 05-26-20  FINDINGS: There are scattered areas of fibroglandular density.     A postbiopsy marking clip is now noted in the left breast with expected  surrounding postbiopsy changes best visualized on MLO imaging. The  remaining bilateral  fibroglandular pattern is stable in appearance.  Redemonstrated are fine linear branching calcifications in the right  6:00 to 7:00 region spanning approximately 17 cm of tissue in the AP  dimension. There are 2 associated postbiopsy marking clips. No new areas  of calcification are seen in either breast.     IMPRESSION:  1. Benign left mammographic findings.        2. No significant change in the appearance of calcifications in the  right breast.        BI-RADS CATEGORY:  6, KNOWN BIOPSY PROVEN MALIGNANCY        RECOMMENDATION:  Recommend clinical follow-up.       Echo 07-22-20  · Normal left ventricular cavity size and wall thickness noted. All left ventricular wall segments contract normally.  · Estimated EF appears to be in the range of 61 - 65%  · The pericardium is normal. There is no evidence of pericardial effusion.      DEXA 09-25-20  FINDINGS: The BMD measured at the AP spine L1-L4 is 1.003 gm/cm2 with a  T-score of -1.5.      IMPRESSION:  The patient is considered to be osteopenic according to the  World Health Organization criteria. Fracture risk is moderate      MRI L spine wo contrast 09-28-20  FINDINGS:  Sagittal alignment is normal. Bone marrow signal intensity is normal. There is disc desiccation L1-2 through L3-4 with mild multiple level loss of intervertebral disc height. Endplate spondylosis and Schmorl's node formation most apparent at L1-2 and  L2-3. Conus medullaris terminates at L1-2 and is normal.     At L1-2, there is a mild concentric disc bulge and endplate spondylosis. There is no canal stenosis or foraminal impingement.     At L2-3, there is concentric disc bulging and mild facet hypertrophy but no canal or foraminal impingement.     At L3-4, is mild facet degenerative change left greater the right with ligamentum flavum thickening and a broad posterior disc bulge. There is no canal stenosis. There is mild inferior foraminal narrowing bilaterally.     L4-5, there is moderate facet  degenerative change bilaterally with ligamentum flavum thickening. Is mild bilateral foraminal narrowing. There is no canal stenosis.     At L5-S1, there is a broad posterior disc bulge. There is a tiny posterior annular fissure. There is no canal stenosis. There is a small focal left posterolateral protrusion into the left inferior foramen with mild to moderate left inferior foraminal  narrowing. There is mild bilateral facet degenerative change.     IMPRESSION:     1. There is no evidence for osseous metastatic disease.  2. There are lumbar degenerative changes without evidence for lumbar canal stenosis. Details above.      Echo 09-29-20  · The study is technically difficult for diagnosis.  · Normal left ventricular cavity size and wall thickness noted. All left ventricular wall segments contract normally.  · Left ventricular ejection fraction appears to be 61 - 65%.  · The aortic valve is structurally normal with no regurgitation or stenosis present.  · The mitral valve is structurally normal with no regurgitation or significant stenosis present.  · There is no evidence of pericardial effusion.       Echo 12-20-20  · Normal left ventricular cavity size and wall thickness noted. All left ventricular wall segments contract normally.  · Left ventricular ejection fraction appears to be 61 - 65%.  · The pericardium is normal. There is no evidence of pericardial effusion. .  · Comments: LV systolic function is about the same as on the previous echo study.      Echo 03-19-21  · Normal left ventricular cavity size and wall thickness noted. All left ventricular wall segments contract normally.  · Left ventricular ejection fraction appears to be 61 - 65%.  · There is no evidence of pericardial effusion.      Echo 06-01-21  · Normal left ventricular cavity size and wall thickness noted. All left ventricular wall segments contract normally.  · Left ventricular ejection fraction appears to be 66 - 70%.  · There is no evidence  of pericardial effusion. .        Left diagnostic mammogram 09-23-21  FINDINGS:   The breast is heterogeneously dense, which may obscure small masses.     1:  There is a post surgical scar with associated surgical clips in the left   breast.     2:  There are benign appearing calcifications with scattered distribution and   associated post reduction change in the left breast.     Tomosynthesis performed in CC and MLO projections.     IMPRESSION:   There is no mammographic evidence of malignancy.     Follow-up mammogram in 1 year is recommended.     BI-RADS Category 2:   Benign Finding(s)        DEXA 09-23-21  FULL RESULT:       Examination: Bone Mineral Density (DXA), 9/23/2021     Clinical History: 61-year-old postmenopausal female with breast cancer.     Indication: Assessment of bone mineral density.     Comparison: None.     Technique: Bone mineral density was obtained using Hologic dual-energy X-ray absorptiometry.     Findings: The findings are provided in the below table(s).       Bone Density:   ---------------------------------------------------------------------------   Region     Exam Date   BMD      T-        Z-                                            g/cm2  Score  Score   ---------------------------------------------------------------------------   AP Spine (L1-L4)                    09/23/2021  0.863   -1.7     -0.1       Femoral Neck (Left)                    09/23/2021  0.712   -1.2      0.1       Total Hip (Left)                    09/23/2021  0.814   -1.0      0.0       Femoral Neck (Right)                    09/23/2021  0.808   -0.4      1.0       Total Hip (Right)                    09/23/2021  0.840   -0.8      0.2       -----------------------------------------------------------------   \For postmenopausal women and men age 50 and over, the World Health   Organization criteria for BMD interpretation classify patients as: Normal   (T-score at or above -1.0), Osteopenia (T-score between -1.0  and     -2.5), or Osteoporosis (T-score at or below -2.5).     IMPRESSION:   Osteopenia based on measurements of the lumbar spine and left femoral neck.     Mammography Digital Diagnostic Left with Faisal (10/03/2022 14:08)  FINDINGS:   The breast is heterogeneously dense, which may obscure small masses.     1:  There is post reduction change in the left breast.     2:  There are surgical clips in the left breast.     Patient is status post right mastectomy for IDC in 2020.     Tomosynthesis performed in CC and MLO projections.    Impression:  There is no mammographic evidence of malignancy.     Follow-up mammogram in 1 year is recommended.     I personally reviewed these image(s) along with the resident's/fellow's   interpretations, certify that if a procedure was performed I was physically   present, and agree with the final report.     BI-RADS Category 2:   Benign Finding(s)        RECENT LABS:  Lab Results   Component Value Date    WBC 7.00 05/25/2023    HGB 13.4 05/25/2023    HCT 42.0 05/25/2023    MCV 95.0 05/25/2023    RDW 13.5 05/25/2023     05/25/2023    NEUTRORELPCT 68.0 05/25/2023    LYMPHORELPCT 25.0 05/25/2023    MONORELPCT 5.0 05/25/2023    EOSRELPCT 0.7 05/25/2023    BASORELPCT 0.9 05/25/2023    NEUTROABS 4.76 05/25/2023    LYMPHSABS 1.75 05/25/2023       Lab Results   Component Value Date     05/25/2023    K 3.8 05/25/2023    CO2 28.5 05/25/2023     05/25/2023    BUN 14 05/25/2023    CREATININE 0.93 05/25/2023    EGFRIFNONA 72 10/03/2022    EGFRIFAFRI 83 10/03/2022    GLUCOSE 161 (H) 05/25/2023    CALCIUM 9.5 05/25/2023    ALKPHOS 83 05/25/2023    AST 14 05/25/2023    ALT 22 05/25/2023    BILITOT 0.2 05/25/2023    ALBUMIN 4.2 05/25/2023    PROTEINTOT 6.5 05/25/2023    MG 2.4 03/11/2023       Lab Results   Component Value Date    TSH 1.870 05/25/2023    BKET59YL 40.1 03/11/2023       Lab Results   Component Value Date     15.1 03/11/2020     Lab Results   Component Value Date     LABCA2 19.0 03/11/2020       ASSESSMENT & PLAN:  Jackie Johnson is a very pleasant 63 y.o. female with what is clinically a Stage IIB ([id number]) poorly differentiated invasive ductal carcinoma of the right breast.  Tumor spanned 15-16 cm although it was difficult to tell how much of this was invasive malignancy v. DCIS.  There were no clinically or radiographically supspicious axillary LNs.  There was associated high grade DCIS.  Tumor was ER-,DE-, HER-2 +.  She had pathologic complete response with neoadjuvant therapy.  She also has LCIS/ALH involving the left breast.    1.  Right breast cancer:  -  Administered TCHP neoadjuvantly to downsize tumor and potentially allow for a more successful surgery. After 6 cycles of TCHP, she had a complete pathologic response at time of mastectomy.  -  We discussed consideration of radiation given the initial size of her tumor.  It really isn't known how much of her initial disease was invasive malignancy v. DCIS.  She says she was evaluated by a Radiation Oncologist at [facility] who didn't recommend radiation.  -  Given HER-2 positive disease, recommended we complete a year of HP which she tolerated well. Post-treatment echo with normal LVEF.    -  L mammogram done 10/3/22 at [facility] without cause for concern.  Repeat exam recommended after 1 year.    2. L Breast LCIS / ALH:  -  She is s/p lumpectomy.  -  Discussed role for 5 years of hormonal therapy in prevention of future breast cancer development in this breast.  She is post-menopausal, so could use Tamoxifen or an aromatase inhibitor for this purpose.  She has an entact uterus so Tamoxifen would come with increased risk (albeit low risk) of development of endometrial hypertrophy/ endometrial cancer.  Aromatase inhibitors would not come with this risk, but would come with risk for loss of bone mineral density and potentially increased risk of side effects.    -  DEXA showed osteopenia with T score -1.5, so  "recommended Tamoxifen for prevention.  Ms. Johnson was not comfortable with risk of endometrial hyperplasia / cancer so she said she would prefer aromatase inhibitor.  In particular she would like to take Femara so ordered this with plan for 5 years of treatment. She is tolerating Femara well without any significant side effects.  Will continue.  She had repeat DEXA at [facility] again 10/3/22 showing stable osteopenia with T score -1.7.  -  She had diagnostic L mammogram at [facility]10/[facility]/22  as above without cause for concern. Repeat exam recommended after 1 year.    - Exam and labs from today without cause for concern. Will continue Femara daily.     3.  Bone health:  -  Ms. Johnson has osteopenia.  Pre-treatment  T score -1.5.  She also has a h/o Vit D Deficiency for which she took a course of weekly high dose Vit D (now completed). Repeat Vit D Level normal. Continue Ca/Vit D BID.  Recommended weight bearing exercise.  -  At [facility], they repeated DEXA after 1 year (9-23-21) and T score was -1.7.  They repeated again after 1 year with T score -1.7.    -  Vit D level drawn today and is pending.    4.  Unusual sense of \"vibration\" and family h/o HSP (with SPG7 mutation):   - Had some evaluation at [facility] with imaging, EEG which was unremarkable.  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 This is not a likely cause for the symptoms she describes.  She more recently followed up with Neurology at [facility] in September 2021.  They told her they didn't feel any further neurologic evaluation would be helpful and suggested she see someone in a different specialty such as an endocrinologist.  She saw a neurogeneticist at [facility] (as this is where her daughter goes) and was tested and was told she didn't have what her daughter has..  I'm not sure what is causing " her symptoms. Again told her that I don't feel this is related to her cancer or its treatment.  Happily whatever is causing it, her symptoms seem to be improving.      5.  Very strong family history of malignancy including several members with breast cancer and a sister with ovarian cancer.  -  She underwent genetic testing at [facility] and testing was negative.      6.  Prophylaxis:  She has had 2022 flu vaccine as well as Prevnar 13. She has received COVID vaccine x 3 (Moderna). She received COVID bivalent booster.    7.  Follow-up:   - Continue Femara  - Continue Ca/Vit D.  - L diagnostic mammogram due in October 2023 per MD Lon  - With MD in 6 months with CBCD, CMP, TSH and Vitamin D.      ACO / MARNI/Other  Quality measures  -  Jackie Johnson received 2022 flu vaccine.  -  Jackie Johnson reports a pain score of 0.    -  Current outpatient and discharge medications have been reconciled for the patient.  Reviewed by: SHER Zaman       I spent 30 minutes with Jackie Johnson today.  In the office today, more than 50% of this time was spent face-to-face with her  in counseling / coordination of care, reviewing her interim medical history and counseling on the current treatment plan.  All questions were answered to her satisfaction.           Electronically Signed by: SHER Zaman    CC:   MD JUAN PABLO Macdonald MD Bora Lim, MD- [facility] Medical Oncology  Kadeem Serrano MD- [facility] Surgical Oncology  Dr. Roc Huston - [facility] Plastic Surgery

## 2023-05-25 NOTE — PROGRESS NOTES
Venipuncture Blood Specimen Collection  Venipuncture performed in left arm by Kelly Armstrong MA with good hemostasis. Patient tolerated the procedure well without complications.   05/25/23   Kelly Armstrong MA

## 2023-05-26 LAB — 25(OH)D3 SERPL-MCNC: 36.2 NG/ML (ref 30–100)

## 2023-06-02 ENCOUNTER — OFFICE VISIT (OUTPATIENT)
Dept: FAMILY MEDICINE CLINIC | Facility: CLINIC | Age: 63
End: 2023-06-02
Payer: COMMERCIAL

## 2023-06-02 VITALS
HEART RATE: 67 BPM | TEMPERATURE: 98.2 F | HEIGHT: 64 IN | BODY MASS INDEX: 28.38 KG/M2 | WEIGHT: 166.2 LBS | OXYGEN SATURATION: 96 % | SYSTOLIC BLOOD PRESSURE: 118 MMHG | DIASTOLIC BLOOD PRESSURE: 80 MMHG

## 2023-06-02 DIAGNOSIS — H92.03 OTALGIA OF BOTH EARS: Primary | ICD-10-CM

## 2023-06-02 PROCEDURE — 99213 OFFICE O/P EST LOW 20 MIN: CPT | Performed by: FAMILY MEDICINE

## 2023-06-05 NOTE — PROGRESS NOTES
Subjective   Jackie Johnson is a 63 y.o. female.   Pt presents today with CC of Earache      History of Present Illness   History of Present Illness  Patient is a 63-year-old female reports 1 week of waxing and waning ear pain bilaterally.  She is can be going out of town soon and is concerned she has an ear infection.  She denies any fevers, postnasal drip, or cough.     The following portions of the patient's history were reviewed and updated as appropriate: allergies, current medications, past family history, past medical history, past social history, past surgical history, and problem list.    Review of Systems   Constitutional:  Negative for chills, fever and unexpected weight loss.   HENT:  Positive for ear pain. Negative for congestion and sore throat.    Eyes:  Negative for blurred vision and visual disturbance.   Respiratory:  Negative for cough and wheezing.    Cardiovascular:  Negative for chest pain and palpitations.   Gastrointestinal:  Negative for abdominal pain and diarrhea.   Endocrine: Negative for cold intolerance and heat intolerance.   Genitourinary:  Negative for dysuria.   Musculoskeletal:  Negative for arthralgias and neck stiffness.   Neurological:  Negative for dizziness, seizures and syncope.   Psychiatric/Behavioral:  Negative for self-injury, suicidal ideas and depressed mood.      Objective   Physical Exam  Vitals and nursing note reviewed.   Constitutional:       Appearance: She is well-developed.   HENT:      Head: Normocephalic and atraumatic.      Right Ear: External ear normal.      Left Ear: External ear normal.      Nose: Nose normal.   Eyes:      Conjunctiva/sclera: Conjunctivae normal.      Pupils: Pupils are equal, round, and reactive to light.   Cardiovascular:      Rate and Rhythm: Normal rate and regular rhythm.      Heart sounds: Normal heart sounds.   Pulmonary:      Effort: Pulmonary effort is normal.      Breath sounds: Normal breath sounds.   Abdominal:       General: Bowel sounds are normal.      Palpations: Abdomen is soft.   Musculoskeletal:      Cervical back: Normal range of motion and neck supple.   Skin:     General: Skin is warm and dry.   Neurological:      Mental Status: She is alert and oriented to person, place, and time.   Psychiatric:         Behavior: Behavior normal.         Assessment & Plan   Diagnoses and all orders for this visit:    1. Otalgia of both ears (Primary)    Exam was normal.  Reassurance given.           BMI is >= 25 and <30. (Overweight) The following options were offered after discussion;: exercise counseling/recommendations and nutrition counseling/recommendations          This document has been electronically signed by Lizandro Ramirez DO  June 5, 2023 18:17 EDT    Dictated Utilizing Dragon Dictation: Part of this note may be an electronic transcription/translation of spoken language to printed text using the Dragon Dictation System.

## 2023-06-06 ENCOUNTER — TELEPHONE (OUTPATIENT)
Dept: ORTHOPEDICS | Facility: OTHER | Age: 63
End: 2023-06-06
Payer: COMMERCIAL

## 2023-08-02 RX ORDER — CYANOCOBALAMIN 1000 UG/ML
1000 INJECTION, SOLUTION INTRAMUSCULAR; SUBCUTANEOUS
Qty: 30 ML | Refills: 1 | Status: SHIPPED | OUTPATIENT
Start: 2023-08-02

## 2023-09-07 DIAGNOSIS — E78.2 MIXED HYPERLIPIDEMIA: ICD-10-CM

## 2023-09-08 RX ORDER — PRAVASTATIN SODIUM 20 MG
20 TABLET ORAL DAILY
Qty: 90 TABLET | Refills: 0 | Status: SHIPPED | OUTPATIENT
Start: 2023-09-08

## 2023-09-12 ENCOUNTER — TELEPHONE (OUTPATIENT)
Dept: ONCOLOGY | Facility: CLINIC | Age: 63
End: 2023-09-12

## 2023-09-12 NOTE — TELEPHONE ENCOUNTER
Caller: Jackie Johnson    Relationship to patient: Self    Best call back number: [phone]    Chief complaint: HAS ANOTHER APPT THAT DAY    Type of visit: F/U 1     Requested date: 9/12/2023 OR 9/15/2023 4 CALL TO R/S     If rescheduling, when is the original appointment:9/14/2023

## 2023-09-13 ENCOUNTER — OFFICE VISIT (OUTPATIENT)
Dept: ONCOLOGY | Facility: CLINIC | Age: 63
End: 2023-09-13
Payer: COMMERCIAL

## 2023-09-13 VITALS
TEMPERATURE: 97.8 F | SYSTOLIC BLOOD PRESSURE: 134 MMHG | DIASTOLIC BLOOD PRESSURE: 82 MMHG | HEART RATE: 76 BPM | RESPIRATION RATE: 18 BRPM

## 2023-09-13 DIAGNOSIS — Z17.1 MALIGNANT NEOPLASM OF RIGHT BREAST IN FEMALE, ESTROGEN RECEPTOR NEGATIVE, UNSPECIFIED SITE OF BREAST: ICD-10-CM

## 2023-09-13 DIAGNOSIS — N60.92 ATYPICAL LOBULAR HYPERPLASIA (ALH) OF LEFT BREAST: ICD-10-CM

## 2023-09-13 DIAGNOSIS — C50.911 MALIGNANT NEOPLASM OF RIGHT BREAST IN FEMALE, ESTROGEN RECEPTOR NEGATIVE, UNSPECIFIED SITE OF BREAST: ICD-10-CM

## 2023-09-13 DIAGNOSIS — R52 PAIN: ICD-10-CM

## 2023-09-13 DIAGNOSIS — R60.9 SWELLING: Primary | ICD-10-CM

## 2023-09-13 PROCEDURE — 99213 OFFICE O/P EST LOW 20 MIN: CPT | Performed by: NURSE PRACTITIONER

## 2023-09-13 NOTE — PROGRESS NOTES
"DATE:  2023    DIAGNOSIS: Breast Cancer    CHIEF COMPLAINT:  \"Lump\" left axilla; \"Lump\" behind left knee    Interval History:  Ms. Carrillo follows with Dr. Obrien for breast cancer and is being seen today for an acute visit.  She reports that 2 to 3 weeks ago she developed a lump under her left axilla and a lump behind her left knee.  She denies any pain, tenderness or redness.  No fever or chills.  She reports that approximately 1 year ago she had a left meniscus tear.  She reports up until today she was having swelling in the left lower extremity and it was warm to touch.  At present, she has mild swelling without redness or any pain.  She is scheduled for upcoming follow-up at [facility] and they are planning to do an ultrasound of the left breast the first week of October.  She denies any other specific complaints today.      The following portions of the patient's history were reviewed and updated as appropriate: allergies, current medications, past family history, past medical history, past social history, past surgical history and problem list.    PAST MEDICAL HISTORY:  Past Medical History:   Diagnosis Date    Acid reflux     Asthma     Breast cancer 2020    rt br ca    Cancer     Eczema     Elevated cholesterol     Endometriosis     GERD (gastroesophageal reflux disease)     Hiatal hernia     High cholesterol     Seasonal allergies     Shaking     INTERMITTANT    Sinusitis     Staph infection 2020    port       PAST SURGICAL HISTORY:  Past Surgical History:   Procedure Laterality Date    BREAST BIOPSY Bilateral      SECTION      COLONOSCOPY N/A 2021    Procedure: COLONOSCOPY FOR SCREENING;  Surgeon: Keerthi Meneses MD;  Location: [facility];  Service: Gastroenterology;  Laterality: N/A;    D & C HYSTEROSCOPY ENDOMETRIAL ABLATION      DIAGNOSTIC LAPAROSCOPY      FRACTURE SURGERY Left     tib/fib    FULGURATION ENDOMETRIOSIS      LEG SURGERY      Left leg following fracture "    LYMPH NODE BIOPSY      MASTECTOMY Right     WITH LYMPH NODES REMOVAL    PORTACATH PLACEMENT N/A 3/18/2020    Procedure: INSERTION OF PORTACATH;  Surgeon: Dudley Aldridge MD;  [facility];  Service: General;  Laterality: N/A;    SKIN BIOPSY      TONSILLECTOMY      TRANSESOPHAGEAL ECHOCARDIOGRAM (CINTIA)      VENOUS ACCESS DEVICE (PORT) INSERTION N/A 5/27/2020    Procedure: INSERTION VENOUS ACCESS DEVICE;  Surgeon: Nerissa Wang MD;  Location:  [facility];  Service: General;  Laterality: N/A;    VENOUS ACCESS DEVICE (PORT) INSERTION AND REMOVAL      VENOUS ACCESS DEVICE (PORT) REMOVAL Right 4/19/2020    Procedure: REMOVAL VENOUS ACCESS DEVICE;  Surgeon: Nerissa Wang MD;  Location:  [facility];  Service: General;  Laterality: Right;       SOCIAL HISTORY:  Social History     Socioeconomic History    Marital status:    Tobacco Use    Smoking status: Never    Smokeless tobacco: Never   Vaping Use    Vaping Use: Never used   Substance and Sexual Activity    Alcohol use: Never    Drug use: Never    Sexual activity: Defer     Partners: Male     Birth control/protection: None                FAMILY HISTORY:  Family History   Problem Relation Age of Onset    Breast cancer Mother 75    Basal cell carcinoma Mother 90        2 small spots on face removed + fair complexion    Heart disease Mother     Hypertension Mother     Diabetes type II Mother     Breast cancer Sister 55        VUS detected on genetic testing CDKN2A gene (c.-33G>C).     Lung disease Sister     Heart disease Father     Throat cancer Father 79    Lung cancer Father 79    Dementia Father     Parkinsonism Father     Cancer Maternal Grandmother 65        unknown gynecologic cancer, cervical vs uterine    Heart disease Maternal Grandmother     Diabetes type II Maternal Grandmother     Lymphoma Maternal Grandfather 64    Breast cancer Paternal Grandmother 70    Prostate cancer Paternal Grandfather 70        metastastic to liver    Breast  "cancer Paternal Aunt 77    Breast cancer Maternal Aunt 80    Breast cancer Other     Ovarian cancer Sister 66        Elevated ; 1A tumor; Borderline cancer; both ovaries & lg cyst removed    Ovarian cancer Maternal Cousin 54    Breast cancer Other 50    Diabetes type I Nephew          MEDICATIONS:  The current medication list was reviewed in the EMR    Current Outpatient Medications:     calcium carbonate-vitamin d 600-400 MG-UNIT per tablet, Take 2 tablets by mouth Daily., Disp: , Rfl:     cetirizine (zyrTEC) 10 MG tablet, Take 1 tablet by mouth Daily., Disp: 30 tablet, Rfl: 2    cyanocobalamin 1000 MCG/ML injection, Inject 1 mL into the appropriate muscle as directed by prescriber Every 7 (Seven) Days., Disp: 30 mL, Rfl: 1    fluticasone (FLONASE) 50 MCG/ACT nasal spray, SPRAY TWICE IN EACH NOSTRIL EVERY DAY, Disp: 16 g, Rfl: 2    letrozole (FEMARA) 2.5 MG tablet, Take 1 tablet by mouth Daily., Disp: 30 tablet, Rfl: 11    levalbuterol (XOPENEX HFA) 45 MCG/ACT inhaler, Inhale 1-2 puffs Every 4 (Four) Hours As Needed for Wheezing., Disp: 15 g, Rfl: 2    multivitamin with minerals tablet tablet, Take 1 tablet by mouth Daily., Disp: , Rfl:     pravastatin (Pravachol) 20 MG tablet, Take 1 tablet by mouth Daily., Disp: 90 tablet, Rfl: 0    Syringe/Needle, Disp, 25G X 5/8\" 1 ML misc, 1 application Every 14 (Fourteen) Days., Disp: 50 each, Rfl: 0    ubrogepant (UBRELVY) 100 MG tablet, Take 1 tablet by mouth 1 (One) Time As Needed (headache). Can take a second one an hour after if headache persists., Disp: 30 tablet, Rfl: 2    Current Facility-Administered Medications:     cyanocobalamin injection 1,000 mcg, 1,000 mcg, Intramuscular, Q28 Days, Concepcion Queen APRN, 1,000 mcg at 11/24/21 1621    ALLERGIES:    Allergies   Allergen Reactions    Albuterol Other (See Comments)     One time severe headache, questionable for aneurysm, did spinal tap was positive, did Angiogram and MRI were Negative for aneurysm    Penicillins " Hives    Clindamycin Hcl Rash    Crestor [Rosuvastatin Calcium] Other (See Comments)     Caused high liver enxymes    Sulfa Antibiotics Other (See Comments) and Swelling     Mucosal lesions    Triprolidine-Pseudoephedrine Swelling    Latex Rash         REVIEW OF SYSTEMS:    A comprehensive 14 point review of systems was performed.  Significant findings as mentioned above.  All other systems reviewed and are negative.        Physical Exam   Vital Signs:   Vitals:    09/13/23 1611   BP: 134/82   Pulse: 76   Resp: 18   Temp: 97.8 °F (36.6 °C)         General: Well developed, well nourished, alert and oriented x 3, in no acute distress.   Head: ATNC   Eyes: PERRL, No evidence of conjunctivitis.   Nose: No nasal discharge.   Mouth: Oral mucosal membranes moist. No oral ulceration or hemorrhages.   Neck: Neck supple. No thyromegaly. No JVD.   Lungs: Clear in all fields to A&P without rales, rhonchi or wheezing.   Heart: S1, S2. Regular rate and rhythm. No murmurs, rubs, or gallops.   Breast: S/P right mastectomy. Surgical scars smooth and intact. S/P left lumpectomy/mastopexy. Surgical scars smooth and intact. No palpable masses on either side. No axillary adenopathy either side. Superficial papule 5 mm in size. No redness/drainage.  Abdomen: Soft. Bowel sounds are normoactive. Nontender with palpation.   Extremities: No clubbing or cyanosis. Trace edema left lower extremity. ? Baker's cyst left posterior knee.  Integumentary: No rash, sores, erythema or nodules. No blistering, bruising, or dry skin.   Neurologic: Grossly non-focal exam.    Pain Score:  Pain Score    09/13/23 1611   PainSc: 3  Comment: Right Upper Abdomen           RECENT LABS:  Lab Results   Component Value Date    WBC 7.00 05/25/2023    HGB 13.4 05/25/2023    HCT 42.0 05/25/2023    MCV 95.0 05/25/2023    RDW 13.5 05/25/2023     05/25/2023    NEUTRORELPCT 68.0 05/25/2023    LYMPHORELPCT 25.0 05/25/2023    MONORELPCT 5.0 05/25/2023    EOSRELPCT 0.7  05/25/2023    BASORELPCT 0.9 05/25/2023    NEUTROABS 4.76 05/25/2023    LYMPHSABS 1.75 05/25/2023       Lab Results   Component Value Date     05/25/2023    K 3.8 05/25/2023    CO2 28.5 05/25/2023     05/25/2023    BUN 14 05/25/2023    CREATININE 0.93 05/25/2023    EGFRIFNONA 72 10/03/2022    EGFRIFAFRI 83 10/03/2022    GLUCOSE 161 (H) 05/25/2023    CALCIUM 9.5 05/25/2023    ALKPHOS 83 05/25/2023    AST 14 05/25/2023    ALT 22 05/25/2023    BILITOT 0.2 05/25/2023    ALBUMIN 4.2 05/25/2023    PROTEINTOT 6.5 05/25/2023    MG 2.4 03/11/2023           Lab Results   Component Value Date    FERRITIN 125 08/31/2020    IRON 77 07/19/2021    TIBC 331 07/19/2021    LABIRON 23 07/19/2021    KBMYBPZZ96 1,399 (H) 03/11/2023    FOLATE 15.1 07/19/2021    HAPTOGLOBIN 142 08/31/2020      ASSESSMENT & PLAN:  Jackie Johnson is a very pleasant 63 y.o. female with    1. Breast Cancer  - Please see most recent follow up note from 05/05/2023 for full details regarding oncology history. She is being seen today for an acute visit. She will follow up with [facility] previously planned.    2. Left Axillary Papule  - 5 mm in size, no redness/drainage. No fever/chills.   - She is scheduled with [facility] the first week of October and she has discussed these findings with them and is scheduled for ultrasound at that time.   - Follow up as planned above.    3. ? Baker's Cyst  - Left posterior knee. She reports prior to today she is has been having pain and swelling of left lower extremity. At present, she has mild trace edema and denies any pain. She reports left meniscus tear one year ago.   - Will obtain ultrasound of left lower extremity to further evaluate.             A total of 25 minutes were spent coordinating this patient’s care in clinic today; more than 50% of this time was face-to-face with the patient, reviewing her interim medical history and counseling on the current treatment and followup plan. All questions were  answered to her satisfaction.          Electronically Signed by: SHER Kim , SHER September 14, 2023 10:32 EDT       CC:   No ref. provider found  Scarlet Loera MD

## 2023-09-20 ENCOUNTER — OFFICE VISIT (OUTPATIENT)
Dept: FAMILY MEDICINE CLINIC | Facility: CLINIC | Age: 63
End: 2023-09-20
Payer: COMMERCIAL

## 2023-09-20 DIAGNOSIS — R11.0 NAUSEA: ICD-10-CM

## 2023-09-20 DIAGNOSIS — R50.9 FEVER, UNSPECIFIED FEVER CAUSE: ICD-10-CM

## 2023-09-20 DIAGNOSIS — U07.1 COVID-19 VIRUS DETECTED: Primary | ICD-10-CM

## 2023-09-20 DIAGNOSIS — R51.9 NONINTRACTABLE HEADACHE, UNSPECIFIED CHRONICITY PATTERN, UNSPECIFIED HEADACHE TYPE: ICD-10-CM

## 2023-09-20 DIAGNOSIS — J02.9 SORE THROAT: ICD-10-CM

## 2023-09-20 LAB
EXPIRATION DATE: ABNORMAL
EXPIRATION DATE: NORMAL
FLUAV AG UPPER RESP QL IA.RAPID: NOT DETECTED
FLUBV AG UPPER RESP QL IA.RAPID: NOT DETECTED
INTERNAL CONTROL: ABNORMAL
INTERNAL CONTROL: NORMAL
Lab: ABNORMAL
Lab: NORMAL
S PYO AG THROAT QL: NEGATIVE
SARS-COV-2 AG UPPER RESP QL IA.RAPID: DETECTED

## 2023-09-20 PROCEDURE — 99213 OFFICE O/P EST LOW 20 MIN: CPT | Performed by: FAMILY MEDICINE

## 2023-09-20 PROCEDURE — 87428 SARSCOV & INF VIR A&B AG IA: CPT | Performed by: FAMILY MEDICINE

## 2023-09-20 RX ORDER — GUAIFENESIN 600 MG/1
1200 TABLET, EXTENDED RELEASE ORAL 2 TIMES DAILY
Qty: 120 TABLET | Refills: 0 | Status: SHIPPED | OUTPATIENT
Start: 2023-09-20

## 2023-09-22 ENCOUNTER — TELEPHONE (OUTPATIENT)
Dept: FAMILY MEDICINE CLINIC | Facility: CLINIC | Age: 63
End: 2023-09-22
Payer: COMMERCIAL

## 2023-09-22 NOTE — TELEPHONE ENCOUNTER
PATIENT HAS COVID.  SHE HAS QUESTIONS ABOUT QUARANTINE AND PROTOCOLS.      PLEASE CALL [phone]

## 2023-09-22 NOTE — TELEPHONE ENCOUNTER
What's the question? If she is still having symptoms today, she should be isolating for the full 10 days from the date that the symptoms began.

## 2023-09-26 ENCOUNTER — TELEPHONE (OUTPATIENT)
Dept: FAMILY MEDICINE CLINIC | Facility: CLINIC | Age: 63
End: 2023-09-26
Payer: COMMERCIAL

## 2023-09-26 NOTE — TELEPHONE ENCOUNTER
Caller: Jackie Johnson    Relationship: Self    Best call back number: [phone]     What was the call regarding:  PATIENT STATED THAT SHE IS CURRENTLY COVID POSITIVE AT THIS TIME AND WOULD LIKE A CALL BACK TO BE INFORMED WHERE SHE SHOULD GET TESTED FOR COVID ONCE HER SYMPTOMS ARE NO LONGER SHOWING. PATIENT STATED THAT SHE WAS INFORMED BY SHANTI SWAIN MD THAT THE HOME COVID TEST MY NOT BE ABLE TO DETECT THE NEW STRAINED OF COVID AND PATIENT WOULD LIKE TO BE INFORMED IF SHE SHOULD TEST A THE OFFICE OR A WALK IN CLINIC TO BE CLEARED FROM COVID

## 2023-09-26 NOTE — TELEPHONE ENCOUNTER
No test needed. Let her know that once she reaches 10 days from the start of her symptoms, she is deemed non-contagious even though she still might have lingering symptoms. The COVID test may be positive until 90 days out still so it's not worth it to retest.

## 2023-10-02 ENCOUNTER — HOSPITAL ENCOUNTER (OUTPATIENT)
Dept: CARDIOLOGY | Facility: HOSPITAL | Age: 63
Discharge: HOME | End: 2023-10-02
Admitting: NURSE PRACTITIONER
Payer: COMMERCIAL

## 2023-10-02 DIAGNOSIS — R60.9 SWELLING: ICD-10-CM

## 2023-10-02 DIAGNOSIS — R52 PAIN: ICD-10-CM

## 2023-10-02 PROCEDURE — 93971 EXTREMITY STUDY: CPT

## 2023-10-03 ENCOUNTER — OFFICE VISIT (OUTPATIENT)
Dept: FAMILY MEDICINE CLINIC | Facility: CLINIC | Age: 63
End: 2023-10-03
Payer: COMMERCIAL

## 2023-10-03 VITALS
HEIGHT: 64 IN | HEART RATE: 79 BPM | TEMPERATURE: 97.1 F | SYSTOLIC BLOOD PRESSURE: 122 MMHG | DIASTOLIC BLOOD PRESSURE: 84 MMHG | WEIGHT: 162.8 LBS | BODY MASS INDEX: 27.79 KG/M2 | OXYGEN SATURATION: 97 %

## 2023-10-03 DIAGNOSIS — E78.2 MIXED HYPERLIPIDEMIA: Primary | ICD-10-CM

## 2023-10-03 DIAGNOSIS — U09.9 POST COVID-19 CONDITION, UNSPECIFIED: ICD-10-CM

## 2023-10-03 DIAGNOSIS — J30.89 SEASONAL ALLERGIC RHINITIS DUE TO OTHER ALLERGIC TRIGGER: ICD-10-CM

## 2023-10-03 PROCEDURE — 99214 OFFICE O/P EST MOD 30 MIN: CPT | Performed by: FAMILY MEDICINE

## 2023-10-03 NOTE — PROGRESS NOTES
"Jackie Johnson     VITALS: Blood pressure 122/84, pulse 79, temperature 97.1 °F (36.2 °C), temperature source Temporal, height 162.6 cm (64.02\"), weight 73.8 kg (162 lb 12.8 oz), SpO2 97 %, not currently breastfeeding.    Subjective  Chief Complaint  URI, Nasal drainage, and Nausea    Subjective          History of Present Illness:  The patient is a 63-year-old female with medical conditions for meniscus tear, hyperlipidemia, and allergic rhinitis who presents to clinic for medical follow-up.  Patient was recently diagnosed with COVID several weeks ago.  She continues to have some congestion and nasal drainage.  No fevers or chills.  She is doing well otherwise.    No complaints about any of the medications.    The following portions of the patient's history were reviewed and updated as appropriate: allergies, current medications, past family history, past medical history, past social history, past surgical history and problem list.    Past Medical History  Past Medical History:   Diagnosis Date    Acid reflux     Asthma     Breast cancer 2020    rt br ca    Cancer     Eczema     Elevated cholesterol     Endometriosis     GERD (gastroesophageal reflux disease)     Hiatal hernia     High cholesterol     Seasonal allergies     Shaking     INTERMITTANT    Sinusitis     Staph infection 2020    port       Surgical History  Past Surgical History:   Procedure Laterality Date    BREAST BIOPSY Bilateral      SECTION      COLONOSCOPY N/A 2021    Procedure: COLONOSCOPY FOR SCREENING;  Surgeon: Keerthi Meneses MD;  Location: [facility];  Service: Gastroenterology;  Laterality: N/A;    D & C HYSTEROSCOPY ENDOMETRIAL ABLATION      DIAGNOSTIC LAPAROSCOPY      FRACTURE SURGERY Left     tib/fib    FULGURATION ENDOMETRIOSIS      LEG SURGERY      Left leg following fracture    LYMPH NODE BIOPSY      MASTECTOMY Right     WITH LYMPH NODES REMOVAL    PORTACATH PLACEMENT N/A 3/18/2020    Procedure: " INSERTION OF PORTACATH;  Surgeon: Dudley Aldridge MD;  Location:  [facility];  Service: General;  Laterality: N/A;    SKIN BIOPSY      TONSILLECTOMY      TRANSESOPHAGEAL ECHOCARDIOGRAM (CINTIA)      VENOUS ACCESS DEVICE (PORT) INSERTION N/A 5/27/2020    Procedure: INSERTION VENOUS ACCESS DEVICE;  Surgeon: Nerissa Wang MD;  Location:  [facility];  Service: General;  Laterality: N/A;    VENOUS ACCESS DEVICE (PORT) INSERTION AND REMOVAL      VENOUS ACCESS DEVICE (PORT) REMOVAL Right 4/19/2020    Procedure: REMOVAL VENOUS ACCESS DEVICE;  Surgeon: Nerissa Wang MD;  Location:  [facility];  Service: General;  Laterality: Right;       Family History  Family History   Problem Relation Age of Onset    Breast cancer Mother 75    Basal cell carcinoma Mother 90        2 small spots on face removed + fair complexion    Heart disease Mother     Hypertension Mother     Diabetes type II Mother     Breast cancer Sister 55        VUS detected on genetic testing CDKN2A gene (c.-33G>C).     Lung disease Sister     Heart disease Father     Throat cancer Father 79    Lung cancer Father 79    Dementia Father     Parkinsonism Father     Cancer Maternal Grandmother 65        unknown gynecologic cancer, cervical vs uterine    Heart disease Maternal Grandmother     Diabetes type II Maternal Grandmother     Lymphoma Maternal Grandfather 64    Breast cancer Paternal Grandmother 70    Prostate cancer Paternal Grandfather 70        metastastic to liver    Breast cancer Paternal Aunt 77    Breast cancer Maternal Aunt 80    Breast cancer Other     Ovarian cancer Sister 66        Elevated ; 1A tumor; Borderline cancer; both ovaries & lg cyst removed    Ovarian cancer Maternal Cousin 54    Breast cancer Other 50    Diabetes type I Nephew        Social History  Social History     Socioeconomic History    Marital status:    Tobacco Use    Smoking status: Never    Smokeless tobacco: Never   Vaping Use    Vaping Use: Never used  "  Substance and Sexual Activity    Alcohol use: Never    Drug use: Never    Sexual activity: Defer     Partners: Male     Birth control/protection: None       Objective   Vital Signs:   /84 (BP Location: Right arm, Patient Position: Sitting, Cuff Size: Adult)   Pulse 79   Temp 97.1 °F (36.2 °C) (Temporal)   Ht 162.6 cm (64.02\")   Wt 73.8 kg (162 lb 12.8 oz)   SpO2 97%   BMI 27.93 kg/m²     Physical Exam     Gen: Patient in NAD. Pleasant and answers appropriately. A&Ox3.    Skin: Warm and dry with normal turgor. No purpura, rashes, or unusual pigmentation noted. Hair is normal in appearance and distribution.    HEENT: NC/AT. No lesions noted. Conjunctiva clear, sclera nonicteric. PERRL. EOMI without nystagmus or strabismus. Fundi appear benign. No hemorrhages or exudates of eyes. Auditory canals are patent bilaterally without lesions. TMs intact,  nonerythematous, nonbulging without lesions. Nasal mucosa pink, nonerythematous, and nonedematous. Frontal and maxillary sinuses are nontender. O/P erythematous and moist without exudate.    Neck: Supple without lymph nodes palpated. FROM.     Lungs: CTA B/L without rales, rhonchi, crackles, or wheezes.    Heart: RRR. S1 and S2 normal. No S3 or S4. No MRGT.    Abd: Soft, nontender,nondistended. (+)BSx4 quadrants.     Extrem: No CCE. Radial pulses 2+/4 and equal B/L. FROMx4. No bone, joint, or muscle tenderness noted.    Neuro: No focal motor/sensory deficits.    Procedures    Result Review :   The following data was reviewed by: Scarlet Loera MD on 10/03/2023:                Assessment and Plan    Jackie Johnson is a 63 y.o. here for medical followup.    Diagnoses and all orders for this visit:    1. Mixed hyperlipidemia (Primary)  -     Lipid Panel; Future    2. Post covid-19 condition, unspecified  Continue to monitor.    3. Seasonal allergic rhinitis due to other allergic trigger  Patient to utilize saline rinse.        Problem List Items Addressed " This Visit    None  Visit Diagnoses       Mixed hyperlipidemia    -  Primary    Relevant Orders    Lipid Panel                      I spent 32 minutes caring for Jackie on this date of service. This time includes time spent by me in the following activities:performing a medically appropriate examination and/or evaluation  and counseling and educating the patient/family/caregiver  Follow Up   Return in about 4 weeks (around 10/31/2023).  Findings and plans discussed with patient who verbalizes understanding and agreement. Will followup with patient once results are in. Patient was given instructions and counseling regarding her condition or for health maintenance advice. Please see specific information pulled into the AVS if appropriate.       Scarlet Loera MD    Transcribed from ambient dictation for Scarlet Loera MD by Nerissa Murphy.  10/03/23   11:22 EDT    Patient or patient representative verbalized consent to the visit recording.  I have personally performed the services described in this document as transcribed by the above individual, and it is both accurate and complete.

## 2023-10-03 NOTE — LETTER
October 3, 2023    Jackie Johnson  [address] [place] [address]  :60    To whom it may concern:    Ms. Jackie Johnson is a patient here at our clinic. She was diagnosed with COVID 23. She was treated with paxlovid. On Thursday, 10/5/23, it will be 15 days since her positive COVID test. Thank you for your understanding. If you have any questions, please do not hesitate to call.          Sincerely,                    Scarlet Loera MD

## 2023-10-09 DIAGNOSIS — J30.89 SEASONAL ALLERGIC RHINITIS DUE TO OTHER ALLERGIC TRIGGER: ICD-10-CM

## 2023-10-09 NOTE — PROGRESS NOTES
Jackie Johnson     VITALS: not currently breastfeeding.    Subjective  Chief Complaint  Fever, Nausea (/), Headache, and Sore Throat (/)    Subjective          History of Present Illness:  Patient is a 63 y.o.  female with medical conditions significant for allergic rhinitis and hyperlipidemia who presents to clinic secondary to medical followup.  She is not feeling well today.  She has been having a 2-day history of fever, nausea, headache, sore throat.    No complaints about any of the medications.    The following portions of the patient's history were reviewed and updated as appropriate: allergies, current medications, past family history, past medical history, past social history, past surgical history and problem list.    Past Medical History  Past Medical History:   Diagnosis Date    Acid reflux     Asthma     Breast cancer 2020    rt br ca    Cancer     Eczema     Elevated cholesterol     Endometriosis     GERD (gastroesophageal reflux disease)     Hiatal hernia     High cholesterol     Seasonal allergies     Shaking     INTERMITTANT    Sinusitis     Staph infection 2020    port       Surgical History  Past Surgical History:   Procedure Laterality Date    BREAST BIOPSY Bilateral      SECTION      COLONOSCOPY N/A 2021    Procedure: COLONOSCOPY FOR SCREENING;  Surgeon: Keerthi Meneses MD;  Location: [facility];  Service: Gastroenterology;  Laterality: N/A;    D & C HYSTEROSCOPY ENDOMETRIAL ABLATION      DIAGNOSTIC LAPAROSCOPY      FRACTURE SURGERY Left     tib/fib    FULGURATION ENDOMETRIOSIS      LEG SURGERY      Left leg following fracture    LYMPH NODE BIOPSY      MASTECTOMY Right     WITH LYMPH NODES REMOVAL    PORTACATH PLACEMENT N/A 3/18/2020    Procedure: INSERTION OF PORTACATH;  Surgeon: Dudley Aldridge MD;  Location: [facility];  Service: General;  Laterality: N/A;    SKIN BIOPSY      TONSILLECTOMY      TRANSESOPHAGEAL ECHOCARDIOGRAM (CINTIA)      VENOUS ACCESS  DEVICE (PORT) INSERTION N/A 5/27/2020    Procedure: INSERTION VENOUS ACCESS DEVICE;  Surgeon: Nerissa Wang MD;  Location:  [facility];  Service: General;  Laterality: N/A;    VENOUS ACCESS DEVICE (PORT) INSERTION AND REMOVAL      VENOUS ACCESS DEVICE (PORT) REMOVAL Right 4/19/2020    Procedure: REMOVAL VENOUS ACCESS DEVICE;  Surgeon: Nerissa Wang MD;  Location:  [facility];  Service: General;  Laterality: Right;       Family History  Family History   Problem Relation Age of Onset    Breast cancer Mother 75    Basal cell carcinoma Mother 90        2 small spots on face removed + fair complexion    Heart disease Mother     Hypertension Mother     Diabetes type II Mother     Breast cancer Sister 55        VUS detected on genetic testing CDKN2A gene (c.-33G>C).     Lung disease Sister     Heart disease Father     Throat cancer Father 79    Lung cancer Father 79    Dementia Father     Parkinsonism Father     Cancer Maternal Grandmother 65        unknown gynecologic cancer, cervical vs uterine    Heart disease Maternal Grandmother     Diabetes type II Maternal Grandmother     Lymphoma Maternal Grandfather 64    Breast cancer Paternal Grandmother 70    Prostate cancer Paternal Grandfather 70        metastastic to liver    Breast cancer Paternal Aunt 77    Breast cancer Maternal Aunt 80    Breast cancer Other     Ovarian cancer Sister 66        Elevated ; 1A tumor; Borderline cancer; both ovaries & lg cyst removed    Ovarian cancer Maternal Cousin 54    Breast cancer Other 50    Diabetes type I Nephew        Social History  Social History     Socioeconomic History    Marital status:    Tobacco Use    Smoking status: Never    Smokeless tobacco: Never   Vaping Use    Vaping Use: Never used   Substance and Sexual Activity    Alcohol use: Never    Drug use: Never    Sexual activity: Defer     Partners: Male     Birth control/protection: None       Objective   Vital Signs:   There were no vitals taken for  this visit.    Physical Exam     Gen: Patient in NAD. Pleasant and answers appropriately. A&Ox3.    Skin: Warm and dry with normal turgor. No purpura, rashes, or unusual pigmentation noted. Hair is normal in appearance and distribution.    HEENT: NC/AT. No lesions noted. Conjunctiva clear, sclera nonicteric. PERRL. EOMI without nystagmus or strabismus. Fundi appear benign. No hemorrhages or exudates of eyes. Auditory canals are patent bilaterally without lesions. TMs intact,  nonerythematous, nonbulging without lesions. Nasal mucosa pink, nonerythematous, and nonedematous. Frontal and maxillary sinuses are nontender. O/P nonerythematous and moist without exudate.    Neck: Supple without lymph nodes palpated. FROM.     Lungs: decreased B/L without rales, rhonchi, crackles, or wheezes.    Heart: RRR. S1 and S2 normal. No S3 or S4. No MRGT.    Abd: Soft, nontender,nondistended. (+)BSx4 quadrants.     Extrem: No CCE. Radial pulses 2+/4 and equal B/L. FROMx4. No bone, joint, or muscle tenderness noted.    Neuro: No focal motor/sensory deficits.    Procedures    Result Review :   The following data was reviewed by: Scarlet Loera MD on 09/20/2023:                Assessment and Plan    Jackie Johnson is a 63 y.o. here for medical followup.    Diagnoses and all orders for this visit:    1. COVID-19 virus detected (Primary)  -     Nirmatrelvir&Ritonavir 300/100 (PAXLOVID) 20 x 150 MG & 10 x 100MG tablet therapy pack tablet; Take 3 tablets by mouth 2 (Two) Times a Day for 5 days.  Dispense: 30 each; Refill: 0  -     guaiFENesin (Mucinex) 600 MG 12 hr tablet; Take 2 tablets by mouth 2 (Two) Times a Day.  Dispense: 120 tablet; Refill: 0    2. Fever, unspecified fever cause  -     POCT rapid strep A  -     POCT SARS-CoV-2 Antigen JACINTO + Flu    3. Nausea  -     POCT rapid strep A  -     POCT SARS-CoV-2 Antigen JACINTO + Flu    4. Nonintractable headache, unspecified chronicity pattern, unspecified headache type  -     POCT  rapid strep A  -     POCT SARS-CoV-2 Antigen JACINTO + Flu    5. Sore throat  -     POCT rapid strep A  -     POCT SARS-CoV-2 Antigen JACINTO + Flu          Problem List Items Addressed This Visit    None  Visit Diagnoses       COVID-19 virus detected    -  Primary    Fever, unspecified fever cause        Relevant Orders    POCT rapid strep A (Completed)    POCT SARS-CoV-2 Antigen JACINTO + Flu (Completed)    Nausea        Relevant Orders    POCT rapid strep A (Completed)    POCT SARS-CoV-2 Antigen JACINTO + Flu (Completed)    Nonintractable headache, unspecified chronicity pattern, unspecified headache type        Relevant Orders    POCT rapid strep A (Completed)    POCT SARS-CoV-2 Antigen JACINTO + Flu (Completed)    Sore throat        Relevant Orders    POCT rapid strep A (Completed)    POCT SARS-CoV-2 Antigen JACINTO + Flu (Completed)                Follow Up   Return in about 3 months (around 12/20/2023).  Findings and plans discussed with patient who verbalizes understanding and agreement. Will followup with patient once results are in. Patient was given instructions and counseling regarding her condition or for health maintenance advice. Please see specific information pulled into the AVS if appropriate.       Scarlet Loera MD

## 2023-10-13 ENCOUNTER — TELEPHONE (OUTPATIENT)
Dept: FAMILY MEDICINE CLINIC | Facility: CLINIC | Age: 63
End: 2023-10-13
Payer: COMMERCIAL

## 2023-10-13 RX ORDER — FLUTICASONE PROPIONATE 50 MCG
SPRAY, SUSPENSION (ML) NASAL
Qty: 16 G | Refills: 5 | Status: SHIPPED | OUTPATIENT
Start: 2023-10-13

## 2023-10-13 NOTE — TELEPHONE ENCOUNTER
Caller: Jackie Johnson    Relationship: Self    Best call back number: [phone]     Who are you requesting to speak with (clinical staff, provider,  specific staff member): CLINICAL    What was the call regarding: WHEN WAS LAST TETANUS DTAP, AND CAN SHE GET A FLU VACCINE WITH THE DTAP    Is it okay if the provider responds through MyChart: YES    Patient notified & verbalized understanding that we have no record of the Tdap.

## 2023-10-13 NOTE — TELEPHONE ENCOUNTER
Caller: Jackie Johnson    Relationship: Self    Best call back number: [phone]     Who are you requesting to speak with (clinical staff, provider,  specific staff member): CLINICAL    What was the call regarding: WHEN WAS LAST TETANUS DTAP, AND CAN SHE GET A FLU VACCINE WITH THE DTAP    Is it okay if the provider responds through MyChart: YES

## 2023-11-03 ENCOUNTER — TELEPHONE (OUTPATIENT)
Dept: FAMILY MEDICINE CLINIC | Facility: CLINIC | Age: 63
End: 2023-11-03
Payer: COMMERCIAL

## 2023-11-21 DIAGNOSIS — Z17.1 MALIGNANT NEOPLASM OF RIGHT BREAST IN FEMALE, ESTROGEN RECEPTOR NEGATIVE, UNSPECIFIED SITE OF BREAST: Primary | ICD-10-CM

## 2023-11-21 DIAGNOSIS — E55.9 VITAMIN D DEFICIENCY: ICD-10-CM

## 2023-11-21 DIAGNOSIS — R53.83 FATIGUE, UNSPECIFIED TYPE: ICD-10-CM

## 2023-11-21 DIAGNOSIS — N60.92 ATYPICAL LOBULAR HYPERPLASIA (ALH) OF LEFT BREAST: ICD-10-CM

## 2023-11-21 DIAGNOSIS — C50.911 MALIGNANT NEOPLASM OF RIGHT BREAST IN FEMALE, ESTROGEN RECEPTOR NEGATIVE, UNSPECIFIED SITE OF BREAST: Primary | ICD-10-CM

## 2023-12-12 NOTE — PROGRESS NOTES
NAME: Jackie Johnson    : 1960    DATE:  2023    DIAGNOSIS:   1.  Clinical Stage IIB ([id number]) poorly differentiated invasive ductal carcinoma of the right breast.  Tumor spanned 15-16 cm, though it was unclear how much of this was invasive malignancy v. DCIS.  There were no clinically or radiographically supspicious axillary LNs.  There was associated high grade DCIS.  Tumor was ER-,CA-, HER-2 +  .  Following neoadjuvant TCHP, she had complete pathologic response - ypT0N0(sn).  No invasive malignancy detected.  0/6 LN involved.     2.  Atypical Lobular Hyperplasia and LCIS involving the L breast    CHIEF COMPLAINT:  Follow up of Breast Cancer    TREATMENT HISTORY:  1.      2.  R Mastectectomy and SLNBx with L Lumpectomy and Mastopexy  - Dr. Kadeem Serrano and Dr. Roc Huston ([facility]) 20    3.       4.  Femara started 3/23/2021    HISTORY OF PRESENT ILLNESS:   Jackie Johnson is a very pleasant 63 y.o. female who was referred by Dr. STACIE Richards for evaluation and treatment of breast cancer. She has a strong family h/o breast cancer and so is normally very good about getting her mammograms done.  She recently moved from [facility] and so missed her mammogram in 2019 because of the move.  She got established with a new PCP (Dr. Denver Sheth) in 2019 and was referred to a a new gynecologist  (Dr. Brayan Richards) who she saw in .  Kevin Richards ordered a screening mammogram.  She had an acute febrile illness around the time of the Super Bowl and at that time thought she might have a lump in her R breast.  While waiting on mammogram scheduling, she started to have pain in the breast and in eventually she would occasionally get a little bit of discharge from the right nipple.  She presented for  Screening mammography 19 as below.  This was followed by diagnostic mammogram and U/S on 20.  She had an abnormal span of 15 cm spanning the  R breast.  She underwent stereotactic biopsy on 1-21-20.    This revealed a poorly differentiated invasive ductal carcinoma of the R breast with associated high grade DCIS.  Tumor was ER/NY- and HER-2+ by IHC.  Her sister lives in [place] and was treated at [facility], so she went there for further evaluation. She saw Dr. Romero of medical oncology and Dr. Serrano of surgical oncology.  She had staging with CT CAP and bone scan as well as Brain MRI and had no evidence of distant metastatic spread.  She had reese breast MRI as well which showed an unexpected abnormality in the L breast described as linear non-mass enhancement spanning an 8 cm area in the inferior breast at 6:00.  This was biopsied on 2-28-20 with biopsy result pending.  No abnormal axillary LNs were identified.  Neoadjuvant chemotherapy was recommended to her by her [facility] team of physicians but she wishes to seek this treatment closer to home so is here today for further evaluation and treatment.    She is in good health overall. She reports an isolated episode of fever/chills during an acute illness, abdominal fullness to her right side, fatigue and a rash circling her neck. She denies changes in weight, cp, sob, persistent abdominal pain, n/v/c/d or bony pain.    INTERVAL HISTORY:  Ms. Johnson presents today for follow up of breast cancer. Since her last visit, she has overall been well.  A second grandson was born.  She continues on Femara and was started on Prolia therapy at [facility].  She had breast imaging in October at [facility] which showed no cause for concern.      PAST MEDICAL HISTORY:  Past Medical History:   Diagnosis Date    Acid reflux     Asthma     Breast cancer 01/2020    rt br ca    Cancer     Eczema     Elevated cholesterol     Endometriosis     GERD (gastroesophageal reflux disease)     Hiatal hernia     High cholesterol     Seasonal allergies     Shaking     INTERMITTANT    Sinusitis     Staph infection 04/2020    port        PAST SURGICAL HISTORY:  Past Surgical History:   Procedure Laterality Date    BREAST BIOPSY Bilateral      SECTION      COLONOSCOPY N/A 2021    Procedure: COLONOSCOPY FOR SCREENING;  Surgeon: Keerthi Meneses MD;  Location: [facility];  Service: Gastroenterology;  Laterality: N/A;    D & C HYSTEROSCOPY ENDOMETRIAL ABLATION      DIAGNOSTIC LAPAROSCOPY      FRACTURE SURGERY Left     tib/fib    FULGURATION ENDOMETRIOSIS      LEG SURGERY      Left leg following fracture    LYMPH NODE BIOPSY      MASTECTOMY Right     WITH LYMPH NODES REMOVAL    PORTACATH PLACEMENT N/A 3/18/2020    Procedure: INSERTION OF PORTACATH;  Surgeon: Dudley Aldridge MD;  Location: [facility];  Service: General;  Laterality: N/A;    SKIN BIOPSY      TONSILLECTOMY      TRANSESOPHAGEAL ECHOCARDIOGRAM (CINTIA)      VENOUS ACCESS DEVICE (PORT) INSERTION N/A 2020    Procedure: INSERTION VENOUS ACCESS DEVICE;  Surgeon: Nerissa Wang MD;  Location: [facility];  Service: General;  Laterality: N/A;    VENOUS ACCESS DEVICE (PORT) INSERTION AND REMOVAL      VENOUS ACCESS DEVICE (PORT) REMOVAL Right 2020    Procedure: REMOVAL VENOUS ACCESS DEVICE;  Surgeon: Nerissa Wang MD;  Location: [facility];  Service: General;  Laterality: Right;       FAMILY HISTORY:  Family History   Problem Relation Age of Onset    Breast cancer Mother 75    Basal cell carcinoma Mother 90        2 small spots on face removed + fair complexion    Heart disease Mother     Hypertension Mother     Diabetes type II Mother     Breast cancer Sister 55        VUS detected on genetic testing CDKN2A gene (c.-33G>C).     Lung disease Sister     Heart disease Father     Throat cancer Father 79    Lung cancer Father 79    Dementia Father     Parkinsonism Father     Cancer Maternal Grandmother 65        unknown gynecologic cancer, cervical vs uterine    Heart disease Maternal Grandmother     Diabetes type II Maternal Grandmother     Lymphoma Maternal  "Grandfather 64    Breast cancer Paternal Grandmother 70    Prostate cancer Paternal Grandfather 70        metastastic to liver    Breast cancer Paternal Aunt 77    Breast cancer Maternal Aunt 80    Breast cancer Other     Ovarian cancer Sister 66        Elevated ; 1A tumor; Borderline cancer; both ovaries & lg cyst removed    Ovarian cancer Maternal Cousin 54    Breast cancer Other 50    Diabetes type I Nephew          SOCIAL HISTORY:  Social History     Socioeconomic History    Marital status:    Tobacco Use    Smoking status: Never    Smokeless tobacco: Never   Vaping Use    Vaping Use: Never used   Substance and Sexual Activity    Alcohol use: Never    Drug use: Never    Sexual activity: Defer     Partners: Male     Birth control/protection: None     REVIEW OF SYSTEMS:   A comprehensive 14 point review of systems was performed.  Significant findings as mentioned above.  All other systems reviewed and are negative.      MEDICATIONS:  The current medication list was reviewed in the EMR    Current Outpatient Medications:     calcium carbonate-vitamin d 600-400 MG-UNIT per tablet, Take 2 tablets by mouth Daily., Disp: , Rfl:     cyanocobalamin 1000 MCG/ML injection, Inject 1 mL into the appropriate muscle as directed by prescriber Every 7 (Seven) Days., Disp: 30 mL, Rfl: 1    fluticasone (FLONASE) 50 MCG/ACT nasal spray, SPRAY TWICE IN EACH NOSTRIL ONCE DAILY, Disp: 16 g, Rfl: 5    letrozole (FEMARA) 2.5 MG tablet, Take 1 tablet by mouth Daily., Disp: 30 tablet, Rfl: 11    levalbuterol (XOPENEX HFA) 45 MCG/ACT inhaler, Inhale 1-2 puffs Every 4 (Four) Hours As Needed for Wheezing., Disp: 15 g, Rfl: 2    multivitamin with minerals tablet tablet, Take 1 tablet by mouth Daily., Disp: , Rfl:     pravastatin (Pravachol) 20 MG tablet, Take 1 tablet by mouth Daily., Disp: 90 tablet, Rfl: 0    Syringe/Needle, Disp, 25G X 5/8\" 1 ML misc, 1 application Every 14 (Fourteen) Days., Disp: 50 each, Rfl: 0    ubrogepant " (UBRELVY) 100 MG tablet, Take 1 tablet by mouth 1 (One) Time As Needed (headache). Can take a second one an hour after if headache persists., Disp: 30 tablet, Rfl: 2    cetirizine (zyrTEC) 10 MG tablet, Take 1 tablet by mouth Daily. (Patient not taking: Reported on 9/20/2023), Disp: 30 tablet, Rfl: 2    guaiFENesin (Mucinex) 600 MG 12 hr tablet, Take 2 tablets by mouth 2 (Two) Times a Day. (Patient not taking: Reported on 12/13/2023), Disp: 120 tablet, Rfl: 0    Current Facility-Administered Medications:     cyanocobalamin injection 1,000 mcg, 1,000 mcg, Intramuscular, Q28 Days, Bernice, Concepcion, APRN, 1,000 mcg at 11/24/21 1621    ALLERGIES:    Allergies   Allergen Reactions    Albuterol Other (See Comments)     One time severe headache, questionable for aneurysm, did spinal tap was positive, did Angiogram and MRI were Negative for aneurysm    Penicillins Hives    Clindamycin Hcl Rash    Crestor [Rosuvastatin Calcium] Other (See Comments)     Caused high liver enxymes    Sulfa Antibiotics Other (See Comments) and Swelling     Mucosal lesions    Triprolidine-Pseudoephedrine Swelling    Latex Rash       PHYSICAL EXAM:  Vitals:    12/13/23 1554   BP: 128/86   Pulse: 69   Resp: 18   Temp: 97.3 °F (36.3 °C)   SpO2: 98%       Pain Score    12/13/23 1554   PainSc: 0-No pain     ECOG score: 0   General:  Awake, alert and oriented, appears well. In no acute distress.  HEENT:  Pupils are equal, round and reactive to light and accommodation, Extra-ocular movements full, Oropharyx clear, mucous membranes moist  Neck:  No JVD, thyromegaly or lymphadenopathy   CV:  Regular rate and rhythm, no murmurs, rubs or gallops  Resp:  Lungs are clear to auscultation bilaterally,  no crackles  Breast: S/p R mastectomy.  Surgical scars smooth and intact.  S/p L lumpectomy/mastopexy. Surgical scars smooth and intact.  No palpable masses or axillary adenopathy on either side.   Abd:  Soft, non-tender, non- distended, bowel sounds present, no  organomegaly or masses  Ext:  No clubbing, cyanosis, no lower extremity edema  Lymph:  No cervical, supraclavicular, axillary,adenopathy  Neuro:  MS as above, grossly non-focal exam      PATHOLOGY:  02-12-20 08-18-20  A. SENTINEL LYMPH NODE #1 RIGHT AXILLA, BIOPSY:  - 1 lymph node, no tumor present (0/1)  - Cytokeratin stain shows no evidence of metastatic carcinoma    B. SENTINEL LYMPH NODE #2, RIGHT AXILLA, BIOPSY:  - one lymph node, no tumor present (0/1)  - Cytokeratin stain shows no evidence of metastatic carcinoma    C. RIGHT BREAST, TOTAL MASTECTOMY:  - No residual carcinoma identified  - area of fibrosis with associated biopsy clip, consistent with treated tumor bed.   - Proliferative fibrocystic change.   - Skin and nipple, no tumor present.   - four low axillary lymph nodes, no tumor present (0/4).     D. LEFT BREAST, SEGMENTAL MASTECTOMY:  - FOCI OF LOBULAR NEOPLASIA (ATYPICAL LOBULAR HYPERPLASIA AND LOBULAR CARCINOMA IN SITU). LOW GRADE, CLASSIC TYPE, WITH FOCAL PAGETOID SPREAD INTO DUCTS.   - Two prior biopsy site changes present.   - Proliferative fibrocystic change.     E. RIGHT BREAST SKIN, EXCESS, EXCISION:  - Skin and breast tissue, no tumor present.     F. LEFT BREAST, MASTOPEXY:  - Skin and breast tissue, no tumor present    Tumor Template  Specimen Identification   Procedure: Total mastectomy   Specimen Laterality: Right  Invasive Carcinoma   Tumor Focality: NA   Tumor Size: 0   Histologic Type of Invasive Carcinoma: NA   Histologic Grade Based on Tommy Histologic Score    Glandular (Acinar)/Tubular Differentiation: Not applicable    Nuclear Pleomorphism: not applicable    Mitotic Rate: not applicable   Overall Grade: not applicable   Lymphovascular invasion: not present  In Situ Carcinoma   Ductal Carcinoma in Situ (DCIS): Not present   Lobular Carcinoma in situ (LCIS): Not present  Tumor Extension   Skin: uninvolved    Nipple: uninvolved   Skeletal muscle: not  applicable  Margins   Invasive Carcinoma Margins: NA    DCIS Margins: NA  Regional Lymph nodes   Uninvolved by tumor cells    Total number of lymph nodes examined: 6    Number of sentinel lymph nodes examined: 2  Treatment effect   Treatment effect in the breast: present   Treatment effect in the lymph nodes: not present  RCB input variables   Dimensions of Residual Cancer that is in-situ: 0%   Total Number of Lymph nodes with Residual Metastatic Carcinoma: 0   Size of Largest Metastasis: NA   RCB Index Computed Value (optional): 0   RCB Class (optional): 0  Pathologic Stage Classification 9ypTNM, AJCC 8th Edition)    Primary Tumor (invasive carcinoma)(ypT): ypT0   Regional lymph nodes (ypN): yPN0(sn)   Distant Metastasis (ypM): NA  Ancillary Studies: Please see previous case [id number]      ENDOSCOPY:        IMAGING:  Bilateral Diagnostic mammogram 12-27-19  FINDINGS:  There are scattered areas of fibroglandular density.     The bilateral fibroglandular pattern is unremarkable in appearance. A  few scattered calcifications are present in the left breast. There are  calcifications in the central aspect of the right breast spanning  approximately 15 cm of tissue in the AP dimension and extending up to  the base of the nipple for which additional imaging is recommended.     IMPRESSION:  1. Benign left mammographic findings.  2. Calcifications in the right breast. Recommend additional imaging.        BI-RADS CATEGORY:  0, INCOMPLETE: Need additional imaging evaluation.     RECOMMENDATION:  Right ML and CC magnification views.      R diagnostic mammogram 01-16-20  FINDINGS:  Magnification imaging of the right breast demonstrates  casting-type pleomorphic calcifications spanning over 15 cm of tissue in  the AP dimension in the right 6:00 to 7:00 region. Calcifications do  extend up to the base of the nipple. Findings are highly suspicious for  DCIS.     Right breast ultrasound: Focused sonographic evaluation of the  right  6:00 to 7:00 region demonstrates a single 0.5 cm irregular hypoechoic  mass associated with a coarse calcification located at 7:00, 3 cm from  the nipple. Ultrasound of the right axilla demonstrates no abnormal  appearing axillary lymph nodes.        IMPRESSION:  Findings are highly suspicious for extensive DCIS in the  right 6:00 to 7:00 region extending into the base of the nipple. There  is also small irregular mass noted on ultrasound in the 7:00 region  suspicious for invasion.        BI-RADS CATEGORY:  5, HIGHLY SUGGESTIVE OF MALIGNANCY        RECOMMENDATION:  Recommend stereotactic guided core biopsy of the  anterior and posterior aspect of the calcifications in the 6:00 to 7:00  region to determine extent of disease. Ultrasound-guided core biopsy of  the right 7:00 mass may also be warranted pending pathology results of  the calcifications.    Diagnostic bilateral mammogram 02-25-20  FINDINGS:  There are scattered areas of fibroglandular density.    1:  There are fine-linear branching calcifications measuring 16 x 7 x 7  centimeters with segmental distribution and associated post biopsy clip in the  right breast lower hemisphere at 6 to 7 o'clock.  There are 2 post biopsy clips  noted.  The calcifications extend to the inferior skin and nipple.    2:  There are amorphous calcifications measuring 0.3 centimeters in the left  breast lower hemisphere at 6 to 7 o'clock located 4 centimeters from the nipple.    IMPRESSION:  1:  Fine-linear branching calcifications in the right breast lower hemisphere at  6 to 7 o'clock are known biopsy-proven malignancy. Ultrasound is recommended for  staging. Recommend appropriate evaluation and treatment of this known malignancy.    2:  Amorphous calcifications in the left breast lower hemisphere at 6 to 7  o'clock located 4 centimeters from the nipple are suspicious. Stereotactic  biopsy is recommended.    BI-RADS Category 4:  Suspicious Abnormality      US Chest  02-25-20  Findings:       Right Breast: The extent of calcified disease is best visualized by mammography. There are dilated ducts with internal calcifications vascularity extending toward the nipple at the 7 o'clock position. This is consistent with the patient's known biopsy-proven malignancy.      Right Charles Basins: No suspicious axillary (level I, II & III) or internal mammary lymphadenopathy is noted.      IMPRESSION:    1. Known RIGHT breast malignancy is best visualized by mammography. A MRI may be obtained for evaluation of disease.    2. No suspicious RIGHT-sided lymphadenopathy      ACR BI-RADS Category: 6. Known malignancy.  Recommend appropriate evaluation and treatment of the known malignancy.         R breast US 02-25-20  Findings:       Right Breast: The extent of calcified disease is best visualized by mammography. There are dilated ducts with internal calcifications vascularity extending toward the nipple at the 7 o'clock position. This is consistent with the patient's known biopsy-proven malignancy.      Right Charles Basins: No suspicious axillary (level I, II & III) or internal mammary lymphadenopathy is noted.      IMPRESSION:    1. Known RIGHT breast malignancy is best visualized by mammography. A MRI may be obtained for evaluation of disease.    2. No suspicious RIGHT-sided lymphadenopathy      ACR BI-RADS Category: 6. Known malignancy.  Recommend appropriate evaluation and treatment of the known malignancy.       MRI Brain w/wo contrast 02-27-20  FINDINGS:     No abnormal parenchymal or leptomeningeal enhancement is noted. The brain parenchyma is unremarkable except of small scattered T2 FLAIR hyperintensities consistent with chronic microangiopathic changes. No acute ischemia, intra or extra-axial hemorrhage, mass effect or midline shift. No hydrocephalus is noted.    The osseous structures and extra-cranial soft tissues are unremarkable.    The orbital structures unremarkable.    The  paranasal sinuses and mastoid air cells are clear.    IMPRESSION:  No evidence of metastatic disease.      CTCAP 02-28-20    Findings:       CHEST:  The breasts are ptotic and there are biopsy clips projecting between the lower quadrants of the right breast.    On image 99 of series 3, there is a nonspecific 1 cm soft tissue nodule in the lower outer quadrant of the right breast.    No lung nodules or pleural effusions are present.    No axillary, internal mammary, mediastinal or hilar adenopathy is seen.    No suspicious bone lesions are present.      ABDOMEN and PELVIS:  The liver is borderline enlarged and diffusely fatty infiltrated without measurable mass or biliary ductal dilatation.    The gallbladder appears normal.    The spleen, pancreas and adrenal glands appear normal.    The kidneys demonstrate function without hydronephrosis and there is a small low dense nidus in the mid right kidney, likely a cyst.    No adenopathy or ascites are seen in the abdomen or pelvis.    A normal-appearing retrocecal appendix is seen.    Multilevel degenerative-like bone changes are present.      IMPRESSION:  No evidence for metastatic disease the chest, abdomen or pelvis.      MRI Breast bilateral 03-02-20    Findings:  The breast composition is heterogeneous fibroglandular tissue. There is no significant early background parenchymal enhancement.    Right breast: There is linearly oriented nonmass enhancement involving the lateral and inferior aspects (anterior, middle, and posterior third) of the right breast 6 - 7 o'clock position extending 17 cm from the base of the nipple to the pectoralis muscle (series 5 image  of 256). There is no evidence for pectoralis muscle invasion. Extension to the inferior skin is better demonstrated mammographically. Susceptibility artifact from 2 postbiopsy clips is noted.    Left breast: There is linearly oriented nonmass enhancement in the inferior aspect (middle third) of the left  breast 6 o'clock position extending approximately 8 cm (series 5 image  of 256). There is no evidence for nipple, skin, or pectoralis muscle involvement. Stereotactic guided biopsy of calcifications at the 6 o'clock position to be performed later today and corresponds to the anterior aspect of the nonmass enhancement. MRI guided biopsy of nonmass enhancement seen on image 94 of 256, series 5 and image 40 of 160, series 8 is recommended (The images have been annotated with a [place]).    Charles Basins: There is no lymphadenopathy in the visualized axillary or internal mammary regions.    IMPRESSION:  Linear nonmass enhancement representing known malignancy right breast as above. Linear nonmass enhancement left breast as above.   MRI guided biopsy of nonmass enhancement seen on image 94 of 256, series 5 and image 40 of 160, series 8 is recommended.    ACR BI-RADS Category: 6. Recommend MR-guided biopsy of nonmass enhancement left breast as above .Recommend appropriate evaluation and treatment of the known malignancy.       Bone scan 03-02-20  FINDINGS:     Radiotracer uptake secondary to degenerative changes are seen involving the shoulders, hips, knees, and feet. Increased uptake along the right tibial shaft likely is likely reactive. Faint focus of activity within the left tibial shaft is compatible with prior injury/fracture. Increased uptake in the skull is compatible with a benign hyperostosis.    Physiologic uptake of the bilateral kidneys and bladder.    IMPRESSION:    No scintigraphic evidence of skeletal metastases.      Echo 03-02-20      Bilateral diagnostic mammogram 05-26-20  FINDINGS: There are scattered areas of fibroglandular density.     A postbiopsy marking clip is now noted in the left breast with expected  surrounding postbiopsy changes best visualized on MLO imaging. The  remaining bilateral fibroglandular pattern is stable in appearance.  Redemonstrated are fine linear branching  calcifications in the right  6:00 to 7:00 region spanning approximately 17 cm of tissue in the AP  dimension. There are 2 associated postbiopsy marking clips. No new areas  of calcification are seen in either breast.     IMPRESSION:  1. Benign left mammographic findings.        2. No significant change in the appearance of calcifications in the  right breast.        BI-RADS CATEGORY:  6, KNOWN BIOPSY PROVEN MALIGNANCY        RECOMMENDATION:  Recommend clinical follow-up.       Echo 07-22-20  Normal left ventricular cavity size and wall thickness noted. All left ventricular wall segments contract normally.  Estimated EF appears to be in the range of 61 - 65%  The pericardium is normal. There is no evidence of pericardial effusion.      DEXA 09-25-20  FINDINGS: The BMD measured at the AP spine L1-L4 is 1.003 gm/cm2 with a  T-score of -1.5.      IMPRESSION:  The patient is considered to be osteopenic according to the  World Health Organization criteria. Fracture risk is moderate      MRI L spine wo contrast 09-28-20  FINDINGS:  Sagittal alignment is normal. Bone marrow signal intensity is normal. There is disc desiccation L1-2 through L3-4 with mild multiple level loss of intervertebral disc height. Endplate spondylosis and Schmorl's node formation most apparent at L1-2 and  L2-3. Conus medullaris terminates at L1-2 and is normal.     At L1-2, there is a mild concentric disc bulge and endplate spondylosis. There is no canal stenosis or foraminal impingement.     At L2-3, there is concentric disc bulging and mild facet hypertrophy but no canal or foraminal impingement.     At L3-4, is mild facet degenerative change left greater the right with ligamentum flavum thickening and a broad posterior disc bulge. There is no canal stenosis. There is mild inferior foraminal narrowing bilaterally.     L4-5, there is moderate facet degenerative change bilaterally with ligamentum flavum thickening. Is mild bilateral foraminal  narrowing. There is no canal stenosis.     At L5-S1, there is a broad posterior disc bulge. There is a tiny posterior annular fissure. There is no canal stenosis. There is a small focal left posterolateral protrusion into the left inferior foramen with mild to moderate left inferior foraminal  narrowing. There is mild bilateral facet degenerative change.     IMPRESSION:     1. There is no evidence for osseous metastatic disease.  2. There are lumbar degenerative changes without evidence for lumbar canal stenosis. Details above.      Echo 09-29-20  The study is technically difficult for diagnosis.  Normal left ventricular cavity size and wall thickness noted. All left ventricular wall segments contract normally.  Left ventricular ejection fraction appears to be 61 - 65%.  The aortic valve is structurally normal with no regurgitation or stenosis present.  The mitral valve is structurally normal with no regurgitation or significant stenosis present.  There is no evidence of pericardial effusion.       Echo 12-20-20  Normal left ventricular cavity size and wall thickness noted. All left ventricular wall segments contract normally.  Left ventricular ejection fraction appears to be 61 - 65%.  The pericardium is normal. There is no evidence of pericardial effusion. .  Comments: LV systolic function is about the same as on the previous echo study.      Echo 03-19-21  Normal left ventricular cavity size and wall thickness noted. All left ventricular wall segments contract normally.  Left ventricular ejection fraction appears to be 61 - 65%.  There is no evidence of pericardial effusion.      Echo 06-01-21  Normal left ventricular cavity size and wall thickness noted. All left ventricular wall segments contract normally.  Left ventricular ejection fraction appears to be 66 - 70%.  There is no evidence of pericardial effusion. .        Left diagnostic mammogram 09-23-21  FINDINGS:   The breast is heterogeneously dense, which  may obscure small masses.     1:  There is a post surgical scar with associated surgical clips in the left   breast.     2:  There are benign appearing calcifications with scattered distribution and   associated post reduction change in the left breast.     Tomosynthesis performed in CC and MLO projections.     IMPRESSION:   There is no mammographic evidence of malignancy.     Follow-up mammogram in 1 year is recommended.     BI-RADS Category 2:   Benign Finding(s)        DEXA 09-23-21  FULL RESULT:       Examination: Bone Mineral Density (DXA), 9/23/2021     Clinical History: 61-year-old postmenopausal female with breast cancer.     Indication: Assessment of bone mineral density.     Comparison: None.     Technique: Bone mineral density was obtained using Hologic dual-energy X-ray absorptiometry.     Findings: The findings are provided in the below table(s).       Bone Density:   ---------------------------------------------------------------------------   Region     Exam Date   BMD      T-        Z-                                            g/cm2  Score  Score   ---------------------------------------------------------------------------   AP Spine (L1-L4)                    09/23/2021  0.863   -1.7     -0.1       Femoral Neck (Left)                    09/23/2021  0.712   -1.2      0.1       Total Hip (Left)                    09/23/2021  0.814   -1.0      0.0       Femoral Neck (Right)                    09/23/2021  0.808   -0.4      1.0       Total Hip (Right)                    09/23/2021  0.840   -0.8      0.2       -----------------------------------------------------------------   \For postmenopausal women and men age 50 and over, the World Health   Organization criteria for BMD interpretation classify patients as: Normal   (T-score at or above -1.0), Osteopenia (T-score between -1.0 and     -2.5), or Osteoporosis (T-score at or below -2.5).     IMPRESSION:   Osteopenia based on measurements of the lumbar  spine and left femoral neck.     Mammography Digital Diagnostic Left with Faisal (10/03/2022 14:08)  FINDINGS:   The breast is heterogeneously dense, which may obscure small masses.     1:  There is post reduction change in the left breast.     2:  There are surgical clips in the left breast.     Patient is status post right mastectomy for IDC in 2020.     Tomosynthesis performed in CC and MLO projections.    Impression:  There is no mammographic evidence of malignancy.     Follow-up mammogram in 1 year is recommended.     I personally reviewed these image(s) along with the resident's/fellow's   interpretations, certify that if a procedure was performed I was physically   present, and agree with the final report.     BI-RADS Category 2:   Benign Finding(s)    MRI Brain with and without Contrast @ [facility] (04/10/2023)   FINDINGS:     Intracranial:   There is no acute hemorrhage or acute infarct.   There is no mass effect or midline shift.   The ventricles and extra-axial spaces are appropriate for age.   There is no worrisome parenchymal or leptomeningeal enhancement.   There is no sellar or suprasellar abnormalities.     Bone:   There are no suspicious calvarial or skull base lesions.     Extracranial:   The orbits are unremarkable.   The visualized paranasal sinuses are predominantly clear.   The mastoid air cells are clear.     IMPRESSION:    No acute intracranial abnormality.   No intracranial metastasis.     NM Bone Mineral Density Both Hips and Spine @ [facility] (10/05/2023)   MPRESSION:   Osteopenia based on measurements of the lumbar spine, left femoral neck and bilateral total hips.     Statistically significant decrease in bone mineral density within the lumbar spine and left total hip dated 10/03/2022.     Mammography Digital Diagnostic Left with Faisal @ [facility] (10/05/2023)   FINDINGS:   The breast is heterogeneously dense, which may obscure small masses.     1:  There is post reduction change in  the left breast.     2:  There are surgical clips in the left breast.     Tomosynthesis performed in CC and MLO projections.     IMPRESSION:  There is no mammographic evidence of malignancy.     Follow-up mammogram in 1 year is recommended.     BI-RADS Category 2:   Benign Finding(s)     US Breast Complete - Left @ [facility] (10/06/2023)  IMPRESSION:  1:  Area in the left axilla is benign.     2:  Post surgical scar in the left breast is benign.     Clinical follow-up for the area of palpable concern is recommended.     Follow-up mammogram in 1 year is recommended.     BI-RADS Category 2:   Benign Finding(s)      RECENT LABS:  Lab Results   Component Value Date    WBC 6.64 12/13/2023    HGB 13.1 12/13/2023    HCT 41.4 12/13/2023    MCV 93.7 12/13/2023    RDW 13.3 12/13/2023     12/13/2023    NEUTRORELPCT 61.3 12/13/2023    LYMPHORELPCT 30.0 12/13/2023    MONORELPCT 6.6 12/13/2023    EOSRELPCT 1.1 12/13/2023    BASORELPCT 0.8 12/13/2023    NEUTROABS 4.08 12/13/2023    LYMPHSABS 1.99 12/13/2023       Lab Results   Component Value Date     12/13/2023    K 4.2 12/13/2023    CO2 28.0 12/13/2023     12/13/2023    BUN 15 12/13/2023    CREATININE 0.90 12/13/2023    EGFRIFNONA 72 10/03/2022    EGFRIFAFRI 83 10/03/2022    GLUCOSE 133 (H) 12/13/2023    CALCIUM 9.7 12/13/2023    ALKPHOS 56 12/13/2023    AST 19 12/13/2023    ALT 21 12/13/2023    BILITOT 0.2 12/13/2023    ALBUMIN 4.5 12/13/2023    PROTEINTOT 6.9 12/13/2023    MG 2.4 03/11/2023       Lab Results   Component Value Date    TSH 1.870 12/13/2023    CREL49KE 36.2 05/25/2023       Lab Results   Component Value Date     15.1 03/11/2020     Lab Results   Component Value Date    LABCA2 19.0 03/11/2020       ASSESSMENT & PLAN:  Jackie Johnson is a very pleasant 63 y.o. female with what is clinically a Stage IIB ([id number]) poorly differentiated invasive ductal carcinoma of the right breast.  Tumor spanned 15-16 cm although it was difficult to  tell how much of this was invasive malignancy v. DCIS.  There were no clinically or radiographically supspicious axillary LNs.  There was associated high grade DCIS.  Tumor was ER-,PA-, HER-2 +.  She had pathologic complete response with neoadjuvant therapy.  She also has LCIS/ALH involving the left breast.    1.  Right breast cancer:  -  Administered TCHP neoadjuvantly to downsize tumor and potentially allow for a more successful surgery. After 6 cycles of TCHP, she had a complete pathologic response at time of mastectomy.  -  We discussed consideration of radiation given the initial size of her tumor.  It really isn't known how much of her initial disease was invasive malignancy v. DCIS.  She says she was evaluated by a Radiation Oncologist at [facility] who didn't recommend radiation.  -  Given HER-2 positive disease, recommended we complete a year of HP which she tolerated well. Post-treatment echo with normal LVEF.    -  L mammogram done 10/25/2023 at [facility] without cause for concern.  Repeat exam recommended after 1 year.    2. L Breast LCIS / ALH:  -  She is s/p lumpectomy.  -  Discussed role for 5 years of hormonal therapy in prevention of future breast cancer development in this breast.  She is post-menopausal, so could use Tamoxifen or an aromatase inhibitor for this purpose.  She has an entact uterus so Tamoxifen would come with increased risk (albeit low risk) of development of endometrial hypertrophy/ endometrial cancer.  Aromatase inhibitors would not come with this risk, but would come with risk for loss of bone mineral density and potentially increased risk of side effects.    -  DEXA showed osteopenia with T score -1.5, so recommended Tamoxifen for prevention.  Ms. Johnson was not comfortable with risk of endometrial hyperplasia / cancer so she said she would prefer aromatase inhibitor.  In particular she would like to take Femara so ordered this with plan for 5 years of treatment.  This was  "started on 3/23/2021.  She continues to tolerate Femara well without any significant side effects.  Will continue.  She had repeat DEXA at [facility] again 10/5/2023 showing slight worsening of her osteopenia with T score -1.9.  Given this, they started her on Prolia therapy which she is receiving at [facility].  -Will continue Femara.  -  3.  Bone health:  -  Ms. Johnson has osteopenia.  Pre-treatment  T score -1.5.  She also has a h/o Vit D Deficiency for which she took a course of weekly high dose Vit D (now completed). Repeat Vit D Level normal. Continue Ca/Vit D BID.  Recommended weight bearing exercise.  -  At [facility], they repeated DEXA after 1 year (9-23-21) and T score was -1.7.  They repeated again after 1 year with T score -1.7.  Repeat DEXA scan performed 10/5/2023 showed a T-score of -1.9 with slight worsening of osteopenia, and Prolia therapy was started.  -  Vit D level drawn today and is pending.    4.  Unusual sense of \"vibration\" and family h/o HSP (with SPG7 mutation):   - Had some evaluation at [facility] with imaging, EEG which was unremarkable.  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 This is not a likely cause for the symptoms she describes.  She more recently followed up with Neurology at [facility] in September 2021.  They told her they didn't feel any further neurologic evaluation would be helpful and suggested she see someone in a different specialty such as an endocrinologist.  She saw a neurogeneticist at [place] (as this is where her daughter goes) and was tested and was told she didn't have what her daughter has..  I'm not sure what is causing her symptoms. Again told her that I don't feel this is related to her cancer or its treatment.  Happily whatever is causing it, her symptoms seem to be improving.      5.  Very strong family " history of malignancy including several members with breast cancer and a sister with ovarian cancer.  -  She underwent genetic testing at [facility] and testing was negative.  - She had  level drawn at [facility] which was within normal limits.    6.  Prophylaxis:  She has had 2023 flu vaccine as well as Prevnar 13. She has received COVID vaccine x 3 (Moderna). She received COVID bivalent booster.  She received Tdap.  Recommended RSV vaccination.  Also recommended 2023 fall COVID booster to be performed 3 months after her infection (or around January 2024).    7.  Follow-up:   - Continue Femara  - Continue Ca/Vit D.  - L diagnostic mammogram due in October 2024 per [facility]  -She will follow up with APRN in 6 months with CBC, CMP, TSH and vitamin D.  - I will plan to see her back in 1 year with CBC, CMP, TSH and vitamin D.  - She will continue on Prolia therapy to be given at [facility].  - Recommended RSV vaccination.  - Recommended fall COVID booster vaccine to be performed 3 months after her infection (January 2024).      8.  ACO / MARNI/Other  Quality measures  -  Jackie Johnson received 2023 flu vaccine.  -  Jackie Johnson reports a pain score of 0.  Given her pain assessment as noted, treatment options were discussed and the following options were decided upon as a follow-up plan to address the patient's pain:  No interventions needed .  -  Current outpatient and discharge medications have been reconciled for the patient.  Reviewed by: Fatoumata Obrien MD      I spent 30 minutes with Jackie Johnson today.  In the office today, more than 50% of this time was spent face-to-face with her  in counseling / coordination of care, reviewing her interim medical history and counseling on the current treatment plan.  All questions were answered to her satisfaction.           Electronically Signed by: Fatoumata Obrien MD    CC:   MD JUAN PABLO Macdonald MD Bora Lim, MD- [facility]  Medical Oncology  Kadeem Serrano MD- [facility]  Dr. Roc Huston - [facility] Plastic Surgery

## 2023-12-13 ENCOUNTER — LAB (OUTPATIENT)
Dept: ONCOLOGY | Facility: CLINIC | Age: 63
End: 2023-12-13
Payer: COMMERCIAL

## 2023-12-13 ENCOUNTER — OFFICE VISIT (OUTPATIENT)
Dept: ONCOLOGY | Facility: CLINIC | Age: 63
End: 2023-12-13
Payer: COMMERCIAL

## 2023-12-13 VITALS
DIASTOLIC BLOOD PRESSURE: 86 MMHG | WEIGHT: 165.8 LBS | HEART RATE: 69 BPM | OXYGEN SATURATION: 98 % | RESPIRATION RATE: 18 BRPM | BODY MASS INDEX: 28.44 KG/M2 | TEMPERATURE: 97.3 F | SYSTOLIC BLOOD PRESSURE: 128 MMHG

## 2023-12-13 DIAGNOSIS — Z17.1 MALIGNANT NEOPLASM OF RIGHT BREAST IN FEMALE, ESTROGEN RECEPTOR NEGATIVE, UNSPECIFIED SITE OF BREAST: ICD-10-CM

## 2023-12-13 DIAGNOSIS — E78.2 MIXED HYPERLIPIDEMIA: ICD-10-CM

## 2023-12-13 DIAGNOSIS — N60.92 ATYPICAL LOBULAR HYPERPLASIA (ALH) OF LEFT BREAST: ICD-10-CM

## 2023-12-13 DIAGNOSIS — Z17.1 MALIGNANT NEOPLASM OF RIGHT BREAST IN FEMALE, ESTROGEN RECEPTOR NEGATIVE, UNSPECIFIED SITE OF BREAST: Primary | ICD-10-CM

## 2023-12-13 DIAGNOSIS — R53.83 FATIGUE, UNSPECIFIED TYPE: ICD-10-CM

## 2023-12-13 DIAGNOSIS — E55.9 VITAMIN D DEFICIENCY: Primary | ICD-10-CM

## 2023-12-13 DIAGNOSIS — E55.9 VITAMIN D DEFICIENCY: ICD-10-CM

## 2023-12-13 DIAGNOSIS — C50.911 MALIGNANT NEOPLASM OF RIGHT BREAST IN FEMALE, ESTROGEN RECEPTOR NEGATIVE, UNSPECIFIED SITE OF BREAST: ICD-10-CM

## 2023-12-13 DIAGNOSIS — C50.911 MALIGNANT NEOPLASM OF RIGHT BREAST IN FEMALE, ESTROGEN RECEPTOR NEGATIVE, UNSPECIFIED SITE OF BREAST: Primary | ICD-10-CM

## 2023-12-13 DIAGNOSIS — M85.80 OSTEOPENIA, UNSPECIFIED LOCATION: ICD-10-CM

## 2023-12-13 LAB
ALBUMIN SERPL-MCNC: 4.5 G/DL (ref 3.5–5.2)
ALBUMIN/GLOB SERPL: 1.9 G/DL
ALP SERPL-CCNC: 56 U/L (ref 39–117)
ALT SERPL W P-5'-P-CCNC: 21 U/L (ref 1–33)
ANION GAP SERPL CALCULATED.3IONS-SCNC: 9 MMOL/L (ref 5–15)
AST SERPL-CCNC: 19 U/L (ref 1–32)
BASOPHILS # BLD AUTO: 0.05 10*3/MM3 (ref 0–0.2)
BASOPHILS NFR BLD AUTO: 0.8 % (ref 0–1.5)
BILIRUB SERPL-MCNC: 0.2 MG/DL (ref 0–1.2)
BUN SERPL-MCNC: 15 MG/DL (ref 8–23)
BUN/CREAT SERPL: 16.7 (ref 7–25)
CALCIUM SPEC-SCNC: 9.7 MG/DL (ref 8.6–10.5)
CHLORIDE SERPL-SCNC: 102 MMOL/L (ref 98–107)
CO2 SERPL-SCNC: 28 MMOL/L (ref 22–29)
CREAT SERPL-MCNC: 0.9 MG/DL (ref 0.57–1)
DEPRECATED RDW RBC AUTO: 46 FL (ref 37–54)
EGFRCR SERPLBLD CKD-EPI 2021: 72 ML/MIN/1.73
EOSINOPHIL # BLD AUTO: 0.07 10*3/MM3 (ref 0–0.4)
EOSINOPHIL NFR BLD AUTO: 1.1 % (ref 0.3–6.2)
ERYTHROCYTE [DISTWIDTH] IN BLOOD BY AUTOMATED COUNT: 13.3 % (ref 12.3–15.4)
GLOBULIN UR ELPH-MCNC: 2.4 GM/DL
GLUCOSE SERPL-MCNC: 133 MG/DL (ref 65–99)
HCT VFR BLD AUTO: 41.4 % (ref 34–46.6)
HGB BLD-MCNC: 13.1 G/DL (ref 12–15.9)
IMM GRANULOCYTES # BLD AUTO: 0.01 10*3/MM3 (ref 0–0.05)
IMM GRANULOCYTES NFR BLD AUTO: 0.2 % (ref 0–0.5)
LYMPHOCYTES # BLD AUTO: 1.99 10*3/MM3 (ref 0.7–3.1)
LYMPHOCYTES NFR BLD AUTO: 30 % (ref 19.6–45.3)
MCH RBC QN AUTO: 29.6 PG (ref 26.6–33)
MCHC RBC AUTO-ENTMCNC: 31.6 G/DL (ref 31.5–35.7)
MCV RBC AUTO: 93.7 FL (ref 79–97)
MONOCYTES # BLD AUTO: 0.44 10*3/MM3 (ref 0.1–0.9)
MONOCYTES NFR BLD AUTO: 6.6 % (ref 5–12)
NEUTROPHILS NFR BLD AUTO: 4.08 10*3/MM3 (ref 1.7–7)
NEUTROPHILS NFR BLD AUTO: 61.3 % (ref 42.7–76)
NRBC BLD AUTO-RTO: 0 /100 WBC (ref 0–0.2)
PLATELET # BLD AUTO: 251 10*3/MM3 (ref 140–450)
PMV BLD AUTO: 10.2 FL (ref 6–12)
POTASSIUM SERPL-SCNC: 4.2 MMOL/L (ref 3.5–5.2)
PROT SERPL-MCNC: 6.9 G/DL (ref 6–8.5)
RBC # BLD AUTO: 4.42 10*6/MM3 (ref 3.77–5.28)
SODIUM SERPL-SCNC: 139 MMOL/L (ref 136–145)
TSH SERPL DL<=0.05 MIU/L-ACNC: 1.87 UIU/ML (ref 0.27–4.2)
WBC NRBC COR # BLD AUTO: 6.64 10*3/MM3 (ref 3.4–10.8)

## 2023-12-13 PROCEDURE — 82306 VITAMIN D 25 HYDROXY: CPT | Performed by: INTERNAL MEDICINE

## 2023-12-13 PROCEDURE — 99214 OFFICE O/P EST MOD 30 MIN: CPT | Performed by: INTERNAL MEDICINE

## 2023-12-13 PROCEDURE — 80050 GENERAL HEALTH PANEL: CPT | Performed by: INTERNAL MEDICINE

## 2023-12-13 NOTE — PROGRESS NOTES
Venipuncture Blood Specimen Collection  Venipuncture performed in left arm by Rosana Braun MA with good hemostasis. Patient tolerated the procedure well without complications.   12/13/23   Rosana Braun MA

## 2023-12-14 LAB — 25(OH)D3 SERPL-MCNC: 71.2 NG/ML (ref 30–100)

## 2024-01-09 RX ORDER — LETROZOLE 2.5 MG/1
2.5 TABLET, FILM COATED ORAL DAILY
Qty: 30 TABLET | Refills: 11 | Status: SHIPPED | OUTPATIENT
Start: 2024-01-09

## 2024-01-30 DIAGNOSIS — E78.2 MIXED HYPERLIPIDEMIA: ICD-10-CM

## 2024-01-31 RX ORDER — PRAVASTATIN SODIUM 20 MG
20 TABLET ORAL DAILY
Qty: 90 TABLET | Refills: 0 | Status: SHIPPED | OUTPATIENT
Start: 2024-01-31

## 2024-04-04 ENCOUNTER — TELEPHONE (OUTPATIENT)
Dept: FAMILY MEDICINE CLINIC | Facility: CLINIC | Age: 64
End: 2024-04-04
Payer: COMMERCIAL

## 2024-04-09 ENCOUNTER — OFFICE VISIT (OUTPATIENT)
Dept: FAMILY MEDICINE CLINIC | Facility: CLINIC | Age: 64
End: 2024-04-09
Payer: COMMERCIAL

## 2024-04-09 VITALS
HEIGHT: 64 IN | SYSTOLIC BLOOD PRESSURE: 124 MMHG | OXYGEN SATURATION: 96 % | WEIGHT: 167.6 LBS | TEMPERATURE: 97.5 F | DIASTOLIC BLOOD PRESSURE: 78 MMHG | BODY MASS INDEX: 28.61 KG/M2 | HEART RATE: 68 BPM

## 2024-04-09 DIAGNOSIS — E78.2 MIXED HYPERLIPIDEMIA: ICD-10-CM

## 2024-04-09 DIAGNOSIS — C50.911 MALIGNANT NEOPLASM OF RIGHT BREAST IN FEMALE, ESTROGEN RECEPTOR NEGATIVE, UNSPECIFIED SITE OF BREAST: ICD-10-CM

## 2024-04-09 DIAGNOSIS — Z17.1 MALIGNANT NEOPLASM OF RIGHT BREAST IN FEMALE, ESTROGEN RECEPTOR NEGATIVE, UNSPECIFIED SITE OF BREAST: ICD-10-CM

## 2024-04-09 DIAGNOSIS — I20.89 ANGINA OF EFFORT: Primary | ICD-10-CM

## 2024-04-09 PROCEDURE — 93000 ELECTROCARDIOGRAM COMPLETE: CPT | Performed by: FAMILY MEDICINE

## 2024-04-09 PROCEDURE — 99214 OFFICE O/P EST MOD 30 MIN: CPT | Performed by: FAMILY MEDICINE

## 2024-04-09 RX ORDER — PRAVASTATIN SODIUM 20 MG
20 TABLET ORAL DAILY
Qty: 90 TABLET | Refills: 1 | Status: SHIPPED | OUTPATIENT
Start: 2024-04-09

## 2024-04-09 RX ORDER — FAMOTIDINE 20 MG/1
20 TABLET, FILM COATED ORAL 2 TIMES DAILY
COMMUNITY

## 2024-04-10 NOTE — TELEPHONE ENCOUNTER
PA for Ubrelvy has been approved through 04/08/2025. I contacted the pt and informed her of the approval and informed her to contact us back with any further problems. The pt verbalized understanding.

## 2024-04-10 NOTE — PROGRESS NOTES
Chief Complaint  Mixed hyperlipidemia    Subjective        Jackie Johnson presents to [facility]  History of Present Illness  The patient is a 63-year-old female with medical conditions significant for meniscal tear, hyperlipidemia, and allergic rhinitis who presents to the clinic for a medical follow-up.    The patient's hip discomfort has shown improvement. She is under the care of [facility] and is scheduled for a mammogram in 10/2024, followed by a breast MRI in the spring of 6 months due to her family history. She is also undergoing a skin cancer screening, having had several skin lesions excised in the past. She has an appointment with [facility] in 02/2025 and is inquiring about the necessity of continuing care there. She experiences persistent coldness and is on a regimen of calcium, vitamin D, and weekly B12 injections.    The patient reported experiencing chest pain on 03/15/2024, 03/17/2024, and 03/18/2024, which has since improved. She expressed concern due to palpitations and chest tightness. She initially attributed these symptoms to significant stress related to her 's hospitalization for lung cancer. She describes a sensation of a lump or narrowing in her chest, which she believes is stress-related. She recalls an incident where she aspirated a chicken nugget and was unable to expel it. She also experienced back pain and a sensation of her heart rolling around while walking her dog on a hill. She switched from Zyrtec to Allegra and did not use Flonase last night, which has significantly improved her condition. She attempted Singulair with Zyrtec; however, discontinued due to an unusual feeling. An x-ray of her lungs at [facility] was normal. She was advised to schedule a stress test to rule out angina. She reported feeling fatigued, waking up at 4:30 AM to make flights and visit the hospital. She noted an elevated systolic blood pressure of 135  "at [facility] and 145 later today, which she attributes to a stressful new job. She believes her blood pressure readings are within normal limits at home. She reported coughing and suspected asthma, for which she used 2 inhalers.    Her mother had congestive heart failure.    Objective   Vital Signs:  /78 (BP Location: Right arm, Patient Position: Sitting, Cuff Size: Adult)   Pulse 68   Temp 97.5 °F (36.4 °C) (Temporal)   Ht 162.6 cm (64.02\")   Wt 76 kg (167 lb 9.6 oz)   SpO2 96%   BMI 28.75 kg/m²   Estimated body mass index is 28.75 kg/m² as calculated from the following:    Height as of this encounter: 162.6 cm (64.02\").    Weight as of this encounter: 76 kg (167 lb 9.6 oz).               Physical Exam     Gen: Patient in NAD. Pleasant and answers appropriately. A&Ox3.    Skin: Warm and dry with normal turgor. No purpura, rashes, or unusual pigmentation noted. Hair is normal in appearance and distribution.    HEENT: NC/AT. No lesions noted. Conjunctiva clear, sclera nonicteric. PERRL. EOMI without nystagmus or strabismus. Fundi appear benign. No hemorrhages or exudates of eyes. Auditory canals are patent bilaterally without lesions. TMs intact,  nonerythematous, nonbulging without lesions. Nasal mucosa pink, nonerythematous, and nonedematous. Frontal and maxillary sinuses are nontender. O/P nonerythematous and moist without exudate.    Neck: Supple without lymph nodes palpated. FROM.     Lungs: CTA B/L without rales, rhonchi, crackles, or wheezes.    Heart: RRR. S1 and S2 normal. No S3 or S4. No MRGT.    Abd: Soft, nontender,nondistended. (+)BSx4 quadrants.     Extrem: No CCE. Radial pulses 2+/4 and equal B/L. FROMx4. No bone, joint, or muscle tenderness noted.    Neuro: No focal motor/sensory deficits.    Result Review :    The following data was reviewed by: Scarlet Loera MD on 04/09/2024:      Imaging  Lung x-ray was normal.      ECG 12 Lead    Date/Time: 4/9/2024 6:12 PM  Performed by: " Scarlet Loera MD    Authorized by: Scarlet Loera MD  Comparison: not compared with previous ECG   Previous ECG: no previous ECG available  Rhythm: sinus rhythm  Rate: normal  ST Segments: ST segments normal  T Waves: T waves normal  QRS axis: normal  Other findings: low voltage    Clinical impression: non-specific ECG  Comments: NSR without any ST or T acute changes.  Low QRS voltage.            Assessment and Plan   This is a 64-year-old female presents to clinic for medical follow-up.  Diagnoses and all orders for this visit:    1. Angina of effort (Primary)  -     Stress Test With Myocardial Perfusion - One Day; Future    2. Mixed hyperlipidemia  -     pravastatin (PRAVACHOL) 20 MG tablet; Take 1 tablet by mouth Daily.  Dispense: 90 tablet; Refill: 1    3. Malignant neoplasm of right breast in female, estrogen receptor negative, unspecified site of breast    1. Routine medical follow-up.  The patient was informed that annual dermatological checks are necessary given her history of cancer and suspicious moles.    2. Chest discomfort.  The patient's recent chest radiograph did not reveal any abnormalities. An electrocardiogram (EKG) will be conducted today. Should the EKG results be normal, we will consider conducting an echocardiogram or a stress test.    3. Elevated blood pressure at home.  The patient's blood pressure readings today were within normal limits. The patient was advised to monitor her blood pressure at home. Should her blood pressure readings consistently fall in the 130s or 140s, she is to contact me.       I spent 32 minutes caring for Jackie on this date of service. This time includes time spent by me in the following activities:performing a medically appropriate examination and/or evaluation  and counseling and educating the patient/family/caregiver  Follow Up     Return in about 6 months (around 10/9/2024).  Patient was given instructions and counseling regarding her condition or  for health maintenance advice. Please see specific information pulled into the AVS if appropriate.     Transcribed from ambient dictation for Scarlet Loera MD by Wanda Sandoval.  04/10/24   08:18 EDT    Patient or patient representative verbalized consent to the visit recording.  I have personally performed the services described in this document as transcribed by the above individual, and it is both accurate and complete.

## 2024-05-21 ENCOUNTER — OFFICE VISIT (OUTPATIENT)
Dept: FAMILY MEDICINE CLINIC | Facility: CLINIC | Age: 64
End: 2024-05-21
Payer: COMMERCIAL

## 2024-05-21 VITALS
SYSTOLIC BLOOD PRESSURE: 114 MMHG | DIASTOLIC BLOOD PRESSURE: 66 MMHG | HEART RATE: 76 BPM | BODY MASS INDEX: 28.24 KG/M2 | HEIGHT: 64 IN | RESPIRATION RATE: 16 BRPM | OXYGEN SATURATION: 97 % | TEMPERATURE: 97.8 F | WEIGHT: 165.4 LBS

## 2024-05-21 DIAGNOSIS — J30.89 SEASONAL ALLERGIC RHINITIS DUE TO OTHER ALLERGIC TRIGGER: ICD-10-CM

## 2024-05-21 DIAGNOSIS — E53.8 B12 DEFICIENCY: ICD-10-CM

## 2024-05-21 DIAGNOSIS — J01.00 ACUTE NON-RECURRENT MAXILLARY SINUSITIS: Primary | ICD-10-CM

## 2024-05-21 LAB
EXPIRATION DATE: NORMAL
FLUAV AG UPPER RESP QL IA.RAPID: NOT DETECTED
FLUBV AG UPPER RESP QL IA.RAPID: NOT DETECTED
INTERNAL CONTROL: NORMAL
Lab: NORMAL
SARS-COV-2 AG UPPER RESP QL IA.RAPID: NOT DETECTED

## 2024-05-21 PROCEDURE — 87428 SARSCOV & INF VIR A&B AG IA: CPT | Performed by: STUDENT IN AN ORGANIZED HEALTH CARE EDUCATION/TRAINING PROGRAM

## 2024-05-21 PROCEDURE — 99214 OFFICE O/P EST MOD 30 MIN: CPT | Performed by: STUDENT IN AN ORGANIZED HEALTH CARE EDUCATION/TRAINING PROGRAM

## 2024-05-21 RX ORDER — GUAIFENESIN 600 MG/1
1200 TABLET, EXTENDED RELEASE ORAL 2 TIMES DAILY
Qty: 120 TABLET | Refills: 0 | Status: SHIPPED | OUTPATIENT
Start: 2024-05-21

## 2024-05-21 RX ORDER — CYANOCOBALAMIN 1000 UG/ML
1000 INJECTION, SOLUTION INTRAMUSCULAR; SUBCUTANEOUS
Qty: 30 ML | Refills: 1 | Status: SHIPPED | OUTPATIENT
Start: 2024-05-21

## 2024-05-21 RX ORDER — FLUTICASONE PROPIONATE 50 MCG
2 SPRAY, SUSPENSION (ML) NASAL DAILY
Qty: 16 G | Refills: 5 | Status: SHIPPED | OUTPATIENT
Start: 2024-05-21

## 2024-05-21 RX ORDER — DOXYCYCLINE HYCLATE 100 MG/1
100 CAPSULE ORAL 2 TIMES DAILY
Qty: 20 CAPSULE | Refills: 0 | Status: SHIPPED | OUTPATIENT
Start: 2024-05-21

## 2024-05-22 NOTE — PROGRESS NOTES
Chief Complaint  URI, Cough, Wheezing, Nasal Congestion, and Ear Fullness    HPI:   URI   Associated symptoms include coughing, a plugged ear sensation and wheezing.   Cough  Associated symptoms include wheezing.   Wheezing   Associated symptoms include coughing.   Ear Fullness   Associated symptoms include coughing.      Jackie Johnson is a 64 y.o. female who presents today to [facility] for URI, Cough, Wheezing, Nasal Congestion, and Ear Fullness. Patient presents for persistent cough and URI symptoms that started about 2 weeks ago. She reports persistent purulent nasal drainage, headaches, and low grade fevers. She denies shortness of breath or productive cough.    Previous History:   Past Medical History:   Diagnosis Date    Acid reflux     Asthma     Breast cancer 2020    rt br ca    Cancer     Eczema     Elevated cholesterol     Endometriosis     GERD (gastroesophageal reflux disease)     Hiatal hernia     High cholesterol     Seasonal allergies     Shaking     INTERMITTANT    Sinusitis     Staph infection 2020    port      Past Surgical History:   Procedure Laterality Date    BREAST BIOPSY Bilateral      SECTION      COLONOSCOPY N/A 2021    Procedure: COLONOSCOPY FOR SCREENING;  Surgeon: Keerthi Meneses MD;  Location: [facility] OR;  Service: Gastroenterology;  Laterality: N/A;    D & C HYSTEROSCOPY ENDOMETRIAL ABLATION      DIAGNOSTIC LAPAROSCOPY      FRACTURE SURGERY Left     tib/fib    FULGURATION ENDOMETRIOSIS      LEG SURGERY      Left leg following fracture    LYMPH NODE BIOPSY      MASTECTOMY Right     WITH LYMPH NODES REMOVAL    PORTACATH PLACEMENT N/A 3/18/2020    Procedure: INSERTION OF PORTACATH;  Surgeon: Dudley Aldridge MD;  Location: [facility];  Service: General;  Laterality: N/A;    SKIN BIOPSY      TONSILLECTOMY      TRANSESOPHAGEAL ECHOCARDIOGRAM (CINTIA)      VENOUS ACCESS DEVICE (PORT) INSERTION N/A 2020    Procedure:  INSERTION VENOUS ACCESS DEVICE;  Surgeon: Nerissa Wang MD;  Location:  [facility] OR;  Service: General;  Laterality: N/A;    VENOUS ACCESS DEVICE (PORT) INSERTION AND REMOVAL      VENOUS ACCESS DEVICE (PORT) REMOVAL Right 4/19/2020    Procedure: REMOVAL VENOUS ACCESS DEVICE;  Surgeon: Nerissa Wang MD;  Location:  [facility] OR;  Service: General;  Laterality: Right;      Social History     Socioeconomic History    Marital status:    Tobacco Use    Smoking status: Never    Smokeless tobacco: Never   Vaping Use    Vaping status: Never Used   Substance and Sexual Activity    Alcohol use: Never    Drug use: Never    Sexual activity: Defer     Partners: Male     Birth control/protection: None      Health Maintenance Due   Topic Date Due    ZOSTER VACCINE (1 of 2) Never done    HEPATITIS C SCREENING  Never done    ANNUAL PHYSICAL  Never done    RSV Vaccine - Adults (1 - 1-dose 60+ series) Never done    Pneumococcal Vaccine 0-64 (2 of 2 - PPSV23 or PCV20) 05/12/2020        Current Medications:  Current Outpatient Medications   Medication Sig Dispense Refill    calcium carbonate-vitamin d 600-400 MG-UNIT per tablet Take 2 tablets by mouth Daily.      cyanocobalamin 1000 MCG/ML injection Inject 1 mL into the appropriate muscle as directed by prescriber Every 7 (Seven) Days. 30 mL 1    famotidine (PEPCID) 20 MG tablet Take 1 tablet by mouth 2 (Two) Times a Day As Needed for Indigestion or Heartburn.      fexofenadine ODT (ALLEGRA ODT) 30 MG disintegrating tablet Place 1 tablet on the tongue 2 (Two) Times a Day. 180 tablet 0    fluticasone (FLONASE) 50 MCG/ACT nasal spray 2 sprays into the nostril(s) as directed by provider Daily. 16 g 5    guaiFENesin (Mucinex) 600 MG 12 hr tablet Take 2 tablets by mouth 2 (Two) Times a Day. 120 tablet 0    letrozole (FEMARA) 2.5 MG tablet TAKE 1 TABLET BY MOUTH DAILY 30 tablet 11    levalbuterol (XOPENEX HFA) 45 MCG/ACT inhaler Inhale 1-2 puffs Every 4 (Four) Hours As Needed for  "Wheezing. 15 g 2    multivitamin with minerals tablet tablet Take 1 tablet by mouth Daily.      pravastatin (PRAVACHOL) 20 MG tablet Take 1 tablet by mouth Daily. 90 tablet 1    Syringe/Needle, Disp, 25G X 5/8\" 1 ML misc 1 application Every 14 (Fourteen) Days. 50 each 0    ubrogepant (UBRELVY) 100 MG tablet Take 1 tablet by mouth 1 (One) Time As Needed (headache). Can take a second one an hour after if headache persists. 30 tablet 2    cetirizine (zyrTEC) 10 MG tablet Take 1 tablet by mouth Daily. (Patient not taking: Reported on 9/20/2023) 30 tablet 2    doxycycline (VIBRAMYCIN) 100 MG capsule Take 1 capsule by mouth 2 (Two) Times a Day. 20 capsule 0     Current Facility-Administered Medications   Medication Dose Route Frequency Provider Last Rate Last Admin    cyanocobalamin injection 1,000 mcg  1,000 mcg Intramuscular Q28 Days Concepcion Queen APRN   1,000 mcg at 11/24/21 1621       Allergies:   Allergies   Allergen Reactions    Albuterol Other (See Comments)     One time severe headache, questionable for aneurysm, did spinal tap was positive, did Angiogram and MRI were Negative for aneurysm    Penicillins Hives    Clindamycin Hcl Rash    Crestor [Rosuvastatin Calcium] Other (See Comments)     Caused high liver enxymes    Sulfa Antibiotics Other (See Comments) and Swelling     Mucosal lesions    Triprolidine-Pseudoephedrine Swelling    Latex Rash       Vitals:   /66 (BP Location: Right arm, Patient Position: Sitting, Cuff Size: Large Adult)   Pulse 76   Temp 97.8 °F (36.6 °C) (Temporal)   Resp 16   Ht 162.6 cm (64\")   Wt 75 kg (165 lb 6.4 oz)   SpO2 97%   BMI 28.39 kg/m²     Estimated body mass index is 28.39 kg/m² as calculated from the following:    Height as of this encounter: 162.6 cm (64\").    Weight as of this encounter: 75 kg (165 lb 6.4 oz).    Jackie Johnson  reports that she has never smoked. She has never used smokeless tobacco.           Physical Exam:   Physical Exam  Constitutional:  "      Appearance: Normal appearance.   HENT:      Nose: Rhinorrhea present. Rhinorrhea is purulent.      Right Sinus: Maxillary sinus tenderness present.      Left Sinus: Maxillary sinus tenderness present.      Mouth/Throat:      Mouth: Mucous membranes are moist.   Cardiovascular:      Rate and Rhythm: Normal rate and regular rhythm.   Pulmonary:      Effort: Pulmonary effort is normal.      Breath sounds: Normal breath sounds. No wheezing or rhonchi.   Musculoskeletal:         General: Normal range of motion.   Skin:     General: Skin is warm and dry.   Neurological:      Mental Status: She is alert.   Psychiatric:         Mood and Affect: Mood normal.        Lab Results:   Office Visit on 05/21/2024   Component Date Value Ref Range Status    SARS Antigen 05/21/2024 Not Detected  Not Detected, Presumptive Negative Final    Influenza A Antigen JACINTO 05/21/2024 Not Detected  Not Detected Final    Influenza B Antigen JACINTO 05/21/2024 Not Detected  Not Detected Final    Internal Control 05/21/2024 Passed  Passed Final    Lot Number 05/21/2024 [id number]   Final    Expiration Date 05/21/2024 02/15/2025   Final   Lab on 12/13/2023   Component Date Value Ref Range Status    Glucose 12/13/2023 133 (H)  65 - 99 mg/dL Final    BUN 12/13/2023 15  8 - 23 mg/dL Final    Creatinine 12/13/2023 0.90  0.57 - 1.00 mg/dL Final    Sodium 12/13/2023 139  136 - 145 mmol/L Final    Potassium 12/13/2023 4.2  3.5 - 5.2 mmol/L Final    Slight hemolysis detected by analyzer. Result may be falsely elevated.    Chloride 12/13/2023 102  98 - 107 mmol/L Final    CO2 12/13/2023 28.0  22.0 - 29.0 mmol/L Final    Calcium 12/13/2023 9.7  8.6 - 10.5 mg/dL Final    Total Protein 12/13/2023 6.9  6.0 - 8.5 g/dL Final    Albumin 12/13/2023 4.5  3.5 - 5.2 g/dL Final    ALT (SGPT) 12/13/2023 21  1 - 33 U/L Final    AST (SGOT) 12/13/2023 19  1 - 32 U/L Final    Alkaline Phosphatase 12/13/2023 56  39 - 117 U/L Final    Total Bilirubin 12/13/2023 0.2  0.0 - 1.2  mg/dL Final    Globulin 12/13/2023 2.4  gm/dL Final    A/G Ratio 12/13/2023 1.9  g/dL Final    BUN/Creatinine Ratio 12/13/2023 16.7  7.0 - 25.0 Final    Anion Gap 12/13/2023 9.0  5.0 - 15.0 mmol/L Final    eGFR 12/13/2023 72.0  >60.0 mL/min/1.73 Final    TSH 12/13/2023 1.870  0.270 - 4.200 uIU/mL Final    25 Hydroxy, Vitamin D 12/13/2023 71.2  30.0 - 100.0 ng/ml Final    WBC 12/13/2023 6.64  3.40 - 10.80 10*3/mm3 Final    RBC 12/13/2023 4.42  3.77 - 5.28 10*6/mm3 Final    Hemoglobin 12/13/2023 13.1  12.0 - 15.9 g/dL Final    Hematocrit 12/13/2023 41.4  34.0 - 46.6 % Final    MCV 12/13/2023 93.7  79.0 - 97.0 fL Final    MCH 12/13/2023 29.6  26.6 - 33.0 pg Final    MCHC 12/13/2023 31.6  31.5 - 35.7 g/dL Final    RDW 12/13/2023 13.3  12.3 - 15.4 % Final    RDW-SD 12/13/2023 46.0  37.0 - 54.0 fl Final    MPV 12/13/2023 10.2  6.0 - 12.0 fL Final    Platelets 12/13/2023 251  140 - 450 10*3/mm3 Final    Neutrophil % 12/13/2023 61.3  42.7 - 76.0 % Final    Lymphocyte % 12/13/2023 30.0  19.6 - 45.3 % Final    Monocyte % 12/13/2023 6.6  5.0 - 12.0 % Final    Eosinophil % 12/13/2023 1.1  0.3 - 6.2 % Final    Basophil % 12/13/2023 0.8  0.0 - 1.5 % Final    Immature Grans % 12/13/2023 0.2  0.0 - 0.5 % Final    Neutrophils, Absolute 12/13/2023 4.08  1.70 - 7.00 10*3/mm3 Final    Lymphocytes, Absolute 12/13/2023 1.99  0.70 - 3.10 10*3/mm3 Final    Monocytes, Absolute 12/13/2023 0.44  0.10 - 0.90 10*3/mm3 Final    Eosinophils, Absolute 12/13/2023 0.07  0.00 - 0.40 10*3/mm3 Final    Basophils, Absolute 12/13/2023 0.05  0.00 - 0.20 10*3/mm3 Final    Immature Grans, Absolute 12/13/2023 0.01  0.00 - 0.05 10*3/mm3 Final    nRBC 12/13/2023 0.0  0.0 - 0.2 /100 WBC Final       Assessment and Plan  Diagnoses and all orders for this visit:    1. Acute non-recurrent maxillary sinusitis (Primary)  -     POCT SARS-CoV-2 Antigen JACINTO + Flu  -     doxycycline (VIBRAMYCIN) 100 MG capsule; Take 1 capsule by mouth 2 (Two) Times a Day.  Dispense: 20  capsule; Refill: 0    2. Seasonal allergic rhinitis due to other allergic trigger  -     fexofenadine ODT (ALLEGRA ODT) 30 MG disintegrating tablet; Place 1 tablet on the tongue 2 (Two) Times a Day.  Dispense: 180 tablet; Refill: 0  -     guaiFENesin (Mucinex) 600 MG 12 hr tablet; Take 2 tablets by mouth 2 (Two) Times a Day.  Dispense: 120 tablet; Refill: 0  -     fluticasone (FLONASE) 50 MCG/ACT nasal spray; 2 sprays into the nostril(s) as directed by provider Daily.  Dispense: 16 g; Refill: 5    3. B12 deficiency  -     cyanocobalamin 1000 MCG/ML injection; Inject 1 mL into the appropriate muscle as directed by prescriber Every 7 (Seven) Days.  Dispense: 30 mL; Refill: 1      Patients exam is consistent with maxillary sinusitis. Will treat with 10 days of doxycycline, follow up if no improvement.   Patient has evidence of severe post nasal drip and allergic rhinitis opn exam. Continue allega, mucinex, and flonase.   Patient is on B12 injections per her primary. Her B12 has been supratheraputic. This has no known adverse effects. I will refill her prescription.           New Medications:   New Medications Ordered This Visit   Medications    fexofenadine ODT (ALLEGRA ODT) 30 MG disintegrating tablet     Sig: Place 1 tablet on the tongue 2 (Two) Times a Day.     Dispense:  180 tablet     Refill:  0    guaiFENesin (Mucinex) 600 MG 12 hr tablet     Sig: Take 2 tablets by mouth 2 (Two) Times a Day.     Dispense:  120 tablet     Refill:  0    fluticasone (FLONASE) 50 MCG/ACT nasal spray     Si sprays into the nostril(s) as directed by provider Daily.     Dispense:  16 g     Refill:  5     ZERO refills remain on this prescription. Your patient is requesting advance approval of refills for this medication to PREVENT ANY MISSED DOSES    cyanocobalamin 1000 MCG/ML injection     Sig: Inject 1 mL into the appropriate muscle as directed by prescriber Every 7 (Seven) Days.     Dispense:  30 mL     Refill:  1    doxycycline  (VIBRAMYCIN) 100 MG capsule     Sig: Take 1 capsule by mouth 2 (Two) Times a Day.     Dispense:  20 capsule     Refill:  0       Discontinued Medications:   Medications Discontinued During This Encounter   Medication Reason    cyanocobalamin 1000 MCG/ML injection Reorder    guaiFENesin (Mucinex) 600 MG 12 hr tablet Reorder    fluticasone (FLONASE) 50 MCG/ACT nasal spray Reorder    fexofenadine ODT (ALLEGRA ODT) 30 MG disintegrating tablet Reorder                 Follow Up:   No follow-ups on file.    Patient was given instructions and counseling regarding her condition or for health maintenance advice. Please see specific information pulled into the AVS if appropriate.       This document has been electronically signed by Andrez Shah DO   May 22, 2024 08:55 EDT    Dictated Utilizing Dragon Dictation: Part of this note may be an electronic transcription/translation of spoken language to printed text using the Dragon Dictation System.

## 2024-06-14 ENCOUNTER — TELEPHONE (OUTPATIENT)
Dept: FAMILY MEDICINE CLINIC | Facility: CLINIC | Age: 64
End: 2024-06-14
Payer: COMMERCIAL

## 2024-06-14 NOTE — TELEPHONE ENCOUNTER
Caller: Jackie Johnson    Relationship: Self    Best call back number: [phone]     What is the best time to reach you: ANY    Who are you requesting to speak with (clinical staff, provider,  specific staff member): NURSE    Do you know the name of the person who called: PATIENT    What was the call regarding: PATIENT SCRATCHED HAND ON CANDACE NAIL, IS SHE UP TO DATE OF TETANUS?    Is it okay if the provider responds through MyChart: PHONE CALL PLEASE

## 2024-06-14 NOTE — TELEPHONE ENCOUNTER
Looked at the pt's immunization records and found that the pt just had the TDAP in October of 2023 so she is well in the coverage window. Attempted to contact the pt back but she did not answer. Left detailed message.    HUB to read.

## 2024-06-17 ENCOUNTER — HOSPITAL ENCOUNTER (OUTPATIENT)
Dept: NUCLEAR MEDICINE | Facility: HOSPITAL | Age: 64
Discharge: HOME | End: 2024-06-17
Payer: COMMERCIAL

## 2024-06-17 ENCOUNTER — HOSPITAL ENCOUNTER (OUTPATIENT)
Dept: CARDIOLOGY | Facility: HOSPITAL | Age: 64
Discharge: HOME | End: 2024-06-17
Payer: COMMERCIAL

## 2024-06-17 ENCOUNTER — LAB (OUTPATIENT)
Dept: ONCOLOGY | Facility: CLINIC | Age: 64
End: 2024-06-17
Payer: COMMERCIAL

## 2024-06-17 ENCOUNTER — OFFICE VISIT (OUTPATIENT)
Dept: ONCOLOGY | Facility: CLINIC | Age: 64
End: 2024-06-17
Payer: COMMERCIAL

## 2024-06-17 VITALS
OXYGEN SATURATION: 99 % | HEART RATE: 62 BPM | RESPIRATION RATE: 18 BRPM | WEIGHT: 165 LBS | SYSTOLIC BLOOD PRESSURE: 134 MMHG | BODY MASS INDEX: 28.32 KG/M2 | TEMPERATURE: 97.3 F | DIASTOLIC BLOOD PRESSURE: 96 MMHG

## 2024-06-17 DIAGNOSIS — C50.911 MALIGNANT NEOPLASM OF RIGHT BREAST IN FEMALE, ESTROGEN RECEPTOR NEGATIVE, UNSPECIFIED SITE OF BREAST: ICD-10-CM

## 2024-06-17 DIAGNOSIS — C50.911 MALIGNANT NEOPLASM OF RIGHT BREAST IN FEMALE, ESTROGEN RECEPTOR NEGATIVE, UNSPECIFIED SITE OF BREAST: Primary | ICD-10-CM

## 2024-06-17 DIAGNOSIS — Z17.1 MALIGNANT NEOPLASM OF RIGHT BREAST IN FEMALE, ESTROGEN RECEPTOR NEGATIVE, UNSPECIFIED SITE OF BREAST: ICD-10-CM

## 2024-06-17 DIAGNOSIS — R53.83 FATIGUE, UNSPECIFIED TYPE: ICD-10-CM

## 2024-06-17 DIAGNOSIS — N60.92 ATYPICAL LOBULAR HYPERPLASIA (ALH) OF LEFT BREAST: ICD-10-CM

## 2024-06-17 DIAGNOSIS — Z17.1 MALIGNANT NEOPLASM OF RIGHT BREAST IN FEMALE, ESTROGEN RECEPTOR NEGATIVE, UNSPECIFIED SITE OF BREAST: Primary | ICD-10-CM

## 2024-06-17 DIAGNOSIS — M85.80 OSTEOPENIA, UNSPECIFIED LOCATION: ICD-10-CM

## 2024-06-17 DIAGNOSIS — E55.9 VITAMIN D DEFICIENCY: ICD-10-CM

## 2024-06-17 DIAGNOSIS — I20.89 ANGINA OF EFFORT: ICD-10-CM

## 2024-06-17 LAB
25(OH)D3 SERPL-MCNC: 57.4 NG/ML (ref 30–100)
ALBUMIN SERPL-MCNC: 4.6 G/DL (ref 3.5–5.2)
ALBUMIN/GLOB SERPL: 2 G/DL
ALP SERPL-CCNC: 44 U/L (ref 39–117)
ALT SERPL W P-5'-P-CCNC: 23 U/L (ref 1–33)
ANION GAP SERPL CALCULATED.3IONS-SCNC: 11.6 MMOL/L (ref 5–15)
AST SERPL-CCNC: 21 U/L (ref 1–32)
BASOPHILS # BLD AUTO: 0.06 10*3/MM3 (ref 0–0.2)
BASOPHILS NFR BLD AUTO: 1.2 % (ref 0–1.5)
BH CV NUCLEAR PRIOR STUDY: 3
BH CV REST NUCLEAR ISOTOPE DOSE: 10.1 MCI
BH CV STRESS BP STAGE 1: NORMAL
BH CV STRESS BP STAGE 2: NORMAL
BH CV STRESS COMMENTS STAGE 1: NORMAL
BH CV STRESS DOSE REGADENOSON STAGE 1: 0.4
BH CV STRESS DURATION MIN STAGE 1: 3
BH CV STRESS DURATION MIN STAGE 2: 3
BH CV STRESS DURATION MIN STAGE 3: 2
BH CV STRESS DURATION SEC STAGE 1: 0
BH CV STRESS DURATION SEC STAGE 2: 0
BH CV STRESS DURATION SEC STAGE 3: 0
BH CV STRESS GRADE STAGE 1: 10
BH CV STRESS GRADE STAGE 2: 12
BH CV STRESS GRADE STAGE 3: 14
BH CV STRESS HR STAGE 1: 94
BH CV STRESS HR STAGE 2: 120
BH CV STRESS HR STAGE 3: 136
BH CV STRESS METS STAGE 1: 5
BH CV STRESS METS STAGE 2: 7.5
BH CV STRESS METS STAGE 3: 10
BH CV STRESS NUCLEAR ISOTOPE DOSE: 30.1 MCI
BH CV STRESS PROTOCOL 1: NORMAL
BH CV STRESS RECOVERY BP: NORMAL MMHG
BH CV STRESS RECOVERY HR: 74 BPM
BH CV STRESS SPEED STAGE 1: 1.7
BH CV STRESS SPEED STAGE 2: 2.5
BH CV STRESS SPEED STAGE 3: 3.4
BH CV STRESS STAGE 1: 1
BH CV STRESS STAGE 2: 2
BH CV STRESS STAGE 3: 3
BILIRUB SERPL-MCNC: 0.4 MG/DL (ref 0–1.2)
BUN SERPL-MCNC: 12 MG/DL (ref 8–23)
BUN/CREAT SERPL: 14.1 (ref 7–25)
CALCIUM SPEC-SCNC: 9.8 MG/DL (ref 8.6–10.5)
CHLORIDE SERPL-SCNC: 102 MMOL/L (ref 98–107)
CO2 SERPL-SCNC: 24.4 MMOL/L (ref 22–29)
CREAT SERPL-MCNC: 0.85 MG/DL (ref 0.57–1)
DEPRECATED RDW RBC AUTO: 45 FL (ref 37–54)
EGFRCR SERPLBLD CKD-EPI 2021: 76.6 ML/MIN/1.73
EOSINOPHIL # BLD AUTO: 0.08 10*3/MM3 (ref 0–0.4)
EOSINOPHIL NFR BLD AUTO: 1.6 % (ref 0.3–6.2)
ERYTHROCYTE [DISTWIDTH] IN BLOOD BY AUTOMATED COUNT: 13.2 % (ref 12.3–15.4)
GLOBULIN UR ELPH-MCNC: 2.3 GM/DL
GLUCOSE SERPL-MCNC: 97 MG/DL (ref 65–99)
HCT VFR BLD AUTO: 43 % (ref 34–46.6)
HGB BLD-MCNC: 13.7 G/DL (ref 12–15.9)
IMM GRANULOCYTES # BLD AUTO: 0.04 10*3/MM3 (ref 0–0.05)
IMM GRANULOCYTES NFR BLD AUTO: 0.8 % (ref 0–0.5)
LYMPHOCYTES # BLD AUTO: 1.63 10*3/MM3 (ref 0.7–3.1)
LYMPHOCYTES NFR BLD AUTO: 32.8 % (ref 19.6–45.3)
MAXIMAL PREDICTED HEART RATE: 156 BPM
MCH RBC QN AUTO: 29.7 PG (ref 26.6–33)
MCHC RBC AUTO-ENTMCNC: 31.9 G/DL (ref 31.5–35.7)
MCV RBC AUTO: 93.1 FL (ref 79–97)
MONOCYTES # BLD AUTO: 0.38 10*3/MM3 (ref 0.1–0.9)
MONOCYTES NFR BLD AUTO: 7.6 % (ref 5–12)
NEUTROPHILS NFR BLD AUTO: 2.78 10*3/MM3 (ref 1.7–7)
NEUTROPHILS NFR BLD AUTO: 56 % (ref 42.7–76)
NRBC BLD AUTO-RTO: 0 /100 WBC (ref 0–0.2)
PERCENT MAX PREDICTED HR: 87.18 %
PLATELET # BLD AUTO: 227 10*3/MM3 (ref 140–450)
PMV BLD AUTO: 10.1 FL (ref 6–12)
POTASSIUM SERPL-SCNC: 4.5 MMOL/L (ref 3.5–5.2)
PROT SERPL-MCNC: 6.9 G/DL (ref 6–8.5)
RBC # BLD AUTO: 4.62 10*6/MM3 (ref 3.77–5.28)
SODIUM SERPL-SCNC: 138 MMOL/L (ref 136–145)
STRESS BASELINE BP: NORMAL MMHG
STRESS BASELINE HR: 64 BPM
STRESS PERCENT HR: 103 %
STRESS POST ESTIMATED WORKLOAD: 10.1 METS
STRESS POST EXERCISE DUR MIN: 8 MIN
STRESS POST PEAK BP: NORMAL MMHG
STRESS POST PEAK HR: 136 BPM
STRESS TARGET HR: 133 BPM
TSH SERPL DL<=0.05 MIU/L-ACNC: 1.86 UIU/ML (ref 0.27–4.2)
WBC NRBC COR # BLD AUTO: 4.97 10*3/MM3 (ref 3.4–10.8)

## 2024-06-17 PROCEDURE — A9500 TC99M SESTAMIBI: HCPCS | Performed by: FAMILY MEDICINE

## 2024-06-17 PROCEDURE — 93018 CV STRESS TEST I&R ONLY: CPT | Performed by: INTERNAL MEDICINE

## 2024-06-17 PROCEDURE — 78452 HT MUSCLE IMAGE SPECT MULT: CPT | Performed by: INTERNAL MEDICINE

## 2024-06-17 PROCEDURE — 80050 GENERAL HEALTH PANEL: CPT | Performed by: INTERNAL MEDICINE

## 2024-06-17 PROCEDURE — 78452 HT MUSCLE IMAGE SPECT MULT: CPT

## 2024-06-17 PROCEDURE — 0 TECHNETIUM SESTAMIBI: Performed by: FAMILY MEDICINE

## 2024-06-17 PROCEDURE — 99214 OFFICE O/P EST MOD 30 MIN: CPT | Performed by: NURSE PRACTITIONER

## 2024-06-17 PROCEDURE — 82306 VITAMIN D 25 HYDROXY: CPT | Performed by: INTERNAL MEDICINE

## 2024-06-17 PROCEDURE — 93017 CV STRESS TEST TRACING ONLY: CPT

## 2024-06-17 RX ADMIN — TECHNETIUM TC 99M SESTAMIBI 1 DOSE: 1 INJECTION INTRAVENOUS at 10:49

## 2024-06-17 RX ADMIN — TECHNETIUM TC 99M SESTAMIBI 1 DOSE: 1 INJECTION INTRAVENOUS at 09:14

## 2024-06-17 NOTE — PROGRESS NOTES
NAME: Jackie Johnson    : 1960    DATE:  2024    DIAGNOSIS:   1.  Clinical Stage IIB ([id number]) poorly differentiated invasive ductal carcinoma of the right breast.  Tumor spanned 15-16 cm, though it was unclear how much of this was invasive malignancy v. DCIS.  There were no clinically or radiographically supspicious axillary LNs.  There was associated high grade DCIS.  Tumor was ER-,MN-, HER-2 +  .  Following neoadjuvant TCHP, she had complete pathologic response - ypT0N0(sn).  No invasive malignancy detected.  0/6 LN involved.     2.  Atypical Lobular Hyperplasia and LCIS involving the L breast    CHIEF COMPLAINT:  Follow up of Breast Cancer    TREATMENT HISTORY:  1.      2.  R Mastectectomy and SLNBx with L Lumpectomy and Mastopexy  - Dr. Kadeem Serrano and Dr. Roc Huston ([facility]) 20    3.       4.  Femara started 3/23/2021    HISTORY OF PRESENT ILLNESS:   Jackie Johnson is a very pleasant 64 y.o. female who was referred by Dr. STACIE Richards for evaluation and treatment of breast cancer. She has a strong family h/o breast cancer and so is normally very good about getting her mammograms done.  She recently moved from [facility] and so missed her mammogram in 2019 because of the move.  She got established with a new PCP (Dr. Denver Sheth) in 2019 and was referred to a a new gynecologist  (Dr. Brayan Richards) who she saw in .  Kevin Richards ordered a screening mammogram.  She had an acute febrile illness around the time of the Super Bowl and at that time thought she might have a lump in her R breast.  While waiting on mammogram scheduling, she started to have pain in the breast and in eventually she would occasionally get a little bit of discharge from the right nipple.  She presented for  Screening mammography 19 as below.  This was followed by diagnostic mammogram and U/S on 20.  She had an abnormal span of 15 cm spanning the  R breast.  She underwent stereotactic biopsy on 1-21-20.    This revealed a poorly differentiated invasive ductal carcinoma of the R breast with associated high grade DCIS.  Tumor was ER/ID- and HER-2+ by IHC.  Her sister lives in [place] and was treated at [facility], so she went there for further evaluation. She saw Dr. Romero of medical oncology and Dr. Serrano of surgical oncology.  She had staging with CT CAP and bone scan as well as Brain MRI and had no evidence of distant metastatic spread.  She had reese breast MRI as well which showed an unexpected abnormality in the L breast described as linear non-mass enhancement spanning an 8 cm area in the inferior breast at 6:00.  This was biopsied on 2-28-20 with biopsy result pending.  No abnormal axillary LNs were identified.  Neoadjuvant chemotherapy was recommended to her by her [facility] team of physicians but she wishes to seek this treatment closer to home so is here today for further evaluation and treatment.    She is in good health overall. She reports an isolated episode of fever/chills during an acute illness, abdominal fullness to her right side, fatigue and a rash circling her neck. She denies changes in weight, cp, sob, persistent abdominal pain, n/v/c/d or bony pain.    INTERVAL HISTORY:  Ms. Johnson presents today for follow up of breast cancer. Overall, she reports that she has been doing well since her last visit. She continues to take Femara daily and is tolerating this well. She has also been receiving Prolia [id number] per [facility]. She recently underwent bilateral breast MRI which was unremarkable. She is without any specific complaints or concerns today.       PAST MEDICAL HISTORY:  Past Medical History:   Diagnosis Date    Acid reflux     Asthma     Breast cancer 01/2020    rt br ca    Cancer     Eczema     Elevated cholesterol     Endometriosis     GERD (gastroesophageal reflux disease)     Hiatal hernia     High cholesterol     Seasonal  allergies     Shaking     INTERMITTANT    Sinusitis     Staph infection 2020    port       PAST SURGICAL HISTORY:  Past Surgical History:   Procedure Laterality Date    BREAST BIOPSY Bilateral      SECTION      COLONOSCOPY N/A 2021    Procedure: COLONOSCOPY FOR SCREENING;  Surgeon: Keerthi Meneses MD;  Location: [facility];  Service: Gastroenterology;  Laterality: N/A;    D & C HYSTEROSCOPY ENDOMETRIAL ABLATION      DIAGNOSTIC LAPAROSCOPY      FRACTURE SURGERY Left     tib/fib    FULGURATION ENDOMETRIOSIS      LEG SURGERY      Left leg following fracture    LYMPH NODE BIOPSY      MASTECTOMY Right     WITH LYMPH NODES REMOVAL    PORTACATH PLACEMENT N/A 3/18/2020    Procedure: INSERTION OF PORTACATH;  Surgeon: Dudley Aldridge MD;  Location: [facility];  Service: General;  Laterality: N/A;    SKIN BIOPSY      TONSILLECTOMY      TRANSESOPHAGEAL ECHOCARDIOGRAM (CINTIA)      VENOUS ACCESS DEVICE (PORT) INSERTION N/A 2020    Procedure: INSERTION VENOUS ACCESS DEVICE;  Surgeon: Nerissa Wang MD;  Location: [facility];  Service: General;  Laterality: N/A;    VENOUS ACCESS DEVICE (PORT) INSERTION AND REMOVAL      VENOUS ACCESS DEVICE (PORT) REMOVAL Right 2020    Procedure: REMOVAL VENOUS ACCESS DEVICE;  Surgeon: Nerissa Wang MD;  Location: [facility];  Service: General;  Laterality: Right;       FAMILY HISTORY:  Family History   Problem Relation Age of Onset    Breast cancer Mother 75    Basal cell carcinoma Mother 90        2 small spots on face removed + fair complexion    Heart disease Mother     Hypertension Mother     Diabetes type II Mother     Breast cancer Sister 55        VUS detected on genetic testing CDKN2A gene (c.-33G>C).     Lung disease Sister     Heart disease Father     Throat cancer Father 79    Lung cancer Father 79    Dementia Father     Parkinsonism Father     Cancer Maternal Grandmother 65        unknown gynecologic cancer, cervical vs uterine    Heart  disease Maternal Grandmother     Diabetes type II Maternal Grandmother     Lymphoma Maternal Grandfather 64    Breast cancer Paternal Grandmother 70    Prostate cancer Paternal Grandfather 70        metastastic to liver    Breast cancer Paternal Aunt 77    Breast cancer Maternal Aunt 80    Breast cancer Other     Ovarian cancer Sister 66        Elevated ; 1A tumor; Borderline cancer; both ovaries & lg cyst removed    Ovarian cancer Maternal Cousin 54    Breast cancer Other 50    Diabetes type I Nephew          SOCIAL HISTORY:  Social History     Socioeconomic History    Marital status:    Tobacco Use    Smoking status: Never    Smokeless tobacco: Never   Vaping Use    Vaping status: Never Used   Substance and Sexual Activity    Alcohol use: Never    Drug use: Never    Sexual activity: Defer     Partners: Male     Birth control/protection: None     REVIEW OF SYSTEMS:   A comprehensive 14 point review of systems was performed.  Significant findings as mentioned above.  All other systems reviewed and are negative.      MEDICATIONS:  The current medication list was reviewed in the EMR    Current Outpatient Medications:     calcium carbonate-vitamin d 600-400 MG-UNIT per tablet, Take 2 tablets by mouth Daily., Disp: , Rfl:     cyanocobalamin 1000 MCG/ML injection, Inject 1 mL into the appropriate muscle as directed by prescriber Every 7 (Seven) Days., Disp: 30 mL, Rfl: 1    doxycycline (VIBRAMYCIN) 100 MG capsule, Take 1 capsule by mouth 2 (Two) Times a Day., Disp: 20 capsule, Rfl: 0    famotidine (PEPCID) 20 MG tablet, Take 1 tablet by mouth 2 (Two) Times a Day As Needed for Indigestion or Heartburn., Disp: , Rfl:     fexofenadine ODT (ALLEGRA ODT) 30 MG disintegrating tablet, Place 1 tablet on the tongue 2 (Two) Times a Day., Disp: 180 tablet, Rfl: 0    fluticasone (FLONASE) 50 MCG/ACT nasal spray, 2 sprays into the nostril(s) as directed by provider Daily., Disp: 16 g, Rfl: 5    guaiFENesin (Mucinex) 600  "MG 12 hr tablet, Take 2 tablets by mouth 2 (Two) Times a Day., Disp: 120 tablet, Rfl: 0    letrozole (FEMARA) 2.5 MG tablet, TAKE 1 TABLET BY MOUTH DAILY, Disp: 30 tablet, Rfl: 11    levalbuterol (XOPENEX HFA) 45 MCG/ACT inhaler, Inhale 1-2 puffs Every 4 (Four) Hours As Needed for Wheezing., Disp: 15 g, Rfl: 2    multivitamin with minerals tablet tablet, Take 1 tablet by mouth Daily., Disp: , Rfl:     pravastatin (PRAVACHOL) 20 MG tablet, Take 1 tablet by mouth Daily., Disp: 90 tablet, Rfl: 1    Syringe/Needle, Disp, 25G X 5/8\" 1 ML misc, 1 application Every 14 (Fourteen) Days., Disp: 50 each, Rfl: 0    ubrogepant (UBRELVY) 100 MG tablet, Take 1 tablet by mouth 1 (One) Time As Needed (headache). Can take a second one an hour after if headache persists., Disp: 30 tablet, Rfl: 2    cetirizine (zyrTEC) 10 MG tablet, Take 1 tablet by mouth Daily. (Patient not taking: Reported on 9/20/2023), Disp: 30 tablet, Rfl: 2    Current Facility-Administered Medications:     cyanocobalamin injection 1,000 mcg, 1,000 mcg, Intramuscular, Q28 Days, Bernice, Concepcion, APRN, 1,000 mcg at 11/24/21 1621    ALLERGIES:    Allergies   Allergen Reactions    Albuterol Other (See Comments)     One time severe headache, questionable for aneurysm, did spinal tap was positive, did Angiogram and MRI were Negative for aneurysm    Penicillins Hives    Clindamycin Hcl Rash    Crestor [Rosuvastatin Calcium] Other (See Comments)     Caused high liver enxymes    Sulfa Antibiotics Other (See Comments) and Swelling     Mucosal lesions    Triprolidine-Pseudoephedrine Swelling    Latex Rash       PHYSICAL EXAM:  Vitals:    06/17/24 1151   BP: 134/96   Pulse: 62   Resp: 18   Temp: 97.3 °F (36.3 °C)   SpO2: 99%       Pain Score    06/17/24 1151   PainSc: 0-No pain     ECOG score: 0   General:  Awake, alert and oriented, appears well. In no acute distress.  HEENT:  Pupils are equal, round and reactive to light and accommodation, Extra-ocular movements full, " Oropharyx clear, mucous membranes moist  Neck:  No JVD, thyromegaly or lymphadenopathy   CV:  Regular rate and rhythm, no murmurs, rubs or gallops  Resp:  Lungs are clear to auscultation bilaterally,  no crackles  Breast: S/p R mastectomy.  Surgical scars smooth and intact.  S/p L lumpectomy/mastopexy. Surgical scars smooth and intact.  No palpable masses or axillary adenopathy on either side. Mild right axillary lymphedema.  Abd:  Soft, non-tender, non- distended, bowel sounds present, no organomegaly or masses  Ext:  No clubbing, cyanosis, no lower extremity edema  Lymph:  No cervical, supraclavicular, axillary,adenopathy  Neuro:  MS as above, grossly non-focal exam      PATHOLOGY:  02-12-20 08-18-20  A. SENTINEL LYMPH NODE #1 RIGHT AXILLA, BIOPSY:  - 1 lymph node, no tumor present (0/1)  - Cytokeratin stain shows no evidence of metastatic carcinoma    B. SENTINEL LYMPH NODE #2, RIGHT AXILLA, BIOPSY:  - one lymph node, no tumor present (0/1)  - Cytokeratin stain shows no evidence of metastatic carcinoma    C. RIGHT BREAST, TOTAL MASTECTOMY:  - No residual carcinoma identified  - area of fibrosis with associated biopsy clip, consistent with treated tumor bed.   - Proliferative fibrocystic change.   - Skin and nipple, no tumor present.   - four low axillary lymph nodes, no tumor present (0/4).     D. LEFT BREAST, SEGMENTAL MASTECTOMY:  - FOCI OF LOBULAR NEOPLASIA (ATYPICAL LOBULAR HYPERPLASIA AND LOBULAR CARCINOMA IN SITU). LOW GRADE, CLASSIC TYPE, WITH FOCAL PAGETOID SPREAD INTO DUCTS.   - Two prior biopsy site changes present.   - Proliferative fibrocystic change.     E. RIGHT BREAST SKIN, EXCESS, EXCISION:  - Skin and breast tissue, no tumor present.     F. LEFT BREAST, MASTOPEXY:  - Skin and breast tissue, no tumor present    Tumor Template  Specimen Identification   Procedure: Total mastectomy   Specimen Laterality: Right  Invasive Carcinoma   Tumor Focality: NA   Tumor Size: 0   Histologic Type of  Invasive Carcinoma: NA   Histologic Grade Based on Tommy Histologic Score    Glandular (Acinar)/Tubular Differentiation: Not applicable    Nuclear Pleomorphism: not applicable    Mitotic Rate: not applicable   Overall Grade: not applicable   Lymphovascular invasion: not present  In Situ Carcinoma   Ductal Carcinoma in Situ (DCIS): Not present   Lobular Carcinoma in situ (LCIS): Not present  Tumor Extension   Skin: uninvolved    Nipple: uninvolved   Skeletal muscle: not applicable  Margins   Invasive Carcinoma Margins: NA    DCIS Margins: NA  Regional Lymph nodes   Uninvolved by tumor cells    Total number of lymph nodes examined: 6    Number of sentinel lymph nodes examined: 2  Treatment effect   Treatment effect in the breast: present   Treatment effect in the lymph nodes: not present  RCB input variables   Dimensions of Residual Cancer that is in-situ: 0%   Total Number of Lymph nodes with Residual Metastatic Carcinoma: 0   Size of Largest Metastasis: NA   RCB Index Computed Value (optional): 0   RCB Class (optional): 0  Pathologic Stage Classification 9ypTNM, AJCC 8th Edition)    Primary Tumor (invasive carcinoma)(ypT): ypT0   Regional lymph nodes (ypN): yPN0(sn)   Distant Metastasis (ypM): NA  Ancillary Studies: Please see previous case [id number]      ENDOSCOPY:        IMAGING:  Bilateral Diagnostic mammogram 12-27-19  FINDINGS:  There are scattered areas of fibroglandular density.     The bilateral fibroglandular pattern is unremarkable in appearance. A  few scattered calcifications are present in the left breast. There are  calcifications in the central aspect of the right breast spanning  approximately 15 cm of tissue in the AP dimension and extending up to  the base of the nipple for which additional imaging is recommended.     IMPRESSION:  1. Benign left mammographic findings.  2. Calcifications in the right breast. Recommend additional imaging.        BI-RADS CATEGORY:  0, INCOMPLETE: Need  additional imaging evaluation.     RECOMMENDATION:  Right ML and CC magnification views.      R diagnostic mammogram 01-16-20  FINDINGS:  Magnification imaging of the right breast demonstrates  casting-type pleomorphic calcifications spanning over 15 cm of tissue in  the AP dimension in the right 6:00 to 7:00 region. Calcifications do  extend up to the base of the nipple. Findings are highly suspicious for  DCIS.     Right breast ultrasound: Focused sonographic evaluation of the right  6:00 to 7:00 region demonstrates a single 0.5 cm irregular hypoechoic  mass associated with a coarse calcification located at 7:00, 3 cm from  the nipple. Ultrasound of the right axilla demonstrates no abnormal  appearing axillary lymph nodes.        IMPRESSION:  Findings are highly suspicious for extensive DCIS in the  right 6:00 to 7:00 region extending into the base of the nipple. There  is also small irregular mass noted on ultrasound in the 7:00 region  suspicious for invasion.        BI-RADS CATEGORY:  5, HIGHLY SUGGESTIVE OF MALIGNANCY        RECOMMENDATION:  Recommend stereotactic guided core biopsy of the  anterior and posterior aspect of the calcifications in the 6:00 to 7:00  region to determine extent of disease. Ultrasound-guided core biopsy of  the right 7:00 mass may also be warranted pending pathology results of  the calcifications.    Diagnostic bilateral mammogram 02-25-20  FINDINGS:  There are scattered areas of fibroglandular density.    1:  There are fine-linear branching calcifications measuring 16 x 7 x 7  centimeters with segmental distribution and associated post biopsy clip in the  right breast lower hemisphere at 6 to 7 o'clock.  There are 2 post biopsy clips  noted.  The calcifications extend to the inferior skin and nipple.    2:  There are amorphous calcifications measuring 0.3 centimeters in the left  breast lower hemisphere at 6 to 7 o'clock located 4 centimeters from the nipple.    IMPRESSION:  1:   Fine-linear branching calcifications in the right breast lower hemisphere at  6 to 7 o'clock are known biopsy-proven malignancy. Ultrasound is recommended for  staging. Recommend appropriate evaluation and treatment of this known malignancy.    2:  Amorphous calcifications in the left breast lower hemisphere at 6 to 7  o'clock located 4 centimeters from the nipple are suspicious. Stereotactic  biopsy is recommended.    BI-RADS Category 4:  Suspicious Abnormality      US Chest 02-25-20  Findings:       Right Breast: The extent of calcified disease is best visualized by mammography. There are dilated ducts with internal calcifications vascularity extending toward the nipple at the 7 o'clock position. This is consistent with the patient's known biopsy-proven malignancy.      Right Charles Basins: No suspicious axillary (level I, II & III) or internal mammary lymphadenopathy is noted.      IMPRESSION:    1. Known RIGHT breast malignancy is best visualized by mammography. A MRI may be obtained for evaluation of disease.    2. No suspicious RIGHT-sided lymphadenopathy      ACR BI-RADS Category: 6. Known malignancy.  Recommend appropriate evaluation and treatment of the known malignancy.         R breast US 02-25-20  Findings:       Right Breast: The extent of calcified disease is best visualized by mammography. There are dilated ducts with internal calcifications vascularity extending toward the nipple at the 7 o'clock position. This is consistent with the patient's known biopsy-proven malignancy.      Right Charles Basins: No suspicious axillary (level I, II & III) or internal mammary lymphadenopathy is noted.      IMPRESSION:    1. Known RIGHT breast malignancy is best visualized by mammography. A MRI may be obtained for evaluation of disease.    2. No suspicious RIGHT-sided lymphadenopathy      ACR BI-RADS Category: 6. Known malignancy.  Recommend appropriate evaluation and treatment of the known malignancy.       MRI  Brain w/wo contrast 02-27-20  FINDINGS:     No abnormal parenchymal or leptomeningeal enhancement is noted. The brain parenchyma is unremarkable except of small scattered T2 FLAIR hyperintensities consistent with chronic microangiopathic changes. No acute ischemia, intra or extra-axial hemorrhage, mass effect or midline shift. No hydrocephalus is noted.    The osseous structures and extra-cranial soft tissues are unremarkable.    The orbital structures unremarkable.    The paranasal sinuses and mastoid air cells are clear.    IMPRESSION:  No evidence of metastatic disease.      CTCAP 02-28-20    Findings:       CHEST:  The breasts are ptotic and there are biopsy clips projecting between the lower quadrants of the right breast.    On image 99 of series 3, there is a nonspecific 1 cm soft tissue nodule in the lower outer quadrant of the right breast.    No lung nodules or pleural effusions are present.    No axillary, internal mammary, mediastinal or hilar adenopathy is seen.    No suspicious bone lesions are present.      ABDOMEN and PELVIS:  The liver is borderline enlarged and diffusely fatty infiltrated without measurable mass or biliary ductal dilatation.    The gallbladder appears normal.    The spleen, pancreas and adrenal glands appear normal.    The kidneys demonstrate function without hydronephrosis and there is a small low dense nidus in the mid right kidney, likely a cyst.    No adenopathy or ascites are seen in the abdomen or pelvis.    A normal-appearing retrocecal appendix is seen.    Multilevel degenerative-like bone changes are present.      IMPRESSION:  No evidence for metastatic disease the chest, abdomen or pelvis.      MRI Breast bilateral 03-02-20    Findings:  The breast composition is heterogeneous fibroglandular tissue. There is no significant early background parenchymal enhancement.    Right breast: There is linearly oriented nonmass enhancement involving the lateral and inferior aspects  (anterior, middle, and posterior third) of the right breast 6 - 7 o'clock position extending 17 cm from the base of the nipple to the pectoralis muscle (series 5 image  of 256). There is no evidence for pectoralis muscle invasion. Extension to the inferior skin is better demonstrated mammographically. Susceptibility artifact from 2 postbiopsy clips is noted.    Left breast: There is linearly oriented nonmass enhancement in the inferior aspect (middle third) of the left breast 6 o'clock position extending approximately 8 cm (series 5 image  of 256). There is no evidence for nipple, skin, or pectoralis muscle involvement. Stereotactic guided biopsy of calcifications at the 6 o'clock position to be performed later today and corresponds to the anterior aspect of the nonmass enhancement. MRI guided biopsy of nonmass enhancement seen on image 94 of 256, series 5 and image 40 of 160, series 8 is recommended (The images have been annotated with a [place]).    Charles Basins: There is no lymphadenopathy in the visualized axillary or internal mammary regions.    IMPRESSION:  Linear nonmass enhancement representing known malignancy right breast as above. Linear nonmass enhancement left breast as above.   MRI guided biopsy of nonmass enhancement seen on image 94 of 256, series 5 and image 40 of 160, series 8 is recommended.    ACR BI-RADS Category: 6. Recommend MR-guided biopsy of nonmass enhancement left breast as above .Recommend appropriate evaluation and treatment of the known malignancy.       Bone scan 03-02-20  FINDINGS:     Radiotracer uptake secondary to degenerative changes are seen involving the shoulders, hips, knees, and feet. Increased uptake along the right tibial shaft likely is likely reactive. Faint focus of activity within the left tibial shaft is compatible with prior injury/fracture. Increased uptake in the skull is compatible with a benign hyperostosis.    Physiologic uptake of the bilateral  kidneys and bladder.    IMPRESSION:    No scintigraphic evidence of skeletal metastases.      Echo 03-02-20      Bilateral diagnostic mammogram 05-26-20  FINDINGS: There are scattered areas of fibroglandular density.     A postbiopsy marking clip is now noted in the left breast with expected  surrounding postbiopsy changes best visualized on MLO imaging. The  remaining bilateral fibroglandular pattern is stable in appearance.  Redemonstrated are fine linear branching calcifications in the right  6:00 to 7:00 region spanning approximately 17 cm of tissue in the AP  dimension. There are 2 associated postbiopsy marking clips. No new areas  of calcification are seen in either breast.     IMPRESSION:  1. Benign left mammographic findings.        2. No significant change in the appearance of calcifications in the  right breast.        BI-RADS CATEGORY:  6, KNOWN BIOPSY PROVEN MALIGNANCY        RECOMMENDATION:  Recommend clinical follow-up.       Echo 07-22-20  Normal left ventricular cavity size and wall thickness noted. All left ventricular wall segments contract normally.  Estimated EF appears to be in the range of 61 - 65%  The pericardium is normal. There is no evidence of pericardial effusion.      DEXA 09-25-20  FINDINGS: The BMD measured at the AP spine L1-L4 is 1.003 gm/cm2 with a  T-score of -1.5.      IMPRESSION:  The patient is considered to be osteopenic according to the  World Health Organization criteria. Fracture risk is moderate      MRI L spine wo contrast 09-28-20  FINDINGS:  Sagittal alignment is normal. Bone marrow signal intensity is normal. There is disc desiccation L1-2 through L3-4 with mild multiple level loss of intervertebral disc height. Endplate spondylosis and Schmorl's node formation most apparent at L1-2 and  L2-3. Conus medullaris terminates at L1-2 and is normal.     At L1-2, there is a mild concentric disc bulge and endplate spondylosis. There is no canal stenosis or foraminal  impingement.     At L2-3, there is concentric disc bulging and mild facet hypertrophy but no canal or foraminal impingement.     At L3-4, is mild facet degenerative change left greater the right with ligamentum flavum thickening and a broad posterior disc bulge. There is no canal stenosis. There is mild inferior foraminal narrowing bilaterally.     L4-5, there is moderate facet degenerative change bilaterally with ligamentum flavum thickening. Is mild bilateral foraminal narrowing. There is no canal stenosis.     At L5-S1, there is a broad posterior disc bulge. There is a tiny posterior annular fissure. There is no canal stenosis. There is a small focal left posterolateral protrusion into the left inferior foramen with mild to moderate left inferior foraminal  narrowing. There is mild bilateral facet degenerative change.     IMPRESSION:     1. There is no evidence for osseous metastatic disease.  2. There are lumbar degenerative changes without evidence for lumbar canal stenosis. Details above.      Echo 09-29-20  The study is technically difficult for diagnosis.  Normal left ventricular cavity size and wall thickness noted. All left ventricular wall segments contract normally.  Left ventricular ejection fraction appears to be 61 - 65%.  The aortic valve is structurally normal with no regurgitation or stenosis present.  The mitral valve is structurally normal with no regurgitation or significant stenosis present.  There is no evidence of pericardial effusion.       Echo 12-20-20  Normal left ventricular cavity size and wall thickness noted. All left ventricular wall segments contract normally.  Left ventricular ejection fraction appears to be 61 - 65%.  The pericardium is normal. There is no evidence of pericardial effusion. .  Comments: LV systolic function is about the same as on the previous echo study.      Echo 03-19-21  Normal left ventricular cavity size and wall thickness noted. All left ventricular wall  segments contract normally.  Left ventricular ejection fraction appears to be 61 - 65%.  There is no evidence of pericardial effusion.      Echo 06-01-21  Normal left ventricular cavity size and wall thickness noted. All left ventricular wall segments contract normally.  Left ventricular ejection fraction appears to be 66 - 70%.  There is no evidence of pericardial effusion. .        Left diagnostic mammogram 09-23-21  FINDINGS:   The breast is heterogeneously dense, which may obscure small masses.     1:  There is a post surgical scar with associated surgical clips in the left   breast.     2:  There are benign appearing calcifications with scattered distribution and   associated post reduction change in the left breast.     Tomosynthesis performed in CC and MLO projections.     IMPRESSION:   There is no mammographic evidence of malignancy.     Follow-up mammogram in 1 year is recommended.     BI-RADS Category 2:   Benign Finding(s)        DEXA 09-23-21  FULL RESULT:       Examination: Bone Mineral Density (DXA), 9/23/2021     Clinical History: 61-year-old postmenopausal female with breast cancer.     Indication: Assessment of bone mineral density.     Comparison: None.     Technique: Bone mineral density was obtained using Hologic dual-energy X-ray absorptiometry.     Findings: The findings are provided in the below table(s).       Bone Density:   ---------------------------------------------------------------------------   Region     Exam Date   BMD      T-        Z-                                            g/cm2  Score  Score   ---------------------------------------------------------------------------   AP Spine (L1-L4)                    09/23/2021  0.863   -1.7     -0.1       Femoral Neck (Left)                    09/23/2021  0.712   -1.2      0.1       Total Hip (Left)                    09/23/2021  0.814   -1.0      0.0       Femoral Neck (Right)                    09/23/2021  0.808   -0.4      1.0        Total Hip (Right)                    09/23/2021  0.840   -0.8      0.2       -----------------------------------------------------------------   \For postmenopausal women and men age 50 and over, the World Health   Organization criteria for BMD interpretation classify patients as: Normal   (T-score at or above -1.0), Osteopenia (T-score between -1.0 and     -2.5), or Osteoporosis (T-score at or below -2.5).     IMPRESSION:   Osteopenia based on measurements of the lumbar spine and left femoral neck.     Mammography Digital Diagnostic Left with Faisal (10/03/2022 14:08)  FINDINGS:   The breast is heterogeneously dense, which may obscure small masses.     1:  There is post reduction change in the left breast.     2:  There are surgical clips in the left breast.     Patient is status post right mastectomy for IDC in 2020.     Tomosynthesis performed in CC and MLO projections.    Impression:  There is no mammographic evidence of malignancy.     Follow-up mammogram in 1 year is recommended.     I personally reviewed these image(s) along with the resident's/fellow's   interpretations, certify that if a procedure was performed I was physically   present, and agree with the final report.     BI-RADS Category 2:   Benign Finding(s)    MRI Brain with and without Contrast @ [facility] (04/10/2023)   FINDINGS:     Intracranial:   There is no acute hemorrhage or acute infarct.   There is no mass effect or midline shift.   The ventricles and extra-axial spaces are appropriate for age.   There is no worrisome parenchymal or leptomeningeal enhancement.   There is no sellar or suprasellar abnormalities.     Bone:   There are no suspicious calvarial or skull base lesions.     Extracranial:   The orbits are unremarkable.   The visualized paranasal sinuses are predominantly clear.   The mastoid air cells are clear.     IMPRESSION:    No acute intracranial abnormality.   No intracranial metastasis.     NM Bone Mineral Density Both Hips  and Spine @ [facility] (10/05/2023)   MPRESSION:   Osteopenia based on measurements of the lumbar spine, left femoral neck and bilateral total hips.     Statistically significant decrease in bone mineral density within the lumbar spine and left total hip dated 10/03/2022.     Mammography Digital Diagnostic Left with Faisal @ [facility] (10/05/2023)   FINDINGS:   The breast is heterogeneously dense, which may obscure small masses.     1:  There is post reduction change in the left breast.     2:  There are surgical clips in the left breast.     Tomosynthesis performed in CC and MLO projections.     IMPRESSION:  There is no mammographic evidence of malignancy.     Follow-up mammogram in 1 year is recommended.     BI-RADS Category 2:   Benign Finding(s)     US Breast Complete - Left @ [facility] (10/06/2023)  IMPRESSION:  1:  Area in the left axilla is benign.     2:  Post surgical scar in the left breast is benign.     Clinical follow-up for the area of palpable concern is recommended.     Follow-up mammogram in 1 year is recommended.     BI-RADS Category 2:   Benign Finding(s)      MRI BREAST BILATERAL SCREENING W WO CONTRAST (04/04/2024 19:35)   MRI FINDINGS:         1:  There is a signal void from clip/foreign body and post surgical scar in the   lower outer region of the left breast.  Postsurgical scar at the site of   excisional biopsy for ALH is demonstrated within the left lower outer breast.   D. LEFT BREAST, SEGMENTAL MASTECTOMY:   FOCI OF LOBULAR NEOPLASIA (ATYPICAL LOBULAR HYPERPLASIA AND LOBULAR CARCINOMA IN   SITU), LOW-GRADE, CLASSIC TYPE, WITH FOCAL PAGETOID SPREAD INTO DUCTS.   Two prior biopsy site changes present.   Proliferative fibrocystic change.     There is no suspicious enhancement associated with the scar.   Postsurgical changes also in keeping with mastopexy are demonstrated.  Benign   areas of fat necrosis are also noted.  There is no suspicious mass, non-mass   enhancement, or secondary  sign of malignancy within the left breast.     There is no suspicious lymphadenopathy In left axillary or internal mammary   minerva basins.     2:  Patient is status post right total mastectomy with right axillary sentinel   lymph node biopsy with negative sentinel lymph node in 2020.  There is no   suspicious enhancement within the right chest wall.  Postsurgical changes are   demonstrated with susceptibility artifact.  There is no suspicious   lymphadenopathy within the right axillary or internal mammary minerva basins.     Impression    1:  Signal void from clip/foreign body and post surgical scar in the lower outer  region of the left breast is benign. Patient will be due for left mammogram in  October 2024    2:  Area in the right breast is benign.    Follow-up mammogram in 6 months is recommended.    BI-RADS Category 2:  Benign Finding(s)      RECENT LABS:  Lab Results   Component Value Date    WBC 4.97 06/17/2024    HGB 13.7 06/17/2024    HCT 43.0 06/17/2024    MCV 93.1 06/17/2024    RDW 13.2 06/17/2024     06/17/2024    NEUTRORELPCT 56.0 06/17/2024    LYMPHORELPCT 32.8 06/17/2024    MONORELPCT 7.6 06/17/2024    EOSRELPCT 1.6 06/17/2024    BASORELPCT 1.2 06/17/2024    NEUTROABS 2.78 06/17/2024    LYMPHSABS 1.63 06/17/2024       Lab Results   Component Value Date     12/13/2023    K 4.2 12/13/2023    CO2 28.0 12/13/2023     12/13/2023    BUN 15 12/13/2023    CREATININE 0.90 12/13/2023    EGFRIFNONA 72 10/03/2022    EGFRIFAFRI 83 10/03/2022    GLUCOSE 133 (H) 12/13/2023    CALCIUM 9.7 12/13/2023    ALKPHOS 56 12/13/2023    AST 19 12/13/2023    ALT 21 12/13/2023    BILITOT 0.2 12/13/2023    ALBUMIN 4.5 12/13/2023    PROTEINTOT 6.9 12/13/2023    MG 2.4 03/11/2023       Lab Results   Component Value Date    TSH 1.870 12/13/2023    YGZD73BF 71.2 12/13/2023       Lab Results   Component Value Date     15.1 03/11/2020     Lab Results   Component Value Date    LABCA2 19.0 03/11/2020        ASSESSMENT & PLAN:  Jackie Johnson is a very pleasant 64 y.o. female with what is clinically a Stage IIB ([id number]) poorly differentiated invasive ductal carcinoma of the right breast.  Tumor spanned 15-16 cm although it was difficult to tell how much of this was invasive malignancy v. DCIS.  There were no clinically or radiographically supspicious axillary LNs.  There was associated high grade DCIS.  Tumor was ER-,RI-, HER-2 +.  She had pathologic complete response with neoadjuvant therapy.  She also has LCIS/ALH involving the left breast.    1.  Right breast cancer:  -  Administered TCHP neoadjuvantly to downsize tumor and potentially allow for a more successful surgery. After 6 cycles of TCHP, she had a complete pathologic response at time of mastectomy.  -  We discussed consideration of radiation given the initial size of her tumor.  It really isn't known how much of her initial disease was invasive malignancy v. DCIS.  She says she was evaluated by a Radiation Oncologist at [facility] who didn't recommend radiation.  -  Given HER-2 positive disease, recommended we complete a year of HP which she tolerated well. Post-treatment echo with normal LVEF.    - L mammogram done 10/25/2023 at [facility] without cause for concern.    - She underwent Bilateral Breast MRI on 04/04/2024 at [facility] which showed benign findings. Recommended follow up left mammogram in 6 months.     2. L Breast LCIS / ALH:  -  She is s/p lumpectomy.  -  Discussed role for 5 years of hormonal therapy in prevention of future breast cancer development in this breast.  She is post-menopausal, so could use Tamoxifen or an aromatase inhibitor for this purpose.  She has an entact uterus so Tamoxifen would come with increased risk (albeit low risk) of development of endometrial hypertrophy/ endometrial cancer.  Aromatase inhibitors would not come with this risk, but would come with risk for loss of bone mineral density and potentially  "increased risk of side effects.    -  DEXA showed osteopenia with T score -1.5, so recommended Tamoxifen for prevention.  Ms. Johnson was not comfortable with risk of endometrial hyperplasia / cancer so she said she would prefer aromatase inhibitor.  In particular she would like to take Femara so ordered this with plan for 5 years of treatment.  This was started on 3/23/2021.  She continues to tolerate Femara well without any significant side effects.  Will continue.  She had repeat DEXA at [facility] again 10/5/2023 showing slight worsening of her osteopenia with T score -1.9.  Given this, they started her on Prolia therapy which she is receiving at [facility].  -Will continue Femara.    3.  Bone health:  -  Ms. Johnson has osteopenia.  Pre-treatment  T score -1.5.  She also has a h/o Vit D Deficiency for which she took a course of weekly high dose Vit D (now completed). Repeat Vit D Level normal. Continue Ca/Vit D BID.  Recommended weight bearing exercise.  -  At [facility], they repeated DEXA after 1 year (9-23-21) and T score was -1.7.  They repeated again after 1 year with T score -1.7.  Repeat DEXA scan performed 10/5/2023 showed a T-score of -1.9 with slight worsening of osteopenia, and Prolia therapy was started.  -  Vit D has been replete. Repeat level is pending from today.     4.  Unusual sense of \"vibration\" and family h/o HSP (with SPG7 mutation):   - Had some evaluation at [facility] with imaging, EEG which was unremarkable.  -  Referral was placed to Neuromuscular Neurology specialist at 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 This is not a likely cause for the symptoms she describes.  She more recently followed up with Neurology at [facility] in September 2021.  They told her they didn't feel any further neurologic evaluation would be helpful and suggested she see someone in a different specialty such as an " endocrinologist.  She saw a neurogeneticist at [facility] (as this is where her daughter goes) and was tested and was told she didn't have what her daughter has.  I'm not sure what is causing her symptoms. Again told her that I don't feel this is related to her cancer or its treatment.  Happily whatever is causing it, her symptoms seem to be improving.      5.  Very strong family history of malignancy including several members with breast cancer and a sister with ovarian cancer.  - She underwent genetic testing at [facility] and testing was negative.  - She had  level drawn at [facility] which was within normal limits.    6.  Prophylaxis:  She has had 2023 flu vaccine as well as Prevnar 13. She has received COVID vaccine x 3 (Moderna). She received COVID bivalent booster.  She received Tdap.  Recommended RSV vaccination.  Also recommended 2023 fall COVID booster to be performed 3 months after her infection (or around January 2024).    7.  Follow-up:   - Continue Femara  - Continue Ca/Vit D. Continue Prolia per [facility].  - With MD in 6 months with CBCD, CMP, Vitamin D and TSH.      ACO / MARNI/Other  Quality measures  -  Jackie Johnson received 2023 flu vaccine.  -  Jackie Johnson reports a pain score of 0.    -  Current outpatient and discharge medications have been reconciled for the patient.  Reviewed by: SHER Zaman        I spent 30 minutes caring for Jackie on this date of service. This time includes time spent by me in the following activities: preparing for the visit, reviewing tests, obtaining and/or reviewing a separately obtained history, performing a medically appropriate examination and/or evaluation, counseling and educating the patient/family/caregiver, ordering medications, tests, or procedures, documenting information in the medical record, independently interpreting results and communicating that information with the patient/family/caregiver, and care coordination.                                Electronically Signed by: SHER Zaman    CC:   MD JUAN PABLO Macdonald MD Bora Lim, MD- [facility]  Kadeem Serrano MD- [facility]  Dr. Roc Huston - [facility]

## 2024-06-17 NOTE — PROGRESS NOTES
Venipuncture Blood Specimen Collection  Venipuncture performed in left arm by Rosana Braun MA with good hemostasis. Patient tolerated the procedure well without complications.   06/17/24   Rosana Braun MA

## 2024-07-18 DIAGNOSIS — E53.8 B12 DEFICIENCY: ICD-10-CM

## 2024-07-22 RX ORDER — CYANOCOBALAMIN 1000 UG/ML
1000 INJECTION, SOLUTION INTRAMUSCULAR; SUBCUTANEOUS
Qty: 30 ML | Refills: 1 | Status: SHIPPED | OUTPATIENT
Start: 2024-07-22

## 2024-10-16 DIAGNOSIS — E78.2 MIXED HYPERLIPIDEMIA: ICD-10-CM

## 2024-10-21 RX ORDER — PRAVASTATIN SODIUM 20 MG
20 TABLET ORAL DAILY
Qty: 90 TABLET | Refills: 1 | Status: SHIPPED | OUTPATIENT
Start: 2024-10-21

## 2024-10-24 RX ORDER — LETROZOLE 2.5 MG/1
2.5 TABLET, FILM COATED ORAL DAILY
Qty: 30 TABLET | Refills: 11 | Status: SHIPPED | OUTPATIENT
Start: 2024-10-24

## 2024-12-26 ENCOUNTER — LAB (OUTPATIENT)
Dept: ONCOLOGY | Facility: HOSPITAL | Age: 64
End: 2024-12-26
Payer: COMMERCIAL

## 2024-12-26 ENCOUNTER — OFFICE VISIT (OUTPATIENT)
Dept: ONCOLOGY | Facility: CLINIC | Age: 64
End: 2024-12-26
Payer: COMMERCIAL

## 2024-12-26 VITALS
RESPIRATION RATE: 18 BRPM | DIASTOLIC BLOOD PRESSURE: 76 MMHG | WEIGHT: 161.4 LBS | SYSTOLIC BLOOD PRESSURE: 113 MMHG | TEMPERATURE: 97.7 F | HEART RATE: 66 BPM | OXYGEN SATURATION: 97 % | BODY MASS INDEX: 27.7 KG/M2

## 2024-12-26 DIAGNOSIS — E55.9 VITAMIN D DEFICIENCY: ICD-10-CM

## 2024-12-26 DIAGNOSIS — C50.911 MALIGNANT NEOPLASM OF RIGHT BREAST IN FEMALE, ESTROGEN RECEPTOR NEGATIVE, UNSPECIFIED SITE OF BREAST: Primary | ICD-10-CM

## 2024-12-26 DIAGNOSIS — Z17.1 MALIGNANT NEOPLASM OF RIGHT BREAST IN FEMALE, ESTROGEN RECEPTOR NEGATIVE, UNSPECIFIED SITE OF BREAST: Primary | ICD-10-CM

## 2024-12-26 DIAGNOSIS — N60.92 ATYPICAL LOBULAR HYPERPLASIA (ALH) OF LEFT BREAST: ICD-10-CM

## 2024-12-26 DIAGNOSIS — R52 PAIN: ICD-10-CM

## 2024-12-26 DIAGNOSIS — R53.83 FATIGUE, UNSPECIFIED TYPE: ICD-10-CM

## 2024-12-26 DIAGNOSIS — M85.80 OSTEOPENIA, UNSPECIFIED LOCATION: ICD-10-CM

## 2024-12-26 DIAGNOSIS — R60.9 SWELLING: ICD-10-CM

## 2024-12-26 DIAGNOSIS — E78.2 MIXED HYPERLIPIDEMIA: ICD-10-CM

## 2024-12-26 LAB
ALBUMIN SERPL-MCNC: 4.6 G/DL (ref 3.5–5.2)
ALBUMIN/GLOB SERPL: 2 G/DL
ALP SERPL-CCNC: 45 U/L (ref 39–117)
ALT SERPL W P-5'-P-CCNC: 27 U/L (ref 1–33)
ANION GAP SERPL CALCULATED.3IONS-SCNC: 10.7 MMOL/L (ref 5–15)
AST SERPL-CCNC: 22 U/L (ref 1–32)
BASOPHILS # BLD AUTO: 0.06 10*3/MM3 (ref 0–0.2)
BASOPHILS NFR BLD AUTO: 1 % (ref 0–1.5)
BILIRUB SERPL-MCNC: 0.2 MG/DL (ref 0–1.2)
BUN SERPL-MCNC: 17 MG/DL (ref 8–23)
BUN/CREAT SERPL: 18.5 (ref 7–25)
CALCIUM SPEC-SCNC: 9.7 MG/DL (ref 8.6–10.5)
CHLORIDE SERPL-SCNC: 103 MMOL/L (ref 98–107)
CO2 SERPL-SCNC: 24.3 MMOL/L (ref 22–29)
CREAT SERPL-MCNC: 0.92 MG/DL (ref 0.57–1)
DEPRECATED RDW RBC AUTO: 45.8 FL (ref 37–54)
EGFRCR SERPLBLD CKD-EPI 2021: 69.7 ML/MIN/1.73
EOSINOPHIL # BLD AUTO: 0.08 10*3/MM3 (ref 0–0.4)
EOSINOPHIL NFR BLD AUTO: 1.3 % (ref 0.3–6.2)
ERYTHROCYTE [DISTWIDTH] IN BLOOD BY AUTOMATED COUNT: 13.4 % (ref 12.3–15.4)
GLOBULIN UR ELPH-MCNC: 2.3 GM/DL
GLUCOSE SERPL-MCNC: 99 MG/DL (ref 65–99)
HCT VFR BLD AUTO: 41.7 % (ref 34–46.6)
HGB BLD-MCNC: 13.6 G/DL (ref 12–15.9)
IMM GRANULOCYTES # BLD AUTO: 0.02 10*3/MM3 (ref 0–0.05)
IMM GRANULOCYTES NFR BLD AUTO: 0.3 % (ref 0–0.5)
LYMPHOCYTES # BLD AUTO: 1.94 10*3/MM3 (ref 0.7–3.1)
LYMPHOCYTES NFR BLD AUTO: 32.4 % (ref 19.6–45.3)
MCH RBC QN AUTO: 30.3 PG (ref 26.6–33)
MCHC RBC AUTO-ENTMCNC: 32.6 G/DL (ref 31.5–35.7)
MCV RBC AUTO: 92.9 FL (ref 79–97)
MONOCYTES # BLD AUTO: 0.4 10*3/MM3 (ref 0.1–0.9)
MONOCYTES NFR BLD AUTO: 6.7 % (ref 5–12)
NEUTROPHILS NFR BLD AUTO: 3.49 10*3/MM3 (ref 1.7–7)
NEUTROPHILS NFR BLD AUTO: 58.3 % (ref 42.7–76)
NRBC BLD AUTO-RTO: 0 /100 WBC (ref 0–0.2)
PLATELET # BLD AUTO: 256 10*3/MM3 (ref 140–450)
PMV BLD AUTO: 10.8 FL (ref 6–12)
POTASSIUM SERPL-SCNC: 4.1 MMOL/L (ref 3.5–5.2)
PROT SERPL-MCNC: 6.9 G/DL (ref 6–8.5)
RBC # BLD AUTO: 4.49 10*6/MM3 (ref 3.77–5.28)
SODIUM SERPL-SCNC: 138 MMOL/L (ref 136–145)
TSH SERPL DL<=0.05 MIU/L-ACNC: 2.06 UIU/ML (ref 0.27–4.2)
WBC NRBC COR # BLD AUTO: 5.99 10*3/MM3 (ref 3.4–10.8)

## 2024-12-26 PROCEDURE — 80050 GENERAL HEALTH PANEL: CPT

## 2024-12-26 PROCEDURE — 82306 VITAMIN D 25 HYDROXY: CPT

## 2024-12-26 NOTE — PROGRESS NOTES
NAME: Jackie Johnson    : 1960    DATE:  2024    DIAGNOSIS:   1.  Clinical Stage IIB ([id number]) poorly differentiated invasive ductal carcinoma of the right breast.  Tumor spanned 15-16 cm, though it was unclear how much of this was invasive malignancy v. DCIS.  There were no clinically or radiographically supspicious axillary LNs.  There was associated high grade DCIS.  Tumor was ER-,HI-, HER-2 +  .  Following neoadjuvant TCHP, she had complete pathologic response - ypT0N0(sn).  No invasive malignancy detected.  0/6 LN involved.     2.  Atypical Lobular Hyperplasia and LCIS involving the L breast    CHIEF COMPLAINT:  Follow up of Breast Cancer    TREATMENT HISTORY:  1.      2.  R Mastectectomy and SLNBx with L Lumpectomy and Mastopexy  - Dr. Kadeem Serrano and Dr. Roc Huston ([facility]) 20    3.       4.  Femara started 3/23/2021    HISTORY OF PRESENT ILLNESS:   Jackie Johnson is a very pleasant 64 y.o. female who was referred by Dr. STACIE Richards for evaluation and treatment of breast cancer. She has a strong family h/o breast cancer and so is normally very good about getting her mammograms done.  She recently moved from [facility] and so missed her mammogram in 2019 because of the move.  She got established with a new PCP (Dr. Denver Sheth) in 2019 and was referred to a a new gynecologist  (Dr. Brayan Richards) who she saw in .  Kevin Richards ordered a screening mammogram.  She had an acute febrile illness around the time of the Super Bowl and at that time thought she might have a lump in her R breast.  While waiting on mammogram scheduling, she started to have pain in the breast and in eventually she would occasionally get a little bit of discharge from the right nipple.  She presented for  Screening mammography 19 as below.  This was followed by diagnostic mammogram and U/S on 20.  She had an abnormal span of 15 cm spanning the  R breast.  She underwent stereotactic biopsy on 1-21-20.    This revealed a poorly differentiated invasive ductal carcinoma of the R breast with associated high grade DCIS.  Tumor was ER/FL- and HER-2+ by IHC.  Her sister lives in [place] and was treated at [facility], so she went there for further evaluation. She saw Dr. Romero of medical oncology and Dr. Serrano of surgical oncology.  She had staging with CT CAP and bone scan as well as Brain MRI and had no evidence of distant metastatic spread.  She had reese breast MRI as well which showed an unexpected abnormality in the L breast described as linear non-mass enhancement spanning an 8 cm area in the inferior breast at 6:00.  This was biopsied on 2-28-20 with biopsy result pending.  No abnormal axillary LNs were identified.  Neoadjuvant chemotherapy was recommended to her by her [facility] team of physicians but she wishes to seek this treatment closer to home so is here today for further evaluation and treatment.    She is in good health overall. She reports an isolated episode of fever/chills during an acute illness, abdominal fullness to her right side, fatigue and a rash circling her neck. She denies changes in weight, cp, sob, persistent abdominal pain, n/v/c/d or bony pain.    INTERVAL HISTORY:  Ms. Johnson presents today for follow up of breast cancer.  She reports doing well since her last visit with us.  She continues to take Femara daily and is tolerating well.  She continues to receive Prolia every 6 months with MD Forrest.  Diagnostic mammogram on 10/9/2024 was performed which was good, recommended annual follow-up.  DEXA scan performed on 10/9/24 as well.  Otherwise no other new or specific complaints today.      PAST MEDICAL HISTORY:  Past Medical History:   Diagnosis Date    Acid reflux     Asthma     Breast cancer 01/2020    rt br ca    Cancer     Eczema     Elevated cholesterol     Endometriosis     GERD (gastroesophageal reflux disease)     Hiatal  hernia     High cholesterol     Seasonal allergies     Shaking     INTERMITTANT    Sinusitis     Staph infection 2020    port       PAST SURGICAL HISTORY:  Past Surgical History:   Procedure Laterality Date    BREAST BIOPSY Bilateral      SECTION      COLONOSCOPY N/A 2021    Procedure: COLONOSCOPY FOR SCREENING;  Surgeon: Keerthi Meneses MD;  Location: [facility];  Service: Gastroenterology;  Laterality: N/A;    D & C HYSTEROSCOPY ENDOMETRIAL ABLATION      DIAGNOSTIC LAPAROSCOPY      FRACTURE SURGERY Left     tib/fib    FULGURATION ENDOMETRIOSIS      LEG SURGERY      Left leg following fracture    LYMPH NODE BIOPSY      MASTECTOMY Right     WITH LYMPH NODES REMOVAL    PORTACATH PLACEMENT N/A 3/18/2020    Procedure: INSERTION OF PORTACATH;  Surgeon: Dudley Aldridge MD;  Location: [facility];  Service: General;  Laterality: N/A;    SKIN BIOPSY      TONSILLECTOMY      TRANSESOPHAGEAL ECHOCARDIOGRAM (CINTIA)      VENOUS ACCESS DEVICE (PORT) INSERTION N/A 2020    Procedure: INSERTION VENOUS ACCESS DEVICE;  Surgeon: Nerissa Wang MD;  Location: [facility];  Service: General;  Laterality: N/A;    VENOUS ACCESS DEVICE (PORT) INSERTION AND REMOVAL      VENOUS ACCESS DEVICE (PORT) REMOVAL Right 2020    Procedure: REMOVAL VENOUS ACCESS DEVICE;  Surgeon: Nerissa Wang MD;  Location: [facility];  Service: General;  Laterality: Right;       FAMILY HISTORY:  Family History   Problem Relation Age of Onset    Breast cancer Mother 75    Basal cell carcinoma Mother 90        2 small spots on face removed + fair complexion    Heart disease Mother     Hypertension Mother     Diabetes type II Mother     Breast cancer Sister 55        VUS detected on genetic testing CDKN2A gene (c.-33G>C).     Lung disease Sister     Heart disease Father     Throat cancer Father 79    Lung cancer Father 79    Dementia Father     Parkinsonism Father     Cancer Maternal Grandmother 65        unknown gynecologic  cancer, cervical vs uterine    Heart disease Maternal Grandmother     Diabetes type II Maternal Grandmother     Lymphoma Maternal Grandfather 64    Breast cancer Paternal Grandmother 70    Prostate cancer Paternal Grandfather 70        metastastic to liver    Breast cancer Paternal Aunt 77    Breast cancer Maternal Aunt 80    Breast cancer Other     Ovarian cancer Sister 66        Elevated ; 1A tumor; Borderline cancer; both ovaries & lg cyst removed    Ovarian cancer Maternal Cousin 54    Breast cancer Other 50    Diabetes type I Nephew          SOCIAL HISTORY:  Social History     Socioeconomic History    Marital status:    Tobacco Use    Smoking status: Never    Smokeless tobacco: Never   Vaping Use    Vaping status: Never Used   Substance and Sexual Activity    Alcohol use: Never    Drug use: Never    Sexual activity: Defer     Partners: Male     Birth control/protection: None     REVIEW OF SYSTEMS:   A comprehensive 14 point review of systems was performed.  Significant findings as mentioned above.  All other systems reviewed and are negative.      MEDICATIONS:  The current medication list was reviewed in the EMR    Current Outpatient Medications:     calcium carbonate-vitamin d 600-400 MG-UNIT per tablet, Take 2 tablets by mouth Daily., Disp: , Rfl:     cetirizine (zyrTEC) 10 MG tablet, Take 1 tablet by mouth Daily. (Patient not taking: Reported on 9/20/2023), Disp: 30 tablet, Rfl: 2    cyanocobalamin 1000 MCG/ML injection, Inject 1 mL into the appropriate muscle as directed by prescriber Every 7 (Seven) Days., Disp: 30 mL, Rfl: 1    doxycycline (VIBRAMYCIN) 100 MG capsule, Take 1 capsule by mouth 2 (Two) Times a Day., Disp: 20 capsule, Rfl: 0    famotidine (PEPCID) 20 MG tablet, Take 1 tablet by mouth 2 (Two) Times a Day As Needed for Indigestion or Heartburn., Disp: , Rfl:     fexofenadine ODT (ALLEGRA ODT) 30 MG disintegrating tablet, Place 1 tablet on the tongue 2 (Two) Times a Day., Disp: 180  "tablet, Rfl: 0    fluticasone (FLONASE) 50 MCG/ACT nasal spray, 2 sprays into the nostril(s) as directed by provider Daily., Disp: 16 g, Rfl: 5    guaiFENesin (Mucinex) 600 MG 12 hr tablet, Take 2 tablets by mouth 2 (Two) Times a Day., Disp: 120 tablet, Rfl: 0    letrozole (FEMARA) 2.5 MG tablet, TAKE 1 TABLET BY MOUTH DAILY, Disp: 30 tablet, Rfl: 11    levalbuterol (XOPENEX HFA) 45 MCG/ACT inhaler, Inhale 1-2 puffs Every 4 (Four) Hours As Needed for Wheezing., Disp: 15 g, Rfl: 2    multivitamin with minerals tablet tablet, Take 1 tablet by mouth Daily., Disp: , Rfl:     pravastatin (PRAVACHOL) 20 MG tablet, TAKE 1 TABLET BY MOUTH DAILY, Disp: 90 tablet, Rfl: 1    Syringe/Needle, Disp, 25G X 5/8\" 1 ML misc, Use 1 application Every 7 (Seven) Days., Disp: 50 each, Rfl: 1    ubrogepant (UBRELVY) 100 MG tablet, Take 1 tablet by mouth 1 (One) Time As Needed (headache). Can take a second one an hour after if headache persists., Disp: 30 tablet, Rfl: 2    Current Facility-Administered Medications:     cyanocobalamin injection 1,000 mcg, 1,000 mcg, Intramuscular, Q28 Days, Concepcion Queen APRN, 1,000 mcg at 11/24/21 1621    ALLERGIES:    Allergies   Allergen Reactions    Albuterol Other (See Comments)     One time severe headache, questionable for aneurysm, did spinal tap was positive, did Angiogram and MRI were Negative for aneurysm    Penicillins Hives    Clindamycin Hcl Rash    Crestor [Rosuvastatin Calcium] Other (See Comments)     Caused high liver enxymes    Sulfa Antibiotics Other (See Comments) and Swelling     Mucosal lesions    Triprolidine-Pseudoephedrine Swelling    Latex Rash       PHYSICAL EXAM:  Vitals:    12/26/24 1519   BP: 113/76   Pulse: 66   Resp: 18   Temp: 97.7 °F (36.5 °C)   SpO2: 97%         Pain Score    12/26/24 1519   PainSc: 0-No pain           General:  Awake, alert and oriented, appears well. In no acute distress.  HEENT:  Pupils are equal, round and reactive to light and accommodation, " Extra-ocular movements full, Oropharyx clear, mucous membranes moist  Neck:  No JVD, thyromegaly or lymphadenopathy   CV:  Regular rate and rhythm, no murmurs, rubs or gallops  Resp:  Lungs are clear to auscultation bilaterally,  no wheezing   Breast: S/p R mastectomy.  Surgical scars smooth and intact.  S/p L lumpectomy/mastopexy. Surgical scars smooth and intact.  No palpable masses or axillary adenopathy on either side. Mild right axillary lymphedema.  Abd:  Soft, non-tender, non- distended, bowel sounds present, no organomegaly or masses  Ext:  No clubbing, cyanosis, no lower extremity edema  Lymph:  No cervical, supraclavicular, axillary,adenopathy  Neuro:  MS as above, grossly non-focal exam      PATHOLOGY:  02-12-20 08-18-20  A. SENTINEL LYMPH NODE #1 RIGHT AXILLA, BIOPSY:  - 1 lymph node, no tumor present (0/1)  - Cytokeratin stain shows no evidence of metastatic carcinoma    B. SENTINEL LYMPH NODE #2, RIGHT AXILLA, BIOPSY:  - one lymph node, no tumor present (0/1)  - Cytokeratin stain shows no evidence of metastatic carcinoma    C. RIGHT BREAST, TOTAL MASTECTOMY:  - No residual carcinoma identified  - area of fibrosis with associated biopsy clip, consistent with treated tumor bed.   - Proliferative fibrocystic change.   - Skin and nipple, no tumor present.   - four low axillary lymph nodes, no tumor present (0/4).     D. LEFT BREAST, SEGMENTAL MASTECTOMY:  - FOCI OF LOBULAR NEOPLASIA (ATYPICAL LOBULAR HYPERPLASIA AND LOBULAR CARCINOMA IN SITU). LOW GRADE, CLASSIC TYPE, WITH FOCAL PAGETOID SPREAD INTO DUCTS.   - Two prior biopsy site changes present.   - Proliferative fibrocystic change.     E. RIGHT BREAST SKIN, EXCESS, EXCISION:  - Skin and breast tissue, no tumor present.     F. LEFT BREAST, MASTOPEXY:  - Skin and breast tissue, no tumor present    Tumor Template  Specimen Identification   Procedure: Total mastectomy   Specimen Laterality: Right  Invasive Carcinoma   Tumor Focality: NA   Tumor  Size: 0   Histologic Type of Invasive Carcinoma: NA   Histologic Grade Based on Tommy Histologic Score    Glandular (Acinar)/Tubular Differentiation: Not applicable    Nuclear Pleomorphism: not applicable    Mitotic Rate: not applicable   Overall Grade: not applicable   Lymphovascular invasion: not present  In Situ Carcinoma   Ductal Carcinoma in Situ (DCIS): Not present   Lobular Carcinoma in situ (LCIS): Not present  Tumor Extension   Skin: uninvolved    Nipple: uninvolved   Skeletal muscle: not applicable  Margins   Invasive Carcinoma Margins: NA    DCIS Margins: NA  Regional Lymph nodes   Uninvolved by tumor cells    Total number of lymph nodes examined: 6    Number of sentinel lymph nodes examined: 2  Treatment effect   Treatment effect in the breast: present   Treatment effect in the lymph nodes: not present  RCB input variables   Dimensions of Residual Cancer that is in-situ: 0%   Total Number of Lymph nodes with Residual Metastatic Carcinoma: 0   Size of Largest Metastasis: NA   RCB Index Computed Value (optional): 0   RCB Class (optional): 0  Pathologic Stage Classification 9ypTNM, AJCC 8th Edition)    Primary Tumor (invasive carcinoma)(ypT): ypT0   Regional lymph nodes (ypN): yPN0(sn)   Distant Metastasis (ypM): NA  Ancillary Studies: Please see previous case [id number]      ENDOSCOPY:        IMAGING:  Bilateral Diagnostic mammogram 12-27-19  FINDINGS:  There are scattered areas of fibroglandular density.     The bilateral fibroglandular pattern is unremarkable in appearance. A  few scattered calcifications are present in the left breast. There are  calcifications in the central aspect of the right breast spanning  approximately 15 cm of tissue in the AP dimension and extending up to  the base of the nipple for which additional imaging is recommended.     IMPRESSION:  1. Benign left mammographic findings.  2. Calcifications in the right breast. Recommend additional imaging.        BI-RADS CATEGORY:   0, INCOMPLETE: Need additional imaging evaluation.     RECOMMENDATION:  Right ML and CC magnification views.      R diagnostic mammogram 01-16-20  FINDINGS:  Magnification imaging of the right breast demonstrates  casting-type pleomorphic calcifications spanning over 15 cm of tissue in  the AP dimension in the right 6:00 to 7:00 region. Calcifications do  extend up to the base of the nipple. Findings are highly suspicious for  DCIS.     Right breast ultrasound: Focused sonographic evaluation of the right  6:00 to 7:00 region demonstrates a single 0.5 cm irregular hypoechoic  mass associated with a coarse calcification located at 7:00, 3 cm from  the nipple. Ultrasound of the right axilla demonstrates no abnormal  appearing axillary lymph nodes.        IMPRESSION:  Findings are highly suspicious for extensive DCIS in the  right 6:00 to 7:00 region extending into the base of the nipple. There  is also small irregular mass noted on ultrasound in the 7:00 region  suspicious for invasion.        BI-RADS CATEGORY:  5, HIGHLY SUGGESTIVE OF MALIGNANCY        RECOMMENDATION:  Recommend stereotactic guided core biopsy of the  anterior and posterior aspect of the calcifications in the 6:00 to 7:00  region to determine extent of disease. Ultrasound-guided core biopsy of  the right 7:00 mass may also be warranted pending pathology results of  the calcifications.    Diagnostic bilateral mammogram 02-25-20  FINDINGS:  There are scattered areas of fibroglandular density.    1:  There are fine-linear branching calcifications measuring 16 x 7 x 7  centimeters with segmental distribution and associated post biopsy clip in the  right breast lower hemisphere at 6 to 7 o'clock.  There are 2 post biopsy clips  noted.  The calcifications extend to the inferior skin and nipple.    2:  There are amorphous calcifications measuring 0.3 centimeters in the left  breast lower hemisphere at 6 to 7 o'clock located 4 centimeters from the  nipple.    IMPRESSION:  1:  Fine-linear branching calcifications in the right breast lower hemisphere at  6 to 7 o'clock are known biopsy-proven malignancy. Ultrasound is recommended for  staging. Recommend appropriate evaluation and treatment of this known malignancy.    2:  Amorphous calcifications in the left breast lower hemisphere at 6 to 7  o'clock located 4 centimeters from the nipple are suspicious. Stereotactic  biopsy is recommended.    BI-RADS Category 4:  Suspicious Abnormality      US Chest 02-25-20  Findings:       Right Breast: The extent of calcified disease is best visualized by mammography. There are dilated ducts with internal calcifications vascularity extending toward the nipple at the 7 o'clock position. This is consistent with the patient's known biopsy-proven malignancy.      Right Charles Basins: No suspicious axillary (level I, II & III) or internal mammary lymphadenopathy is noted.      IMPRESSION:    1. Known RIGHT breast malignancy is best visualized by mammography. A MRI may be obtained for evaluation of disease.    2. No suspicious RIGHT-sided lymphadenopathy      ACR BI-RADS Category: 6. Known malignancy.  Recommend appropriate evaluation and treatment of the known malignancy.         R breast US 02-25-20  Findings:       Right Breast: The extent of calcified disease is best visualized by mammography. There are dilated ducts with internal calcifications vascularity extending toward the nipple at the 7 o'clock position. This is consistent with the patient's known biopsy-proven malignancy.      Right Charles Basins: No suspicious axillary (level I, II & III) or internal mammary lymphadenopathy is noted.      IMPRESSION:    1. Known RIGHT breast malignancy is best visualized by mammography. A MRI may be obtained for evaluation of disease.    2. No suspicious RIGHT-sided lymphadenopathy      ACR BI-RADS Category: 6. Known malignancy.  Recommend appropriate evaluation and treatment of the  known malignancy.       MRI Brain w/wo contrast 02-27-20  FINDINGS:     No abnormal parenchymal or leptomeningeal enhancement is noted. The brain parenchyma is unremarkable except of small scattered T2 FLAIR hyperintensities consistent with chronic microangiopathic changes. No acute ischemia, intra or extra-axial hemorrhage, mass effect or midline shift. No hydrocephalus is noted.    The osseous structures and extra-cranial soft tissues are unremarkable.    The orbital structures unremarkable.    The paranasal sinuses and mastoid air cells are clear.    IMPRESSION:  No evidence of metastatic disease.      CTCAP 02-28-20    Findings:       CHEST:  The breasts are ptotic and there are biopsy clips projecting between the lower quadrants of the right breast.    On image 99 of series 3, there is a nonspecific 1 cm soft tissue nodule in the lower outer quadrant of the right breast.    No lung nodules or pleural effusions are present.    No axillary, internal mammary, mediastinal or hilar adenopathy is seen.    No suspicious bone lesions are present.      ABDOMEN and PELVIS:  The liver is borderline enlarged and diffusely fatty infiltrated without measurable mass or biliary ductal dilatation.    The gallbladder appears normal.    The spleen, pancreas and adrenal glands appear normal.    The kidneys demonstrate function without hydronephrosis and there is a small low dense nidus in the mid right kidney, likely a cyst.    No adenopathy or ascites are seen in the abdomen or pelvis.    A normal-appearing retrocecal appendix is seen.    Multilevel degenerative-like bone changes are present.      IMPRESSION:  No evidence for metastatic disease the chest, abdomen or pelvis.      MRI Breast bilateral 03-02-20    Findings:  The breast composition is heterogeneous fibroglandular tissue. There is no significant early background parenchymal enhancement.    Right breast: There is linearly oriented nonmass enhancement involving the  lateral and inferior aspects (anterior, middle, and posterior third) of the right breast 6 - 7 o'clock position extending 17 cm from the base of the nipple to the pectoralis muscle (series 5 image  of 256). There is no evidence for pectoralis muscle invasion. Extension to the inferior skin is better demonstrated mammographically. Susceptibility artifact from 2 postbiopsy clips is noted.    Left breast: There is linearly oriented nonmass enhancement in the inferior aspect (middle third) of the left breast 6 o'clock position extending approximately 8 cm (series 5 image  of 256). There is no evidence for nipple, skin, or pectoralis muscle involvement. Stereotactic guided biopsy of calcifications at the 6 o'clock position to be performed later today and corresponds to the anterior aspect of the nonmass enhancement. MRI guided biopsy of nonmass enhancement seen on image 94 of 256, series 5 and image 40 of 160, series 8 is recommended (The images have been annotated with a [place]).    Charles Basins: There is no lymphadenopathy in the visualized axillary or internal mammary regions.    IMPRESSION:  Linear nonmass enhancement representing known malignancy right breast as above. Linear nonmass enhancement left breast as above.   MRI guided biopsy of nonmass enhancement seen on image 94 of 256, series 5 and image 40 of 160, series 8 is recommended.    ACR BI-RADS Category: 6. Recommend MR-guided biopsy of nonmass enhancement left breast as above .Recommend appropriate evaluation and treatment of the known malignancy.       Bone scan 03-02-20  FINDINGS:     Radiotracer uptake secondary to degenerative changes are seen involving the shoulders, hips, knees, and feet. Increased uptake along the right tibial shaft likely is likely reactive. Faint focus of activity within the left tibial shaft is compatible with prior injury/fracture. Increased uptake in the skull is compatible with a benign hyperostosis.    Physiologic  uptake of the bilateral kidneys and bladder.    IMPRESSION:    No scintigraphic evidence of skeletal metastases.      Echo 03-02-20      Bilateral diagnostic mammogram 05-26-20  FINDINGS: There are scattered areas of fibroglandular density.     A postbiopsy marking clip is now noted in the left breast with expected  surrounding postbiopsy changes best visualized on MLO imaging. The  remaining bilateral fibroglandular pattern is stable in appearance.  Redemonstrated are fine linear branching calcifications in the right  6:00 to 7:00 region spanning approximately 17 cm of tissue in the AP  dimension. There are 2 associated postbiopsy marking clips. No new areas  of calcification are seen in either breast.     IMPRESSION:  1. Benign left mammographic findings.        2. No significant change in the appearance of calcifications in the  right breast.        BI-RADS CATEGORY:  6, KNOWN BIOPSY PROVEN MALIGNANCY        RECOMMENDATION:  Recommend clinical follow-up.       Echo 07-22-20  Normal left ventricular cavity size and wall thickness noted. All left ventricular wall segments contract normally.  Estimated EF appears to be in the range of 61 - 65%  The pericardium is normal. There is no evidence of pericardial effusion.      DEXA 09-25-20  FINDINGS: The BMD measured at the AP spine L1-L4 is 1.003 gm/cm2 with a  T-score of -1.5.      IMPRESSION:  The patient is considered to be osteopenic according to the  World Health Organization criteria. Fracture risk is moderate      MRI L spine wo contrast 09-28-20  FINDINGS:  Sagittal alignment is normal. Bone marrow signal intensity is normal. There is disc desiccation L1-2 through L3-4 with mild multiple level loss of intervertebral disc height. Endplate spondylosis and Schmorl's node formation most apparent at L1-2 and  L2-3. Conus medullaris terminates at L1-2 and is normal.     At L1-2, there is a mild concentric disc bulge and endplate spondylosis. There is no canal stenosis  or foraminal impingement.     At L2-3, there is concentric disc bulging and mild facet hypertrophy but no canal or foraminal impingement.     At L3-4, is mild facet degenerative change left greater the right with ligamentum flavum thickening and a broad posterior disc bulge. There is no canal stenosis. There is mild inferior foraminal narrowing bilaterally.     L4-5, there is moderate facet degenerative change bilaterally with ligamentum flavum thickening. Is mild bilateral foraminal narrowing. There is no canal stenosis.     At L5-S1, there is a broad posterior disc bulge. There is a tiny posterior annular fissure. There is no canal stenosis. There is a small focal left posterolateral protrusion into the left inferior foramen with mild to moderate left inferior foraminal  narrowing. There is mild bilateral facet degenerative change.     IMPRESSION:     1. There is no evidence for osseous metastatic disease.  2. There are lumbar degenerative changes without evidence for lumbar canal stenosis. Details above.      Echo 09-29-20  The study is technically difficult for diagnosis.  Normal left ventricular cavity size and wall thickness noted. All left ventricular wall segments contract normally.  Left ventricular ejection fraction appears to be 61 - 65%.  The aortic valve is structurally normal with no regurgitation or stenosis present.  The mitral valve is structurally normal with no regurgitation or significant stenosis present.  There is no evidence of pericardial effusion.       Echo 12-20-20  Normal left ventricular cavity size and wall thickness noted. All left ventricular wall segments contract normally.  Left ventricular ejection fraction appears to be 61 - 65%.  The pericardium is normal. There is no evidence of pericardial effusion. .  Comments: LV systolic function is about the same as on the previous echo study.      Echo 03-19-21  Normal left ventricular cavity size and wall thickness noted. All left  ventricular wall segments contract normally.  Left ventricular ejection fraction appears to be 61 - 65%.  There is no evidence of pericardial effusion.      Echo 06-01-21  Normal left ventricular cavity size and wall thickness noted. All left ventricular wall segments contract normally.  Left ventricular ejection fraction appears to be 66 - 70%.  There is no evidence of pericardial effusion. .        Left diagnostic mammogram 09-23-21  FINDINGS:   The breast is heterogeneously dense, which may obscure small masses.     1:  There is a post surgical scar with associated surgical clips in the left   breast.     2:  There are benign appearing calcifications with scattered distribution and   associated post reduction change in the left breast.     Tomosynthesis performed in CC and MLO projections.     IMPRESSION:   There is no mammographic evidence of malignancy.     Follow-up mammogram in 1 year is recommended.     BI-RADS Category 2:   Benign Finding(s)        DEXA 09-23-21  FULL RESULT:       Examination: Bone Mineral Density (DXA), 9/23/2021     Clinical History: 61-year-old postmenopausal female with breast cancer.     Indication: Assessment of bone mineral density.     Comparison: None.     Technique: Bone mineral density was obtained using Hologic dual-energy X-ray absorptiometry.     Findings: The findings are provided in the below table(s).       Bone Density:   ---------------------------------------------------------------------------   Region     Exam Date   BMD      T-        Z-                                            g/cm2  Score  Score   ---------------------------------------------------------------------------   AP Spine (L1-L4)                    09/23/2021  0.863   -1.7     -0.1       Femoral Neck (Left)                    09/23/2021  0.712   -1.2      0.1       Total Hip (Left)                    09/23/2021  0.814   -1.0      0.0       Femoral Neck (Right)                    09/23/2021  0.80    -0.4      1.0       Total Hip (Right)                    09/23/2021  0.840   -0.8      0.2       -----------------------------------------------------------------   \For postmenopausal women and men age 50 and over, the World Health   Organization criteria for BMD interpretation classify patients as: Normal   (T-score at or above -1.0), Osteopenia (T-score between -1.0 and     -2.5), or Osteoporosis (T-score at or below -2.5).     IMPRESSION:   Osteopenia based on measurements of the lumbar spine and left femoral neck.     Mammography Digital Diagnostic Left with Faisal (10/03/2022 14:08)  FINDINGS:   The breast is heterogeneously dense, which may obscure small masses.     1:  There is post reduction change in the left breast.     2:  There are surgical clips in the left breast.     Patient is status post right mastectomy for IDC in 2020.     Tomosynthesis performed in CC and MLO projections.    Impression:  There is no mammographic evidence of malignancy.     Follow-up mammogram in 1 year is recommended.     I personally reviewed these image(s) along with the resident's/fellow's   interpretations, certify that if a procedure was performed I was physically   present, and agree with the final report.     BI-RADS Category 2:   Benign Finding(s)    MRI Brain with and without Contrast @ [facility] (04/10/2023)   FINDINGS:     Intracranial:   There is no acute hemorrhage or acute infarct.   There is no mass effect or midline shift.   The ventricles and extra-axial spaces are appropriate for age.   There is no worrisome parenchymal or leptomeningeal enhancement.   There is no sellar or suprasellar abnormalities.     Bone:   There are no suspicious calvarial or skull base lesions.     Extracranial:   The orbits are unremarkable.   The visualized paranasal sinuses are predominantly clear.   The mastoid air cells are clear.     IMPRESSION:    No acute intracranial abnormality.   No intracranial metastasis.     NM Bone Mineral  Density Both Hips and Spine @ [facility] (10/05/2023)   MPRESSION:   Osteopenia based on measurements of the lumbar spine, left femoral neck and bilateral total hips.     Statistically significant decrease in bone mineral density within the lumbar spine and left total hip dated 10/03/2022.     Mammography Digital Diagnostic Left with Faisal @ [facility] (10/05/2023)   FINDINGS:   The breast is heterogeneously dense, which may obscure small masses.     1:  There is post reduction change in the left breast.     2:  There are surgical clips in the left breast.     Tomosynthesis performed in CC and MLO projections.     IMPRESSION:  There is no mammographic evidence of malignancy.     Follow-up mammogram in 1 year is recommended.     BI-RADS Category 2:   Benign Finding(s)     US Breast Complete - Left @ [facility] (10/06/2023)  IMPRESSION:  1:  Area in the left axilla is benign.     2:  Post surgical scar in the left breast is benign.     Clinical follow-up for the area of palpable concern is recommended.     Follow-up mammogram in 1 year is recommended.     BI-RADS Category 2:   Benign Finding(s)      MRI BREAST BILATERAL SCREENING W WO CONTRAST (04/04/2024 19:35)   MRI FINDINGS:         1:  There is a signal void from clip/foreign body and post surgical scar in the   lower outer region of the left breast.  Postsurgical scar at the site of   excisional biopsy for ALH is demonstrated within the left lower outer breast.   D. LEFT BREAST, SEGMENTAL MASTECTOMY:   FOCI OF LOBULAR NEOPLASIA (ATYPICAL LOBULAR HYPERPLASIA AND LOBULAR CARCINOMA IN   SITU), LOW-GRADE, CLASSIC TYPE, WITH FOCAL PAGETOID SPREAD INTO DUCTS.   Two prior biopsy site changes present.   Proliferative fibrocystic change.     There is no suspicious enhancement associated with the scar.   Postsurgical changes also in keeping with mastopexy are demonstrated.  Benign   areas of fat necrosis are also noted.  There is no suspicious mass, non-mass    enhancement, or secondary sign of malignancy within the left breast.     There is no suspicious lymphadenopathy In left axillary or internal mammary   minerva basins.     2:  Patient is status post right total mastectomy with right axillary sentinel   lymph node biopsy with negative sentinel lymph node in 2020.  There is no   suspicious enhancement within the right chest wall.  Postsurgical changes are   demonstrated with susceptibility artifact.  There is no suspicious   lymphadenopathy within the right axillary or internal mammary minerva basins.     Impression    1:  Signal void from clip/foreign body and post surgical scar in the lower outer  region of the left breast is benign. Patient will be due for left mammogram in  October 2024    2:  Area in the right breast is benign.    Follow-up mammogram in 6 months is recommended.    BI-RADS Category 2:  Benign Finding(s)    Mammo Diagnostic Digital Tomosynthesis Left With CAD (10/09/2024 09:44)   Impression    There is no mammographic evidence of malignancy in the left breast.    Overall BI-RAD Category: 2 - Benign    Recommend return to annual screening mammography, due on 10/8/2025.  Narrative    CLINICAL INDICATION:  Patient is a 64 y.o. female and is seen for history of breast cancer.      Mammography Digital Diagnostic Left with Faisal  Computer-aided detection was utilized by the radiologist in the  interpretation of this examination.  Tomosynthesis was performed in CC and MLO projections.    COMPARISON:  The present examination has been compared to prior imaging studies  performed : 07/16/2020 Stereotactic Breast Biopsy Left at [facility],  08/17/2020 Mammography Guided Seed Placement Left at [facility],  09/23/2021 Mammography Digital Diagnostic Left with Faisal at [facility],  10/03/2022 Mammography Digital Diagnostic Left with Faisal at [facility],  10/05/2023 Mammography Digital Diagnostic Left with Faisal at [facility]    FINDINGS:  There are scattered areas  of fibroglandular density.    1:  There is post reduction change in the left breast.    2:  There are surgical clips in the left breast.      NM Bone Mineral Density Both Hips and Spine (10/09/2024 11:52)   Impression    1.  Osteopenia based on measurements of the lumbar spine, left femoral neck and left total hip.  2.  No statistically significant interval change in bone mineral density compared to prior on 10/05/2023.    Narrative    FULL RESULT:    Examination: Bone Mineral Density (DXA), 10/9/2024    Clinical History: A 64-year-old postmenopausal female with breast cancer, on hormonal therapy.    Indication: Assessment of bone mineral density..    Comparison: Bone mineral density scan 10/05/2023    Technique: Bone mineral density was obtained using Hologic dual-energy X-ray absorptiometry.    Findings: The findings are provided in the below table(s).      Bone Density:  ---------------------------------------------------------------------------  Region     Exam Date   BMD      T-        Z-                                           g/cm2  Score  Score  ---------------------------------------------------------------------------  AP Spine (L1-L4)                   10/09/2024  0.857   -1.7      0.0      Femoral Neck (Left)                   10/09/2024  0.638   -1.9     -0.4      Total Hip (Left)                   10/09/2024  0.785   -1.3     -0.1      Femoral Neck (Right)                   10/09/2024  0.798   -0.5      1.0      Total Hip (Right)                   10/09/2024  0.839   -0.8      0.4            RECENT LABS:  Lab Results   Component Value Date    WBC 5.99 12/26/2024    HGB 13.6 12/26/2024    HCT 41.7 12/26/2024    MCV 92.9 12/26/2024    RDW 13.4 12/26/2024     12/26/2024    NEUTRORELPCT 58.3 12/26/2024    LYMPHORELPCT 32.4 12/26/2024    MONORELPCT 6.7 12/26/2024    EOSRELPCT 1.3 12/26/2024    BASORELPCT 1.0 12/26/2024    NEUTROABS 3.49 12/26/2024    LYMPHSABS 1.94 12/26/2024       Lab Results    Component Value Date     06/17/2024    K 4.5 06/17/2024    CO2 24.4 06/17/2024     06/17/2024    BUN 12 06/17/2024    CREATININE 0.85 06/17/2024    EGFRIFNONA 72 10/03/2022    EGFRIFAFRI 83 10/03/2022    GLUCOSE 97 06/17/2024    CALCIUM 9.8 06/17/2024    ALKPHOS 44 06/17/2024    AST 21 06/17/2024    ALT 23 06/17/2024    BILITOT 0.4 06/17/2024    ALBUMIN 4.6 06/17/2024    PROTEINTOT 6.9 06/17/2024    MG 2.4 03/11/2023       Lab Results   Component Value Date    TSH 1.860 06/17/2024    XLWD10CW 57.4 06/17/2024       Lab Results   Component Value Date     15.1 03/11/2020     Lab Results   Component Value Date    LABCA2 19.0 03/11/2020       ASSESSMENT & PLAN:  Jackie Johnson is a very pleasant 64 y.o. female with what is clinically a Stage IIB ([id number]) poorly differentiated invasive ductal carcinoma of the right breast.  Tumor spanned 15-16 cm although it was difficult to tell how much of this was invasive malignancy v. DCIS.  There were no clinically or radiographically supspicious axillary LNs.  There was associated high grade DCIS.  Tumor was ER-,RI-, HER-2 +.  She had pathologic complete response with neoadjuvant therapy.  She also has LCIS/ALH involving the left breast.    1.  Right breast cancer:  -  Administered TCHP neoadjuvantly to downsize tumor and potentially allow for a more successful surgery. After 6 cycles of TCHP, she had a complete pathologic response at time of mastectomy.  -  Ddiscussed consideration of radiation given the initial size of her tumor.  It really isn't known how much of her initial disease was invasive malignancy v. DCIS.  She says she was evaluated by a Radiation Oncologist at [facility] ([place] who didn't recommend radiation.  -  Given HER-2 positive disease, recommended we complete a year of HP which she tolerated well. Post-treatment echo with normal LVEF.    - L mammogram done 10/9/2024 at [facility] without cause for concern. Recommended follow up left mammogram  "annually.     2. L Breast LCIS / ALH:  -  She is s/p lumpectomy.  -  Discussed role for 5 years of hormonal therapy in prevention of future breast cancer development in this breast.  She is post-menopausal, so could use Tamoxifen or an aromatase inhibitor for this purpose.  She has an entact uterus so Tamoxifen would come with increased risk (albeit low risk) of development of endometrial hypertrophy/ endometrial cancer.  Aromatase inhibitors would not come with this risk, but would come with risk for loss of bone mineral density and potentially increased risk of side effects.    -  DEXA showed osteopenia with T score -1.5, so recommended Tamoxifen for prevention.  Ms. Johnson was not comfortable with risk of endometrial hyperplasia / cancer so she said she would prefer aromatase inhibitor.  In particular she would like to take Femara so ordered this with plan for 5 years of treatment.  This was started on 3/23/2021.  She continues to tolerate Femara well without any significant side effects.  Will continue.  She had repeat DEXA at [facility] again 10/9/2024 showing continued osteopenia, no worsening.  She continues q6 monthly Prolia therapy which she is receiving at [facility].  -Will continue Femara.    3.  Bone health:  -  Ms. Johnson has osteopenia.  Pre-treatment  T score -1.5.  She also has a h/o Vit D Deficiency for which she took a course of weekly high dose Vit D (now completed). Repeat Vit D Level normal. Continue Ca/Vit D BID.  Recommended weight bearing exercise.  -  At [facility], they repeated DEXA after 1 year (9-23-21) and T score was -1.7.  They repeated again after 1 year with T score -1.7.  Repeat DEXA scan performed 10/5/2023 showed a T-score of -1.9 with slight worsening of osteopenia, and Prolia therapy was started. New DEXA on 10/9/24 shows no worsening. She continues Prolia.   -  Vit D has been replete. Repeat level is pending from today.     4.  Unusual sense of \"vibration\" and family " h/o HSP (with SPG7 mutation):   - Had some evaluation at [facility] with imaging, EEG which was unremarkable.  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 This is not a likely cause for the symptoms she describes.  She more recently followed up with Neurology at [facility] in September 2021.  They told her they didn't feel any further neurologic evaluation would be helpful and suggested she see someone in a different specialty such as an endocrinologist.  She saw a neurogeneticist at [place] (as this is where her daughter goes) and was tested and was told she didn't have what her daughter has.  I'm not sure what is causing her symptoms. Again told her that I don't feel this is related to her cancer or its treatment.  Happily whatever is causing it, her symptoms seem to be improving.      5.  Very strong family history of malignancy including several members with breast cancer and a sister with ovarian cancer.  - She underwent genetic testing at [facility] and testing was negative.  - She had  level drawn at [facility] which was within normal limits.    6.  Prophylaxis:  She has had 2023 flu vaccine as well as Prevnar 13. She has received COVID vaccine x 3 (Moderna). She received COVID bivalent booster.  She received Tdap.  Recommended RSV vaccination.  Also recommended 2023 fall COVID booster to be performed 3 months after her infection (or around January 2024).    7.  Follow-up:   - Continue Femara  - Continue Ca/Vit D. Continue Prolia per [facility].  - With MD in 6 months with CBCD, CMP, Vitamin D and TSH.      ACO / MARNI/Other  Quality measures  -  Jackie Johnson did not receive 2024 flu vaccine.  This was recommended.  -  Jackie Antonioumann reports a pain score of 0.    -  Current outpatient and discharge medications have been reconciled for the  patient.  Reviewed by: SHER Moreland          I spent 30 minutes caring for Jackie on this date of service. This time includes time spent by me in the following activities: preparing for the visit, reviewing tests, obtaining and/or reviewing a separately obtained history, performing a medically appropriate examination and/or evaluation, counseling and educating the patient/family/caregiver, ordering medications, tests, or procedures, documenting information in the medical record, independently interpreting results and communicating that information with the patient/family/caregiver, and care coordination.           Electronically Signed by: SHER Moreland       CC:   MD JUAN PABLO Macdonald MD Bora Lim, MD- [facility] Medical Oncology  Kadeem Serrano MD- [facility] Surgical Oncology  Dr. Roc Huston - [facility] Plastic Surgery

## 2024-12-27 LAB — 25(OH)D3 SERPL-MCNC: 55 NG/ML (ref 30–100)

## 2025-01-23 ENCOUNTER — OFFICE VISIT (OUTPATIENT)
Dept: FAMILY MEDICINE CLINIC | Facility: CLINIC | Age: 65
End: 2025-01-23
Payer: COMMERCIAL

## 2025-01-23 VITALS
BODY MASS INDEX: 28.41 KG/M2 | TEMPERATURE: 97.3 F | SYSTOLIC BLOOD PRESSURE: 114 MMHG | OXYGEN SATURATION: 97 % | HEART RATE: 70 BPM | HEIGHT: 64 IN | RESPIRATION RATE: 16 BRPM | DIASTOLIC BLOOD PRESSURE: 66 MMHG | WEIGHT: 166.4 LBS

## 2025-01-23 DIAGNOSIS — M25.551 RIGHT HIP PAIN: ICD-10-CM

## 2025-01-23 DIAGNOSIS — R51.9 NONINTRACTABLE HEADACHE, UNSPECIFIED CHRONICITY PATTERN, UNSPECIFIED HEADACHE TYPE: ICD-10-CM

## 2025-01-23 DIAGNOSIS — M54.41 CHRONIC BILATERAL LOW BACK PAIN WITH RIGHT-SIDED SCIATICA: Primary | ICD-10-CM

## 2025-01-23 DIAGNOSIS — Z13.220 ENCOUNTER FOR LIPID SCREENING FOR CARDIOVASCULAR DISEASE: ICD-10-CM

## 2025-01-23 DIAGNOSIS — Z13.6 ENCOUNTER FOR LIPID SCREENING FOR CARDIOVASCULAR DISEASE: ICD-10-CM

## 2025-01-23 DIAGNOSIS — G89.29 CHRONIC BILATERAL LOW BACK PAIN WITH RIGHT-SIDED SCIATICA: Primary | ICD-10-CM

## 2025-01-23 DIAGNOSIS — Z23 IMMUNIZATION DUE: ICD-10-CM

## 2025-01-23 RX ORDER — IBUPROFEN 200 MG
200 TABLET ORAL EVERY 6 HOURS PRN
COMMUNITY

## 2025-02-10 NOTE — PROGRESS NOTES
"Jackie Johnson     VITALS: Blood pressure 114/66, pulse 70, temperature 97.3 °F (36.3 °C), temperature source Temporal, resp. rate 16, height 162.6 cm (64\"), weight 75.5 kg (166 lb 6.4 oz), SpO2 97%, not currently breastfeeding.    Subjective  Chief Complaint  Hip Pain    Subjective          History of Present Illness:    History of Present Illness  The patient is a 64-year-old female with medical conditions significant for breast cancer, GERD, hyperlipidemia, and allergic rhinitis who presents to the clinic for a medical follow-up. She is complaining of hip pain.    She reports experiencing hip discomfort, initially attributing it to weather conditions. The pain has become persistent, reminiscent of her previous meniscus tear. She describes the pain as a locking sensation, particularly when rising from a seated position on a hard chair. The pain subsides after a period of walking. She recalls an incident around [place] where she spent significant time on the floor with toddlers, during which she may have heard a popping sound. She is seeking advice on potential physical therapy interventions. She also mentions a history of a leg fracture at age 16, resulting in a 5/8 inch shortening of her leg, for which she uses a shoe lift prescribed by her podiatrist. She expresses concern about the possibility of an underlying bone issue. She also reports favoring her knee due to associated pain. She has been spending increased time sitting in a recliner due to school commitments, which she believes may be contributing to her pain. She is currently insured but will transition to a different plan upon turning 65 next month, and is therefore hesitant to undergo unnecessary MRIs. She recalls a previous episode of hip pain coinciding with an elevated cholesterol level in 2010. She has been managing the pain with ibuprofen, taking one pill as needed.    She is currently on Prolia and reports concurrent spinal pain. She is " uncertain if the pain is related to her medication or another underlying condition.    She had blood work done at the beginning of 2024 by Dr. Obrien's office. She prefers to wait until 2024 to repeat the blood work. She received her influenza vaccine in 2023 at [facility]. She is unsure if she received the RSV vaccine.    She is inquiring about the appropriate needle length for her B12 injections, as she has been using a 5/8 inch needle but feels it may be too short.    She is also seeking information on the potential side effects of Ubrelvy, as she experienced a headache after taking it last week.    Supplemental Information  She tried Flonase to help her sleep at night but experienced heart palpitations, so she discontinued its use. Since stopping Flonase, she has not had any heart palpitations.    IMMUNIZATIONS  She received the influenza vaccine in 2023 at [facility]. She received the RSV vaccine in .    No complaints regarding medications.     The following portions of the patient's history were reviewed and updated as appropriate: allergies, current medications, past family history, past medical history, past social history, past surgical history and problem list.    Past Medical History  Past Medical History:   Diagnosis Date    Acid reflux     Asthma     Breast cancer 2020    rt br ca    Cancer     Eczema     Elevated cholesterol     Endometriosis     GERD (gastroesophageal reflux disease)     Hiatal hernia     High cholesterol     Seasonal allergies     Shaking     INTERMITTANT    Sinusitis     Staph infection 2020    port       Surgical History  Past Surgical History:   Procedure Laterality Date    BREAST BIOPSY Bilateral      SECTION      COLONOSCOPY N/A 2021    Procedure: COLONOSCOPY FOR SCREENING;  Surgeon: Keerthi Meneses MD;  [facility];  Service: Gastroenterology;  Laterality: N/A;    D & C HYSTEROSCOPY ENDOMETRIAL ABLATION       DIAGNOSTIC LAPAROSCOPY      FRACTURE SURGERY Left     tib/fib    FULGURATION ENDOMETRIOSIS      LEG SURGERY      Left leg following fracture    LYMPH NODE BIOPSY      MASTECTOMY Right     WITH LYMPH NODES REMOVAL    PORTACATH PLACEMENT N/A 3/18/2020    Procedure: INSERTION OF PORTACATH;  Surgeon: Dudley Aldridge MD;  Location: [facility];  Service: General;  Laterality: N/A;    SKIN BIOPSY      TONSILLECTOMY      TRANSESOPHAGEAL ECHOCARDIOGRAM (CINTIA)      VENOUS ACCESS DEVICE (PORT) INSERTION N/A 5/27/2020    Procedure: INSERTION VENOUS ACCESS DEVICE;  Surgeon: Nerissa Wang MD;  Location: [facility] OR;  Service: General;  Laterality: N/A;    VENOUS ACCESS DEVICE (PORT) INSERTION AND REMOVAL      VENOUS ACCESS DEVICE (PORT) REMOVAL Right 4/19/2020    Procedure: REMOVAL VENOUS ACCESS DEVICE;  Surgeon: Nerissa Wang MD;  Location: [facility];  Service: General;  Laterality: Right;       Family History  Family History   Problem Relation Age of Onset    Breast cancer Mother 75    Basal cell carcinoma Mother 90        2 small spots on face removed + fair complexion    Heart disease Mother     Hypertension Mother     Diabetes type II Mother     Breast cancer Sister 55        VUS detected on genetic testing CDKN2A gene (c.-33G>C).     Lung disease Sister     Heart disease Father     Throat cancer Father 79    Lung cancer Father 79    Dementia Father     Parkinsonism Father     Cancer Maternal Grandmother 65        unknown gynecologic cancer, cervical vs uterine    Heart disease Maternal Grandmother     Diabetes type II Maternal Grandmother     Lymphoma Maternal Grandfather 64    Breast cancer Paternal Grandmother 70    Prostate cancer Paternal Grandfather 70        metastastic to liver    Breast cancer Paternal Aunt 77    Breast cancer Maternal Aunt 80    Breast cancer Other     Ovarian cancer Sister 66        Elevated ; 1A tumor; Borderline cancer; both ovaries & lg cyst removed    Ovarian cancer  "Maternal Cousin 54    Breast cancer Other 50    Diabetes type I Nephew        Social History  Social History     Socioeconomic History    Marital status:    Tobacco Use    Smoking status: Never    Smokeless tobacco: Never   Vaping Use    Vaping status: Never Used   Substance and Sexual Activity    Alcohol use: Never    Drug use: Never    Sexual activity: Defer     Partners: Male     Birth control/protection: None       Objective   Vital Signs:   /66 (BP Location: Right arm, Patient Position: Sitting, Cuff Size: Adult)   Pulse 70   Temp 97.3 °F (36.3 °C) (Temporal)   Resp 16   Ht 162.6 cm (64\")   Wt 75.5 kg (166 lb 6.4 oz)   SpO2 97%   BMI 28.56 kg/m²       Physical Exam     Physical Exam  Lungs were auscultated.    Gen: Patient in NAD. Pleasant and answers appropriately. A&Ox3.    Skin: Warm and dry with normal turgor. No purpura, rashes, or unusual pigmentation noted. Hair is normal in appearance and distribution.    HEENT: NC/AT. No lesions noted. Conjunctiva clear, sclera nonicteric. PERRL. EOMI without nystagmus or strabismus. Fundi appear benign. No hemorrhages or exudates of eyes. Auditory canals are patent bilaterally without lesions. TMs intact,  nonerythematous, bulging without lesions. Nasal mucosa erythematous, and nonedematous. Frontal and maxillary sinuses are nontender. O/P erythematous and moist without exudate.    Neck: Supple without lymph nodes palpated.  Decreased ROM. No carotid bruits appreciated bilaterally.    Lungs: Decreased B/L without rales, rhonchi, crackles, or wheezes.    Heart: RRR. S1 and S2 normal. No S3 or S4. No MRGT.    Abd: Soft, nontender,nondistended. (+)BSx4 quadrants.     Extrem: No CCE. Radial pulses 2+/4 and equal B/L. FROMx4.  Positive joint tenderness noted especially in the right hip.    Back: Decreased range of motion.    Neuro: No focal motor/sensory deficits.    Procedures    Result Review :   The following data was reviewed by: Scarlet Loera, " MD on 01/23/2025:       Results  Imaging  October 2024 bone density test shows thinning in the lumbar spine and left hip and upper leg.           Assessment and Plan      Jackie Johnson is a 64 y.o. here for medical followup.    Diagnoses and all orders for this visit:    1. Chronic bilateral low back pain with right-sided sciatica (Primary)  -     XR Spine Lumbar 2 or 3 View    2. Right hip pain  -     XR Hip With or Without Pelvis 2 - 3 View Right    3. Nonintractable headache, unspecified chronicity pattern, unspecified headache type  -     ubrogepant (UBRELVY) 100 MG tablet; Take 1 tablet by mouth 1 (One) Time As Needed (headache). Can take a second one an hour after if headache persists.  Dispense: 16 tablet; Refill: 2    4. Encounter for lipid screening for cardiovascular disease  -     Lipid Panel; Future    5. Immunization due  -     Pneumococcal Conjugate Vaccine 20-Valent (PCV20)  -     Fluzone >6mos ([id number])        Assessment & Plan  1. Hip pain.  The etiology of the pain could be multifactorial, potentially due to age-related changes, weather conditions, or arthritis. Given her oncological history, it is prudent to rule out any unusual causes. The pain could also be attributed to capsulitis or cartilage irritation. It is possible that she is over-relying on her right hip due to weakness in the left hip, leading to inflammation under pressure. An x-ray of the hip will be ordered. She will be provided with a set of home exercises to strengthen the muscles around the hip. She is advised to take ibuprofen 800 mg three times daily for a week to alleviate inflammation. If the home exercises do not yield improvement, she is instructed to contact the office for further discussion on the next steps.    2. Back pain.  Her bone density scan from October 2024 revealed thinning of the lumbar spine, which could be contributing to her back pain. She will be provided with a set of home exercises to strengthen the  muscles around the back. If the home exercises do not yield improvement, she is instructed to contact the office for further discussion on the next steps.    3. Health maintenance.  She will receive the pneumonia and influenza vaccines today. Her cholesterol levels will be checked during her upcoming appointment with Dr. Pascual.    4. Medication management.  She is advised to continue using a 5/8 inch needle for her B12 injections. She is reassured that Ubrelvy is safe to use up to a year post-expiration.    Follow-up  The patient will follow up in 6 months.      BMI is >= 25 and <30. (Overweight) The following options were offered after discussion;: exercise counseling/recommendations and nutrition counseling/recommendations             Patient or patient representative verbalized consent for the use of Ambient Listening during the visit with  Scarlet Loera MD for chart documentation. 2/9/2025  23:51 EST        Follow Up   Return in about 6 months (around 7/23/2025), or will come back for fasting labs, XRAY.  Findings and plans discussed with patient who verbalizes understanding and agreement. Will followup with patient once results are in. Patient was given instructions and counseling regarding her condition or for health maintenance advice. Please see specific information pulled into the AVS if appropriate.       Scarlet Loera MD

## 2025-03-29 DIAGNOSIS — E78.2 MIXED HYPERLIPIDEMIA: ICD-10-CM

## 2025-03-31 RX ORDER — PRAVASTATIN SODIUM 20 MG
20 TABLET ORAL DAILY
Qty: 90 TABLET | Refills: 1 | OUTPATIENT
Start: 2025-03-31

## 2025-04-03 DIAGNOSIS — E78.2 MIXED HYPERLIPIDEMIA: ICD-10-CM

## 2025-04-03 NOTE — TELEPHONE ENCOUNTER
Caller: Alex Jackie    Relationship: Self    Best call back number: [phone]     Requested Prescriptions:   Requested Prescriptions     Pending Prescriptions Disp Refills    pravastatin (PRAVACHOL) 20 MG tablet 90 tablet 1     Sig: Take 1 tablet by mouth Daily.        Pharmacy where request should be sent: [id number] [place] [address] AT [facility] [address] - [phone]  - [phone] FX     Last office visit with prescribing clinician: 1/23/2025   Last telemedicine visit with prescribing clinician: Visit date not found   Next office visit with prescribing clinician: 7/24/2025     Additional details provided by patient: PATIENT NEEDING BUT WILL  WHEN SHE COMES BACK FROM [place]

## 2025-04-04 RX ORDER — PRAVASTATIN SODIUM 20 MG
20 TABLET ORAL DAILY
Qty: 90 TABLET | Refills: 1 | Status: SHIPPED | OUTPATIENT
Start: 2025-04-04

## 2025-04-09 ENCOUNTER — TELEPHONE (OUTPATIENT)
Dept: FAMILY MEDICINE CLINIC | Facility: CLINIC | Age: 65
End: 2025-04-09
Payer: COMMERCIAL

## 2025-04-09 NOTE — TELEPHONE ENCOUNTER
04/09/2025 PA for Ubrelvy submitted and approved from 04/09/2025 to 04/09/2026. Patient and [id number] [place] notified.

## 2025-04-11 ENCOUNTER — OFFICE VISIT (OUTPATIENT)
Dept: FAMILY MEDICINE CLINIC | Facility: CLINIC | Age: 65
End: 2025-04-11
Payer: COMMERCIAL

## 2025-04-11 VITALS
HEART RATE: 67 BPM | DIASTOLIC BLOOD PRESSURE: 82 MMHG | WEIGHT: 169.4 LBS | BODY MASS INDEX: 28.92 KG/M2 | SYSTOLIC BLOOD PRESSURE: 126 MMHG | HEIGHT: 64 IN | TEMPERATURE: 97.7 F | OXYGEN SATURATION: 98 %

## 2025-04-11 DIAGNOSIS — Z13.6 ENCOUNTER FOR LIPID SCREENING FOR CARDIOVASCULAR DISEASE: ICD-10-CM

## 2025-04-11 DIAGNOSIS — Z17.1 MALIGNANT NEOPLASM OF RIGHT BREAST IN FEMALE, ESTROGEN RECEPTOR NEGATIVE, UNSPECIFIED SITE OF BREAST: ICD-10-CM

## 2025-04-11 DIAGNOSIS — Z13.220 ENCOUNTER FOR LIPID SCREENING FOR CARDIOVASCULAR DISEASE: ICD-10-CM

## 2025-04-11 DIAGNOSIS — R10.11 RUQ PAIN: ICD-10-CM

## 2025-04-11 DIAGNOSIS — R30.0 DYSURIA: Primary | ICD-10-CM

## 2025-04-11 DIAGNOSIS — C50.911 MALIGNANT NEOPLASM OF RIGHT BREAST IN FEMALE, ESTROGEN RECEPTOR NEGATIVE, UNSPECIFIED SITE OF BREAST: ICD-10-CM

## 2025-04-11 PROCEDURE — 87077 CULTURE AEROBIC IDENTIFY: CPT | Performed by: FAMILY MEDICINE

## 2025-04-11 PROCEDURE — 87186 SC STD MICRODIL/AGAR DIL: CPT | Performed by: FAMILY MEDICINE

## 2025-04-11 PROCEDURE — 87086 URINE CULTURE/COLONY COUNT: CPT | Performed by: FAMILY MEDICINE

## 2025-04-11 PROCEDURE — 99214 OFFICE O/P EST MOD 30 MIN: CPT | Performed by: FAMILY MEDICINE

## 2025-04-12 PROBLEM — L03.221 CELLULITIS OF NECK: Status: RESOLVED | Noted: 2020-04-18 | Resolved: 2025-04-12

## 2025-04-12 PROBLEM — Z01.818 PREOPERATIVE CLEARANCE: Status: RESOLVED | Noted: 2021-07-20 | Resolved: 2025-04-12

## 2025-04-12 PROBLEM — Z12.11 ENCOUNTER FOR SCREENING FOR MALIGNANT NEOPLASM OF COLON: Status: RESOLVED | Noted: 2021-07-20 | Resolved: 2025-04-12

## 2025-04-12 PROBLEM — Z51.89 VISIT FOR WOUND CHECK: Status: RESOLVED | Noted: 2021-11-10 | Resolved: 2025-04-12

## 2025-04-12 NOTE — PROGRESS NOTES
"Jackie Johnson     VITALS: Blood pressure 126/82, pulse 67, temperature 97.7 °F (36.5 °C), temperature source Temporal, height 162.6 cm (64\"), weight 76.8 kg (169 lb 6.4 oz), SpO2 98%, not currently breastfeeding.    Subjective  Chief Complaint  Urinary Frequency and Dysuria    Subjective          History of Present Illness:    History of Present Illness  The patient is a 65-year-old female with medical conditions significant for cancer and osteoarthritis who presents for a follow-up visit. She reports not feeling well and suspects she has a urinary tract infection (UTI).    She has been experiencing dysuria and increased urinary frequency for the past week, which she attributes to a possible UTI. The symptoms are intermittent, with some days being symptom-free. She reports no fevers or chills. She also mentions a significant prolapse and questions its potential contribution to her current symptoms. She recalls an incident of inadequate rinsing after a sponge bath, which she suspects may have led to irritation. She has a history of recurrent UTIs in her teens and 20s, occurring approximately 3 times annually.    She expresses concern about persistent right upper quadrant pain and cramping, which she initially suspected to be gallbladder-related. However, a recent CT scan at [facility] did not reveal any abnormalities. She describes the pain as pressure-like, exacerbated by eating, and localized below her rib cage. She also reports a sensation of one side being larger than the other. She underwent chemotherapy in the past.    She has a history of elevated cholesterol levels and reports hip discomfort when lying down.    She has a history of cancer and underwent chemotherapy in the past. She had a consultation at [facility] last week, where she received a positive report. She has an upcoming appointment in 07/2025. She underwent lab tests for vitamin D, calcium, and phosphorus levels, as well as an MRI, all of " which were normal. She is scheduled for annual skin screenings at their skin cancer clinic.    No complaints regarding medications.     The following portions of the patient's history were reviewed and updated as appropriate: allergies, current medications, past family history, past medical history, past social history, past surgical history and problem list.    Past Medical History  Past Medical History:   Diagnosis Date    Acid reflux     Asthma     Breast cancer 2020    rt br ca    Broken leg     Cancer     Eczema     Elevated cholesterol     Endometriosis     GERD (gastroesophageal reflux disease)     Hiatal hernia     High cholesterol     Seasonal allergies     Shaking     INTERMITTANT    Sinusitis     Staph infection 2020    port       Surgical History  Past Surgical History:   Procedure Laterality Date    BREAST BIOPSY Bilateral      SECTION      COLONOSCOPY N/A 2021    Procedure: COLONOSCOPY FOR SCREENING;  Surgeon: Keerthi Meneses MD;  Location: [facility];  Service: Gastroenterology;  Laterality: N/A;    D & C HYSTEROSCOPY ENDOMETRIAL ABLATION      DIAGNOSTIC LAPAROSCOPY      FRACTURE SURGERY Left     tib/fib    FULGURATION ENDOMETRIOSIS      LEG SURGERY      Left leg following fracture    LYMPH NODE BIOPSY      MASTECTOMY Right     WITH LYMPH NODES REMOVAL    PORTACATH PLACEMENT N/A 3/18/2020    Procedure: INSERTION OF PORTACATH;  Surgeon: Dudley Aldridge MD;  Location: [facility];  Service: General;  Laterality: N/A;    SKIN BIOPSY      TONSILLECTOMY      TRANSESOPHAGEAL ECHOCARDIOGRAM (CINTIA)      VENOUS ACCESS DEVICE (PORT) INSERTION N/A 2020    Procedure: INSERTION VENOUS ACCESS DEVICE;  Surgeon: Nerissa Wang MD;  Location: [facility];  Service: General;  Laterality: N/A;    VENOUS ACCESS DEVICE (PORT) INSERTION AND REMOVAL      VENOUS ACCESS DEVICE (PORT) REMOVAL Right 2020    Procedure: REMOVAL VENOUS ACCESS DEVICE;  Surgeon: Nerissa Wang MD;   "Location: [facility] OR;  Service: General;  Laterality: Right;       Family History  Family History   Problem Relation Age of Onset    Breast cancer Mother 75    Basal cell carcinoma Mother 90        2 small spots on face removed + fair complexion    Heart disease Mother     Hypertension Mother     Diabetes type II Mother     Breast cancer Sister 55        VUS detected on genetic testing CDKN2A gene (c.-33G>C).     Lung disease Sister     Heart disease Father     Throat cancer Father 79    Lung cancer Father 79    Dementia Father     Parkinsonism Father     Cancer Maternal Grandmother 65        unknown gynecologic cancer, cervical vs uterine    Heart disease Maternal Grandmother     Diabetes type II Maternal Grandmother     Lymphoma Maternal Grandfather 64    Breast cancer Paternal Grandmother 70    Prostate cancer Paternal Grandfather 70        metastastic to liver    Breast cancer Paternal Aunt 77    Breast cancer Maternal Aunt 80    Breast cancer Other     Ovarian cancer Sister 66        Elevated ; 1A tumor; Borderline cancer; both ovaries & lg cyst removed    Ovarian cancer Maternal Cousin 54    Breast cancer Other 50    Diabetes type I Nephew        Social History  Social History     Socioeconomic History    Marital status:    Tobacco Use    Smoking status: Never    Smokeless tobacco: Never   Vaping Use    Vaping status: Never Used   Substance and Sexual Activity    Alcohol use: Never    Drug use: Never    Sexual activity: Defer     Partners: Male     Birth control/protection: None       Objective   Vital Signs:   /82 (BP Location: Left arm, Patient Position: Sitting)   Pulse 67   Temp 97.7 °F (36.5 °C) (Temporal)   Ht 162.6 cm (64\")   Wt 76.8 kg (169 lb 6.4 oz)   SpO2 98%   BMI 29.08 kg/m²       Physical Exam     Physical Exam      Gen: Patient in NAD. Pleasant and answers appropriately. A&Ox3.    Skin: Warm and dry with normal turgor. No purpura, rashes, or unusual pigmentation noted. " Hair is normal in appearance and distribution.    HEENT: NC/AT. No lesions noted. Conjunctiva clear, sclera nonicteric. PERRL. EOMI without nystagmus or strabismus. Fundi appear benign. No hemorrhages or exudates of eyes. Auditory canals are patent bilaterally without lesions. TMs intact,  nonerythematous, nonbulging without lesions. Nasal mucosa pink, nonerythematous, and nonedematous. Frontal and maxillary sinuses are nontender. O/P nonerythematous and moist without exudate.    Neck: Supple without lymph nodes palpated. FROM. No carotid bruits appreciated bilaterally.    Lungs: CTA B/L without rales, rhonchi, crackles, or wheezes.    Heart: RRR. S1 and S2 normal. No S3 or S4. No MRGT.    Abd: Soft, nontender,nondistended. (+)BSx4 quadrants.     Extrem: No CCE. Radial pulses 2+/4 and equal B/L. FROMx4. No bone, joint, or muscle tenderness noted.    Neuro: No focal motor/sensory deficits.    Procedures    Result Review :   The following data was reviewed by: Scarlet Loera MD on 04/11/2025:       Results  Laboratory Studies  Urinalysis today shows traces of blood and leukocytes.  Pending urine culture.           Assessment and Plan      Jackie Johnson is a 65 y.o. here for medical followup.    Diagnoses and all orders for this visit:    1. Dysuria (Primary)  -     Urine Culture - Urine, Urine, Clean Catch; Future  -     Urine Culture - Urine, Urine, Clean Catch  -     CBC Auto Differential; Future  -     Comprehensive Metabolic Panel; Future    2. RUQ pain  -     CBC Auto Differential; Future  -     Comprehensive Metabolic Panel; Future  -     Lipase; Future  -     Amylase; Future    3. Encounter for lipid screening for cardiovascular disease  -     Lipid Panel; Future    4. Malignant neoplasm of right breast in female, estrogen receptor negative, unspecified site of breast  -     CBC Auto Differential; Future  -     Comprehensive Metabolic Panel; Future        Assessment & Plan  1.  Dysuria.  Symptoms  include painful urination and increased frequency, with traces of blood and white blood cells in the urine. Differential diagnoses include kidney stones, interstitial cystitis, and potential malignancy. A prescription for Bactrim will be provided, to be filled at [facility] if symptoms worsen. If symptoms persist or recur, an antispasmodic medication may be considered.    2. Right upper quadrant pain.  The pain could be related to the pancreas or gallbladder, especially given the history of chemotherapy. Laboratory tests will be ordered to evaluate pancreatic and gallbladder function, as well as liver and kidney function, and electrolyte levels.    3. Cholesterol screening  A fasting cholesterol test will be conducted. The patient is advised to fast for 8 hours before the test.    4.Breast cancer.  The patient had a recent follow-up at [facility] with no signs of recurrence. Continued follow-up with [facility] is recommended for imaging and yearly skin screenings.    Patient or patient representative verbalized consent for the use of Ambient Listening during the visit with  Scarlet Loera MD for chart documentation. 4/12/2025  02:40 EDT        Follow Up   Return (already has appt).  Findings and plans discussed with patient who verbalizes understanding and agreement. Will followup with patient once results are in. Patient was given instructions and counseling regarding her condition or for health maintenance advice. Please see specific information pulled into the AVS if appropriate.       Scarlet Loera MD

## 2025-04-13 LAB — BACTERIA SPEC AEROBE CULT: ABNORMAL

## 2025-04-14 ENCOUNTER — LAB (OUTPATIENT)
Dept: LAB | Facility: HOSPITAL | Age: 65
End: 2025-04-14
Payer: COMMERCIAL

## 2025-04-14 ENCOUNTER — RESULTS FOLLOW-UP (OUTPATIENT)
Dept: FAMILY MEDICINE CLINIC | Facility: CLINIC | Age: 65
End: 2025-04-14
Payer: COMMERCIAL

## 2025-04-14 DIAGNOSIS — Z13.220 ENCOUNTER FOR LIPID SCREENING FOR CARDIOVASCULAR DISEASE: ICD-10-CM

## 2025-04-14 DIAGNOSIS — R30.0 DYSURIA: ICD-10-CM

## 2025-04-14 DIAGNOSIS — R10.11 RUQ PAIN: ICD-10-CM

## 2025-04-14 DIAGNOSIS — Z13.6 ENCOUNTER FOR LIPID SCREENING FOR CARDIOVASCULAR DISEASE: ICD-10-CM

## 2025-04-14 DIAGNOSIS — Z17.1 MALIGNANT NEOPLASM OF RIGHT BREAST IN FEMALE, ESTROGEN RECEPTOR NEGATIVE, UNSPECIFIED SITE OF BREAST: ICD-10-CM

## 2025-04-14 DIAGNOSIS — C50.911 MALIGNANT NEOPLASM OF RIGHT BREAST IN FEMALE, ESTROGEN RECEPTOR NEGATIVE, UNSPECIFIED SITE OF BREAST: ICD-10-CM

## 2025-04-14 PROCEDURE — 85025 COMPLETE CBC W/AUTO DIFF WBC: CPT

## 2025-04-14 PROCEDURE — 36415 COLL VENOUS BLD VENIPUNCTURE: CPT

## 2025-04-14 PROCEDURE — 82150 ASSAY OF AMYLASE: CPT

## 2025-04-14 PROCEDURE — 83690 ASSAY OF LIPASE: CPT

## 2025-04-14 PROCEDURE — 80053 COMPREHEN METABOLIC PANEL: CPT

## 2025-04-14 PROCEDURE — 80061 LIPID PANEL: CPT

## 2025-04-14 RX ORDER — CIPROFLOXACIN 500 MG/1
500 TABLET, FILM COATED ORAL 2 TIMES DAILY
Qty: 14 TABLET | Refills: 0 | Status: SHIPPED | OUTPATIENT
Start: 2025-04-14

## 2025-04-15 LAB
ALBUMIN SERPL-MCNC: 4.4 G/DL (ref 3.5–5.2)
ALBUMIN/GLOB SERPL: 1.8 G/DL
ALP SERPL-CCNC: 46 U/L (ref 39–117)
ALT SERPL W P-5'-P-CCNC: 27 U/L (ref 1–33)
AMYLASE SERPL-CCNC: 75 U/L (ref 28–100)
ANION GAP SERPL CALCULATED.3IONS-SCNC: 14 MMOL/L (ref 5–15)
AST SERPL-CCNC: 21 U/L (ref 1–32)
BASOPHILS # BLD AUTO: 0.04 10*3/MM3 (ref 0–0.2)
BASOPHILS NFR BLD AUTO: 0.6 % (ref 0–1.5)
BILIRUB SERPL-MCNC: 0.4 MG/DL (ref 0–1.2)
BUN SERPL-MCNC: 16 MG/DL (ref 8–23)
BUN/CREAT SERPL: 17.8 (ref 7–25)
CALCIUM SPEC-SCNC: 9.7 MG/DL (ref 8.6–10.5)
CHLORIDE SERPL-SCNC: 102 MMOL/L (ref 98–107)
CHOLEST SERPL-MCNC: 190 MG/DL (ref 0–200)
CO2 SERPL-SCNC: 19 MMOL/L (ref 22–29)
CREAT SERPL-MCNC: 0.9 MG/DL (ref 0.57–1)
DEPRECATED RDW RBC AUTO: 43.9 FL (ref 37–54)
EGFRCR SERPLBLD CKD-EPI 2021: 71.1 ML/MIN/1.73
EOSINOPHIL # BLD AUTO: 0.03 10*3/MM3 (ref 0–0.4)
EOSINOPHIL NFR BLD AUTO: 0.5 % (ref 0.3–6.2)
ERYTHROCYTE [DISTWIDTH] IN BLOOD BY AUTOMATED COUNT: 13.1 % (ref 12.3–15.4)
GLOBULIN UR ELPH-MCNC: 2.4 GM/DL
GLUCOSE SERPL-MCNC: 84 MG/DL (ref 65–99)
HCT VFR BLD AUTO: 41.4 % (ref 34–46.6)
HDLC SERPL-MCNC: 46 MG/DL (ref 40–60)
HGB BLD-MCNC: 13.6 G/DL (ref 12–15.9)
IMM GRANULOCYTES # BLD AUTO: 0.01 10*3/MM3 (ref 0–0.05)
IMM GRANULOCYTES NFR BLD AUTO: 0.2 % (ref 0–0.5)
LDLC SERPL CALC-MCNC: 130 MG/DL (ref 0–100)
LDLC/HDLC SERPL: 2.79 {RATIO}
LIPASE SERPL-CCNC: 80 U/L (ref 13–60)
LYMPHOCYTES # BLD AUTO: 1.76 10*3/MM3 (ref 0.7–3.1)
LYMPHOCYTES NFR BLD AUTO: 27.8 % (ref 19.6–45.3)
MCH RBC QN AUTO: 30 PG (ref 26.6–33)
MCHC RBC AUTO-ENTMCNC: 32.9 G/DL (ref 31.5–35.7)
MCV RBC AUTO: 91.4 FL (ref 79–97)
MONOCYTES # BLD AUTO: 0.48 10*3/MM3 (ref 0.1–0.9)
MONOCYTES NFR BLD AUTO: 7.6 % (ref 5–12)
NEUTROPHILS NFR BLD AUTO: 4 10*3/MM3 (ref 1.7–7)
NEUTROPHILS NFR BLD AUTO: 63.3 % (ref 42.7–76)
NRBC BLD AUTO-RTO: 0 /100 WBC (ref 0–0.2)
PLATELET # BLD AUTO: 264 10*3/MM3 (ref 140–450)
PMV BLD AUTO: 10.7 FL (ref 6–12)
POTASSIUM SERPL-SCNC: 4 MMOL/L (ref 3.5–5.2)
PROT SERPL-MCNC: 6.8 G/DL (ref 6–8.5)
RBC # BLD AUTO: 4.53 10*6/MM3 (ref 3.77–5.28)
SODIUM SERPL-SCNC: 135 MMOL/L (ref 136–145)
TRIGL SERPL-MCNC: 78 MG/DL (ref 0–150)
VLDLC SERPL-MCNC: 14 MG/DL (ref 5–40)
WBC NRBC COR # BLD AUTO: 6.32 10*3/MM3 (ref 3.4–10.8)

## 2025-05-09 DIAGNOSIS — E53.8 B12 DEFICIENCY: ICD-10-CM

## 2025-05-10 RX ORDER — CYANOCOBALAMIN 1000 UG/ML
INJECTION, SOLUTION INTRAMUSCULAR; SUBCUTANEOUS
Qty: 10 ML | Refills: 1 | Status: SHIPPED | OUTPATIENT
Start: 2025-05-10

## 2025-06-02 ENCOUNTER — TELEPHONE (OUTPATIENT)
Dept: FAMILY MEDICINE CLINIC | Facility: CLINIC | Age: 65
End: 2025-06-02
Payer: COMMERCIAL
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
## 2025-06-26 ENCOUNTER — OFFICE VISIT (OUTPATIENT)
Dept: ONCOLOGY | Facility: CLINIC | Age: 65
End: 2025-06-26
Payer: COMMERCIAL

## 2025-06-26 ENCOUNTER — LAB (OUTPATIENT)
Dept: ONCOLOGY | Facility: CLINIC | Age: 65
End: 2025-06-26
Payer: COMMERCIAL

## 2025-06-26 VITALS
OXYGEN SATURATION: 98 % | RESPIRATION RATE: 18 BRPM | HEART RATE: 75 BPM | SYSTOLIC BLOOD PRESSURE: 124 MMHG | HEIGHT: 64 IN | TEMPERATURE: 97.1 F | BODY MASS INDEX: 28.48 KG/M2 | WEIGHT: 166.8 LBS | DIASTOLIC BLOOD PRESSURE: 73 MMHG

## 2025-06-26 DIAGNOSIS — C50.911 MALIGNANT NEOPLASM OF RIGHT BREAST IN FEMALE, ESTROGEN RECEPTOR NEGATIVE, UNSPECIFIED SITE OF BREAST: ICD-10-CM

## 2025-06-26 DIAGNOSIS — Z17.1 MALIGNANT NEOPLASM OF RIGHT BREAST IN FEMALE, ESTROGEN RECEPTOR NEGATIVE, UNSPECIFIED SITE OF BREAST: ICD-10-CM

## 2025-06-26 DIAGNOSIS — M85.80 OSTEOPENIA, UNSPECIFIED LOCATION: ICD-10-CM

## 2025-06-26 DIAGNOSIS — C50.911 MALIGNANT NEOPLASM OF RIGHT BREAST IN FEMALE, ESTROGEN RECEPTOR NEGATIVE, UNSPECIFIED SITE OF BREAST: Primary | ICD-10-CM

## 2025-06-26 DIAGNOSIS — R10.11 RUQ PAIN: Primary | ICD-10-CM

## 2025-06-26 DIAGNOSIS — E55.9 VITAMIN D DEFICIENCY: ICD-10-CM

## 2025-06-26 DIAGNOSIS — Z79.811 AROMATASE INHIBITOR USE: ICD-10-CM

## 2025-06-26 DIAGNOSIS — R53.83 FATIGUE, UNSPECIFIED TYPE: ICD-10-CM

## 2025-06-26 DIAGNOSIS — Z17.1 MALIGNANT NEOPLASM OF RIGHT BREAST IN FEMALE, ESTROGEN RECEPTOR NEGATIVE, UNSPECIFIED SITE OF BREAST: Primary | ICD-10-CM

## 2025-06-26 LAB
ALBUMIN SERPL-MCNC: 4.3 G/DL (ref 3.5–5.2)
ALBUMIN/GLOB SERPL: 1.9 G/DL
ALP SERPL-CCNC: 38 U/L (ref 39–117)
ALT SERPL W P-5'-P-CCNC: 28 U/L (ref 1–33)
ANION GAP SERPL CALCULATED.3IONS-SCNC: 10.2 MMOL/L (ref 5–15)
AST SERPL-CCNC: 20 U/L (ref 1–32)
BASOPHILS # BLD AUTO: 0.08 10*3/MM3 (ref 0–0.2)
BASOPHILS NFR BLD AUTO: 1.4 % (ref 0–1.5)
BILIRUB SERPL-MCNC: 0.2 MG/DL (ref 0–1.2)
BUN SERPL-MCNC: 15.3 MG/DL (ref 8–23)
BUN/CREAT SERPL: 16.5 (ref 7–25)
CALCIUM SPEC-SCNC: 9.4 MG/DL (ref 8.6–10.5)
CHLORIDE SERPL-SCNC: 102 MMOL/L (ref 98–107)
CO2 SERPL-SCNC: 25.8 MMOL/L (ref 22–29)
CREAT SERPL-MCNC: 0.93 MG/DL (ref 0.57–1)
DEPRECATED RDW RBC AUTO: 44.9 FL (ref 37–54)
EGFRCR SERPLBLD CKD-EPI 2021: 68.3 ML/MIN/1.73
EOSINOPHIL # BLD AUTO: 0.08 10*3/MM3 (ref 0–0.4)
EOSINOPHIL NFR BLD AUTO: 1.4 % (ref 0.3–6.2)
ERYTHROCYTE [DISTWIDTH] IN BLOOD BY AUTOMATED COUNT: 13.2 % (ref 12.3–15.4)
GLOBULIN UR ELPH-MCNC: 2.3 GM/DL
GLUCOSE SERPL-MCNC: 90 MG/DL (ref 65–99)
HCT VFR BLD AUTO: 41.2 % (ref 34–46.6)
HGB BLD-MCNC: 13.3 G/DL (ref 12–15.9)
IMM GRANULOCYTES # BLD AUTO: 0.01 10*3/MM3 (ref 0–0.05)
IMM GRANULOCYTES NFR BLD AUTO: 0.2 % (ref 0–0.5)
LYMPHOCYTES # BLD AUTO: 1.71 10*3/MM3 (ref 0.7–3.1)
LYMPHOCYTES NFR BLD AUTO: 29.6 % (ref 19.6–45.3)
MCH RBC QN AUTO: 29.6 PG (ref 26.6–33)
MCHC RBC AUTO-ENTMCNC: 32.3 G/DL (ref 31.5–35.7)
MCV RBC AUTO: 91.8 FL (ref 79–97)
MONOCYTES # BLD AUTO: 0.44 10*3/MM3 (ref 0.1–0.9)
MONOCYTES NFR BLD AUTO: 7.6 % (ref 5–12)
NEUTROPHILS NFR BLD AUTO: 3.46 10*3/MM3 (ref 1.7–7)
NEUTROPHILS NFR BLD AUTO: 59.8 % (ref 42.7–76)
NRBC BLD AUTO-RTO: 0 /100 WBC (ref 0–0.2)
PLATELET # BLD AUTO: 217 10*3/MM3 (ref 140–450)
PMV BLD AUTO: 10 FL (ref 6–12)
POTASSIUM SERPL-SCNC: 3.6 MMOL/L (ref 3.5–5.2)
PROT SERPL-MCNC: 6.6 G/DL (ref 6–8.5)
RBC # BLD AUTO: 4.49 10*6/MM3 (ref 3.77–5.28)
SODIUM SERPL-SCNC: 138 MMOL/L (ref 136–145)
TSH SERPL DL<=0.05 MIU/L-ACNC: 2.54 UIU/ML (ref 0.27–4.2)
WBC NRBC COR # BLD AUTO: 5.78 10*3/MM3 (ref 3.4–10.8)

## 2025-06-26 PROCEDURE — 80050 GENERAL HEALTH PANEL: CPT

## 2025-06-26 PROCEDURE — 82306 VITAMIN D 25 HYDROXY: CPT

## 2025-06-26 RX ORDER — LETROZOLE 2.5 MG/1
2.5 TABLET, FILM COATED ORAL DAILY
Qty: 30 TABLET | Refills: 11 | Status: SHIPPED | OUTPATIENT
Start: 2025-06-26

## 2025-06-26 NOTE — PROGRESS NOTES
NAME: Jackie Johnson    : 1960    DATE:  2025    DIAGNOSIS:   1.  Clinical Stage IIB ([id number]) poorly differentiated invasive ductal carcinoma of the right breast.  Tumor spanned 15-16 cm, though it was unclear how much of this was invasive malignancy v. DCIS.  There were no clinically or radiographically supspicious axillary LNs.  There was associated high grade DCIS.  Tumor was ER-,LA-, HER-2 +  .  Following neoadjuvant TCHP, she had complete pathologic response - ypT0N0(sn).  No invasive malignancy detected.  0/6 LN involved.     2.  Atypical Lobular Hyperplasia and LCIS involving the L breast    CHIEF COMPLAINT:  Follow up of Breast Cancer    TREATMENT HISTORY:  1.      2.  R Mastectectomy and SLNBx with L Lumpectomy and Mastopexy  - Dr. Kadeem Serrano and Dr. Roc Huston ([facility]) 20    3.       4.  Femara started 3/23/2021    HISTORY OF PRESENT ILLNESS:   Jackie Johnson is a very pleasant 65 y.o. female who was referred by Dr. STACIE Richards for evaluation and treatment of breast cancer. She has a strong family h/o breast cancer and so is normally very good about getting her mammograms done.  She recently moved from [facility] and so missed her mammogram in 2019 because of the move.  She got established with a new PCP (Dr. Denver Sheth) in 2019 and was referred to a a new gynecologist  (Dr. Brayan Richards) who she saw in .  Kevin Richards ordered a screening mammogram.  She had an acute febrile illness around the time of the Super Bowl and at that time thought she might have a lump in her R breast.  While waiting on mammogram scheduling, she started to have pain in the breast and in eventually she would occasionally get a little bit of discharge from the right nipple.  She presented for  Screening mammography 19 as below.  This was followed by diagnostic mammogram and U/S on 20.  She had an abnormal span of 15 cm spanning the  R breast.  She underwent stereotactic biopsy on 1-21-20.    This revealed a poorly differentiated invasive ductal carcinoma of the R breast with associated high grade DCIS.  Tumor was ER/MA- and HER-2+ by IHC.  Her sister lives in [place] and was treated at [facility], so she went there for further evaluation. She saw Dr. Romero of medical oncology and Dr. Serrano of surgical oncology.  She had staging with CT CAP and bone scan as well as Brain MRI and had no evidence of distant metastatic spread.  She had reese breast MRI as well which showed an unexpected abnormality in the L breast described as linear non-mass enhancement spanning an 8 cm area in the inferior breast at 6:00.  This was biopsied on 2-28-20 with biopsy result pending.  No abnormal axillary LNs were identified.  Neoadjuvant chemotherapy was recommended to her by her [facility] team of physicians but she wishes to seek this treatment closer to home so is here today for further evaluation and treatment.    She is in good health overall. She reports an isolated episode of fever/chills during an acute illness, abdominal fullness to her right side, fatigue and a rash circling her neck. She denies changes in weight, cp, sob, persistent abdominal pain, n/v/c/d or bony pain.    INTERVAL HISTORY:  Ms. Johnson presents today for follow up of breast cancer. Since her last visit, she states that she has been well. She continues to follow with [facility] every 6 months with plans for follow up in the fall. She states that she continues to take Femara and Prolia. She denies noticeable side effects. She reports that she has been experiencing intermittent RUQ pain, specifically after eating. She denies nausea, vomiting, diarrhea. She is otherwise without specific complaints today.       PAST MEDICAL HISTORY:  Past Medical History:   Diagnosis Date    Acid reflux     Asthma     Breast cancer 01/2020    rt br ca    Broken leg     Cancer     Eczema     Elevated cholesterol      Endometriosis     GERD (gastroesophageal reflux disease)     Hiatal hernia     High cholesterol     Seasonal allergies     Shaking     INTERMITTANT    Sinusitis     Staph infection 2020    port       PAST SURGICAL HISTORY:  Past Surgical History:   Procedure Laterality Date    BREAST BIOPSY Bilateral      SECTION      COLONOSCOPY N/A 2021    Procedure: COLONOSCOPY FOR SCREENING;  Surgeon: Keerthi Meneses MD;  Location: [facility];  Service: Gastroenterology;  Laterality: N/A;    D & C HYSTEROSCOPY ENDOMETRIAL ABLATION      DIAGNOSTIC LAPAROSCOPY      FRACTURE SURGERY Left     tib/fib    FULGURATION ENDOMETRIOSIS      LEG SURGERY      Left leg following fracture    LYMPH NODE BIOPSY      MASTECTOMY Right     WITH LYMPH NODES REMOVAL    PORTACATH PLACEMENT N/A 3/18/2020    Procedure: INSERTION OF PORTACATH;  Surgeon: Dudley Aldridge MD;  Location: [facility];  Service: General;  Laterality: N/A;    SKIN BIOPSY      TONSILLECTOMY      TRANSESOPHAGEAL ECHOCARDIOGRAM (CINTIA)      VENOUS ACCESS DEVICE (PORT) INSERTION N/A 2020    Procedure: INSERTION VENOUS ACCESS DEVICE;  Surgeon: Nerissa Wang MD;  Location: [facility];  Service: General;  Laterality: N/A;    VENOUS ACCESS DEVICE (PORT) INSERTION AND REMOVAL      VENOUS ACCESS DEVICE (PORT) REMOVAL Right 2020    Procedure: REMOVAL VENOUS ACCESS DEVICE;  Surgeon: Nerissa Wang MD;  Location: [facility];  Service: General;  Laterality: Right;       FAMILY HISTORY:  Family History   Problem Relation Age of Onset    Breast cancer Mother 75    Basal cell carcinoma Mother 90        2 small spots on face removed + fair complexion    Heart disease Mother     Hypertension Mother     Diabetes type II Mother     Breast cancer Sister 55        VUS detected on genetic testing CDKN2A gene (c.-33G>C).     Lung disease Sister     Heart disease Father     Throat cancer Father 79    Lung cancer Father 79    Dementia Father      Parkinsonism Father     Cancer Maternal Grandmother 65        unknown gynecologic cancer, cervical vs uterine    Heart disease Maternal Grandmother     Diabetes type II Maternal Grandmother     Lymphoma Maternal Grandfather 64    Breast cancer Paternal Grandmother 70    Prostate cancer Paternal Grandfather 70        metastastic to liver    Breast cancer Paternal Aunt 77    Breast cancer Maternal Aunt 80    Breast cancer Other     Ovarian cancer Sister 66        Elevated ; 1A tumor; Borderline cancer; both ovaries & lg cyst removed    Ovarian cancer Maternal Cousin 54    Breast cancer Other 50    Diabetes type I Nephew          SOCIAL HISTORY:  Social History     Socioeconomic History    Marital status:    Tobacco Use    Smoking status: Never    Smokeless tobacco: Never   Vaping Use    Vaping status: Never Used   Substance and Sexual Activity    Alcohol use: Never    Drug use: Never    Sexual activity: Defer     Partners: Male     Birth control/protection: None     REVIEW OF SYSTEMS:   A comprehensive 14 point review of systems was performed.  Significant findings as mentioned above.  All other systems reviewed and are negative.      MEDICATIONS:  The current medication list was reviewed in the EMR    Current Outpatient Medications:     calcium carbonate-vitamin d 600-400 MG-UNIT per tablet, Take 2 tablets by mouth Daily., Disp: , Rfl:     Cholecalciferol 125 MCG (5000 UT) tablet, Take 1 tablet by mouth Daily., Disp: , Rfl:     ciprofloxacin (CIPRO) 500 MG tablet, Take 1 tablet by mouth 2 (Two) Times a Day., Disp: 14 tablet, Rfl: 0    cyanocobalamin 1000 MCG/ML injection, INJECT 1 ML IN THE MUSCLE EVERY 7 DAYS, Disp: 10 mL, Rfl: 1    fexofenadine ODT (ALLEGRA ODT) 30 MG disintegrating tablet, Place 1 tablet on the tongue 2 (Two) Times a Day., Disp: 180 tablet, Rfl: 0    ibuprofen (ADVIL,MOTRIN) 200 MG tablet, Take 1 tablet by mouth Every 6 (Six) Hours As Needed for Mild Pain., Disp: , Rfl:     letrozole  "(FEMARA) 2.5 MG tablet, TAKE 1 TABLET BY MOUTH DAILY, Disp: 30 tablet, Rfl: 11    levalbuterol (XOPENEX HFA) 45 MCG/ACT inhaler, Inhale 1-2 puffs Every 4 (Four) Hours As Needed for Wheezing., Disp: 15 g, Rfl: 2    multivitamin with minerals tablet tablet, Take 1 tablet by mouth Daily., Disp: , Rfl:     pravastatin (PRAVACHOL) 20 MG tablet, Take 1 tablet by mouth Daily., Disp: 90 tablet, Rfl: 1    Syringe/Needle, Disp, 25G X 5/8\" 1 ML misc, Use 1 application Every 7 (Seven) Days., Disp: 50 each, Rfl: 1    ubrogepant (UBRELVY) 100 MG tablet, Take 1 tablet by mouth 1 (One) Time As Needed (headache). Can take a second one an hour after if headache persists., Disp: 16 tablet, Rfl: 2    Current Facility-Administered Medications:     cyanocobalamin injection 1,000 mcg, 1,000 mcg, Intramuscular, Q28 Days, Concepcion Queen APRN, 1,000 mcg at 11/24/21 1621    ALLERGIES:    Allergies   Allergen Reactions    Albuterol Other (See Comments)     One time severe headache, questionable for aneurysm, did spinal tap was positive, did Angiogram and MRI were Negative for aneurysm    Penicillins Hives    Clindamycin Hcl Rash    Crestor [Rosuvastatin Calcium] Other (See Comments)     Caused high liver enxymes    Sulfa Antibiotics Other (See Comments) and Swelling     Mucosal lesions    Triprolidine-Pseudoephedrine Swelling    Flonase [Fluticasone] Palpitations    Latex Rash       PHYSICAL EXAM:  Vitals:    06/26/25 1550   BP: 124/73   Pulse: 75   Resp: 18   Temp: 97.1 °F (36.2 °C)   SpO2: 98%         Pain Score    06/26/25 1550   PainSc: 0-No pain       ECOG score: 0   General:  Awake, alert and oriented, appears well. In no acute distress.  HEENT:  Pupils are equal, round and reactive to light and accommodation, Extra-ocular movements full, Oropharyx clear, mucous membranes moist  Neck:  No JVD, thyromegaly or lymphadenopathy   CV:  Regular rate and rhythm, no murmurs, rubs or gallops  Resp:  Lungs are clear to auscultation bilaterally,  no " wheezing   Breast: S/p R mastectomy.  Surgical scars smooth and intact.  S/p L lumpectomy/mastopexy. Surgical scars smooth and intact.  No palpable masses or axillary adenopathy on either side. Mild right axillary lymphedema.  Abd:  Soft, non-tender, non- distended, bowel sounds present, no organomegaly or masses  Ext:  No clubbing, cyanosis, no lower extremity edema  Lymph:  No cervical, supraclavicular, axillary,adenopathy  Neuro:  MS as above, grossly non-focal exam      PATHOLOGY:  02-12-20 08-18-20  A. SENTINEL LYMPH NODE #1 RIGHT AXILLA, BIOPSY:  - 1 lymph node, no tumor present (0/1)  - Cytokeratin stain shows no evidence of metastatic carcinoma    B. SENTINEL LYMPH NODE #2, RIGHT AXILLA, BIOPSY:  - one lymph node, no tumor present (0/1)  - Cytokeratin stain shows no evidence of metastatic carcinoma    C. RIGHT BREAST, TOTAL MASTECTOMY:  - No residual carcinoma identified  - area of fibrosis with associated biopsy clip, consistent with treated tumor bed.   - Proliferative fibrocystic change.   - Skin and nipple, no tumor present.   - four low axillary lymph nodes, no tumor present (0/4).     D. LEFT BREAST, SEGMENTAL MASTECTOMY:  - FOCI OF LOBULAR NEOPLASIA (ATYPICAL LOBULAR HYPERPLASIA AND LOBULAR CARCINOMA IN SITU). LOW GRADE, CLASSIC TYPE, WITH FOCAL PAGETOID SPREAD INTO DUCTS.   - Two prior biopsy site changes present.   - Proliferative fibrocystic change.     E. RIGHT BREAST SKIN, EXCESS, EXCISION:  - Skin and breast tissue, no tumor present.     F. LEFT BREAST, MASTOPEXY:  - Skin and breast tissue, no tumor present    Tumor Template  Specimen Identification   Procedure: Total mastectomy   Specimen Laterality: Right  Invasive Carcinoma   Tumor Focality: NA   Tumor Size: 0   Histologic Type of Invasive Carcinoma: NA   Histologic Grade Based on [place] Histologic Score    Glandular (Acinar)/Tubular Differentiation: Not applicable    Nuclear Pleomorphism: not applicable    Mitotic Rate: not  applicable   Overall Grade: not applicable   Lymphovascular invasion: not present  In Situ Carcinoma   Ductal Carcinoma in Situ (DCIS): Not present   Lobular Carcinoma in situ (LCIS): Not present  Tumor Extension   Skin: uninvolved    Nipple: uninvolved   Skeletal muscle: not applicable  Margins   Invasive Carcinoma Margins: NA    DCIS Margins: NA  Regional Lymph nodes   Uninvolved by tumor cells    Total number of lymph nodes examined: 6    Number of sentinel lymph nodes examined: 2  Treatment effect   Treatment effect in the breast: present   Treatment effect in the lymph nodes: not present  RCB input variables   Dimensions of Residual Cancer that is in-situ: 0%   Total Number of Lymph nodes with Residual Metastatic Carcinoma: 0   Size of Largest Metastasis: NA   RCB Index Computed Value (optional): 0   RCB Class (optional): 0  Pathologic Stage Classification 9ypTNM, AJCC 8th Edition)    Primary Tumor (invasive carcinoma)(ypT): ypT0   Regional lymph nodes (ypN): yPN0(sn)   Distant Metastasis (ypM): NA  Ancillary Studies: Please see previous case [id number]      ENDOSCOPY:        IMAGING:  Bilateral Diagnostic mammogram 12-27-19  FINDINGS:  There are scattered areas of fibroglandular density.     The bilateral fibroglandular pattern is unremarkable in appearance. A  few scattered calcifications are present in the left breast. There are  calcifications in the central aspect of the right breast spanning  approximately 15 cm of tissue in the AP dimension and extending up to  the base of the nipple for which additional imaging is recommended.     IMPRESSION:  1. Benign left mammographic findings.  2. Calcifications in the right breast. Recommend additional imaging.        BI-RADS CATEGORY:  0, INCOMPLETE: Need additional imaging evaluation.     RECOMMENDATION:  Right ML and CC magnification views.      R diagnostic mammogram 01-16-20  FINDINGS:  Magnification imaging of the right breast demonstrates  casting-type  pleomorphic calcifications spanning over 15 cm of tissue in  the AP dimension in the right 6:00 to 7:00 region. Calcifications do  extend up to the base of the nipple. Findings are highly suspicious for  DCIS.     Right breast ultrasound: Focused sonographic evaluation of the right  6:00 to 7:00 region demonstrates a single 0.5 cm irregular hypoechoic  mass associated with a coarse calcification located at 7:00, 3 cm from  the nipple. Ultrasound of the right axilla demonstrates no abnormal  appearing axillary lymph nodes.        IMPRESSION:  Findings are highly suspicious for extensive DCIS in the  right 6:00 to 7:00 region extending into the base of the nipple. There  is also small irregular mass noted on ultrasound in the 7:00 region  suspicious for invasion.        BI-RADS CATEGORY:  5, HIGHLY SUGGESTIVE OF MALIGNANCY        RECOMMENDATION:  Recommend stereotactic guided core biopsy of the  anterior and posterior aspect of the calcifications in the 6:00 to 7:00  region to determine extent of disease. Ultrasound-guided core biopsy of  the right 7:00 mass may also be warranted pending pathology results of  the calcifications.    Diagnostic bilateral mammogram 02-25-20  FINDINGS:  There are scattered areas of fibroglandular density.    1:  There are fine-linear branching calcifications measuring 16 x 7 x 7  centimeters with segmental distribution and associated post biopsy clip in the  right breast lower hemisphere at 6 to 7 o'clock.  There are 2 post biopsy clips  noted.  The calcifications extend to the inferior skin and nipple.    2:  There are amorphous calcifications measuring 0.3 centimeters in the left  breast lower hemisphere at 6 to 7 o'clock located 4 centimeters from the nipple.    IMPRESSION:  1:  Fine-linear branching calcifications in the right breast lower hemisphere at  6 to 7 o'clock are known biopsy-proven malignancy. Ultrasound is recommended for  staging. Recommend appropriate evaluation and  treatment of this known malignancy.    2:  Amorphous calcifications in the left breast lower hemisphere at 6 to 7  o'clock located 4 centimeters from the nipple are suspicious. Stereotactic  biopsy is recommended.    BI-RADS Category 4:  Suspicious Abnormality      US Chest 02-25-20  Findings:       Right Breast: The extent of calcified disease is best visualized by mammography. There are dilated ducts with internal calcifications vascularity extending toward the nipple at the 7 o'clock position. This is consistent with the patient's known biopsy-proven malignancy.      Right Charles Basins: No suspicious axillary (level I, II & III) or internal mammary lymphadenopathy is noted.      IMPRESSION:    1. Known RIGHT breast malignancy is best visualized by mammography. A MRI may be obtained for evaluation of disease.    2. No suspicious RIGHT-sided lymphadenopathy      ACR BI-RADS Category: 6. Known malignancy.  Recommend appropriate evaluation and treatment of the known malignancy.         R breast US 02-25-20  Findings:       Right Breast: The extent of calcified disease is best visualized by mammography. There are dilated ducts with internal calcifications vascularity extending toward the nipple at the 7 o'clock position. This is consistent with the patient's known biopsy-proven malignancy.      Right Charles Basins: No suspicious axillary (level I, II & III) or internal mammary lymphadenopathy is noted.      IMPRESSION:    1. Known RIGHT breast malignancy is best visualized by mammography. A MRI may be obtained for evaluation of disease.    2. No suspicious RIGHT-sided lymphadenopathy      ACR BI-RADS Category: 6. Known malignancy.  Recommend appropriate evaluation and treatment of the known malignancy.       MRI Brain w/wo contrast 02-27-20  FINDINGS:     No abnormal parenchymal or leptomeningeal enhancement is noted. The brain parenchyma is unremarkable except of small scattered T2 FLAIR hyperintensities consistent  with chronic microangiopathic changes. No acute ischemia, intra or extra-axial hemorrhage, mass effect or midline shift. No hydrocephalus is noted.    The osseous structures and extra-cranial soft tissues are unremarkable.    The orbital structures unremarkable.    The paranasal sinuses and mastoid air cells are clear.    IMPRESSION:  No evidence of metastatic disease.      CTCAP 02-28-20    Findings:       CHEST:  The breasts are ptotic and there are biopsy clips projecting between the lower quadrants of the right breast.    On image 99 of series 3, there is a nonspecific 1 cm soft tissue nodule in the lower outer quadrant of the right breast.    No lung nodules or pleural effusions are present.    No axillary, internal mammary, mediastinal or hilar adenopathy is seen.    No suspicious bone lesions are present.      ABDOMEN and PELVIS:  The liver is borderline enlarged and diffusely fatty infiltrated without measurable mass or biliary ductal dilatation.    The gallbladder appears normal.    The spleen, pancreas and adrenal glands appear normal.    The kidneys demonstrate function without hydronephrosis and there is a small low dense nidus in the mid right kidney, likely a cyst.    No adenopathy or ascites are seen in the abdomen or pelvis.    A normal-appearing retrocecal appendix is seen.    Multilevel degenerative-like bone changes are present.      IMPRESSION:  No evidence for metastatic disease the chest, abdomen or pelvis.      MRI Breast bilateral 03-02-20    Findings:  The breast composition is heterogeneous fibroglandular tissue. There is no significant early background parenchymal enhancement.    Right breast: There is linearly oriented nonmass enhancement involving the lateral and inferior aspects (anterior, middle, and posterior third) of the right breast 6 - 7 o'clock position extending 17 cm from the base of the nipple to the pectoralis muscle (series 5 image  of 256). There is no evidence for  pectoralis muscle invasion. Extension to the inferior skin is better demonstrated mammographically. Susceptibility artifact from 2 postbiopsy clips is noted.    Left breast: There is linearly oriented nonmass enhancement in the inferior aspect (middle third) of the left breast 6 o'clock position extending approximately 8 cm (series 5 image  of 256). There is no evidence for nipple, skin, or pectoralis muscle involvement. Stereotactic guided biopsy of calcifications at the 6 o'clock position to be performed later today and corresponds to the anterior aspect of the nonmass enhancement. MRI guided biopsy of nonmass enhancement seen on image 94 of 256, series 5 and image 40 of 160, series 8 is recommended (The images have been annotated with a [place]).    Charles Basins: There is no lymphadenopathy in the visualized axillary or internal mammary regions.    IMPRESSION:  Linear nonmass enhancement representing known malignancy right breast as above. Linear nonmass enhancement left breast as above.   MRI guided biopsy of nonmass enhancement seen on image 94 of 256, series 5 and image 40 of 160, series 8 is recommended.    ACR BI-RADS Category: 6. Recommend MR-guided biopsy of nonmass enhancement left breast as above .Recommend appropriate evaluation and treatment of the known malignancy.       Bone scan 03-02-20  FINDINGS:     Radiotracer uptake secondary to degenerative changes are seen involving the shoulders, hips, knees, and feet. Increased uptake along the right tibial shaft likely is likely reactive. Faint focus of activity within the left tibial shaft is compatible with prior injury/fracture. Increased uptake in the skull is compatible with a benign hyperostosis.    Physiologic uptake of the bilateral kidneys and bladder.    IMPRESSION:    No scintigraphic evidence of skeletal metastases.      Echo 03-02-20      Bilateral diagnostic mammogram 05-26-20  FINDINGS: There are scattered areas of fibroglandular  density.     A postbiopsy marking clip is now noted in the left breast with expected  surrounding postbiopsy changes best visualized on MLO imaging. The  remaining bilateral fibroglandular pattern is stable in appearance.  Redemonstrated are fine linear branching calcifications in the right  6:00 to 7:00 region spanning approximately 17 cm of tissue in the AP  dimension. There are 2 associated postbiopsy marking clips. No new areas  of calcification are seen in either breast.     IMPRESSION:  1. Benign left mammographic findings.        2. No significant change in the appearance of calcifications in the  right breast.        BI-RADS CATEGORY:  6, KNOWN BIOPSY PROVEN MALIGNANCY        RECOMMENDATION:  Recommend clinical follow-up.       Echo 07-22-20  Normal left ventricular cavity size and wall thickness noted. All left ventricular wall segments contract normally.  Estimated EF appears to be in the range of 61 - 65%  The pericardium is normal. There is no evidence of pericardial effusion.      DEXA 09-25-20  FINDINGS: The BMD measured at the AP spine L1-L4 is 1.003 gm/cm2 with a  T-score of -1.5.      IMPRESSION:  The patient is considered to be osteopenic according to the  World Health Organization criteria. Fracture risk is moderate      MRI L spine wo contrast 09-28-20  FINDINGS:  Sagittal alignment is normal. Bone marrow signal intensity is normal. There is disc desiccation L1-2 through L3-4 with mild multiple level loss of intervertebral disc height. Endplate spondylosis and Schmorl's node formation most apparent at L1-2 and  L2-3. Conus medullaris terminates at L1-2 and is normal.     At L1-2, there is a mild concentric disc bulge and endplate spondylosis. There is no canal stenosis or foraminal impingement.     At L2-3, there is concentric disc bulging and mild facet hypertrophy but no canal or foraminal impingement.     At L3-4, is mild facet degenerative change left greater the right with ligamentum flavum  thickening and a broad posterior disc bulge. There is no canal stenosis. There is mild inferior foraminal narrowing bilaterally.     L4-5, there is moderate facet degenerative change bilaterally with ligamentum flavum thickening. Is mild bilateral foraminal narrowing. There is no canal stenosis.     At L5-S1, there is a broad posterior disc bulge. There is a tiny posterior annular fissure. There is no canal stenosis. There is a small focal left posterolateral protrusion into the left inferior foramen with mild to moderate left inferior foraminal  narrowing. There is mild bilateral facet degenerative change.     IMPRESSION:     1. There is no evidence for osseous metastatic disease.  2. There are lumbar degenerative changes without evidence for lumbar canal stenosis. Details above.      Echo 09-29-20  The study is technically difficult for diagnosis.  Normal left ventricular cavity size and wall thickness noted. All left ventricular wall segments contract normally.  Left ventricular ejection fraction appears to be 61 - 65%.  The aortic valve is structurally normal with no regurgitation or stenosis present.  The mitral valve is structurally normal with no regurgitation or significant stenosis present.  There is no evidence of pericardial effusion.       Echo 12-20-20  Normal left ventricular cavity size and wall thickness noted. All left ventricular wall segments contract normally.  Left ventricular ejection fraction appears to be 61 - 65%.  The pericardium is normal. There is no evidence of pericardial effusion. .  Comments: LV systolic function is about the same as on the previous echo study.      Echo 03-19-21  Normal left ventricular cavity size and wall thickness noted. All left ventricular wall segments contract normally.  Left ventricular ejection fraction appears to be 61 - 65%.  There is no evidence of pericardial effusion.      Echo 06-01-21  Normal left ventricular cavity size and wall thickness noted. All  left ventricular wall segments contract normally.  Left ventricular ejection fraction appears to be 66 - 70%.  There is no evidence of pericardial effusion. .        Left diagnostic mammogram 09-23-21  FINDINGS:   The breast is heterogeneously dense, which may obscure small masses.     1:  There is a post surgical scar with associated surgical clips in the left   breast.     2:  There are benign appearing calcifications with scattered distribution and   associated post reduction change in the left breast.     Tomosynthesis performed in CC and MLO projections.     IMPRESSION:   There is no mammographic evidence of malignancy.     Follow-up mammogram in 1 year is recommended.     BI-RADS Category 2:   Benign Finding(s)        DEXA 09-23-21  FULL RESULT:       Examination: Bone Mineral Density (DXA), 9/23/2021     Clinical History: 61-year-old postmenopausal female with breast cancer.     Indication: Assessment of bone mineral density.     Comparison: None.     Technique: Bone mineral density was obtained using Hologic dual-energy X-ray absorptiometry.     Findings: The findings are provided in the below table(s).       Bone Density:   ---------------------------------------------------------------------------   Region     Exam Date   BMD      T-        Z-                                            g/cm2  Score  Score   ---------------------------------------------------------------------------   AP Spine (L1-L4)                    09/23/2021  0.863   -1.7     -0.1       Femoral Neck (Left)                    09/23/2021  0.712   -1.2      0.1       Total Hip (Left)                    09/23/2021  0.814   -1.0      0.0       Femoral Neck (Right)                    09/23/2021  0.808   -0.4      1.0       Total Hip (Right)                    09/23/2021  0.840   -0.8      0.2       -----------------------------------------------------------------   \For postmenopausal women and men age 50 and over, the World Health    Organization criteria for BMD interpretation classify patients as: Normal   (T-score at or above -1.0), Osteopenia (T-score between -1.0 and     -2.5), or Osteoporosis (T-score at or below -2.5).     IMPRESSION:   Osteopenia based on measurements of the lumbar spine and left femoral neck.     Mammography Digital Diagnostic Left with Faisal (10/03/2022 14:08)  FINDINGS:   The breast is heterogeneously dense, which may obscure small masses.     1:  There is post reduction change in the left breast.     2:  There are surgical clips in the left breast.     Patient is status post right mastectomy for IDC in 2020.     Tomosynthesis performed in CC and MLO projections.    Impression:  There is no mammographic evidence of malignancy.     Follow-up mammogram in 1 year is recommended.     I personally reviewed these image(s) along with the resident's/fellow's   interpretations, certify that if a procedure was performed I was physically   present, and agree with the final report.     BI-RADS Category 2:   Benign Finding(s)    MRI Brain with and without Contrast @ [facility] (04/10/2023)   FINDINGS:     Intracranial:   There is no acute hemorrhage or acute infarct.   There is no mass effect or midline shift.   The ventricles and extra-axial spaces are appropriate for age.   There is no worrisome parenchymal or leptomeningeal enhancement.   There is no sellar or suprasellar abnormalities.     Bone:   There are no suspicious calvarial or skull base lesions.     Extracranial:   The orbits are unremarkable.   The visualized paranasal sinuses are predominantly clear.   The mastoid air cells are clear.     IMPRESSION:    No acute intracranial abnormality.   No intracranial metastasis.     NM Bone Mineral Density Both Hips and Spine @ [facility] (10/05/2023)   MPRESSION:   Osteopenia based on measurements of the lumbar spine, left femoral neck and bilateral total hips.     Statistically significant decrease in bone mineral density  within the lumbar spine and left total hip dated 10/03/2022.     Mammography Digital Diagnostic Left with Faisal @ [facility] (10/05/2023)   FINDINGS:   The breast is heterogeneously dense, which may obscure small masses.     1:  There is post reduction change in the left breast.     2:  There are surgical clips in the left breast.     Tomosynthesis performed in CC and MLO projections.     IMPRESSION:  There is no mammographic evidence of malignancy.     Follow-up mammogram in 1 year is recommended.     BI-RADS Category 2:   Benign Finding(s)     US Breast Complete - Left @ [facility] (10/06/2023)  IMPRESSION:  1:  Area in the left axilla is benign.     2:  Post surgical scar in the left breast is benign.     Clinical follow-up for the area of palpable concern is recommended.     Follow-up mammogram in 1 year is recommended.     BI-RADS Category 2:   Benign Finding(s)      MRI BREAST BILATERAL SCREENING W WO CONTRAST (04/04/2024 19:35)   MRI FINDINGS:         1:  There is a signal void from clip/foreign body and post surgical scar in the   lower outer region of the left breast.  Postsurgical scar at the site of   excisional biopsy for ALH is demonstrated within the left lower outer breast.   D. LEFT BREAST, SEGMENTAL MASTECTOMY:   FOCI OF LOBULAR NEOPLASIA (ATYPICAL LOBULAR HYPERPLASIA AND LOBULAR CARCINOMA IN   SITU), LOW-GRADE, CLASSIC TYPE, WITH FOCAL PAGETOID SPREAD INTO DUCTS.   Two prior biopsy site changes present.   Proliferative fibrocystic change.     There is no suspicious enhancement associated with the scar.   Postsurgical changes also in keeping with mastopexy are demonstrated.  Benign   areas of fat necrosis are also noted.  There is no suspicious mass, non-mass   enhancement, or secondary sign of malignancy within the left breast.     There is no suspicious lymphadenopathy In left axillary or internal mammary   minerva basins.     2:  Patient is status post right total mastectomy with right axillary  sentinel   lymph node biopsy with negative sentinel lymph node in 2020.  There is no   suspicious enhancement within the right chest wall.  Postsurgical changes are   demonstrated with susceptibility artifact.  There is no suspicious   lymphadenopathy within the right axillary or internal mammary minerva basins.     Impression    1:  Signal void from clip/foreign body and post surgical scar in the lower outer  region of the left breast is benign. Patient will be due for left mammogram in  October 2024    2:  Area in the right breast is benign.    Follow-up mammogram in 6 months is recommended.    BI-RADS Category 2:  Benign Finding(s)    Mammo Diagnostic Digital Tomosynthesis Left With CAD (10/09/2024 09:44)   Impression    There is no mammographic evidence of malignancy in the left breast.    Overall BI-RAD Category: 2 - Benign    Recommend return to annual screening mammography, due on 10/8/2025.  Narrative    CLINICAL INDICATION:  Patient is a 64 y.o. female and is seen for history of breast cancer.      Mammography Digital Diagnostic Left with Faisal  Computer-aided detection was utilized by the radiologist in the  interpretation of this examination.  Tomosynthesis was performed in CC and MLO projections.    COMPARISON:  The present examination has been compared to prior imaging studies  performed : 07/16/2020 Stereotactic Breast Biopsy Left at [facility],  08/17/2020 Mammography Guided Seed Placement Left at [facility],  09/23/2021 Mammography Digital Diagnostic Left with Faisal at [facility],  10/03/2022 Mammography Digital Diagnostic Left with Faisal at [facility],  10/05/2023 Mammography Digital Diagnostic Left with Faisal at [facility]    FINDINGS:  There are scattered areas of fibroglandular density.    1:  There is post reduction change in the left breast.    2:  There are surgical clips in the left breast.      NM Bone Mineral Density Both Hips and Spine (10/09/2024 11:52)   Impression    1.  Osteopenia based  on measurements of the lumbar spine, left femoral neck and left total hip.  2.  No statistically significant interval change in bone mineral density compared to prior on 10/05/2023.    Narrative    FULL RESULT:    Examination: Bone Mineral Density (DXA), 10/9/2024    Clinical History: A 64-year-old postmenopausal female with breast cancer, on hormonal therapy.    Indication: Assessment of bone mineral density..    Comparison: Bone mineral density scan 10/05/2023    Technique: Bone mineral density was obtained using Hologic dual-energy X-ray absorptiometry.    Findings: The findings are provided in the below table(s).      Bone Density:  ---------------------------------------------------------------------------  Region     Exam Date   BMD      T-        Z-                                           g/cm2  Score  Score  ---------------------------------------------------------------------------  AP Spine (L1-L4)                   10/09/2024  0.857   -1.7      0.0      Femoral Neck (Left)                   10/09/2024  0.638   -1.9     -0.4      Total Hip (Left)                   10/09/2024  0.785   -1.3     -0.1      Femoral Neck (Right)                   10/09/2024  0.798   -0.5      1.0      Total Hip (Right)                   10/09/2024  0.839   -0.8      0.4            RECENT LABS:  Lab Results   Component Value Date    WBC 5.78 06/26/2025    HGB 13.3 06/26/2025    HCT 41.2 06/26/2025    MCV 91.8 06/26/2025    RDW 13.2 06/26/2025     06/26/2025    NEUTRORELPCT 59.8 06/26/2025    LYMPHORELPCT 29.6 06/26/2025    MONORELPCT 7.6 06/26/2025    EOSRELPCT 1.4 06/26/2025    BASORELPCT 1.4 06/26/2025    NEUTROABS 3.46 06/26/2025    LYMPHSABS 1.71 06/26/2025       Lab Results   Component Value Date     (L) 04/14/2025    K 4.0 04/14/2025    CO2 19.0 (L) 04/14/2025     04/14/2025    BUN 16 04/14/2025    CREATININE 0.90 04/14/2025    EGFRIFNONA 72 10/03/2022    EGFRIFAFRI 83 10/03/2022    GLUCOSE 84  04/14/2025    CALCIUM 9.7 04/14/2025    ALKPHOS 46 04/14/2025    AST 21 04/14/2025    ALT 27 04/14/2025    BILITOT 0.4 04/14/2025    ALBUMIN 4.4 04/14/2025    PROTEINTOT 6.8 04/14/2025    MG 2.4 03/11/2023       Lab Results   Component Value Date    TSH 2.060 12/26/2024    USKM87LY 55.0 12/26/2024       Lab Results   Component Value Date     15.1 03/11/2020     Lab Results   Component Value Date    LABCA2 19.0 03/11/2020       ASSESSMENT & PLAN:  Jackie Johnson is a very pleasant 65 y.o. female with what is clinically a Stage IIB ([id number]) poorly differentiated invasive ductal carcinoma of the right breast.  Tumor spanned 15-16 cm although it was difficult to tell how much of this was invasive malignancy v. DCIS.  There were no clinically or radiographically supspicious axillary LNs.  There was associated high grade DCIS.  Tumor was ER-,MI-, HER-2 +.  She had pathologic complete response with neoadjuvant therapy.  She also has LCIS/ALH involving the left breast.    1.  Right breast cancer:  -  Administered TCHP neoadjuvantly to downsize tumor and potentially allow for a more successful surgery. After 6 cycles of TCHP, she had a complete pathologic response at time of mastectomy.  -  Ddiscussed consideration of radiation given the initial size of her tumor.  It really isn't known how much of her initial disease was invasive malignancy v. DCIS.  She says she was evaluated by a Radiation Oncologist at [facility] who didn't recommend radiation.  -  Given HER-2 positive disease, recommended we complete a year of HP which she tolerated well. Post-treatment echo with normal LVEF.    - L mammogram done 10/9/2024 at [facility] without cause for concern. Recommended follow up left mammogram annually.     2. L Breast LCIS / ALH:  -  She is s/p lumpectomy.  -  Discussed role for 5 years of hormonal therapy in prevention of future breast cancer development in this breast.  She is post-menopausal, so could use  "Tamoxifen or an aromatase inhibitor for this purpose.  She has an entact uterus so Tamoxifen would come with increased risk (albeit low risk) of development of endometrial hypertrophy/ endometrial cancer.  Aromatase inhibitors would not come with this risk, but would come with risk for loss of bone mineral density and potentially increased risk of side effects.    -  DEXA showed osteopenia with T score -1.5, so recommended Tamoxifen for prevention.  Ms. Johnson was not comfortable with risk of endometrial hyperplasia / cancer so she said she would prefer aromatase inhibitor.  In particular she would like to take Femara so ordered this with plan for 5 years of treatment.  This was started on 3/23/2021.  She continues to tolerate Femara well without any significant side effects.  Will continue.  She had repeat DEXA at [facility] again 10/9/2024 showing continued osteopenia, no worsening.  She continues q6 monthly Prolia therapy which she is receiving at [facility].  -  Clinically she is doing well. Exam and lab today are without cause for concern.  - Will continue Femara. Refills provided today.    3.  Bone health:  -  Ms. Johnson has osteopenia.  Pre-treatment  T score -1.5.  She also has a h/o Vit D Deficiency for which she took a course of weekly high dose Vit D (now completed). Repeat Vit D Level normal. Continue Ca/Vit D BID.  Recommended weight bearing exercise.  -  At [facility], they repeated DEXA after 1 year (9-23-21) and T score was -1.7.  They repeated again after 1 year with T score -1.7.  Repeat DEXA scan performed 10/5/2023 showed a T-score of -1.9 with slight worsening of osteopenia, and Prolia therapy was started. New DEXA on 10/9/24 shows no worsening. She continues Prolia.   -  Vit D has been replete. Repeat level is pending from today.     4.  Unusual sense of \"vibration\" and family h/o HSP (with SPG7 mutation):   - Had some evaluation at [facility] with imaging, EEG which was " unremarkable.  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 This is not a likely cause for the symptoms she describes.  She more recently followed up with Neurology at [facility] in September 2021.  They told her they didn't feel any further neurologic evaluation would be helpful and suggested she see someone in a different specialty such as an endocrinologist.  She saw a neurogeneticist at [place] (as this is where her daughter goes) and was tested and was told she didn't have what her daughter has.  I'm not sure what is causing her symptoms. Again told her that I don't feel this is related to her cancer or its treatment.  Happily whatever is causing it, her symptoms seem to be improving.    5.  Very strong family history of malignancy including several members with breast cancer and a sister with ovarian cancer.  - She underwent genetic testing at [facility] and testing was negative.  - She had  level drawn at [facility] which was within normal limits.    6. RUQ pain  -  Reports intermittent RUQ pain, worse after eating. Will obtain gallbladder ultrasound as she reports previous history of gallbladder sludge in 2013. If negative, could consider HIDA scan, which we discussed.     7.  Prophylaxis:  She has had 2023 flu vaccine as well as Prevnar 13. She has received COVID vaccine x 3 (Moderna). She received COVID bivalent booster.  She received Tdap.  Recommended RSV vaccination.  Also recommended 2023 fall COVID booster to be performed 3 months after her infection (or around January 2024).    8.  Follow-up:   - Will obtain gallbladder ultrasound within the next two weeks.  - Continue Femara  - Continue Ca/Vit D. Continue Prolia per [facility].  - Follow up with Dr. Obrien in 6 months with CBCD, CMP, TSH and vitamin D.       ACO / MARNI/Other  Quality measures  -   Jackie Johnson did not receive 2024 flu vaccine.  This was recommended.  -  Jackie Johnson reports a pain score of 0.    -  Current outpatient and discharge medications have been reconciled for the patient.  Reviewed by: SHER Loo        I spent 30 minutes with Jackie Johnson today.  In the office today, more than 50% of this time was spent face-to-face with her  in counseling / coordination of care, reviewing her interim medical history and counseling on the current treatment plan.  All questions were answered to her satisfaction.               Electronically Signed by: SHER Loo          CC:   MD JUAN PABLO Macdonald MD Bora Lim, MD- [facility]  Kadeem Serrano MD- [facility]  Dr. Roc Huston - [facility]

## 2025-06-26 NOTE — PROGRESS NOTES
Venipuncture Blood Specimen Collection  Venipuncture performed in left arm by Norma Mcclure MA with good hemostasis. Patient tolerated the procedure well without complications.   06/26/25   Norma Mcclure MA

## 2025-06-27 LAB — 25(OH)D3 SERPL-MCNC: 52 NG/ML (ref 30–100)

## 2025-07-08 ENCOUNTER — DOCUMENTATION (OUTPATIENT)
Dept: ONCOLOGY | Facility: CLINIC | Age: 65
End: 2025-07-08
Payer: COMMERCIAL

## 2025-07-08 ENCOUNTER — HOSPITAL ENCOUNTER (OUTPATIENT)
Facility: HOSPITAL | Age: 65
Discharge: HOME | End: 2025-07-08
Admitting: NURSE PRACTITIONER
Payer: COMMERCIAL

## 2025-07-08 DIAGNOSIS — R10.11 RUQ PAIN: ICD-10-CM

## 2025-07-08 PROCEDURE — 76705 ECHO EXAM OF ABDOMEN: CPT | Performed by: RADIOLOGY

## 2025-07-08 PROCEDURE — 76705 ECHO EXAM OF ABDOMEN: CPT

## 2025-07-08 NOTE — PROGRESS NOTES
Called patient to discuss results of gallbladder ultrasound. No gallstones or sludge visualized. Discussed additional workup with HIDA scan. She states that symptoms seem to be improved as she has been eating out less. She has upcoming appointment with her PCP at the end of the month and will discuss further with her at that time. She denies further concerns.        SHER Loo

## 2025-07-24 ENCOUNTER — OFFICE VISIT (OUTPATIENT)
Dept: FAMILY MEDICINE CLINIC | Facility: CLINIC | Age: 65
End: 2025-07-24
Payer: COMMERCIAL

## 2025-07-24 ENCOUNTER — LAB (OUTPATIENT)
Dept: FAMILY MEDICINE CLINIC | Facility: CLINIC | Age: 65
End: 2025-07-24
Payer: COMMERCIAL

## 2025-07-24 VITALS
SYSTOLIC BLOOD PRESSURE: 118 MMHG | DIASTOLIC BLOOD PRESSURE: 70 MMHG | HEART RATE: 76 BPM | HEIGHT: 64 IN | OXYGEN SATURATION: 97 % | TEMPERATURE: 97.1 F | BODY MASS INDEX: 27.96 KG/M2 | WEIGHT: 163.8 LBS

## 2025-07-24 DIAGNOSIS — R10.11 RIGHT UPPER QUADRANT PAIN: ICD-10-CM

## 2025-07-24 DIAGNOSIS — R25.2 LEG CRAMPS: ICD-10-CM

## 2025-07-24 DIAGNOSIS — E78.2 MIXED HYPERLIPIDEMIA: ICD-10-CM

## 2025-07-24 DIAGNOSIS — R25.2 LEG CRAMPS: Primary | ICD-10-CM

## 2025-07-24 LAB — MAGNESIUM SERPL-MCNC: 2.3 MG/DL (ref 1.6–2.4)

## 2025-07-24 PROCEDURE — 36415 COLL VENOUS BLD VENIPUNCTURE: CPT

## 2025-07-24 PROCEDURE — 99214 OFFICE O/P EST MOD 30 MIN: CPT | Performed by: FAMILY MEDICINE

## 2025-07-24 PROCEDURE — 83735 ASSAY OF MAGNESIUM: CPT | Performed by: FAMILY MEDICINE

## (undated) DEVICE — SUT VIC 3/0 SH 27IN J416H

## (undated) DEVICE — GLV SURG PREMIERPRO MIC LTX PF SZ7.5 BRN

## (undated) DEVICE — TUBING, SUCTION, 1/4" X 20', STRAIGHT: Brand: MEDLINE INDUSTRIES, INC.

## (undated) DEVICE — PAD GRND REM POLYHESIVE A/ DISP

## (undated) DEVICE — PK BASIC 70

## (undated) DEVICE — HOLDER: Brand: DEROYAL

## (undated) DEVICE — DRAPE,UTILTY,TAPE,15X26, 4EA/PK: Brand: MEDLINE

## (undated) DEVICE — DRP C/ARM W/BAND W/CLIPS 41X74IN

## (undated) DEVICE — SINGLE PORT MANIFOLD: Brand: NEPTUNE 2

## (undated) DEVICE — Device: Brand: DEFENDO AIR/WATER/SUCTION AND BIOPSY VALVE

## (undated) DEVICE — KT CVR ULTRASND PROB PULL UP LXF 5X8

## (undated) DEVICE — NDL HYPO ECLPS SFTY 22G 1 1/2IN

## (undated) DEVICE — DECANT BG O JET

## (undated) DEVICE — DRAPE,T,LAPARO,TRANS,STERILE: Brand: MEDLINE

## (undated) DEVICE — INTENDED FOR TISSUE SEPARATION, AND OTHER PROCEDURES THAT REQUIRE A SHARP SURGICAL BLADE TO PUNCTURE OR CUT.: Brand: BARD-PARKER ® CARBON RIB-BACK BLADES

## (undated) DEVICE — GOWN,REINF,POLY,ECL,PP SLV,XL: Brand: MEDLINE

## (undated) DEVICE — SKIN AFFIX SURG ADHESIVE 72/CS 0.55ML: Brand: MEDLINE

## (undated) DEVICE — BNDG ADHS CURAD FLX/FABRC 2X4IN STRL LF

## (undated) DEVICE — DRSNG WND SIL OPTIFOAM GENTLE BRDR ADHS W/SA 6X6IN

## (undated) DEVICE — NDL HYPO ECLPS SFTY 18G 1 1/2IN

## (undated) DEVICE — Device

## (undated) DEVICE — TRY DRSNG CENTRL LN UC44 FCP

## (undated) DEVICE — CONN Y IRR DISP 1P/U

## (undated) DEVICE — SYR LUERLOK 30CC

## (undated) DEVICE — GLV SURG PREMIERPRO MIC LTX PF SZ7 BRN

## (undated) DEVICE — ENDOGATOR AUXILIARY WATER JET CONNECTOR: Brand: ENDOGATOR

## (undated) DEVICE — SYR LL TP 10ML STRL

## (undated) DEVICE — APPL CHLORAPREP HI/LITE 26ML ORNG

## (undated) DEVICE — SUT VIC 4/0 P3 18IN J494G

## (undated) DEVICE — SUT MNCRYL PLS ANTIB UD 4/0 PS2 18IN

## (undated) DEVICE — SYR LUERLOK 5CC

## (undated) DEVICE — SUT PROLN 3/0 8832H

## (undated) DEVICE — POWERLOC 22G X 0.75 INCH WITH Y-SITE: Brand: PORT ACCESS NEEDLE

## (undated) DEVICE — SUCTION CANISTER, 1500CC, RIGID: Brand: DEROYAL